# Patient Record
Sex: FEMALE | Race: BLACK OR AFRICAN AMERICAN | Employment: OTHER | URBAN - METROPOLITAN AREA
[De-identification: names, ages, dates, MRNs, and addresses within clinical notes are randomized per-mention and may not be internally consistent; named-entity substitution may affect disease eponyms.]

---

## 2017-01-18 ENCOUNTER — TELEPHONE (OUTPATIENT)
Dept: FAMILY MEDICINE CLINIC | Age: 82
End: 2017-01-18

## 2017-01-18 RX ORDER — DULOXETIN HYDROCHLORIDE 30 MG/1
30 CAPSULE, DELAYED RELEASE ORAL DAILY
Qty: 30 CAP | Refills: 3 | Status: SHIPPED | OUTPATIENT
Start: 2017-01-18 | End: 2017-05-17 | Stop reason: SDUPTHER

## 2017-01-18 NOTE — TELEPHONE ENCOUNTER
Pt states her feet and legs are burning and aching. Please call something in for her and call her back.     Pt# [phone]

## 2017-01-18 NOTE — TELEPHONE ENCOUNTER
Neuropathy is worse since being off of the gabapentin. Will add cymbalta 30 mg daily. Follow up in 1 month.

## 2017-02-17 RX ORDER — GLIPIZIDE 2.5 MG/1
TABLET, EXTENDED RELEASE ORAL
Qty: 90 TAB | Refills: 3 | Status: SHIPPED | OUTPATIENT
Start: 2017-02-17 | End: 2018-02-08 | Stop reason: SDUPTHER

## 2017-02-22 ENCOUNTER — OFFICE VISIT (OUTPATIENT)
Dept: FAMILY MEDICINE CLINIC | Age: 82
End: 2017-02-22

## 2017-02-22 ENCOUNTER — HOSPITAL ENCOUNTER (OUTPATIENT)
Dept: LAB | Age: 82
Discharge: HOME | End: 2017-02-22
Payer: MEDICARE

## 2017-02-22 VITALS
HEIGHT: 64 IN | BODY MASS INDEX: 36.74 KG/M2 | RESPIRATION RATE: 16 BRPM | HEART RATE: 64 BPM | WEIGHT: 215.2 LBS | DIASTOLIC BLOOD PRESSURE: 69 MMHG | OXYGEN SATURATION: 97 % | SYSTOLIC BLOOD PRESSURE: 125 MMHG | TEMPERATURE: 96.3 F

## 2017-02-22 DIAGNOSIS — I50.22 CHRONIC SYSTOLIC HEART FAILURE (HCC): ICD-10-CM

## 2017-02-22 DIAGNOSIS — J01.10 ACUTE NON-RECURRENT FRONTAL SINUSITIS: Primary | ICD-10-CM

## 2017-02-22 DIAGNOSIS — M25.50 ARTHRALGIA OF MULTIPLE JOINTS: ICD-10-CM

## 2017-02-22 DIAGNOSIS — E78.2 MIXED HYPERLIPIDEMIA: ICD-10-CM

## 2017-02-22 DIAGNOSIS — J45.909 REACTIVE AIRWAY DISEASE WITH WHEEZING WITHOUT COMPLICATION: ICD-10-CM

## 2017-02-22 DIAGNOSIS — I10 ESSENTIAL HYPERTENSION, BENIGN: ICD-10-CM

## 2017-02-22 DIAGNOSIS — N18.3 CKD (CHRONIC KIDNEY DISEASE), STAGE 3 (MODERATE): ICD-10-CM

## 2017-02-22 DIAGNOSIS — Z91.09 ENVIRONMENTAL ALLERGIES: ICD-10-CM

## 2017-02-22 DIAGNOSIS — Z51.81 ENCOUNTER FOR MEDICATION MONITORING: ICD-10-CM

## 2017-02-22 DIAGNOSIS — E11.9 TYPE 2 DIABETES MELLITUS WITHOUT COMPLICATION, WITHOUT LONG-TERM CURRENT USE OF INSULIN (HCC): Chronic | ICD-10-CM

## 2017-02-22 DIAGNOSIS — R52 BODY ACHES: ICD-10-CM

## 2017-02-22 LAB
FLUAV+FLUBV AG NOSE QL IA.RAPID: NEGATIVE POS/NEG
FLUAV+FLUBV AG NOSE QL IA.RAPID: NEGATIVE POS/NEG
GLUCOSE POC: 117 MG/DL
HBA1C MFR BLD HPLC: 6 %
S PYO AG THROAT QL: NEGATIVE
VALID INTERNAL CONTROL?: YES
VALID INTERNAL CONTROL?: YES

## 2017-02-22 PROCEDURE — 36415 COLL VENOUS BLD VENIPUNCTURE: CPT

## 2017-02-22 PROCEDURE — 80061 LIPID PANEL: CPT

## 2017-02-22 PROCEDURE — 80053 COMPREHEN METABOLIC PANEL: CPT

## 2017-02-22 RX ORDER — LISINOPRIL 5 MG/1
5 TABLET ORAL DAILY
COMMUNITY
Start: 2017-01-17 | End: 2017-08-10 | Stop reason: SDUPTHER

## 2017-02-22 RX ORDER — PREDNISONE 10 MG/1
10 TABLET ORAL 2 TIMES DAILY
Qty: 8 TAB | Refills: 0 | Status: SHIPPED | OUTPATIENT
Start: 2017-02-22 | End: 2017-02-26

## 2017-02-22 RX ORDER — AMOXICILLIN AND CLAVULANATE POTASSIUM 500; 125 MG/1; MG/1
1 TABLET, FILM COATED ORAL 2 TIMES DAILY
Qty: 20 TAB | Refills: 0 | Status: SHIPPED | OUTPATIENT
Start: 2017-02-22 | End: 2017-03-04

## 2017-02-22 NOTE — PROGRESS NOTES
Chief Complaint   Patient presents with    Leg Pain     follow up on left leg pain    Pressure Behind the Eyes     pt c/o sinus pressure    Cold Symptoms     pt c/o runny nose    Cough         BMP 12/19/2016    A1C  12/19/2016    Lipid 9/16/2016    Microalbumin 1/18/2017

## 2017-02-22 NOTE — MR AVS SNAPSHOT
Visit Information Date & Time Provider Department Dept. Phone Encounter #  
 2/22/2017  9:00 AM Madeleine RichardsBharat [phone] [id number] Follow-up Instructions Return in about 3 months (around 5/22/2017), or if symptoms worsen or fail to improve. Your Appointments 3/15/2017  8:45 AM  
ROUTINE CARE with Madeleine Richards MD  
[facility] [address]) Appt Note: fasting labs, 3 mon fu  
 [address] [place] [address] [phone]  
[phone] [address] [phone]  
  
    
 3/22/2017 10:00 AM  
6 MONTH with Jerry Jara MD  
[facility]) Appt Note: .; 10-10 pt resched -lj [address]  
[phone] [address] Upcoming Health Maintenance Date Due  
 EYE EXAM RETINAL OR DILATED Q1 2/22/2017 MEDICARE YEARLY EXAM 3/5/2017 HEMOGLOBIN A1C Q6M 6/19/2017 FOOT EXAM Q1 9/16/2017 LIPID PANEL Q1 9/16/2017 MICROALBUMIN Q1 1/18/2018 GLAUCOMA SCREENING Q2Y 2/22/2018 DTaP/Tdap/Td series (2 - Td) 9/14/2022 Allergies as of 2/22/2017  Review Complete On: 2/22/2017 By: Burgess Rhonda LPN Severity Noted Reaction Type Reactions Gabapentin  10/12/2016    Other (comments) Muscles twitching and jerking Levaquin [Levofloxacin]  03/09/2012    Swelling Percodan [Oxycodone Hcl-oxycodone-asa]  03/09/2012    Itching Current Immunizations  Reviewed on 12/19/2016 Name Date Influenza High Dose Vaccine PF 9/16/2016, 8/25/2015 Influenza Vaccine  Deferred (Patient Refused), 9/30/2014, 9/20/2013 Influenza Vaccine Split 8/31/2012, 9/13/2010 Pneumococcal Conjugate (PCV-13) 8/25/2015 Pneumococcal Vaccine (Pcv) 10/30/2009 TDAP Vaccine 9/14/2012 Zoster Vaccine, Live 3/19/2013 Not reviewed this visit You Were Diagnosed With   
  
 Codes Comments Type 2 diabetes mellitus without complication, without long-term current use of insulin (HCC)    -  Primary ICD-10-CM: E11.9 ICD-9-CM: 250.00 Chronic systolic heart failure (HCC)     ICD-10-CM: I50.22 ICD-9-CM: 428.22 CKD (chronic kidney disease), stage 3 (moderate)     ICD-10-CM: N18.3 ICD-9-CM: 180. 3 Arthralgia of multiple joints     ICD-10-CM: M25.50 ICD-9-CM: 719.49 Encounter for medication monitoring     ICD-10-CM: Z51.81 
ICD-9-CM: V58.83 Essential hypertension, benign     ICD-10-CM: I10 
ICD-9-CM: 401.1 Environmental allergies     ICD-10-CM: Z91.09 
ICD-9-CM: V15.09 Reactive airway disease with wheezing without complication     P-96-LN: J45.909 ICD-9-CM: 493.90 Upper respiratory tract infection, unspecified type     ICD-10-CM: J06.9 ICD-9-CM: 465.9 Body aches     ICD-10-CM: R52 ICD-9-CM: 780.96 Mixed hyperlipidemia     ICD-10-CM: E78.2 ICD-9-CM: 272.2 Vitals BP  
  
  
  
  
  
 125/69 (BP 1 Location: Left arm, BP Patient Position: Sitting) Vitals History BMI and BSA Data Body Mass Index Body Surface Area  
 36.94 kg/m 2 2.1 m 2 Preferred Pharmacy Pharmacy Name Phone [address], [address] [place] [place] [address] [phone] Your Updated Medication List  
  
   
This list is accurate as of: 2/22/17 10:37 AM.  Always use your most recent med list.  
  
  
  
  
 albuterol-ipratropium 2.5 mg-0.5 mg/3 ml Nebu Commonly known as:  DUO-NEB  
3 mL by Nebulization route. ALPRAZolam 0.5 mg tablet Commonly known as:  XANAX  
TAKE ONE-HALF TO ONE TABLET BY MOUTH AT BEDTIME AS NEEDED FOR SLEEP  
  
 amoxicillin-clavulanate 500-125 mg per tablet Commonly known as:  AUGMENTIN Take 1 Tab by mouth two (2) times a day for 10 days. azelastine 137 mcg (0.1 %) nasal spray Commonly known as:  ASTELIN  
1 Spray by Both Nostrils route daily. biotin 300 mcg Tab Take 1 Tab by mouth daily. For hair, skin, and nails  
  
 carvedilol 25 mg tablet Commonly known as:  COREG  
TAKE ONE TABLET BY MOUTH TWICE DAILY  
  
 diazePAM 5 mg tablet Commonly known as:  VALIUM  
TAKE ONE TABLET EVERY 6 HOURS AS NEEDED FOR ANXIETY DULoxetine 30 mg capsule Commonly known as:  CYMBALTA Take 1 Cap by mouth daily. Indications: NEUROPATHIC PAIN  
  
 fluticasone 50 mcg/actuation nasal spray Commonly known as:  FLONASE  
USE TWO SPRAYS EACH NOSTRIL EVERY DAY  
  
 furosemide 20 mg tablet Commonly known as:  LASIX TAKE ONE TABLET BY MOUTH EACH MORNING AND ONE TABLET EACH AFTERNOON BETWEEN 2PM AND 4PM. glipiZIDE SR 2.5 mg CR tablet Commonly known as:  GLUCOTROL XL  
TAKE ONE TABLET BY MOUTH DAILY HYDROcodone-acetaminophen  mg tablet Commonly known as:  [facility] Take 1 Tab by mouth every six (6) hours as needed. lisinopril 5 mg tablet Commonly known as:  PRINIVIL, ZESTRIL  
  
 mometasone 0.1 % topical cream  
Commonly known as:  Kym Boo Apply  to affected area daily as needed for Skin Irritation. MUCINEX D  mg per tablet Generic drug:  PSEUDOEPHEDRINE-guaiFENesin Take 1 Tab by mouth daily as needed. ondansetron hcl 4 mg tablet Commonly known as:  Gabrielle Crown Take 4 mg by mouth every eight (8) hours as needed. pantoprazole 40 mg tablet Commonly known as:  PROTONIX  
TAKE ONE TABLET BY MOUTH DAILY predniSONE 10 mg tablet Commonly known as:  Becky Pound Take 1 Tab by mouth two (2) times a day for 4 days. promethazine-dextromethorphan 6.25-15 mg/5 mL syrup Commonly known as:  PROMETHAZINE-DM  
TAKE ONE TEASPOONFUL BY MOUTH EVERY SIX HOURS AS NEEDED FOR COUGH  
  
 simvastatin 40 mg tablet Commonly known as:  ZOCOR  
TAKE ONE TABLET BY MOUTH EVERY NIGHT AT BEDTIME  
  
 tiotropium-olodaterol 2.5-2.5 mcg/actuation Mist  
Take 2 Puffs by inhalation daily. VENTOLIN HFA 90 mcg/actuation inhaler Generic drug:  albuterol INHALE 2 PUFFS BY MOUTH EVERY 4 HOURS AS NEEDED FOR WHEEZING. Prescriptions Sent to Pharmacy Refills  
 amoxicillin-clavulanate (AUGMENTIN) 500-125 mg per tablet 0 Sig: Take 1 Tab by mouth two (2) times a day for 10 days. Class: Normal  
 Pharmacy: [address] [place] [address] #: [phone] Route: Oral  
 predniSONE (DELTASONE) 10 mg tablet 0 Sig: Take 1 Tab by mouth two (2) times a day for 4 days. Class: Normal  
 Pharmacy: [address] [place] [address] Ph #: [phone] Route: Oral  
  
We Performed the Following AMB POC COMPLETE CBC, AUTOMATED [87343 CPT(R)] AMB POC GLUCOSE, QUANTITATIVE, BLOOD [48260 CPT(R)] AMB POC HEMOGLOBIN A1C [56527 CPT(R)] AMB POC RAPID STREP A [12233 CPT(R)] AMB POC MARIAM INFLUENZA A/B TEST [79819 CPT(R)] LIPID PANEL [04069 CPT(R)] METABOLIC PANEL, COMPREHENSIVE [92777 CPT(R)] Follow-up Instructions Return in about 3 months (around 5/22/2017), or if symptoms worsen or fail to improve. To-Do List   
 02/22/2017 Imaging:  XR CHEST PA LAT Please provide this summary of care documentation to your next provider. Your primary care clinician is listed as Ziyad Bishop. If you have any questions after today's visit, please call [phone].

## 2017-02-22 NOTE — PROGRESS NOTES
HISTORY OF PRESENT ILLNESS  Gely To is a 80 y.o. female. HPI   Follow up on chronic medical problems. C/o nasal congestion, headache and pressure in the face and cough for the past 2 weeks. Coughing up thick yellowish phlegm. No fever or chills noted. Has malaise and body aches. Throat is scratchy. Has post nasal drainage. No chest pain or SOB. No wheezing noted. Cardiovascular Review:  The patient has hypertension, hyperlipidemia and Systolic HF. Diet and Lifestyle: generally follows a low fat low cholesterol diet, generally follows a low sodium diet  Home BP Monitoring: is not measured at home. Pertinent ROS: taking medications as instructed, no medication side effects noted, no TIA's, no chest pain on exertion, no dyspnea on exertion, noting that her ankles has been swelling more over the past few weeks. DM type II follow up:  Compliant w/ meds, diabetic diet, and exercise. Obtains home glucose monitoring averaging in the mid 100s. Checks BS daily on most days and prn. Pt does not have BS log at visit today. No Rf needed for today. Denies any tingling sensation, polyuria and polydipsia. No blurred vision. No significant weight changes. Osteoarthritis:  Patient has osteoarthritis in multiple joints but primarily in the knees and back. Symptoms onset: problem is longstanding. Rheumatological ROS: stable, mild-to-moderate joint symptoms intermittently, reasonably well controlled by PRN meds. Response to treatment plan: stable and intermittent. Asthma Review:  The patient is being seen for follow up of allergies, chronic cough and chronic sinusitits, not currently in exacerbation. Overall still feel short winded at times. Has had increased in hoarseness over the past several weeks. Has been seeing pulmonary and not sure if asthma or COPD per pt. Using nebulizer 3 to 4 times in a day. Regimen compliance:  The patient reports adherence to asthma action plan and regimen. Patient Active Problem List   Diagnosis Code    CHF (congestive heart failure) ([facility]) I50.9    S/P TKR (total knee replacement) Z96.659    Anemia D64.9    s/p lumbar laminectomy Z98.890    S/P ASAD (total abdominal hysterectomy) Z90.710    S/P hemorrhoidectomy Z98.890, Z87.19    S/P rotator cuff surgery Z98.890    DM (diabetes mellitus) ([facility] [address] E11.9    Chronic sinusitis J32.9    S/P sinus surgery Z98.890    Dyslipidemia E78.5    Environmental allergies Z91.09    Obstructive sleep apnea (adult) (pediatric) G47.33    DJD (degenerative joint disease) M19.90    Chronic systolic heart failure ([facility]) I50.22    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    Sepsis ([facility]) A41.9    Acute respiratory failure ([facility]) J96.00    Back pain M54.9    Asthma J45.909    Anxiety disorder F41.9    Diabetic neuropathy ([facility]) E11.40    Esophageal reflux K21.9    Essential hypertension, benign I10    Reactive airway disease with wheezing without complication Y79.248    Encounter for medication monitoring Z51.81    CKD (chronic kidney disease) N18.9    Hyperkalemia E87.5    Arthralgia of multiple joints M25.50       Current Outpatient Prescriptions   Medication Sig Dispense Refill    lisinopril (PRINIVIL, ZESTRIL) 5 mg tablet       amoxicillin-clavulanate (AUGMENTIN) 500-125 mg per tablet Take 1 Tab by mouth two (2) times a day for 10 days. 20 Tab 0    predniSONE (DELTASONE) 10 mg tablet Take 1 Tab by mouth two (2) times a day for 4 days. 8 Tab 0    glipiZIDE SR (GLUCOTROL XL) 2.5 mg CR tablet TAKE ONE TABLET BY MOUTH DAILY 90 Tab 3    promethazine-dextromethorphan (PROMETHAZINE-DM) 6.25-15 mg/5 mL syrup TAKE ONE TEASPOONFUL BY MOUTH EVERY SIX HOURS AS NEEDED FOR COUGH 240 mL 1    fluticasone (FLONASE) 50 mcg/actuation nasal spray USE TWO SPRAYS EACH NOSTRIL EVERY DAY 16 g 8    DULoxetine (CYMBALTA) 30 mg capsule Take 1 Cap by mouth daily.  Indications: NEUROPATHIC PAIN 30 Cap 3    pantoprazole (PROTONIX) 40 mg tablet TAKE ONE TABLET BY MOUTH DAILY 30 Tab 6    ondansetron hcl (ZOFRAN) 4 mg tablet Take 4 mg by mouth every eight (8) hours as needed.  HYDROcodone-acetaminophen (NORCO)  mg tablet Take 1 Tab by mouth every six (6) hours as needed. 60 Tab 0    mometasone (ELOCON) 0.1 % topical cream Apply  to affected area daily as needed for Skin Irritation. 15 g 1    ALPRAZolam (XANAX) 0.5 mg tablet TAKE ONE-HALF TO ONE TABLET BY MOUTH AT BEDTIME AS NEEDED FOR SLEEP 30 Tab 4    azelastine (ASTELIN) 137 mcg (0.1 %) nasal spray 1 [place] by Both Nostrils route daily.  diazepam (VALIUM) 5 mg tablet TAKE ONE TABLET EVERY 6 HOURS AS NEEDED FOR ANXIETY 60 Tab 3    furosemide (LASIX) 20 mg tablet TAKE ONE TABLET BY MOUTH EACH MORNING AND ONE TABLET EACH AFTERNOON BETWEEN 2PM AND 4PM. 180 Tab 1    albuterol-ipratropium (DUO-NEB) 2.5 mg-0.5 mg/3 ml nebu 3 mL by Nebulization route.  simvastatin (ZOCOR) 40 mg tablet TAKE ONE TABLET BY MOUTH EVERY NIGHT AT BEDTIME 90 Tab 2    carvedilol (COREG) 25 mg tablet TAKE ONE TABLET BY MOUTH TWICE DAILY 180 Tab 3    VENTOLIN HFA 90 mcg/actuation inhaler INHALE 2 PUFFS BY MOUTH EVERY 4 HOURS AS NEEDED FOR WHEEZING. 1 Inhaler 9    tiotropium-olodaterol 2.5-2.5 mcg/actuation mist Take 2 Puffs by inhalation daily.  PSEUDOEPHEDRINE-guaiFENesin (MUCINEX D)  mg per tablet Take 1 Tab by mouth daily as needed.  biotin 300 mcg tab Take 1 Tab by mouth daily.  For hair, skin, and nails         Allergies   Allergen Reactions    Gabapentin Other (comments)     Muscles twitching and jerking    Levaquin [Levofloxacin] Swelling    Percodan [Oxycodone Hcl-Oxycodone-Asa] Itching       Past Medical History:   Diagnosis Date    Anemia 3/3/2010    Arrhythmia     irregular heartbeat    Arthritis     CHF (congestive heart failure) (HCC) 3/3/2010    Chronic pain     back    Chronic sinusitis 3/3/2010    CPAP (continuous positive airway pressure) dependence     Diverticulosis     DM (diabetes mellitus) ([facility] [address] 3/3/2010    Dyslipidemia 3/3/2010    GERD (gastroesophageal reflux disease)     HTN (hypertension) 3/3/2010    Ill-defined condition     being evaluated py pulmonolgist for \"trouble breathing\"    S/P TKR (total knee replacement) 3/3/2010    Unspecified sleep apnea     doesn't wear CPAP since back has been hurting       Past Surgical History:   Procedure Laterality Date    HX APPENDECTOMY      thinks it was taken with hysterectomy    HX GYN      \"tubes opened\"    HX HEENT      sinus surgery    HX HEMORRHOIDECTOMY      HX HYSTERECTOMY      HX KNEE REPLACEMENT  1996    both knees- at same time    HX LUMBAR LAMINECTOMY  1980s    HX MOHS PROCEDURES      left shoulder    HX ORTHOPAEDIC  1980    neck fusion    HX ORTHOPAEDIC  1999    right knee replaced for second time    HX ORTHOPAEDIC      lumbar fusion    HX OTHER SURGICAL      [place] COLONOSCOPY FLX DX W/COLLJ SPEC WHEN PFRMD  [id number]    dr. Augusta Sandifer (barium enema)       Family History   Problem Relation Age of Onset    Diabetes Mother     Heart Disease Father     Diabetes Brother     Blindness Brother     Diabetes Sister     Heart Disease Sister     Heart Disease Brother     Heart Disease Brother     Asthma Brother     Malignant Hyperthermia Neg Hx     Pseudocholinesterase Deficiency Neg Hx     Delayed Awakening Neg Hx     Post-op Nausea/Vomiting Neg Hx     Emergence Delirium Neg Hx     Post-op Cognitive Dysfunction Neg Hx        Social History   Substance Use Topics    Smoking status: Former Smoker     Packs/day: 0.30     Years: 5.00     Quit date: 1/1/1960    Smokeless tobacco: Never Used    Alcohol use No        Lab Results  Component Value Date/Time   WBC 5.4 10/05/2016 01:13 PM   HGB 10.5 10/05/2016 01:13 PM   HCT 33.4 10/05/2016 01:13 PM   PLATELET 721 55/50/9422 01:13 PM   MCV 96 10/05/2016 01:13 PM       Lab Results  Component Value Date/Time   Cholesterol, total 173 09/16/2016 09:16 AM   HDL Cholesterol 70 09/16/2016 09:16 AM   LDL, calculated 80 09/16/2016 09:16 AM   Triglyceride 114 09/16/2016 09:16 AM   CHOL/HDL Ratio 2.0 06/11/2010 02:10 PM       Lab Results   Component Value Date/Time    Glucose 136 12/19/2016 04:24 PM    Glucose (POC) 127 05/02/2016 07:40 AM    Glucose  02/22/2017 09:35 AM      Lab Results   Component Value Date/Time    Sodium 145 12/19/2016 04:24 PM    Potassium 5.3 12/19/2016 04:24 PM    Chloride 104 12/19/2016 04:24 PM    CO2 24 12/19/2016 04:24 PM    Anion gap 3 12/17/2015 11:47 AM    Glucose 136 12/19/2016 04:24 PM    BUN 25 12/19/2016 04:24 PM    Creatinine 1.78 12/19/2016 04:24 PM    BUN/Creatinine ratio 14 12/19/2016 04:24 PM    GFR est AA 30 12/19/2016 04:24 PM    GFR est non-AA 26 12/19/2016 04:24 PM    Calcium 9.7 12/19/2016 04:24 PM    Bilirubin, total 0.3 09/16/2016 09:16 AM    ALT (SGPT) 12 09/16/2016 09:16 AM    AST (SGOT) 17 09/16/2016 09:16 AM    Alk. phosphatase 63 09/16/2016 09:16 AM    Protein, total 6.4 09/16/2016 09:16 AM    Albumin 4.2 09/16/2016 09:16 AM    Globulin 3.4 12/17/2015 11:47 AM    A-G Ratio 1.9 09/16/2016 09:16 AM      Lab Results   Component Value Date/Time    Hemoglobin A1c 5.9 02/11/2014 08:26 AM    Hemoglobin A1c (POC) 6.0 02/22/2017 09:35 AM         Review of Systems   Constitutional: Positive for malaise/fatigue. HENT: Positive for congestion and sore throat. Eyes: Negative for blurred vision. Respiratory: Positive for cough. Negative for shortness of breath. Cardiovascular: Negative for chest pain, palpitations and leg swelling. Gastrointestinal: Negative for abdominal pain, constipation and heartburn. Genitourinary: Negative for dysuria, frequency and urgency. Musculoskeletal: Negative for back pain and joint pain. Neurological: Positive for headaches. Negative for dizziness and tingling. Endo/Heme/Allergies: Positive for environmental allergies.    Psychiatric/Behavioral: Negative for depression. The patient does not have insomnia. Physical Exam   Constitutional: She appears well-developed and well-nourished. /69 (BP 1 Location: Left arm, BP Patient Position: Sitting)  Pulse 64  Temp 96.3 °F (35.7 °C) (Oral)   Resp 16  Ht 5' 4\" (1.626 m)  Wt 215 lb 3.2 oz (97.6 kg)  LMP 03/04/1961 (Approximate)  SpO2 97%   L/min  BMI 36.94 kg/m2     HENT:   Right Ear: Tympanic membrane and ear canal normal.   Left Ear: Tympanic membrane and ear canal normal.   Nose: Mucosal edema and rhinorrhea present. Right sinus exhibits frontal sinus tenderness. Left sinus exhibits frontal sinus tenderness. Mouth/Throat: Mucous membranes are normal. Posterior oropharyngeal erythema present. No oropharyngeal exudate. Neck: Trachea normal, normal range of motion and full passive range of motion without pain. Neck supple. No edema present. No thyromegaly present. Cardiovascular: Normal rate and regular rhythm. No murmur heard. Pulmonary/Chest: Effort normal and breath sounds normal. She has no wheezes. She has no rales. Abdominal: Soft. Normal appearance and bowel sounds are normal. There is no tenderness. Musculoskeletal: Normal range of motion. She exhibits no edema. Lymphadenopathy:     She has no cervical adenopathy. Skin: Skin is warm and dry. Psychiatric: She has a normal mood and affect. Nursing note and vitals reviewed. ASSESSMENT and Chao Part was seen today for leg pain, pressure behind the eyes, cold symptoms and cough. Diagnoses and all orders for this visit:    Acute non-recurrent frontal sinusitis  -     AMB POC RAPID STREP A  -     XR CHEST PA LAT; Future  -     amoxicillin-clavulanate (AUGMENTIN) 500-125 mg per tablet; Take 1 Tab by mouth two (2) times a day for 10 days. -     predniSONE (DELTASONE) 10 mg tablet; Take 1 Tab by mouth two (2) times a day for 4 days. Body aches  -     AMB POC MARIAM INFLUENZA A/B TEST  -  Negative.       Essential hypertension, benign  Stable     Type 2 diabetes mellitus without complication, without long-term current use of insulin (HCC)  -     AMB POC HEMOGLOBIN A1C  -     AMB POC GLUCOSE, QUANTITATIVE, BLOOD    Chronic systolic heart failure (HCC)  Stable     CKD (chronic kidney disease), stage 3 (moderate)  Stable     Arthralgia of multiple joints  Stable     Environmental allergies//  Reactive airway disease with wheezing without complication  Continue with nebulizer. Mixed hyperlipidemia  -     LIPID PANEL    Encounter for medication monitoring  -     METABOLIC PANEL, COMPREHENSIVE  -     AMB POC COMPLETE CBC, AUTOMATED    Follow-up Disposition:  Return in about 3 months (around 5/22/2017), or if symptoms worsen or fail to improve. reviewed diet, exercise and weight control  cardiovascular risk and specific lipid/LDL goals reviewed  reviewed medications and side effects in detail  specific diabetic recommendations: low cholesterol diet, weight control and daily exercise discussed, all medications, side effects and compliance discussed carefully, foot care discussed and Podiatry visits discussed, annual eye examinations at Ophthalmology discussed and glycohemoglobin and other lab monitoring discussed     I have discussed diagnosis listed in this note with pt and/or family. I have discussed treatment plans and options and the risk/benefit analysis of those options, including safe use of medications and possible medication side effects. Through the use of shared decision making we have agreed to the above plan. The patient has received an after-visit summary and questions were answered concerning future plans and follow up. Advise pt of any urgent changes then to proceed to the ER.

## 2017-03-22 ENCOUNTER — OFFICE VISIT (OUTPATIENT)
Dept: CARDIOLOGY CLINIC | Age: 82
End: 2017-03-22

## 2017-03-22 VITALS
HEIGHT: 64 IN | DIASTOLIC BLOOD PRESSURE: 76 MMHG | BODY MASS INDEX: 37.78 KG/M2 | SYSTOLIC BLOOD PRESSURE: 116 MMHG | RESPIRATION RATE: 18 BRPM | WEIGHT: 221.3 LBS

## 2017-03-22 DIAGNOSIS — E78.5 DYSLIPIDEMIA: Chronic | ICD-10-CM

## 2017-03-22 DIAGNOSIS — I50.22 CHRONIC SYSTOLIC HEART FAILURE (HCC): Primary | ICD-10-CM

## 2017-03-22 DIAGNOSIS — I10 ESSENTIAL HYPERTENSION, BENIGN: ICD-10-CM

## 2017-03-22 DIAGNOSIS — E11.9 TYPE 2 DIABETES MELLITUS WITHOUT COMPLICATION, WITHOUT LONG-TERM CURRENT USE OF INSULIN (HCC): Chronic | ICD-10-CM

## 2017-03-22 NOTE — PROGRESS NOTES
Subjective/HPI:     Rian Oreilly is a 80 y.o. female is here for f/u appt. The patient denies chest pain/ shortness of breath, orthopnea, PND, LE edema, palpitations, syncope, presyncope or fatigue. Doing well.          PCP Provider  Johnny Nguyen MD  Past Medical History:   Diagnosis Date    Anemia 3/3/2010    Arrhythmia     irregular heartbeat    Arthritis     CHF (congestive heart failure) ([facility] [address] 3/3/2010    Chronic pain     back    Chronic sinusitis 3/3/2010    CPAP (continuous positive airway pressure) dependence     Diverticulosis     DM (diabetes mellitus) ([facility] [address] 3/3/2010    Dyslipidemia 3/3/2010    GERD (gastroesophageal reflux disease)     HTN (hypertension) 3/3/2010    Ill-defined condition     being evaluated py pulmonolgist for \"trouble breathing\"    S/P TKR (total knee replacement) 3/3/2010    Unspecified sleep apnea     doesn't wear CPAP since back has been hurting      Past Surgical History:   Procedure Laterality Date    HX APPENDECTOMY      thinks it was taken with hysterectomy    HX GYN      \"tubes opened\"    HX HEENT      sinus surgery    HX HEMORRHOIDECTOMY      HX HYSTERECTOMY      HX KNEE REPLACEMENT  1996    both knees- at same time    HX LUMBAR LAMINECTOMY  1980s    HX MOHS PROCEDURES      left shoulder    HX ORTHOPAEDIC  1980    neck fusion    HX ORTHOPAEDIC  1999    right knee replaced for second time    HX ORTHOPAEDIC      lumbar fusion    HX OTHER SURGICAL      NC COLONOSCOPY FLX DX W/COLLJ SPEC WHEN PFRMD  [id number]    dr. Veronica Mckeon (barium enema)     Allergies   Allergen Reactions    Gabapentin Other (comments)     Muscles twitching and jerking    Levaquin [Levofloxacin] Swelling    Percodan [Oxycodone Hcl-Oxycodone-Asa] Itching      Family History   Problem Relation Age of Onset    Diabetes Mother     Heart Disease Father     Diabetes Brother     Blindness Brother     Diabetes Sister     Heart Disease Sister     Heart Disease Brother     Heart Disease Brother     Asthma Brother     Malignant Hyperthermia Neg Hx     Pseudocholinesterase Deficiency Neg Hx     Delayed Awakening Neg Hx     Post-op Nausea/Vomiting Neg Hx     Emergence Delirium Neg Hx     Post-op Cognitive Dysfunction Neg Hx       Current Outpatient Prescriptions   Medication Sig    lisinopril (PRINIVIL, ZESTRIL) 5 mg tablet     glipiZIDE SR (GLUCOTROL XL) 2.5 mg CR tablet TAKE ONE TABLET BY MOUTH DAILY    promethazine-dextromethorphan (PROMETHAZINE-DM) 6.25-15 mg/5 mL syrup TAKE ONE TEASPOONFUL BY MOUTH EVERY SIX HOURS AS NEEDED FOR COUGH    fluticasone (FLONASE) 50 mcg/actuation nasal spray USE TWO SPRAYS EACH NOSTRIL EVERY DAY    DULoxetine (CYMBALTA) 30 mg capsule Take 1 Cap by mouth daily. Indications: NEUROPATHIC PAIN    pantoprazole (PROTONIX) 40 mg tablet TAKE ONE TABLET BY MOUTH DAILY    ondansetron hcl (ZOFRAN) 4 mg tablet Take 4 mg by mouth every eight (8) hours as needed.  HYDROcodone-acetaminophen (NORCO)  mg tablet Take 1 Tab by mouth every six (6) hours as needed.  mometasone (ELOCON) 0.1 % topical cream Apply  to affected area daily as needed for Skin Irritation.  ALPRAZolam (XANAX) 0.5 mg tablet TAKE ONE-HALF TO ONE TABLET BY MOUTH AT BEDTIME AS NEEDED FOR SLEEP    azelastine (ASTELIN) 137 mcg (0.1 %) nasal spray 1 [place] by Both Nostrils route daily.  diazepam (VALIUM) 5 mg tablet TAKE ONE TABLET EVERY 6 HOURS AS NEEDED FOR ANXIETY    furosemide (LASIX) 20 mg tablet TAKE ONE TABLET BY MOUTH EACH MORNING AND ONE TABLET EACH AFTERNOON BETWEEN 2PM AND 4PM.    albuterol-ipratropium (DUO-NEB) 2.5 mg-0.5 mg/3 ml nebu 3 mL by Nebulization route.  simvastatin (ZOCOR) 40 mg tablet TAKE ONE TABLET BY MOUTH EVERY NIGHT AT BEDTIME    carvedilol (COREG) 25 mg tablet TAKE ONE TABLET BY MOUTH TWICE DAILY    VENTOLIN HFA 90 mcg/actuation inhaler INHALE 2 PUFFS BY MOUTH EVERY 4 HOURS AS NEEDED FOR WHEEZING.     tiotropium-olodaterol 2.5-2.5 mcg/actuation mist Take 2 Puffs by inhalation daily.  PSEUDOEPHEDRINE-guaiFENesin (MUCINEX D)  mg per tablet Take 1 Tab by mouth daily as needed.  biotin 300 mcg tab Take 1 Tab by mouth daily. For hair, skin, and nails     No current facility-administered medications for this visit. Vitals:    03/22/17 0938 03/22/17 0947   BP: 116/80 116/76   Resp: 18    Weight: 221 lb 4.8 oz (100.4 kg)    Height: 5' 4\" (1.626 m)      Social History     Social History    Marital status:      Spouse name: N/A    Number of children: N/A    Years of education: N/A     Occupational History    Not on file. Social History Main Topics    Smoking status: Former Smoker     Packs/day: 0.30     Years: 5.00     Quit date: 1/1/1960    Smokeless tobacco: Never Used    Alcohol use No    Drug use: No    Sexual activity: No     Other Topics Concern    Not on file     Social History Narrative    ** Merged History Encounter **            I have reviewed the nurses notes, vitals, problem list, allergy list, medical history, family, social history and medications. Review of Symptoms:    General: Pt denies excessive weight gain or loss. Pt is able to conduct ADL's  HEENT: Denies blurred vision, headaches, epistaxis and difficulty swallowing. Respiratory: Denies shortness of breath, HIGH, wheezing or stridor. Cardiovascular: Denies precordial pain, palpitations, edema or PND  Gastrointestinal: Denies poor appetite, indigestion, abdominal pain or blood in stool  Urinary: Denies dysuria, pyuria  Musculoskeletal: Denies pain or swelling from muscles or joints  Neurologic: Denies tremor, paresthesias, or sensory motor disturbance  Skin: Denies rash, itching or texture change. Psych: Denies depression        Physical Exam:      General: Well developed, in no acute distress, cooperative and alert  HEENT: No carotid bruits, no JVD, trach is midline. Neck Supple, PEERL, EOM intact.   Heart:  Normal S1/S2 negative S3 or S4. Regular, no murmur, gallop or rub.   Respiratory: Clear bilaterally x 4, no wheezing or rales  Abdomen:   Soft, non-tender, no masses, bowel sounds are active.   Extremities:  No edema, normal cap refill, no cyanosis, atraumatic. Neuro: A&Ox3, speech clear, gait stable. Skin: Skin color is normal. No rashes or lesions. Non diaphoretic  Vascular: 2+ pulses symmetric in all extremities    Cardiographics    ECG: Sinus  Rhythm HR 70  -Poor R-wave progression    -  Nonspecific T-abnormality.      Results for orders placed or performed during the hospital encounter of 12/17/15   EKG, 12 LEAD, INITIAL   Result Value Ref Range    Ventricular Rate 78 BPM    Atrial Rate 78 BPM    P-R Interval 164 ms    QRS Duration 96 ms    Q-T Interval 400 ms    QTC Calculation (Bezet) 456 ms    Calculated P Axis 53 degrees    Calculated R Axis -35 degrees    Calculated T Axis 52 degrees    Diagnosis       Normal sinus rhythm  Left axis deviation  Nonspecific T wave abnormality  When compared with ECG of 16-OCT-2015 10:27,  premature ventricular complexes are no longer present  Criteria for Septal infarct are no longer present  Confirmed by Domingo Fowler, P.VBrittney ([id number]) on 12/17/2015 12:18:40 PM           Cardiology Labs:  Lab Results   Component Value Date/Time    Cholesterol, total 117 02/22/2017 10:23 AM    HDL Cholesterol 23 02/22/2017 10:23 AM    LDL, calculated 49 02/22/2017 10:23 AM    Triglyceride 225 02/22/2017 10:23 AM    CHOL/HDL Ratio 2.0 06/11/2010 02:10 PM       Lab Results   Component Value Date/Time    Sodium 145 02/22/2017 10:23 AM    Potassium 4.9 02/22/2017 10:23 AM    Chloride 104 02/22/2017 10:23 AM    CO2 26 02/22/2017 10:23 AM    Anion gap 3 12/17/2015 11:47 AM    Glucose 112 02/22/2017 10:23 AM    BUN 18 02/22/2017 10:23 AM    Creatinine 1.22 02/22/2017 10:23 AM    BUN/Creatinine ratio 15 02/22/2017 10:23 AM    GFR est AA 47 02/22/2017 10:23 AM    GFR est non-AA 40 02/22/2017 10:23 AM Calcium 9.5 02/22/2017 10:23 AM    AST (SGOT) 20 02/22/2017 10:23 AM    Alk. phosphatase 97 02/22/2017 10:23 AM    Protein, total 6.9 02/22/2017 10:23 AM    Albumin 4.1 02/22/2017 10:23 AM    Globulin 3.4 12/17/2015 11:47 AM    A-G Ratio 1.5 02/22/2017 10:23 AM    ALT (SGPT) 22 02/22/2017 10:23 AM           Assessment:     Assessment:     Lupillo Pritchett was seen today for hypertension. Diagnoses and all orders for this visit:    Chronic systolic heart failure (HCC)    Essential hypertension, benign  -     AMB POC EKG ROUTINE W/ 12 LEADS, INTER & REP    Type 2 diabetes mellitus without complication, without long-term current use of insulin ([facility] [address]    Dyslipidemia        ICD-10-CM ICD-9-CM    1. Chronic systolic heart failure (HCC) I50.22 428.22    2. Essential hypertension, benign I10 401.1 AMB POC EKG ROUTINE W/ 12 LEADS, INTER & REP   3. Type 2 diabetes mellitus without complication, without long-term current use of insulin (HCC) E11.9 250.00    4. Dyslipidemia E78.5 272.4      Orders Placed This Encounter    AMB POC EKG ROUTINE W/ 12 LEADS, INTER & REP     Order Specific Question:   Reason for Exam:     Answer:   routine        Plan:     Patient presents today for f/u appt and denies cardiac complaints. She remains in SR and is normotensive. Lipids and labs managed by PCP and at goal. Continue current care and f/u in 6 months.     Ga Meier MD

## 2017-03-24 ENCOUNTER — LAB ONLY (OUTPATIENT)
Dept: FAMILY MEDICINE CLINIC | Age: 82
End: 2017-03-24

## 2017-03-24 ENCOUNTER — HOSPITAL ENCOUNTER (OUTPATIENT)
Dept: LAB | Age: 82
Discharge: HOME | End: 2017-03-24
Payer: MEDICARE

## 2017-03-24 DIAGNOSIS — E78.5 DYSLIPIDEMIA: Primary | Chronic | ICD-10-CM

## 2017-03-24 PROCEDURE — 80061 LIPID PANEL: CPT

## 2017-03-24 PROCEDURE — 80053 COMPREHEN METABOLIC PANEL: CPT

## 2017-03-25 LAB
ALBUMIN SERPL-MCNC: 4.4 G/DL (ref 3.5–4.7)
ALBUMIN/GLOB SERPL: 2 {RATIO} (ref 1.2–2.2)
ALP SERPL-CCNC: 69 IU/L (ref 39–117)
ALT SERPL-CCNC: 15 IU/L (ref 0–32)
AST SERPL-CCNC: 21 IU/L (ref 0–40)
BILIRUB SERPL-MCNC: 0.2 MG/DL (ref 0–1.2)
BUN SERPL-MCNC: 21 MG/DL (ref 8–27)
BUN/CREAT SERPL: 18 (ref 11–26)
CALCIUM SERPL-MCNC: 10 MG/DL (ref 8.7–10.3)
CHLORIDE SERPL-SCNC: 102 MMOL/L (ref 96–106)
CHOLEST SERPL-MCNC: 168 MG/DL (ref 100–199)
CO2 SERPL-SCNC: 27 MMOL/L (ref 18–29)
CREAT SERPL-MCNC: 1.18 MG/DL (ref 0.57–1)
GLOBULIN SER CALC-MCNC: 2.2 G/DL (ref 1.5–4.5)
GLUCOSE SERPL-MCNC: 127 MG/DL (ref 65–99)
HDLC SERPL-MCNC: 85 MG/DL
INTERPRETATION, 910389: NORMAL
INTERPRETATION: NORMAL
LDLC SERPL CALC-MCNC: 69 MG/DL (ref 0–99)
PDF IMAGE, 910387: NORMAL
POTASSIUM SERPL-SCNC: 4.2 MMOL/L (ref 3.5–5.2)
PROT SERPL-MCNC: 6.6 G/DL (ref 6–8.5)
SODIUM SERPL-SCNC: 146 MMOL/L (ref 134–144)
TRIGL SERPL-MCNC: 71 MG/DL (ref 0–149)
VLDLC SERPL CALC-MCNC: 14 MG/DL (ref 5–40)

## 2017-03-30 ENCOUNTER — HOSPITAL ENCOUNTER (OUTPATIENT)
Dept: MAMMOGRAPHY | Age: 82
Discharge: HOME | End: 2017-03-30
Attending: FAMILY MEDICINE
Payer: MEDICARE

## 2017-03-30 DIAGNOSIS — Z12.31 VISIT FOR SCREENING MAMMOGRAM: ICD-10-CM

## 2017-03-30 PROCEDURE — 77067 SCR MAMMO BI INCL CAD: CPT

## 2017-05-08 RX ORDER — ALBUTEROL SULFATE 90 UG/1
AEROSOL, METERED RESPIRATORY (INHALATION)
Qty: 1 INHALER | Refills: 8 | Status: SHIPPED | OUTPATIENT
Start: 2017-05-08 | End: 2017-09-08 | Stop reason: SDUPTHER

## 2017-05-17 DIAGNOSIS — F51.01 PRIMARY INSOMNIA: ICD-10-CM

## 2017-05-17 RX ORDER — ALPRAZOLAM 0.5 MG/1
TABLET ORAL
Qty: 30 TAB | Refills: 3 | OUTPATIENT
Start: 2017-05-17 | End: 2017-09-25 | Stop reason: SDUPTHER

## 2017-05-17 RX ORDER — DULOXETIN HYDROCHLORIDE 30 MG/1
CAPSULE, DELAYED RELEASE ORAL
Qty: 30 CAP | Refills: 6 | Status: SHIPPED | OUTPATIENT
Start: 2017-05-17 | End: 2017-09-08

## 2017-05-22 ENCOUNTER — HOSPITAL ENCOUNTER (OUTPATIENT)
Dept: LAB | Age: 82
Discharge: HOME | End: 2017-05-22
Payer: MEDICARE

## 2017-05-22 ENCOUNTER — OFFICE VISIT (OUTPATIENT)
Dept: FAMILY MEDICINE CLINIC | Age: 82
End: 2017-05-22

## 2017-05-22 VITALS
RESPIRATION RATE: 16 BRPM | WEIGHT: 219 LBS | SYSTOLIC BLOOD PRESSURE: 129 MMHG | OXYGEN SATURATION: 99 % | BODY MASS INDEX: 37.39 KG/M2 | HEIGHT: 64 IN | DIASTOLIC BLOOD PRESSURE: 65 MMHG | TEMPERATURE: 97.8 F | HEART RATE: 64 BPM

## 2017-05-22 DIAGNOSIS — I10 ESSENTIAL HYPERTENSION, BENIGN: Primary | ICD-10-CM

## 2017-05-22 DIAGNOSIS — J32.9 CHRONIC SINUSITIS, UNSPECIFIED LOCATION: Chronic | ICD-10-CM

## 2017-05-22 DIAGNOSIS — Z91.09 ENVIRONMENTAL ALLERGIES: ICD-10-CM

## 2017-05-22 DIAGNOSIS — J45.20 MILD INTERMITTENT ASTHMA WITHOUT COMPLICATION: ICD-10-CM

## 2017-05-22 DIAGNOSIS — E78.5 DYSLIPIDEMIA: Chronic | ICD-10-CM

## 2017-05-22 DIAGNOSIS — N18.3 CKD (CHRONIC KIDNEY DISEASE), STAGE 3 (MODERATE): ICD-10-CM

## 2017-05-22 DIAGNOSIS — I50.22 CHRONIC SYSTOLIC HEART FAILURE (HCC): ICD-10-CM

## 2017-05-22 DIAGNOSIS — G47.33 OBSTRUCTIVE SLEEP APNEA (ADULT) (PEDIATRIC): ICD-10-CM

## 2017-05-22 DIAGNOSIS — M15.9 PRIMARY OSTEOARTHRITIS INVOLVING MULTIPLE JOINTS: ICD-10-CM

## 2017-05-22 DIAGNOSIS — Z51.81 ENCOUNTER FOR MEDICATION MONITORING: ICD-10-CM

## 2017-05-22 DIAGNOSIS — E11.9 TYPE 2 DIABETES MELLITUS WITHOUT COMPLICATION, WITHOUT LONG-TERM CURRENT USE OF INSULIN (HCC): Chronic | ICD-10-CM

## 2017-05-22 DIAGNOSIS — M47.816 SPONDYLOSIS OF LUMBAR REGION WITHOUT MYELOPATHY OR RADICULOPATHY: ICD-10-CM

## 2017-05-22 LAB
GLUCOSE POC: 115 MG/DL
HBA1C MFR BLD HPLC: 6.2 %

## 2017-05-22 PROCEDURE — 80048 BASIC METABOLIC PNL TOTAL CA: CPT

## 2017-05-22 PROCEDURE — 36415 COLL VENOUS BLD VENIPUNCTURE: CPT

## 2017-05-22 RX ORDER — PREDNISONE 10 MG/1
TABLET ORAL
Qty: 30 TAB | Refills: 0 | Status: SHIPPED | OUTPATIENT
Start: 2017-05-22 | End: 2017-06-23 | Stop reason: SDUPTHER

## 2017-05-22 RX ORDER — PROMETHAZINE HYDROCHLORIDE AND DEXTROMETHORPHAN HYDROBROMIDE 6.25; 15 MG/5ML; MG/5ML
SYRUP ORAL
Qty: 240 ML | Refills: 1 | Status: SHIPPED | OUTPATIENT
Start: 2017-05-22 | End: 2022-06-27 | Stop reason: ALTCHOICE

## 2017-05-22 NOTE — MR AVS SNAPSHOT
Visit Information Date & Time Provider Department Dept. Phone Encounter #  
 5/22/2017  8:00 AM Angela Tee MD [facility] [phone] [id number] Follow-up Instructions Return in about 4 months (around 9/22/2017). Your Appointments 9/13/2017  9:30 AM  
6 MONTH with Jaki Houser MD  
[facility] [address] [facility]) Appt Note: Per Dr Aaron Webb $0CP REM Patient will call and move to a different day in August. REM 3/22/2017  
 [address]  
[phone] [address] [place] [address] Upcoming Health Maintenance Date Due  
 EYE EXAM RETINAL OR DILATED Q1 2/22/2017 MEDICARE YEARLY EXAM 3/5/2017 INFLUENZA AGE 9 TO ADULT 8/1/2017 HEMOGLOBIN A1C Q6M 8/22/2017 FOOT EXAM Q1 9/16/2017 MICROALBUMIN Q1 1/18/2018 GLAUCOMA SCREENING Q2Y 2/22/2018 LIPID PANEL Q1 3/24/2018 DTaP/Tdap/Td series (2 - Td) 9/14/2022 Allergies as of 5/22/2017  Review Complete On: 5/22/2017 By: Neetu Dueñas LPN Severity Noted Reaction Type Reactions Gabapentin  10/12/2016    Other (comments) Muscles twitching and jerking Levaquin [Levofloxacin]  03/09/2012    Swelling Percodan [Oxycodone Hcl-oxycodone-asa]  03/09/2012    Itching Current Immunizations  Reviewed on 5/22/2017 Name Date Influenza High Dose Vaccine PF 9/16/2016, 8/25/2015 Influenza Vaccine  Deferred (Patient Refused), 9/30/2014, 9/20/2013 Influenza Vaccine Split 8/31/2012, 9/13/2010 Pneumococcal Conjugate (PCV-13) 8/25/2015 Pneumococcal Vaccine (Pcv) 10/30/2009 TDAP Vaccine 9/14/2012 Zoster Vaccine, Live 3/19/2013 Reviewed by Angela Tee MD on 5/22/2017 at  8:20 AM  
You Were Diagnosed With   
  
 Codes Comments Essential hypertension, benign    -  Primary ICD-10-CM: I10 
ICD-9-CM: 401.1 Type 2 diabetes mellitus without complication, without long-term current use of insulin (HCC)     ICD-10-CM: E11.9 ICD-9-CM: 250.00 Dyslipidemia     ICD-10-CM: E78.5 ICD-9-CM: 272.4 Chronic systolic heart failure (HCC)     ICD-10-CM: I50.22 ICD-9-CM: 428.22 CKD (chronic kidney disease), stage 3 (moderate)     ICD-10-CM: N18.3 ICD-9-CM: 457. 3 Environmental allergies     ICD-10-CM: Z91.09 
ICD-9-CM: V15.09 Chronic sinusitis, unspecified location     ICD-10-CM: J32.9 ICD-9-CM: 473.9 Mild intermittent asthma without complication     BKE-10-UY: J45.20 ICD-9-CM: 493.90 Obstructive sleep apnea (adult) (pediatric)     ICD-10-CM: G47.33 
ICD-9-CM: 327.23 Primary osteoarthritis involving multiple joints     ICD-10-CM: M15.0 ICD-9-CM: 715.09 Spondylosis of lumbar region without myelopathy or radiculopathy     ICD-10-CM: M47.816 ICD-9-CM: 721.3 Encounter for medication monitoring     ICD-10-CM: Z51.81 
ICD-9-CM: V58.83 Vitals BP Pulse Temp Resp Height(growth percentile) Weight(growth percentile) 129/65 (BP 1 Location: Left arm, BP Patient Position: Sitting) 64 97.8 °F (36.6 °C) (Oral) 16 5' 4\" (1.626 m) 219 lb (99.3 kg) LMP SpO2 BMI OB Status Smoking Status 03/04/1961 (Approximate) 99% 37.59 kg/m2 Hysterectomy Former Smoker Vitals History BMI and BSA Data Body Mass Index Body Surface Area  
 37.59 kg/m 2 2.12 m 2 Preferred Pharmacy Pharmacy Name Phone [facility] [address] [facility] [phone] Your Updated Medication List  
  
   
This list is accurate as of: 5/22/17  8:37 AM.  Always use your most recent med list.  
  
  
  
  
 albuterol-ipratropium 2.5 mg-0.5 mg/3 ml Nebu Commonly known as:  DUO-NEB  
3 mL by Nebulization route. ALPRAZolam 0.5 mg tablet Commonly known as:  Francesca Kim TAKE ONE-HALF TO 1 TABLET BY MOUTH EVERY NIGHT AT BEDTIME AS NEEDED FOR SLEEP  
  
 azelastine 137 mcg (0.1 %) nasal spray Commonly known as:  ASTELIN  
1 Spray by Both Nostrils route daily. biotin 300 mcg Tab Take 1 Tab by mouth daily. For hair, skin, and nails  
  
 carvedilol 25 mg tablet Commonly known as:  COREG  
TAKE ONE TABLET BY MOUTH TWICE DAILY  
  
 diazePAM 5 mg tablet Commonly known as:  VALIUM  
TAKE ONE TABLET EVERY 6 HOURS AS NEEDED FOR ANXIETY DULoxetine 30 mg capsule Commonly known as:  CYMBALTA TAKE ONE CAPSULE BY MOUTH DAILY  
  
 fluticasone 50 mcg/actuation nasal spray Commonly known as:  FLONASE  
USE TWO SPRAYS EACH NOSTRIL EVERY DAY  
  
 furosemide 20 mg tablet Commonly known as:  LASIX TAKE ONE TABLET BY MOUTH EACH MORNING AND ONE TABLET EACH AFTERNOON BETWEEN 2PM AND 4PM. glipiZIDE SR 2.5 mg CR tablet Commonly known as:  GLUCOTROL XL  
TAKE ONE TABLET BY MOUTH DAILY HYDROcodone-acetaminophen  mg tablet Commonly known as:  Theron Brittle Take 1 Tab by mouth every six (6) hours as needed. lisinopril 5 mg tablet Commonly known as:  Glenn Galdamez Take 5 mg by mouth daily. mometasone 0.1 % topical cream  
Commonly known as:  Isra Demark Apply  to affected area daily as needed for Skin Irritation. MUCINEX D  mg per tablet Generic drug:  PSEUDOEPHEDRINE-guaiFENesin Take 1 Tab by mouth daily as needed. ondansetron hcl 4 mg tablet Commonly known as:  Selvin Ernie Take 4 mg by mouth every eight (8) hours as needed. pantoprazole 40 mg tablet Commonly known as:  PROTONIX  
TAKE ONE TABLET BY MOUTH DAILY predniSONE 10 mg tablet Commonly known as:  Nighat Fay Use as directed. promethazine-dextromethorphan 6.25-15 mg/5 mL syrup Commonly known as:  PROMETHAZINE-DM  
TAKE ONE TEASPOONFUL BY MOUTH EVERY SIX HOURS AS NEEDED FOR COUGH  
  
 simvastatin 40 mg tablet Commonly known as:  ZOCOR  
 TAKE ONE TABLET BY MOUTH EVERY NIGHT AT BEDTIME  
  
 tiotropium-olodaterol 2.5-2.5 mcg/actuation Mist  
Take 2 Puffs by inhalation daily. VENTOLIN HFA 90 mcg/actuation inhaler Generic drug:  albuterol INHALE 2 PUFFS BY MOUTH EVERY 4 HOURS AS NEEDED FOR WHEEZING. Prescriptions Sent to Pharmacy Refills  
 promethazine-dextromethorphan (PROMETHAZINE-DM) 6.25-15 mg/5 mL syrup 1 Sig: TAKE ONE TEASPOONFUL BY MOUTH EVERY SIX HOURS AS NEEDED FOR COUGH Class: Normal  
 Pharmacy: [facility] [address] [facility] Ph #: [phone]  
 predniSONE (DELTASONE) 10 mg tablet 0 Sig: Use as directed. Class: Normal  
 Pharmacy: [facility] [address] [facility] Ph #: [phone] We Performed the Following AMB POC GLUCOSE, QUANTITATIVE, BLOOD [29207 CPT(R)] AMB POC HEMOGLOBIN A1C [38298 CPT(R)] METABOLIC PANEL, BASIC [18629 CPT(R)] Follow-up Instructions Return in about 4 months (around 9/22/2017). Please provide this summary of care documentation to your next provider. Your primary care clinician is listed as Yony Longoria. If you have any questions after today's visit, please call [phone].

## 2017-05-22 NOTE — PROGRESS NOTES
Chief Complaint   Patient presents with    Hypertension     follow up    Diabetes     follow up    Cholesterol Problem     follow up       CMP 3/24/2017    Lipid 3/24/2017    A1C  2/22/2017    Microalbumin 1/18/2017    Mammogram 3/30/2017    Last eye exam was 2/22/2016 by Dr. Anyi Melvin.

## 2017-05-22 NOTE — PROGRESS NOTES
HISTORY OF PRESENT ILLNESS  Nargis Moreno is a 80 y.o. female. HPI   Follow up on chronic medical problems. Overall feeling fairly well. Has no new issues noted today. Cardiovascular Review:  The patient has hypertension, hyperlipidemia and Systolic HF. Diet and Lifestyle: generally follows a low fat low cholesterol diet, generally follows a low sodium diet  Home BP Monitoring: is not measured at home. Pertinent ROS: taking medications as instructed, no medication side effects noted, no TIA's, no chest pain on exertion, no dyspnea on exertion, noting that her ankles has been swelling more over the past few weeks. DM type II follow up:  Compliant w/ meds, diabetic diet, and exercise. Obtains home glucose monitoring averaging in the mid 100s. Checks BS daily on most days and prn. Pt does not have BS log at visit today. No Rf needed for today. Denies any tingling sensation, polyuria and polydipsia. No blurred vision. No significant weight changes. Osteoarthritis:  Patient has osteoarthritis in multiple joints but primarily in the knees and back. Symptoms onset: problem is longstanding. Rheumatological ROS: stable, mild-to-moderate joint symptoms intermittently, reasonably well controlled by PRN meds. Response to treatment plan: stable and intermittent. Asthma Review:  The patient is being seen for follow up of chronic respiratory failure and allergies and chronic sinusitits, not currently in exacerbation. Overall still feel short winded at times but this is her baseline. Has been seeing pulmonary and not sure if asthma or COPD per pt. Using nebulizer 3 to 4 times in a day. Regimen compliance: The patient reports adherence to asthma action plan and regimen.     Patient Active Problem List   Diagnosis Code    CHF (congestive heart failure) ([facility]) I50.9    S/P TKR (total knee replacement) Z96.659    Anemia D64.9    s/p lumbar laminectomy Z98.890    S/P ASAD (total abdominal hysterectomy) Z90.710    S/P hemorrhoidectomy Z98.890, Z87.19    S/P rotator cuff surgery Z98.890    DM (diabetes mellitus) ([facility]) E11.9    Chronic sinusitis J32.9    S/P sinus surgery Z98.890    Dyslipidemia E78.5    Environmental allergies Z91.09    Obstructive sleep apnea (adult) (pediatric) G47.33    DJD (degenerative joint disease) M19.90    Chronic systolic heart failure ([facility]) I50.22    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    Sepsis ([facility]) A41.9    Acute respiratory failure ([facility]) J96.00    Asthma J45.909    Anxiety disorder F41.9    Diabetic neuropathy ([facility]) E11.40    Esophageal reflux K21.9    Essential hypertension, benign I10    Reactive airway disease with wheezing without complication G83.498    Encounter for medication monitoring Z51.81    CKD (chronic kidney disease) N18.9    Hyperkalemia E87.5    Arthralgia of multiple joints M25.50       Current Outpatient Prescriptions   Medication Sig Dispense Refill    promethazine-dextromethorphan (PROMETHAZINE-DM) 6.25-15 mg/5 mL syrup TAKE ONE TEASPOONFUL BY MOUTH EVERY SIX HOURS AS NEEDED FOR COUGH 240 mL 1    DULoxetine (CYMBALTA) 30 mg capsule TAKE ONE CAPSULE BY MOUTH DAILY 30 Cap 6    ALPRAZolam (XANAX) 0.5 mg tablet TAKE ONE-HALF TO 1 TABLET BY MOUTH EVERY NIGHT AT BEDTIME AS NEEDED FOR SLEEP 30 Tab 3    VENTOLIN HFA 90 mcg/actuation inhaler INHALE 2 PUFFS BY MOUTH EVERY 4 HOURS AS NEEDED FOR WHEEZING. 1 Inhaler 8    lisinopril (PRINIVIL, ZESTRIL) 5 mg tablet Take 5 mg by mouth daily.  glipiZIDE SR (GLUCOTROL XL) 2.5 mg CR tablet TAKE ONE TABLET BY MOUTH DAILY 90 Tab 3    fluticasone (FLONASE) 50 mcg/actuation nasal spray USE TWO SPRAYS EACH NOSTRIL EVERY DAY 16 g 8    pantoprazole (PROTONIX) 40 mg tablet TAKE ONE TABLET BY MOUTH DAILY 30 Tab 6    ondansetron hcl (ZOFRAN) 4 mg tablet Take 4 mg by mouth every eight (8) hours as needed.       HYDROcodone-acetaminophen (NORCO)  mg tablet Take 1 Tab by mouth every six (6) hours as needed. 60 Tab 0    mometasone (ELOCON) 0.1 % topical cream Apply  to affected area daily as needed for Skin Irritation. 15 g 1    azelastine (ASTELIN) 137 mcg (0.1 %) nasal spray 1 Sisters by Both Nostrils route daily.  diazepam (VALIUM) 5 mg tablet TAKE ONE TABLET EVERY 6 HOURS AS NEEDED FOR ANXIETY 60 Tab 3    furosemide (LASIX) 20 mg tablet TAKE ONE TABLET BY MOUTH EACH MORNING AND ONE TABLET EACH AFTERNOON BETWEEN 2PM AND 4PM. 180 Tab 1    albuterol-ipratropium (DUO-NEB) 2.5 mg-0.5 mg/3 ml nebu 3 mL by Nebulization route.  simvastatin (ZOCOR) 40 mg tablet TAKE ONE TABLET BY MOUTH EVERY NIGHT AT BEDTIME 90 Tab 2    carvedilol (COREG) 25 mg tablet TAKE ONE TABLET BY MOUTH TWICE DAILY 180 Tab 3    tiotropium-olodaterol 2.5-2.5 mcg/actuation mist Take 2 Puffs by inhalation daily.  PSEUDOEPHEDRINE-guaiFENesin (MUCINEX D)  mg per tablet Take 1 Tab by mouth daily as needed.  biotin 300 mcg tab Take 1 Tab by mouth daily.  For hair, skin, and nails         Allergies   Allergen Reactions    Gabapentin Other (comments)     Muscles twitching and jerking    Levaquin [Levofloxacin] Swelling    Percodan [Oxycodone Hcl-Oxycodone-Asa] Itching         Past Medical History:   Diagnosis Date    Anemia 3/3/2010    Arrhythmia     irregular heartbeat    Arthritis     CHF (congestive heart failure) ([facility]) 3/3/2010    Chronic pain     back    Chronic sinusitis 3/3/2010    CPAP (continuous positive airway pressure) dependence     Diverticulosis     DM (diabetes mellitus) ([facility] [address] 3/3/2010    Dyslipidemia 3/3/2010    GERD (gastroesophageal reflux disease)     HTN (hypertension) 3/3/2010    Ill-defined condition     being evaluated py pulmonolgist for \"trouble breathing\"    S/P TKR (total knee replacement) 3/3/2010    Unspecified sleep apnea     doesn't wear CPAP since back has been hurting         Past Surgical History:   Procedure Laterality Date    HX APPENDECTOMY thinks it was taken with hysterectomy    HX GYN      \"tubes opened\"    HX HEENT      sinus surgery    HX HEMORRHOIDECTOMY      HX HYSTERECTOMY      HX KNEE REPLACEMENT  1996    both knees- at same time    HX LUMBAR LAMINECTOMY  1980s    HX MOHS PROCEDURES      left shoulder    HX ORTHOPAEDIC  1980    neck fusion    HX [address] [facility]    right knee replaced for second time    HX ORTHOPAEDIC      lumbar fusion    HX OTHER SURGICAL      CORNELL BIOPSY BREAST STEREOTACTIC Left yrs ago    (-)    IN COLONOSCOPY FLX DX W/COLLJ SPEC WHEN PFRMD  [id number]    dr. Loreto Dorsey (barium enema)         Family History   Problem Relation Age of Onset    Diabetes Mother     Heart Disease Father     Diabetes Brother     Blindness Brother     Diabetes Sister     Heart Disease Sister     Heart Disease Brother     Heart Disease Brother     Asthma Brother     Malignant Hyperthermia Neg Hx     Pseudocholinesterase Deficiency Neg Hx     Delayed Awakening Neg Hx     Post-op Nausea/Vomiting Neg Hx     Emergence Delirium Neg Hx     Post-op Cognitive Dysfunction Neg Hx        Social History   Substance Use Topics    Smoking status: Former Smoker     Packs/day: 0.30     Years: 5.00     Quit date: 1/1/1960    Smokeless tobacco: Never Used    Alcohol use No        Lab Results   Component Value Date/Time    WBC 5.4 10/05/2016 01:13 PM    HGB 10.5 10/05/2016 01:13 PM    HCT 33.4 10/05/2016 01:13 PM    PLATELET 475 55/83/2353 01:13 PM    MCV 96 10/05/2016 01:13 PM       Lab Results   Component Value Date/Time    Cholesterol, total 168 03/24/2017 09:50 AM    HDL Cholesterol 85 03/24/2017 09:50 AM    LDL, calculated 69 03/24/2017 09:50 AM    Triglyceride 71 03/24/2017 09:50 AM    CHOL/HDL Ratio 2.0 06/11/2010 02:10 PM       Lab Results   Component Value Date/Time    TSH 2.510 10/05/2016 01:13 PM      Lab Results   Component Value Date/Time    Sodium 146 03/24/2017 09:50 AM    Potassium 4.2 03/24/2017 09:50 AM    Chloride 102 03/24/2017 09:50 AM    CO2 27 03/24/2017 09:50 AM    Anion gap 3 12/17/2015 11:47 AM    Glucose 127 03/24/2017 09:50 AM    BUN 21 03/24/2017 09:50 AM    Creatinine 1.18 03/24/2017 09:50 AM    BUN/Creatinine ratio 18 03/24/2017 09:50 AM    GFR est AA 49 03/24/2017 09:50 AM    GFR est non-AA 42 03/24/2017 09:50 AM    Calcium 10.0 03/24/2017 09:50 AM    Bilirubin, total 0.2 03/24/2017 09:50 AM    ALT (SGPT) 15 03/24/2017 09:50 AM    AST (SGOT) 21 03/24/2017 09:50 AM    Alk. phosphatase 69 03/24/2017 09:50 AM    Protein, total 6.6 03/24/2017 09:50 AM    Albumin 4.4 03/24/2017 09:50 AM    Globulin 3.4 12/17/2015 11:47 AM    A-G Ratio 2.0 03/24/2017 09:50 AM      Lab Results   Component Value Date/Time    Hemoglobin A1c 5.9 02/11/2014 08:26 AM    Hemoglobin A1c (POC) 6.0 02/22/2017 09:35 AM         Review of Systems   Constitutional: Negative for malaise/fatigue. HENT: Negative for congestion. Eyes: Negative for blurred vision. Respiratory: Negative for cough and shortness of breath. Cardiovascular: Negative for chest pain, palpitations and leg swelling. Gastrointestinal: Negative for abdominal pain, constipation and heartburn. Genitourinary: Negative for dysuria, frequency and urgency. Neurological: Negative for dizziness, tingling and headaches. Endo/Heme/Allergies: positive for environmental allergies. Psychiatric/Behavioral: Negative for depression. The patient does not have insomnia. Physical Exam   Constitutional: She appears well-developed and well-nourished. Visit Vitals    /65 (BP 1 Location: Left arm, BP Patient Position: Sitting)    Pulse 64    Temp 97.8 °F (36.6 °C) (Oral)    Resp 16    Ht 5' 4\" (1.626 m)    Wt 219 lb (99.3 kg)    LMP 03/04/1961 (Approximate)    SpO2 99%    BMI 37.59 kg/m2          HENT:   Right Ear: Tympanic membrane and ear canal normal.   Left Ear: Tympanic membrane and ear canal normal.   Nose: No mucosal edema or rhinorrhea.    Mouth/Throat: Oropharynx is clear and moist and mucous membranes are normal.   Neck: Normal range of motion. Neck supple. No thyromegaly present. Cardiovascular: Normal rate and regular rhythm. No murmur heard. Pulmonary/Chest: Effort normal and breath sounds normal.   Abdominal: Soft. Bowel sounds are normal. There is no tenderness. Musculoskeletal: Normal range of motion. She exhibits trace edema in the ankles. Lymphadenopathy:     She has no cervical adenopathy. Skin: Skin is warm and dry. Psychiatric: She has a normal mood and affect. Nursing note and vitals reviewed. ASSESSMENT and Lise Vargas was seen today for hypertension, diabetes and cholesterol problem. Diagnoses and all orders for this visit:    Essential hypertension, benign  Stable at goal.      Type 2 diabetes mellitus without complication, without long-term current use of insulin (HCC)  -     AMB POC GLUCOSE, QUANTITATIVE, BLOOD  -     AMB POC HEMOGLOBIN A1C  Pt will schedule her eye exam.      Dyslipidemia  Continue to monitor. Work on diet and exercise. Chronic systolic heart failure (HCC)  As per cardiology    CKD (chronic kidney disease), stage 3 (moderate)  Stable. Continue to monitor. Environmental allergies//Asthma  Chronic sinusitis, unspecified location  -     Refill promethazine-dextromethorphan (PROMETHAZINE-DM) 6.25-15 mg/5 mL syrup; TAKE ONE TEASPOONFUL BY MOUTH EVERY SIX HOURS AS NEEDED FOR COUGH    Obstructive sleep apnea (adult) (pediatric)  Stable with CAPA    Primary osteoarthritis involving multiple joints  Spondylosis of lumbar region without myelopathy or radiculopathy  Stable     Encounter for medication monitoring  -     METABOLIC PANEL, BASIC      Follow-up Disposition: 4 months.     Current treatment plan is effective, no change in therapy  reviewed diet, exercise and weight control  cardiovascular risk and specific lipid/LDL goals reviewed  reviewed medications and side effects in detail  specific diabetic recommendations: low cholesterol diet, weight control and daily exercise discussed, all medications, side effects and compliance discussed carefully, foot care discussed and Podiatry visits discussed, annual eye examinations at Ophthalmology discussed and glycohemoglobin and other lab monitoring discussed     I have discussed diagnosis listed in this note with pt and/or family. I have discussed treatment plans and options and the risk/benefit analysis of those options, including safe use of medications and possible medication side effects. Through the use of shared decision making we have agreed to the above plan. The patient has received an after-visit summary and questions were answered concerning future plans and follow up. Advise pt of any urgent changes then to proceed to the ER.

## 2017-05-23 LAB
BUN SERPL-MCNC: 21 MG/DL (ref 8–27)
BUN/CREAT SERPL: 15 (ref 12–28)
CALCIUM SERPL-MCNC: 9.4 MG/DL (ref 8.7–10.3)
CHLORIDE SERPL-SCNC: 106 MMOL/L (ref 96–106)
CO2 SERPL-SCNC: 25 MMOL/L (ref 18–29)
CREAT SERPL-MCNC: 1.44 MG/DL (ref 0.57–1)
GLUCOSE SERPL-MCNC: 98 MG/DL (ref 65–99)
INTERPRETATION: NORMAL
POTASSIUM SERPL-SCNC: 4.1 MMOL/L (ref 3.5–5.2)
SODIUM SERPL-SCNC: 148 MMOL/L (ref 134–144)

## 2017-06-06 RX ORDER — FLUTICASONE PROPIONATE 50 MCG
SPRAY, SUSPENSION (ML) NASAL
Qty: 1 BOTTLE | Refills: 5 | Status: SHIPPED | OUTPATIENT
Start: 2017-06-06 | End: 2018-01-22 | Stop reason: SDUPTHER

## 2017-06-12 DIAGNOSIS — K21.9 GASTROESOPHAGEAL REFLUX DISEASE WITHOUT ESOPHAGITIS: ICD-10-CM

## 2017-06-12 RX ORDER — PANTOPRAZOLE SODIUM 40 MG/1
TABLET, DELAYED RELEASE ORAL
Qty: 90 TAB | Refills: 3 | Status: SHIPPED | OUTPATIENT
Start: 2017-06-12 | End: 2018-08-06 | Stop reason: SDUPTHER

## 2017-06-23 RX ORDER — PREDNISONE 10 MG/1
TABLET ORAL
Qty: 30 TAB | Refills: 0 | Status: SHIPPED | OUTPATIENT
Start: 2017-06-23 | End: 2017-09-08 | Stop reason: ALTCHOICE

## 2017-07-10 RX ORDER — SIMVASTATIN 40 MG/1
TABLET, FILM COATED ORAL
Qty: 90 TAB | Refills: 0 | Status: SHIPPED | OUTPATIENT
Start: 2017-07-10 | End: 2017-10-16 | Stop reason: SDUPTHER

## 2017-08-07 RX ORDER — IPRATROPIUM BROMIDE AND ALBUTEROL SULFATE 2.5; .5 MG/3ML; MG/3ML
3 SOLUTION RESPIRATORY (INHALATION)
Qty: 540 NEBULE | Refills: 3 | Status: SHIPPED | OUTPATIENT
Start: 2017-08-07 | End: 2017-08-21 | Stop reason: SDUPTHER

## 2017-08-21 RX ORDER — IPRATROPIUM BROMIDE AND ALBUTEROL SULFATE 2.5; .5 MG/3ML; MG/3ML
3 SOLUTION RESPIRATORY (INHALATION)
Qty: 540 NEBULE | Refills: 3 | Status: SHIPPED | OUTPATIENT
Start: 2017-08-21 | End: 2018-06-26 | Stop reason: SDUPTHER

## 2017-08-21 RX ORDER — ALBUTEROL SULFATE 0.83 MG/ML
SOLUTION RESPIRATORY (INHALATION)
Qty: 1650 EACH | Refills: 3 | Status: SHIPPED | OUTPATIENT
Start: 2017-08-21 | End: 2019-03-18 | Stop reason: ALTCHOICE

## 2017-08-21 RX ORDER — ALBUTEROL SULFATE 0.83 MG/ML
SOLUTION RESPIRATORY (INHALATION)
Qty: 100 EACH | Refills: 3 | Status: SHIPPED | OUTPATIENT
Start: 2017-08-21 | End: 2017-08-21 | Stop reason: SDUPTHER

## 2017-08-21 RX ORDER — ALBUTEROL SULFATE 0.83 MG/ML
2.5 SOLUTION RESPIRATORY (INHALATION)
Qty: 3 PACKAGE | Refills: 3 | Status: SHIPPED | OUTPATIENT
Start: 2017-08-21 | End: 2017-08-21 | Stop reason: SDUPTHER

## 2017-08-21 NOTE — TELEPHONE ENCOUNTER
----- Message from Gunnar Booker sent at 8/21/2017 12:19 PM EDT -----  Regarding: Dr. Luis Malloy/Refill  Pt is requesting a refill on Ipratropium Bromide 0.5 mg and Albuterol Sulfate 3 mg. Pt uses [facility]) on file. Best contact is [phone].

## 2017-09-07 NOTE — PROGRESS NOTES
HISTORY OF PRESENT ILLNESS  Fox Walker is a 80 y.o. female. HPI   Follow up on chronic medical problems. Cardiovascular Review:  The patient has hypertension, hyperlipidemia and Systolic HF. Diet and Lifestyle: generally follows a low fat low cholesterol diet, generally follows a low sodium diet  Home BP Monitoring: is not measured at home. Pertinent ROS: taking medications as instructed, no medication side effects noted, no TIA's, no chest pain on exertion, no dyspnea on exertion, noting that her ankles has been swelling more over the past few weeks. DM type II follow up:  Compliant w/ meds, diabetic diet, and exercise. Obtains home glucose monitoring averaging in the mid 100s. Checks BS daily on most days and prn. Pt does not have BS log at visit today. No Rf needed for today. Denies any tingling sensation, polyuria and polydipsia. No blurred vision. No significant weight changes. Osteoarthritis:  Patient has osteoarthritis in multiple joints but primarily in the knees and back. Symptoms onset: problem is longstanding. Rheumatological ROS: stable, mild-to-moderate joint symptoms intermittently, reasonably well controlled by PRN meds. Response to treatment plan: stable and intermittent. Asthma Review:  The patient is being seen for follow up of chronic respiratory failure and allergies and chronic sinusitits, not currently in exacerbation. Overall still feel short winded at times. Has had increased in hoarseness over the past several weeks. Has been seeing pulmonary and not sure if asthma or COPD per pt. Using nebulizer 3 to 4 times in a day. Regimen compliance: The patient reports adherence to asthma action plan and regimen.     Patient Active Problem List   Diagnosis Code    CHF (congestive heart failure) ([facility]) I50.9    S/P TKR (total knee replacement) Z96.659    Anemia D64.9    s/p lumbar laminectomy Z98.890    S/P ASAD (total abdominal hysterectomy) Z90.710    S/P hemorrhoidectomy Z98.890, Z87.19    S/P rotator cuff surgery Z98.890    DM (diabetes mellitus) ([facility] [address] E11.9    Chronic sinusitis J32.9    S/P sinus surgery Z98.890    Dyslipidemia E78.5    Environmental allergies Z91.09    Obstructive sleep apnea (adult) (pediatric) G47.33    DJD (degenerative joint disease) M19.90    Chronic systolic heart failure (HCC) I50.22    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    Sepsis ([facility]) A41.9    Acute respiratory failure (HCC) J96.00    Asthma J45.909    Anxiety disorder F41.9    Diabetic neuropathy ([facility]) E11.40    Esophageal reflux K21.9    Essential hypertension, benign I10    Reactive airway disease with wheezing without complication U40.285    Encounter for medication monitoring Z51.81    CKD (chronic kidney disease) N18.9    Arthralgia of multiple joints M25.50       Current Outpatient Prescriptions   Medication Sig Dispense Refill    albuterol-ipratropium (DUO-NEB) 2.5 mg-0.5 mg/3 ml nebu 3 mL by Nebulization route every four (4) hours as needed. (up to 6 per day) *90 day supply DX: asthma 540 Nebule 3    albuterol (PROVENTIL VENTOLIN) 2.5 mg /3 mL (0.083 %) nebulizer solution INHALE THE CONTENTS OF ONE VIAL VIA NEBULIZER EVERY 4 HOURS AS NEEDED FOR WHEEZING 1650 Each 3    furosemide (LASIX) 20 mg tablet TAKE 1 TABLET BY MOUTH EVERY MORNING AND TAKE 1 TABLET BY MOUTH EVERY AFTERNOON BETWEEN 2 AND 4  Tab 3    lisinopril (PRINIVIL, ZESTRIL) 5 mg tablet Take 1 Tab by mouth daily.  90 Tab 3    simvastatin (ZOCOR) 40 mg tablet TAKE 1 TABLET BY MOUTH ONCE EVERY NIGHT AT BEDTIME 90 Tab 0    predniSONE (DELTASONE) 10 mg tablet TAKE 1 TABLET BY MOUTH TWICE DAILY AS DIRECTED 30 Tab 0    pantoprazole (PROTONIX) 40 mg tablet TAKE 1 TABLET BY MOUTH ONCE DAILY 90 Tab 3    fluticasone (FLONASE) 50 mcg/actuation nasal spray INHALE 2 SPRAYS IN EACH NOSTRIL ONCE DAILY 1 Bottle 5    promethazine-dextromethorphan (PROMETHAZINE-DM) 6.25-15 mg/5 mL syrup TAKE ONE TEASPOONFUL BY MOUTH EVERY SIX HOURS AS NEEDED FOR COUGH 240 mL 1    DULoxetine (CYMBALTA) 30 mg capsule TAKE ONE CAPSULE BY MOUTH DAILY 30 Cap 6    ALPRAZolam (XANAX) 0.5 mg tablet TAKE ONE-HALF TO 1 TABLET BY MOUTH EVERY NIGHT AT BEDTIME AS NEEDED FOR SLEEP 30 Tab 3    VENTOLIN HFA 90 mcg/actuation inhaler INHALE 2 PUFFS BY MOUTH EVERY 4 HOURS AS NEEDED FOR WHEEZING. 1 Inhaler 8    glipiZIDE SR (GLUCOTROL XL) 2.5 mg CR tablet TAKE ONE TABLET BY MOUTH DAILY 90 Tab 3    ondansetron hcl (ZOFRAN) 4 mg tablet Take 4 mg by mouth every eight (8) hours as needed.  HYDROcodone-acetaminophen (NORCO)  mg tablet Take 1 Tab by mouth every six (6) hours as needed. 60 Tab 0    mometasone (ELOCON) 0.1 % topical cream Apply  to affected area daily as needed for Skin Irritation. 15 g 1    azelastine (ASTELIN) 137 mcg (0.1 %) nasal spray 1 Purdin by Both Nostrils route daily.  diazepam (VALIUM) 5 mg tablet TAKE ONE TABLET EVERY 6 HOURS AS NEEDED FOR ANXIETY 60 Tab 3    carvedilol (COREG) 25 mg tablet TAKE ONE TABLET BY MOUTH TWICE DAILY 180 Tab 3    tiotropium-olodaterol 2.5-2.5 mcg/actuation mist Take 2 Puffs by inhalation daily.  PSEUDOEPHEDRINE-guaiFENesin (MUCINEX D)  mg per tablet Take 1 Tab by mouth daily as needed.  biotin 300 mcg tab Take 1 Tab by mouth daily.  For hair, skin, and nails         Allergies   Allergen Reactions    Gabapentin Other (comments)     Muscles twitching and jerking    Levaquin [Levofloxacin] Swelling    Percodan [Oxycodone Hcl-Oxycodone-Asa] Itching         Past Medical History:   Diagnosis Date    Anemia 3/3/2010    Arrhythmia     irregular heartbeat    Arthritis     CH[facility]congest[facility] failure) ([facility]) 3/3/2010    Chronic pain     back    Chronic sinusitis 3/3/2010    CPAP (continuous positive airway pressure) dependence     Diverticulosis     DM (diabetes mellitus) ([facility] [address] 3/3/2010    Dyslipidemia 3/3/2010    GERD (gastroesophageal reflux disease)     HTN (hypertension) 3/3/2010    Ill-defined condition     being evaluated py pulmonolgist for \"trouble breathing\"    S/P TKR (total knee replacement) 3/3/2010    Unspecified sleep apnea     doesn't wear CPAP since back has been hurting         Past Surgical History:   Procedure Laterality Date    HX APPENDECTOMY      thinks it was taken with hysterectomy    HX GYN      \"tubes opened\"    HX HEENT      sinus surgery    HX HEMORRHOIDECTOMY      HX HYSTERECTOMY      HX KNEE REPLACEMENT  1996    both knees- at same time    HX LUMBAR LAMINECTOMY  1980s    HX MOHS PROCEDURES      left shoulder    HX ORTHOPAEDIC  1980    neck fusion    HX ORTHOPAEDIC  1999    right knee replaced for second time    HX ORTHOPAEDIC      lumbar fusion    HX OTHER SURGICAL      CORNELL BIOPSY BREAST STEREOTACTIC Left yrs ago    (-)    IA COLONOSCOPY FLX DX W/COLLJ SPEC WHEN PFRMD  [id number]    dr. Destiny Pickard (barium enema)         Family History   Problem Relation Age of Onset    Diabetes Mother     Heart Disease Father     Diabetes Brother     Blindness Brother     Diabetes Sister     Heart Disease Sister     Heart Disease Brother     Heart Disease Brother     Asthma Brother     Malignant Hyperthermia Neg Hx     Pseudocholinesterase Deficiency Neg Hx     Delayed Awakening Neg Hx     Post-op Nausea/Vomiting Neg Hx     Emergence Delirium Neg Hx     Post-op Cognitive Dysfunction Neg Hx        Social History   Substance Use Topics    Smoking status: Former Smoker     Packs/day: 0.30     Years: 5.00     Quit date: 1/1/1960    Smokeless tobacco: Never Used    Alcohol use No        Lab Results   Component Value Date/Time    WBC 5.4 10/05/2016 01:13 PM    HGB 10.5 10/05/2016 01:13 PM    HCT 33.4 10/05/2016 01:13 PM    PLATELET 177 50/23/1571 01:13 PM    MCV 96 10/05/2016 01:13 PM       Lab Results   Component Value Date/Time    Cholesterol, total 168 03/24/2017 09:50 AM    HDL Cholesterol 85 03/24/2017 09:50 AM LDL, calculated 69 03/24/2017 09:50 AM    Triglyceride 71 03/24/2017 09:50 AM    CHOL/HDL Ratio 2.0 06/11/2010 02:10 PM       Lab Results   Component Value Date/Time    TSH 2.510 10/05/2016 01:13 PM      Lab Results   Component Value Date/Time    Sodium 148 05/22/2017 08:42 AM    Potassium 4.1 05/22/2017 08:42 AM    Chloride 106 05/22/2017 08:42 AM    CO2 25 05/22/2017 08:42 AM    Anion gap 3 12/17/2015 11:47 AM    Glucose 98 05/22/2017 08:42 AM    BUN 21 05/22/2017 08:42 AM    Creatinine 1.44 05/22/2017 08:42 AM    BUN/Creatinine ratio 15 05/22/2017 08:42 AM    GFR est AA 38 05/22/2017 08:42 AM    GFR est non-AA 33 05/22/2017 08:42 AM    Calcium 9.4 05/22/2017 08:42 AM    Bilirubin, total 0.2 03/24/2017 09:50 AM    ALT (SGPT) 15 03/24/2017 09:50 AM    AST (SGOT) 21 03/24/2017 09:50 AM    Alk. phosphatase 69 03/24/2017 09:50 AM    Protein, total 6.6 03/24/2017 09:50 AM    Albumin 4.4 03/24/2017 09:50 AM    Globulin 3.4 12/17/2015 11:47 AM    A-G Ratio 2.0 03/24/2017 09:50 AM      Lab Results   Component Value Date/Time    Hemoglobin A1c 5.9 02/11/2014 08:26 AM    Hemoglobin A1c (POC) 6.2 05/22/2017 08:42 AM         Review of Systems   Constitutional: Negative for malaise/fatigue. HENT: Negative for congestion. Eyes: Negative for blurred vision. Respiratory: Negative for cough. Cardiovascular: Negative for chest pain, palpitations and leg swelling. Gastrointestinal: Negative for abdominal pain, constipation and heartburn. Genitourinary: Negative for dysuria, frequency and urgency. Musculoskeletal: Negative for back pain and joint pain. Neurological: Negative for dizziness, tingling and headaches. Endo/Heme/Allergies: Negative for environmental allergies. Psychiatric/Behavioral: Negative for depression. The patient does not have insomnia. Physical Exam   Constitutional: She appears well-developed and well-nourished.    /78 (BP 1 Location: Left arm, BP Patient Position: Sitting) Pulse 75  Temp 96.6 °F (35.9 °C) (Oral)   Resp 16  Ht 5' 4\" (1.626 m)  Wt 222 lb (100.7 kg)  LMP 03[birth year]04/1961 (Approximate)  SpO2 99%  BMI 38.11 kg/m2    HENT:   Right Ear: Tympanic membrane and ear canal normal.   Left Ear: Tympanic membrane and ear canal normal.   Nose: No mucosal edema or rhinorrhea. Mouth/Throat: Oropharynx is clear and moist and mucous membranes are normal.   Neck: Normal range of motion. Neck supple. No thyromegaly present. Cardiovascular: Normal rate and regular rhythm. No murmur heard. Pulmonary/Chest: Effort normal and breath sounds normal.   Abdominal: Soft. Bowel sounds are normal. There is no tenderness. Musculoskeletal: Normal range of motion. She exhibits mild edema. Lymphadenopathy:     She has no cervical adenopathy. Skin: Skin is warm and dry. Hyperemic rash on the left lower leg medial ankle area. Psychiatric: She has a normal mood and affect. Nursing note and vitals reviewed. ASSESSMENT and PLAN  Diagnoses and all orders for this visit:    1. Essential hypertension, benign  Stable     2. Type 2 diabetes mellitus without complication, without long-term current use of insulin (HCC)  -     AMB POC GLUCOSE, QUANTITATIVE, BLOOD  -     HEMOGLOBIN A1C WITH EAG    3. Dyslipidemia  -     LIPID PANEL    4. Chronic systolic heart failure (HCC)  Stable     5. CKD (chronic kidney disease), stage 3 (moderate)  Stable     6. Environmental allergies//  7. Reactive airway disease with wheezing without complication  -     albuterol (VENTOLIN HFA) 90 mcg/actuation inhaler; INHALE 2 PUFFS BY MOUTH EVERY 4 HOURS AS NEEDED FOR WHEEZING. 8. Obstructive sleep apnea (adult) (pediatric)  Stable on CPAP    9. Primary osteoarthritis involving multiple joints  Stable     10. Bilateral edema of lower extremity  -    Increase furosemide (LASIX) 20 mg tablet; TAKE 1 and 1/2 TABLETS BY MOUTH EVERY MORNING AND TAKE 1 TABLET BY MOUTH EVERY AFTERNOON BETWEEN 2 AND 4 PM    11. Encounter for medication monitoring  -     METABOLIC PANEL, COMPREHENSIVE  -     CBC W/O DIFF    12. Encounter for immunization  -     Influenza virus vaccine (FLUZONE HIGH-DOSE) 65 years and older (76216)  -     [place] IMMUNIZ ADMIN,1 SINGLE/COMB VAC/TOXOID    13. Rash, skin  -     REFERRAL TO DERMATOLOGY      Follow-up Disposition:  Return in about 4 months (around 1/8/2018). reviewed diet, exercise and weight control  cardiovascular risk and specific lipid/LDL goals reviewed  reviewed medications and side effects in detail  specific diabetic recommendations: low cholesterol diet, weight control and daily exercise discussed, home glucose monitoring emphasized, all medications, side effects and compliance discussed carefully, foot care discussed and Podiatry visits discussed, annual eye examinations at Ophthalmology discussed and glycohemoglobin and other lab monitoring discussed     I have discussed diagnosis listed in this note with pt and/or family. I have discussed treatment plans and options and the risk/benefit analysis of those options, including safe use of medications and possible medication side effects. Through the use of shared decision making we have agreed to the above plan. The patient has received an after-visit summary and questions were answered concerning future plans and follow up. Advise pt of any urgent changes then to proceed to the ER.

## 2017-09-08 ENCOUNTER — HOSPITAL ENCOUNTER (OUTPATIENT)
Dept: LAB | Age: 82
Discharge: HOME | End: 2017-09-08
Payer: MEDICARE

## 2017-09-08 ENCOUNTER — OFFICE VISIT (OUTPATIENT)
Dept: FAMILY MEDICINE CLINIC | Age: 82
End: 2017-09-08

## 2017-09-08 VITALS
SYSTOLIC BLOOD PRESSURE: 136 MMHG | DIASTOLIC BLOOD PRESSURE: 78 MMHG | OXYGEN SATURATION: 99 % | WEIGHT: 222 LBS | HEART RATE: 75 BPM | RESPIRATION RATE: 16 BRPM | TEMPERATURE: 96.6 F | HEIGHT: 64 IN | BODY MASS INDEX: 37.9 KG/M2

## 2017-09-08 DIAGNOSIS — Z51.81 ENCOUNTER FOR MEDICATION MONITORING: ICD-10-CM

## 2017-09-08 DIAGNOSIS — R21 RASH, SKIN: ICD-10-CM

## 2017-09-08 DIAGNOSIS — G47.33 OBSTRUCTIVE SLEEP APNEA (ADULT) (PEDIATRIC): ICD-10-CM

## 2017-09-08 DIAGNOSIS — E11.9 TYPE 2 DIABETES MELLITUS WITHOUT COMPLICATION, WITHOUT LONG-TERM CURRENT USE OF INSULIN (HCC): Chronic | ICD-10-CM

## 2017-09-08 DIAGNOSIS — I10 ESSENTIAL HYPERTENSION, BENIGN: Primary | ICD-10-CM

## 2017-09-08 DIAGNOSIS — J45.909 REACTIVE AIRWAY DISEASE WITH WHEEZING WITHOUT COMPLICATION: ICD-10-CM

## 2017-09-08 DIAGNOSIS — E78.5 DYSLIPIDEMIA: Chronic | ICD-10-CM

## 2017-09-08 DIAGNOSIS — M15.9 PRIMARY OSTEOARTHRITIS INVOLVING MULTIPLE JOINTS: ICD-10-CM

## 2017-09-08 DIAGNOSIS — Z23 ENCOUNTER FOR IMMUNIZATION: ICD-10-CM

## 2017-09-08 DIAGNOSIS — N18.3 CKD (CHRONIC KIDNEY DISEASE), STAGE 3 (MODERATE): ICD-10-CM

## 2017-09-08 DIAGNOSIS — R60.0 BILATERAL EDEMA OF LOWER EXTREMITY: ICD-10-CM

## 2017-09-08 DIAGNOSIS — I50.22 CHRONIC SYSTOLIC HEART FAILURE (HCC): ICD-10-CM

## 2017-09-08 DIAGNOSIS — Z91.09 ENVIRONMENTAL ALLERGIES: ICD-10-CM

## 2017-09-08 LAB — GLUCOSE POC: 109 MG/DL

## 2017-09-08 PROCEDURE — 83036 HEMOGLOBIN GLYCOSYLATED A1C: CPT

## 2017-09-08 PROCEDURE — 80061 LIPID PANEL: CPT

## 2017-09-08 PROCEDURE — 85027 COMPLETE CBC AUTOMATED: CPT

## 2017-09-08 PROCEDURE — 36415 COLL VENOUS BLD VENIPUNCTURE: CPT

## 2017-09-08 PROCEDURE — 80053 COMPREHEN METABOLIC PANEL: CPT

## 2017-09-08 RX ORDER — HYDROCODONE BITARTRATE AND ACETAMINOPHEN 10; 325 MG/1; MG/1
1 TABLET ORAL
COMMUNITY
Start: 2017-07-25 | End: 2017-09-08 | Stop reason: SDUPTHER

## 2017-09-08 RX ORDER — ALBUTEROL SULFATE 90 UG/1
AEROSOL, METERED RESPIRATORY (INHALATION)
Qty: 1 INHALER | Refills: 11 | Status: SHIPPED | OUTPATIENT
Start: 2017-09-08 | End: 2018-12-02 | Stop reason: SDUPTHER

## 2017-09-08 RX ORDER — FUROSEMIDE 20 MG/1
TABLET ORAL
Qty: 180 TAB | Refills: 3
Start: 2017-09-08 | End: 2017-11-14

## 2017-09-08 RX ORDER — DICLOFENAC SODIUM 20 MG/G
SOLUTION TOPICAL
COMMUNITY
Start: 2017-08-04 | End: 2017-09-08

## 2017-09-08 NOTE — PROGRESS NOTES
Chief Complaint   Patient presents with    Hypertension     4 month follow up    Diabetes     4 month follow up     Cholesterol Problem     4 month follow up     CMP 3/24/2017    Lipid 3/24/2017    A1C  2/22/2017    Microalbumin 1/18/2017    Mammogram 3/30/2017    Last eye exam was 2/22/2016 by Dr. Lewis Siegel.  Appt in 1/2018

## 2017-09-08 NOTE — MR AVS SNAPSHOT
Visit Information Date & Time Provider Department Dept. Phone Encounter #  
 9/8/2017 10:00 AM Deisi Warren MD [facility] [phone] [id number] Follow-up Instructions Return in about 4 months (around 1/8/2018). Your Appointments 9/13/2017  9:30 AM  
6 MONTH with Hawa Rojas MD  
[facility] [address]) Appt Note: Per Dr Pa Travis $0CP REM Patient will call and move to a different day in August. REM 3/22/2017  
 [address]  
[phone] [address] [place] [address] Upcoming Health Maintenance Date Due  
 MEDICARE YEARLY EXAM 3/5/2017 EYE EXAM RETINAL OR DILATED Q1 1/22/2018* HEMOGLOBIN A1C Q6M 11/22/2017 MICROALBUMIN Q1 1/18/2018 GLAUCOMA SCREENING Q2Y 2/22/2018 LIPID PANEL Q1 3/24/2018 FOOT EXAM Q1 7/6/2018 DTaP/Tdap/Td series (2 - Td) 9/14/2022 *Topic was postponed. The date shown is not the original due date. Allergies as of 9/8/2017  Review Complete On: 9/8/2017 By: Deisi Warren MD  
  
 Severity Noted Reaction Type Reactions Gabapentin  10/12/2016    Other (comments) Muscles twitching and jerking Levaquin [Levofloxacin]  03/09/2012    Swelling Percodan [Oxycodone Hcl-oxycodone-asa]  03/09/2012    Itching Current Immunizations  Reviewed on 9/8/2017 Name Date Influenza High Dose Vaccine PF 9/8/2017, 9/16/2016, 8/25/2015 Influenza Vaccine  Deferred (Patient Refused), 9/30/2014, 9/20/2013 Influenza Vaccine Split 8/31/2012, 9/13/2010 Pneumococcal Conjugate (PCV-13) 8/25/2015 Pneumococcal Vaccine (Pcv) 10/30/2009 TDAP Vaccine 9/14/2012 Zoster Vaccine, Live 3/19/2013 Reviewed by Deisi Warren MD on 9/8/2017 at 10:49 AM  
You Were Diagnosed With   
  
 Codes Comments Essential hypertension, benign    -  Primary ICD-10-CM: I10 
ICD-9-CM: 401.1 Type 2 diabetes mellitus without complication, without long-term current use of insulin (HCC)     ICD-10-CM: E11.9 ICD-9-CM: 250.00 Dyslipidemia     ICD-10-CM: E78.5 ICD-9-CM: 272.4 Chronic systolic heart failure (HCC)     ICD-10-CM: I50.22 ICD-9-CM: 428.22 CKD (chronic kidney disease), stage 3 (moderate)     ICD-10-CM: N18.3 ICD-9-CM: 670. 3 Environmental allergies     ICD-10-CM: Z91.09 
ICD-9-CM: V15.09 Reactive airway disease with wheezing without complication     -75-DJ: J45.909 ICD-9-CM: 493.90 Obstructive sleep apnea (adult) (pediatric)     ICD-10-CM: G47.33 
ICD-9-CM: 327.23 Primary osteoarthritis involving multiple joints     ICD-10-CM: M15.0 ICD-9-CM: 715.09 Encounter for medication monitoring     ICD-10-CM: Z51.81 
ICD-9-CM: V58.83 Encounter for immunization     ICD-10-CM: X33 ICD-9-CM: V03.89 Bilateral edema of lower extremity     ICD-10-CM: R60.0 ICD-9-CM: 524. 3 Rash, skin     ICD-10-CM: R21 
ICD-9-CM: 782.1 Vitals BP Pulse Temp Resp Height(growth percentile) Weight(growth percentile) 136/78 (BP 1 Location: Left arm, BP Patient Position: Sitting) 75 96.6 °F (35.9 °C) (Oral) 16 5' 4\" (1.626 m) 222 lb (100.7 kg) LMP SpO2 BMI OB Status Smoking Status 03/04/1961 (Approximate) 99% 38.11 kg/m2 Hysterectomy Former Smoker Vitals History BMI and BSA Data Body Mass Index Body Surface Area  
 38.11 kg/m 2 2.13 m 2 Preferred Pharmacy Pharmacy Name Phone [phone] [facility] [phone] Your Updated Medication List  
  
   
This list is accurate as of: 9/8/17 11:28 AM.  Always use your most recent med list.  
  
  
  
  
 * albuterol 2.5 mg /3 mL (0.083 %) nebulizer solution Commonly known as:  PROVENTIL VENTOLIN  
INHALE THE CONTENTS OF ONE VIAL VIA NEBULIZER EVERY 4 HOURS AS NEEDED FOR WHEEZING  
  
 * albuterol 90 mcg/actuation inhaler Commonly known as:  VENTOLIN HFA INHALE 2 PUFFS BY MOUTH EVERY 4 HOURS AS NEEDED FOR WHEEZING. albuterol-ipratropium 2.5 mg-0.5 mg/3 ml Nebu Commonly known as:  DUO-NEB  
3 mL by Nebulization route every four (4) hours as needed. (up to 6 per day) *90 day supply DX: asthma ALPRAZolam 0.5 mg tablet Commonly known as:  XANAX  
TAKE ONE-HALF TO 1 TABLET BY MOUTH EVERY NIGHT AT BEDTIME AS NEEDED FOR SLEEP  
  
 azelastine 137 mcg (0.1 %) nasal spray Commonly known as:  ASTELIN  
1 Spray by Both Nostrils route daily. carvedilol 25 mg tablet Commonly known as:  COREG  
TAKE ONE TABLET BY MOUTH TWICE DAILY  
  
 diazePAM 5 mg tablet Commonly known as:  VALIUM  
TAKE ONE TABLET EVERY 6 HOURS AS NEEDED FOR ANXIETY  
  
 fluticasone 50 mcg/actuation nasal spray Commonly known as:  Adal Juana INHALE 2 SPRAYS IN EACH NOSTRIL ONCE DAILY  
  
 furosemide 20 mg tablet Commonly known as:  LASIX TAKE 1 and 1/2 TABLETS BY MOUTH EVERY MORNING AND TAKE 1 TABLET BY MOUTH EVERY AFTERNOON BETWEEN 2 AND 4 PM  
  
 glipiZIDE SR 2.5 mg CR tablet Commonly known as:  GLUCOTROL XL  
TAKE ONE TABLET BY MOUTH DAILY HYDROcodone-acetaminophen  mg tablet Commonly known as:  Eric Edwin Take 1 Tab by mouth every six (6) hours as needed. lisinopril 5 mg tablet Commonly known as:  Tildon Cadence Take 1 Tab by mouth daily. mometasone 0.1 % topical cream  
Commonly known as:  Yves Loron Apply  to affected area daily as needed for Skin Irritation. MUCINEX D  mg per tablet Generic drug:  PSEUDOEPHEDRINE-guaiFENesin Take 1 Tab by mouth daily as needed. ondansetron hcl 4 mg tablet Commonly known as:  Inocencio Marshal Take 4 mg by mouth every eight (8) hours as needed. pantoprazole 40 mg tablet Commonly known as:  PROTONIX  
TAKE 1 TABLET BY MOUTH ONCE DAILY promethazine-dextromethorphan 6.25-15 mg/5 mL syrup Commonly known as:  PROMETHAZINE-DM  
TAKE ONE TEASPOONFUL BY MOUTH EVERY SIX HOURS AS NEEDED FOR COUGH  
  
 simvastatin 40 mg tablet Commonly known as:  ZOCOR  
TAKE 1 TABLET BY MOUTH ONCE EVERY NIGHT AT BEDTIME  
  
 tiotropium-olodaterol 2.5-2.5 mcg/actuation Mist  
Take 2 Puffs by inhalation daily. * Notice: This list has 2 medication(s) that are the same as other medications prescribed for you. Read the directions carefully, and ask your doctor or other care provider to review them with you. Prescriptions Sent to Pharmacy Refills  
 albuterol (VENTOLIN HFA) 90 mcg/actuation inhaler 11 Sig: INHALE 2 PUFFS BY MOUTH EVERY 4 HOURS AS NEEDED FOR WHEEZING. Class: Normal  
 [facility] [address] [place] [address] [address] AT [address] [facility] #: [phone] We Performed the Following AMB POC GLUCOSE, QUANTITATIVE, BLOOD [02339 CPT(R)] CBC W/O DIFF [36669 CPT(R)] HEMOGLOBIN A1C WITH EAG [87556 CPT(R)] INFLUENZA VIRUS VACCINE, HIGH DOSE SEASONAL, PRESERVATIVE FREE [52249 CPT(R)] LIPID PANEL [09264 CPT(R)] METABOLIC PANEL, COMPREHENSIVE [23284 CPT(R)] [place] IMMUNIZ ADMIN,1 SINGLE/COMB VAC/TOXOID [id number] CPT(R)] REFERRAL TO DERMATOLOGY [REF19 Custom] Follow-up Instructions Return in about 4 months (around 1/8/2018). Referral Information Referral ID Referred By Referred To  
  
 [id number] Tammie DOZIER MD   
   [place] [address] [address] Phone: [phone] Fax: [phone] Visits Status Start Date End Date 1 Authorized 9/8/17 9/8/18 If your referral has a status of pending review or denied, additional information will be sent to support the outcome of this decision. Please provide this summary of care documentation to your next provider. Your primary care clinician is listed as Kasey Núñez. If you have any questions after today's visit, please call [phone].

## 2017-09-09 LAB
ALBUMIN SERPL-MCNC: 4.3 G/DL (ref 3.5–4.7)
ALBUMIN/GLOB SERPL: 1.9 {RATIO} (ref 1.2–2.2)
ALP SERPL-CCNC: 56 IU/L (ref 39–117)
ALT SERPL-CCNC: 13 IU/L (ref 0–32)
AST SERPL-CCNC: 23 IU/L (ref 0–40)
BILIRUB SERPL-MCNC: 0.3 MG/DL (ref 0–1.2)
BUN SERPL-MCNC: 26 MG/DL (ref 8–27)
BUN/CREAT SERPL: 15 (ref 12–28)
CALCIUM SERPL-MCNC: 9.6 MG/DL (ref 8.7–10.3)
CHLORIDE SERPL-SCNC: 104 MMOL/L (ref 96–106)
CHOLEST SERPL-MCNC: 160 MG/DL (ref 100–199)
CO2 SERPL-SCNC: 24 MMOL/L (ref 18–29)
CREAT SERPL-MCNC: 1.68 MG/DL (ref 0.57–1)
ERYTHROCYTE [DISTWIDTH] IN BLOOD BY AUTOMATED COUNT: 14.6 % (ref 12.3–15.4)
EST. AVERAGE GLUCOSE BLD GHB EST-MCNC: 137 MG/DL
GLOBULIN SER CALC-MCNC: 2.3 G/DL (ref 1.5–4.5)
GLUCOSE SERPL-MCNC: 105 MG/DL (ref 65–99)
HBA1C MFR BLD: 6.4 % (ref 4.8–5.6)
HCT VFR BLD AUTO: 34 % (ref 34–46.6)
HDLC SERPL-MCNC: 77 MG/DL
HGB BLD-MCNC: 10.8 G/DL (ref 11.1–15.9)
INTERPRETATION, 910389: NORMAL
INTERPRETATION: NORMAL
LDLC SERPL CALC-MCNC: 68 MG/DL (ref 0–99)
Lab: NORMAL
MCH RBC QN AUTO: 30.3 PG (ref 26.6–33)
MCHC RBC AUTO-ENTMCNC: 31.8 G/DL (ref 31.5–35.7)
MCV RBC AUTO: 95 FL (ref 79–97)
PDF IMAGE, 910387: NORMAL
PLATELET # BLD AUTO: 186 X10E3/UL (ref 150–379)
POTASSIUM SERPL-SCNC: 5.6 MMOL/L (ref 3.5–5.2)
PROT SERPL-MCNC: 6.6 G/DL (ref 6–8.5)
RBC # BLD AUTO: 3.57 X10E6/UL (ref 3.77–5.28)
SODIUM SERPL-SCNC: 147 MMOL/L (ref 134–144)
TRIGL SERPL-MCNC: 76 MG/DL (ref 0–149)
VLDLC SERPL CALC-MCNC: 15 MG/DL (ref 5–40)
WBC # BLD AUTO: 4.4 X10E3/UL (ref 3.4–10.8)

## 2017-09-11 RX ORDER — CARVEDILOL 25 MG/1
TABLET ORAL
Qty: 180 TAB | Refills: 0 | Status: SHIPPED | OUTPATIENT
Start: 2017-09-11 | End: 2017-12-08 | Stop reason: SDUPTHER

## 2017-09-12 ENCOUNTER — HOSPITAL ENCOUNTER (OUTPATIENT)
Dept: GENERAL RADIOLOGY | Age: 82
Discharge: HOME | End: 2017-09-12
Payer: MEDICARE

## 2017-09-12 DIAGNOSIS — J44.9 COPD (CHRONIC OBSTRUCTIVE PULMONARY DISEASE) (HCC): ICD-10-CM

## 2017-09-12 PROCEDURE — 71020 XR CHEST PA LAT: CPT

## 2017-09-25 DIAGNOSIS — F51.01 PRIMARY INSOMNIA: ICD-10-CM

## 2017-09-26 ENCOUNTER — TELEPHONE (OUTPATIENT)
Dept: FAMILY MEDICINE CLINIC | Age: 82
End: 2017-09-26

## 2017-09-26 RX ORDER — ALPRAZOLAM 0.5 MG/1
TABLET ORAL
Qty: 30 TAB | Refills: 3 | OUTPATIENT
Start: 2017-09-26 | End: 2018-02-12 | Stop reason: SDUPTHER

## 2017-09-26 NOTE — TELEPHONE ENCOUNTER
Spoke with patient and she has not received a letter with her lab results. Informed patient will check with Dr. Jaida Norman and call back. Patient verbalized understanding.

## 2017-10-04 ENCOUNTER — HOSPITAL ENCOUNTER (OUTPATIENT)
Dept: LAB | Age: 82
Discharge: HOME | End: 2017-10-04
Payer: MEDICARE

## 2017-10-04 ENCOUNTER — LAB ONLY (OUTPATIENT)
Dept: FAMILY MEDICINE CLINIC | Age: 82
End: 2017-10-04

## 2017-10-04 DIAGNOSIS — E87.5 HYPERKALEMIA: Primary | ICD-10-CM

## 2017-10-04 PROCEDURE — 84132 ASSAY OF SERUM POTASSIUM: CPT

## 2017-10-05 ENCOUNTER — TELEPHONE (OUTPATIENT)
Dept: FAMILY MEDICINE CLINIC | Age: 82
End: 2017-10-05

## 2017-10-05 LAB — POTASSIUM SERPL-SCNC: 5 MMOL/L (ref 3.5–5.2)

## 2017-10-16 RX ORDER — SIMVASTATIN 40 MG/1
TABLET, FILM COATED ORAL
Qty: 90 TAB | Refills: 0 | Status: SHIPPED | OUTPATIENT
Start: 2017-10-16 | End: 2018-02-12 | Stop reason: SDUPTHER

## 2017-10-20 ENCOUNTER — OFFICE VISIT (OUTPATIENT)
Dept: CARDIOLOGY CLINIC | Age: 82
End: 2017-10-20

## 2017-10-20 VITALS
WEIGHT: 222 LBS | SYSTOLIC BLOOD PRESSURE: 130 MMHG | HEART RATE: 91 BPM | HEIGHT: 64 IN | DIASTOLIC BLOOD PRESSURE: 70 MMHG | BODY MASS INDEX: 37.9 KG/M2 | RESPIRATION RATE: 18 BRPM | OXYGEN SATURATION: 98 %

## 2017-10-20 DIAGNOSIS — I10 ESSENTIAL HYPERTENSION, BENIGN: ICD-10-CM

## 2017-10-20 DIAGNOSIS — I50.22 CHRONIC SYSTOLIC HEART FAILURE (HCC): Primary | ICD-10-CM

## 2017-10-20 DIAGNOSIS — E78.5 DYSLIPIDEMIA: Chronic | ICD-10-CM

## 2017-10-20 DIAGNOSIS — N18.30 STAGE 3 CHRONIC KIDNEY DISEASE (HCC): ICD-10-CM

## 2017-10-20 RX ORDER — OFLOXACIN 3 MG/ML
SOLUTION/ DROPS OPHTHALMIC
Refills: 2 | COMMUNITY
Start: 2017-10-02 | End: 2018-04-11

## 2017-10-20 RX ORDER — KETOROLAC TROMETHAMINE 5 MG/ML
SOLUTION OPHTHALMIC
Refills: 2 | COMMUNITY
Start: 2017-10-03 | End: 2018-04-11

## 2017-10-20 RX ORDER — MONTELUKAST SODIUM 10 MG/1
TABLET ORAL
Refills: 3 | COMMUNITY
Start: 2017-09-25 | End: 2017-11-14

## 2017-10-20 RX ORDER — PREDNISONE 10 MG/1
TABLET ORAL
COMMUNITY
End: 2017-11-02 | Stop reason: ALTCHOICE

## 2017-10-20 RX ORDER — PREDNISOLONE ACETATE 10 MG/ML
SUSPENSION/ DROPS OPHTHALMIC
Refills: 2 | COMMUNITY
Start: 2017-10-02 | End: 2018-04-11

## 2017-10-20 RX ORDER — DULOXETIN HYDROCHLORIDE 30 MG/1
CAPSULE, DELAYED RELEASE ORAL
Refills: 6 | COMMUNITY
Start: 2017-10-16 | End: 2018-04-11 | Stop reason: SDUPTHER

## 2017-10-20 RX ORDER — FLUOCINONIDE 0.5 MG/G
CREAM TOPICAL
Refills: 2 | COMMUNITY
Start: 2017-09-25 | End: 2017-10-23

## 2017-10-20 NOTE — PROGRESS NOTES
NAME:  Candace Cage   :   1931   MRN:   [id number]   PCP:  Radha Taylor MD           Subjective: The patient is a 80y.o. year old female  who returns for a routine follow-up. Since the last visit, patient reports no change in exercise tolerance, chest pain, edema, medication intolerance, palpitations, shortness of breath, PND/orthopnea wheezing, sputum, syncope, dizziness or light headedness. Doing well. Started with COPD exacerbation yesterday. Notes wheezing and hoarseness. Past Medical History:   Diagnosis Date    Anemia 3/3/2010    Arrhythmia     irregular heartbeat    Arthritis     CH[facility]congestive heart failure) ([facility]) 3/3/2010    Chronic pain     back    Chronic sinusitis 3/3/2010    CPAP (continuous positive airway pressure) dependence     Diverticulosis     DM (diabetes mellitus) ([address] 3/3/2010    Dyslipidemia 3/3/2010    GERD (gastroesophageal reflux disease)     HTN (hypertension) 3/3/2010    Ill-defined condition     being evaluated py pulmonolgist for \"trouble breathing\"    S/P TKR (total knee replacement) 3/3/2010    Unspecified sleep apnea     doesn't wear CPAP since back has been hurting       Social History   Substance Use Topics    Smoking status: Former Smoker     Packs/day: 0.30     Years: 5.00     Quit date: 1960    Smokeless tobacco: Never Used    Alcohol use No      Family History   Problem Relation Age of Onset    Diabetes Mother     Heart Disease Father     Diabetes Brother     Blindness Brother     Diabetes Sister     Heart Disease Sister     Heart Disease Brother     Heart Disease Brother     Asthma Brother     Malignant Hyperthermia Neg Hx     Pseudocholinesterase Deficiency Neg Hx     Delayed Awakening Neg Hx     Post-op Nausea/Vomiting Neg Hx     Emergence Delirium Neg Hx     Post-op Cognitive Dysfunction Neg Hx         Review of Systems  Constitutional: Negative for fever, chills, and diaphoresis.    Respiratory: Negative for cough, hemoptysis, sputum production, Reports  shortness of breath and wheezing. Cardiovascular: Negative for chest pain, palpitations, orthopnea, claudication, leg swelling and PND. Gastrointestinal: Negative for heartburn, nausea, vomiting, blood in stool and melena. Genitourinary: Negative for dysuria and flank pain. Musculoskeletal: Negative for joint pain and back pain. Skin: Negative for rash. Neurological: Negative for focal weakness, seizures, loss of consciousness, weakness and headaches. Endo/Heme/Allergies: Does not bruise/bleed easily. Psychiatric/Behavioral: Negative for memory loss. The patient does not have insomnia. Objective:       Vitals:    10/20/17 0921 10/20/17 0932   BP: 128/70 130/70   Pulse: 91    Resp: 18    SpO2: 98%    Weight: 222 lb (100.7 kg)    Height: 5' 4\" (1.626 m)     Body mass index is 38.11 kg/(m^2). General PE    Gen: NAD     Mental Status - Alert. General Appearance - Not in acute distress. Neck - no JVD     Chest and Lung Exam     Inspection: Accessory muscles - No use of accessory muscles in breathing. Auscultation:   Breath sounds: - expiratory wheezing. Cardiovascular   Inspection: Jugular vein - Bilateral - Inspection Normal.   Palpation/Percussion:   Apical Impulse: - Normal.   Auscultation: Rhythm - Regular. Heart Sounds - S1 WNL and S2 WNL. No S3 or S4. Murmurs & Other Heart Sounds: Auscultation of the heart reveals - No Murmurs. Peripheral Vascular   Upper Extremity: Inspection - Bilateral - No Cyanotic nailbeds or Digital clubbing. Lower Extremity:   Palpation: Edema - Bilateral - No edema. Abdomen: Soft, non-tender, bowel sounds are active. Neuro: A&O times 3, CN and motor grossly WNL      Data Review:     EKG -  Sinus  Rhythm   -Left axis -anterior fascicular block. -  Nonspecific T-abnormality.      LABS-   Lab Results   Component Value Date/Time    Cholesterol, total 160 09/08/2017 11:09 AM    HDL Cholesterol 77 09/08/2017 11:09 AM    LDL, calculated 68 09/08/2017 11:09 AM    VLDL, calculated 15 09/08/2017 11:09 AM    Triglyceride 76 09/08/2017 11:09 AM    CHOL/HDL Ratio 2.0 06/11/2010 02:10 PM         Allergies reviewed  Allergies   Allergen Reactions    Gabapentin Other (comments)     Muscles twitching and jerking    Levaquin [Levofloxacin] Swelling    Percodan [Oxycodone Hcl-Oxycodone-Asa] Itching       Medications reviewed  Current Outpatient Prescriptions   Medication Sig    DULoxetine (CYMBALTA) 30 mg capsule TAKE ONE CAPSULE BY MOUTH DAILY    montelukast (SINGULAIR) 10 mg tablet TK 1 T PO D    predniSONE (DELTASONE) 10 mg tablet Take  by mouth. 4 tablets for four days, 3 tablets for 3 days and tapering off    simvastatin (ZOCOR) 40 mg tablet TAKE 1 TABLET BY MOUTH ONCE EVERY NIGHT AT BEDTIME    ALPRAZolam (XANAX) 0.5 mg tablet TAKE ONE-HALF TO 1 TABLET BY MOUTH ONCE EVERY NIGHT AT BEDTIME AS NEEDED FOR SLEEP    carvedilol (COREG) 25 mg tablet TAKE 1 TABLET BY MOUTH TWICE DAILY    albuterol (VENTOLIN HFA) 90 mcg/actuation inhaler INHALE 2 PUFFS BY MOUTH EVERY 4 HOURS AS NEEDED FOR WHEEZING.  furosemide (LASIX) 20 mg tablet TAKE 1 and 1/2 TABLETS BY MOUTH EVERY MORNING AND TAKE 1 TABLET BY MOUTH EVERY AFTERNOON BETWEEN 2 AND 4 PM (Patient taking differently: Take 40 mg by mouth daily. TAKE 1 and 1/2 TABLETS BY MOUTH EVERY MORNING AND TAKE 1 TABLET BY MOUTH EVERY AFTERNOON BETWEEN 2 AND 4 PM)    albuterol-ipratropium (DUO-NEB) 2.5 mg-0.5 mg/3 ml nebu 3 mL by Nebulization route every four (4) hours as needed. (up to 6 per day) *90 day supply DX: asthma    albuterol (PROVENTIL VENTOLIN) 2.5 mg /3 mL (0.083 %) nebulizer solution INHALE THE CONTENTS OF ONE VIAL VIA NEBULIZER EVERY 4 HOURS AS NEEDED FOR WHEEZING    lisinopril (PRINIVIL, ZESTRIL) 5 mg tablet Take 1 Tab by mouth daily.  (Patient taking differently: Take 2.5 mg by mouth daily.)    pantoprazole (PROTONIX) 40 mg tablet TAKE 1 TABLET BY MOUTH ONCE DAILY    fluticasone (FLONASE) 50 mcg/actuation nasal spray INHALE 2 SPRAYS IN EACH NOSTRIL ONCE DAILY    promethazine-dextromethorphan (PROMETHAZINE-DM) 6.25-15 mg/5 mL syrup TAKE ONE TEASPOONFUL BY MOUTH EVERY SIX HOURS AS NEEDED FOR COUGH    glipiZIDE SR (GLUCOTROL XL) 2.5 mg CR tablet TAKE ONE TABLET BY MOUTH DAILY    HYDROcodone-acetaminophen (NORCO)  mg tablet Take 1 Tab by mouth every six (6) hours as needed.  azelastine (ASTELIN) 137 mcg (0.1 %) nasal spray 1 [place] by Both Nostrils route daily.  diazepam (VALIUM) 5 mg tablet TAKE ONE TABLET EVERY 6 HOURS AS NEEDED FOR ANXIETY    tiotropium-olodaterol 2.5-2.5 mcg/actuation mist Take 2 Puffs by inhalation daily.  PSEUDOEPHEDRINE-guaiFENesin (MUCINEX D)  mg per tablet Take 1 Tab by mouth daily as needed.  ofloxacin (FLOXIN) 0.3 % ophthalmic solution INSTILL 1 GTT INTO OD QID STARTING 3 DAYS BEFORE SURGERY    prednisoLONE acetate (PRED FORTE) 1 % ophthalmic suspension INSTILL 1 GTT INTO OD QID START THE DAY AFTER SURGERY    ketorolac (ACULAR) 0.5 % ophthalmic solution INSTILL 1 GTT INTO OD QID STARTING 3 DAYS PRIOR TO SURGERY     No current facility-administered medications for this visit. Assessment:       ICD-10-CM ICD-9-CM    1. Chronic systolic heart failure (HCC) I50.22 428.22    2. Dyslipidemia E78.5 272.4 AMB POC EKG ROUTINE W/ 12 LEADS, INTER & REP   3. Essential hypertension, benign I10 401.1    4.  Stage 3 chronic kidney disease N18.3 585.3         Orders Placed This Encounter    AMB POC EKG ROUTINE W/ 12 LEADS, INTER & REP     Order Specific Question:   Reason for Exam:     Answer:   Routine    DULoxetine (CYMBALTA) 30 mg capsule     Sig: TAKE ONE CAPSULE BY MOUTH DAILY     Refill:  6    DISCONTD: fluocinoNIDE (LIDEX) 0.05 % topical cream     Sig: APPLY AA ON RIGHT LEG BID PRN     Refill:  2    montelukast (SINGULAIR) 10 mg tablet     Sig: TK 1 T PO D     Refill:  3    ofloxacin (FLOXIN) 0.3 % ophthalmic solution     Sig: INSTILL 1 GTT INTO OD QID STARTING 3 DAYS BEFORE SURGERY     Refill:  2    prednisoLONE acetate (PRED FORTE) 1 % ophthalmic suspension     Sig: INSTILL 1 GTT INTO OD QID START THE DAY AFTER SURGERY     Refill:  2    ketorolac (ACULAR) 0.5 % ophthalmic solution     Sig: INSTILL 1 GTT INTO OD QID STARTING 3 DAYS PRIOR TO SURGERY     Refill:  2    predniSONE (DELTASONE) 10 mg tablet     Sig: Take  by mouth. 4 tablets for four days, 3 tablets for 3 days and tapering off       Patient Active Problem List   Diagnosis Code    CHF (congestive heart failure) ([facility]) I50.9    S/P TKR (total knee replacement) Z96.659    Anemia D64.9    s/p lumbar laminectomy Z98.890    S/P ASAD (total abdominal hysterectomy) Z90.710    S/P hemorrhoidectomy Z98.890, Z87.19    S/P rotator cuff surgery Z98.890    DM (diabetes mellitus) ([facility] E11.9    Chronic sinusitis J32.9    S/P sinus surgery Z98.890    Dyslipidemia E78.5    Environmental allergies Z91.09    Obstructive sleep apnea (adult) (pediatric) G47.33    DJD (degenerative joint disease) M19.90    Chronic systolic heart failure ([facility]) I50.22    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    Sepsis ([facility]) A41.9    Acute respiratory failure ([facility]) J96.00    Asthma J45.909    Anxiety disorder F41.9    Diabetic neuropathy ([facility]) E11.40    Esophageal reflux K21.9    Essential hypertension, benign I10    Reactive airway disease with wheezing without complication P12.058    Encounter for medication monitoring Z51.81    CKD (chronic kidney disease) N18.9    Arthralgia of multiple joints M25.50       Plan:     Patient presents for follow up, feeling well and stable from cardiac standpoint. EKG sinus rhythm, normotensive. Edema improved with support socks. Will continue current medications and follow up in 6 mo. She is cleared for cataract surgery in November with Dr Lottie Hills a [facility].      Ashanti Young MD

## 2017-10-20 NOTE — MR AVS SNAPSHOT
Visit Information Date & Time Provider Department Dept. Phone Encounter #  
 10/20/2017  9:45 AM Gunjan Hill, [address] [facility] [phone] Your Appointments 10/23/2017  9:00 AM  
Medicare Physical with Oralia Archuleta, [address] [facility]) Appt Note: [address] [address]  
[phone] [address] [facility] [address]  
  
    
 11/14/2017 11:45 AM  
PRE OP with Chela Anderson MD  
[facility]) Appt Note: Pre op  
 [address] [facility] [address] [address]  
[phone] [address] [facility] [address]  
  
    
 1/8/2018 10:00 AM  
ROUTINE CARE with Chela Anderson MD  
[facility]) Appt Note: routine follow up  
 [address] [facility] [address] [address]  
[phone] [address] [facility] [address] Upcoming Health Maintenance Date Due  
 MEDICARE YEARLY EXAM 3/5/2017 EYE EXAM RETINAL OR DILATED Q1 1/22/2018* MICROALBUMIN Q1 1/18/2018 GLAUCOMA SCREENING Q2Y 2/22/2018 HEMOGLOBIN A1C Q6M 3/8/2018 FOOT EXAM Q1 7/6/2018 LIPID PANEL Q1 9/8/2018 DTaP/Tdap/Td series (2 - Td) 9/14/2022 *Topic was postponed. The date shown is not the original due date. Allergies as of 10/20/2017  Review Complete On: 10/20/2017 By: Cleve Hand Severity Noted Reaction Type Reactions Gabapentin  10/12/2016    Other (comments) Muscles twitching and jerking Levaquin [Levofloxacin]  03/09/2012    Swelling Percodan [Oxycodone Hcl-oxycodone-asa]  03/09/2012    Itching Current Immunizations  Reviewed on 9/8/2017 Name Date Influenza High Dose Vaccine PF 9/8/2017, 9/16/2016, 8/25/2015 Influenza Vaccine  Deferred (Patient Refused), 9/30/2014, 9/20/2013 Influenza Vaccine Split 8/31/2012, 9/13/2010 Pneumococcal Conjugate (PCV-13) 8/25/2015 Pneumococcal Vaccine (Pcv) 10/30/2009 TDAP Vaccine 9/14/2012 Zoster Vaccine, Live 3/19/2013 Not reviewed this visit You Were Diagnosed With   
  
 Codes Comments Chronic systolic heart failure (HCC)    -  Primary ICD-10-CM: G67.55 ICD-9-CM: 428.22 Dyslipidemia     ICD-10-CM: E78.5 ICD-9-CM: 272.4 Essential hypertension, benign     ICD-10-CM: I10 
ICD-9-CM: 401.1 Stage 3 chronic kidney disease     ICD-10-CM: N18.3 ICD-9-CM: 151. 3 Vitals BP Pulse Resp Height(growth percentile) Weight(growth percentile) LMP  
 130/70 (BP 1 Location: Left arm, BP Patient Position: Sitting) 91 18 5' 4\" (1.626 m) 222 lb (100.7 kg) 03/04/1961 (Approximate) SpO2 BMI OB Status Smoking Status 98% 38.11 kg/m2 Hysterectomy Former Smoker Vitals History BMI and BSA Data Body Mass Index Body Surface Area  
 38.11 kg/m 2 2.13 m 2 Preferred Pharmacy Pharmacy Name Phone [address] [facility] [phone] Your Updated Medication List  
  
   
This list is accurate as of: 10/20/17 10:20 AM.  Always use your most recent med list.  
  
  
  
  
 * albuterol 2.5 mg /3 mL (0.083 %) nebulizer solution Commonly known as:  PROVENTIL VENTOLIN  
INHALE THE CONTENTS OF ONE VIAL VIA NEBULIZER EVERY 4 HOURS AS NEEDED FOR WHEEZING  
  
 * albuterol 90 mcg/actuation inhaler Commonly known as:  VENTOLIN HFA INHALE 2 PUFFS BY MOUTH EVERY 4 HOURS AS NEEDED FOR WHEEZING. albuterol-ipratropium 2.5 mg-0.5 mg/3 ml Nebu Commonly known as:  DUO-NEB  
3 mL by Nebulization route every four (4) hours as needed. (up to 6 per day) *90 day supply DX: asthma ALPRAZolam 0.5 mg tablet Commonly known as:  XANAX  
TAKE ONE-HALF TO 1 TABLET BY MOUTH ONCE EVERY NIGHT AT BEDTIME AS NEEDED FOR SLEEP  
  
 azelastine 137 mcg (0.1 %) nasal spray Commonly known as:  ASTELIN  
1 Spray by Both Nostrils route daily. carvedilol 25 mg tablet Commonly known as:  COREG  
TAKE 1 TABLET BY MOUTH TWICE DAILY  
  
 diazePAM 5 mg tablet Commonly known as:  VALIUM  
TAKE ONE TABLET EVERY 6 HOURS AS NEEDED FOR ANXIETY DULoxetine 30 mg capsule Commonly known as:  CYMBALTA TAKE ONE CAPSULE BY MOUTH DAILY  
  
 fluocinoNIDE 0.05 % topical cream  
Commonly known as:  LIDEX APPLY AA ON RIGHT LEG BID PRN  
  
 fluticasone 50 mcg/actuation nasal spray Commonly known as:  Sheila Reges INHALE 2 SPRAYS IN EACH NOSTRIL ONCE DAILY  
  
 furosemide 20 mg tablet Commonly known as:  LASIX TAKE 1 and 1/2 TABLETS BY MOUTH EVERY MORNING AND TAKE 1 TABLET BY MOUTH EVERY AFTERNOON BETWEEN 2 AND 4 PM  
  
 glipiZIDE SR 2.5 mg CR tablet Commonly known as:  GLUCOTROL XL  
TAKE ONE TABLET BY MOUTH DAILY HYDROcodone-acetaminophen  mg tablet Commonly known as:  Winter Nicko Take 1 Tab by mouth every six (6) hours as needed. ketorolac 0.5 % ophthalmic solution Commonly known as:  Shaune Marsh INSTILL 1 GTT INTO OD QID STARTING 3 DAYS PRIOR TO SURGERY  
  
 lisinopril 5 mg tablet Commonly known as:  Fabio Carlinler Take 1 Tab by mouth daily. mometasone 0.1 % topical cream  
Commonly known as:  Alisa Boas Apply  to affected area daily as needed for Skin Irritation. montelukast 10 mg tablet Commonly known as:  SINGULAIR TK 1 T PO D  
  
 MUCINEX D  mg per tablet Generic drug:  PSEUDOEPHEDRINE-guaiFENesin Take 1 Tab by mouth daily as needed. ofloxacin 0.3 % ophthalmic solution Commonly known as:  FLOXIN  
INSTILL 1 GTT INTO OD QID STARTING 3 DAYS BEFORE SURGERY  
  
 ondansetron hcl 4 mg tablet Commonly known as:  Ele Loss Take 4 mg by mouth every eight (8) hours as needed. pantoprazole 40 mg tablet Commonly known as:  PROTONIX  
TAKE 1 TABLET BY MOUTH ONCE DAILY prednisoLONE acetate 1 % ophthalmic suspension Commonly known as:  PRED FORTE INSTILL 1 GTT INTO OD QID START THE DAY AFTER SURGERY  
  
 predniSONE 10 mg tablet Commonly known as:  Ben Esters Take  by mouth. 4 tablets for four days, 3 tablets for 3 days and tapering off  
  
 promethazine-dextromethorphan 6.25-15 mg/5 mL syrup Commonly known as:  PROMETHAZINE-DM  
TAKE ONE TEASPOONFUL BY MOUTH EVERY SIX HOURS AS NEEDED FOR COUGH  
  
 simvastatin 40 mg tablet Commonly known as:  ZOCOR  
TAKE 1 TABLET BY MOUTH ONCE EVERY NIGHT AT BEDTIME  
  
 tiotropium-olodaterol 2.5-2.5 mcg/actuation Mist  
Take 2 Puffs by inhalation daily. * Notice: This list has 2 medication(s) that are the same as other medications prescribed for you. Read the directions carefully, and ask your doctor or other care provider to review them with you. We Performed the Following AMB POC EKG ROUTINE W/ 12 LEADS, INTER & REP [50579 CPT(R)] Introducing [facility] SERVICES! Dear Clarisa Left: 
Thank you for requesting a [employer] account. Our records indicate that you have previously registered for a [employer] account but its currently inactive. Please call our [employer] support line at [phone]. Additional Information If you have questions, please visit the Frequently Asked Questions section of the [employer] website at [email or web address] Remember, [employer] is NOT to be used for urgent needs. For medical emergencies, dial 911. Now available from your iPhone and Android! Please provide this summary of care documentation to your next provider. Your primary care clinician is listed as Rani Samuels. If you have any questions after today's visit, please call [phone].

## 2017-10-20 NOTE — PROGRESS NOTES
Chief Complaint   Patient presents with    Cholesterol Problem     6 mo f/u    Hypertension     \"    CHF     \"

## 2017-10-23 ENCOUNTER — OFFICE VISIT (OUTPATIENT)
Dept: FAMILY MEDICINE CLINIC | Age: 82
End: 2017-10-23

## 2017-10-23 DIAGNOSIS — Z00.00 MEDICARE ANNUAL WELLNESS VISIT, SUBSEQUENT: Primary | ICD-10-CM

## 2017-10-31 VITALS
BODY MASS INDEX: 38.11 KG/M2 | WEIGHT: 222 LBS | DIASTOLIC BLOOD PRESSURE: 63 MMHG | SYSTOLIC BLOOD PRESSURE: 146 MMHG | HEART RATE: 70 BPM

## 2017-10-31 RX ORDER — LANOLIN ALCOHOL/MO/W.PET/CERES
1000 CREAM (GRAM) TOPICAL DAILY
COMMUNITY
End: 2020-05-18 | Stop reason: ALTCHOICE

## 2017-10-31 NOTE — PATIENT INSTRUCTIONS
Medicare Wellness Visit, Female    The best way to live healthy is to have a healthy lifestyle by eating a well-balanced diet, exercising regularly, limiting alcohol and stopping smoking. Regular physical exams and screening tests are another way to keep healthy. Preventive exams provided by your health care provider can find health problems before they become diseases or illnesses. Preventive services including immunizations, screening tests, monitoring and exams can help you take care of your own health. All people over age 72 should have a pneumovax  and and a prevnar shot to prevent pneumonia. These are once in a lifetime unless you and your provider decide differently. You have had both. All people over 65 should have a yearly flu shot and a tetanus vaccine every 10 years. You have had both. A tetanus booster will be needed in 2022. A bone mass density to screen for osteoporosis or thinning of the bones should be done every 2 years after 65. You had a dexa scan in 2012 that was normal; this can be repeated per d/w your PCP. Screening for diabetes mellitus with a blood sugar test should be done every year. Your A1c was 6.4% which is great, in Sept 2017 - you have diabetes so this will likely be checked every 3-6 months. Glaucoma is a disease of the eye due to increased ocular pressure that can lead to blindness and it should be done every year by an eye professional. You have had your annual eye exam and have cataract surgery in Nov.     Cardiovascular screening tests that check for elevated lipids (fatty part of blood) which can lead to heart disease and strokes should be done every 5 years. Your lipids were checked in  Sept 2017 and you are on medication so will likely be checked annually.      Colorectal screening that evaluates for blood or polyps in your colon should be done yearly as a stool test or every five years as a flexible sigmoidoscope or every 10 years as a colonoscopy up to age 76. Breast cancer screening with a mammogram is recommended biennially  for women age 54-69. Screening for cervical cancer with a pap smear and pelvic exam is recommended for women after age 72 years every 2 years up to age 79 or when the provider and patient decide to stop. If there is a history of cervical abnormalities or other increased risk for cancer then the test is recommended yearly. A shingles vaccine is also recommended once in a lifetime after age 61. You have had this vaccine. Your Medicare Wellness Exam is recommended annually.

## 2017-10-31 NOTE — PROGRESS NOTES
Dr. Tima Bedolla Referred AS, 4/5/1931, a 80 y.o. female for a Medicare Annual Wellness Visit (AWV). She presents with her  who is also going to have his AWV, per his request, while he is here since he is due. She has been on prednisone recently for her breathing. She has cataract surgery planned in  Nov with Dr. Anthony Saravia. Having trouble splitting 5 mg lisinopril tablets - states she is now on 2.5 mg daily not 5 mg daily. In review of labs noted low Vit D of 26.4 in past - she is not on a supplement. Will consult with Dr. Demetrius Cope on these things and get back with patient as indicated. This is a Subsequent Medicare Annual Wellness Exam (AWV) (Performed 12 months after IPPE or effective date of Medicare Part B enrollment)    I have reviewed the patient's medical history in detail and updated the computerized patient record.      History     Past Medical History:   Diagnosis Date    Anemia 3/3/2010    Arrhythmia     irregular heartbeat    Arthritis     CHF (congestive heart failure) ([facility]) 3/3/2010    Chronic pain     back    Chronic sinusitis 3/3/2010    CPAP (continuous positive airway pressure) dependence     Diverticulosis     DM (diabetes mellitus) ([facility] [address] 3/3/2010    Dyslipidemia 3/3/2010    GERD (gastroesophageal reflux disease)     HTN (hypertension) 3/3/2010    Ill-defined condition     being evaluated py pulmonolgist for \"trouble breathing\"    S/P TKR (total knee replacement) 3/3/2010    Unspecified sleep apnea     doesn't wear CPAP since back has been hurting      Past Surgical History:   Procedure Laterality Date    HX APPENDECTOMY      thinks it was taken with hysterectomy    HX GYN      \"tubes opened\"    HX HEENT      sinus surgery    HX HEMORRHOIDECTOMY      HX HYSTERECTOMY      HX KNEE REPLACEMENT  1996    both knees- at same time    HX LUMBAR LAMINECTOMY  1980s    HX MOHS PROCEDURES      left shoulder    HX ORTHOPAEDIC  1980    neck fusion   1 [employer] right knee replaced for second time    HX ORTHOPAEDIC      lumbar fusion    HX OTHER SURGICAL      CORNELL BIOPSY BREAST STEREOTACTIC Left yrs ago    (-)    MO COLONOSCOPY FLX DX W/COLLJ SPEC WHEN PFRMD  [id number]    dr. Solis Orr (barium enema)     Current Outpatient Prescriptions   Medication Sig Dispense Refill    DULoxetine (CYMBALTA) 30 mg capsule TAKE ONE CAPSULE BY MOUTH DAILY  6    predniSONE (DELTASONE) 10 mg tablet Take  by mouth. 4 tablets for four days, 3 tablets for 3 days and tapering off      simvastatin (ZOCOR) 40 mg tablet TAKE 1 TABLET BY MOUTH ONCE EVERY NIGHT AT BEDTIME 90 Tab 0    ALPRAZolam (XANAX) 0.5 mg tablet TAKE ONE-HALF TO 1 TABLET BY MOUTH ONCE EVERY NIGHT AT BEDTIME AS NEEDED FOR SLEEP 30 Tab 3    carvedilol (COREG) 25 mg tablet TAKE 1 TABLET BY MOUTH TWICE DAILY 180 Tab 0    albuterol (VENTOLIN HFA) 90 mcg/actuation inhaler INHALE 2 PUFFS BY MOUTH EVERY 4 HOURS AS NEEDED FOR WHEEZING. 1 Inhaler 11    furosemide (LASIX) 20 mg tablet TAKE 1 and 1/2 TABLETS BY MOUTH EVERY MORNING AND TAKE 1 TABLET BY MOUTH EVERY AFTERNOON BETWEEN 2 AND 4 PM (Patient taking differently: Take 40 mg by mouth daily. TAKE 1 and 1/2 TABLETS BY MOUTH EVERY MORNING AND TAKE 1 TABLET BY MOUTH EVERY AFTERNOON BETWEEN 2 AND 4 PM) 180 Tab 3    albuterol-ipratropium (DUO-NEB) 2.5 mg-0.5 mg/3 ml nebu 3 mL by Nebulization route every four (4) hours as needed. (up to 6 per day) *90 day supply DX: asthma 540 Nebule 3    albuterol (PROVENTIL VENTOLIN) 2.5 mg /3 mL (0.083 %) nebulizer solution INHALE THE CONTENTS OF ONE VIAL VIA NEBULIZER EVERY 4 HOURS AS NEEDED FOR WHEEZING 1650 Each 3    lisinopril (PRINIVIL, ZESTRIL) 5 mg tablet Take 1 Tab by mouth daily.  (Patient taking differently: Take 2.5 mg by mouth daily.) 90 Tab 3    pantoprazole (PROTONIX) 40 mg tablet TAKE 1 TABLET BY MOUTH ONCE DAILY 90 Tab 3    fluticasone (FLONASE) 50 mcg/actuation nasal spray INHALE 2 SPRAYS IN EACH NOSTRIL ONCE DAILY 1 Bottle 5    promethazine-dextromethorphan (PROMETHAZINE-DM) 6.25-15 mg/5 mL syrup TAKE ONE TEASPOONFUL BY MOUTH EVERY SIX HOURS AS NEEDED FOR COUGH 240 mL 1    glipiZIDE SR (GLUCOTROL XL) 2.5 mg CR tablet TAKE ONE TABLET BY MOUTH DAILY 90 Tab 3    HYDROcodone-acetaminophen (NORCO)  mg tablet Take 1 Tab by mouth every six (6) hours as needed. 60 Tab 0    azelastine (ASTELIN) 137 mcg (0.1 %) nasal spray 1 [place] by Both Nostrils route daily.  diazepam (VALIUM) 5 mg tablet TAKE ONE TABLET EVERY 6 HOURS AS NEEDED FOR ANXIETY 60 Tab 3    tiotropium-olodaterol 2.5-2.5 mcg/actuation mist Take 2 Puffs by inhalation daily.  PSEUDOEPHEDRINE-guaiFENesin (MUCINEX D)  mg per tablet Take 1 Tab by mouth daily as needed.       montelukast (SINGULAIR) 10 mg tablet TK 1 T PO D  3    ofloxacin (FLOXIN) 0.3 % ophthalmic solution INSTILL 1 GTT INTO OD QID STARTING 3 DAYS BEFORE SURGERY  2    prednisoLONE acetate (PRED FORTE) 1 % ophthalmic suspension INSTILL 1 GTT INTO OD QID START THE DAY AFTER SURGERY  2    ketorolac (ACULAR) 0.5 % ophthalmic solution INSTILL 1 GTT INTO OD QID STARTING 3 DAYS PRIOR TO SURGERY  2     Allergies   Allergen Reactions    Gabapentin Other (comments)     Muscles twitching and jerking    Levaquin [Levofloxacin] Swelling    Percodan [Oxycodone Hcl-Oxycodone-Asa] Itching     Family History   Problem Relation Age of Onset    Diabetes Mother     Heart Disease Father     Diabetes Brother     Blindness Brother     Diabetes Sister     Heart Disease Sister     Heart Disease Brother     Heart Disease Brother     Asthma Brother     Malignant Hyperthermia Neg Hx     Pseudocholinesterase Deficiency Neg Hx     Delayed Awakening Neg Hx     Post-op Nausea/Vomiting Neg Hx     Emergence Delirium Neg Hx     Post-op Cognitive Dysfunction Neg Hx      Social History   Substance Use Topics    Smoking status: Former Smoker     Packs/day: 0.30     Years: 5.00     Quit date: 1/1/1960    Smokeless tobacco: Never Used    Alcohol use No     Patient Active Problem List   Diagnosis Code    CHF [facility]congestive heart failure) ([facility]) I50.9    S/P TKR (total knee replacement) Z96.659    Anemia D64.9    s/p lumbar laminectomy Z98.890    S/P ASAD (total abdominal hysterectomy) Z90.710    S/P hemorrhoidectomy Z98.890, Z87.19    S/P rotator cuff surgery Z98.890    DM (diabetes mellitus) ([facility] [address] E11.9    Chronic sinusitis J32.9    S/P sinus surgery Z98.890    Dyslipidemia E78.5    Environmental allergies Z91.09    Obstructive sleep apnea (adult) (pediatric) G47.33    DJD (degenerative joint disease) M19.90    Chronic systolic heart failure ([facility]) I50.22    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    Sepsis ([facility]) A41.9    Acute respiratory failure ([facility]) J96.00    Asthma J45.909    Anxiety disorder F41.9    Diabetic neuropathy ([facility]) E11.40    Esophageal reflux K21.9    Essential hypertension, benign I10    Reactive airway disease with wheezing without complication V10.672    Encounter for medication monitoring Z51.81    CKD (chronic kidney disease) N18.9    Arthralgia of multiple joints M25.50       Depression Risk Factor Screening:     PHQ over the last two weeks 10/23/2017   Little interest or pleasure in doing things Several days   Feeling down, depressed or hopeless Several days   Total Score PHQ 2 2     Alcohol Risk Factor Screening: You do not drink alcohol or very rarely. Functional Ability and Level of Safety:   Hearing Loss  Hearing is good. The patient needs further evaluation. Pt states she has some issues with hearing. Activities of Daily Living  The home contains: no safety equipment.   Patient needs help with:  transportation, shopping, housework and managing medications   ADL Assessment 10/23/2017   Feeding yourself No Help Needed   Getting from bed to chair No Help Needed   Getting dressed No Help Needed   Bathing or showering No Help Needed   Walk across the room (includes cane/walker) No Help Needed   Using the telphone No Help Needed   Taking your medications Help Needed   Preparing meals Help Needed   Managing money (expenses/bills) No Help Needed   Moderately strenuous housework (laundry) Help Needed   Shopping for personal items (toiletries/medicines) Help Needed   Shopping for groceries Help Needed   Driving Help Needed   Climbing a flight of stairs Help Needed   Getting to places beyond walking distances Help Needed         Fall RiskFall Risk Assessment, last 12 mths 10/23/2017   Able to walk? Yes   Fall in past 12 months? No       Abuse Screen  Patient is not abused    Cognitive Screening   Evaluation of Cognitive Function:  Has your family/caregiver stated any concerns about your memory: yes   Pt feels she is having memory issues. Will discuss with her primary care doctor. Patient Care Team   Patient Care Team:  Dena Bassett MD as PCP - General (Family Practice)  Armida Guzmán RN as Nurse Cory Helm MD as Physician (Neurology)  Elda Tarango MD as Physician (Cardiology)  Destiny Streeter (Physician Assistant)  Ester Carrington MD (Nephrology)    Assessment/Plan   Education and counseling provided:  End-of-Life planning (with patient's consent)  Bone mass measurement (DEXA)    Diagnoses and all orders for this visit:    1. Medicare annual wellness visit, subsequent      Medicare-recommended wellness and preventative care screenings / vaccines reviewed as noted in patient instructions. Written information provided on advanced directives and reviewed with patient and spouse. Patient verbalized understanding of information presented. Answered all of the patient's questions.       Varsha Nunez, PHARMD, CDE

## 2017-11-02 ENCOUNTER — OFFICE VISIT (OUTPATIENT)
Dept: FAMILY MEDICINE CLINIC | Age: 82
End: 2017-11-02

## 2017-11-02 VITALS
OXYGEN SATURATION: 94 % | DIASTOLIC BLOOD PRESSURE: 58 MMHG | HEIGHT: 64 IN | RESPIRATION RATE: 16 BRPM | TEMPERATURE: 98 F | WEIGHT: 218.2 LBS | BODY MASS INDEX: 37.25 KG/M2 | HEART RATE: 73 BPM | SYSTOLIC BLOOD PRESSURE: 106 MMHG

## 2017-11-02 DIAGNOSIS — M72.2 PLANTAR FASCIITIS OF LEFT FOOT: Primary | ICD-10-CM

## 2017-11-02 NOTE — PATIENT INSTRUCTIONS
Plantar Fasciitis: Exercises  Your Care Instructions  Here are some examples of typical rehabilitation exercises for your condition. Start each exercise slowly. Ease off the exercise if you start to have pain. Your doctor or physical therapist will tell you when you can start these exercises and which ones will work best for you. How to do the exercises  Towel stretch    1. Sit with your legs extended and knees straight. 2. Place a towel around your foot just under the toes. 3. Hold each end of the towel in each hand, with your hands above your knees. 4. Pull back with the towel so that your foot stretches toward you. 5. Hold the position for at least 15 to 30 seconds. 6. Repeat 2 to 4 times a session, up to 5 sessions a day. Calf stretch    This exercise stretches the muscles at the back of the lower leg (the calf) and the Achilles tendon. Do this exercise 3 or 4 times a day, 5 days a week. 1. Stand facing a wall with your hands on the wall at about eye level. Put the leg you want to stretch about a step behind your other leg. 2. Keeping your back heel on the floor, bend your front knee until you feel a stretch in the back leg. 3. Hold the stretch for 15 to 30 seconds. Repeat 2 to 4 times. Plantar fascia and calf stretch    Stretching the plantar fascia and calf muscles can increase flexibility and decrease heel pain. You can do this exercise several times each day and before and after activity. 1. Stand on a step as shown above. Be sure to hold on to the banister. 2. Slowly let your heels down over the edge of the step as you relax your calf muscles. You should feel a gentle stretch across the bottom of your foot and up the back of your leg to your knee. 3. Hold the stretch about 15 to 30 seconds, and then tighten your calf muscle a little to bring your heel back up to the level of the step. Repeat 2 to 4 times.   Towel curls    Make this exercise more challenging by placing a weighted object, such as a soup can, on the other end of the towel. 1. While sitting, place your foot on a towel on the floor and scrunch the towel toward you with your toes. 2. Then, also using your toes, push the towel away from you. [place] pickups    1. Put marbles on the floor next to a cup.  2. Using your toes, try to lift the marbles up from the floor and put them in the cup. Follow-up care is a key part of your treatment and safety. Be sure to make and go to all appointments, and call your doctor if you are having problems. It's also a good idea to know your test results and keep a list of the medicines you take. Where can you learn more? Go to http://marla-chika.info/. Shameka Myers in the search box to learn more about \"Plantar Fasciitis: Exercises. \"  Current as of: March 21, 2017  Content Version: 11.4  © 7924-1544 [employer]. Care instructions adapted under license by [employer] (which disclaims liability or warranty for this information). If you have questions about a medical condition or this instruction, always ask your healthcare professional. [place] [address] any warranty or liability for your use of this information.

## 2017-11-02 NOTE — PROGRESS NOTES
HISTORY OF PRESENT ILLNESS  Reese Hale is a 80 y.o. female. HPI    Pt of Dr. Leydi Chin with PMHx of CHF, DM with neuropathy, CKD, dyslipidemia, here for an acute visit with CC of left foot pain. It started yesterday, so she took a pain pill that she normally uses for her back (Norco ) and the pain went away. She got up this morning and started having difficulty putting weight on it. Denies any injury. Reports that it feels like she stepped on something, though she didn't. The pain has been constant. Pain is in the middle/bottom of her left sole. She was wearing bedroom shoes yesterday and the day before; this is typical for her. No alleviating factors or aggravating factors noted. Has had similar pain in the remote past and it got better as she got up and moved around. Pain was worst when she first got up this morning and has been slowly improving since. Has been walking 20 minutes on the treadmill for the past few weeks, which hasn't caused any pain, but wondering if it may be related.      Past Medical History:   Diagnosis Date    Anemia 3/3/2010    Arrhythmia     irregular heartbeat    Arthritis     [facility]congest[facility] failure) ([facility]) 3/3/2010    Chronic pain     back    Chronic sinusitis 3/3/2010    CPAP (continuous positive airway pressure) dependence     Diverticulosis     DM (diabetes mellitus) ([facility] [address] 3/3/2010    Dyslipidemia 3/3/2010    GERD (gastroesophageal reflux disease)     HTN (hypertension) 3/3/2010    Ill-defined condition     being evaluated py pulmonolgist for \"trouble breathing\"    S/P TKR (total knee replacement) 3/3/2010    Unspecified sleep apnea     doesn't wear CPAP since back has been hurting     Past Surgical History:   Procedure Laterality Date    HX APPENDECTOMY      thinks it was taken with hysterectomy    HX GYN      \"tubes opened\"    HX HEENT      sinus surgery    HX HEMORRHOIDECTOMY      HX HYSTERECTOMY      [address] both knees- at same time    HX LUMBAR LAMINECTOMY  1980s    HX MOHS PROCEDURES      left shoulder    HX ORTHOPAEDIC  1980    neck fusion    HX ORTHOPAEDIC  1999    right knee replaced for second time    HX ORTHOPAEDIC      lumbar fusion    HX OTHER SURGICAL      CORNELL BIOPSY BREAST STEREOTACTIC Left yrs ago    (-)    UT COLONOSCOPY FLX DX W/COLLJ SPEC WHEN PFRMD  [id number]    dr. Sara Arshad (barium enema)     Family History   Problem Relation Age of Onset    Diabetes Mother     Heart Disease Father     Diabetes Brother     Blindness Brother     Diabetes Sister     Heart Disease Sister     Heart Disease Brother     Heart Disease Brother     Asthma Brother     Malignant Hyperthermia Neg Hx     Pseudocholinesterase Deficiency Neg Hx     Delayed Awakening Neg Hx     Post-op Nausea/Vomiting Neg Hx     Emergence Delirium Neg Hx     Post-op Cognitive Dysfunction Neg Hx      Social History     Social History    Marital status:      Spouse name: N/A    Number of children: N/A    Years of education: N/A     Social History Main Topics    Smoking status: Former Smoker     Packs/day: 0.30     Years: 5.00     Quit date: 1/1/1960    Smokeless tobacco: Never Used    Alcohol use No    Drug use: No    Sexual activity: No     Other Topics Concern    None     Social History Narrative    ** Merged History Encounter **          Patient Active Problem List   Diagnosis Code    CHF (congestive heart failure) ([facility]) I50.9    S/P TKR (total knee replacement) Z96.659    Anemia D64.9    s/p lumbar laminectomy Z98.890    S/P ASAD (total abdominal hysterectomy) Z90.710    S/P hemorrhoidectomy Z98.890, Z87.19    S/P rotator cuff surgery Z98.890    DM (diabetes mellitus) ([facility] [address] E11.9    Chronic sinusitis J32.9    S/P sinus surgery Z98.890    Dyslipidemia E78.5    Environmental allergies Z91.09    Obstructive sleep apnea (adult) (pediatric) G47.33    DJD (degenerative joint disease) M19.90    Chronic systolic heart failure ([facility]) I50.22    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    Sepsis ([facility]) A41.9    Acute respiratory failure ([facility]) J96.00    Asthma J45.909    Anxiety disorder F41.9    Diabetic neuropathy ([facility]) E11.40    Esophageal reflux K21.9    Essential hypertension, benign I10    Reactive airway disease with wheezing without complication X55.934    Encounter for medication monitoring Z51.81    CKD (chronic kidney disease) N18.9    Arthralgia of multiple joints M25.50    Plantar fasciitis of left foot M72.2     Outpatient Encounter Prescriptions as of 11/2/2017   Medication Sig Dispense Refill    cyanocobalamin 1,000 mcg tablet Take 1,000 mcg by mouth daily.  DULoxetine (CYMBALTA) 30 mg capsule TAKE ONE CAPSULE BY MOUTH DAILY  6    montelukast (SINGULAIR) 10 mg tablet TAKE ONE TABLET BY MOUTH DAILY  3    simvastatin (ZOCOR) 40 mg tablet TAKE 1 TABLET BY MOUTH ONCE EVERY NIGHT AT BEDTIME 90 Tab 0    ALPRAZolam (XANAX) 0.5 mg tablet TAKE ONE-HALF TO 1 TABLET BY MOUTH ONCE EVERY NIGHT AT BEDTIME AS NEEDED FOR SLEEP 30 Tab 3    carvedilol (COREG) 25 mg tablet TAKE 1 TABLET BY MOUTH TWICE DAILY 180 Tab 0    albuterol (VENTOLIN HFA) 90 mcg/actuation inhaler INHALE 2 PUFFS BY MOUTH EVERY 4 HOURS AS NEEDED FOR WHEEZING. 1 Inhaler 11    furosemide (LASIX) 20 mg tablet TAKE 1 and 1/2 TABLETS BY MOUTH EVERY MORNING AND TAKE 1 TABLET BY MOUTH EVERY AFTERNOON BETWEEN 2 AND 4 PM (Patient taking differently: Take 40 mg by mouth daily. TAKE 1 and 1/2 TABLETS BY MOUTH EVERY MORNING AND TAKE 1 TABLET BY MOUTH EVERY AFTERNOON BETWEEN 2 AND 4 PM) 180 Tab 3    albuterol-ipratropium (DUO-NEB) 2.5 mg-0.5 mg/3 ml nebu 3 mL by Nebulization route every four (4) hours as needed.  (up to 6 per day) *90 day supply DX: asthma 540 Nebule 3    albuterol (PROVENTIL VENTOLIN) 2.5 mg /3 mL (0.083 %) nebulizer solution INHALE THE CONTENTS OF ONE VIAL VIA NEBULIZER EVERY 4 HOURS AS NEEDED FOR WHEEZING 1650 Each 3    lisinopril (PRINIVIL, ZESTRIL) 5 mg tablet Take 1 Tab by mouth daily. (Patient taking differently: Take 2.5 mg by mouth daily.) 90 Tab 3    pantoprazole (PROTONIX) 40 mg tablet TAKE 1 TABLET BY MOUTH ONCE DAILY 90 Tab 3    fluticasone (FLONASE) 50 mcg/actuation nasal spray INHALE 2 SPRAYS IN EACH NOSTRIL ONCE DAILY 1 Bottle 5    glipiZIDE SR (GLUCOTROL XL) 2.5 mg CR tablet TAKE ONE TABLET BY MOUTH DAILY 90 Tab 3    HYDROcodone-acetaminophen (NORCO)  mg tablet Take 1 Tab by mouth every six (6) hours as needed. 60 Tab 0    azelastine (ASTELIN) 137 mcg (0.1 %) nasal spray 1 [employer] by Both Nostrils route daily.  diazepam (VALIUM) 5 mg tablet TAKE ONE TABLET EVERY 6 HOURS AS NEEDED FOR ANXIETY 60 Tab 3    tiotropium-olodaterol 2.5-2.5 mcg/actuation mist Take 2 Puffs by inhalation daily.  PSEUDOEPHEDRINE-guaiFENesin (MUCINEX D)  mg per tablet Take 1 Tab by mouth daily as needed.  ofloxacin (FLOXIN) 0.3 % ophthalmic solution INSTILL 1 GTT INTO OD QID STARTING 3 DAYS BEFORE SURGERY  2    prednisoLONE acetate (PRED FORTE) 1 % ophthalmic suspension INSTILL 1 GTT INTO OD QID START THE DAY AFTER SURGERY  2    ketorolac (ACULAR) 0.5 % ophthalmic solution INSTILL 1 GTT INTO OD QID STARTING 3 DAYS PRIOR TO SURGERY  2    [DISCONTINUED] predniSONE (DELTASONE) 10 mg tablet Take  by mouth. 4 tablets for four days, 3 tablets for 3 days and tapering off      promethazine-dextromethorphan (PROMETHAZINE-DM) 6.25-15 mg/5 mL syrup TAKE ONE TEASPOONFUL BY MOUTH EVERY SIX HOURS AS NEEDED FOR COUGH 240 mL 1     No facility-administered encounter medications on file as of 11/2/2017.       Visit Vitals    /58 (BP 1 Location: Right arm, BP Patient Position: Sitting)    Pulse 73    Temp 98 °F (36.7 °C) (Oral)    Resp 16    Ht 5' 4\" (1.626 m)    Wt 218 lb 3.2 oz (99 kg)    LMP 03/04/1961 (Approximate)    SpO2 94%    BMI 37.45 kg/m2         Review of Systems   Constitutional: Negative for chills and fever. Musculoskeletal: Positive for myalgias. Negative for falls and joint pain. Skin: Negative for itching and rash. Physical Exam   Constitutional: She appears well-developed and well-nourished. No distress. Cardiovascular: Intact distal pulses. Musculoskeletal:        Feet:    Tenderness to palpation over plantar fasciae; very flat arch noted; pedal pulse 2+; brisk cap refill   Skin: Skin is warm and dry. She is not diaphoretic. Nursing note and vitals reviewed. ASSESSMENT and PLAN    ICD-10-CM ICD-9-CM    1. Plantar fasciitis of left foot M72.2 728.71      1. Plantar fasciitis  Discussed supportive therapy, including frozen water bottle therapy, stretches, wearing supportive shoes at all times. Also discussed possible orthotic shoe inserts/[employer] [facility] for further treatment if needed. Follow-up Disposition:  Return if symptoms worsen or fail to improve.    Ngoc So, CYNTHIA

## 2017-11-02 NOTE — PROGRESS NOTES
Chief Complaint   Patient presents with    Foot Pain     left     Patient states left foot started hurting yesterday. No injury noted. History of diabetic neuropathy. Pain level 8.

## 2017-11-02 NOTE — MR AVS SNAPSHOT
Visit Information Date & Time Provider Department Dept. Phone Encounter #  
 11/2/2017  2:45 PM Lynda Bahena NP [facility] [phone] [id number] Follow-up Instructions Return if symptoms worsen or fail to improve. Your Appointments 11/14/2017 11:45 AM  
PRE OP with Melvin Pruitt MD  
[address]) Appt Note: Pre op  
 [address] [place] [id number]  
[phone] [address] [id number]  
  
    
 1/8/2018 10:00 AM  
ROUTINE CARE with Melvin Pruitt MD  
[address]) Appt Note: routine follow up  
 [address] [place] [phone] [address] Upcoming Health Maintenance Date Due MICROALBUMIN Q1 1/18/2018 HEMOGLOBIN A1C Q6M 3/8/2018 FOOT EXAM Q1 7/6/2018 LIPID PANEL Q1 9/8/2018 EYE EXAM RETINAL OR DILATED Q1 10/2/2018 MEDICARE YEARLY EXAM 10/24/2018 GLAUCOMA SCREENING Q2Y 10/2/2019 DTaP/Tdap/Td series (2 - Td) 9/14/2022 Allergies as of 11/2/2017  Review Complete On: 11/2/2017 By: Lynda Bahena NP Severity Noted Reaction Type Reactions Gabapentin  10/12/2016    Other (comments) Muscles twitching and jerking Levaquin [Levofloxacin]  03/09/2012    Swelling Lyrica [Pregabalin]  10/31/2017    Other (comments) Muscle twitching and jerking Percodan [Oxycodone Hcl-oxycodone-asa]  03/09/2012    Itching Current Immunizations  Reviewed on 10/31/2017 Name Date Influenza High Dose Vaccine PF 9/8/2017, 9/16/2016, 8/25/2015 Influenza Vaccine  Deferred (Patient Refused), 9/30/2014, 9/20/2013 Influenza Vaccine Split 8/31/2012, 9/13/2010 Pneumococcal Conjugate (PCV-13) 8/25/2015 Pneumococcal Vaccine (Pcv) 10/30/2009 TDAP Vaccine 9/14/2012 Zoster Vaccine, Live 3/19/2013 Not reviewed this visit You Were Diagnosed With   
  
 Codes Comments Plantar fasciitis of left foot    -  Primary ICD-10-CM: M72.2 ICD-9-CM: 728.71 Vitals BP Pulse Temp Resp Height(growth percentile) Weight(growth percentile) 106/58 (BP 1 Location: Right arm, BP Patient Position: Sitting) 73 98 °F (36.7 °C) (Oral) 16 5' 4\" (1.626 m) 218 lb 3.2 oz (99 kg) LMP SpO2 BMI OB Status Smoking Status 03/04/1961 (Approximate) 94% 37.45 kg/m2 Hysterectomy Former Smoker BMI and BSA Data Body Mass Index Body Surface Area  
 37.45 kg/m 2 2.11 m 2 Preferred Pharmacy Pharmacy Name Phone [address] [address] [facility] [address], [address] [facility] [phone] Your Updated Medication List  
  
   
This list is accurate as of: 11/2/17  3:21 PM.  Always use your most recent med list.  
  
  
  
  
 * albuterol 2.5 mg /3 mL (0.083 %) nebulizer solution Commonly known as:  PROVENTIL VENTOLIN  
INHALE THE CONTENTS OF ONE VIAL VIA NEBULIZER EVERY 4 HOURS AS NEEDED FOR WHEEZING  
  
 * albuterol 90 mcg/actuation inhaler Commonly known as:  VENTOLIN HFA INHALE 2 PUFFS BY MOUTH EVERY 4 HOURS AS NEEDED FOR WHEEZING. albuterol-ipratropium 2.5 mg-0.5 mg/3 ml Nebu Commonly known as:  DUO-NEB  
3 mL by Nebulization route every four (4) hours as needed. (up to 6 per day) *90 day supply DX: asthma ALPRAZolam 0.5 mg tablet Commonly known as:  XANAX  
TAKE ONE-HALF TO 1 TABLET BY MOUTH ONCE EVERY NIGHT AT BEDTIME AS NEEDED FOR SLEEP  
  
 azelastine 137 mcg (0.1 %) nasal spray Commonly known as:  ASTELIN  
1 Spray by Both Nostrils route daily. carvedilol 25 mg tablet Commonly known as:  COREG  
TAKE 1 TABLET BY MOUTH TWICE DAILY  
  
 cyanocobalamin 1,000 mcg tablet Take 1,000 mcg by mouth daily. diazePAM 5 mg tablet Commonly known as:  VALIUM  
TAKE ONE TABLET EVERY 6 HOURS AS NEEDED FOR ANXIETY DULoxetine 30 mg capsule Commonly known as:  CYMBALTA TAKE ONE CAPSULE BY MOUTH DAILY  
  
 fluticasone 50 mcg/actuation nasal spray Commonly known as:  Caron Gut INHALE 2 SPRAYS IN EACH NOSTRIL ONCE DAILY  
  
 furosemide 20 mg tablet Commonly known as:  LASIX TAKE 1 and 1/2 TABLETS BY MOUTH EVERY MORNING AND TAKE 1 TABLET BY MOUTH EVERY AFTERNOON BETWEEN 2 AND 4 PM  
  
 glipiZIDE SR 2.5 mg CR tablet Commonly known as:  GLUCOTROL XL  
TAKE ONE TABLET BY MOUTH DAILY HYDROcodone-acetaminophen  mg tablet Commonly known as:  [address] Take 1 Tab by mouth every six (6) hours as needed. ketorolac 0.5 % ophthalmic solution Commonly known as:  Claudell Dempsey INSTILL 1 GTT INTO OD QID STARTING 3 DAYS PRIOR TO SURGERY  
  
 lisinopril 5 mg tablet Commonly known as:  Donnald Code Take 1 Tab by mouth daily. montelukast 10 mg tablet Commonly known as:  SINGULAIR  
TAKE ONE TABLET BY MOUTH DAILY MUCINEX D  mg per tablet Generic drug:  PSEUDOEPHEDRINE-guaiFENesin Take 1 Tab by mouth daily as needed. ofloxacin 0.3 % ophthalmic solution Commonly known as:  FLOXIN  
INSTILL 1 GTT INTO OD QID STARTING 3 DAYS BEFORE SURGERY  
  
 pantoprazole 40 mg tablet Commonly known as:  PROTONIX  
TAKE 1 TABLET BY MOUTH ONCE DAILY prednisoLONE acetate 1 % ophthalmic suspension Commonly known as:  PRED FORTE INSTILL 1 GTT INTO OD QID START THE DAY AFTER SURGERY  
  
 promethazine-dextromethorphan 6.25-15 mg/5 mL syrup Commonly known as:  PROMETHAZINE-DM  
TAKE ONE TEASPOONFUL BY MOUTH EVERY SIX HOURS AS NEEDED FOR COUGH  
  
 simvastatin 40 mg tablet Commonly known as:  ZOCOR  
TAKE 1 TABLET BY MOUTH ONCE EVERY NIGHT AT BEDTIME  
  
 tiotropium-olodaterol 2.5-2.5 mcg/actuation Mist  
Take 2 Puffs by inhalation daily. * Notice: This list has 2 medication(s) that are the same as other medications prescribed for you.  Read the directions carefully, and ask your doctor or other care provider to review them with you. Follow-up Instructions Return if symptoms worsen or fail to improve. Patient Instructions Plantar Fasciitis: Exercises Your Care Instructions Here are some examples of typical rehabilitation exercises for your condition. Start each exercise slowly. Ease off the exercise if you start to have pain. Your doctor or physical therapist will tell you when you can start these exercises and which ones will work best for you. How to do the exercises Towel stretch 1. Sit with your legs extended and knees straight. 2. Place a towel around your foot just under the toes. 3. Hold each end of the towel in each hand, with your hands above your knees. 4. Pull back with the towel so that your foot stretches toward you. 5. Hold the position for at least 15 to 30 seconds. 6. Repeat 2 to 4 times a session, up to 5 sessions a day. Calf stretch This exercise stretches the muscles at the back of the lower leg (the calf) and the Achilles tendon. Do this exercise 3 or 4 times a day, 5 days a week. 1. Stand facing a wall with your hands on the wall at about eye level. Put the leg you want to stretch about a step behind your other leg. 2. Keeping your back heel on the floor, bend your front knee until you feel a stretch in the back leg. 3. Hold the stretch for 15 to 30 seconds. Repeat 2 to 4 times. Plantar fascia and calf stretch Stretching the plantar fascia and calf muscles can increase flexibility and decrease heel pain. You can do this exercise several times each day and before and after activity. 1. Stand on a step as shown above. Be sure to hold on to the banister. 2. Slowly let your heels down over the edge of the step as you relax your calf muscles. You should feel a gentle stretch across the bottom of your foot and up the back of your leg to your knee. 3. Hold the stretch about 15 to 30 seconds, and then tighten your calf muscle a little to bring your heel back up to the level of the step. Repeat 2 to 4 times. Towel curls Make this exercise more challenging by placing a weighted object, such as a soup can, on the other end of the towel. 1. While sitting, place your foot on a towel on the floor and scrunch the towel toward you with your toes. 2. Then, also using your toes, push the towel away from you. [place] pickups 1. Put marbles on the floor next to a cup. 
2. Using your toes, try to lift the marbles up from the floor and put them in the cup. Follow-up care is a key part of your treatment and safety. Be sure to make and go to all appointments, and call your doctor if you are having problems. It's also a good idea to know your test results and keep a list of the medicines you take. Where can you learn more? Go to http://marla-chika.info/. Bianka Vasquez in the search box to learn more about \"Plantar Fasciitis: Exercises. \" Current as of: March 21, 2017 Content Version: 11.4 © 4679-2970 Healthwise, [employer]. Care instructions adapted under license by [employer] (which disclaims liability or warranty for this information). If you have questions about a medical condition or this instruction, always ask your healthcare professional. [place] [address] any warranty or liability for your use of this information. Introducing [facility]! Dear Matilde Holguin: 
Thank you for requesting a i[employer] account. Our records indicate that you have previously registered for a iList account but its currently inactive. Please call our iList support line at [phone]. Additional Information If you have questions, please visit the Frequently Asked Questions section of the [employer] website at [email or web address] Remember, iList is NOT to be used for urgent needs.  For medical emergencies, dial 911. Now available from your iPhone and Android! Please provide this summary of care documentation to your next provider. Your primary care clinician is listed as Will Branham. If you have any questions after today's visit, please call [phone].

## 2017-11-07 LAB
CHOLEST SERPL-MCNC: NORMAL MG/DL (ref 100–199)
GLUCOSE SERPL-MCNC: NORMAL MG/DL (ref 65–99)
INTERPRETATION, 910389: NORMAL
INTERPRETATION: NORMAL
VLDLC SERPL CALC-MCNC: NORMAL MG/DL

## 2017-11-13 NOTE — PROGRESS NOTES
HISTORY OF PRESENT ILLNESS  Carl Loyola is a 80 y.o. female. HPI   Pre-op for cataract surgery. Cardiovascular Review:  The patient has hypertension, hyperlipidemia and Systolic HF. Diet and Lifestyle: generally follows a low fat low cholesterol diet, generally follows a low sodium diet  Pertinent ROS: taking medications as instructed, no medication side effects noted, no TIA's, no chest pain on exertion, no dyspnea on exertion, noting that her ankles has been swelling stable over the past several weeks with wearing her compression stockings. DM type II follow up:  Compliant w/ meds, diabetic diet, and exercise. Obtains home glucose monitoring averaging in the mid 100s. Checks BS daily on most days and prn. Pt does not have BS log at visit today. No Rf needed for today. Denies any tingling sensation, polyuria and polydipsia. No blurred vision. No significant weight changes. Osteoarthritis:  Patient has osteoarthritis in multiple joints but primarily in the knees and back. Symptoms onset: problem is longstanding. Rheumatological ROS: stable, mild-to-moderate joint symptoms intermittently, reasonably well controlled by PRN meds. Response to treatment plan: stable and intermittent. Asthma Review:  The patient is being seen for follow up of chronic respiratory failure and allergies and chronic sinusitits, not currently in exacerbation. Overall still feel short winded at times but this is at her baseline. Has been seeing pulmonary and not sure if asthma or COPD per pt. Using nebulizer 3 to 4 times in a day. Regimen compliance: The patient reports adherence to asthma action plan and regimen.     Patient Active Problem List   Diagnosis Code    CHF (congestive heart failure) ([facility]) I50.9    S/P TKR (total knee replacement) Z96.659    Anemia D64.9    s/p lumbar laminectomy Z98.890    S/P ASAD (total abdominal hysterectomy) Z90.710    S/P hemorrhoidectomy Z98.890, Z87.19    S/P rotator cuff surgery Z98.890    DM (diabetes mellitus) ([facility] [address] E11.9    Chronic sinusitis J32.9    S/P sinus surgery Z98.890    Dyslipidemia E78.5    Environmental allergies Z91.09    Obstructive sleep apnea (adult) (pediatric) G47.33    DJD (degenerative joint disease) M19.90    Chronic systolic heart failure (HCC) I50.22    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    Sepsis ([facility]) A41.9    Acute respiratory failure ([facility]) J96.00    Asthma J45.909    Anxiety disorder F41.9    Diabetic neuropathy ([facility]) E11.40    Esophageal reflux K21.9    Essential hypertension, benign I10    Reactive airway disease with wheezing without complication G42.110    Encounter for medication monitoring Z51.81    CKD (chronic kidney disease) N18.9    Arthralgia of multiple joints M25.50    Plantar fasciitis of left foot M72.2       Current Outpatient Prescriptions   Medication Sig Dispense Refill    lisinopril (PRINIVIL, ZESTRIL) 5 mg tablet Take one half tablet daily      cyanocobalamin 1,000 mcg tablet Take 1,000 mcg by mouth daily.  DULoxetine (CYMBALTA) 30 mg capsule TAKE ONE CAPSULE BY MOUTH DAILY  6    ofloxacin (FLOXIN) 0.3 % ophthalmic solution INSTILL 1 GTT INTO OD QID STARTING 3 DAYS BEFORE SURGERY  2    prednisoLONE acetate (PRED FORTE) 1 % ophthalmic suspension INSTILL 1 GTT INTO OD QID START THE DAY AFTER SURGERY  2    ketorolac (ACULAR) 0.5 % ophthalmic solution INSTILL 1 GTT INTO OD QID STARTING 3 DAYS PRIOR TO SURGERY  2    simvastatin (ZOCOR) 40 mg tablet TAKE 1 TABLET BY MOUTH ONCE EVERY NIGHT AT BEDTIME 90 Tab 0    ALPRAZolam (XANAX) 0.5 mg tablet TAKE ONE-HALF TO 1 TABLET BY MOUTH ONCE EVERY NIGHT AT BEDTIME AS NEEDED FOR SLEEP 30 Tab 3    carvedilol (COREG) 25 mg tablet TAKE 1 TABLET BY MOUTH TWICE DAILY 180 Tab 0    albuterol (VENTOLIN HFA) 90 mcg/actuation inhaler INHALE 2 PUFFS BY MOUTH EVERY 4 HOURS AS NEEDED FOR WHEEZING.  1 Inhaler 11    albuterol-ipratropium (DUO-NEB) 2.5 mg-0.5 mg/3 ml nebu 3 mL by Nebulization route every four (4) hours as needed. (up to 6 per day) *90 day supply DX: asthma 540 Nebule 3    albuterol (PROVENTIL VENTOLIN) 2.5 mg /3 mL (0.083 %) nebulizer solution INHALE THE CONTENTS OF ONE VIAL VIA NEBULIZER EVERY 4 HOURS AS NEEDED FOR WHEEZING 1650 Each 3    pantoprazole (PROTONIX) 40 mg tablet TAKE 1 TABLET BY MOUTH ONCE DAILY 90 Tab 3    fluticasone (FLONASE) 50 mcg/actuation nasal spray INHALE 2 SPRAYS IN EACH NOSTRIL ONCE DAILY 1 Bottle 5    promethazine-dextromethorphan (PROMETHAZINE-DM) 6.25-15 mg/5 mL syrup TAKE ONE TEASPOONFUL BY MOUTH EVERY SIX HOURS AS NEEDED FOR COUGH 240 mL 1    glipiZIDE SR (GLUCOTROL XL) 2.5 mg CR tablet TAKE ONE TABLET BY MOUTH DAILY 90 Tab 3    HYDROcodone-acetaminophen (NORCO)  mg tablet Take 1 Tab by mouth every six (6) hours as needed. 60 Tab 0    diazepam (VALIUM) 5 mg tablet TAKE ONE TABLET EVERY 6 HOURS AS NEEDED FOR ANXIETY 60 Tab 3    tiotropium-olodaterol 2.5-2.5 mcg/actuation mist Take 2 Puffs by inhalation daily.  PSEUDOEPHEDRINE-guaiFENesin (MUCINEX D)  mg per tablet Take 1 Tab by mouth daily as needed.          Allergies   Allergen Reactions    Gabapentin Other (comments)     Muscles twitching and jerking    Levaquin [Levofloxacin] Swelling    Lyrica [Pregabalin] Other (comments)     Muscle twitching and jerking    Percodan [Oxycodone Hcl-Oxycodone-Asa] Itching       Past Medical History:   Diagnosis Date    Anemia 3/3/2010    Arrhythmia     irregular heartbeat    Arthritis     CHF (congestive heart failure) (HCC) 3/3/2010    Chronic pain     back    Chronic sinusitis 3/3/2010    CPAP (continuous positive airway pressure) dependence     Diverticulosis     DM (diabetes mellitus) ([facility] [address] 3/3/2010    Dyslipidemia 3/3/2010    GERD (gastroesophageal reflux disease)     HTN (hypertension) 3/3/2010    Ill-defined condition     being evaluated py pulmonolgist for \"trouble breathing\"    S/P TKR (total knee replacement) 3/3/2010    Unspecified sleep apnea     doesn't wear CPAP since back has been hurting       Past Surgical History:   Procedure Laterality Date    HX APPENDECTOMY      thinks it was taken with hysterectomy    HX GYN      \"tubes opened\"    HX HEENT      sinus surgery    HX HEMORRHOIDECTOMY      HX HYSTERECTOMY      HX KNEE REPLACEMENT  1996    both knees- at same time    HX LUMBAR LAMINECTOMY  1980s    HX MOHS PROCEDURES      left shoulder    HX ORTHOPAEDIC  1980    neck fusion    HX [facility] [facility]    right knee replaced for second time    HX ORTHOPAEDIC      lumbar fusion    HX OTHER SURGICAL      CORNELL BIOPSY BREAST STEREOTACTIC Left yrs ago    (-)    VT COLONOSCOPY FLX DX W/COLLJ SPEC WHEN PFRMD  [id number]    dr. Ronald Melendez (barium enema)       Family History   Problem Relation Age of Onset    Diabetes Mother     Heart Disease Father     Diabetes Brother     Blindness Brother     Diabetes Sister     Heart Disease Sister     Heart Disease Brother     Heart Disease Brother     Asthma Brother     Malignant Hyperthermia Neg Hx     Pseudocholinesterase Deficiency Neg Hx     Delayed Awakening Neg Hx     Post-op Nausea/Vomiting Neg Hx     Emergence Delirium Neg Hx     Post-op Cognitive Dysfunction Neg Hx        Social History   Substance Use Topics    Smoking status: Former Smoker     Packs/day: 0.30     Years: 5.00     Quit date: 1/1/1960    Smokeless tobacco: Never Used    Alcohol use No          Lab Results  Component Value Date/Time   WBC 4.4 09/08/2017 11:09 AM   HGB 10.8 09/08/2017 11:09 AM   HCT 34.0 09/08/2017 11:09 AM   PLATELET 944 34/60/5807 11:09 AM   MCV 95 09/08/2017 11:09 AM     Lab Results  Component Value Date/Time   Cholesterol, total 160 09/08/2017 11:09 AM   HDL Cholesterol 77 09/08/2017 11:09 AM   LDL, calculated 68 09/08/2017 11:09 AM   Triglyceride 76 09/08/2017 11:09 AM   CHOL/HDL Ratio 2.0 06/11/2010 02:10 PM     Lab Results   Component Value Date/Time    Sodium 147 09/08/2017 11:09 AM    Potassium 5.0 10/04/2017 09:18 AM    Chloride 104 09/08/2017 11:09 AM    CO2 24 09/08/2017 11:09 AM    Anion gap 3 12/17/2015 11:47 AM    Glucose 105 09/08/2017 11:09 AM    BUN 26 09/08/2017 11:09 AM    Creatinine 1.68 09/08/2017 11:09 AM    BUN/Creatinine ratio 15 09/08/2017 11:09 AM    GFR est AA 31 09/08/2017 11:09 AM    GFR est non-AA 27 09/08/2017 11:09 AM    Calcium 9.6 09/08/2017 11:09 AM    Bilirubin, total 0.3 09/08/2017 11:09 AM    ALT (SGPT) 13 09/08/2017 11:09 AM    AST (SGOT) 23 09/08/2017 11:09 AM    Alk. phosphatase 56 09/08/2017 11:09 AM    Protein, total 6.6 09/08/2017 11:09 AM    Albumin 4.3 09/08/2017 11:09 AM    Globulin 3.4 12/17/2015 11:47 AM    A-G Ratio 1.9 09/08/2017 11:09 AM      Lab Results   Component Value Date/Time    Hemoglobin A1c 6.4 09/08/2017 11:09 AM    Hemoglobin A1c (POC) 6.2 05/22/2017 08:42 AM      Review of Systems   Constitutional: Negative for malaise/fatigue. HENT: Positive for congestion. Eyes: Negative for blurred vision. Respiratory: Positive for cough. Negative for shortness of breath. Cardiovascular: Negative for chest pain, palpitations and leg swelling. Gastrointestinal: Negative for abdominal pain, constipation and heartburn. Genitourinary: Negative for dysuria, frequency and urgency. Musculoskeletal: Positive for back pain and joint pain. Neurological: Negative for dizziness, tingling and headaches. Endo/Heme/Allergies: Positive for environmental allergies. Psychiatric/Behavioral: Negative for depression. The patient does not have insomnia. Physical Exam   Constitutional: She appears well-developed and well-nourished.    /59 (BP 1 Location: Left arm, BP Patient Position: Sitting)  Pulse 73  Temp 96.8 °F (36 °C) (Oral)   Resp 18  Ht 5' 4\" (1.626 m)  Wt 222 lb (100.7 kg)  LMP 03/04/1961 (Approximate)  SpO2 97%  BMI 38.11 kg/m2     HENT:   Right Ear: Tympanic membrane and ear canal normal.   Left Ear: Tympanic membrane and ear canal normal.   Nose: No mucosal edema or rhinorrhea. Mouth/Throat: Oropharynx is clear and moist and mucous membranes are normal.   Neck: Normal range of motion. Neck supple. No thyromegaly present. Cardiovascular: Normal rate and regular rhythm. No murmur heard. Pulmonary/Chest: Effort normal and breath sounds normal.   Abdominal: Soft. Bowel sounds are normal. There is no tenderness. Musculoskeletal: Normal range of motion. She exhibits no edema. Lymphadenopathy:     She has no cervical adenopathy. Skin: Skin is warm and dry. Psychiatric: She has a normal mood and affect. Nursing note and vitals reviewed. ASSESSMENT and PLAN  Diagnoses and all orders for this visit:    1. Pre-op exam  2. Senile cataract of right eye, unspecified age-related cataract type  -     AMB POC URINALYSIS DIP STICK AUTO W/ MICRO        Medically stable to proceed with surgery. 3. Essential hypertension, benign  Stable at goal.      4. Type 2 diabetes mellitus without complication, without long-term current use of insulin (HCC)  -     AMB POC GLUCOSE, QUANTITATIVE, BLOOD    5. Dyslipidemia  Stable     6. Chronic systolic heart failure (HCC)  Stable     7. Diabetic polyneuropathy associated with drug or chemical induced diabetes mellitus ([address]  Stable     8. Stage 3 chronic kidney disease  Follow by renal.  This has been stable     9. Mild intermittent reactive airway disease with wheezing without complication  Stable     10. Arthralgia of multiple joints//  11. Spondylosis of lumbar region without myelopathy or radiculopathy  Overall stable     12.  Encounter for medication monitoring  -     CBC W/O DIFF  -     METABOLIC PANEL, BASIC      Follow-up Disposition:   reviewed diet, exercise and weight control  cardiovascular risk and specific lipid/LDL goals reviewed  reviewed medications and side effects in detail    I have discussed diagnosis listed in this note with pt and/or family. I have discussed treatment plans and options and the risk/benefit analysis of those options, including safe use of medications and possible medication side effects. Through the use of shared decision making we have agreed to the above plan. The patient has received an after-visit summary and questions were answered concerning future plans and follow up. Advise pt of any urgent changes then to proceed to the ER.

## 2017-11-14 ENCOUNTER — HOSPITAL ENCOUNTER (OUTPATIENT)
Dept: LAB | Age: 82
Discharge: HOME | End: 2017-11-14
Payer: MEDICARE

## 2017-11-14 ENCOUNTER — OFFICE VISIT (OUTPATIENT)
Dept: FAMILY MEDICINE CLINIC | Age: 82
End: 2017-11-14

## 2017-11-14 VITALS
BODY MASS INDEX: 37.9 KG/M2 | WEIGHT: 222 LBS | OXYGEN SATURATION: 97 % | DIASTOLIC BLOOD PRESSURE: 59 MMHG | HEIGHT: 64 IN | HEART RATE: 73 BPM | TEMPERATURE: 96.8 F | SYSTOLIC BLOOD PRESSURE: 127 MMHG | RESPIRATION RATE: 18 BRPM

## 2017-11-14 DIAGNOSIS — I50.22 CHRONIC SYSTOLIC HEART FAILURE (HCC): ICD-10-CM

## 2017-11-14 DIAGNOSIS — Z01.818 PRE-OP EXAM: Primary | ICD-10-CM

## 2017-11-14 DIAGNOSIS — H25.9 SENILE CATARACT OF RIGHT EYE, UNSPECIFIED AGE-RELATED CATARACT TYPE: ICD-10-CM

## 2017-11-14 DIAGNOSIS — Z51.81 ENCOUNTER FOR MEDICATION MONITORING: ICD-10-CM

## 2017-11-14 DIAGNOSIS — M25.50 ARTHRALGIA OF MULTIPLE JOINTS: ICD-10-CM

## 2017-11-14 DIAGNOSIS — E11.9 TYPE 2 DIABETES MELLITUS WITHOUT COMPLICATION, WITHOUT LONG-TERM CURRENT USE OF INSULIN (HCC): Chronic | ICD-10-CM

## 2017-11-14 DIAGNOSIS — J45.20 MILD INTERMITTENT REACTIVE AIRWAY DISEASE WITH WHEEZING WITHOUT COMPLICATION: ICD-10-CM

## 2017-11-14 DIAGNOSIS — I10 ESSENTIAL HYPERTENSION, BENIGN: ICD-10-CM

## 2017-11-14 DIAGNOSIS — E78.5 DYSLIPIDEMIA: Chronic | ICD-10-CM

## 2017-11-14 DIAGNOSIS — E09.42 DIABETIC POLYNEUROPATHY ASSOCIATED WITH DRUG OR CHEMICAL INDUCED DIABETES MELLITUS (HCC): ICD-10-CM

## 2017-11-14 DIAGNOSIS — N18.30 STAGE 3 CHRONIC KIDNEY DISEASE (HCC): ICD-10-CM

## 2017-11-14 DIAGNOSIS — M47.816 SPONDYLOSIS OF LUMBAR REGION WITHOUT MYELOPATHY OR RADICULOPATHY: ICD-10-CM

## 2017-11-14 LAB
BACTERIA UA POCT, BACTPOCT: NORMAL
BILIRUB UR QL STRIP: NEGATIVE
CASTS UA POCT: NEGATIVE
CLUE CELLS, CLUEPOCT: NEGATIVE
CRYSTALS UA POCT, CRYSPOCT: NEGATIVE
EPITHELIAL CELLS POCT: NORMAL
GLUCOSE POC: 180 MG/DL
GLUCOSE UR-MCNC: NEGATIVE MG/DL
KETONES P FAST UR STRIP-MCNC: NEGATIVE MG/DL
MUCUS UA POCT, MUCPOCT: NORMAL
PH UR STRIP: 5.5 [PH] (ref 4.6–8)
PROT UR QL STRIP: NEGATIVE
RBC UA POCT, RBCPOCT: 0
SP GR UR STRIP: 1.01 (ref 1–1.03)
TRICH UA POCT, TRICHPOC: NEGATIVE
UA UROBILINOGEN AMB POC: NORMAL (ref 0.2–1)
URINALYSIS CLARITY POC: CLEAR
URINALYSIS COLOR POC: YELLOW
URINE BLOOD POC: NEGATIVE
URINE CULT COMMENT, POCT: NORMAL
URINE LEUKOCYTES POC: NEGATIVE
URINE NITRITES POC: NEGATIVE
WBC UA POCT, WBCPOCT: NORMAL
YEAST UA POCT, YEASTPOC: NEGATIVE

## 2017-11-14 PROCEDURE — 85027 COMPLETE CBC AUTOMATED: CPT

## 2017-11-14 PROCEDURE — 36415 COLL VENOUS BLD VENIPUNCTURE: CPT

## 2017-11-14 PROCEDURE — 80048 BASIC METABOLIC PNL TOTAL CA: CPT

## 2017-11-14 RX ORDER — LISINOPRIL 5 MG/1
TABLET ORAL
COMMUNITY
End: 2018-04-11

## 2017-11-14 RX ORDER — FUROSEMIDE 40 MG/1
TABLET ORAL DAILY
COMMUNITY
End: 2017-11-14

## 2017-11-14 NOTE — PROGRESS NOTES
Chief Complaint   Patient presents with    Pre-op Exam     CMP 9/18/17    Lipid 9/8/17    A1C 9/8/17  Microalbumin 1/18/2017    Mammogram 3/30/2017    Last eye exam was 2/22/16 by Dr. Benji Sharma.  Appt in 1/2018

## 2017-11-14 NOTE — MR AVS SNAPSHOT
Visit Information Date & Time Provider Department Dept. Phone Encounter #  
 11/14/2017 11:45 AM Rancho Sun MD [facility] [phone] [id number] Your Appointments 1/8/2018 10:00 AM  
ROUTINE CARE with Rancho Sun MD  
[facility]) Appt Note: routine follow up  
 [address] [facility] [phone] [address] [facility] [address] Upcoming Health Maintenance Date Due MICROALBUMIN Q1 1/18/2018 HEMOGLOBIN A1C Q6M 3/8/2018 FOOT EXAM Q1 7/6/2018 LIPID PANEL Q1 9/8/2018 EYE EXAM RETINAL OR DILATED Q1 10/2/2018 MEDICARE YEARLY EXAM 10/24/2018 GLAUCOMA SCREENING Q2Y 10/2/2019 DTaP/Tdap/Td series (2 - Td) 9/14/2022 Allergies as of 11/14/2017  Review Complete On: 11/14/2017 By: Edvin Montero LPN Severity Noted Reaction Type Reactions Gabapentin  10/12/2016    Other (comments) Muscles twitching and jerking Levaquin [Levofloxacin]  03/09/2012    Swelling Lyrica [Pregabalin]  10/31/2017    Other (comments) Muscle twitching and jerking Percodan [Oxycodone Hcl-oxycodone-asa]  03/09/2012    Itching Current Immunizations  Reviewed on 11/14/2017 Name Date Influenza High Dose Vaccine PF 9/8/2017, 9/16/2016, 8/25/2015 Influenza Vaccine  Deferred (Patient Refused), 9/30/2014, 9/20/2013 Influenza Vaccine Split 8/31/2012, 9/13/2010 Pneumococcal Conjugate (PCV-13) 8/25/2015 Pneumococcal Vaccine (Pcv) 10/30/2009 TDAP Vaccine 9/14/2012 Zoster Vaccine, Live 3/19/2013 Reviewed by Rancho Sun MD on 11/14/2017 at 12:16 PM  
 Reviewed by Rancho Sun MD on 11/14/2017 at 12:35 PM  
You Were Diagnosed With   
  
 Codes Comments Pre-op exam    -  Primary ICD-10-CM: Y01.535 ICD-9-CM: V72.84   
 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     ICD-10-CM: N82.76 
ICD-9-CM: 249.60, 357.2 Dyslipidemia     ICD-10-CM: E78.5 ICD-9-CM: 272.4 Stage 3 chronic kidney disease     ICD-10-CM: N18.3 ICD-9-CM: 068. 3 Arthralgia of multiple joints     ICD-10-CM: M25.50 ICD-9-CM: 719.49 Encounter for medication monitoring     ICD-10-CM: Z51.81 
ICD-9-CM: V58.83 Mild intermittent reactive airway disease with wheezing without complication     EJV-65-JY: J45.20 ICD-9-CM: 493.90 Essential hypertension, benign     ICD-10-CM: I10 
ICD-9-CM: 401.1 Spondylosis of lumbar region without myelopathy or radiculopathy     ICD-10-CM: M47.816 ICD-9-CM: 721.3 Vitals BP Pulse Temp Resp Height(growth percentile) Weight(growth percentile) 127/59 (BP 1 Location: Left arm, BP Patient Position: Sitting) 73 96.8 °F (36 °C) (Oral) 18 5' 4\" (1.626 m) 222 lb (100.7 kg) LMP SpO2 BMI OB Status Smoking Status 03/04/1961 (Approximate) 97% 38.11 kg/m2 Hysterectomy Former Smoker Vitals History BMI and BSA Data Body Mass Index Body Surface Area  
 38.11 kg/m 2 2.13 m 2 Preferred Pharmacy Pharmacy Name Phone [address] [facility] [address] [facility] [phone] Your Updated Medication List  
  
   
This list is accurate as of: 11/14/17 12:56 PM.  Always use your most recent med list.  
  
  
  
  
 * albuterol 2.5 mg /3 mL (0.083 %) nebulizer solution Commonly known as:  PROVENTIL VENTOLIN  
 INHALE THE CONTENTS OF ONE VIAL VIA NEBULIZER EVERY 4 HOURS AS NEEDED FOR WHEEZING  
  
 * albuterol 90 mcg/actuation inhaler Commonly known as:  VENTOLIN HFA INHALE 2 PUFFS BY MOUTH EVERY 4 HOURS AS NEEDED FOR WHEEZING. albuterol-ipratropium 2.5 mg-0.5 mg/3 ml Nebu Commonly known as:  DUO-NEB  
3 mL by Nebulization route every four (4) hours as needed. (up to 6 per day) *90 day supply DX: asthma ALPRAZolam 0.5 mg tablet Commonly known as:  XANAX  
TAKE ONE-HALF TO 1 TABLET BY MOUTH ONCE EVERY NIGHT AT BEDTIME AS NEEDED FOR SLEEP  
  
 carvedilol 25 mg tablet Commonly known as:  COREG  
TAKE 1 TABLET BY MOUTH TWICE DAILY  
  
 cyanocobalamin 1,000 mcg tablet Take 1,000 mcg by mouth daily. diazePAM 5 mg tablet Commonly known as:  VALIUM  
TAKE ONE TABLET EVERY 6 HOURS AS NEEDED FOR ANXIETY DULoxetine 30 mg capsule Commonly known as:  CYMBALTA TAKE ONE CAPSULE BY MOUTH DAILY  
  
 fluticasone 50 mcg/actuation nasal spray Commonly known as:  Cathlyn Nathalie INHALE 2 SPRAYS IN EACH NOSTRIL ONCE DAILY  
  
 glipiZIDE SR 2.5 mg CR tablet Commonly known as:  GLUCOTROL XL  
TAKE ONE TABLET BY MOUTH DAILY HYDROcodone-acetaminophen  mg tablet Commonly known as:  Benetta Pock Take 1 Tab by mouth every six (6) hours as needed. ketorolac 0.5 % ophthalmic solution Commonly known as:  Kisha Broad INSTILL 1 GTT INTO OD QID STARTING 3 DAYS PRIOR TO SURGERY  
  
 lisinopril 5 mg tablet Commonly known as:  Alisa Needy Take one half tablet daily MUCINEX D  mg per tablet Generic drug:  PSEUDOEPHEDRINE-guaiFENesin Take 1 Tab by mouth daily as needed. ofloxacin 0.3 % ophthalmic solution Commonly known as:  FLOXIN  
INSTILL 1 GTT INTO OD QID STARTING 3 DAYS BEFORE SURGERY  
  
 pantoprazole 40 mg tablet Commonly known as:  PROTONIX  
TAKE 1 TABLET BY MOUTH ONCE DAILY prednisoLONE acetate 1 % ophthalmic suspension Commonly known as:  PRED FORTE INSTILL 1 GTT INTO OD QID START THE DAY AFTER SURGERY  
  
 promethazine-dextromethorphan 6.25-15 mg/5 mL syrup Commonly known as:  PROMETHAZINE-DM  
TAKE ONE TEASPOONFUL BY MOUTH EVERY SIX HOURS AS NEEDED FOR COUGH  
  
 simvastatin 40 mg tablet Commonly known as:  ZOCOR  
TAKE 1 TABLET BY MOUTH ONCE EVERY NIGHT AT BEDTIME  
  
 tiotropium-olodaterol 2.5-2.5 mcg/actuation Mist  
Take 2 Puffs by inhalation daily. * Notice: This list has 2 medication(s) that are the same as other medications prescribed for you. Read the directions carefully, and ask your doctor or other care provider to review them with you. We Performed the Following AMB POC GLUCOSE, QUANTITATIVE, BLOOD [66280 CPT(R)] AMB POC URINALYSIS DIP STICK AUTO W/ MICRO  [21033 CPT(R)] CBC W/O DIFF [05344 CPT(R)] METABOLIC PANEL, BASIC [82002 CPT(R)] Introducing [facility]! Dear Charlesetta Severance: 
Thank you for requesting a [employer] account. Our records indicate that you have previously registered for a [employer] account but its currently inactive. Please call our Shopular support line at [phone]. Additional Information If you have questions, please visit the Frequently Asked Questions section of the [employer] website at [email or web address] Remember, Shopular is NOT to be used for urgent needs. For medical emergencies, dial 911. Now available from your iPhone and Android! Please provide this summary of care documentation to your next provider. Your primary care clinician is listed as Johnny Nguyen. If you have any questions after today's visit, please call [phone].

## 2017-11-15 LAB
BUN SERPL-MCNC: 22 MG/DL (ref 8–27)
BUN/CREAT SERPL: 13 (ref 12–28)
CALCIUM SERPL-MCNC: 9.9 MG/DL (ref 8.7–10.3)
CHLORIDE SERPL-SCNC: 101 MMOL/L (ref 96–106)
CO2 SERPL-SCNC: 30 MMOL/L (ref 18–29)
CREAT SERPL-MCNC: 1.67 MG/DL (ref 0.57–1)
ERYTHROCYTE [DISTWIDTH] IN BLOOD BY AUTOMATED COUNT: 14.4 % (ref 12.3–15.4)
GFR SERPLBLD CREATININE-BSD FMLA CKD-EPI: 28 ML/MIN/1.73
GFR SERPLBLD CREATININE-BSD FMLA CKD-EPI: 32 ML/MIN/1.73
GLUCOSE SERPL-MCNC: 168 MG/DL (ref 65–99)
HCT VFR BLD AUTO: 33.9 % (ref 34–46.6)
HGB BLD-MCNC: 11 G/DL (ref 11.1–15.9)
INTERPRETATION: NORMAL
MCH RBC QN AUTO: 31.3 PG (ref 26.6–33)
MCHC RBC AUTO-ENTMCNC: 32.4 G/DL (ref 31.5–35.7)
MCV RBC AUTO: 96 FL (ref 79–97)
PLATELET # BLD AUTO: 167 X10E3/UL (ref 150–379)
POTASSIUM SERPL-SCNC: 4.8 MMOL/L (ref 3.5–5.2)
RBC # BLD AUTO: 3.52 X10E6/UL (ref 3.77–5.28)
SODIUM SERPL-SCNC: 146 MMOL/L (ref 134–144)
WBC # BLD AUTO: 5.2 X10E3/UL (ref 3.4–10.8)

## 2017-11-20 ENCOUNTER — DOCUMENTATION ONLY (OUTPATIENT)
Dept: FAMILY MEDICINE CLINIC | Age: 82
End: 2017-11-20

## 2017-12-11 RX ORDER — CARVEDILOL 25 MG/1
TABLET ORAL
Qty: 180 TAB | Refills: 0 | Status: SHIPPED | OUTPATIENT
Start: 2017-12-11 | End: 2018-03-09 | Stop reason: SDUPTHER

## 2018-01-08 ENCOUNTER — OFFICE VISIT (OUTPATIENT)
Dept: FAMILY MEDICINE CLINIC | Age: 83
End: 2018-01-08

## 2018-01-08 ENCOUNTER — HOSPITAL ENCOUNTER (OUTPATIENT)
Dept: LAB | Age: 83
Discharge: HOME | End: 2018-01-08
Payer: MEDICARE

## 2018-01-08 VITALS
SYSTOLIC BLOOD PRESSURE: 124 MMHG | RESPIRATION RATE: 16 BRPM | HEIGHT: 64 IN | BODY MASS INDEX: 37.05 KG/M2 | TEMPERATURE: 97.4 F | WEIGHT: 217 LBS | HEART RATE: 77 BPM | DIASTOLIC BLOOD PRESSURE: 54 MMHG | OXYGEN SATURATION: 98 %

## 2018-01-08 DIAGNOSIS — N18.30 STAGE 3 CHRONIC KIDNEY DISEASE (HCC): ICD-10-CM

## 2018-01-08 DIAGNOSIS — E11.40 TYPE 2 DIABETES MELLITUS WITH DIABETIC NEUROPATHY, WITHOUT LONG-TERM CURRENT USE OF INSULIN (HCC): Chronic | ICD-10-CM

## 2018-01-08 DIAGNOSIS — M79.10 MYALGIA: ICD-10-CM

## 2018-01-08 DIAGNOSIS — I10 ESSENTIAL HYPERTENSION, BENIGN: Primary | ICD-10-CM

## 2018-01-08 DIAGNOSIS — M25.50 ARTHRALGIA OF MULTIPLE JOINTS: ICD-10-CM

## 2018-01-08 DIAGNOSIS — E78.5 DYSLIPIDEMIA: Chronic | ICD-10-CM

## 2018-01-08 DIAGNOSIS — I50.22 CHRONIC SYSTOLIC HEART FAILURE (HCC): ICD-10-CM

## 2018-01-08 DIAGNOSIS — Z51.81 ENCOUNTER FOR MEDICATION MONITORING: ICD-10-CM

## 2018-01-08 LAB
BILIRUB UR QL STRIP: NEGATIVE
GLUCOSE POC: 122 MG/DL
GLUCOSE UR-MCNC: NEGATIVE MG/DL
HBA1C MFR BLD HPLC: 6.2 %
KETONES P FAST UR STRIP-MCNC: NEGATIVE MG/DL
PH UR STRIP: 5.5 [PH] (ref 4.6–8)
PROT UR QL STRIP: NEGATIVE
SP GR UR STRIP: 1.01 (ref 1–1.03)
UA UROBILINOGEN AMB POC: NORMAL (ref 0.2–1)
URINALYSIS CLARITY POC: CLEAR
URINALYSIS COLOR POC: YELLOW
URINE BLOOD POC: NORMAL
URINE LEUKOCYTES POC: NEGATIVE
URINE NITRITES POC: NEGATIVE

## 2018-01-08 PROCEDURE — 80061 LIPID PANEL: CPT

## 2018-01-08 PROCEDURE — 36415 COLL VENOUS BLD VENIPUNCTURE: CPT

## 2018-01-08 PROCEDURE — 82550 ASSAY OF CK (CPK): CPT

## 2018-01-08 PROCEDURE — 82043 UR ALBUMIN QUANTITATIVE: CPT

## 2018-01-08 PROCEDURE — 80053 COMPREHEN METABOLIC PANEL: CPT

## 2018-01-08 NOTE — MR AVS SNAPSHOT
Visit Information Date & Time Provider Department Dept. Phone Encounter #  
 1/8/2018 10:00 AM Jasper Ornelas MD [address] [phone] [id number] Follow-up Instructions Return in about 1 month (around 2/8/2018). Upcoming Health Maintenance Date Due MICROALBUMIN Q1 1/18/2018 HEMOGLOBIN A1C Q6M 3/8/2018 FOOT EXAM Q1 7/6/2018 LIPID PANEL Q1 9/8/2018 EYE EXAM RETINAL OR DILATED Q1 10/2/2018 MEDICARE YEARLY EXAM 10/24/2018 GLAUCOMA SCREENING Q2Y 10/2/2019 DTaP/Tdap/Td series (2 - Td) 9/14/2022 Allergies as of 1/8/2018  Review Complete On: 1/8/2018 By: Jasper Ornelas MD  
  
 Severity Noted Reaction Type Reactions Gabapentin  10/12/2016    Other (comments) Muscles twitching and jerking Levaquin [Levofloxacin]  03/09/2012    Swelling Lyrica [Pregabalin]  10/31/2017    Other (comments) Muscle twitching and jerking Percodan [Oxycodone Hcl-oxycodone-asa]  03/09/2012    Itching Current Immunizations  Reviewed on 1/8/2018 Name Date Influenza High Dose Vaccine PF 9/8/2017, 9/16/2016, 8/25/2015 Influenza Vaccine  Deferred (Patient Refused), 9/30/2014, 9/20/2013 Influenza Vaccine Split 8/31/2012, 9/13/2010 Pneumococcal Conjugate (PCV-13) 8/25/2015 Pneumococcal Vaccine (Pcv) 10/30/2009 TDAP Vaccine 9/14/2012 Zoster Vaccine, Live 3/19/2013 Reviewed by Chan Gamez LPN on 4/6/7323 at  5:93 AM  
 Reviewed by Jasper Ornelas MD on 1/8/2018 at 10:32 AM  
You Were Diagnosed With   
  
 Codes Comments Essential hypertension, benign    -  Primary ICD-10-CM: I10 
ICD-9-CM: 401.1 Type 2 diabetes mellitus with diabetic neuropathy, without long-term current use of insulin (HCC)     ICD-10-CM: E11.40 ICD-9-CM: 250.60, 357.2 Dyslipidemia     ICD-10-CM: E78.5 ICD-9-CM: 272.4 Chronic systolic heart failure (HCC)     ICD-10-CM: I50.22 ICD-9-CM: 428.22   
 Stage 3 chronic kidney disease     ICD-10-CM: N18.3 ICD-9-CM: 203. 3 Arthralgia of multiple joints     ICD-10-CM: M25.50 ICD-9-CM: 719.49 Encounter for medication monitoring     ICD-10-CM: Z51.81 
ICD-9-CM: V58.83 Myalgia     ICD-10-CM: M79.1 ICD-9-CM: 729.1 Tremor     ICD-10-CM: R25.1 ICD-9-CM: 589. 0 Vitals BP Pulse Temp Resp Height(growth percentile) Weight(growth percentile) 124/54 (BP 1 Location: Left arm, BP Patient Position: Sitting) 77 97.4 °F (36.3 °C) (Oral) 16 5' 4\" (1.626 m) 217 lb (98.4 kg) LMP SpO2 BMI OB Status Smoking Status 03[birth year]04/1961 (Approximate) 98% 37.25 kg/m2 Hysterectomy Former Smoker Vitals History BMI and BSA Data Body Mass Index Body Surface Area  
 37.25 kg/m 2 2.11 m 2 Preferred Pharmacy Pharmacy Name Phone [address] [facility] [phone] Your Updated Medication List  
  
   
This list is accurate as of: 1/8/18 10:48 AM.  Always use your most recent med list.  
  
  
  
  
 * albuterol 2.5 mg /3 mL (0.083 %) nebulizer solution Commonly known as:  PROVENTIL VENTOLIN  
INHALE THE CONTENTS OF ONE VIAL VIA NEBULIZER EVERY 4 HOURS AS NEEDED FOR WHEEZING  
  
 * albuterol 90 mcg/actuation inhaler Commonly known as:  VENTOLIN HFA INHALE 2 PUFFS BY MOUTH EVERY 4 HOURS AS NEEDED FOR WHEEZING. albuterol-ipratropium 2.5 mg-0.5 mg/3 ml Nebu Commonly known as:  DUO-NEB  
3 mL by Nebulization route every four (4) hours as needed. (up to 6 per day) *90 day supply DX: asthma ALPRAZolam 0.5 mg tablet Commonly known as:  XANAX  
TAKE ONE-HALF TO 1 TABLET BY MOUTH ONCE EVERY NIGHT AT BEDTIME AS NEEDED FOR SLEEP  
  
 carvedilol 25 mg tablet Commonly known as:  COREG  
TAKE 1 TABLET BY MOUTH TWICE DAILY  
  
 cyanocobalamin 1,000 mcg tablet Take 1,000 mcg by mouth daily. diazePAM 5 mg tablet Commonly known as:  VALIUM  
 TAKE ONE TABLET EVERY 6 HOURS AS NEEDED FOR ANXIETY * DULoxetine 30 mg capsule Commonly known as:  CYMBALTA TAKE ONE CAPSULE BY MOUTH DAILY * DULoxetine 30 mg capsule Commonly known as:  CYMBALTA TAKE ONE CAPSULE BY MOUTH DAILY  
  
 fluticasone 50 mcg/actuation nasal spray Commonly known as:  Jeppie [place] INHALE 2 SPRAYS IN EACH NOSTRIL ONCE DAILY  
  
 glipiZIDE SR 2.5 mg CR tablet Commonly known as:  GLUCOTROL XL  
TAKE ONE TABLET BY MOUTH DAILY HYDROcodone-acetaminophen  mg tablet Commonly known as:  Hailey Gonzalez Take 1 Tab by mouth every six (6) hours as needed. ketorolac 0.5 % ophthalmic solution Commonly known as:  Terrial Regino INSTILL 1 GTT INTO OD QID STARTING 3 DAYS PRIOR TO SURGERY  
  
 lisinopril 5 mg tablet Commonly known as:  Joie Motley Take one half tablet daily MUCINEX D  mg per tablet Generic drug:  PSEUDOEPHEDRINE-guaiFENesin Take 1 Tab by mouth daily as needed. ofloxacin 0.3 % ophthalmic solution Commonly known as:  FLOXIN  
INSTILL 1 GTT INTO OD QID STARTING 3 DAYS BEFORE SURGERY  
  
 pantoprazole 40 mg tablet Commonly known as:  PROTONIX  
TAKE 1 TABLET BY MOUTH ONCE DAILY prednisoLONE acetate 1 % ophthalmic suspension Commonly known as:  PRED FORTE INSTILL 1 GTT INTO OD QID START THE DAY AFTER SURGERY  
  
 promethazine-dextromethorphan 6.25-15 mg/5 mL syrup Commonly known as:  PROMETHAZINE-DM  
TAKE ONE TEASPOONFUL BY MOUTH EVERY SIX HOURS AS NEEDED FOR COUGH  
  
 simvastatin 40 mg tablet Commonly known as:  ZOCOR  
TAKE 1 TABLET BY MOUTH ONCE EVERY NIGHT AT BEDTIME  
  
 tiotropium-olodaterol 2.5-2.5 mcg/actuation Mist  
Take 2 Puffs by inhalation daily. * Notice: This list has 4 medication(s) that are the same as other medications prescribed for you. Read the directions carefully, and ask your doctor or other care provider to review them with you. We Performed the Following AMB POC GLUCOSE, QUANTITATIVE, BLOOD [44417 CPT(R)] AMB POC HEMOGLOBIN A1C [84462 CPT(R)] AMB POC URINALYSIS DIP STICK AUTO W/O MICRO [53724 CPT(R)] CK [id number] CPT(R)] LIPID PANEL [35853 CPT(R)] METABOLIC PANEL, COMPREHENSIVE [94556 CPT(R)] MICROALBUMIN, UR, RAND W/ MICROALBUMIN/CREA RATIO [id number] CPT(R)] Follow-up Instructions Return in about 1 month (around 2/8/2018). Introducing [address]! Dear Wali Lara: 
Thank you for requesting a [employer] account. Our records indicate that you have previously registered for a [employer] account but its currently inactive. Please call our [employer] support line at [phone]. Additional Information If you have questions, please visit the Frequently Asked Questions section of the [employer] website at [email or web address] Remember, [employer] is NOT to be used for urgent needs. For medical emergencies, dial 911. Now available from your iPhone and Android! Please provide this summary of care documentation to your next provider. Your primary care clinician is listed as Keri Mao. If you have any questions after today's visit, please call [phone].

## 2018-01-08 NOTE — PROGRESS NOTES
HISTORY OF PRESENT ILLNESS  Shaneka Ruelas is a 80 y.o. female. HPI   Follow up on chronic medical problems. Cardiovascular Review:  The patient has hypertension, hyperlipidemia and Systolic HF. Diet and Lifestyle: generally follows a low fat low cholesterol diet, generally follows a low sodium diet  Home BP Monitoring: is not measured at home. Pertinent ROS: taking medications as instructed, no medication side effects noted, no TIA's, no chest pain on exertion, no dyspnea on exertion, noting that her ankles swelling has been mild. DM type II follow up:  Compliant w/ meds, diabetic diet, and exercise. Obtains home glucose monitoring averaging in the mid 100s. Checks BS daily on most days and prn. Pt does not have BS log at visit today. No Rf needed for today. Denies any tingling sensation, polyuria and polydipsia. No blurred vision. No significant weight changes. Osteoarthritis:  Patient has osteoarthritis in multiple joints but primarily in the knees and back. Has been having more aching in the muscles all over in the arms and legs. Cold water has been making her pain worse she thinks. Symptoms onset: problem is longstanding. Rheumatological ROS: stable, mild-to-moderate joint symptoms intermittently, reasonably well controlled by PRN meds. Response to treatment plan: stable and intermittent. Asthma Review:  The patient is being seen for follow up of chronic respiratory failure and allergies and chronic sinusitits, not currently in exacerbation. Overall still feel short winded at times but this is at baseline. Using nebulizer 3 to 4 times in a day as needed. Regimen compliance: The patient reports adherence to asthma action plan and regimen.     Patient Active Problem List   Diagnosis Code    CHF (congestive heart failure) ([facility]) I50.9    S/P TKR (total knee replacement) Z96.659    Anemia D64.9    s/p lumbar laminectomy Z98.890    S/P ASAD (total abdominal hysterectomy) Z90.710    S/P hemorrhoidectomy Z98.890, Z87.19    S/P rotator cuff surgery Z98.890    DM (diabetes mellitus) ([facility]) E11.9    Chronic sinusitis J32.9    S/P sinus surgery Z98.890    Dyslipidemia E78.5    Environmental allergies Z91.09    Obstructive sleep apnea (adult) (pediatric) G47.33    DJD (degenerative joint disease) M19.90    Chronic systolic heart failure ([facility]) I50.22    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    Sepsis ([facility]) A41.9    Acute respiratory failure ([facility]) J96.00    Asthma J45.909    Anxiety disorder F41.9    Diabetic neuropathy ([facility]) E11.40    Esophageal reflux K21.9    Essential hypertension, benign I10    Reactive airway disease with wheezing without complication P98.367    Encounter for medication monitoring Z51.81    CKD (chronic kidney disease) N18.9    Arthralgia of multiple joints M25.50       Current Outpatient Prescriptions   Medication Sig Dispense Refill    albuterol-ipratropium (DUO-NEB) 2.5 mg-0.5 mg/3 ml nebu 3 mL by Nebulization route every four (4) hours as needed. (up to 6 per day) *90 day supply DX: asthma 540 Nebule 3    albuterol (PROVENTIL VENTOLIN) 2.5 mg /3 mL (0.083 %) nebulizer solution INHALE THE CONTENTS OF ONE VIAL VIA NEBULIZER EVERY 4 HOURS AS NEEDED FOR WHEEZING 1650 Each 3    furosemide (LASIX) 20 mg tablet TAKE 1 TABLET BY MOUTH EVERY MORNING AND TAKE 1 TABLET BY MOUTH EVERY AFTERNOON BETWEEN 2 AND 4  Tab 3    lisinopril (PRINIVIL, ZESTRIL) 5 mg tablet Take 1 Tab by mouth daily.  90 Tab 3    simvastatin (ZOCOR) 40 mg tablet TAKE 1 TABLET BY MOUTH ONCE EVERY NIGHT AT BEDTIME 90 Tab 0    predniSONE (DELTASONE) 10 mg tablet TAKE 1 TABLET BY MOUTH TWICE DAILY AS DIRECTED 30 Tab 0    pantoprazole (PROTONIX) 40 mg tablet TAKE 1 TABLET BY MOUTH ONCE DAILY 90 Tab 3    fluticasone (FLONASE) 50 mcg/actuation nasal spray INHALE 2 SPRAYS IN EACH NOSTRIL ONCE DAILY 1 Bottle 5    promethazine-dextromethorphan (PROMETHAZINE-DM) 6.25-15 mg/5 mL syrup TAKE ONE TEASPOONFUL BY MOUTH EVERY SIX HOURS AS NEEDED FOR COUGH 240 mL 1    DULoxetine (CYMBALTA) 30 mg capsule TAKE ONE CAPSULE BY MOUTH DAILY 30 Cap 6    ALPRAZolam (XANAX) 0.5 mg tablet TAKE ONE-HALF TO 1 TABLET BY MOUTH EVERY NIGHT AT BEDTIME AS NEEDED FOR SLEEP 30 Tab 3    VENTOLIN HFA 90 mcg/actuation inhaler INHALE 2 PUFFS BY MOUTH EVERY 4 HOURS AS NEEDED FOR WHEEZING. 1 Inhaler 8    glipiZIDE SR (GLUCOTROL XL) 2.5 mg CR tablet TAKE ONE TABLET BY MOUTH DAILY 90 Tab 3    ondansetron hcl (ZOFRAN) 4 mg tablet Take 4 mg by mouth every eight (8) hours as needed.  HYDROcodone-acetaminophen (NORCO)  mg tablet Take 1 Tab by mouth every six (6) hours as needed. 60 Tab 0    mometasone (ELOCON) 0.1 % topical cream Apply  to affected area daily as needed for Skin Irritation. 15 g 1    azelastine (ASTELIN) 137 mcg (0.1 %) nasal spray 1 [place] by Both Nostrils route daily.  diazepam (VALIUM) 5 mg tablet TAKE ONE TABLET EVERY 6 HOURS AS NEEDED FOR ANXIETY 60 Tab 3    carvedilol (COREG) 25 mg tablet TAKE ONE TABLET BY MOUTH TWICE DAILY 180 Tab 3    tiotropium-olodaterol 2.5-2.5 mcg/actuation mist Take 2 Puffs by inhalation daily.  PSEUDOEPHEDRINE-guaiFENesin (MUCINEX D)  mg per tablet Take 1 Tab by mouth daily as needed.  biotin 300 mcg tab Take 1 Tab by mouth daily.  For hair, skin, and nails         Allergies   Allergen Reactions    Gabapentin Other (comments)     Muscles twitching and jerking    Levaquin [Levofloxacin] Swelling    Percodan [Oxycodone Hcl-Oxycodone-Asa] Itching         Past Medical History:   Diagnosis Date    Anemia 3/3/2010    Arrhythmia     irregular heartbeat    Arthritis     CHF (congestive heart failure) (HCC) 3/3/2010    Chronic pain     back    Chronic sinusitis 3/3/2010    CPAP (continuous positive airway pressure) dependence     Diverticulosis     DM (diabetes mellitus) ([facility] [address] 3/3/2010    Dyslipidemia 3/3/2010    GERD (gastroesophageal reflux disease)     HTN (hypertension) 3/3/2010    Ill-defined condition     being evaluated py pulmonolgist for \"trouble breathing\"    S/P TKR (total knee replacement) 3/3/2010    Unspecified sleep apnea     doesn't wear CPAP since back has been hurting         Past Surgical History:   Procedure Laterality Date    HX APPENDECTOMY      thinks it was taken with hysterectomy    HX GYN      \"tubes opened\"    HX HEENT      sinus surgery    HX HEMORRHOIDECTOMY      HX HYSTERECTOMY      HX KNEE REPLACEMENT  1996    both knees- at same time    HX LUMBAR LAMINECTOMY  1980s    HX MOHS PROCEDURES      left shoulder    HX ORTHOPAEDIC  1980    neck fusion    HX ORTHOPAEDIC  1999    right knee replaced for second time    HX ORTHOPAEDIC      lumbar fusion    HX OTHER SURGICAL      CORNELL BIOPSY BREAST STEREOTACTIC Left yrs ago    (-)    [place] COLONOSCOPY FLX DX W/COLLJ SPEC WHEN PFRMD  [id number]    dr. Josef Wolfe (barium enema)         Family History   Problem Relation Age of Onset    Diabetes Mother     Heart Disease Father     Diabetes Brother     Blindness Brother     Diabetes Sister     Heart Disease Sister     Heart Disease Brother     Heart Disease Brother     Asthma Brother     Malignant Hyperthermia Neg Hx     Pseudocholinesterase Deficiency Neg Hx     Delayed Awakening Neg Hx     Post-op Nausea/Vomiting Neg Hx     Emergence Delirium Neg Hx     Post-op Cognitive Dysfunction Neg Hx        Social History   Substance Use Topics    Smoking status: Former Smoker     Packs/day: 0.30     Years: 5.00     Quit date: 1/1/1960    Smokeless tobacco: Never Used    Alcohol use No        Lab Results   Component Value Date/Time    WBC 5.4 10/05/2016 01:13 PM    HGB 10.5 10/05/2016 01:13 PM    HCT 33.4 10/05/2016 01:13 PM    PLATELET 131 80/61/9867 01:13 PM    MCV 96 10/05/2016 01:13 PM       Lab Results   Component Value Date/Time    Cholesterol, total 168 03/24/2017 09:50 AM    HDL Cholesterol 85 03/24/2017 09:50 AM    LDL, calculated 69 03/24/2017 09:50 AM    Triglyceride 71 03/24/2017 09:50 AM    CHOL/HDL Ratio 2.0 06/11/2010 02:10 PM       Lab Results   Component Value Date/Time    TSH 2.510 10/05/2016 01:13 PM      Lab Results   Component Value Date/Time    Sodium 148 05/22/2017 08:42 AM    Potassium 4.1 05/22/2017 08:42 AM    Chloride 106 05/22/2017 08:42 AM    CO2 25 05/22/2017 08:42 AM    Anion gap 3 12/17/2015 11:47 AM    Glucose 98 05/22/2017 08:42 AM    BUN 21 05/22/2017 08:42 AM    Creatinine 1.44 05/22/2017 08:42 AM    BUN/Creatinine ratio 15 05/22/2017 08:42 AM    GFR est AA 38 05/22/2017 08:42 AM    GFR est non-AA 33 05/22/2017 08:42 AM    Calcium 9.4 05/22/2017 08:42 AM    Bilirubin, total 0.2 03/24/2017 09:50 AM    ALT (SGPT) 15 03/24/2017 09:50 AM    AST (SGOT) 21 03/24/2017 09:50 AM    Alk. phosphatase 69 03/24/2017 09:50 AM    Protein, total 6.6 03/24/2017 09:50 AM    Albumin 4.4 03/24/2017 09:50 AM    Globulin 3.4 12/17/2015 11:47 AM    A-G Ratio 2.0 03/24/2017 09:50 AM      Lab Results   Component Value Date/Time    Hemoglobin A1c 5.9 02/11/2014 08:26 AM    Hemoglobin A1c (POC) 6.2 05/22/2017 08:42 AM         Review of Systems   Constitutional: Negative for malaise/fatigue. HENT: Negative for congestion. Eyes: Negative for blurred vision. Respiratory: Negative for cough. Cardiovascular: Negative for chest pain, palpitations. Gastrointestinal: Negative for abdominal pain, constipation and heartburn. Genitourinary: Negative for dysuria, frequency and urgency. Neurological: Negative for dizziness, tingling and headaches. Endo/Heme/Allergies: Negative for environmental allergies. Psychiatric/Behavioral: Negative for depression. The patient does not have insomnia. Physical Exam   Constitutional: She appears well-developed and well-nourished.    /54 (BP 1 Location: Left arm, BP Patient Position: Sitting)  Pulse 77  Temp 97.4 °F (36.3 °C) (Oral)   Resp 16 Ht 5' 4\" (1.626 m)  Wt 217 lb (98.4 kg)  LMP 03/04/1961 (Approximate)  SpO2 98%  BMI 37.25 kg/m2    HENT:   Right Ear: Tympanic membrane and ear canal normal.   Left Ear: Tympanic membrane and ear canal normal.   Nose: No mucosal edema or rhinorrhea. Mouth/Throat: Oropharynx is clear and moist and mucous membranes are normal.   Neck: Normal range of motion. Neck supple. No thyromegaly present. Cardiovascular: Normal rate and regular rhythm. No murmur heard. Pulmonary/Chest: Effort normal and breath sounds normal.   Abdominal: Soft. Bowel sounds are normal. There is no tenderness. Musculoskeletal: Normal range of motion. She exhibits mild edema. Lymphadenopathy:     She has no cervical adenopathy. Skin: Skin is warm and dry. Hyperemic rash on the left lower leg medial ankle area. Psychiatric: She has a normal mood and affect. Nursing note and vitals reviewed. ASSESSMENT and PLAN  Diagnoses and all orders for this visit:    1. Essential hypertension, benign  Stable at goal.      2. Type 2 diabetes mellitus with diabetic neuropathy, without long-term current use of insulin (HCC)  -     AMB POC HEMOGLOBIN A1C  -     AMB POC GLUCOSE, QUANTITATIVE, BLOOD  -     MICROALBUMIN, UR, RAND W/ MICROALBUMIN/CREA RATIO  -     AMB POC URINALYSIS DIP STICK AUTO W/O MICRO    3. Dyslipidemia  -     LIPID PANEL    4. Chronic systolic heart failure (HCC)  Stable     5. Stage 3 chronic kidney disease  Stable. Has follow up with renal for next month. 6. Arthralgia of multiple joints  Stable     7. Myalgia  -     CK  HOLD zocor to see if this is d/t statin    8. Encounter for medication monitoring  -     METABOLIC PANEL, COMPREHENSIVE      Follow-up Disposition:  Return in about 1 month (around 2/8/2018).   reviewed diet, exercise and weight control  cardiovascular risk and specific lipid/LDL goals reviewed  reviewed medications and side effects in detail  specific diabetic recommendations: low cholesterol diet, weight control and daily exercise discussed, foot care discussed and Podiatry visits discussed, annual eye examinations at Ophthalmology discussed and glycohemoglobin and other lab monitoring discussed     I have discussed diagnosis listed in this note with pt and/or family. I have discussed treatment plans and options and the risk/benefit analysis of those options, including safe use of medications and possible medication side effects. Through the use of shared decision making we have agreed to the above plan. The patient has received an after-visit summary and questions were answered concerning future plans and follow up. Advise pt of any urgent changes then to proceed to the ER.

## 2018-01-09 LAB
ALBUMIN SERPL-MCNC: 4.2 G/DL (ref 3.5–4.7)
ALBUMIN/CREAT UR: 11.3 MG/G CREAT (ref 0–30)
ALBUMIN/GLOB SERPL: 1.8 {RATIO} (ref 1.2–2.2)
ALP SERPL-CCNC: 62 IU/L (ref 39–117)
ALT SERPL-CCNC: 11 IU/L (ref 0–32)
AST SERPL-CCNC: 17 IU/L (ref 0–40)
BILIRUB SERPL-MCNC: 0.3 MG/DL (ref 0–1.2)
BUN SERPL-MCNC: 21 MG/DL (ref 8–27)
BUN/CREAT SERPL: 13 (ref 12–28)
CALCIUM SERPL-MCNC: 9.9 MG/DL (ref 8.7–10.3)
CHLORIDE SERPL-SCNC: 100 MMOL/L (ref 96–106)
CHOLEST SERPL-MCNC: 175 MG/DL (ref 100–199)
CK SERPL-CCNC: 73 U/L (ref 24–173)
CO2 SERPL-SCNC: 28 MMOL/L (ref 18–29)
CREAT SERPL-MCNC: 1.68 MG/DL (ref 0.57–1)
CREAT UR-MCNC: 90.2 MG/DL
GLOBULIN SER CALC-MCNC: 2.4 G/DL (ref 1.5–4.5)
GLUCOSE SERPL-MCNC: 117 MG/DL (ref 65–99)
HDLC SERPL-MCNC: 71 MG/DL
INTERPRETATION, 910389: NORMAL
INTERPRETATION: NORMAL
INTERPRETATION: NORMAL
LDLC SERPL CALC-MCNC: 81 MG/DL (ref 0–99)
Lab: NORMAL
Lab: NORMAL
MICROALBUMIN UR-MCNC: 10.2 UG/ML
PDF IMAGE, 910387: NORMAL
POTASSIUM SERPL-SCNC: 4.4 MMOL/L (ref 3.5–5.2)
PROT SERPL-MCNC: 6.6 G/DL (ref 6–8.5)
SODIUM SERPL-SCNC: 146 MMOL/L (ref 134–144)
TRIGL SERPL-MCNC: 117 MG/DL (ref 0–149)
VLDLC SERPL CALC-MCNC: 23 MG/DL (ref 5–40)

## 2018-01-11 ENCOUNTER — TELEPHONE (OUTPATIENT)
Dept: FAMILY MEDICINE CLINIC | Age: 83
End: 2018-01-11

## 2018-01-15 RX ORDER — PEAK FLOW METER
EACH MISCELLANEOUS
Qty: 1 EACH | Refills: 0 | Status: SHIPPED | OUTPATIENT
Start: 2018-01-15 | End: 2018-06-15

## 2018-01-22 RX ORDER — FLUTICASONE PROPIONATE 50 MCG
SPRAY, SUSPENSION (ML) NASAL
Qty: 1 BOTTLE | Refills: 11 | Status: SHIPPED | OUTPATIENT
Start: 2018-01-22 | End: 2018-01-22 | Stop reason: SDUPTHER

## 2018-01-22 RX ORDER — FLUTICASONE PROPIONATE 50 MCG
SPRAY, SUSPENSION (ML) NASAL
Qty: 1 BOTTLE | Refills: 11 | Status: SHIPPED | OUTPATIENT
Start: 2018-01-22

## 2018-01-23 ENCOUNTER — LAB ONLY (OUTPATIENT)
Dept: FAMILY MEDICINE CLINIC | Age: 83
End: 2018-01-23

## 2018-01-23 ENCOUNTER — HOSPITAL ENCOUNTER (OUTPATIENT)
Dept: LAB | Age: 83
Discharge: HOME | End: 2018-01-23
Payer: MEDICARE

## 2018-01-23 DIAGNOSIS — E87.0 SERUM SODIUM ELEVATED: Primary | ICD-10-CM

## 2018-01-23 PROCEDURE — 84295 ASSAY OF SERUM SODIUM: CPT

## 2018-01-24 LAB — SODIUM SERPL-SCNC: 146 MMOL/L (ref 134–144)

## 2018-01-25 ENCOUNTER — TELEPHONE (OUTPATIENT)
Dept: FAMILY MEDICINE CLINIC | Age: 83
End: 2018-01-25

## 2018-01-26 RX ORDER — PREDNISONE 10 MG/1
10 TABLET ORAL 2 TIMES DAILY
Qty: 10 TAB | Refills: 0 | Status: SHIPPED | OUTPATIENT
Start: 2018-01-26 | End: 2018-01-31

## 2018-01-26 RX ORDER — AZITHROMYCIN 250 MG/1
TABLET, FILM COATED ORAL
Qty: 6 TAB | Refills: 0 | Status: SHIPPED | OUTPATIENT
Start: 2018-01-26 | End: 2018-01-31

## 2018-01-26 NOTE — TELEPHONE ENCOUNTER
Patients  would like a return call at [phone]. Patient believes she has a sinus infection and is having some wheezing without SOB. Patient would like to know if Karoline Perdomo will call something in for her.

## 2018-01-26 NOTE — TELEPHONE ENCOUNTER
Patient states she feels like she has a sinus infection. Patient c/o sore throat, coughing, sinus drainage, and a little wheezing. Patient stated she would like Dr. Sarah Cr to send Prednisone and Zpak to the pharm becauseshe does not feel like coming in. Informed patient will check with Dr. Sarah Cr.

## 2018-02-07 LAB — CREATININE, EXTERNAL: 1.81

## 2018-02-09 ENCOUNTER — OFFICE VISIT (OUTPATIENT)
Dept: FAMILY MEDICINE CLINIC | Age: 83
End: 2018-02-09

## 2018-02-09 VITALS
TEMPERATURE: 96.3 F | RESPIRATION RATE: 16 BRPM | HEIGHT: 64 IN | DIASTOLIC BLOOD PRESSURE: 56 MMHG | OXYGEN SATURATION: 97 % | BODY MASS INDEX: 36.91 KG/M2 | WEIGHT: 216.2 LBS | HEART RATE: 69 BPM | SYSTOLIC BLOOD PRESSURE: 120 MMHG

## 2018-02-09 DIAGNOSIS — M79.10 MUSCLE SORENESS: ICD-10-CM

## 2018-02-09 DIAGNOSIS — M79.10 MYALGIA: Primary | ICD-10-CM

## 2018-02-09 NOTE — PROGRESS NOTES
Chief Complaint   Patient presents with    Medication Evaluation     body soreness all over, was taken off some medications       1. Have you been to the ER, urgent care clinic since your last visit? Hospitalized since your last visit? no    2. Have you seen or consulted any other health care providers outside of the [address] [place] [address] since your last visit? Include any pap smears or colon screening.  No

## 2018-02-09 NOTE — PROGRESS NOTES
Appt made with Dr. Josseline Lomas 2/12/2018 at 2pm and arrive by 1:30pm. Patient and patient's  given appt information and verbalized understanding.

## 2018-02-09 NOTE — PROGRESS NOTES
HISTORY OF PRESENT ILLNESS  Tres Foy is a 80 y.o. female. HPI   Follow up on muscle pain. Holding the Zocor did not help with the muscle pain. Osteoarthritis:  Patient has osteoarthritis in multiple joints but primarily in the knees and back. Has been having more aching in the muscles all over in the arms and legs, and back. Cold weather makes her pain worse she thinks. Symptoms onset: problem is longstanding. Rheumatological ROS: stable, mild-to-moderate joint symptoms intermittently, reasonably well controlled by PRN meds. Response to treatment plan: stable and intermittent.      Patient Active Problem List   Diagnosis Code    CHF (congestive heart failure) ([facility]) I50.9    S/P TKR (total knee replacement) Z96.659    Anemia D64.9    s/p lumbar laminectomy Z98.890    S/P ASAD (total abdominal hysterectomy) Z90.710    S/P hemorrhoidectomy Z98.890, Z87.19    S/P rotator cuff surgery Z98.890    DM (diabetes mellitus) ([facility] [address] E11.9    Chronic sinusitis J32.9    S/P sinus surgery Z98.890    Dyslipidemia E78.5    Environmental allergies Z91.09    Obstructive sleep apnea (adult) (pediatric) G47.33    DJD (degenerative joint disease) M19.90    Chronic systolic heart failure ([facility]) I50.22    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    Sepsis ([facility]) A41.9    Acute respiratory failure ([facility]) J96.00    Asthma J45.909    Anxiety disorder F41.9    Diabetic neuropathy ([facility]) E11.40    Esophageal reflux K21.9    Essential hypertension, benign I10    Reactive airway disease with wheezing without complication K28.577    Encounter for medication monitoring Z51.81    CKD (chronic kidney disease) N18.9    Arthralgia of multiple joints M25.50    Plantar fasciitis of left foot M72.2       Current Outpatient Prescriptions   Medication Sig Dispense Refill    glipiZIDE SR (GLUCOTROL XL) 2.5 mg CR tablet TAKE ONE TABLET BY MOUTH DAILY 90 Tab 3    fluticasone (FLONASE) 50 mcg/actuation nasal spray SHAKE LIQUID AND USE 2 SPRAYS IN EACH NOSTRIL EVERY DAY 1 Bottle 11    VIOS machine USE AS DIRECTED 1 Each 0    carvedilol (COREG) 25 mg tablet TAKE 1 TABLET BY MOUTH TWICE DAILY 180 Tab 0    DULoxetine (CYMBALTA) 30 mg capsule TAKE ONE CAPSULE BY MOUTH DAILY 30 Cap 6    lisinopril (PRINIVIL, ZESTRIL) 5 mg tablet Take one half tablet daily      cyanocobalamin 1,000 mcg tablet Take 1,000 mcg by mouth daily.  DULoxetine (CYMBALTA) 30 mg capsule TAKE ONE CAPSULE BY MOUTH DAILY  6    ALPRAZolam (XANAX) 0.5 mg tablet TAKE ONE-HALF TO 1 TABLET BY MOUTH ONCE EVERY NIGHT AT BEDTIME AS NEEDED FOR SLEEP 30 Tab 3    albuterol (VENTOLIN HFA) 90 mcg/actuation inhaler INHALE 2 PUFFS BY MOUTH EVERY 4 HOURS AS NEEDED FOR WHEEZING. 1 Inhaler 11    albuterol-ipratropium (DUO-NEB) 2.5 mg-0.5 mg/3 ml nebu 3 mL by Nebulization route every four (4) hours as needed. (up to 6 per day) *90 day supply DX: asthma 540 Nebule 3    albuterol (PROVENTIL VENTOLIN) 2.5 mg /3 mL (0.083 %) nebulizer solution INHALE THE CONTENTS OF ONE VIAL VIA NEBULIZER EVERY 4 HOURS AS NEEDED FOR WHEEZING 1650 Each 3    pantoprazole (PROTONIX) 40 mg tablet TAKE 1 TABLET BY MOUTH ONCE DAILY 90 Tab 3    promethazine-dextromethorphan (PROMETHAZINE-DM) 6.25-15 mg/5 mL syrup TAKE ONE TEASPOONFUL BY MOUTH EVERY SIX HOURS AS NEEDED FOR COUGH 240 mL 1    HYDROcodone-acetaminophen (NORCO)  mg tablet Take 1 Tab by mouth every six (6) hours as needed. 60 Tab 0    diazepam (VALIUM) 5 mg tablet TAKE ONE TABLET EVERY 6 HOURS AS NEEDED FOR ANXIETY 60 Tab 3    tiotropium-olodaterol 2.5-2.5 mcg/actuation mist Take 2 Puffs by inhalation daily.  PSEUDOEPHEDRINE-guaiFENesin (MUCINEX D)  mg per tablet Take 1 Tab by mouth daily as needed.       ofloxacin (FLOXIN) 0.3 % ophthalmic solution   2    prednisoLONE acetate (PRED FORTE) 1 % ophthalmic suspension INSTILL 1 GTT INTO OD QID START THE DAY AFTER SURGERY  2    ketorolac (ACULAR) 0.5 % ophthalmic solution INSTILL 1 GTT INTO OD QID STARTING 3 DAYS PRIOR TO SURGERY  2    simvastatin (ZOCOR) 40 mg tablet TAKE 1 TABLET BY MOUTH ONCE EVERY NIGHT AT BEDTIME (Patient not taking: Reported on 2/9/2018) 90 Tab 0       Allergies   Allergen Reactions    Gabapentin Other (comments)     Muscles twitching and jerking    Levaquin [Levofloxacin] Swelling    Lyrica [Pregabalin] Other (comments)     Muscle twitching and jerking    Percodan [Oxycodone Hcl-Oxycodone-Asa] Itching       Past Medical History:   Diagnosis Date    Anemia 3/3/2010    Arrhythmia     irregular heartbeat    Arthritis     [facility] 3/3/2010    Chronic pain     back    Chronic sinusitis 3/3/2010    CPAP (continuous positive airway pressure) dependence     Diverticulosis     DM (diabetes mellitus) ([facility] [address] 3/3/2010    Dyslipidemia 3/3/2010    GERD (gastroesophageal reflux disease)     HTN (hypertension) 3/3/2010    Ill-defined condition     being evaluated py pulmonolgist for \"trouble breathing\"    S/P TKR (total knee replacement) 3/3/2010    Unspecified sleep apnea     doesn't wear CPAP since back has been hurting       Past Surgical History:   Procedure Laterality Date    HX APPENDECTOMY      thinks it was taken with hysterectomy    HX GYN      \"tubes opened\"    HX HEENT      sinus surgery    HX HEMORRHOIDECTOMY      HX HYSTERECTOMY      HX KNEE REPLACEMENT  1996    both knees- at same time    HX LUMBAR LAMINECTOMY  1980s    HX MOHS PROCEDURES      left shoulder    HX ORTHOPAEDIC  1980    neck fusion    HX ORTHOPAEDIC  1999    right knee replaced for second time    HX ORTHOPAEDIC      lumbar fusion    HX OTHER SURGICAL      CORNELL BIOPSY BREAST STEREOTACTIC Left yrs ago    (-)    [place] COLONOSCOPY FLX DX W/COLLJ SPEC WHEN PFRMD  [id number]    dr. Damari Chase (barium enema)       Family History   Problem Relation Age of Onset    Diabetes Mother     Heart Disease Father     Diabetes Brother    Tere Walker Blindness Brother     Diabetes Sister     Heart Disease Sister     Heart Disease Brother     Heart Disease Brother     Asthma Brother     Malignant Hyperthermia Neg Hx     Pseudocholinesterase Deficiency Neg Hx     Delayed Awakening Neg Hx     Post-op Nausea/Vomiting Neg Hx     Emergence Delirium Neg Hx     Post-op Cognitive Dysfunction Neg Hx        Social History   Substance Use Topics    Smoking status: Former Smoker     Packs/day: 0.30     Years: 5.00     Quit date: 1/1/1960    Smokeless tobacco: Never Used    Alcohol use No        Review of Systems   Constitutional: Negative for malaise/fatigue. HENT: Negative for congestion. Eyes: Negative for blurred vision. Respiratory: Negative for cough and shortness of breath. Cardiovascular: Negative for chest pain, palpitations and leg swelling. Gastrointestinal: Negative for abdominal pain, constipation and heartburn. Genitourinary: Negative for dysuria, frequency and urgency. Musculoskeletal: Positive for myalgias. Negative for back pain and joint pain. Neurological: Negative for dizziness, tingling and headaches. Endo/Heme/Allergies: Negative for environmental allergies. Psychiatric/Behavioral: Negative for depression. The patient does not have insomnia. Physical Exam   Constitutional: She appears well-developed and well-nourished. /56 (BP 1 Location: Left arm, BP Patient Position: Sitting)  Pulse 69  Temp 96.3 °F (35.7 °C) (Oral)   Resp 16  Ht 5' 4\" (1.626 m)  Wt 216 lb 3.2 oz (98.1 kg)  LMP 03/04/1961 (Approximate)  SpO2 97%  BMI 37.11 kg/m2       Neck: Normal range of motion. Neck supple. No thyromegaly present. Cardiovascular: Normal rate and regular rhythm. No murmur heard. Pulmonary/Chest: Effort normal and breath sounds normal.   Abdominal: Soft. Bowel sounds are normal. There is no tenderness. Musculoskeletal: Normal range of motion. She exhibits no edema.    Has multiple areas of muscle tenderness in the legs and arms and back. Lymphadenopathy:     She has no cervical adenopathy. Skin: Skin is warm and dry. Psychiatric: She has a normal mood and affect. Nursing note and vitals reviewed. ASSESSMENT and PLAN  Diagnoses and all orders for this visit:    1. Myalgia  2. Muscle soreness  -     REFERRAL TO RHEUMATOLOGY      Follow-up Disposition:  Return in about 2 months (around 4/9/2018). I have discussed diagnosis listed in this note with pt and/or family. I have discussed treatment plans and options and the risk/benefit analysis of those options, including safe use of medications and possible medication side effects. Through the use of shared decision making we have agreed to the above plan. The patient has received an after-visit summary and questions were answered concerning future plans and follow up. Advise pt of any urgent changes then to proceed to the ER.

## 2018-02-09 NOTE — MR AVS SNAPSHOT
[address] [facility] 
 
 
 [address] [facility] [address] [id number] [phone] Patient: Shantel Guzman MRN: [id number] Visit Information Date & Time Provider Department Dept. Phone Encounter #  
 2018 11:45 AM Michelle Wilde MD [facility] [phone] [id number] Follow-up Instructions Return in about 2 months (around 2018). Your Appointments 2018  1:00 PM  
ESTABLISHED PATIENT with Deanna Marsh MD  
[facility] [address] [facility]) Appt Note: smith'd 6mnt fu w/Dr WOOD 18 bg  
 [address] [address]  
[phone] [address] [address] Upcoming Health Maintenance Date Due  
 FOOT EXAM Q1 2018 HEMOGLOBIN A1C Q6M 2018 EYE EXAM RETINAL OR DILATED Q1 10/2/2018 MEDICARE YEARLY EXAM 10/24/2018 MICROALBUMIN Q1 2019 LIPID PANEL Q1 2019 GLAUCOMA SCREENING Q2Y 10/2/2019 DTaP/Tdap/Td series (2 - Td) 2022 Allergies as of 2018  Review Complete On: 2018 By: Maulik Coleman LPN Severity Noted Reaction Type Reactions Gabapentin  10/12/2016    Other (comments) Muscles twitching and jerking Levaquin [Levofloxacin]  2012    Swelling Lyrica [Pregabalin]  10/31/2017    Other (comments) Muscle twitching and jerking Percodan [Oxycodone Hcl-oxycodone-asa]  2012    Itching Current Immunizations  Reviewed on 2018 Name Date Influenza High Dose Vaccine PF 2017, 2016, 2015 Influenza Vaccine  Deferred (Patient Refused), 2014, 2013 Influenza Vaccine Split 2012, 2010 Pneumococcal Conjugate (PCV-13) 2015 Pneumococcal Vaccine (Pcv) 10/30/2009 TDAP Vaccine 2012 Zoster Vaccine, Live 3/19/2013 Not reviewed this visit You Were Diagnosed With   
  
 Codes Comments Myalgia    -  Primary ICD-10-CM: M79.1 ICD-9-CM: 729.1 Muscle soreness     ICD-10-CM: M79.1 ICD-9-CM: 729.1 Vitals BP Pulse Temp Resp Height(growth percentile) Weight(growth percentile) 120/56 (BP 1 Location: Left arm, BP Patient Position: Sitting) 69 96.3 °F (35.7 °C) (Oral) 16 5' 4\" (1.626 m) 216 lb 3.2 oz (98.1 kg) LMP SpO2 BMI OB Status Smoking Status 03/04/1961 (Approximate) 97% 37.11 kg/m2 Hysterectomy Former Smoker Vitals History BMI and BSA Data Body Mass Index Body Surface Area  
 37.11 kg/m 2 2.1 m 2 Preferred Pharmacy Pharmacy Name Phone [address] [facility] [phone] [facility] [phone] Your Updated Medication List  
  
   
This list is accurate as of: 2/9/18 12:51 PM.  Always use your most recent med list.  
  
  
  
  
 * albuterol 2.5 mg /3 mL (0.083 %) nebulizer solution Commonly known as:  PROVENTIL VENTOLIN  
INHALE THE CONTENTS OF ONE VIAL VIA NEBULIZER EVERY 4 HOURS AS NEEDED FOR WHEEZING  
  
 * albuterol 90 mcg/actuation inhaler Commonly known as:  VENTOLIN HFA INHALE 2 PUFFS BY MOUTH EVERY 4 HOURS AS NEEDED FOR WHEEZING. albuterol-ipratropium 2.5 mg-0.5 mg/3 ml Nebu Commonly known as:  DUO-NEB  
3 mL by Nebulization route every four (4) hours as needed. (up to 6 per day) *90 day supply DX: asthma ALPRAZolam 0.5 mg tablet Commonly known as:  XANAX  
TAKE ONE-HALF TO 1 TABLET BY MOUTH ONCE EVERY NIGHT AT BEDTIME AS NEEDED FOR SLEEP  
  
 carvedilol 25 mg tablet Commonly known as:  COREG  
TAKE 1 TABLET BY MOUTH TWICE DAILY  
  
 cyanocobalamin 1,000 mcg tablet Take 1,000 mcg by mouth daily. diazePAM 5 mg tablet Commonly known as:  VALIUM  
TAKE ONE TABLET EVERY 6 HOURS AS NEEDED FOR ANXIETY * DULoxetine 30 mg capsule Commonly known as:  CYMBALTA TAKE ONE CAPSULE BY MOUTH DAILY * DULoxetine 30 mg capsule Commonly known as:  CYMBALTA TAKE ONE CAPSULE BY MOUTH DAILY  
  
 fluticasone 50 mcg/actuation nasal spray Commonly known as:  Mariam New SHAKE LIQUID AND USE 2 SPRAYS IN EACH NOSTRIL EVERY DAY  
  
 glipiZIDE SR 2.5 mg CR tablet Commonly known as:  GLUCOTROL XL  
TAKE ONE TABLET BY MOUTH DAILY HYDROcodone-acetaminophen  mg tablet Commonly known as:  Milan Fraction Take 1 Tab by mouth every six (6) hours as needed. ketorolac 0.5 % ophthalmic solution Commonly known as:  Prince Copping INSTILL 1 GTT INTO OD QID STARTING 3 DAYS PRIOR TO SURGERY  
  
 lisinopril 5 mg tablet Commonly known as:  Delaney Prichard Take one half tablet daily MUCINEX D  mg per tablet Generic drug:  PSEUDOEPHEDRINE-guaiFENesin Take 1 Tab by mouth daily as needed. ofloxacin 0.3 % ophthalmic solution Commonly known as:  FLOXIN  
  
 pantoprazole 40 mg tablet Commonly known as:  PROTONIX  
TAKE 1 TABLET BY MOUTH ONCE DAILY prednisoLONE acetate 1 % ophthalmic suspension Commonly known as:  PRED FORTE INSTILL 1 GTT INTO OD QID START THE DAY AFTER SURGERY  
  
 promethazine-dextromethorphan 6.25-15 mg/5 mL syrup Commonly known as:  PROMETHAZINE-DM  
TAKE ONE TEASPOONFUL BY MOUTH EVERY SIX HOURS AS NEEDED FOR COUGH  
  
 simvastatin 40 mg tablet Commonly known as:  ZOCOR  
TAKE 1 TABLET BY MOUTH ONCE EVERY NIGHT AT BEDTIME  
  
 tiotropium-olodaterol 2.5-2.5 mcg/actuation Mist  
Take 2 Puffs by inhalation daily. Vios machine Generic drug:  Nebulizer & Compressor USE AS DIRECTED * Notice: This list has 4 medication(s) that are the same as other medications prescribed for you. Read the directions carefully, and ask your doctor or other care provider to review them with you. We Performed the Following REFERRAL TO RHEUMATOLOGY [ISF00 Custom] Follow-up Instructions Return in about 2 months (around 4/9/2018). Referral Information Referral ID Referred By Referred To  
  
 [id number] [place], [facility] [address] [facility] Phone: [phone] Fax: [phone] Visits Status Start Date End Date 1 New Request 2/9/18 2/9/19 If your referral has a status of pending review or denied, additional information will be sent to support the outcome of this decision. Introducing [facility] SERVICES! Dear Miesha Carlos: 
Thank you for requesting a [employer] account. Our records indicate that you have previously registered for a [employer] account but its currently inactive. Please call our [employer] support line at [phone]. Additional Information If you have questions, please visit the Frequently Asked Questions section of the [employer] website at [email or web address] [employer]. [employer]/. Remember, [employer] is NOT to be used for urgent needs. For medical emergencies, dial 911. Now available from your iPhone and Android! Please provide this summary of care documentation to your next provider. Your primary care clinician is listed as Reymundo Pollock. If you have any questions after today's visit, please call [phone].

## 2018-02-12 DIAGNOSIS — F51.01 PRIMARY INSOMNIA: ICD-10-CM

## 2018-02-12 RX ORDER — ALPRAZOLAM 0.5 MG/1
TABLET ORAL
Qty: 30 TAB | Refills: 3 | OUTPATIENT
Start: 2018-02-12 | End: 2018-06-22 | Stop reason: SDUPTHER

## 2018-02-13 RX ORDER — SIMVASTATIN 40 MG/1
TABLET, FILM COATED ORAL
Qty: 90 TAB | Refills: 0 | Status: SHIPPED | OUTPATIENT
Start: 2018-02-13 | End: 2018-05-16 | Stop reason: SDUPTHER

## 2018-02-22 LAB — CREATININE, EXTERNAL: 1.6

## 2018-03-09 RX ORDER — CARVEDILOL 25 MG/1
TABLET ORAL
Qty: 180 TAB | Refills: 0 | Status: SHIPPED | OUTPATIENT
Start: 2018-03-09 | End: 2018-06-06 | Stop reason: SDUPTHER

## 2018-04-03 RX ORDER — AMOXICILLIN AND CLAVULANATE POTASSIUM 500; 125 MG/1; MG/1
1 TABLET, FILM COATED ORAL 2 TIMES DAILY
Qty: 20 TAB | Refills: 0 | Status: SHIPPED | OUTPATIENT
Start: 2018-04-03 | End: 2018-04-13 | Stop reason: ALTCHOICE

## 2018-04-03 RX ORDER — PREDNISONE 10 MG/1
10 TABLET ORAL
Qty: 10 TAB | Refills: 0 | Status: SHIPPED | OUTPATIENT
Start: 2018-04-03 | End: 2018-04-08

## 2018-04-03 NOTE — TELEPHONE ENCOUNTER
Patient wants to get something called in for a URI, she said she is coughing & has congestion, she said it is a   head cold.   Please give her a call @ [phone]

## 2018-04-03 NOTE — TELEPHONE ENCOUNTER
Spoke with patient and c/o sinus drainage, coughing up light yellow sputum, hoarse, and nasal congestion. Patient would like an antibiotic and Prednisone sent to the pharm. Informed patient will check with Dr. Swartz. Patient verbalized understanding.

## 2018-04-11 ENCOUNTER — HOSPITAL ENCOUNTER (OUTPATIENT)
Dept: LAB | Age: 83
Discharge: HOME | End: 2018-04-11
Payer: MEDICARE

## 2018-04-11 ENCOUNTER — OFFICE VISIT (OUTPATIENT)
Dept: FAMILY MEDICINE CLINIC | Age: 83
End: 2018-04-11

## 2018-04-11 VITALS
BODY MASS INDEX: 35.17 KG/M2 | WEIGHT: 206 LBS | DIASTOLIC BLOOD PRESSURE: 52 MMHG | OXYGEN SATURATION: 99 % | HEIGHT: 64 IN | SYSTOLIC BLOOD PRESSURE: 105 MMHG | RESPIRATION RATE: 20 BRPM | HEART RATE: 62 BPM | TEMPERATURE: 98 F

## 2018-04-11 DIAGNOSIS — J01.00 ACUTE MAXILLARY SINUSITIS, RECURRENCE NOT SPECIFIED: ICD-10-CM

## 2018-04-11 DIAGNOSIS — Z51.81 ENCOUNTER FOR MEDICATION MONITORING: ICD-10-CM

## 2018-04-11 DIAGNOSIS — I50.22 CHRONIC SYSTOLIC HEART FAILURE (HCC): ICD-10-CM

## 2018-04-11 DIAGNOSIS — R53.81 MALAISE: ICD-10-CM

## 2018-04-11 DIAGNOSIS — J45.20 MILD INTERMITTENT ASTHMA WITHOUT COMPLICATION: ICD-10-CM

## 2018-04-11 DIAGNOSIS — N18.30 STAGE 3 CHRONIC KIDNEY DISEASE (HCC): ICD-10-CM

## 2018-04-11 DIAGNOSIS — E11.40 TYPE 2 DIABETES MELLITUS WITH DIABETIC NEUROPATHY, WITHOUT LONG-TERM CURRENT USE OF INSULIN (HCC): Chronic | ICD-10-CM

## 2018-04-11 DIAGNOSIS — M25.50 ARTHRALGIA OF MULTIPLE JOINTS: ICD-10-CM

## 2018-04-11 DIAGNOSIS — G47.33 OBSTRUCTIVE SLEEP APNEA (ADULT) (PEDIATRIC): ICD-10-CM

## 2018-04-11 DIAGNOSIS — I10 ESSENTIAL HYPERTENSION, BENIGN: Primary | ICD-10-CM

## 2018-04-11 DIAGNOSIS — E78.5 DYSLIPIDEMIA: Chronic | ICD-10-CM

## 2018-04-11 PROBLEM — E11.21 TYPE 2 DIABETES WITH NEPHROPATHY (HCC): Status: ACTIVE | Noted: 2018-04-11

## 2018-04-11 PROBLEM — E66.01 SEVERE OBESITY (BMI 35.0-39.9) WITH COMORBIDITY (HCC): Status: ACTIVE | Noted: 2018-04-11

## 2018-04-11 LAB
FLUAV+FLUBV AG NOSE QL IA.RAPID: NEGATIVE POS/NEG
FLUAV+FLUBV AG NOSE QL IA.RAPID: NEGATIVE POS/NEG
GLUCOSE POC: 124 MG/DL
HBA1C MFR BLD HPLC: 6.5 %
VALID INTERNAL CONTROL?: YES

## 2018-04-11 PROCEDURE — 80053 COMPREHEN METABOLIC PANEL: CPT

## 2018-04-11 PROCEDURE — 36415 COLL VENOUS BLD VENIPUNCTURE: CPT

## 2018-04-11 RX ORDER — HYDROXYCHLOROQUINE SULFATE 200 MG/1
200 TABLET, FILM COATED ORAL 2 TIMES DAILY
COMMUNITY
Start: 2018-02-27 | End: 2019-07-15

## 2018-04-11 RX ORDER — FUROSEMIDE 20 MG/1
TABLET ORAL
Refills: 3 | COMMUNITY
Start: 2018-03-29 | End: 2018-04-11

## 2018-04-11 RX ORDER — DULOXETIN HYDROCHLORIDE 60 MG/1
60 CAPSULE, DELAYED RELEASE ORAL DAILY
COMMUNITY
End: 2018-04-11 | Stop reason: SDUPTHER

## 2018-04-11 RX ORDER — DICLOFENAC SODIUM 20 MG/G
SOLUTION TOPICAL
COMMUNITY
Start: 2017-08-04 | End: 2018-06-15

## 2018-04-11 RX ORDER — ALBUTEROL SULFATE 90 UG/1
AEROSOL, METERED RESPIRATORY (INHALATION)
COMMUNITY
Start: 2017-09-08 | End: 2018-04-11 | Stop reason: SDUPTHER

## 2018-04-11 RX ORDER — LISINOPRIL 2.5 MG/1
2.5 TABLET ORAL DAILY
COMMUNITY

## 2018-04-11 RX ORDER — LISINOPRIL 5 MG/1
TABLET ORAL
COMMUNITY
End: 2018-04-11 | Stop reason: SDUPTHER

## 2018-04-11 RX ORDER — DULOXETIN HYDROCHLORIDE 30 MG/1
CAPSULE, DELAYED RELEASE ORAL
Refills: 4 | COMMUNITY
Start: 2018-02-08 | End: 2018-04-11

## 2018-04-11 RX ORDER — PANTOPRAZOLE SODIUM 40 MG/1
TABLET, DELAYED RELEASE ORAL
COMMUNITY
Start: 2017-06-12 | End: 2018-04-11 | Stop reason: SDUPTHER

## 2018-04-11 RX ORDER — CARVEDILOL 25 MG/1
TABLET ORAL
COMMUNITY
Start: 2018-03-09 | End: 2018-04-11 | Stop reason: SDUPTHER

## 2018-04-11 RX ORDER — GLIPIZIDE 2.5 MG/1
TABLET, EXTENDED RELEASE ORAL
COMMUNITY
Start: 2018-02-08 | End: 2018-04-11 | Stop reason: SDUPTHER

## 2018-04-11 RX ORDER — FUROSEMIDE 20 MG/1
20 TABLET ORAL DAILY
COMMUNITY
End: 2019-03-18 | Stop reason: DRUGHIGH

## 2018-04-11 RX ORDER — PREDNISONE 5 MG/1
TABLET ORAL
Refills: 0 | COMMUNITY
Start: 2018-02-26 | End: 2018-04-11 | Stop reason: ALTCHOICE

## 2018-04-11 RX ORDER — HYDROXYCHLOROQUINE SULFATE 200 MG/1
TABLET, FILM COATED ORAL
Refills: 1 | COMMUNITY
Start: 2018-03-26 | End: 2018-04-11 | Stop reason: SDUPTHER

## 2018-04-11 RX ORDER — DULOXETIN HYDROCHLORIDE 30 MG/1
CAPSULE, DELAYED RELEASE ORAL
COMMUNITY
Start: 2017-10-16 | End: 2018-04-11 | Stop reason: SDUPTHER

## 2018-04-11 RX ORDER — DULOXETIN HYDROCHLORIDE 60 MG/1
60 CAPSULE, DELAYED RELEASE ORAL DAILY
COMMUNITY
End: 2019-12-06 | Stop reason: SDUPTHER

## 2018-04-11 RX ORDER — SIMVASTATIN 40 MG/1
TABLET, FILM COATED ORAL
COMMUNITY
Start: 2018-02-13 | End: 2018-04-11 | Stop reason: SDUPTHER

## 2018-04-11 RX ORDER — PREDNISONE 10 MG/1
10 TABLET ORAL 2 TIMES DAILY
Qty: 10 TAB | Refills: 0 | Status: SHIPPED | OUTPATIENT
Start: 2018-04-11 | End: 2018-04-16

## 2018-04-11 RX ORDER — ALPRAZOLAM 0.5 MG/1
TABLET ORAL
COMMUNITY
Start: 2018-02-12 | End: 2018-04-11 | Stop reason: SDUPTHER

## 2018-04-11 RX ORDER — DULOXETIN HYDROCHLORIDE 60 MG/1
CAPSULE, DELAYED RELEASE ORAL
Refills: 2 | COMMUNITY
Start: 2018-03-29 | End: 2018-04-11 | Stop reason: SDUPTHER

## 2018-04-11 RX ORDER — IPRATROPIUM BROMIDE AND ALBUTEROL SULFATE 2.5; .5 MG/3ML; MG/3ML
3 SOLUTION RESPIRATORY (INHALATION)
COMMUNITY
Start: 2017-08-21 | End: 2018-04-11 | Stop reason: SDUPTHER

## 2018-04-11 RX ORDER — FLUTICASONE PROPIONATE 50 MCG
SPRAY, SUSPENSION (ML) NASAL
COMMUNITY
Start: 2018-01-22 | End: 2018-04-11 | Stop reason: SDUPTHER

## 2018-04-11 RX ORDER — PROMETHAZINE HYDROCHLORIDE AND DEXTROMETHORPHAN HYDROBROMIDE 6.25; 15 MG/5ML; MG/5ML
SYRUP ORAL
COMMUNITY
Start: 2017-05-22 | End: 2018-04-11 | Stop reason: SDUPTHER

## 2018-04-11 RX ORDER — LISINOPRIL 2.5 MG/1
TABLET ORAL
Refills: 10 | COMMUNITY
Start: 2018-02-07 | End: 2018-04-11 | Stop reason: SDUPTHER

## 2018-04-11 RX ORDER — AZITHROMYCIN 250 MG/1
TABLET, FILM COATED ORAL
Qty: 6 TAB | Refills: 0 | Status: SHIPPED | OUTPATIENT
Start: 2018-04-11 | End: 2018-06-15 | Stop reason: ALTCHOICE

## 2018-04-11 RX ORDER — LANOLIN ALCOHOL/MO/W.PET/CERES
1000 CREAM (GRAM) TOPICAL
COMMUNITY
End: 2018-04-11 | Stop reason: SDUPTHER

## 2018-04-11 RX ORDER — DIAZEPAM 5 MG/1
TABLET ORAL
COMMUNITY
Start: 2016-09-16 | End: 2018-04-11 | Stop reason: SDUPTHER

## 2018-04-11 NOTE — PROGRESS NOTES
Chief Complaint   Patient presents with    Cholesterol Problem     follow up    Hypertension     follow up     Diabetes     follow up     Mammogram 03/30/207     Eye exam 10/02/2017    1. Have you been to the ER, urgent care clinic since your last visit? No   Hospitalized since your last visit? NO    2. Have you seen or consulted any other health care providers outside of the [address] [place] [address] since your last visit?   NO    Patient denies any pain at this time

## 2018-04-11 NOTE — PROGRESS NOTES
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 HPI   Follow up on chronic medical problems. C/o nasal congestion, headache and pressure in the face and cough for the past 2 weeks. Coughing up thick yellowish phlegm. No fever or chills noted. Has malaise. Throat is scratchy. Has post nasal drainage. No chest pain or SOB. Has had some wheezing noted. Started on the prednisone and Augmentin on last week that has helped but does not feel completely cleared. Cardiovascular Review:  The patient has hypertension, hyperlipidemia and Systolic HF. Diet and Lifestyle: generally follows a low fat low cholesterol diet, generally follows a low sodium diet  Home BP Monitoring: is not measured at home. Pertinent ROS: taking medications as instructed, no medication side effects noted, no TIA's, no chest pain on exertion, no dyspnea on exertion, noting that her ankles swelling has been mild. DM type II follow up:  Compliant w/ meds, diabetic diet, and exercise. Obtains home glucose monitoring averaging in the mid 100s. Checks BS daily on most days and prn. Pt does not have BS log at visit today. No Rf needed for today. Denies any tingling sensation, polyuria and polydipsia. No blurred vision. No significant weight changes. Osteoarthritis:  Patient has osteoarthritis in multiple joints but primarily in the knees and back. Has been having more aching in the muscles all over in the arms and legs. Cold water has been making her pain worse she thinks. Symptoms onset: problem is longstanding. Rheumatological ROS: stable, mild-to-moderate joint symptoms intermittently, reasonably well controlled by PRN meds. Response to treatment plan: stable and intermittent. Asthma Review:  The patient is being seen for follow up of chronic respiratory failure and allergies and chronic sinusitits, not currently in exacerbation.   Overall still feel short winded at times but this is at baseline. Using nebulizer 3 to 4 times in a day as needed. Regimen compliance: The patient reports adherence to asthma action plan and regimen. Patient Active Problem List   Diagnosis Code    CHF (congestive heart failure) ([facility]) I50.9    S/P TKR (total knee replacement) Z96.659    Anemia D64.9    s/p lumbar laminectomy Z98.890    S/P ASAD (total abdominal hysterectomy) Z90.710    S/P hemorrhoidectomy Z98.890, Z87.19    S/P rotator cuff surgery Z98.890    DM (diabetes mellitus) ([facility] [address] E11.9    Chronic sinusitis J32.9    S/P sinus surgery Z98.890    Dyslipidemia E78.5    Environmental allergies Z91.09    Obstructive sleep apnea (adult) (pediatric) G47.33    DJD (degenerative joint disease) M19.90    Chronic systolic heart failure ([facility]) I50.22    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 9) with comorbidity ([facility]) E66.01       Current Outpatient Prescriptions   Medication Sig Dispense Refill    diclofenac sodium (PENNSAID) 20 mg/gram /actuation(2 %) [facility] LOT # [id number]  EXP: 06/01/2018      hydroxychloroquine (PLAQUENIL) 200 mg tablet 200 mg two (2) times a day.  furosemide (LASIX) 20 mg tablet Take 40 mg by mouth daily.  lisinopril (PRINIVIL, ZESTRIL) 2.5 mg tablet Take 2.5 mg by mouth daily.  DULoxetine (CYMBALTA) 60 mg capsule Take 60 mg by mouth daily.  predniSONE (DELTASONE) 10 mg tablet Take 1 Tab by mouth two (2) times a day for 5 days.  10 Tab 0    azithromycin (ZITHROMAX) 250 mg tablet Take 2 tablets today, then take 1 tablet daily 6 Tab 0    amoxicillin-clavulanate (AUGMENTIN) 500-125 mg per tablet Take 1 Tab by mouth two (2) times a day for 10 days. 20 Tab 0    carvedilol (COREG) 25 mg tablet TAKE 1 TABLET BY MOUTH TWICE DAILY 180 Tab 0    simvastatin (ZOCOR) 40 mg tablet TAKE 1 TABLET BY MOUTH ONCE EVERY NIGHT AT BEDTIME 90 Tab 0    ALPRAZolam (XANAX) 0.5 mg tablet TAKE 1/2 TO 1 TABLET BY MOUTH EVERY NIGHT AT BEDTIME AS NEEDED FOR SLEEP 30 Tab 3    glipiZIDE SR (GLUCOTROL XL) 2.5 mg CR tablet TAKE ONE TABLET BY MOUTH DAILY 90 Tab 3    fluticasone (FLONASE) 50 mcg/actuation nasal spray SHAKE LIQUID AND USE 2 SPRAYS IN EACH NOSTRIL EVERY DAY 1 Bottle 11    VIOS machine USE AS DIRECTED 1 Each 0    cyanocobalamin 1,000 mcg tablet Take 1,000 mcg by mouth daily.  albuterol (VENTOLIN HFA) 90 mcg/actuation inhaler INHALE 2 PUFFS BY MOUTH EVERY 4 HOURS AS NEEDED FOR WHEEZING. 1 Inhaler 11    albuterol-ipratropium (DUO-NEB) 2.5 mg-0.5 mg/3 ml nebu 3 mL by Nebulization route every four (4) hours as needed. (up to 6 per day) *90 day supply DX: asthma 540 Nebule 3    pantoprazole (PROTONIX) 40 mg tablet TAKE 1 TABLET BY MOUTH ONCE DAILY 90 Tab 3    promethazine-dextromethorphan (PROMETHAZINE-DM) 6.25-15 mg/5 mL syrup TAKE ONE TEASPOONFUL BY MOUTH EVERY SIX HOURS AS NEEDED FOR COUGH 240 mL 1    HYDROcodone-acetaminophen (NORCO)  mg tablet Take 1 Tab by mouth every six (6) hours as needed. 60 Tab 0    diazepam (VALIUM) 5 mg tablet TAKE ONE TABLET EVERY 6 HOURS AS NEEDED FOR ANXIETY 60 Tab 3    tiotropium-olodaterol 2.5-2.5 mcg/actuation mist Take 2 Puffs by inhalation daily.  PSEUDOEPHEDRINE-guaiFENesin (MUCINEX D)  mg per tablet Take 1 Tab by mouth daily as needed.       albuterol (PROVENTIL VENTOLIN) 2.5 mg /3 mL (0.083 %) nebulizer solution INHALE THE CONTENTS OF ONE VIAL VIA NEBULIZER EVERY 4 HOURS AS NEEDED FOR WHEEZING 1650 Each 3       Allergies   Allergen Reactions    Gabapentin Other (comments)     Muscles twitching and jerking    Levaquin [Levofloxacin] Swelling    Lyrica [Pregabalin] Other (comments)     Muscle twitching and jerking    Percodan [Oxycodone Hcl-Oxycodone-Asa] Itching       Past Medical History:   Diagnosis Date    Anemia 3/3/2010    Arrhythmia     irregular heartbeat    Arthritis     CHF (congestive heart failure) ([facility] [address] 3/3/2010    Chronic pain     back    Chronic sinusitis 3/3/2010    CPAP (continuous positive airway pressure) dependence     Diverticulosis     DM (diabetes mellitus) ([facility] [address] 3/3/2010    Dyslipidemia 3/3/2010    GERD (gastroesophageal reflux disease)     HTN (hypertension) 3/3/2010    Ill-defined condition     being evaluated py pulmonolgist for \"trouble breathing\"    S/P TKR (total knee replacement) 3/3/2010    Unspecified sleep apnea     doesn't wear CPAP since back has been hurting       Past Surgical History:   Procedure Laterality Date    HX APPENDECTOMY      thinks it was taken with hysterectomy    HX GYN      \"tubes opened\"    HX HEENT      sinus surgery    HX HEMORRHOIDECTOMY      HX HYSTERECTOMY      HX KNEE REPLACEMENT  1996    both knees- at same time    HX LUMBAR LAMINECTOMY  1980s    HX MOHS PROCEDURES      left shoulder    HX ORTHOPAEDIC  1980    neck fusion    HX ORTHOPAEDIC  1999    right knee replaced for second time    HX ORTHOPAEDIC      lumbar fusion    HX OTHER SURGICAL      CORNELL BIOPSY BREAST STEREOTACTIC Left yrs ago    (-)    MA COLONOSCOPY FLX DX W/COLLJ SPEC WHEN PFRMD  [id number]    dr. Yasir Wu (barium enema)       Family History   Problem Relation Age of Onset    Diabetes Mother     Heart Disease Father     Diabetes Brother     Blindness Brother     Diabetes Sister     Heart Disease Sister     Heart Disease Brother     Heart Disease Brother     Asthma Brother     Malignant Hyperthermia Neg Hx     Pseudocholinesterase Deficiency Neg Hx     Delayed Awakening Neg Hx     Post-op Nausea/Vomiting Neg Hx     Emergence Delirium Neg Hx     Post-op Cognitive Dysfunction Neg Hx        Social History   Substance Use Topics    Smoking status: Former Smoker     Packs/day: 0.30     Years: 5.00     Quit date: 1/1/1960    Smokeless tobacco: Never Used    Alcohol use No     Lab Results  Component Value Date/Time   WBC 5.2 11/14/2017 12:39 PM   HGB 11.0 (L) 11/14/2017 12:39 PM   HCT 33.9 (L) 11/14/2017 12:39 PM   PLATELET 375 67/22/3718 12:39 PM   MCV 96 11/14/2017 12:39 PM     Lab Results  Component Value Date/Time   Cholesterol, total 175 01/08/2018 10:54 AM   HDL Cholesterol 71 01/08/2018 10:54 AM   LDL, calculated 81 01/08/2018 10:54 AM   Triglyceride 117 01/08/2018 10:54 AM   CHOL/HDL Ratio 2.0 06/11/2010 02:10 PM     Lab Results   Component Value Date/Time    Sodium 146 (H) 01/23/2018 08:30 AM    Potassium 4.4 01/08/2018 10:54 AM    Chloride 100 01/08/2018 10:54 AM    CO2 28 01/08/2018 10:54 AM    Anion gap 3 (L) 12/17/2015 11:47 AM    Glucose 117 (H) 01/08/2018 10:54 AM    BUN 21 01/08/2018 10:54 AM    Creatinine 1.68 (H) 01/08/2018 10:54 AM    BUN/Creatinine ratio 13 01/08/2018 10:54 AM    GFR est AA 31 (L) 01/08/2018 10:54 AM    GFR est non-AA 27 (L) 01/08/2018 10:54 AM    Calcium 9.9 01/08/2018 10:54 AM    Bilirubin, total 0.3 01/08/2018 10:54 AM    ALT (SGPT) 11 01/08/2018 10:54 AM    AST (SGOT) 17 01/08/2018 10:54 AM    Alk. phosphatase 62 01/08/2018 10:54 AM    Protein, total 6.6 01/08/2018 10:54 AM    Albumin 4.2 01/08/2018 10:54 AM    Globulin 3.4 12/17/2015 11:47 AM    A-G Ratio 1.8 01/08/2018 10:54 AM      Lab Results   Component Value Date/Time    Hemoglobin A1c 6.4 (H) 09/08/2017 11:09 AM    Hemoglobin A1c (POC) 6.5 04/11/2018 03:38 PM           Review of Systems   Eyes: Negative for blurred vision. Respiratory: Negative for cough. Cardiovascular: Negative for chest pain, palpitations. Gastrointestinal: Negative for abdominal pain, constipation and heartburn.    Genitourinary: Negative for dysuria, frequency and urgency. Neurological: Negative for dizziness and headaches. Endo/Heme/Allergies: Negative for environmental allergies. Psychiatric/Behavioral: Negative for depression. The patient does not have insomnia. Physical Exam   Constitutional: She appears well-developed and well-nourished. /52 (BP 1 Location: Left arm, BP Patient Position: Sitting)  Pulse 62  Temp 98 °F (36.7 °C) (Oral)   Resp 20  Ht 5' 4\" (1.626 m)  Wt 206 lb (93.4 kg)  LMP 03/04/1961 (Approximate)  SpO2 99%   L/min  BMI 35.36 kg/m2    HENT:   Right Ear: Tympanic membrane and ear canal normal.   Left Ear: Tympanic membrane and ear canal normal.   Nose: has some mucosal edema or rhinorrhea. No sinus tenderness noted. Mouth/Throat: Oropharynx is clear and moist and mucous membranes are normal.   Neck: Normal range of motion. Neck supple. No thyromegaly present. Cardiovascular: Normal rate and regular rhythm. No murmur heard. Pulmonary/Chest: Effort normal and has exp wheezes noted. Abdominal: Soft. Bowel sounds are normal. There is no tenderness. Musculoskeletal: Normal range of motion. She exhibits mild edema. Lymphadenopathy:     She has no cervical adenopathy. Skin: Skin is warm and dry. Psychiatric: She has a normal mood and affect. Nursing note and vitals reviewed. ASSESSMENT and PLAN  Diagnoses and all orders for this visit:    1. Essential hypertension, benign  Stable at goal     2. Type 2 diabetes mellitus with diabetic neuropathy, without long-term current use of insulin ([facility])  -     AMB POC COMPLETE CBC, AUTOMATED  -     AMB POC GLUCOSE, QUANTITATIVE, BLOOD  -     AMB POC HEMOGLOBIN A1C      3. Dyslipidemia  Continue to monitor. Work on diet and exercise. 4. Mild intermittent asthma without complication  -     predniSONE (DELTASONE) 10 mg tablet; Take 1 Tab by mouth two (2) times a day for 5 days.     5. Acute maxillary sinusitis, recurrence not specified  - azithromycin (ZITHROMAX) 250 mg tablet; Take 2 tablets today, then take 1 tablet daily    6. Malaise  -     AMB POC MARIAM INFLUENZA A/B TEST  -  Negative     7. Chronic systolic heart failure (HCC)  Stable     8. Obstructive sleep apnea (adult) (pediatric)  Stable on CPAP    9. Arthralgia of multiple joints  As per rheumatolgist    10. Stage 3 chronic kidney disease  As per renal    11. Encounter for medication monitoring  -     METABOLIC PANEL, COMPREHENSIVE      Follow-up Disposition:  Return in about 2 days (around 4/13/2018). reviewed diet, exercise and weight control  cardiovascular risk and specific lipid/LDL goals reviewed  reviewed medications and side effects in detail  specific diabetic recommendations: low cholesterol diet, weight control and daily exercise discussed and glycohemoglobin and other lab monitoring discussed     I have discussed diagnosis listed in this note with pt and/or family. I have discussed treatment plans and options and the risk/benefit analysis of those options, including safe use of medications and possible medication side effects. Through the use of shared decision making we have agreed to the above plan. The patient has received an after-visit summary and questions were answered concerning future plans and follow up. Advise pt of any urgent changes then to proceed to the ER.

## 2018-04-11 NOTE — MR AVS SNAPSHOT
[address] [address] 
[phone] Patient: Cristal Gupta MRN: [id number] VWD:2/8/[birth year]12 Visit Information Date & Time Provider Department Dept. Phone Encounter #  
 4/11/2018  2:45 PM Bharat Wheat Jarret [phone] [id number] Follow-up Instructions Return in about 2 days (around 4/13/2018). Your Appointments 4/20/2018 11:00 AM  
ESTABLISHED PATIENT with Caridad Alan MD  
[facility]) Appt Note: smith'd 6mnt fu w/Dr WOOD 1/11/18 bg; per patient r/s because  was moved to this date per Dr. Howell Marmora 3/23/18 ka w  
 2 [address]  
[phone] [address] [place] [address] Upcoming Health Maintenance Date Due  
 FOOT EXAM Q1 7/6/2018 HEMOGLOBIN A1C Q6M 7/8/2018 EYE EXAM RETINAL OR DILATED Q1 10/2/2018 MEDICARE YEARLY EXAM 10/24/2018 MICROALBUMIN Q1 1/8/2019 LIPID PANEL Q1 1/8/2019 GLAUCOMA SCREENING Q2Y 10/2/2019 DTaP/Tdap/Td series (2 - Td) 9/14/2022 Allergies as of 4/11/2018  Review Complete On: 4/11/2018 By: Alexandra Ford MD  
  
 Severity Noted Reaction Type Reactions Gabapentin  10/12/2016    Other (comments) Muscles twitching and jerking Levaquin [Levofloxacin]  03/09/2012    Swelling Lyrica [Pregabalin]  10/31/2017    Other (comments) Muscle twitching and jerking Percodan [Oxycodone Hcl-oxycodone-asa]  03/09/2012    Itching Current Immunizations  Reviewed on 4/11/2018 Name Date Influenza High Dose Vaccine PF 9/8/2017, 9/16/2016, 8/25/2015 Influenza Vaccine  Deferred (Patient Refused), 9/30/2014, 9/20/2013 Influenza Vaccine Split 8/31/2012, 9/13/2010 Pneumococcal Conjugate (PCV-13) 8/25/2015 Pneumococcal Vaccine (Pcv) 10/30/2009 TDAP Vaccine 9/14/2012 Zoster Vaccine, Live 3/19/2013 Reviewed by Nataliia Good MD on 4/11/2018 at  3:17 PM  
 Reviewed by Nataliia Good MD on 4/11/2018 at  3:28 PM  
You Were Diagnosed With   
  
 Codes Comments Type 2 diabetes mellitus with diabetic neuropathy, without long-term current use of insulin (HCC)    -  Primary ICD-10-CM: E11.40 ICD-9-CM: 250.60, 357.2 Chronic systolic heart failure (HCC)     ICD-10-CM: I50.22 ICD-9-CM: 428.22 Type 2 diabetes with nephropathy (HCC)     ICD-10-CM: E11.21 
ICD-9-CM: 250.40, 583.81 Obstructive sleep apnea (adult) (pediatric)     ICD-10-CM: G47.33 
ICD-9-CM: 327.23 Essential hypertension, benign     ICD-10-CM: I10 
ICD-9-CM: 401.1 Mild intermittent asthma without complication     QKK-28-IC: J45.20 ICD-9-CM: 493.90 Arthralgia of multiple joints     ICD-10-CM: M25.50 ICD-9-CM: 719.49 Stage 3 chronic kidney disease     ICD-10-CM: N18.3 ICD-9-CM: 248. 3 Dyslipidemia     ICD-10-CM: E78.5 ICD-9-CM: 272.4 Upper respiratory tract infection, unspecified type     ICD-10-CM: J06.9 ICD-9-CM: 465.9 Vitals BP Pulse Temp Resp Height(growth percentile) Weight(growth percentile) 105/52 (BP 1 Location: Left arm, BP Patient Position: Sitting) 62 98 °F (36.7 °C) (Oral) 20 5' 4\" (1.626 m) 206 lb (93.4 kg) LMP SpO2 PF BMI OB Status Smoking Status 03/04/1961 (Approximate) 99% 250 L/min 35.36 kg/m2 Hysterectomy Former Smoker BMI and BSA Data Body Mass Index Body Surface Area  
 35.36 kg/m 2 2.05 m 2 Preferred Pharmacy Pharmacy Name Phone [address] [facility] [phone] Your Updated Medication List  
  
   
This list is accurate as of 4/11/18  4:14 PM.  Always use your most recent med list.  
  
  
  
  
 * albuterol 2.5 mg /3 mL (0.083 %) nebulizer solution Commonly known as:  PROVENTIL VENTOLIN  
 INHALE THE CONTENTS OF ONE VIAL VIA NEBULIZER EVERY 4 HOURS AS NEEDED FOR WHEEZING  
  
 * albuterol 90 mcg/actuation inhaler Commonly known as:  VENTOLIN HFA INHALE 2 PUFFS BY MOUTH EVERY 4 HOURS AS NEEDED FOR WHEEZING. albuterol-ipratropium 2.5 mg-0.5 mg/3 ml Nebu Commonly known as:  DUO-NEB  
3 mL by Nebulization route every four (4) hours as needed. (up to 6 per day) *90 day supply DX: asthma ALPRAZolam 0.5 mg tablet Commonly known as:  XANAX  
TAKE 1/2 TO 1 TABLET BY MOUTH EVERY NIGHT AT BEDTIME AS NEEDED FOR SLEEP  
  
 amoxicillin-clavulanate 500-125 mg per tablet Commonly known as:  AUGMENTIN Take 1 Tab by mouth two (2) times a day for 10 days. azithromycin 250 mg tablet Commonly known as:  Kristal Sanders Take 2 tablets today, then take 1 tablet daily  
  
 carvedilol 25 mg tablet Commonly known as:  COREG  
TAKE 1 TABLET BY MOUTH TWICE DAILY  
  
 cyanocobalamin 1,000 mcg tablet Take 1,000 mcg by mouth daily. diazePAM 5 mg tablet Commonly known as:  VALIUM  
TAKE ONE TABLET EVERY 6 HOURS AS NEEDED FOR ANXIETY DULoxetine 60 mg capsule Commonly known as:  CYMBALTA Take 60 mg by mouth daily. fluticasone 50 mcg/actuation nasal spray Commonly known as:  [employer] SHAKE LIQUID AND USE 2 SPRAYS IN EACH NOSTRIL EVERY DAY  
  
 glipiZIDE SR 2.5 mg CR tablet Commonly known as:  GLUCOTROL XL  
TAKE ONE TABLET BY MOUTH DAILY HYDROcodone-acetaminophen  mg tablet Commonly known as:  Herman James Take 1 Tab by mouth every six (6) hours as needed. hydroxychloroquine 200 mg tablet Commonly known as:  PLAQUENIL  
200 mg two (2) times a day. LASIX 20 mg tablet Generic drug:  furosemide Take 40 mg by mouth daily. lisinopril 2.5 mg tablet Commonly known as:  Kenzie Madison Take 2.5 mg by mouth daily. MUCINEX D  mg per tablet Generic drug:  PSEUDOEPHEDRINE-guaiFENesin Take 1 Tab by mouth daily as needed. pantoprazole 40 mg tablet Commonly known as:  PROTONIX  
TAKE 1 TABLET BY MOUTH ONCE DAILY PENNSAID 20 mg/gram /actuation(2 %) Sopm  
Generic drug:  diclofenac sodium LOT # [id number] EXP: 06/01/2018  
  
 predniSONE 10 mg tablet Commonly known as:  Lannis Maddison Take 1 Tab by mouth two (2) times a day for 5 days. promethazine-dextromethorphan 6.25-15 mg/5 mL syrup Commonly known as:  PROMETHAZINE-DM  
TAKE ONE TEASPOONFUL BY MOUTH EVERY SIX HOURS AS NEEDED FOR COUGH  
  
 simvastatin 40 mg tablet Commonly known as:  ZOCOR  
TAKE 1 TABLET BY MOUTH ONCE EVERY NIGHT AT BEDTIME  
  
 tiotropium-olodaterol 2.5-2.5 mcg/actuation Mist  
Take 2 Puffs by inhalation daily. Vios machine Generic drug:  Nebulizer & Compressor USE AS DIRECTED * Notice: This list has 2 medication(s) that are the same as other medications prescribed for you. Read the directions carefully, and ask your doctor or other care provider to review them with you. Prescriptions Sent to Pharmacy Refills  
 predniSONE (DELTASONE) 10 mg tablet 0 Sig: Take 1 Tab by mouth two (2) times a day for 5 days. Class: Normal  
 Pharmacy: [facility] [address] AT [address] [place] [address] Ph #: [phone] Route: Oral  
 azithromycin (ZITHROMAX) 250 mg tablet 0 Sig: Take 2 tablets today, then take 1 tablet daily Class: Normal  
 Pharmacy: [facility] [employer] [address], [address] [place] [address] RD AT [address] [place] [address] Ph #: [phone] We Performed the Following AMB POC COMPLETE CBC, AUTOMATED [19889 CPT(R)] AMB POC GLUCOSE, QUANTITATIVE, BLOOD [99429 CPT(R)] AMB POC HEMOGLOBIN A1C [33938 CPT(R)] AMB POC MARIAM INFLUENZA A/B TEST [39800 CPT(R)] METABOLIC PANEL, COMPREHENSIVE [89150 CPT(R)] Follow-up Instructions Return in about 2 days (around 4/13/2018). Introducing [facility] & HEALTH SERVICES! Dear Azra Easley: Thank you for requesting a [employer] account. Our records indicate that you have previously registered for a [employer] account but its currently inactive. Please call our [employer] support line at [phone]. Additional Information If you have questions, please visit the Frequently Asked Questions section of the [employer] website at [email or web address] Remember, Al[employer] is NOT to be used for urgent needs. For medical emergencies, dial 911. Now available from your iPhone and Android! Please provide this summary of care documentation to your next provider. Your primary care clinician is listed as Sarina Rivas. If you have any questions after today's visit, please call [phone].

## 2018-04-12 LAB
ALBUMIN SERPL-MCNC: 4.3 G/DL (ref 3.5–4.7)
ALBUMIN/GLOB SERPL: 1.7 {RATIO} (ref 1.2–2.2)
ALP SERPL-CCNC: 64 IU/L (ref 39–117)
ALT SERPL-CCNC: 19 IU/L (ref 0–32)
AST SERPL-CCNC: 21 IU/L (ref 0–40)
BILIRUB SERPL-MCNC: 0.3 MG/DL (ref 0–1.2)
BUN SERPL-MCNC: 36 MG/DL (ref 8–27)
BUN/CREAT SERPL: 17 (ref 12–28)
CALCIUM SERPL-MCNC: 9.7 MG/DL (ref 8.7–10.3)
CHLORIDE SERPL-SCNC: 101 MMOL/L (ref 96–106)
CO2 SERPL-SCNC: 27 MMOL/L (ref 18–29)
CREAT SERPL-MCNC: 2.08 MG/DL (ref 0.57–1)
GFR SERPLBLD CREATININE-BSD FMLA CKD-EPI: 21 ML/MIN/1.73
GFR SERPLBLD CREATININE-BSD FMLA CKD-EPI: 24 ML/MIN/1.73
GLOBULIN SER CALC-MCNC: 2.5 G/DL (ref 1.5–4.5)
GLUCOSE SERPL-MCNC: 113 MG/DL (ref 65–99)
INTERPRETATION: NORMAL
Lab: NORMAL
POTASSIUM SERPL-SCNC: 4.9 MMOL/L (ref 3.5–5.2)
PROT SERPL-MCNC: 6.8 G/DL (ref 6–8.5)
SODIUM SERPL-SCNC: 145 MMOL/L (ref 134–144)

## 2018-04-13 ENCOUNTER — OFFICE VISIT (OUTPATIENT)
Dept: FAMILY MEDICINE CLINIC | Age: 83
End: 2018-04-13

## 2018-04-13 VITALS
BODY MASS INDEX: 35.55 KG/M2 | WEIGHT: 208.2 LBS | OXYGEN SATURATION: 98 % | SYSTOLIC BLOOD PRESSURE: 132 MMHG | HEIGHT: 64 IN | DIASTOLIC BLOOD PRESSURE: 73 MMHG | RESPIRATION RATE: 20 BRPM | TEMPERATURE: 97.8 F | HEART RATE: 67 BPM

## 2018-04-13 DIAGNOSIS — J01.40 ACUTE NON-RECURRENT PANSINUSITIS: ICD-10-CM

## 2018-04-13 DIAGNOSIS — Z12.31 ENCOUNTER FOR SCREENING MAMMOGRAM FOR BREAST CANCER: ICD-10-CM

## 2018-04-13 DIAGNOSIS — J20.9 ACUTE BRONCHITIS, UNSPECIFIED ORGANISM: Primary | ICD-10-CM

## 2018-04-13 DIAGNOSIS — R49.0 HOARSENESS OF VOICE: ICD-10-CM

## 2018-04-13 NOTE — PROGRESS NOTES
Chief Complaint   Patient presents with    Cough     follow up    Cold Symptoms     follow up     Mammogram 03/30/2017     Eye exam 10/02/2017      1. Have you been to the ER, urgent care clinic since your last visit? NO   Hospitalized since your last visit? NO    2. Have you seen or consulted any other health care providers outside of the [facility] since your last visit? NO    Patient denies any pain at this time.

## 2018-04-13 NOTE — MR AVS SNAPSHOT
Nura Mckeon 
 
 
 [address] [facility] [address] [address] 
[phone] Patient: Chapin Russo MRN: [id number] WQR:6/0/4515 Visit Information Date & Time Provider Department Dept. Phone Encounter #  
 4/13/2018 11:45 AM Karrie Agee MD [facility] [phone] [id number] Your Appointments 4/20/2018 11:00 AM  
ESTABLISHED PATIENT with Roopa Olguin MD  
[facility] [address] [place] [address]) Appt Note: smith'd 6mnt fu w/Dr WOOD 1/11/18 bg; per patient r/s because  was moved to this date per Dr. Garland Hathaway 3/23/18 ka w  
 [address]  
[phone] [address] Upcoming Health Maintenance Date Due  
 FOOT EXAM Q1 7/6/2018 EYE EXAM RETINAL OR DILATED Q1 10/2/2018 HEMOGLOBIN A1C Q6M 10/11/2018 MEDICARE YEARLY EXAM 10/24/2018 MICROALBUMIN Q1 1/8/2019 LIPID PANEL Q1 1/8/2019 GLAUCOMA SCREENING Q2Y 10/2/2019 DTaP/Tdap/Td series (2 - Td) 9/14/2022 Allergies as of 4/13/2018  Review Complete On: 4/13/2018 By: Gerri Bingham LPN Severity Noted Reaction Type Reactions Gabapentin  10/12/2016    Other (comments) Muscles twitching and jerking Levaquin [Levofloxacin]  03/09/2012    Swelling Lyrica [Pregabalin]  10/31/2017    Other (comments) Muscle twitching and jerking Percodan [Oxycodone Hcl-oxycodone-asa]  03/09/2012    Itching Current Immunizations  Reviewed on 4/11/2018 Name Date Influenza High Dose Vaccine PF 9/8/2017, 9/16/2016, 8/25/2015 Influenza Vaccine  Deferred (Patient Refused), 9/30/2014, 9/20/2013 Influenza Vaccine Split 8/31/2012, 9/13/2010 Pneumococcal Conjugate (PCV-13) 8/25/2015 Pneumococcal Vaccine (Pcv) 10/30/2009 TDAP Vaccine 9/14/2012 Zoster Vaccine, Live 3/19/2013 Not reviewed this visit Vitals BP Pulse Temp Resp Height(growth percentile) Weight(growth percentile) 132/73 (BP 1 Location: Left arm, BP Patient Position: Sitting) 67 97.8 °F (36.6 °C) (Oral) 20 5' 4\" (1.626 m) 208 lb 3.2 oz (94.4 kg) LMP SpO2 PF BMI OB Status Smoking Status 03[birth year]04/1961 (Approximate) 98% 275 L/min 35.74 kg/m2 Hysterectomy Former Smoker BMI and BSA Data Body Mass Index Body Surface Area 35.74 kg/m 2 2.06 m 2 Preferred Pharmacy Pharmacy Name Phone [facility] [address], [address] [facility] [phone] Your Updated Medication List  
  
   
This list is accurate as of 4/13/18 12:46 PM.  Always use your most recent med list.  
  
  
  
  
 * albuterol 2.5 mg /3 mL (0.083 %) nebulizer solution Commonly known as:  PROVENTIL VENTOLIN  
INHALE THE CONTENTS OF ONE VIAL VIA NEBULIZER EVERY 4 HOURS AS NEEDED FOR WHEEZING  
  
 * albuterol 90 mcg/actuation inhaler Commonly known as:  VENTOLIN HFA INHALE 2 PUFFS BY MOUTH EVERY 4 HOURS AS NEEDED FOR WHEEZING. albuterol-ipratropium 2.5 mg-0.5 mg/3 ml Nebu Commonly known as:  DUO-NEB  
3 mL by Nebulization route every four (4) hours as needed. (up to 6 per day) *90 day supply DX: asthma ALPRAZolam 0.5 mg tablet Commonly known as:  XANAX  
TAKE 1/2 TO 1 TABLET BY MOUTH EVERY NIGHT AT BEDTIME AS NEEDED FOR SLEEP  
  
 azithromycin 250 mg tablet Commonly known as:  Carri Gores Take 2 tablets today, then take 1 tablet daily  
  
 carvedilol 25 mg tablet Commonly known as:  COREG  
TAKE 1 TABLET BY MOUTH TWICE DAILY  
  
 cyanocobalamin 1,000 mcg tablet Take 1,000 mcg by mouth daily. diazePAM 5 mg tablet Commonly known as:  VALIUM  
TAKE ONE TABLET EVERY 6 HOURS AS NEEDED FOR ANXIETY DULoxetine 60 mg capsule Commonly known as:  CYMBALTA Take 60 mg by mouth daily. fluticasone 50 mcg/actuation nasal spray Commonly known as:  Cyndra [employer] SHAKE LIQUID AND USE 2 SPRAYS IN EACH NOSTRIL EVERY DAY  
  
 glipiZIDE SR 2.5 mg CR tablet Commonly known as:  GLUCOTROL XL  
TAKE ONE TABLET BY MOUTH DAILY HYDROcodone-acetaminophen  mg tablet Commonly known as:  Lui Ayan Take 1 Tab by mouth every six (6) hours as needed. hydroxychloroquine 200 mg tablet Commonly known as:  PLAQUENIL  
200 mg two (2) times a day. LASIX 20 mg tablet Generic drug:  furosemide Take 40 mg by mouth daily. lisinopril 2.5 mg tablet Commonly known as:  Erika B[place]ord Pool Take 2.5 mg by mouth daily. MUCINEX D  mg per tablet Generic drug:  PSEUDOEPHEDRINE-guaiFENesin Take 1 Tab by mouth daily as needed. pantoprazole 40 mg tablet Commonly known as:  PROTONIX  
TAKE 1 TABLET BY MOUTH ONCE DAILY PENNSAID 20 mg/gram /actuation(2 %) Sopm  
Generic drug:  diclofenac sodium LOT # [id number] EXP: 06/01/2018  
  
 predniSONE 10 mg tablet Commonly known as:  [employer] Take 1 Tab by mouth two (2) times a day for 5 days. promethazine-dextromethorphan 6.25-15 mg/5 mL syrup Commonly known as:  PROMETHAZINE-DM  
TAKE ONE TEASPOONFUL BY MOUTH EVERY SIX HOURS AS NEEDED FOR COUGH  
  
 simvastatin 40 mg tablet Commonly known as:  ZOCOR  
TAKE 1 TABLET BY MOUTH ONCE EVERY NIGHT AT BEDTIME  
  
 tiotropium-olodaterol 2.5-2.5 mcg/actuation Mist  
Take 2 Puffs by inhalation daily. Vios machine Generic drug:  Nebulizer & Compressor USE AS DIRECTED * Notice: This list has 2 medication(s) that are the same as other medications prescribed for you. Read the directions carefully, and ask your doctor or other care provider to review them with you. Introducing [facility] & HE[facility] SERVICES! Dear Guanaco Shepherd: 
Thank you for requesting a [employer] account. Our records indicate that you have previously registered for a [employer] account but its currently inactive. Please call our [employer] support line at [phone]. Additional Information If you have questions, please visit the Frequently Asked Questions section of the [email or web address] website at [email or web address] Remember, [email or web address] is NOT to be used for urgent needs. For medical emergencies, dial 911. Now available from your iPhone and Android! Please provide this summary of care documentation to your next provider. Your primary care clinician is listed as Clara Neil. If you have any questions after today's visit, please call [phone].

## 2018-04-13 NOTE — PROGRESS NOTES
HISTORY OF PRESENT ILLNESS  Ronald Harris is a 80 y.o. female.   HPI  {Choose one or more HPI Chronic Disease Notes, press DELETE if none desired:30229}  {Choose one or more SmartLinks; press DELETE if none desired:[id number]}   {Choose one or more Last Lab values; press DELETE if none desired:[id number]}     ROS    Physical Exam    ASSESSMENT and PLAN  {ASSESSMENT/PLAN:73083}

## 2018-04-13 NOTE — PROGRESS NOTES
2 day follow up on cough and congestion. Overall feeling better. Congestion is less. Wheezing is resolved. Lungs sound much clearer. Continue with current treatment. Still has hoarseness of her voice.   Refer to ENT

## 2018-04-20 ENCOUNTER — OFFICE VISIT (OUTPATIENT)
Dept: CARDIOLOGY CLINIC | Age: 83
End: 2018-04-20

## 2018-04-20 VITALS
HEART RATE: 72 BPM | HEIGHT: 64 IN | OXYGEN SATURATION: 98 % | DIASTOLIC BLOOD PRESSURE: 74 MMHG | SYSTOLIC BLOOD PRESSURE: 126 MMHG | BODY MASS INDEX: 35.58 KG/M2 | RESPIRATION RATE: 16 BRPM | WEIGHT: 208.4 LBS

## 2018-04-20 DIAGNOSIS — I50.22 CHRONIC SYSTOLIC HEART FAILURE (HCC): Primary | ICD-10-CM

## 2018-04-20 DIAGNOSIS — E78.5 DYSLIPIDEMIA: Chronic | ICD-10-CM

## 2018-04-20 DIAGNOSIS — N18.30 STAGE 3 CHRONIC KIDNEY DISEASE (HCC): ICD-10-CM

## 2018-04-20 DIAGNOSIS — E11.40 TYPE 2 DIABETES MELLITUS WITH DIABETIC NEUROPATHY, WITHOUT LONG-TERM CURRENT USE OF INSULIN (HCC): Chronic | ICD-10-CM

## 2018-04-20 NOTE — PROGRESS NOTES
Chief Complaint   Patient presents with    Cholesterol Problem     6 Month F/U     1. Have you been to the ER, urgent care clinic since your last visit? Hospitalized since your last visit? No    2. Have you seen or consulted any other health care providers outside of the [address] [place] [address] since your last visit? Include any pap smears or colon screening. Yes Pt seen her PCP.

## 2018-04-20 NOTE — MR AVS SNAPSHOT
Brenda Sabillon [address] [facility] 
[phone] Patient: Trenton Zepeda MRN:  [id number] Visit Information Date & Time Provider Department Dept. Phone Encounter #  
 4/20/2018  1:30 PM Dang Plaza, [address] [facility] [id number] [phone] Your Appointments 6/12/2018  2:45 PM  
ROUTINE CARE with Clara Neil MD  
[facility] Appt Note: routine follow up  
 [address] [facility] [place] [id number]  
[phone] [address] [facility] [address] 4/17/2019 11:15 AM  
ESTABLISHED PATIENT with Dang Plaza MD  
[facility]) Appt Note: annual f/u  
 [address] [facility]  
[phone] [address] [facility] Upcoming Health Maintenance Date Due  
 FOOT EXAM Q1 7/6/2018 EYE EXAM RETINAL OR DILATED Q1 10/2/2018 HEMOGLOBIN A1C Q6M 10/11/2018 MEDICARE YEARLY EXAM 10/24/2018 MICROALBUMIN Q1 1/8/2019 LIPID PANEL Q1 1/8/2019 GLAUCOMA SCREENING Q2Y 10/2/2019 DTaP/Tdap/Td series (2 - Td) 9/14/2022 Allergies as of 4/20/2018  Review Complete On: 4/20/2018 By: Elias Mejia NP Severity Noted Reaction Type Reactions Gabapentin  10/12/2016    Other (comments) Muscles twitching and jerking Levaquin [Levofloxacin]  03/09/2012    Swelling Lyrica [Pregabalin]  10/31/2017    Other (comments) Muscle twitching and jerking Percodan [Oxycodone Hcl-oxycodone-asa]  03/09/2012    Itching Current Immunizations  Reviewed on 4/11/2018 Name Date Influenza High Dose Vaccine PF 9/8/2017, 9/16/2016, 8/25/2015 Influenza Vaccine  Deferred (Patient Refused), 9/30/2014, 9/20/2013 Influenza Vaccine Split 8/31/2012, 9/13/2010 Pneumococcal Conjugate (PCV-13) 8/25/2015 Pneumococcal Vaccine (Pcv) 10/30/2009 TDAP Vaccine 9/14/2012 Zoster Vaccine, Live 3/19/2013 Not reviewed this visit You Were Diagnosed With   
  
 Codes Comments Chronic systolic heart failure (HCC)    -  Primary ICD-10-CM: P51.42 ICD-9-CM: 428.22 Dyslipidemia     ICD-10-CM: E78.5 ICD-9-CM: 272.4 Type 2 diabetes mellitus with diabetic neuropathy, without long-term current use of insulin (HCC)     ICD-10-CM: E11.40 ICD-9-CM: 250.60, 357.2 Stage 3 chronic kidney disease     ICD-10-CM: N18.3 ICD-9-CM: 817. 3 Vitals BP Pulse Resp Height(growth percentile) Weight(growth percentile) LMP  
 126/74 (BP 1 Location: Right arm, BP Patient Position: Sitting) 72 16 5' 4\" (1.626 m) 208 lb 6.4 oz (94.5 kg) 03/04/1961 (Approximate) SpO2 BMI OB Status Smoking Status 98% 35.77 kg/m2 Hysterectomy Former Smoker Vitals History BMI and BSA Data Body Mass Index Body Surface Area 35.77 kg/m 2 2.07 m 2 Preferred Pharmacy Pharmacy Name Phone [address] [facility] [phone] Your Updated Medication List  
  
   
This list is accurate as of 4/20/18  2:19 PM.  Always use your most recent med list.  
  
  
  
  
 * albuterol 2.5 mg /3 mL (0.083 %) nebulizer solution Commonly known as:  PROVENTIL VENTOLIN  
INHALE THE CONTENTS OF ONE VIAL VIA NEBULIZER EVERY 4 HOURS AS NEEDED FOR WHEEZING  
  
 * albuterol 90 mcg/actuation inhaler Commonly known as:  VENTOLIN HFA INHALE 2 PUFFS BY MOUTH EVERY 4 HOURS AS NEEDED FOR WHEEZING. albuterol-ipratropium 2.5 mg-0.5 mg/3 ml Nebu Commonly known as:  DUO-NEB  
3 mL by Nebulization route every four (4) hours as needed. (up to 6 per day) *90 day supply DX: asthma ALPRAZolam 0.5 mg tablet Commonly known as:  XANAX  
TAKE 1/2 TO 1 TABLET BY MOUTH EVERY NIGHT AT BEDTIME AS NEEDED FOR SLEEP  
  
 azithromycin 250 mg tablet Commonly known as:  Geremias Hire Take 2 tablets today, then take 1 tablet daily  
  
 carvedilol 25 mg tablet Commonly known as:  COREG  
TAKE 1 TABLET BY MOUTH TWICE DAILY  
  
 cyanocobalamin 1,000 mcg tablet Take 1,000 mcg by mouth daily. diazePAM 5 mg tablet Commonly known as:  VALIUM  
TAKE ONE TABLET EVERY 6 HOURS AS NEEDED FOR ANXIETY DULoxetine 60 mg capsule Commonly known as:  CYMBALTA Take 60 mg by mouth daily. fluticasone 50 mcg/actuation nasal spray Commonly known as:  Star Serve SHAKE LIQUID AND USE 2 SPRAYS IN EACH NOSTRIL EVERY DAY  
  
 glipiZIDE SR 2.5 mg CR tablet Commonly known as:  GLUCOTROL XL  
TAKE ONE TABLET BY MOUTH DAILY HYDROcodone-acetaminophen  mg tablet Commonly known as:  Orren Yolanda Take 1 Tab by mouth every six (6) hours as needed. hydroxychloroquine 200 mg tablet Commonly known as:  PLAQUENIL  
200 mg two (2) times a day. LASIX 20 mg tablet Generic drug:  furosemide Take 40 mg by mouth daily. lisinopril 2.5 mg tablet Commonly known as:  Courtney Golder Take 2.5 mg by mouth daily. MUCINEX D  mg per tablet Generic drug:  PSEUDOEPHEDRINE-guaiFENesin Take 1 Tab by mouth daily as needed. pantoprazole 40 mg tablet Commonly known as:  PROTONIX  
TAKE 1 TABLET BY MOUTH ONCE DAILY PENNSAID 20 mg/gram /actuation(2 %) Sop  
Generic drug:  diclofenac sodium LOT # [id number] EXP: 06/01/2018  
  
 promethazine-dextromethorphan 6.25-15 mg/5 mL syrup Commonly known as:  PROMETHAZINE-DM  
TAKE ONE TEASPOONFUL BY MOUTH EVERY SIX HOURS AS NEEDED FOR COUGH  
  
 simvastatin 40 mg tablet Commonly known as:  ZOCOR  
TAKE 1 TABLET BY MOUTH ONCE EVERY NIGHT AT BEDTIME  
  
 tiotropium-olodaterol 2.5-2.5 mcg/actuation Mist  
Take 2 Puffs by inhalation daily. Vios machine Generic drug:  Nebulizer & Compressor USE AS DIRECTED  
  
 * Notice: This list has 2 medication(s) that are the same as other medications prescribed for you. Read the directions carefully, and ask your doctor or other care provider to review them with you. We Performed the Following AMB POC EKG ROUTINE W/ 12 LEADS, INTER & REP [78804 CPT(R)] To-Do List   
 05/31/2018 11:10 AM  
  Appointment with [facility] CORNELL 1 at [facility] ([phone]) Shower or bathe using soap and water. Do not use deodorant, powder, perfumes, or lotion the day of your exam.  If your prior mammograms were not performed at [facility] 6 please bring films with you or forward prior images 2 days before your procedure. Check in at registration 15min before your appointment time unless you were instructed to do otherwise. A script is not necessary, but if you have one, please bring it on the day of the mammogram or have it faxed to the department. [facility] [phone] [facility]  [phone] [address] [place] [address]  [phone] [facility] [phone] [facility] [address] [facility] [phone] Introducing [facility] & HEALTH SERVICES! Nicole Sharp introduces [employer] patient portal. Now you can access parts of your medical record, email your doctor's office, and request medication refills online. 1. In your internet browser, go to [email or web address] 2. Click on the First Time User? Click Here link in the Sign In box. You will see the New Member Sign Up page. 3. Enter your [employer] Access Code exactly as it appears below. You will not need to use this code after youve completed the sign-up process. If you do not sign up before the expiration date, you must request a new code. · [employer] Access Code: [email or web address] Expires: 7/19/2018  2:17 PM 
 
4. Enter the last four digits of your Social Security Number (xxxx) and Date of Birth (mm/dd/yyyy) as indicated and click Submit. You will be taken to the next sign-up page. 5. Create a [employer] ID. This will be your [employer] login ID and cannot be changed, so think of one that is secure and easy to remember. 6. Create a [employer] password. You can change your password at any time. 7. Enter your Password Reset Question and Answer. This can be used at a later time if you forget your password. 8. Enter your e-mail address. You will receive e-mail notification when new information is available in [address] 9. Click Sign Up. You can now view and download portions of your medical record. 10. Click the Download Summary menu link to download a portable copy of your medical information. If you have questions, please visit the Frequently Asked Questions section of the [employer] website. Remember, [employer] is NOT to be used for urgent needs. For medical emergencies, dial 911. Now available from your iPhone and Android! Please provide this summary of care documentation to your next provider. Your primary care clinician is listed as Michel Booker. If you have any questions after today's visit, please call [phone].

## 2018-04-20 NOTE — PROGRESS NOTES
Julee Peabody DNP, ANP-BC  Subjective/HPI:     Aide Olsen is a 80 y.o. female is here for routine f/u. The patient denies exertional chest pain/ shortness of breath, orthopnea, PND, LE edema, palpitations, syncope, presyncope or fatigue. Patient reports feeling well in her usual state of health, she is able to ambulate without difficulty specifically denies dyspnea on exertion or excessive levels of fatigue.       PCP Provider  Nishant Arreguin MD  Past Medical History:   Diagnosis Date    Anemia 3/3/2010    Arrhythmia     irregular heartbeat    Arthritis     CHF (congestive heart failure) ([facility] [address] 3/3/2010    Chronic pain     back    Chronic sinusitis 3/3/2010    CPAP (continuous positive airway pressure) dependence     Diverticulosis     DM (diabetes mellitus) ([facility] 3/3/2010    Dyslipidemia 3/3/2010    GERD (gastroesophageal reflux disease)     HTN (hypertension) 3/3/2010    Ill-defined condition     being evaluated py pulmonolgist for \"trouble breathing\"    S/P TKR (total knee replacement) 3/3/2010    Unspecified sleep apnea     doesn't wear CPAP since back has been hurting      Past Surgical History:   Procedure Laterality Date    HX APPENDECTOMY      thinks it was taken with hysterectomy    HX GYN      \"tubes opened\"    HX HEENT      sinus surgery    HX HEMORRHOIDECTOMY      HX HYSTERECTOMY      HX KNEE REPLACEMENT  1996    both knees- at same time    HX LUMBAR LAMINECTOMY  1980s    HX MOHS PROCEDURES      left shoulder    HX ORTHOPAEDIC  1980    neck fusion    HX ORTHOPAEDIC  1999    right knee replaced for second time    HX ORTHOPAEDIC      lumbar fusion    HX OTHER SURGICAL      CORNELL BIOPSY BREAST STEREOTACTIC Left yrs ago    (-)    TN COLONOSCOPY FLX DX W/COLLJ SPEC WHEN PFRMD  [id number]    dr. Srinivasan Macias (barium enema)     Allergies   Allergen Reactions    Gabapentin Other (comments)     Muscles twitching and jerking    Levaquin [Levofloxacin] Swelling    Lyrica [Pregabalin] Other (comments)     Muscle twitching and jerking    Percodan [Oxycodone Hcl-Oxycodone-Asa] Itching      Family History   Problem Relation Age of Onset    Diabetes Mother     Heart Disease Father     Diabetes Brother     Blindness Brother     Diabetes Sister     Heart Disease Sister     Heart Disease Brother     Heart Disease Brother     Asthma Brother     Malignant Hyperthermia Neg Hx     Pseudocholinesterase Deficiency Neg Hx     Delayed Awakening Neg Hx     Post-op Nausea/Vomiting Neg Hx     Emergence Delirium Neg Hx     Post-op Cognitive Dysfunction Neg Hx       Current Outpatient Prescriptions   Medication Sig    hydroxychloroquine (PLAQUENIL) 200 mg tablet 200 mg two (2) times a day.  lisinopril (PRINIVIL, ZESTRIL) 2.5 mg tablet Take 2.5 mg by mouth daily.  DULoxetine (CYMBALTA) 60 mg capsule Take 60 mg by mouth daily.  azithromycin (ZITHROMAX) 250 mg tablet Take 2 tablets today, then take 1 tablet daily    carvedilol (COREG) 25 mg tablet TAKE 1 TABLET BY MOUTH TWICE DAILY    simvastatin (ZOCOR) 40 mg tablet TAKE 1 TABLET BY MOUTH ONCE EVERY NIGHT AT BEDTIME    ALPRAZolam (XANAX) 0.5 mg tablet TAKE 1/2 TO 1 TABLET BY MOUTH EVERY NIGHT AT BEDTIME AS NEEDED FOR SLEEP    glipiZIDE SR (GLUCOTROL XL) 2.5 mg CR tablet TAKE ONE TABLET BY MOUTH DAILY    fluticasone (FLONASE) 50 mcg/actuation nasal spray SHAKE LIQUID AND USE 2 SPRAYS IN EACH NOSTRIL EVERY DAY    VIOS machine USE AS DIRECTED    cyanocobalamin 1,000 mcg tablet Take 1,000 mcg by mouth daily.  albuterol (VENTOLIN HFA) 90 mcg/actuation inhaler INHALE 2 PUFFS BY MOUTH EVERY 4 HOURS AS NEEDED FOR WHEEZING.  albuterol-ipratropium (DUO-NEB) 2.5 mg-0.5 mg/3 ml nebu 3 mL by Nebulization route every four (4) hours as needed.  (up to 6 per day) *90 day supply DX: asthma    albuterol (PROVENTIL VENTOLIN) 2.5 mg /3 mL (0.083 %) nebulizer solution INHALE THE CONTENTS OF ONE VIAL VIA NEBULIZER EVERY 4 HOURS AS NEEDED FOR WHEEZING    pantoprazole (PROTONIX) 40 mg tablet TAKE 1 TABLET BY MOUTH ONCE DAILY    promethazine-dextromethorphan (PROMETHAZINE-DM) 6.25-15 mg/5 mL syrup TAKE ONE TEASPOONFUL BY MOUTH EVERY SIX HOURS AS NEEDED FOR COUGH    HYDROcodone-acetaminophen (NORCO)  mg tablet Take 1 Tab by mouth every six (6) hours as needed.  diazepam (VALIUM) 5 mg tablet TAKE ONE TABLET EVERY 6 HOURS AS NEEDED FOR ANXIETY    tiotropium-olodaterol 2.5-2.5 mcg/actuation mist Take 2 Puffs by inhalation daily.  diclofenac sodium (PENNSAID) 20 mg/gram /actuation(2 %) [facility] LOT # [id number]  EXP: 06/01/2018    furosemide (LASIX) 20 mg tablet Take 40 mg by mouth daily.  PSEUDOEPHEDRINE-guaiFENesin (MUCINEX D)  mg per tablet Take 1 Tab by mouth daily as needed. No current facility-administered medications for this visit. Vitals:    04/20/18 1327 04/20/18 1335   BP: 128/70 126/74   Pulse: 72    Resp: 16    SpO2: 98%    Weight: 208 lb 6.4 oz (94.5 kg)    Height: 5' 4\" (1.626 m)      Social History     Social History    Marital status:      Spouse name: N/A    Number of children: N/A    Years of education: N/A     Occupational History    Not on file. Social History Main Topics    Smoking status: Former Smoker     Packs/day: 0.30     Years: 5.00     Quit date: 1/1/1960    Smokeless tobacco: Never Used    Alcohol use No    Drug use: No    Sexual activity: No     Other Topics Concern    Not on file     Social History Narrative    ** Merged History Encounter **            I have reviewed the nurses notes, vitals, problem list, allergy list, medical history, family, social history and medications. Review of Symptoms:    General: Pt denies excessive weight gain or loss. Pt is able to conduct ADL's  HEENT: Denies blurred vision, headaches, epistaxis and difficulty swallowing. Respiratory: Denies shortness of breath, HIGH, wheezing or stridor.   Cardiovascular: Denies precordial pain, palpitations, edema or PND  Gastrointestinal: Denies poor appetite, indigestion, abdominal pain or blood in stool  Musculoskeletal: Denies pain or swelling from muscles or joints  Neurologic: Denies tremor, paresthesias, or sensory motor disturbance  Skin: Denies rash, itching or texture change. Physical Exam:      General: Well developed, in no acute distress, cooperative and alert  HEENT: No carotid bruits, no JVD, trach is midline. Neck Supple, PEERL, EOM intact. Heart:  Normal S1/S2 negative S3 or S4. Regular, no murmur, gallop or rub.   Respiratory: Clear bilaterally x 4, no wheezing or rales  Abdomen:   Soft, non-tender, no masses, bowel sounds are active.   Extremities:  No edema, normal cap refill, no cyanosis, atraumatic. Neuro: A&Ox3, speech clear, gait stable. Skin: Skin color is normal. No rashes or lesions.  Non diaphoretic  Vascular: 2+ pulses symmetric in all extremities    Cardiographics    ECG: Sinus rhythm with PVC  Results for orders placed or performed during the hospital encounter of 12/17/15   EKG, 12 LEAD, INITIAL   Result Value Ref Range    Ventricular Rate 78 BPM    Atrial Rate 78 BPM    P-R Interval 164 ms    QRS Duration 96 ms    Q-T Interval 400 ms    QTC Calculation (Bezet) 456 ms    Calculated P Axis 53 degrees    Calculated R Axis -35 degrees    Calculated T Axis 52 degrees    Diagnosis       Normal sinus rhythm  Left axis deviation  Nonspecific T wave abnormality  When compared with ECG of 16-OCT-2015 10:27,  premature ventricular complexes are no longer present  Criteria for Septal infarct are no longer present  Confirmed by Amy Najera, P.VBrittney ([id number]) on 12/17/2015 12:18:40 PM           Cardiology Labs:  Lab Results   Component Value Date/Time    Cholesterol, total 175 01/08/2018 10:54 AM    HDL Cholesterol 71 01/08/2018 10:54 AM    LDL, calculated 81 01/08/2018 10:54 AM    Triglyceride 117 01/08/2018 10:54 AM    CHOL/HDL Ratio 2.0 06/11/2010 02:10 PM       Lab Results   Component Value Date/Time    Sodium 145 (H) 04/11/2018 03:56 PM    Potassium 4.9 04/11/2018 03:56 PM    Chloride 101 04/11/2018 03:56 PM    CO2 27 04/11/2018 03:56 PM    Anion gap 3 (L) 12/17/2015 11:47 AM    Glucose 113 (H) 04/11/2018 03:56 PM    BUN 36 (H) 04/11/2018 03:56 PM    Creatinine 2.08 (H) 04/11/2018 03:56 PM    BUN/Creatinine ratio 17 04/11/2018 03:56 PM    GFR est AA 24 (L) 04/11/2018 03:56 PM    GFR est non-AA 21 (L) 04/11/2018 03:56 PM    Calcium 9.7 04/11/2018 03:56 PM    Bilirubin, total 0.3 04/11/2018 03:56 PM    AST (SGOT) 21 04/11/2018 03:56 PM    Alk. phosphatase 64 04/11/2018 03:56 PM    Protein, total 6.8 04/11/2018 03:56 PM    Albumin 4.3 04/11/2018 03:56 PM    Globulin 3.4 12/17/2015 11:47 AM    A-G Ratio 1.7 04/11/2018 03:56 PM    ALT (SGPT) 19 04/11/2018 03:56 PM           Assessment:     Assessment:     Diagnoses and all orders for this visit:    1. Chronic systolic heart failure ([facility] [address]    2. Dyslipidemia  -     AMB POC EKG ROUTINE W/ 12 LEADS, INTER & REP    3. Type 2 diabetes mellitus with diabetic neuropathy, without long-term current use of insulin ([facility])    4. Stage 3 chronic kidney disease        ICD-10-CM ICD-9-CM    1. Chronic systolic heart failure (HCC) I50.22 428.22    2. Dyslipidemia E78.5 272.4 AMB POC EKG ROUTINE W/ 12 LEADS, INTER & REP   3. Type 2 diabetes mellitus with diabetic neuropathy, without long-term current use of insulin (HCC) E11.40 250.60      357.2    4. Stage 3 chronic kidney disease N18.3 585.3      Orders Placed This Encounter    AMB POC EKG ROUTINE W/ 12 LEADS, INTER & REP     Order Specific Question:   Reason for Exam:     Answer:   Routine        Plan:     Patient is a 71-year-old female with a history of cardiomyopathy resolved ejection fraction 60% 2016, presenting as [place] heart class I.   Continue current medications  Hypertension: Controlled continue  Hyperlipidemia: On statin therapy followed by primary care  Chronic kidney disease stage III: She is off diuretic, continue 2.5 mg ACE inhibitor; would not titrate any further  Follow-up in 1 year    Kathleen Novoa MD    This note was created using voice recognition software. Despite editing, there may be syntax errors.

## 2018-04-30 LAB — CREATININE, EXTERNAL: 2

## 2018-05-21 ENCOUNTER — TELEPHONE (OUTPATIENT)
Dept: CARDIOLOGY CLINIC | Age: 83
End: 2018-05-21

## 2018-05-22 RX ORDER — SIMVASTATIN 40 MG/1
TABLET, FILM COATED ORAL
Qty: 90 TAB | Refills: 0 | Status: SHIPPED | OUTPATIENT
Start: 2018-05-22 | End: 2018-08-15 | Stop reason: SDUPTHER

## 2018-05-22 NOTE — TELEPHONE ENCOUNTER
Mr. Sander Madrigal has gone to [facility] twice looking for the refill on the simvastatin 40mg, the refill request was received 5/16/18, 5/21/18 & being faxed again today from [facility]. Patient is out of meds and needs the refill today please. Advised of 24 hour return call policy.

## 2018-05-23 NOTE — TELEPHONE ENCOUNTER
Spoke with patient  Verified patient with 2 patient identifiers    Confirmed patient received medication refill for Simvastatin.

## 2018-05-31 ENCOUNTER — HOSPITAL ENCOUNTER (OUTPATIENT)
Dept: MAMMOGRAPHY | Age: 83
Discharge: HOME | End: 2018-05-31
Attending: FAMILY MEDICINE
Payer: MEDICARE

## 2018-05-31 DIAGNOSIS — Z12.31 ENCOUNTER FOR SCREENING MAMMOGRAM FOR BREAST CANCER: ICD-10-CM

## 2018-05-31 LAB — CREATININE, EXTERNAL: 1.56

## 2018-05-31 PROCEDURE — 77067 SCR MAMMO BI INCL CAD: CPT

## 2018-06-06 RX ORDER — CARVEDILOL 25 MG/1
25 TABLET ORAL 2 TIMES DAILY WITH MEALS
Qty: 180 TAB | Refills: 3 | Status: SHIPPED | OUTPATIENT
Start: 2018-06-06 | End: 2019-05-17 | Stop reason: SDUPTHER

## 2018-06-15 ENCOUNTER — HOSPITAL ENCOUNTER (OUTPATIENT)
Dept: LAB | Age: 83
Discharge: HOME | End: 2018-06-15
Payer: MEDICARE

## 2018-06-15 ENCOUNTER — OFFICE VISIT (OUTPATIENT)
Dept: FAMILY MEDICINE CLINIC | Age: 83
End: 2018-06-15

## 2018-06-15 VITALS
TEMPERATURE: 97.4 F | HEART RATE: 63 BPM | WEIGHT: 216.2 LBS | DIASTOLIC BLOOD PRESSURE: 65 MMHG | SYSTOLIC BLOOD PRESSURE: 127 MMHG | BODY MASS INDEX: 36.91 KG/M2 | RESPIRATION RATE: 18 BRPM | HEIGHT: 64 IN | OXYGEN SATURATION: 97 %

## 2018-06-15 DIAGNOSIS — E11.21 TYPE 2 DIABETES WITH NEPHROPATHY (HCC): ICD-10-CM

## 2018-06-15 DIAGNOSIS — I50.22 CHRONIC SYSTOLIC HEART FAILURE (HCC): ICD-10-CM

## 2018-06-15 DIAGNOSIS — M35.3 POLYMYALGIA RHEUMATICA (HCC): ICD-10-CM

## 2018-06-15 DIAGNOSIS — I10 ESSENTIAL HYPERTENSION, BENIGN: Primary | ICD-10-CM

## 2018-06-15 DIAGNOSIS — J45.20 MILD INTERMITTENT REACTIVE AIRWAY DISEASE WITH WHEEZING WITHOUT COMPLICATION: ICD-10-CM

## 2018-06-15 DIAGNOSIS — Z51.81 ENCOUNTER FOR MEDICATION MONITORING: ICD-10-CM

## 2018-06-15 DIAGNOSIS — E78.5 DYSLIPIDEMIA: Chronic | ICD-10-CM

## 2018-06-15 DIAGNOSIS — M25.50 ARTHRALGIA OF MULTIPLE JOINTS: ICD-10-CM

## 2018-06-15 DIAGNOSIS — N18.30 STAGE 3 CHRONIC KIDNEY DISEASE (HCC): ICD-10-CM

## 2018-06-15 LAB — GLUCOSE POC: 152 MG/DL

## 2018-06-15 PROCEDURE — 36415 COLL VENOUS BLD VENIPUNCTURE: CPT

## 2018-06-15 PROCEDURE — 80048 BASIC METABOLIC PNL TOTAL CA: CPT

## 2018-06-15 RX ORDER — PREDNISONE 5 MG/1
5 TABLET ORAL DAILY
Refills: 1 | COMMUNITY
Start: 2018-06-03 | End: 2018-09-17 | Stop reason: SDUPTHER

## 2018-06-15 NOTE — PROGRESS NOTES
HISTORY OF PRESENT ILLNESS  Kieran Vallejo is a 80 y.o. female. HPI   Follow up on chronic medical problems. Cardiovascular Review:  The patient has hypertension, hyperlipidemia and Systolic HF. Diet and Lifestyle: generally follows a low fat low cholesterol diet, generally follows a low sodium diet  Home BP Monitoring: is not measured at home. Pertinent ROS: taking medications as instructed, no medication side effects noted, no TIA's, no chest pain on exertion, no dyspnea on exertion, noting that her ankles swelling has been mild. DM type II follow up:  Compliant w/ meds, diabetic diet, and exercise. Obtains home glucose monitoring averaging in the mid 100s. Checks BS daily on most days and prn. Pt does not have BS log at visit today. No Rf needed for today. Denies any tingling sensation, polyuria and polydipsia. No blurred vision. No significant weight changes. Osteoarthritis:  Patient has osteoarthritis in multiple joints but primarily in the knees and back. Seen by rheumatology and place on prednisone and plaquenil. Her symptoms are so much better on this regimen. She claims that she is not having any pain and feeling really good. She has polymyalgia rheumatica. Response to treatment plan: improved  Asthma Review:  The patient is being seen for follow up of chronic respiratory failure and allergies and chronic sinusitits, not currently in exacerbation. Breathing has been good also with taking prednisone. Using nebulizer 3 to 4 times in a day as needed. Regimen compliance: The patient reports adherence to asthma action plan and regimen.     Patient Active Problem List   Diagnosis Code    CHF (congestive heart failure) ([facility]) I50.9    S/P TKR (total knee replacement) Z96.659    Anemia D64.9    s/p lumbar laminectomy Z98.890    S/P ASAD (total abdominal hysterectomy) Z90.710    S/P hemorrhoidectomy Z98.890, Z87.19    S/P rotator cuff surgery Z98.890    DM (diabetes mellitus) ([facility] [address] E11.9    Chronic sinusitis J32.9    S/P sinus surgery Z98.890    Dyslipidemia E78.5    Environmental allergies Z91.09    Obstructive sleep apnea (adult) (pediatric) G47.33    DJD (degenerative joint disease) M19.90    Chronic systolic heart failure (HCC) I50.22    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    Asthma J45.909    Anxiety disorder F41.9    Diabetic neuropathy ([facility]) E11.40    Esophageal reflux K21.9    Essential hypertension, benign I10    Reactive airway disease with wheezing without complication W50.882    Encounter for medication monitoring Z51.81    CKD (chronic kidney disease) N18.9    Arthralgia of multiple joints M25.50    Plantar fasciitis of left foot M72.2    Type 2 diabetes with nephropathy ([facility]) E11.21    Severe obesity (BMI 35.0-39. 9) with comorbidity ([facility]) E66.01       Current Outpatient Prescriptions   Medication Sig Dispense Refill    predniSONE (DELTASONE) 5 mg tablet Take 3 tabs by mouth daily  1    carvedilol (COREG) 25 mg tablet Take 1 Tab by mouth two (2) times daily (with meals). 180 Tab 3    simvastatin (ZOCOR) 40 mg tablet TAKE 1 TABLET BY MOUTH EVERY NIGHT AT BEDTIME 90 Tab 0    hydroxychloroquine (PLAQUENIL) 200 mg tablet 200 mg two (2) times a day.  furosemide (LASIX) 20 mg tablet Take 40 mg by mouth daily.  lisinopril (PRINIVIL, ZESTRIL) 2.5 mg tablet Take 2.5 mg by mouth daily.  DULoxetine (CYMBALTA) 60 mg capsule Take 60 mg by mouth daily.  ALPRAZolam (XANAX) 0.5 mg tablet TAKE 1/2 TO 1 TABLET BY MOUTH EVERY NIGHT AT BEDTIME AS NEEDED FOR SLEEP 30 Tab 3    glipiZIDE SR (GLUCOTROL XL) 2.5 mg CR tablet TAKE ONE TABLET BY MOUTH DAILY 90 Tab 3    fluticasone (FLONASE) 50 mcg/actuation nasal spray SHAKE LIQUID AND USE 2 SPRAYS IN EACH NOSTRIL EVERY DAY 1 Bottle 11    cyanocobalamin 1,000 mcg tablet Take 1,000 mcg by mouth daily.       albuterol (VENTOLIN HFA) 90 mcg/actuation inhaler INHALE 2 PUFFS BY MOUTH EVERY 4 HOURS AS NEEDED FOR WHEEZING. 1 Inhaler 11    albuterol-ipratropium (DUO-NEB) 2.5 mg-0.5 mg/3 ml nebu 3 mL by Nebulization route every four (4) hours as needed. (up to 6 per day) *90 day supply DX: asthma 540 Nebule 3    albuterol (PROVENTIL VENTOLIN) 2.5 mg /3 mL (0.083 %) nebulizer solution INHALE THE CONTENTS OF ONE VIAL VIA NEBULIZER EVERY 4 HOURS AS NEEDED FOR WHEEZING 1650 Each 3    pantoprazole (PROTONIX) 40 mg tablet TAKE 1 TABLET BY MOUTH ONCE DAILY 90 Tab 3    promethazine-dextromethorphan (PROMETHAZINE-DM) 6.25-15 mg/5 mL syrup TAKE ONE TEASPOONFUL BY MOUTH EVERY SIX HOURS AS NEEDED FOR COUGH 240 mL 1    HYDROcodone-acetaminophen (NORCO)  mg tablet Take 1 Tab by mouth every six (6) hours as needed. 60 Tab 0    PSEUDOEPHEDRINE-guaiFENesin (MUCINEX D)  mg per tablet Take 1 Tab by mouth daily as needed.          Allergies   Allergen Reactions    Gabapentin Other (comments)     Muscles twitching and jerking    Levaquin [Levofloxacin] Swelling    Lyrica [Pregabalin] Other (comments)     Muscle twitching and jerking    Percodan [Oxycodone Hcl-Oxycodone-Asa] Itching       Past Medical History:   Diagnosis Date    Anemia 3/3/2010    Arrhythmia     irregular heartbeat    Arthritis     CHF [facility]congestive heart failure) ([facility]) 3/3/2010    Chronic pain     back    Chronic sinusitis 3/3/2010    CPAP (continuous positive airway pressure) dependence     Diverticulosis     DM (diabetes mellitus) ([facility] [address] 3/3/2010    Dyslipidemia 3/3/2010    GERD (gastroesophageal reflux disease)     HTN (hypertension) 3/3/2010    Ill-defined condition     being evaluated py pulmonolgist for \"trouble breathing\"    S/P TKR (total knee replacement) 3/3/2010    Unspecified sleep apnea     doesn't wear CPAP since back has been hurting       Past Surgical History:   Procedure Laterality Date    HX APPENDECTOMY      thinks it was taken with hysterectomy    HX GYN      \"tubes opened\"    HX HEENT      sinus surgery    HX HEMORRHOIDECTOMY      HX HYSTERECTOMY      HX KNEE REPLACEMENT  1996    both knees- at same time    HX LUMBAR LAMINECTOMY  1980s    HX MOHS PROCEDURES      left shoulder    HX ORTHOPAEDIC  1980    neck fusion    HX ORTHOPAEDIC  1999    right knee replaced for second time    HX ORTHOPAEDIC      lumbar fusion    HX OTHER SURGICAL      CORNELL BIOPSY BREAST STEREOTACTIC Left yrs ago    (-)    [place] COLONOSCOPY FLX DX W/COLLJ SPEC WHEN PFRMD  [id number]    dr. Nicole Bishop (barium enema)       Family History   Problem Relation Age of Onset    Diabetes Mother     Heart Disease Father     Diabetes Brother     Blindness Brother     Diabetes Sister     Heart Disease Sister     Heart Disease Brother     Heart Disease Brother     Asthma Brother     Malignant Hyperthermia Neg Hx     Pseudocholinesterase Deficiency Neg Hx     Delayed Awakening Neg Hx     Post-op Nausea/Vomiting Neg Hx     Emergence Delirium Neg Hx     Post-op Cognitive Dysfunction Neg Hx        Social History   Substance Use Topics    Smoking status: Former Smoker     Packs/day: 0.30     Years: 5.00     Quit date: 1/1/1960    Smokeless tobacco: Never Used    Alcohol use No        Lab Results  Component Value Date/Time   WBC 5.2 11/14/2017 12:39 PM   HGB 11.0 (L) 11/14/2017 12:39 PM   HCT 33.9 (L) 11/14/2017 12:39 PM   PLATELET 210 94/54/8514 12:39 PM   MCV 96 11/14/2017 12:39 PM     Lab Results  Component Value Date/Time   Cholesterol, total 175 01/08/2018 10:54 AM   HDL Cholesterol 71 01/08/2018 10:54 AM   LDL, calculated 81 01/08/2018 10:54 AM   Triglyceride 117 01/08/2018 10:54 AM   CHOL/HDL Ratio 2.0 06/11/2010 02:10 PM     Lab Results  Component Value Date/Time   TSH 2.510 10/05/2016 01:13 PM      Lab Results   Component Value Date/Time    Sodium 145 (H) 04/11/2018 03:56 PM    Potassium 4.9 04/11/2018 03:56 PM    Chloride 101 04/11/2018 03:56 PM    CO2 27 04/11/2018 03:56 PM    Anion gap 3 (L) 12/17/2015 11:47 AM    Glucose 113 (H) 04/11/2018 03:56 PM    BUN 36 (H) 04/11/2018 03:56 PM    Creatinine 2.08 (H) 04/11/2018 03:56 PM    BUN/Creatinine ratio 17 04/11/2018 03:56 PM    GFR est AA 24 (L) 04/11/2018 03:56 PM    GFR est non-AA 21 (L) 04/11/2018 03:56 PM    Calcium 9.7 04/11/2018 03:56 PM    Bilirubin, total 0.3 04/11/2018 03:56 PM    ALT (SGPT) 19 04/11/2018 03:56 PM    AST (SGOT) 21 04/11/2018 03:56 PM    Alk. phosphatase 64 04/11/2018 03:56 PM    Protein, total 6.8 04/11/2018 03:56 PM    Albumin 4.3 04/11/2018 03:56 PM    Globulin 3.4 12/17/2015 11:47 AM    A-G Ratio 1.7 04/11/2018 03:56 PM      Lab Results   Component Value Date/Time    Hemoglobin A1c 6.4 (H) 09/08/2017 11:09 AM    Hemoglobin A1c (POC) 6.5 04/11/2018 03:38 PM         Review of Systems   Constitutional: Negative for malaise/fatigue. HENT: Negative for congestion. Eyes: Negative for blurred vision. Respiratory: Negative for cough and shortness of breath. Cardiovascular: Negative for chest pain, palpitations and leg swelling. Gastrointestinal: Negative for abdominal pain, constipation and heartburn. Genitourinary: Negative for dysuria, frequency and urgency. Musculoskeletal: Negative for back pain and joint pain. Neurological: Negative for dizziness, tingling and headaches. Endo/Heme/Allergies: Negative for environmental allergies. Psychiatric/Behavioral: Negative for depression. The patient does not have insomnia. Physical Exam   Constitutional: She appears well-developed and well-nourished. /65 (BP 1 Location: Right arm, BP Patient Position: Sitting)  Pulse 63  Temp 97.4 °F (36.3 °C) (Oral)   Resp 18  Ht 5' 4\" (1.626 m)  Wt 216 lb 3.2 oz (98.1 kg)  LMP 03/04/1961 (Approximate)  SpO2 97%   L/min  BMI 37.11 kg/m2     HENT:   Right Ear: Tympanic membrane and ear canal normal.   Left Ear: Tympanic membrane and ear canal normal.   Nose: No mucosal edema or rhinorrhea.    Mouth/Throat: Oropharynx is clear and moist and mucous membranes are normal.   Neck: Normal range of motion. Neck supple. No thyromegaly present. Cardiovascular: Normal rate and regular rhythm. No murmur heard. Pulmonary/Chest: Effort normal and breath sounds normal.   Abdominal: Soft. Bowel sounds are normal. There is no tenderness. Musculoskeletal: Normal range of motion. She exhibits no edema. Lymphadenopathy:     She has no cervical adenopathy. Skin: Skin is warm and dry. Psychiatric: She has a normal mood and affect. Nursing note and vitals reviewed. ASSESSMENT and PLAN  Diagnoses and all orders for this visit:    1. Essential hypertension, benign  Stable at goal      2. Type 2 diabetes with nephropathy (HCC)  -     AMB POC GLUCOSE, QUANTITATIVE, BLOOD    3. Chronic systolic heart failure (HCC)  Stable     4. Dyslipidemia  Stable     5. Stage 3 chronic kidney disease  Stable     6. Arthralgia of multiple joints//  7. Polymyalgia rheumatica (HCC)  Improved     8. Mild intermittent reactive airway disease with wheezing without complication  Stable     9. Encounter for medication monitoring  -     METABOLIC PANEL, BASIC      Follow-up Disposition:  Return in about 3 months (around 9/15/2018). reviewed diet, exercise and weight control  cardiovascular risk and specific lipid/LDL goals reviewed  reviewed medications and side effects in detail  specific diabetic recommendations: low cholesterol diet, weight control and daily exercise discussed and glycohemoglobin and other lab monitoring discussed     I have discussed diagnosis listed in this note with pt and/or family. I have discussed treatment plans and options and the risk/benefit analysis of those options, including safe use of medications and possible medication side effects. Through the use of shared decision making we have agreed to the above plan. The patient has received an after-visit summary and questions were answered concerning future plans and follow up.   Advise pt of any urgent changes then to proceed to the ER.

## 2018-06-15 NOTE — PROGRESS NOTES
Chief Complaint   Patient presents with    Hypertension     follow up    Diabetes     follow up    Cholesterol Problem     follow up     Mammogram 5/31/2018    1. Have you been to the ER, urgent care clinic since your last visit? Hospitalized since your last visit? No    2. Have you seen or consulted any other health care providers outside of the [facility] since your last visit? Include any pap smears or colon screening.  No

## 2018-06-15 NOTE — MR AVS SNAPSHOT
[address] [facility] 
 
 
 [address] [facility] [address] [id number] 
[phone] Patient: Floretta Goltz MRN: [id number] CYJ:[birth year]2 Visit Information Date & Time Provider Department Dept. Phone Encounter #  
 6/15/2018 11:45 AM Ana De León MD [facility] [phone] [id number] Follow-up Instructions Return in about 3 months (around 9/15/2018). Your Appointments 4/17/2019 11:15 AM  
ESTABLISHED PATIENT with Nelly Cortez MD  
[facility]) Appt Note: annual f/u  
 [address]  
[phone] [address] Upcoming Health Maintenance Date Due  
 FOOT EXAM Q1 7/6/2018 Influenza Age 5 to Adult 8/1/2018 EYE EXAM RETINAL OR DILATED Q1 10/2/2018 HEMOGLOBIN A1C Q6M 10/11/2018 MEDICARE YEARLY EXAM 10/24/2018 MICROALBUMIN Q1 1/8/2019 LIPID PANEL Q1 1/8/2019 GLAUCOMA SCREENING Q2Y 10/2/2019 DTaP/Tdap/Td series (2 - Td) 9/14/2022 Allergies as of 6/15/2018  Review Complete On: 6/15/2018 By: Ana De León MD  
  
 Severity Noted Reaction Type Reactions Gabapentin  10/12/2016    Other (comments) Muscles twitching and jerking Levaquin [Levofloxacin]  03/09/2012    Swelling Lyrica [Pregabalin]  10/31/2017    Other (comments) Muscle twitching and jerking Percodan [Oxycodone Hcl-oxycodone-asa]  03/09/2012    Itching Current Immunizations  Reviewed on 4/11/2018 Name Date Influenza High Dose Vaccine PF 9/8/2017, 9/16/2016, 8/25/2015 Influenza Vaccine  Deferred (Patient Refused), 9/30/2014, 9/20/2013 Influenza Vaccine Split 8/31/2012, 9/13/2010 Pneumococcal Conjugate (PCV-13) 8/25/2015 Pneumococcal Vaccine (Pcv) 10/30/2009 TDAP Vaccine 9/14/2012 Zoster Vaccine, Live 3/19/2013 Not reviewed this visit You Were Diagnosed With   
  
 Codes Comments Essential hypertension, benign    -  Primary ICD-10-CM: I10 
ICD-9-CM: 401.1 Type 2 diabetes with nephropathy (HCC)     ICD-10-CM: E11.21 
ICD-9-CM: 250.40, 583.81 Chronic systolic heart failure (HCC)     ICD-10-CM: I50.22 ICD-9-CM: 428.22 Dyslipidemia     ICD-10-CM: E78.5 ICD-9-CM: 272.4 Stage 3 chronic kidney disease     ICD-10-CM: N18.3 ICD-9-CM: 669. 3 Arthralgia of multiple joints     ICD-10-CM: M25.50 ICD-9-CM: 719.49 Mild intermittent reactive airway disease with wheezing without complication     WCS-21-DK: J45.20 ICD-9-CM: 493.90 Encounter for medication monitoring     ICD-10-CM: Z51.81 
ICD-9-CM: V58.83 Vitals BP Pulse Temp Resp Height(growth percentile) Weight(growth percentile) 127/65 (BP 1 Location: Right arm, BP Patient Position: Sitting) 63 97.4 °F (36.3 °C) (Oral) 18 5' 4\" (1.626 m) 216 lb 3.2 oz (98.1 kg) LMP SpO2 PF BMI OB Status Smoking Status 03/04/1961 (Approximate) 97% 330 L/min 37.11 kg/m2 Hysterectomy Former Smoker Vitals History BMI and BSA Data Body Mass Index Body Surface Area  
 37.11 kg/m 2 2.1 m 2 Preferred Pharmacy Pharmacy Name Phone [address] [facility] [facility] [phone] Your Updated Medication List  
  
   
This list is accurate as of 6/15/18 12:45 PM.  Always use your most recent med list.  
  
  
  
  
 * albuterol 2.5 mg /3 mL (0.083 %) nebulizer solution Commonly known as:  PROVENTIL VENTOLIN  
INHALE THE CONTENTS OF ONE VIAL VIA NEBULIZER EVERY 4 HOURS AS NEEDED FOR WHEEZING  
  
 * albuterol 90 mcg/actuation inhaler Commonly known as:  VENTOLIN HFA INHALE 2 PUFFS BY MOUTH EVERY 4 HOURS AS NEEDED FOR WHEEZING. albuterol-ipratropium 2.5 mg-0.5 mg/3 ml Nebu Commonly known as:  DUO-NEB  
3 mL by Nebulization route every four (4) hours as needed.  (up to 6 per day) *90 day supply DX: asthma ALPRAZolam 0.5 mg tablet Commonly known as:  XANAX  
TAKE 1/2 TO 1 TABLET BY MOUTH EVERY NIGHT AT BEDTIME AS NEEDED FOR SLEEP  
  
 carvedilol 25 mg tablet Commonly known as:  Randy Mood Take 1 Tab by mouth two (2) times daily (with meals). cyanocobalamin 1,000 mcg tablet Take 1,000 mcg by mouth daily. DULoxetine 60 mg capsule Commonly known as:  CYMBALTA Take 60 mg by mouth daily. fluticasone 50 mcg/actuation nasal spray Commonly known as:  Stephanie [place] SHAKE LIQUID AND USE 2 SPRAYS IN EACH NOSTRIL EVERY DAY  
  
 glipiZIDE SR 2.5 mg CR tablet Commonly known as:  GLUCOTROL XL  
TAKE ONE TABLET BY MOUTH DAILY HYDROcodone-acetaminophen  mg tablet Commonly known as:  Hurman Grist Take 1 Tab by mouth every six (6) hours as needed. hydroxychloroquine 200 mg tablet Commonly known as:  PLAQUENIL  
200 mg two (2) times a day. LASIX 20 mg tablet Generic drug:  furosemide Take 40 mg by mouth daily. lisinopril 2.5 mg tablet Commonly known as:  Kathyleen Herber Take 2.5 mg by mouth daily. MUCINEX D  mg per tablet Generic drug:  PSEUDOEPHEDRINE-guaiFENesin Take 1 Tab by mouth daily as needed. pantoprazole 40 mg tablet Commonly known as:  PROTONIX  
TAKE 1 TABLET BY MOUTH ONCE DAILY predniSONE 5 mg tablet Commonly known as:  Jose Shubham Take 3 tabs by mouth daily  
  
 promethazine-dextromethorphan 6.25-15 mg/5 mL syrup Commonly known as:  PROMETHAZINE-DM  
TAKE ONE TEASPOONFUL BY MOUTH EVERY SIX HOURS AS NEEDED FOR COUGH  
  
 simvastatin 40 mg tablet Commonly known as:  ZOCOR  
TAKE 1 TABLET BY MOUTH EVERY NIGHT AT BEDTIME  
  
 * Notice: This list has 2 medication(s) that are the same as other medications prescribed for you. Read the directions carefully, and ask your doctor or other care provider to review them with you. We Performed the Following AMB POC GLUCOSE, QUANTITATIVE, BLOOD [24115 CPT(R)] METABOLIC PANEL, BASIC [26344 CPT(R)] Follow-up Instructions Return in about 3 months (around 9/15/2018). Introducing [facility] [place] [facility]! [facility] introduces [employer] patient portal. Now you can access parts of your medical record, email your doctor's office, and request medication refills online. 1. In your internet browser, go to [email or web address] 2. Click on the First Time User? Click Here link in the Sign In box. You will see the New Member Sign Up page. 3. Enter your [employer] Access Code exactly as it appears below. You will not need to use this code after youve completed the sign-up process. If you do not sign up before the expiration date, you must request a new code. · [employer] Access Code: [email or web address] Expires: 7/19/2018  2:17 PM 
 
4. Enter the last four digits of your Social Security Number (xxxx) and Date of Birth (mm/dd/yyyy) as indicated and click Submit. You will be taken to the next sign-up page. 5. Create a [employer] ID. This will be your [employer] login ID and cannot be changed, so think of one that is secure and easy to remember. 6. Create a [employer] password. You can change your password at any time. 7. Enter your Password Reset Question and Answer. This can be used at a later time if you forget your password. 8. Enter your e-mail address. You will receive e-mail notification when new information is available in [address] 9. Click Sign Up. You can now view and download portions of your medical record. 10. Click the Download Summary menu link to download a portable copy of your medical information. If you have questions, please visit the Frequently Asked Questions section of the [employer] website. Remember, [employer] is NOT to be used for urgent needs. For medical emergencies, dial 911. Now available from your iPhone and Android! Please provide this summary of care documentation to your next provider. Your primary care clinician is listed as Latoya Guerrero. If you have any questions after today's visit, please call [phone].

## 2018-06-16 LAB
BUN SERPL-MCNC: 33 MG/DL (ref 8–27)
BUN/CREAT SERPL: 20 (ref 12–28)
CALCIUM SERPL-MCNC: 9.3 MG/DL (ref 8.7–10.3)
CHLORIDE SERPL-SCNC: 105 MMOL/L (ref 96–106)
CO2 SERPL-SCNC: 29 MMOL/L (ref 20–29)
CREAT SERPL-MCNC: 1.66 MG/DL (ref 0.57–1)
GFR SERPLBLD CREATININE-BSD FMLA CKD-EPI: 28 ML/MIN/1.73
GFR SERPLBLD CREATININE-BSD FMLA CKD-EPI: 32 ML/MIN/1.73
GLUCOSE SERPL-MCNC: 142 MG/DL (ref 65–99)
INTERPRETATION: NORMAL
POTASSIUM SERPL-SCNC: 4.5 MMOL/L (ref 3.5–5.2)
SODIUM SERPL-SCNC: 145 MMOL/L (ref 134–144)

## 2018-06-22 DIAGNOSIS — F51.01 PRIMARY INSOMNIA: ICD-10-CM

## 2018-06-22 RX ORDER — ALPRAZOLAM 0.5 MG/1
TABLET ORAL
Qty: 30 TAB | Refills: 3 | OUTPATIENT
Start: 2018-06-22 | End: 2018-10-21 | Stop reason: SDUPTHER

## 2018-06-26 ENCOUNTER — OFFICE VISIT (OUTPATIENT)
Dept: FAMILY MEDICINE CLINIC | Age: 83
End: 2018-06-26

## 2018-06-26 VITALS
DIASTOLIC BLOOD PRESSURE: 71 MMHG | HEART RATE: 77 BPM | OXYGEN SATURATION: 95 % | BODY MASS INDEX: 36.54 KG/M2 | RESPIRATION RATE: 22 BRPM | TEMPERATURE: 95.5 F | WEIGHT: 214 LBS | SYSTOLIC BLOOD PRESSURE: 151 MMHG | HEIGHT: 64 IN

## 2018-06-26 DIAGNOSIS — E11.21 TYPE 2 DIABETES WITH NEPHROPATHY (HCC): ICD-10-CM

## 2018-06-26 DIAGNOSIS — J20.9 ACUTE BRONCHITIS, UNSPECIFIED ORGANISM: Primary | ICD-10-CM

## 2018-06-26 DIAGNOSIS — J45.21 MILD INTERMITTENT ASTHMA WITH EXACERBATION: ICD-10-CM

## 2018-06-26 LAB — GLUCOSE POC: 93 MG/DL

## 2018-06-26 RX ORDER — PREDNISONE 10 MG/1
TABLET ORAL
Qty: 21 TAB | Refills: 0 | Status: SHIPPED | OUTPATIENT
Start: 2018-06-26 | End: 2018-07-20 | Stop reason: ALTCHOICE

## 2018-06-26 RX ORDER — TRIAMCINOLONE ACETONIDE 55 UG/1
2 SPRAY, METERED NASAL DAILY
Qty: 1 BOTTLE | Refills: 6 | Status: SHIPPED | OUTPATIENT
Start: 2018-06-26 | End: 2018-09-17

## 2018-06-26 RX ORDER — AMOXICILLIN AND CLAVULANATE POTASSIUM 500; 125 MG/1; MG/1
1 TABLET, FILM COATED ORAL 2 TIMES DAILY
Qty: 20 TAB | Refills: 0 | Status: SHIPPED | OUTPATIENT
Start: 2018-06-26 | End: 2018-07-06

## 2018-06-26 RX ORDER — AZITHROMYCIN 250 MG/1
TABLET, FILM COATED ORAL
Qty: 6 TAB | Refills: 0 | Status: SHIPPED | OUTPATIENT
Start: 2018-06-26 | End: 2018-07-01

## 2018-06-26 NOTE — PROGRESS NOTES
Chief Complaint   Patient presents with    Cough     patient c/o cough for 6 days    Wheezing     patient c/o wheezing 6 days     Mammogram 05/31/2018    Colonoscopy 04/06/2000    Eye exam 10/02/2017    1. Have you been to the ER, urgent care clinic since your last visit? Hospitalized since your last visit? No    2. Have you seen or consulted any other health care providers outside of the [address] [place] [address] since your last visit? Include any pap smears or colon screening. No     Patient c/o 6/10 headache     Verbal order per  Albuterol nebulizer treatment. After obtaining consent, and per verbal orders of Dr. Elda Arrington, injection of Decodron given in left deltoid  by Brianna Gómez LPN. Patient instructed to remain in clinic for 20 minutes afterwards, and to report any adverse reaction to me immediately. Patient did not have any adverse reactions during this office visit.

## 2018-06-26 NOTE — MR AVS SNAPSHOT
[address] [facility] 
 
 
 [address] [facility] [address] [id number] 
[phone] Patient: Laruyn Schumacher MRN: [id number] DLI:[birth year] Visit Information Date & Time Provider Department Dept. Phone Encounter #  
 6/26/2018  3:00 PM Rachell Pruitt MD [facility] [phone] [id number] Follow-up Instructions Return in about 3 days (around 6/29/2018). Your Appointments 6/26/2018  3:00 PM  
ROUTINE CARE with Rachell Pruitt MD  
[facility]) Appt Note: cough/wheezing [address] [facility] [address] [phone]  
[phone] [address] [facility] [address] 9/17/2018  2:00 PM  
ROUTINE CARE with Rachell Pruitt MD  
[facility]) Appt Note: 3  mnth fu  
 [address] [place] [address] [address] [phone]  
[phone] 4/17/2019 11:15 AM  
ESTABLISHED PATIENT with Dane Rendon MD  
[facility] Appt Note: annual f/u  
 [address] [facility] [address]  
[phone] [address] [facility] [address] Upcoming Health Maintenance Date Due  
 FOOT EXAM Q1 7/6/2018 Influenza Age 5 to Adult 8/1/2018 EYE EXAM RETINAL OR DILATED Q1 10/2/2018 HEMOGLOBIN A1C Q6M 10/11/2018 MEDICARE YEARLY EXAM 10/24/2018 MICROALBUMIN Q1 1/8/2019 LIPID PANEL Q1 1/8/2019 GLAUCOMA SCREENING Q2Y 10/2/2019 DTaP/Tdap/Td series (2 - Td) 9/14/2022 Allergies as of 6/26/2018  Review Complete On: 6/26/2018 By: Sebastien Liang LPN Severity Noted Reaction Type Reactions Gabapentin  10/12/2016    Other (comments) Muscles twitching and jerking Levaquin [Levofloxacin]  03/09/2012    Swelling Lyrica [Pregabalin]  10/31/2017    Other (comments) Muscle twitching and jerking Percodan [Oxycodone Hcl-oxycodone-asa]  03/09/2012    Itching Current Immunizations  Reviewed on 4/11/2018 Name Date Influenza High Dose Vaccine PF 9/8/2017, 9/16/2016, 8/25/2015 Influenza Vaccine  Deferred (Patient Refused), 9/30/2014, 9/20/2013 Influenza Vaccine Split 8/31/2012, 9/13/2010 Pneumococcal Conjugate (PCV-13) 8/25/2015 Pneumococcal Vaccine (Pcv) 10/30/2009 TDAP Vaccine 9/14/2012 Zoster Vaccine, Live 3/19/2013 Not reviewed this visit You Were Diagnosed With   
  
 Codes Comments Mild intermittent asthma with exacerbation    -  Primary ICD-10-CM: J45.21 ICD-9-CM: 980.32 Acute bronchitis, unspecified organism     ICD-10-CM: J20.9 ICD-9-CM: 466.0 Type 2 diabetes with nephropathy (HCC)     ICD-10-CM: E11.21 
ICD-9-CM: 250.40, 583.81 Vitals BP Pulse Temp Resp Height(growth percentile) Weight(growth percentile) 151/71 (BP 1 Location: Right arm, BP Patient Position: Sitting) 77 95.5 °F (35.3 °C) (Oral) 22 5' 4\" (1.626 m) 214 lb (97.1 kg) LMP SpO2 BMI OB Status Smoking Status 03/04/1961 (Approximate) 95% 36.73 kg/m2 Hysterectomy Former Smoker BMI and BSA Data Body Mass Index Body Surface Area  
 36.73 kg/m 2 2.09 m 2 Preferred Pharmacy Pharmacy Name Phone [address] [facility], [address] [facility] [phone] Your Updated Medication List  
  
   
This list is accurate as of 6/26/18  1:14 PM.  Always use your most recent med list.  
  
  
  
  
 * albuterol 2.5 mg /3 mL (0.083 %) nebulizer solution Commonly known as:  PROVENTIL VENTOLIN  
INHALE THE CONTENTS OF ONE VIAL VIA NEBULIZER EVERY 4 HOURS AS NEEDED FOR WHEEZING  
  
 * albuterol 90 mcg/actuation inhaler Commonly known as:  VENTOLIN HFA INHALE 2 PUFFS BY MOUTH EVERY 4 HOURS AS NEEDED FOR WHEEZING. ALPRAZolam 0.5 mg tablet Commonly known as:  Mary Osmel TAKE 1/2 TO 1 TABLET BY MOUTH EVERY NIGHT AT BEDTIME AS NEEDED FOR SLEEP  
  
 amoxicillin-clavulanate 500-125 mg per tablet Commonly known as:  AUGMENTIN Take 1 Tab by mouth two (2) times a day for 10 days. azithromycin 250 mg tablet Commonly known as:  Kristal Sanders Take 2 tablets today, then take 1 tablet daily  
  
 carvedilol 25 mg tablet Commonly known as:  Pablo Pineda Take 1 Tab by mouth two (2) times daily (with meals). cyanocobalamin 1,000 mcg tablet Take 1,000 mcg by mouth daily. DULoxetine 60 mg capsule Commonly known as:  CYMBALTA Take 60 mg by mouth daily. fluticasone 50 mcg/actuation nasal spray Commonly known as:  [facility] SHAKE LIQUID AND USE 2 SPRAYS IN EACH NOSTRIL EVERY DAY  
  
 glipiZIDE SR 2.5 mg CR tablet Commonly known as:  GLUCOTROL XL  
TAKE ONE TABLET BY MOUTH DAILY HYDROcodone-acetaminophen  mg tablet Commonly known as:  Herman James Take 1 Tab by mouth every six (6) hours as needed. hydroxychloroquine 200 mg tablet Commonly known as:  PLAQUENIL  
200 mg two (2) times a day. LASIX 20 mg tablet Generic drug:  furosemide Take 40 mg by mouth daily. lisinopril 2.5 mg tablet Commonly known as:  Welcome Kaycee Take 2.5 mg by mouth daily. MUCINEX D  mg per tablet Generic drug:  PSEUDOEPHEDRINE-guaiFENesin Take 1 Tab by mouth daily as needed. pantoprazole 40 mg tablet Commonly known as:  PROTONIX  
TAKE 1 TABLET BY MOUTH ONCE DAILY * predniSONE 5 mg tablet Commonly known as:  Pansy Hummingbird Take 3 tabs by mouth daily * predniSONE 10 mg dose pack Commonly known as:  STERAPRED DS See administration instruction per 10mg dose pack  
  
 promethazine-dextromethorphan 6.25-15 mg/5 mL syrup Commonly known as:  PROMETHAZINE-DM  
TAKE ONE TEASPOONFUL BY MOUTH EVERY SIX HOURS AS NEEDED FOR COUGH  
  
 simvastatin 40 mg tablet Commonly known as:  ZOCOR  
 TAKE 1 TABLET BY MOUTH EVERY NIGHT AT BEDTIME  
  
 * Notice: This list has 4 medication(s) that are the same as other medications prescribed for you. Read the directions carefully, and ask your doctor or other care provider to review them with you. Prescriptions Sent to Pharmacy Refills  
 amoxicillin-clavulanate (AUGMENTIN) 500-125 mg per tablet 0 Sig: Take 1 Tab by mouth two (2) times a day for 10 days. Class: Normal  
 Pharmacy: [facility], [address] AT [address] [place] [address] Ph #: [phone] Route: Oral  
 azithromycin (ZITHROMAX) 250 mg tablet 0 Sig: Take 2 tablets today, then take 1 tablet daily Class: Normal  
 Pharmacy: [facility] [address] [place] [address] RD AT [address] [place] [address] Ph #: [phone]  
 predniSONE (STERAPRED DS) 10 mg dose pack 0 Sig: See administration instruction per 10mg dose pack Class: Normal  
 Pharmacy: [facility], [address] [place] [address] RD AT [address] [place] [address] Ph #: [phone] We Performed the Following AMB POC COMPLETE CBC, AUTOMATED [31457 CPT(R)] AMB POC GLUCOSE, QUANTITATIVE, BLOOD [45382 CPT(R)] Follow-up Instructions Return in about 3 days (around 6/29/2018). Introducing [facility]! [facility] introduces [employer] patient portal. Now you can access parts of your medical record, email your doctor's office, and request medication refills online. 1. In your internet browser, go to [email or web address] 2. Click on the First Time User? Click Here link in the Sign In box. You will see the New Member Sign Up page. 3. Enter your [employer] Access Code exactly as it appears below. You will not need to use this code after youve completed the sign-up process. If you do not sign up before the expiration date, you must request a new code. · [employer] Access Code: [email or web address] Expires: 7/19/2018  2:17 PM 
 
4. Enter the last four digits of your Social Security Number (xxxx) and Date of Birth (mm/dd/yyyy) as indicated and click Submit. You will be taken to the next sign-up page. 5. Create a [employer] ID. This will be your [employer] login ID and cannot be changed, so think of one that is secure and easy to remember. 6. Create a [employer] password. You can change your password at any time. 7. Enter your Password Reset Question and Answer. This can be used at a later time if you forget your password. 8. Enter your e-mail address. You will receive e-mail notification when new information is available in [address] 9. Click Sign Up. You can now view and download portions of your medical record. 10. Click the Download Summary menu link to download a portable copy of your medical information. If you have questions, please visit the Frequently Asked Questions section of the [employer] website. Remember, [employer] is NOT to be used for urgent needs. For medical emergencies, dial 911. Now available from your iPhone and Android! Please provide this summary of care documentation to your next provider. Your primary care clinician is listed as Nishant Arreguin. If you have any questions after today's visit, please call [phone].

## 2018-06-26 NOTE — PROGRESS NOTES
HISTORY OF PRESENT ILLNESS  Fox Walker is a 80 y.o. female. HPI   C/o nasal congestion, headache and cough for the past 5-6 days. Coughing up thick yellowish phlegm as well as blowing it out from her nose. No fever or chills noted. Has malaise. Throat is scratchy. Has post nasal drainage. No chest pain or SOB. Has had increased wheezing noted. Increase cough at night. Patient Active Problem List   Diagnosis Code    CHF (congestive heart failure) ([facility]) I50.9    S/P TKR (total knee replacement) Z96.659    Anemia D64.9    s/p lumbar laminectomy Z98.890    S/P ASAD (total abdominal hysterectomy) Z90.710    S/P hemorrhoidectomy Z98.890, Z87.19    S/P rotator cuff surgery Z98.890    [facility] E11.9    Chronic sinusitis J32.9    S/P sinus surgery Z98.890    Dyslipidemia E78.5    Environmental allergies Z91.09    Obstructive sleep apnea (adult) (pediatric) G47.33    DJD (degenerative joint disease) M19.90    Chronic systolic heart failure ([facility]) I50.22    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    Asthma J45.909    Anxiety disorder F41.9    Diabetic neuropathy ([facility]) E11.40    Esophageal reflux K21.9    Essential hypertension, benign I10    Reactive airway disease with wheezing without complication F82.963    Encounter for medication monitoring Z51.81    CKD (chronic kidney disease) N18.9    Arthralgia of multiple joints M25.50    Plantar fasciitis of left foot M72.2    Type 2 diabetes with nephropathy ([facility]) E11.21    Severe obesity (BMI 35.0-39. 9) with comorbidity ([facility]) E66.01    Polymyalgia rheumatica ([facility]) M35.3       Current Outpatient Prescriptions   Medication Sig Dispense Refill    amoxicillin-clavulanate (AUGMENTIN) 500-125 mg per tablet Take 1 Tab by mouth two (2) times a day for 10 days.  20 Tab 0    azithromycin (ZITHROMAX) 250 mg tablet Take 2 tablets today, then take 1 tablet daily 6 Tab 0    predniSONE (STERAPRED DS) 10 mg dose pack See administration instruction per 10mg dose pack 21 Tab 0    ALPRAZolam (XANAX) 0.5 mg tablet TAKE 1/2 TO 1 TABLET BY MOUTH EVERY NIGHT AT BEDTIME AS NEEDED FOR SLEEP 30 Tab 3    predniSONE (DELTASONE) 5 mg tablet Take 3 tabs by mouth daily  1    carvedilol (COREG) 25 mg tablet Take 1 Tab by mouth two (2) times daily (with meals). 180 Tab 3    simvastatin (ZOCOR) 40 mg tablet TAKE 1 TABLET BY MOUTH EVERY NIGHT AT BEDTIME 90 Tab 0    hydroxychloroquine (PLAQUENIL) 200 mg tablet 200 mg two (2) times a day.  furosemide (LASIX) 20 mg tablet Take 40 mg by mouth daily.  lisinopril (PRINIVIL, ZESTRIL) 2.5 mg tablet Take 2.5 mg by mouth daily.  DULoxetine (CYMBALTA) 60 mg capsule Take 60 mg by mouth daily.  glipiZIDE SR (GLUCOTROL XL) 2.5 mg CR tablet TAKE ONE TABLET BY MOUTH DAILY 90 Tab 3    cyanocobalamin 1,000 mcg tablet Take 1,000 mcg by mouth daily.  albuterol (VENTOLIN HFA) 90 mcg/actuation inhaler INHALE 2 PUFFS BY MOUTH EVERY 4 HOURS AS NEEDED FOR WHEEZING. 1 Inhaler 11    albuterol (PROVENTIL VENTOLIN) 2.5 mg /3 mL (0.083 %) nebulizer solution INHALE THE CONTENTS OF ONE VIAL VIA NEBULIZER EVERY 4 HOURS AS NEEDED FOR WHEEZING 1650 Each 3    pantoprazole (PROTONIX) 40 mg tablet TAKE 1 TABLET BY MOUTH ONCE DAILY 90 Tab 3    promethazine-dextromethorphan (PROMETHAZINE-DM) 6.25-15 mg/5 mL syrup TAKE ONE TEASPOONFUL BY MOUTH EVERY SIX HOURS AS NEEDED FOR COUGH 240 mL 1    HYDROcodone-acetaminophen (NORCO)  mg tablet Take 1 Tab by mouth every six (6) hours as needed. 60 Tab 0    PSEUDOEPHEDRINE-guaiFENesin (MUCINEX D)  mg per tablet Take 1 Tab by mouth daily as needed.       fluticasone (FLONASE) 50 mcg/actuation nasal spray SHAKE LIQUID AND USE 2 SPRAYS IN EACH NOSTRIL EVERY DAY 1 Bottle 11       Allergies   Allergen Reactions    Gabapentin Other (comments)     Muscles twitching and jerking    Levaquin [Levofloxacin] Swelling    Lyrica [Pregabalin] Other (comments) Muscle twitching and jerking    Percodan [Oxycodone Hcl-Oxycodone-Asa] Itching       Past Medical History:   Diagnosis Date    Anemia 3/3/2010    Arrhythmia     irregular heartbeat    Arthritis     CHF (congestive heart failure) ([facility]) 3/3/2010    Chronic pain     back    Chronic sinusitis 3/3/2010    CPAP (continuous positive airway pressure) dependence     Diverticulosis     DM (diabetes mellitus) ([address] 3/3/2010    Dyslipidemia 3/3/2010    GERD (gastroesophageal reflux disease)     HTN (hypertension) 3/3/2010    Ill-defined condition     being evaluated py pulmonolgist for \"trouble breathing\"    S/P TKR (total knee replacement) 3/3/2010    Unspecified sleep apnea     doesn't wear CPAP since back has been hurting       Past Surgical History:   Procedure Laterality Date    HX APPENDECTOMY      thinks it was taken with hysterectomy    HX GYN      \"tubes opened\"    HX HEENT      sinus surgery    HX HEMORRHOIDECTOMY      HX HYSTERECTOMY      HX KNEE REPLACEMENT  1996    both knees- at same time    HX LUMBAR LAMINECTOMY  1980s    HX MOHS PROCEDURES      left shoulder    HX ORTHOPAEDIC  1980    neck fusion    HX ORTHOPAEDIC  1999    right knee replaced for second time    HX ORTHOPAEDIC      lumbar fusion    HX OTHER SURGICAL      CORNELL BIOPSY BREAST STEREOTACTIC Left yrs ago    (-)    MA COLONOSCOPY FLX DX W/COLLJ SPEC WHEN PFRMD  [id number]    dr. Preston Laguna (barium enema)       Family History   Problem Relation Age of Onset    Diabetes Mother     Heart Disease Father     Diabetes Brother     Blindness Brother     Diabetes Sister     Heart Disease Sister     Heart Disease Brother     Heart Disease Brother     Asthma Brother     Malignant Hyperthermia Neg Hx     Pseudocholinesterase Deficiency Neg Hx     Delayed Awakening Neg Hx     Post-op Nausea/Vomiting Neg Hx     Emergence Delirium Neg Hx     Post-op Cognitive Dysfunction Neg Hx        Social History   Substance Use Topics    Smoking status: Former Smoker     Packs/day: 0.30     Years: 5.00     Quit date: 1/1/1960    Smokeless tobacco: Never Used    Alcohol use No          ROS    Physical Exam   Constitutional: She appears well-developed and well-nourished. /71 (BP 1 Location: Right arm, BP Patient Position: Sitting)  Pulse 77  Temp 95.5 °F (35.3 °C) (Oral)   Resp 22  Ht 5' 4\" (1.626 m)  Wt 214 lb (97.1 kg)  LMP 03/04/1961 (Approximate)  SpO2 95%  BMI 36.73 kg/m2     HENT:   Right Ear: Tympanic membrane and ear canal normal.   Left Ear: Tympanic membrane and ear canal normal.   Nose: Mucosal edema and rhinorrhea present. Mouth/Throat: Mucous membranes are normal. Posterior oropharyngeal erythema present. No oropharyngeal exudate. Neck: Trachea normal, normal range of motion and full passive range of motion without pain. Neck supple. No edema present. No thyromegaly present. Cardiovascular: Normal rate and regular rhythm. Pulmonary/Chest: Effort normal. She has no decreased breath sounds. She has wheezes. She has rhonchi. She has no rales. Abdominal: Soft. Normal appearance and bowel sounds are normal. There is no tenderness. Lymphadenopathy:     She has no cervical adenopathy. Nursing note and vitals reviewed. ASSESSMENT and PLAN  Diagnoses and all orders for this visit:    1. Acute bronchitis, unspecified organism  Xray not available so will cover for possible PNA. -     amoxicillin-clavulanate (AUGMENTIN) 500-125 mg per tablet; Take 1 Tab by mouth two (2) times a day for 10 days. -     azithromycin (ZITHROMAX) 250 mg tablet; Take 2 tablets today, then take 1 tablet daily    2. Mild intermittent asthma with exacerbation  -     AMB POC COMPLETE CBC, AUTOMATED  -     predniSONE (STERAPRED DS) 10 mg dose pack; See administration instruction per 10mg dose pack    3.  Type 2 diabetes with nephropathy (HCC)  -     AMB POC GLUCOSE, QUANTITATIVE, BLOOD      Follow-up Disposition:  Return in about 3 days (around 6/29/2018). reviewed medications and side effects in detail    I have discussed diagnosis listed in this note with pt and/or family. I have discussed treatment plans and options and the risk/benefit analysis of those options, including safe use of medications and possible medication side effects. Through the use of shared decision making we have agreed to the above plan. The patient has received an after-visit summary and questions were answered concerning future plans and follow up. Advise pt of any urgent changes then to proceed to the ER.

## 2018-06-27 RX ORDER — DEXAMETHASONE SODIUM PHOSPHATE 10 MG/ML
10 INJECTION INTRAMUSCULAR; INTRAVENOUS ONCE
Qty: 1 ML | Refills: 0
Start: 2018-06-27 | End: 2018-06-27

## 2018-06-29 ENCOUNTER — OFFICE VISIT (OUTPATIENT)
Dept: FAMILY MEDICINE CLINIC | Age: 83
End: 2018-06-29

## 2018-06-29 VITALS
TEMPERATURE: 97 F | RESPIRATION RATE: 18 BRPM | BODY MASS INDEX: 36.37 KG/M2 | HEART RATE: 67 BPM | WEIGHT: 213 LBS | HEIGHT: 64 IN | SYSTOLIC BLOOD PRESSURE: 136 MMHG | DIASTOLIC BLOOD PRESSURE: 64 MMHG | OXYGEN SATURATION: 95 %

## 2018-06-29 DIAGNOSIS — Z23 ENCOUNTER FOR IMMUNIZATION: ICD-10-CM

## 2018-06-29 DIAGNOSIS — J20.9 ACUTE BRONCHITIS, UNSPECIFIED ORGANISM: ICD-10-CM

## 2018-06-29 DIAGNOSIS — J45.21 MILD INTERMITTENT ASTHMA WITH EXACERBATION: Primary | ICD-10-CM

## 2018-06-29 NOTE — PROGRESS NOTES
Chief Complaint   Patient presents with    Cough     2 day follow up cough and wheezing     Patient denies pain a this time. 1. Have you been to the ER, urgent care clinic since your last visit? Hospitalized since your last visit? No    2. Have you seen or consulted any other health care providers outside of the [address] [place] [address] since your last visit? Include any pap smears or colon screening.  No

## 2018-06-29 NOTE — MR AVS SNAPSHOT
[facility] 
 
 
 [address] [facility] [address] 
[phone] Patient: Kishore Johnston MRN: [id number] QSK:4[birth year]61 Visit Information Date & Time Provider Department Dept. Phone Encounter #  
 6/29/2018 11:45 AM Nataliia Good MD [facility] [phone] [id number] Your Appointments 9/17/2018  2:00 PM  
ROUTINE CARE with Nataliia Good MD  
[address]) Appt Note: 3  mnth   
 [address] [facility] [address]  
[phone] [address] [place] [address] [address] 4/17/2019 11:15 AM  
ESTABLISHED PATIENT with Wilbur Blizzard, MD  
[facility] [address] [place] [address]) Appt Note: annual f/u  
 [address] [facility] [place], [place] [address]  
[phone] [address] [facility] [place] [place] [address] Upcoming Health Maintenance Date Due  
 FOOT EXAM Q1 7/6/2018 Influenza Age 5 to Adult 8/1/2018 EYE EXAM RETINAL OR DILATED Q1 10/2/2018 HEMOGLOBIN A1C Q6M 10/11/2018 MEDICARE YEARLY EXAM 10/24/2018 MICROALBUMIN Q1 1/8/2019 LIPID PANEL Q1 1/8/2019 GLAUCOMA SCREENING Q2Y 10/2/2019 DTaP/Tdap/Td series (2 - Td) 9/14/2022 Allergies as of 6/29/2018  Review Complete On: 6/29/2018 By: Glady Romberg, LPN Severity Noted Reaction Type Reactions Gabapentin  10/12/2016    Other (comments) Muscles twitching and jerking Levaquin [Levofloxacin]  03/09/2012    Swelling Lyrica [Pregabalin]  10/31/2017    Other (comments) Muscle twitching and jerking Percodan [Oxycodone Hcl-oxycodone-asa]  03/09/2012    Itching Current Immunizations  Reviewed on 4/11/2018 Name Date Influenza High Dose Vaccine PF 9/8/2017, 9/16/2016, 8/25/2015 Influenza Vaccine  Deferred (Patient Refused), 9/30/2014, 9/20/2013 Influenza Vaccine Split 8/31/2012, 9/13/2010 Pneumococcal Conjugate (PCV-13) 8/25/2015 Pneumococcal Vaccine (Pcv) 10/30/2009 TDAP Vaccine 9/14/2012 Zoster Vaccine, Live 3/19/2013 Not reviewed this visit Vitals BP Pulse Temp Resp Height(growth percentile) Weight(growth percentile) 136/64 (BP 1 Location: Left arm, BP Patient Position: Sitting) 67 97 °F (36.1 °C) (Oral) 18 5' 4\" (1.626 m) 213 lb (96.6 kg) LMP SpO2 BMI OB Status Smoking Status 03/04/1961 (Approximate) 95% 36.56 kg/m2 Hysterectomy Former Smoker BMI and BSA Data Body Mass Index Body Surface Area  
 36.56 kg/m 2 2.09 m 2 Preferred Pharmacy Pharmacy Name Phone [address] [facility], [address] [facility] [phone] Your Updated Medication List  
  
   
This list is accurate as of 6/29/18 12:25 PM.  Always use your most recent med list.  
  
  
  
  
 * albuterol 2.5 mg /3 mL (0.083 %) nebulizer solution Commonly known as:  PROVENTIL VENTOLIN  
INHALE THE CONTENTS OF ONE VIAL VIA NEBULIZER EVERY 4 HOURS AS NEEDED FOR WHEEZING  
  
 * albuterol 90 mcg/actuation inhaler Commonly known as:  VENTOLIN HFA INHALE 2 PUFFS BY MOUTH EVERY 4 HOURS AS NEEDED FOR WHEEZING. ALPRAZolam 0.5 mg tablet Commonly known as:  XANAX  
TAKE 1/2 TO 1 TABLET BY MOUTH EVERY NIGHT AT BEDTIME AS NEEDED FOR SLEEP  
  
 amoxicillin-clavulanate 500-125 mg per tablet Commonly known as:  AUGMENTIN Take 1 Tab by mouth two (2) times a day for 10 days. azithromycin 250 mg tablet Commonly known as:  Pradip Jackson Take 2 tablets today, then take 1 tablet daily  
  
 carvedilol 25 mg tablet Commonly known as:  Ryan Dole Take 1 Tab by mouth two (2) times daily (with meals). cyanocobalamin 1,000 mcg tablet Take 1,000 mcg by mouth daily. DULoxetine 60 mg capsule Commonly known as:  CYMBALTA Take 60 mg by mouth daily. fluticasone 50 mcg/actuation nasal spray Commonly known as:  Babatunde Shyann SHAKE LIQUID AND USE 2 SPRAYS IN EACH NOSTRIL EVERY DAY  
  
 glipiZIDE SR 2.5 mg CR tablet Commonly known as:  GLUCOTROL XL  
TAKE ONE TABLET BY MOUTH DAILY HYDROcodone-acetaminophen  mg tablet Commonly known as:  Aparna Pare Take 1 Tab by mouth every six (6) hours as needed. hydroxychloroquine 200 mg tablet Commonly known as:  PLAQUENIL  
200 mg two (2) times a day. LASIX 20 mg tablet Generic drug:  furosemide Take 40 mg by mouth daily. lisinopril 2.5 mg tablet Commonly known as:  Zuleta Linwood Take 2.5 mg by mouth daily. MUCINEX D  mg per tablet Generic drug:  PSEUDOEPHEDRINE-guaiFENesin Take 1 Tab by mouth daily as needed. pantoprazole 40 mg tablet Commonly known as:  PROTONIX  
TAKE 1 TABLET BY MOUTH ONCE DAILY * predniSONE 5 mg tablet Commonly known as:  Leverne Jane Take 3 tabs by mouth daily * predniSONE 10 mg dose pack Commonly known as:  STERAPRED DS See administration instruction per 10mg dose pack  
  
 promethazine-dextromethorphan 6.25-15 mg/5 mL syrup Commonly known as:  PROMETHAZINE-DM  
TAKE ONE TEASPOONFUL BY MOUTH EVERY SIX HOURS AS NEEDED FOR COUGH  
  
 simvastatin 40 mg tablet Commonly known as:  ZOCOR  
TAKE 1 TABLET BY MOUTH EVERY NIGHT AT BEDTIME  
  
 triamcinolone 55 mcg nasal inhaler Commonly known as:  NASACORT  
2 Sprays by Both Nostrils route daily. * Notice: This list has 4 medication(s) that are the same as other medications prescribed for you. Read the directions carefully, and ask your doctor or other care provider to review them with you. Introducing [facility]! Ava Metcalf introduces [employer] patient portal. Now you can access parts of your medical record, email your doctor's office, and request medication refills online. 1. In your internet browser, go to [email or web address] 2. Click on the First Time User? Click Here link in the Sign In box. You will see the New Member Sign Up page. 3. Enter your PostedIn Access Code exactly as it appears below. You will not need to use this code after youve completed the sign-up process. If you do not sign up before the expiration date, you must request a new code. · PostedIn Access Code: [email or web address] Expires: 7/19/2018  2:17 PM 
 
4. Enter the last four digits of your Social Security Number (xxxx) and Date of Birth (mm/dd/yyyy) as indicated and click Submit. You will be taken to the next sign-up page. 5. Create a PostedIn ID. This will be your PostedIn login ID and cannot be changed, so think of one that is secure and easy to remember. 6. Create a PostedIn password. You can change your password at any time. 7. Enter your Password Reset Question and Answer. This can be used at a later time if you forget your password. 8. Enter your e-mail address. You will receive e-mail notification when new information is available in [address] 9. Click Sign Up. You can now view and download portions of your medical record. 10. Click the Download Summary menu link to download a portable copy of your medical information. If you have questions, please visit the Frequently Asked Questions section of the PostedIn website. Remember, PostedIn is NOT to be used for urgent needs. For medical emergencies, dial 911. Now available from your iPhone and Android! Please provide this summary of care documentation to your next provider. Your primary care clinician is listed as Vick Blanc. If you have any questions after today's visit, please call [phone].

## 2018-06-29 NOTE — PROGRESS NOTES
HISTORY OF PRESENT ILLNESS  Keaton Bee is a 80 y.o. female. HPI   Follow on congestion and cough. Overall feeling better. Cough is better. Still wheezing but better. No fever or chills. Cough at night is improved. No chest pain and no SOB. BS did elevate to the low 200s. Seen by pulmonary on yesterday and added asmanex. Patient Active Problem List   Diagnosis Code    CHF (congestive heart failure) ([facility]) I50.9    S/P TKR (total knee replacement) Z96.659    Anemia D64.9    s/p lumbar laminectomy Z98.890    S/P ASAD (total abdominal hysterectomy) Z90.710    S/P hemorrhoidectomy Z98.890, Z87.19    S/P rotator cuff surgery Z98.890    [facility] mellitus[facility] E11.9    Chronic sinusitis J32.9    S/P sinus surgery Z98.890    Dyslipidemia E78.5    Environmental allergies Z91.09    Obstructive sleep apnea (adult) (pediatric) G47.33    DJD (degenerative joint disease) M19.90    Chronic systolic heart failure ([facility]) I50.22    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    Asthma J45.909    Anxiety disorder F41.9    Diabetic neuropathy ([facility]) E11.40    Esophageal reflux K21.9    Essential hypertension, benign I10    Reactive airway disease with wheezing without complication Z31.334    Encounter for medication monitoring Z51.81    CKD (chronic kidney disease) N18.9    Arthralgia of multiple joints M25.50    Plantar fasciitis of left foot M72.2    Type 2 diabetes with nephropathy ([facility]) E11.21    Severe obesity (BMI 35.0-39. 9) with comorbidity ([facility]) E66.01    Polymyalgia rheumatica ([facility]) M35.3       Current Outpatient Prescriptions   Medication Sig Dispense Refill    amoxicillin-clavulanate (AUGMENTIN) 500-125 mg per tablet Take 1 Tab by mouth two (2) times a day for 10 days.  20 Tab 0    azithromycin (ZITHROMAX) 250 mg tablet Take 2 tablets today, then take 1 tablet daily 6 Tab 0    predniSONE (STERAPRED DS) 10 mg dose pack See administration instruction per 10mg dose pack 21 Tab 0    triamcinolone (NASACORT) 55 mcg nasal inhaler 2 Sprays by Both Nostrils route daily. 1 Bottle 6    ALPRAZolam (XANAX) 0.5 mg tablet TAKE 1/2 TO 1 TABLET BY MOUTH EVERY NIGHT AT BEDTIME AS NEEDED FOR SLEEP 30 Tab 3    carvedilol (COREG) 25 mg tablet Take 1 Tab by mouth two (2) times daily (with meals). 180 Tab 3    hydroxychloroquine (PLAQUENIL) 200 mg tablet 200 mg two (2) times a day.  furosemide (LASIX) 20 mg tablet Take 40 mg by mouth daily.  lisinopril (PRINIVIL, ZESTRIL) 2.5 mg tablet Take 2.5 mg by mouth daily.  DULoxetine (CYMBALTA) 60 mg capsule Take 60 mg by mouth daily.  glipiZIDE SR (GLUCOTROL XL) 2.5 mg CR tablet TAKE ONE TABLET BY MOUTH DAILY 90 Tab 3    fluticasone (FLONASE) 50 mcg/actuation nasal spray SHAKE LIQUID AND USE 2 SPRAYS IN EACH NOSTRIL EVERY DAY 1 Bottle 11    cyanocobalamin 1,000 mcg tablet Take 1,000 mcg by mouth daily.  albuterol (VENTOLIN HFA) 90 mcg/actuation inhaler INHALE 2 PUFFS BY MOUTH EVERY 4 HOURS AS NEEDED FOR WHEEZING. 1 Inhaler 11    albuterol (PROVENTIL VENTOLIN) 2.5 mg /3 mL (0.083 %) nebulizer solution INHALE THE CONTENTS OF ONE VIAL VIA NEBULIZER EVERY 4 HOURS AS NEEDED FOR WHEEZING 1650 Each 3    pantoprazole (PROTONIX) 40 mg tablet TAKE 1 TABLET BY MOUTH ONCE DAILY 90 Tab 3    promethazine-dextromethorphan (PROMETHAZINE-DM) 6.25-15 mg/5 mL syrup TAKE ONE TEASPOONFUL BY MOUTH EVERY SIX HOURS AS NEEDED FOR COUGH 240 mL 1    HYDROcodone-acetaminophen (NORCO)  mg tablet Take 1 Tab by mouth every six (6) hours as needed. 60 Tab 0    PSEUDOEPHEDRINE-guaiFENesin (MUCINEX D)  mg per tablet Take 1 Tab by mouth daily as needed.       predniSONE (DELTASONE) 5 mg tablet Take 3 tabs by mouth daily  1    simvastatin (ZOCOR) 40 mg tablet TAKE 1 TABLET BY MOUTH EVERY NIGHT AT BEDTIME 90 Tab 0       Allergies   Allergen Reactions    Gabapentin Other (comments)     Muscles twitching and jerking    Levaquin [Levofloxacin] Swelling    Lyrica [Pregabalin] Other (comments)     Muscle twitching and jerking    Percodan [Oxycodone Hcl-Oxycodone-Asa] Itching       Past Medical History:   Diagnosis Date    Anemia 3/3/2010    Arrhythmia     irregular heartbeat    Arthritis     CH[facility]estive [facility] failure) ([facility] 3/3/2010    Chronic pain     back    Chronic sinusitis 3/3/2010    CPAP (continuous positive airway pressure) dependence     Diverticulosis     DM (diabetes mellitus) ([facility] 3/3/2010    Dyslipidemia 3/3/2010    GERD (gastroesophageal reflux disease)     HTN (hypertension) 3/3/2010    Ill-defined condition     being evaluated py pulmonolgist for \"trouble breathing\"    S/P TKR (total knee replacement) 3/3/2010    Unspecified sleep apnea     doesn't wear CPAP since back has been hurting       Past Surgical History:   Procedure Laterality Date    HX APPENDECTOMY      thinks it was taken with hysterectomy    HX GYN      \"tubes opened\"    HX HEENT      sinus surgery    HX HEMORRHOIDECTOMY      HX HYSTERECTOMY      HX KNEE REPLACEMENT  1996    both knees- at same time    HX LUMBAR LAMINECTOMY  1980s    HX MOHS PROCEDURES      left shoulder    HX ORTHOPAEDIC  1980    neck fusion    HX ORTHOPAEDIC  1999    right knee replaced for second time    HX ORTHOPAEDIC      lumbar fusion    HX OTHER SURGICAL      CORNELL BIOPSY BREAST STEREOTACTIC Left yrs ago    (-)    [place] COLONOSCOPY FLX DX W/COLLJ SPEC WHEN PFRMD  [id number]    dr. Yasir Wu (barium enema)       Family History   Problem Relation Age of Onset    Diabetes Mother     Heart Disease Father     Diabetes Brother     Blindness Brother     Diabetes Sister     Heart Disease Sister     Heart Disease Brother     Heart Disease Brother     Asthma Brother     Malignant Hyperthermia Neg Hx     Pseudocholinesterase Deficiency Neg Hx     Delayed Awakening Neg Hx     Post-op Nausea/Vomiting Neg Hx     Emergence Delirium Neg Hx     Post-op Cognitive Dysfunction Neg Hx        Social History   Substance Use Topics    Smoking status: Former Smoker     Packs/day: 0.30     Years: 5.00     Quit date: 1/1/1960    Smokeless tobacco: Never Used    Alcohol use No        Lab Results  Component Value Date/Time   WBC 5.2 11/14/2017 12:39 PM   HGB 11.0 (L) 11/14/2017 12:39 PM   HCT 33.9 (L) 11/14/2017 12:39 PM   PLATELET 421 95/06/2738 12:39 PM   MCV 96 11/14/2017 12:39 PM     Lab Results  Component Value Date/Time   Cholesterol, total 175 01/08/2018 10:54 AM   HDL Cholesterol 71 01/08/2018 10:54 AM   LDL, calculated 81 01/08/2018 10:54 AM   Triglyceride 117 01/08/2018 10:54 AM   CHOL/HDL Ratio 2.0 06/11/2010 02:10 PM     Lab Results  Component Value Date/Time   TSH 2.510 10/05/2016 01:13 PM      Lab Results   Component Value Date/Time    Sodium 145 (H) 06/15/2018 12:38 PM    Potassium 4.5 06/15/2018 12:38 PM    Chloride 105 06/15/2018 12:38 PM    CO2 29 06/15/2018 12:38 PM    Anion gap 3 (L) 12/17/2015 11:47 AM    Glucose 142 (H) 06/15/2018 12:38 PM    BUN 33 (H) 06/15/2018 12:38 PM    Creatinine 1.66 (H) 06/15/2018 12:38 PM    BUN/Creatinine ratio 20 06/15/2018 12:38 PM    GFR est AA 32 (L) 06/15/2018 12:38 PM    GFR est non-AA 28 (L) 06/15/2018 12:38 PM    Calcium 9.3 06/15/2018 12:38 PM    Bilirubin, total 0.3 04/11/2018 03:56 PM    ALT (SGPT) 19 04/11/2018 03:56 PM    AST (SGOT) 21 04/11/2018 03:56 PM    Alk. phosphatase 64 04/11/2018 03:56 PM    Protein, total 6.8 04/11/2018 03:56 PM    Albumin 4.3 04/11/2018 03:56 PM    Globulin 3.4 12/17/2015 11:47 AM    A-G Ratio 1.7 04/11/2018 03:56 PM      Lab Results   Component Value Date/Time    Hemoglobin A1c 6.4 (H) 09/08/2017 11:09 AM    Hemoglobin A1c (POC) 6.5 04/11/2018 03:38 PM         ROS    Physical Exam   Constitutional: She appears well-developed and well-nourished.    /64 (BP 1 Location: Left arm, BP Patient Position: Sitting)  Pulse 67  Temp 97 °F (36.1 °C) (Oral)   Resp 18  Ht 5' 4\" (1.626 m)  Wt 213 lb (96.6 kg)  LMP 03/04/1961 (Approximate)  SpO2 95%   L/min  BMI 36.56 kg/m2     HENT:   Right Ear: Tympanic membrane and ear canal normal.   Left Ear: Tympanic membrane and ear canal normal.   Nose: Mucosal edema and rhinorrhea present. Mouth/Throat: Mucous membranes are normal. Posterior oropharyngeal erythema present. No oropharyngeal exudate. Neck: Trachea normal, normal range of motion and full passive range of motion without pain. Neck supple. No edema present. No thyromegaly present. Cardiovascular: Normal rate and regular rhythm. Pulmonary/Chest: Effort normal and breath sounds normal. She has no wheezes. She has no rales. Abdominal: Soft. Normal appearance and bowel sounds are normal. There is no tenderness. Lymphadenopathy:     She has no cervical adenopathy. Nursing note and vitals reviewed. ASSESSMENT and PLAN  Diagnoses and all orders for this visit:    1. Mild intermittent asthma with exacerbation//  2. Acute bronchitis, unspecified organism  Improving. Continue with current treatment.         Follow-up Disposition:   reviewed medications and side effects in detail

## 2018-07-02 ENCOUNTER — TELEPHONE (OUTPATIENT)
Dept: FAMILY MEDICINE CLINIC | Age: 83
End: 2018-07-02

## 2018-07-02 NOTE — TELEPHONE ENCOUNTER
Spoke to patient and states she is starting to feel a little better. Informed patient to call if symptoms got worse.  Patient verbalized understanding    Dr. Sharron Fitch made aware

## 2018-07-02 NOTE — TELEPHONE ENCOUNTER
----- Message from Claude Combe, MD sent at 6/29/2018  1:53 PM EDT -----  Call on Monday and check to see if she is better.

## 2018-07-20 ENCOUNTER — OFFICE VISIT (OUTPATIENT)
Dept: FAMILY MEDICINE CLINIC | Age: 83
End: 2018-07-20

## 2018-07-20 VITALS
HEART RATE: 74 BPM | HEIGHT: 64 IN | DIASTOLIC BLOOD PRESSURE: 57 MMHG | WEIGHT: 214 LBS | RESPIRATION RATE: 18 BRPM | BODY MASS INDEX: 36.54 KG/M2 | TEMPERATURE: 98.5 F | SYSTOLIC BLOOD PRESSURE: 109 MMHG | OXYGEN SATURATION: 96 %

## 2018-07-20 DIAGNOSIS — J45.21 MILD INTERMITTENT ASTHMA WITH ACUTE EXACERBATION: Primary | ICD-10-CM

## 2018-07-20 DIAGNOSIS — J20.9 ACUTE BRONCHITIS, UNSPECIFIED ORGANISM: ICD-10-CM

## 2018-07-20 RX ORDER — PREDNISONE 1 MG/1
TABLET ORAL
Refills: 1 | COMMUNITY
Start: 2018-07-17 | End: 2018-07-20 | Stop reason: ALTCHOICE

## 2018-07-20 NOTE — PROGRESS NOTES
HISTORY OF PRESENT ILLNESS  Kimberly Hansen is a 80 y.o. female.   HPI  {Choose one or more HPI Chronic Disease Notes, press DELETE if none desired:85809}  {Choose one or more SmartLinks; press DELETE if none desired:[id number]}   {Choose one or more Last Lab values; press DELETE if none desired:[id number]}     ROS    Physical Exam    ASSESSMENT and PLAN  {ASSESSMENT/PLAN:56556}

## 2018-07-20 NOTE — PROGRESS NOTES
HISTORY OF PRESENT ILLNESS  Shefali Vickers is a 80 y.o. female. HPI   Follow on congestion and cough. Overall feeling better. Cough is resolved. No wheezing. No fever or chills. No chest pain and no SOB. BS back down in the low to mid 100s. Patient Active Problem List   Diagnosis Code    CHF (congestive heart failure) ([facility]) I50.9    S/P TKR (total knee replacement) Z96.659    Anemia D64.9    s/p lumbar laminectomy Z98.890    S/P ASAD (total abdominal hysterectomy) Z90.710    S/P hemorrhoidectomy Z98.890, Z87.19    S/P rotator cuff surgery Z98.890    DM (diabetes mellitus) ([facility] E11.9    Chronic sinusitis J32.9    S/P sinus surgery Z98.890    Dyslipidemia E78.5    Environmental allergies Z91.09    Obstructive sleep apnea (adult) (pediatric) G47.33    DJD (degenerative joint disease) M19.90    Chronic systolic heart failure ([facility]) I50.22    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    Asthma J45.909    Anxiety disorder F41.9    Diabetic neuropathy ([facility]) E11.40    Esophageal reflux K21.9    Essential hypertension, benign I10    Reactive airway disease with wheezing without complication N08.420    Encounter for medication monitoring Z51.81    CKD (chronic kidney disease) N18.9    Arthralgia of multiple joints M25.50    Plantar fasciitis of left foot M72.2    Type 2 diabetes with nephropathy ([facility]) E11.21    Severe obesity (BMI 35.0-39. 9) with comorbidity ([facility]) E66.01    Polymyalgia rheumatica ([facility]) M35.3       Current Outpatient Prescriptions   Medication Sig Dispense Refill    docusate sodium (STOOL SOFTENER PO) Take 1 Tab by mouth two (2) times a day.  triamcinolone (NASACORT) 55 mcg nasal inhaler 2 Sprays by Both Nostrils route daily.  (Patient taking differently: 2 Sprays by Both Nostrils route daily as needed.) 1 Bottle 6    ALPRAZolam (XANAX) 0.5 mg tablet TAKE 1/2 TO 1 TABLET BY MOUTH EVERY NIGHT AT BEDTIME AS NEEDED FOR SLEEP 30 Tab 3    predniSONE (DELTASONE) 5 mg tablet Take 10 mg by mouth daily. 1    carvedilol (COREG) 25 mg tablet Take 1 Tab by mouth two (2) times daily (with meals). 180 Tab 3    simvastatin (ZOCOR) 40 mg tablet TAKE 1 TABLET BY MOUTH EVERY NIGHT AT BEDTIME 90 Tab 0    hydroxychloroquine (PLAQUENIL) 200 mg tablet Take 200 mg by mouth two (2) times a day.  furosemide (LASIX) 20 mg tablet Take 40 mg by mouth daily.  lisinopril (PRINIVIL, ZESTRIL) 2.5 mg tablet Take 2.5 mg by mouth daily.  DULoxetine (CYMBALTA) 60 mg capsule Take 60 mg by mouth daily.  glipiZIDE SR (GLUCOTROL XL) 2.5 mg CR tablet TAKE ONE TABLET BY MOUTH DAILY 90 Tab 3    fluticasone (FLONASE) 50 mcg/actuation nasal spray SHAKE LIQUID AND USE 2 SPRAYS IN EACH NOSTRIL EVERY DAY 1 Bottle 11    cyanocobalamin 1,000 mcg tablet Take 1,000 mcg by mouth daily.  albuterol (VENTOLIN HFA) 90 mcg/actuation inhaler INHALE 2 PUFFS BY MOUTH EVERY 4 HOURS AS NEEDED FOR WHEEZING. 1 Inhaler 11    albuterol (PROVENTIL VENTOLIN) 2.5 mg /3 mL (0.083 %) nebulizer solution INHALE THE CONTENTS OF ONE VIAL VIA NEBULIZER EVERY 4 HOURS AS NEEDED FOR WHEEZING 1650 Each 3    pantoprazole (PROTONIX) 40 mg tablet TAKE 1 TABLET BY MOUTH ONCE DAILY 90 Tab 3    promethazine-dextromethorphan (PROMETHAZINE-DM) 6.25-15 mg/5 mL syrup TAKE ONE TEASPOONFUL BY MOUTH EVERY SIX HOURS AS NEEDED FOR COUGH 240 mL 1    HYDROcodone-acetaminophen (NORCO)  mg tablet Take 1 Tab by mouth every six (6) hours as needed. 60 Tab 0    PSEUDOEPHEDRINE-guaiFENesin (MUCINEX D)  mg per tablet Take 1 Tab by mouth daily.          Allergies   Allergen Reactions    Gabapentin Other (comments)     Muscles twitching and jerking    Levaquin [Levofloxacin] Swelling    Lyrica [Pregabalin] Other (comments)     Muscle twitching and jerking    Percodan [Oxycodone Hcl-Oxycodone-Asa] Itching         Past Medical History:   Diagnosis Date    Anemia 3/3/2010    Arrhythmia     irregular heartbeat    Arthritis  CHF (congestive heart failure) ([facility] 3/3/2010    Chronic pain     back    Chronic sinusitis 3/3/2010    CPAP (continuous positive airway pressure) dependence     Diverticulosis     DM (diabetes mellitus) ([facility] 3/3/2010    Dyslipidemia 3/3/2010    GERD (gastroesophageal reflux disease)     HTN (hypertension) 3/3/2010    Ill-defined condition     being evaluated py pulmonolgist for \"trouble breathing\"    S/P TKR (total knee replacement) 3/3/2010    Unspecified sleep apnea     doesn't wear CPAP since back has been hurting         Past Surgical History:   Procedure Laterality Date    HX APPENDECTOMY      thinks it was taken with hysterectomy    HX GYN      \"tubes opened\"    HX HEENT      sinus surgery    HX HEMORRHOIDECTOMY      HX HYSTERECTOMY      HX KNEE REPLACEMENT  1996    both knees- at same time    HX LUMBAR LAMINECTOMY  1980s    HX MOHS PROCEDURES      left shoulder    HX ORTHOPAEDIC  1980    neck fusion    HX ORTHOPAEDIC  1999    right knee replaced for second time    HX ORTHOPAEDIC      lumbar fusion    HX OTHER SURGICAL      CORNELL BIOPSY BREAST STEREOTACTIC Left yrs ago    (-)    WI COLONOSCOPY FLX DX W/COLLJ SPEC WHEN PFRMD  [id number]    dr. Orlando Linton (barium enema)         Family History   Problem Relation Age of Onset    Diabetes Mother     Heart Disease Father     Diabetes Brother     Blindness Brother     Diabetes Sister     Heart Disease Sister     Heart Disease Brother     Heart Disease Brother     Asthma Brother     Malignant Hyperthermia Neg Hx     Pseudocholinesterase Deficiency Neg Hx     Delayed Awakening Neg Hx     Post-op Nausea/Vomiting Neg Hx     Emergence Delirium Neg Hx     Post-op Cognitive Dysfunction Neg Hx        Social History   Substance Use Topics    Smoking status: Former Smoker     Packs/day: 0.30     Years: 5.00     Quit date: 1/1/1960    Smokeless tobacco: Never Used    Alcohol use No        Lab Results   Component Value Date/Time    WBC 5.2 11/14/2017 12:39 PM    HGB 11.0 (L) 11/14/2017 12:39 PM    HCT 33.9 (L) 11/14/2017 12:39 PM    PLATELET 661 29/61/1632 12:39 PM    MCV 96 11/14/2017 12:39 PM     Lab Results   Component Value Date/Time    Cholesterol, total 175 01/08/2018 10:54 AM    HDL Cholesterol 71 01/08/2018 10:54 AM    LDL, calculated 81 01/08/2018 10:54 AM    Triglyceride 117 01/08/2018 10:54 AM    CHOL/HDL Ratio 2.0 06/11/2010 02:10 PM     Lab Results   Component Value Date/Time    TSH 2.510 10/05/2016 01:13 PM      Lab Results   Component Value Date/Time    Sodium 145 (H) 06/15/2018 12:38 PM    Potassium 4.5 06/15/2018 12:38 PM    Chloride 105 06/15/2018 12:38 PM    CO2 29 06/15/2018 12:38 PM    Anion gap 3 (L) 12/17/2015 11:47 AM    Glucose 142 (H) 06/15/2018 12:38 PM    BUN 33 (H) 06/15/2018 12:38 PM    Creatinine 1.66 (H) 06/15/2018 12:38 PM    BUN/Creatinine ratio 20 06/15/2018 12:38 PM    GFR est AA 32 (L) 06/15/2018 12:38 PM    GFR est non-AA 28 (L) 06/15/2018 12:38 PM    Calcium 9.3 06/15/2018 12:38 PM    Bilirubin, total 0.3 04/11/2018 03:56 PM    ALT (SGPT) 19 04/11/2018 03:56 PM    AST (SGOT) 21 04/11/2018 03:56 PM    Alk. phosphatase 64 04/11/2018 03:56 PM    Protein, total 6.8 04/11/2018 03:56 PM    Albumin 4.3 04/11/2018 03:56 PM    Globulin 3.4 12/17/2015 11:47 AM    A-G Ratio 1.7 04/11/2018 03:56 PM      Lab Results   Component Value Date/Time    Hemoglobin A1c 6.4 (H) 09/08/2017 11:09 AM    Hemoglobin A1c (POC) 6.5 04/11/2018 03:38 PM         ROS    Physical Exam   Constitutional: She appears well-developed and well-nourished.    /57 (BP 1 Location: Right arm, BP Patient Position: Sitting)  Pulse 74  Temp 98.5 °F (36.9 °C) (Oral)   Resp 18  Ht 5' 4\" (1.626 m)  Wt 214 lb (97.1 kg)  LMP 03/04/1961 (Approximate)  SpO2 96%   L/min  BMI 36.73 kg/m2     HENT:   Right Ear: Tympanic membrane and ear canal normal.   Left Ear: Tympanic membrane and ear canal normal.   Nose: clear and moist.    Mouth/Throat: Mucous membranes are normal.  No oropharyngeal exudate. Neck: Neck supple. No edema present. No thyromegaly present. Cardiovascular: Normal rate and regular rhythm. Pulmonary/Chest: Effort normal and breath sounds normal. She has no wheezes. She has no rales. Abdominal: Soft. Normal appearance and bowel sounds are normal. There is no tenderness. Lymphadenopathy:     She has no cervical adenopathy. Nursing note and vitals reviewed. ASSESSMENT and PLAN  Diagnoses and all orders for this visit:    1. Mild intermittent asthma with exacerbation//  2. Acute bronchitis, unspecified organism  Improved. Follow up PRN. Follow-up Disposition:     I have discussed diagnosis listed in this note with pt and/or family. I have discussed treatment plans and options and the risk/benefit analysis of those options, including safe use of medications and possible medication side effects. Through the use of shared decision making we have agreed to the above plan. The patient has received an after-visit summary and questions were answered concerning future plans and follow up. Advise pt of any urgent changes then to proceed to the ER.

## 2018-07-20 NOTE — MR AVS SNAPSHOT
[facility] 
 
 
 [address] [id number] [phone] Patient: Val Becerril MRN: [id number] Visit Information Date & Time Provider Department Dept. Phone Encounter #  
 2018 11:45 AM David Morales MD [facility] [phone] [id number] Your Appointments 2018  2:00 PM  
ROUTINE CARE with David Morales MD  
[facility]) Appt Note: 3  mnth fu  
 [address] [id number]  
[phone] [address] [facility] [address] 2019 11:15 AM  
ESTABLISHED PATIENT with Yissel Wakefield MD  
[facility]) Appt Note: annual f/u  
 [id number] [facility]  
[phone] [id number] [facility] Upcoming Health Maintenance Date Due  
 FOOT EXAM Q1 2018 Influenza Age 5 to Adult 2018 EYE EXAM RETINAL OR DILATED Q1 10/2/2018 HEMOGLOBIN A1C Q6M 10/11/2018 MEDICARE YEARLY EXAM 10/24/2018 MICROALBUMIN Q1 2019 LIPID PANEL Q1 2019 GLAUCOMA SCREENING Q2Y 10/2/2019 DTaP/Tdap/Td series (2 - Td) 2022 Allergies as of 2018  Review Complete On: 2018 By: David Morales MD  
  
 Severity Noted Reaction Type Reactions Gabapentin  10/12/2016    Other (comments) Muscles twitching and jerking Levaquin [Levofloxacin]  2012    Swelling Lyrica [Pregabalin]  10/31/2017    Other (comments) Muscle twitching and jerking Percodan [Oxycodone Hcl-oxycodone-asa]  2012    Itching Current Immunizations  Reviewed on 2018 Name Date Influenza High Dose Vaccine PF 2017, 2016, 2015 Influenza Vaccine  Deferred (Patient Refused), 2014, 2013 Influenza Vaccine Split 2012, 2010 Pneumococcal Conjugate (PCV-13) 8/25/2015 Pneumococcal Vaccine (Pcv) 10/30/2009 TDAP Vaccine 9/14/2012 Zoster Vaccine, Live 3/19/2013 Not reviewed this visit Vitals BP Pulse Temp Resp Height(growth percentile) Weight(growth percentile) 109/57 (BP 1 Location: Right arm, BP Patient Position: Sitting) 74 98.5 °F (36.9 °C) (Oral) 18 5' 4\" (1.626 m) 214 lb (97.1 kg) LMP SpO2 PF BMI OB Status Smoking Status 03/04/1961 (Approximate) 96% 350 L/min 36.73 kg/m2 Hysterectomy Former Smoker Vitals History BMI and BSA Data Body Mass Index Body Surface Area  
 36.73 kg/m 2 2.09 m 2 Preferred Pharmacy Pharmacy Name Phone [facility]y [facility] [phone] [facility] [phone] Your Updated Medication List  
  
   
This list is accurate as of 7/20/18 12:49 PM.  Always use your most recent med list.  
  
  
  
  
 * albuterol 2.5 mg /3 mL (0.083 %) nebulizer solution Commonly known as:  PROVENTIL VENTOLIN  
INHALE THE CONTENTS OF ONE VIAL VIA NEBULIZER EVERY 4 HOURS AS NEEDED FOR WHEEZING  
  
 * albuterol 90 mcg/actuation inhaler Commonly known as:  VENTOLIN HFA INHALE 2 PUFFS BY MOUTH EVERY 4 HOURS AS NEEDED FOR WHEEZING. ALPRAZolam 0.5 mg tablet Commonly known as:  XANAX  
TAKE 1/2 TO 1 TABLET BY MOUTH EVERY NIGHT AT BEDTIME AS NEEDED FOR SLEEP  
  
 carvedilol 25 mg tablet Commonly known as:  Phineas Hoar Take 1 Tab by mouth two (2) times daily (with meals). cyanocobalamin 1,000 mcg tablet Take 1,000 mcg by mouth daily. DULoxetine 60 mg capsule Commonly known as:  CYMBALTA Take 60 mg by mouth daily. fluticasone 50 mcg/actuation nasal spray Commonly known as:  Vicente Metro SHAKE LIQUID AND USE 2 SPRAYS IN EACH NOSTRIL EVERY DAY  
  
 glipiZIDE SR 2.5 mg CR tablet Commonly known as:  GLUCOTROL XL  
TAKE ONE TABLET BY MOUTH DAILY HYDROcodone-acetaminophen  mg tablet Commonly known as:  Rolinda Doles Take 1 Tab by mouth every six (6) hours as needed. hydroxychloroquine 200 mg tablet Commonly known as:  PLAQUENIL Take 200 mg by mouth two (2) times a day. LASIX 20 mg tablet Generic drug:  furosemide Take 40 mg by mouth daily. lisinopril 2.5 mg tablet Commonly known as:  Marilynne Shows Take 2.5 mg by mouth daily. MUCINEX D  mg per tablet Generic drug:  PSEUDOEPHEDRINE-guaiFENesin Take 1 Tab by mouth daily. pantoprazole 40 mg tablet Commonly known as:  PROTONIX  
TAKE 1 TABLET BY MOUTH ONCE DAILY predniSONE 5 mg tablet Commonly known as:  Hollace Dhruv Take 10 mg by mouth daily. promethazine-dextromethorphan 6.25-15 mg/5 mL syrup Commonly known as:  PROMETHAZINE-DM  
TAKE ONE TEASPOONFUL BY MOUTH EVERY SIX HOURS AS NEEDED FOR COUGH  
  
 simvastatin 40 mg tablet Commonly known as:  ZOCOR  
TAKE 1 TABLET BY MOUTH EVERY NIGHT AT BEDTIME  
  
 STOOL SOFTENER PO Take 1 Tab by mouth two (2) times a day. triamcinolone 55 mcg nasal inhaler Commonly known as:  NASACORT  
2 Sprays by Both Nostrils route daily. * Notice: This list has 2 medication(s) that are the same as other medications prescribed for you. Read the directions carefully, and ask your doctor or other care provider to review them with you. Introducing [facility]! [facility] introduces [employer] patient portal. Now you can access parts of your medical record, email your doctor's office, and request medication refills online. 1. In your internet browser, go to [email or web address] 2. Click on the First Time User? Click Here link in the Sign In box. You will see the New Member Sign Up page. 3. Enter your [employer] Access Code exactly as it appears below. You will not need to use this code after youve completed the sign-up process.  If you do not sign up before the expiration date, you must request a new code. · [employer] Access Code: [email or web address] Expires: 10/18/2018 12:49 PM 
 
4. Enter the last four digits of your Social Security Number (xxxx) and Date of Birth (mm/dd/yyyy) as indicated and click Submit. You will be taken to the next sign-up page. 5. Create a [employer] ID. This will be your [employer] login ID and cannot be changed, so think of one that is secure and easy to remember. 6. Create a [employer] password. You can change your password at any time. 7. Enter your Password Reset Question and Answer. This can be used at a later time if you forget your password. 8. Enter your e-mail address. You will receive e-mail notification when new information is available in [address] 9. Click Sign Up. You can now view and download portions of your medical record. 10. Click the Download Summary menu link to download a portable copy of your medical information. If you have questions, please visit the Frequently Asked Questions section of the [employer] website. Remember, [employer] is NOT to be used for urgent needs. For medical emergencies, dial 911. Now available from your iPhone and Android! Please provide this summary of care documentation to your next provider. Your primary care clinician is listed as Jose Gamez. If you have any questions after today's visit, please call [phone].

## 2018-08-15 RX ORDER — SIMVASTATIN 40 MG/1
TABLET, FILM COATED ORAL
Qty: 90 TAB | Refills: 0 | Status: SHIPPED | OUTPATIENT
Start: 2018-08-15 | End: 2018-11-18 | Stop reason: SDUPTHER

## 2018-09-05 LAB — CREATININE, EXTERNAL: 2.11

## 2018-09-17 ENCOUNTER — HOSPITAL ENCOUNTER (OUTPATIENT)
Dept: LAB | Age: 83
Discharge: HOME | End: 2018-09-17
Payer: MEDICARE

## 2018-09-17 ENCOUNTER — OFFICE VISIT (OUTPATIENT)
Dept: FAMILY MEDICINE CLINIC | Age: 83
End: 2018-09-17

## 2018-09-17 VITALS
TEMPERATURE: 98.5 F | WEIGHT: 220.4 LBS | HEART RATE: 63 BPM | HEIGHT: 64 IN | SYSTOLIC BLOOD PRESSURE: 131 MMHG | BODY MASS INDEX: 37.63 KG/M2 | RESPIRATION RATE: 16 BRPM | DIASTOLIC BLOOD PRESSURE: 64 MMHG | OXYGEN SATURATION: 98 %

## 2018-09-17 DIAGNOSIS — M25.50 ARTHRALGIA OF MULTIPLE JOINTS: ICD-10-CM

## 2018-09-17 DIAGNOSIS — N18.30 STAGE 3 CHRONIC KIDNEY DISEASE (HCC): ICD-10-CM

## 2018-09-17 DIAGNOSIS — E11.21 TYPE 2 DIABETES WITH NEPHROPATHY (HCC): ICD-10-CM

## 2018-09-17 DIAGNOSIS — E78.2 MIXED HYPERLIPIDEMIA: ICD-10-CM

## 2018-09-17 DIAGNOSIS — M35.3 POLYMYALGIA RHEUMATICA (HCC): ICD-10-CM

## 2018-09-17 DIAGNOSIS — R25.1 TREMOR OF BOTH HANDS: ICD-10-CM

## 2018-09-17 DIAGNOSIS — G89.29 ENCOUNTER FOR CHRONIC PAIN MANAGEMENT: ICD-10-CM

## 2018-09-17 DIAGNOSIS — J45.20 MILD INTERMITTENT REACTIVE AIRWAY DISEASE WITH WHEEZING WITHOUT COMPLICATION: ICD-10-CM

## 2018-09-17 DIAGNOSIS — Z51.81 ENCOUNTER FOR MEDICATION MONITORING: ICD-10-CM

## 2018-09-17 DIAGNOSIS — I10 ESSENTIAL HYPERTENSION, BENIGN: Primary | ICD-10-CM

## 2018-09-17 DIAGNOSIS — I50.22 CHRONIC SYSTOLIC HEART FAILURE (HCC): ICD-10-CM

## 2018-09-17 DIAGNOSIS — Z23 ENCOUNTER FOR IMMUNIZATION: ICD-10-CM

## 2018-09-17 LAB
GLUCOSE POC: 108 MG/DL
HBA1C MFR BLD HPLC: 7.1 %

## 2018-09-17 PROCEDURE — 80307 DRUG TEST PRSMV CHEM ANLYZR: CPT

## 2018-09-17 PROCEDURE — 85027 COMPLETE CBC AUTOMATED: CPT

## 2018-09-17 PROCEDURE — 36415 COLL VENOUS BLD VENIPUNCTURE: CPT

## 2018-09-17 PROCEDURE — 80061 LIPID PANEL: CPT

## 2018-09-17 PROCEDURE — 80053 COMPREHEN METABOLIC PANEL: CPT

## 2018-09-17 RX ORDER — PREDNISONE 1 MG/1
1 TABLET ORAL 3 TIMES DAILY
COMMUNITY
End: 2018-09-17 | Stop reason: SDUPTHER

## 2018-09-17 RX ORDER — PREDNISONE 5 MG/1
5 TABLET ORAL DAILY
Qty: 30 TAB | Refills: 1
Start: 2018-09-17 | End: 2018-12-18 | Stop reason: ALTCHOICE

## 2018-09-17 RX ORDER — PREDNISONE 1 MG/1
3 TABLET ORAL DAILY
COMMUNITY
Start: 2018-09-17 | End: 2018-12-18 | Stop reason: ALTCHOICE

## 2018-09-17 RX ORDER — HYDROCODONE BITARTRATE AND ACETAMINOPHEN 10; 325 MG/1; MG/1
1 TABLET ORAL
Qty: 60 TAB | Refills: 0 | Status: SHIPPED | OUTPATIENT
Start: 2018-09-17 | End: 2019-06-18 | Stop reason: SDUPTHER

## 2018-09-17 NOTE — PROGRESS NOTES
HISTORY OF PRESENT ILLNESS Killian Milligan is a 80 y.o. female. HPI Follow up on chronic medical problems. Cardiovascular Review: 
The patient has hypertension, hyperlipidemia and Systolic HF. Diet and Lifestyle: generally follows a low fat low cholesterol diet, generally follows a low sodium diet Home BP Monitoring: is not measured at home. Pertinent ROS: taking medications as instructed, no medication side effects noted, no TIA's, no chest pain on exertion, no dyspnea on exertion, noting that her ankles swelling has been mild. DM type II follow up: 
Compliant w/ meds, diabetic diet, and exercise. Obtains home glucose monitoring averaging in the mid 100s. Checks BS daily on most days and prn. Pt does not have BS log at visit today. No Rf needed for today. Denies any tingling sensation, polyuria and polydipsia. No blurred vision. No significant weight changes. Asthma Review: 
The patient is being seen for follow up of chronic respiratory failure and allergies and chronic sinusitits, not currently in exacerbation. Breathing has been good also with taking prednisone. Using nebulizer 3 to 4 times in a day as needed. Regimen compliance: The patient reports adherence to asthma action plan and regimen. Encounter for pain management. 1./2. Medical history/Past medical History Chronic Pain: 
Osteoarthritis: 
Patient has osteoarthritis in multiple joints but primarily in the knees and back. Seen by rheumatology and place on prednisone and plaquenil. Her symptoms have been worse over the last 2 weeks and she has been hurting all over. She has appt with rheumatology in the first week of October. She is currently on prednisone 7 mg daily per rheumatology. She has polymyalgia rheumatica. On several days over the past 2 weeks, she has had severe pain. Pain has been 8/10.      
3. Applicable records from prior treatment providers are apart of [facility] under the media tab. 4. Diagnostic, therapeutic and laboratory results are available in the [facility] chart. 5. Consultation notes are available for review in the media tab of the [facility] chart. 6. Treatment goals include pain control so that the pt may be as active and function with her daily activities and improved comfort level. Previously pt has been limited by pain. 7. The risks and benefits of treatment has been discussed at this office visit with the pt. She understands that the medication has addicting potential.  Additionally the pt has been advised that narcotic pain medication may impair mental and/or physical ability required for performance of tasks such as driving or operating any other machinery. 8. Pt has an updated signed pain contract on file and can be found under the FYI section of the [facility] chart. 9. The pain contract is reviewed. Pain medication will be continued at the previous dosage. Urine drug screening will be done today. Diagnostic studies are not indicated at this time. Interventional procedure are not indicated at this time. 10. Medication prescibed is HYDROcodone-acetaminophen (NORCO)  mg tablet.  has been reviewed at this OV by me. 11. Patient instructions have been reviewed in detail as outlined above and in the pain contract. Last refill was in December. 12. Re-eval is planned for 3 months. Naloxone prescription is not warranted. Patient Active Problem List  
Diagnosis Code  CHF (congestive heart failure) ([facility]) I50.9  S/P TKR (total knee replacement) K59.186  Anemia D64.9  
 s/p lumbar laminectomy Z98.890  
 S/P ASAD (total abdominal hysterectomy) Z90.710  S/P hemorrhoidectomy Z98.890, Z87.19  
 S/P rotator cuff surgery Z98.890  
 DM (diabetes mellitus) ([facility] E11.9  Chronic sinusitis J32.9  S/P sinus surgery Z98.890  
 Dyslipidemia E78.5  Environmental allergies Z91.09  
  Obstructive sleep apnea (adult) (pediatric) G47.33  
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  
 CKD (chronic kidney disease) N18.9  Arthralgia of multiple joints M25.50  Plantar fasciitis of left foot M72.2  Type 2 diabetes with nephropathy ([facility]) E11.21  
 Severe obesity (BMI 35.0-39. 9) with comorbidity ([facility]) E66.01  
 Polymyalgia rheumatica ([facility] M35.3 Current Outpatient Prescriptions Medication Sig Dispense Refill  predniSONE (DELTASONE) 1 mg tablet Take 3 Tabs by mouth daily.  HYDROcodone-acetaminophen (NORCO)  mg tablet Take 1 Tab by mouth every six (6) hours as needed. 60 Tab 0  predniSONE (DELTASONE) 5 mg tablet Take 1 Tab by mouth daily. 30 Tab 1  
 simvastatin (ZOCOR) 40 mg tablet TAKE 1 TABLET BY MOUTH EVERY NIGHT AT BEDTIME 90 Tab 0  
 pantoprazole (PROTONIX) 40 mg tablet TAKE 1 TABLET BY MOUTH EVERY DAY 90 Tab 3  
 docusate sodium (STOOL SOFTENER PO) Take 1 Tab by mouth two (2) times a day.  ALPRAZolam (XANAX) 0.5 mg tablet TAKE 1/2 TO 1 TABLET BY MOUTH EVERY NIGHT AT BEDTIME AS NEEDED FOR SLEEP 30 Tab 3  carvedilol (COREG) 25 mg tablet Take 1 Tab by mouth two (2) times daily (with meals). 180 Tab 3  
 hydroxychloroquine (PLAQUENIL) 200 mg tablet Take 200 mg by mouth two (2) times a day.  furosemide (LASIX) 20 mg tablet Take 20 mg by mouth daily.  lisinopril (PRINIVIL, ZESTRIL) 2.5 mg tablet Take 2.5 mg by mouth daily.  DULoxetine (CYMBALTA) 60 mg capsule Take 60 mg by mouth daily.     
 glipiZIDE SR (GLUCOTROL XL) 2.5 mg CR tablet TAKE ONE TABLET BY MOUTH DAILY 90 Tab 3  
  fluticasone (FLONASE) 50 mcg/actuation nasal spray SHAKE LIQUID AND USE 2 SPRAYS IN EACH NOSTRIL EVERY DAY 1 Bottle 11  
 cyanocobalamin 1,000 mcg tablet Take 1,000 mcg by mouth daily.  albuterol (VENTOLIN HFA) 90 mcg/actuation inhaler INHALE 2 PUFFS BY MOUTH EVERY 4 HOURS AS NEEDED FOR WHEEZING. 1 Inhaler 11  
 albuterol (PROVENTIL VENTOLIN) 2.5 mg /3 mL (0.083 %) nebulizer solution INHALE THE CONTENTS OF ONE VIAL VIA NEBULIZER EVERY 4 HOURS AS NEEDED FOR WHEEZING 1650 Each 3  
 promethazine-dextromethorphan (PROMETHAZINE-DM) 6.25-15 mg/5 mL syrup TAKE ONE TEASPOONFUL BY MOUTH EVERY SIX HOURS AS NEEDED FOR COUGH 240 mL 1  
 PSEUDOEPHEDRINE-guaiFENesin (MUCINEX D)  mg per tablet Take 1 Tab by mouth daily. Allergies Allergen Reactions  Gabapentin Other (comments) Muscles twitching and jerking  Levaquin [Levofloxacin] Swelling  Lyrica [Pregabalin] Other (comments) Muscle twitching and jerking  Percodan [Oxycodone Hcl-Oxycodone-Asa] Itching Past Medical History:  
Diagnosis Date  Anemia 3/3/2010  Arrhythmia   
 irregular heartbeat  Arthritis  CHF (congestive heart failure) ([facility] 3/3/2010  Chronic pain   
 back  Chronic sinusitis 3/3/2010  CPAP (continuous positive airway pressure) dependence  Diverticulosis  DM (diabetes mellitus) ([facility] 3/3/2010  Dyslipidemia 3/3/2010  GERD (gastroesophageal reflux disease)  HTN (hypertension) 3/3/2010  Ill-defined condition   
 being evaluated py pulmonolgist for \"trouble breathing\"  S/P TKR (total knee replacement) 3/3/2010  Unspecified sleep apnea   
 doesn't wear CPAP since back has been hurting Past Surgical History:  
Procedure Laterality Date  HX APPENDECTOMY    
 thinks it was taken with hysterectomy  HX GYN \"tubes opened\"  HX HEENT    
 sinus surgery  HX HEMORRHOIDECTOMY  HX HYSTERECTOMY  HX KNEE REPLACEMENT  1996  
 both knees- at same time  HX LUMBAR LAMINECTOMY  1980s  HX MOHS PROCEDURES    
 left shoulder  HX ORTHOPAEDIC  1980  
 neck fusion  HX ORTHOPAEDIC  1999  
 right knee replaced for second time  HX ORTHOPAEDIC    
 lumbar fusion  HX OTHER SURGICAL  CORNELL BIOPSY BREAST STEREOTACTIC Left yrs ago (-)  MO COLONOSCOPY FLX DX W/COLLJ SPEC WHEN PFRMD  [id number]  
 dr. Orlando Linton (barium enema) Family History Problem Relation Age of Onset  Diabetes Mother  Heart Disease Father  Diabetes Brother  Blindness Brother  Diabetes Sister  Heart Disease Sister  Heart Disease Brother  Heart Disease Brother  Asthma Brother  Malignant Hyperthermia Neg Hx  Pseudocholinesterase Deficiency Neg Hx  Delayed Awakening Neg Hx  Post-op Nausea/Vomiting Neg Hx  Emergence Delirium Neg Hx  Post-op Cognitive Dysfunction Neg Hx Social History Substance Use Topics  Smoking status: Former Smoker Packs/day: 0.30 Years: 5.00 Quit date: 1/1/1960  Smokeless tobacco: Never Used  Alcohol use No  
 
  
Lab Results Component Value Date/Time WBC 5.2 11/14/2017 12:39 PM  
HGB 11.0 (L) 11/14/2017 12:39 PM  
HCT 33.9 (L) 11/14/2017 12:39 PM  
PLATELET 980 67/23/5638 12:39 PM  
MCV 96 11/14/2017 12:39 PM  
 
Lab Results Component Value Date/Time Cholesterol, total 175 01/08/2018 10:54 AM  
HDL Cholesterol 71 01/08/2018 10:54 AM  
LDL, calculated 81 01/08/2018 10:54 AM  
Triglyceride 117 01/08/2018 10:54 AM  
CHOL/HDL Ratio 2.0 06/11/2010 02:10 PM  
 
Lab Results Component Value Date/Time  Sodium 145 (H) 06/15/2018 12:38 PM  
 Potassium 4.5 06/15/2018 12:38 PM  
 Chloride 105 06/15/2018 12:38 PM  
 CO2 29 06/15/2018 12:38 PM  
 Anion gap 3 (L) 12/17/2015 11:47 AM  
 Glucose 142 (H) 06/15/2018 12:38 PM  
 BUN 33 (H) 06/15/2018 12:38 PM  
 Creatinine 1.66 (H) 06/15/2018 12:38 PM  
 BUN/Creatinine ratio 20 06/15/2018 12:38 PM  
 GFR est AA 32 (L) 06/15/2018 12:38 PM  
 GFR est non-AA 28 (L) 06/15/2018 12:38 PM  
 Calcium 9.3 06/15/2018 12:38 PM  
 Bilirubin, total 0.3 04/11/2018 03:56 PM  
 ALT (SGPT) 19 04/11/2018 03:56 PM  
 AST (SGOT) 21 04/11/2018 03:56 PM  
 Alk. phosphatase 64 04/11/2018 03:56 PM  
 Protein, total 6.8 04/11/2018 03:56 PM  
 Albumin 4.3 04/11/2018 03:56 PM  
 Globulin 3.4 12/17/2015 11:47 AM  
 A-G Ratio 1.7 04/11/2018 03:56 PM  
  
Lab Results Component Value Date/Time Hemoglobin A1c 6.4 (H) 09/08/2017 11:09 AM  
 Hemoglobin A1c (POC) 7.1 09/17/2018 10:38 AM  
   
Review of Systems Constitutional: Negative for malaise/fatigue. HENT: Negative for congestion. Eyes: Negative for blurred vision. Respiratory: Negative for cough and shortness of breath. Cardiovascular: Negative for chest pain, palpitations and leg swelling. Gastrointestinal: Negative for abdominal pain, constipation and heartburn. Genitourinary: Negative for dysuria, frequency and urgency. Musculoskeletal: Positive for back pain, joint pain and myalgias. Neurological: Positive for tremors (c/o increase tremor in both hands, left seems wose. sometimes has hard time holding objects d/t the tremor. ). Negative for dizziness, tingling, sensory change, focal weakness and headaches. Endo/Heme/Allergies: Negative for environmental allergies. Psychiatric/Behavioral: Negative for depression. The patient does not have insomnia. Physical Exam  
Constitutional: She appears well-developed and well-nourished. /64 (BP 1 Location: Left arm, BP Patient Position: Sitting)  Pulse 63  Temp 98.5 °F (36.9 °C) (Oral)   Resp 16  Ht 5' 4\" (1.626 m)  Wt 220 lb 6.4 oz (100 kg)  LMP 03/04/1961 (Approximate)  SpO2 98%  BMI 37.83 kg/m2 HENT:  
Right Ear: Tympanic membrane and ear canal normal.  
Left Ear: Tympanic membrane and ear canal normal.  
Nose: No mucosal edema or rhinorrhea.   
Mouth/Throat: Oropharynx is clear and moist and mucous membranes are normal.  
 Neck: Normal range of motion. Neck supple. No thyromegaly present. Cardiovascular: Normal rate, regular rhythm and normal heart sounds. Exam reveals no gallop. Pulmonary/Chest: Effort normal and breath sounds normal.  
Abdominal: Soft. Normal appearance and bowel sounds are normal. She exhibits no mass. There is no tenderness. Musculoskeletal: Normal range of motion. She exhibits edema (trace). She exhibits no tenderness. Lymphadenopathy:  
  She has no cervical adenopathy. Skin: Skin is warm and dry. Psychiatric: She has a normal mood and affect. Nursing note and vitals reviewed. ASSESSMENT and PLAN Diagnoses and all orders for this visit: 1. Essential hypertension, benign Stable 2. Type 2 diabetes with nephropathy (HCC) -     AMB POC HEMOGLOBIN A1C 
-     AMB POC GLUCOSE, QUANTITATIVE, BLOOD 3. Mixed hyperlipidemia -     LIPID PANEL 4. Chronic systolic heart failure ([facility] Stable 5. Stage 3 chronic kidney disease Continue to monitor. 6. Mild intermittent reactive airway disease with wheezing without complication// 
Environmental Allergies Add zyrtec daily as needed. 7. Arthralgia of multiple joints 8. Polymyalgia rheumatica ([facility] Increase prednisone up to 8 mg daily until her follow up.   
-     HYDROcodone-acetaminophen (NORCO)  mg tablet; Take 1 Tab by mouth every six (6) hours as needed. 9. Encounter for chronic pain management 
-     9-DRUG SCREEN + OXY, UR The pt has signed medication agreement. Pain contract is reviewed. Pain medications will be continued at the previous dosage. Urine drug screening will be done today. Diagnostic  studies are not indicated at this time. Interventional procedure are not indicated at this time. Re-eval in 3 months. 10. Tremor of both hands 
-     [facility] ref [id number] Encounter for medication monitoring -     METABOLIC PANEL, COMPREHENSIVE 
-     CBC W/O DIFF 
 
 12. Encounter for immunization -     Administration fee () for Medicare insured patients -     Influenza Vaccine Inactivated (IIV) (FLUAD), Subunit, Adjuvanted, IM (31877) -     [place] IMMUNIZ ADMIN,1 SINGLE/COMB VAC/TOXOID Follow-up Disposition: 
Return in about 3 months (around 12/17/2018). reviewed diet, exercise and weight control 
cardiovascular risk and specific lipid/LDL goals reviewed 
reviewed medications and side effects in detail 
specific diabetic recommendations: low cholesterol diet, weight control and daily exercise discussed, home glucose monitoring emphasized, all medications, side effects and compliance discussed carefully and glycohemoglobin and other lab monitoring discussed I have discussed diagnosis listed in this note with pt and/or family. I have discussed treatment plans and options and the risk/benefit analysis of those options, including safe use of medications and possible medication side effects. Through the use of shared decision making we have agreed to the above plan. The patient has received an after-visit summary and questions were answered concerning future plans and follow up. Advise pt of any urgent changes then to proceed to the ER.

## 2018-09-17 NOTE — PROGRESS NOTES
Verbal order received per Dr. Swartz flu vaccine IM- Talita Sanz. Pt received fluad vaccine IM in right deltoid without any difficulty.

## 2018-09-17 NOTE — LETTER
Name:Moy Quinn VYQ:[birth year] MR #:[id number] Provider Name:Lesley Muniz MD  
*[id number]* BSMG-491 (5/16) Page 1 of 5 Initial [employer] CONTROLLED SUBSTANCE AGREEMENT I may be prescribed medications that are controlled substances as part  of my treatment plan for management of my medical condition(s). The goal of my treatment plan is to maintain and/or improve my health and wellbeing. Because controlled substances have an increased risk of abuse or harm, continual re-evaluation is needed determine if the goals of my treatment plan are being met for my safety and the safety of others. Quirino Trinidad  am entering into this Controlled Substance Agreement with my provider, Zenobia Garcia MD at [facility] . I understand that successful treatment requires mutual trust and honesty between me and my provider. I understand that there are state and federal laws and regulations which apply to the medications that my provider may prescribe that must be followed. I understand there are risks and benefits ts of taking the medicines that my provider may prescribe. I understand and agree that following this Agreement is necessary in continuing my provider-patient relationship and success of my treatment plan. As a part of my treatment plan, I agree to the following: COMMUNICATION: 
 
1. I will communicate fully with my provider about my medical condition(s), including the effect on my daily life and how well my medications are helping. I will tell my provider all of the medications that I take for any reason, including medications I receive from another health care provider, and will notify my provider about all issues, problems or concerns, including any side effects, which may be related to my medications. I understand that this information allows my provider to adjust my treatment plan to help manage my medical condition.  I understand that this information will become part of my permanent medical record. 2. I will notify my provider if I have a history of alcohol/drug misuse/addiction or if I have had treatment for alcohol/drug addiction in the past, or if I have a new problem with or concern about alcohol/drug use/addiction, because this increases the likelihood of high risk behaviors and may lead to serious medical conditions. 3. Females Only: I will notify my provider if I am or become pregnant, or if I intend to become pregnant, or if I intend to breastfeed. I understand that communication of these issues with my provider is important, due to possible effects my medication could have on an unborn fetus or breastfeeding child. Name:Brittney Loyola GSX:[birth year] MR #:[id number] Provider Name:Lesley Carver MD  
*[id number]* BSMG-491 (5/16) Page 2 of 5 Initial [employer] MISUSE OF MEDICATIONS / DRUGS: 
 
1. I agree to take all controlled substances as prescribed, and will not misuse or abuse any controlled substances prescribed by my provider. For my safety, I will not increase the amount of medicine I take without first talking with and getting permission from my provider. 2. If I have a medical emergency, another health care provider may prescribe me medication. If I seek emergency treatment, I will notify my provider within seventy-two (72) hours. 3. I understand that my provider may discuss my use and/or possible misuse/abuse of controlled substances and alcohol, as appropriate, with any health care provider involved in my care, pharmacist or legal authority. ILLEGAL DRUGS: 
 
1. I will not use illegal drugs of any kind, including but not limited to marijuana, heroin, cocaine, or any prescription drug which is not prescribed to me. DRUG DIVERSION / PRESCRIPTION FRAUD: 
 
1. I will not share, sell, trade, give away, or otherwise misuse my prescriptions or medications. 2. I will not alter any prescriptions provided to me by my provider. SINGLE PROVIDER: 
 
1. I agree that all controlled substances that I take will be prescribed only by my provider (or his/her covering provider) under this Agreement. This agreement does not prevent me from seeking emergency medical treatment or receiving pain management related to a surgery. PROTECTING MEDICATIONS: 
 
1. I am responsible for keeping my prescriptions and medications in a safe and secure place including safeguarding them from loss or theft. I understand that lost, stolen or damaged/destroyed prescriptions or medications will not be replaced. Name:Brittney Mccoy FOE:[birth year] MR #:[id number] Provider Name:Lesley Clarke MD  
*IHIP-391* BSMG-491 (5/16) Page 3 of 5 Initial [facility] PRESCRIPTION RENEWALS/REFILLS: 
 
1. I will follow my controlled substance medication schedule as prescribed by my provider. 2. I understand and agree that I will make any requests for renewals or refills of my prescriptions only at the time of an office visit or during my providers regular office hours subject to the prescription refill requirements of the individual practice. 3. I understand that my provider may not call in prescriptions for controlled substances to my pharmacy. 4. I understand that my provider may adjust or discontinue these medications as deemed appropriate for my medical treatment plan. This Agreement does not guarantee the prescription of controlled medications. 5. I agree that if my medications are adjusted or discontinued, I will properly dispose of any remaining medications. I understand that I will be required to dispose of any remaining controlled medications prior to being provided with any prescriptions for other controlled medications.  
 
 
1. I authorize my provider and my pharmacy to cooperate fully with any local, state, or federal law enforcement agency in the investigation of any possible misuse, sale, or other diversion of my controlled substance prescriptions or medications. RISKS: 
 
 
1. I understand that if I do not adhere to this Agreement in any way, my provider may change my prescriptions, stop prescribing controlled substances or end our provider-patient relationship. 2. If my provider decides to stop prescribing medication, or decides to end our provider-patient relationship,my provider may require that I taper my medications slowly. If necessary, my provider may also provide a prescription for other medications to treat my withdrawal symptoms. UNDERSTANDING THIS AGREEMENT: 
 
I understand that my provider may adjust or stop my prescriptions for controlled substances based on my medical condition and my treatment plan. I understand that this Agreement does not guarantee that I will be prescribed medications or controlled substances. I understand that controlled substances may be just one part 
of my treatment plan. My initial on each page and my signature below shows that I have read each page of this Agreement, I have had an opportunity to ask questions, and all of my questions have been answered to my satisfaction by my provider. By signing below, I agree to comply with this Agreement, and I understand that if I do not follow the Agreements listed above, my provider may stop 
 
 
 
_________________________________________  Date/Time 9/17/2018 11:05 AM   
             (Patient Signature)

## 2018-09-17 NOTE — PROGRESS NOTES
Carl Loyola is a 80 y.o. female Chief Complaint Patient presents with  Diabetes 4 month follow up Visit Vitals  /64 (BP 1 Location: Left arm, BP Patient Position: Sitting)  Pulse 63  Temp 98.5 °F (36.9 °C) (Oral)  Resp 16  
 Ht 5' 4\" (1.626 m)  Wt 220 lb 6.4 oz (100 kg)  LMP 03/04/1961 (Approximate)  SpO2 98%  BMI 37.83 kg/m2 Health Maintenance Due Topic Date Due  
 FOOT EXAM Q1  07/06/2018  Influenza Age 5 to Adult  08/01/2018  
 EYE EXAM RETINAL OR DILATED Q1  10/02/2018  HEMOGLOBIN A1C Q6M  10/11/2018 1. Have you been to the ER, urgent care clinic since your last visit? Hospitalized since your last visit? No 
 
2. Have you seen or consulted any other health care providers outside of the [facility] since your last visit? Include any pap smears or colon screening. No 
 
Do you have an Advance Directive?  No

## 2018-09-17 NOTE — MR AVS SNAPSHOT
[address] [facility] 
 
 
 [address] [facility] [address] [address] 
[phone] Patient: Kimberly Hansen MRN: [id number] WSD:[birth year] Visit Information Date & Time Provider Department Dept. Phone Encounter #  
 9/17/2018  9:45 AM Hossein Amaya MD [facility] [phone] [id number] Follow-up Instructions Return in about 3 months (around 12/17/2018). Your Appointments 4/17/2019 11:15 AM  
ESTABLISHED PATIENT with Lencho Santacruz MD  
[facility] [address] [facility]) Appt Note: annual f/u  
 [id number] [facility]  
[phone] [address] [facility] Upcoming Health Maintenance Date Due  
 FOOT EXAM Q1 7/6/2018 Influenza Age 5 to Adult 8/1/2018 EYE EXAM RETINAL OR DILATED Q1 10/2/2018 HEMOGLOBIN A1C Q6M 10/11/2018 MEDICARE YEARLY EXAM 10/24/2018 LIPID PANEL Q1 1/8/2019 MICROALBUMIN Q1 5/31/2019 GLAUCOMA SCREENING Q2Y 10/2/2019 DTaP/Tdap/Td series (2 - Td) 9/14/2022 Allergies as of 9/17/2018  Review Complete On: 9/17/2018 By: Hossein Amaya MD  
  
 Severity Noted Reaction Type Reactions Gabapentin  10/12/2016    Other (comments) Muscles twitching and jerking Levaquin [Levofloxacin]  03/09/2012    Swelling Lyrica [Pregabalin]  10/31/2017    Other (comments) Muscle twitching and jerking Percodan [Oxycodone Hcl-oxycodone-asa]  03/09/2012    Itching Current Immunizations  Reviewed on 9/17/2018 Name Date Influenza High Dose Vaccine PF 9/8/2017, 9/16/2016, 8/25/2015 Influenza Vaccine  Deferred (Patient Refused), 9/30/2014, 9/20/2013 Influenza Vaccine Split 8/31/2012, 9/13/2010 Pneumococcal Conjugate (PCV-13) 8/25/2015 Pneumococcal Vaccine (Pcv) 10/30/2009 TDAP Vaccine 9/14/2012 Zoster Vaccine, Live 3/19/2013 Reviewed by Mireille Howell MD on 9/17/2018 at 10:20 AM  
You Were Diagnosed With   
  
 Codes Comments Essential hypertension, benign    -  Primary ICD-10-CM: I10 
ICD-9-CM: 401.1 Type 2 diabetes with nephropathy (HCC)     ICD-10-CM: E11.21 
ICD-9-CM: 250.40, 583.81 Mixed hyperlipidemia     ICD-10-CM: E78.2 ICD-9-CM: 272.2 Chronic systolic heart failure (HCC)     ICD-10-CM: I50.22 ICD-9-CM: 428.22 Stage 3 chronic kidney disease     ICD-10-CM: N18.3 ICD-9-CM: 818. 3 Mild intermittent reactive airway disease with wheezing without complication     ZOY-35-YT: J45.20 ICD-9-CM: 493.90 Arthralgia of multiple joints     ICD-10-CM: M25.50 ICD-9-CM: 719.49 Spondylosis of lumbar region without myelopathy or radiculopathy     ICD-10-CM: M47.816 ICD-9-CM: 721.3 Encounter for medication monitoring     ICD-10-CM: Z51.81 
ICD-9-CM: V58.83 Tremor of both hands     ICD-10-CM: R25.1 ICD-9-CM: 350. 0 Vitals BP Pulse Temp Resp Height(growth percentile) Weight(growth percentile) 131/64 (BP 1 Location: Left arm, BP Patient Position: Sitting) 63 98.5 °F (36.9 °C) (Oral) 16 5' 4\" (1.626 m) 220 lb 6.4 oz (100 kg) LMP SpO2 BMI OB Status Smoking Status 03/04/1961 (Approximate) 98% 37.83 kg/m2 Hysterectomy Former Smoker BMI and BSA Data Body Mass Index Body Surface Area  
 37.83 kg/m 2 2.12 m 2 Preferred Pharmacy Pharmacy Name Phone [place] [address] [facility] [address] [facility] [[email or web address]] [phone] Your Updated Medication List  
  
   
This list is accurate as of 9/17/18 11:00 AM.  Always use your most recent med list.  
  
  
  
  
 * albuterol 2.5 mg /3 mL (0.083 %) nebulizer solution Commonly known as:  PROVENTIL VENTOLIN  
INHALE THE CONTENTS OF ONE VIAL VIA NEBULIZER EVERY 4 HOURS AS NEEDED FOR WHEEZING  
  
 * albuterol 90 mcg/actuation inhaler Commonly known as:  VENTOLIN HFA  
 INHALE 2 PUFFS BY MOUTH EVERY 4 HOURS AS NEEDED FOR WHEEZING. ALPRAZolam 0.5 mg tablet Commonly known as:  XANAX  
TAKE 1/2 TO 1 TABLET BY MOUTH EVERY NIGHT AT BEDTIME AS NEEDED FOR SLEEP  
  
 carvedilol 25 mg tablet Commonly known as:  Christa Dome Take 1 Tab by mouth two (2) times daily (with meals). cyanocobalamin 1,000 mcg tablet Take 1,000 mcg by mouth daily. DULoxetine 60 mg capsule Commonly known as:  CYMBALTA Take 60 mg by mouth daily. fluticasone 50 mcg/actuation nasal spray Commonly known as:  Henrry Sapello SHAKE LIQUID AND USE 2 SPRAYS IN EACH NOSTRIL EVERY DAY  
  
 glipiZIDE SR 2.5 mg CR tablet Commonly known as:  GLUCOTROL XL  
TAKE ONE TABLET BY MOUTH DAILY HYDROcodone-acetaminophen  mg tablet Commonly known as:  Daphnie Michele Take 1 Tab by mouth every six (6) hours as needed. hydroxychloroquine 200 mg tablet Commonly known as:  PLAQUENIL Take 200 mg by mouth two (2) times a day. LASIX 20 mg tablet Generic drug:  furosemide Take 20 mg by mouth daily. lisinopril 2.5 mg tablet Commonly known as:  Patricia Kathleen Take 2.5 mg by mouth daily. MUCINEX D  mg per tablet Generic drug:  PSEUDOEPHEDRINE-guaiFENesin Take 1 Tab by mouth daily. pantoprazole 40 mg tablet Commonly known as:  PROTONIX  
TAKE 1 TABLET BY MOUTH EVERY DAY * predniSONE 5 mg tablet Commonly known as:  Alma Mons Take 5 mg by mouth daily. * predniSONE 1 mg tablet Commonly known as:  Alma Mons Take 3 Tabs by mouth daily. promethazine-dextromethorphan 6.25-15 mg/5 mL syrup Commonly known as:  PROMETHAZINE-DM  
TAKE ONE TEASPOONFUL BY MOUTH EVERY SIX HOURS AS NEEDED FOR COUGH  
  
 simvastatin 40 mg tablet Commonly known as:  ZOCOR  
TAKE 1 TABLET BY MOUTH EVERY NIGHT AT BEDTIME  
  
 STOOL SOFTENER PO Take 1 Tab by mouth two (2) times a day. * Notice:   This list has 4 medication(s) that are the same as other medications prescribed for you. Read the directions carefully, and ask your doctor or other care provider to review them with you. We Performed the Following AMB POC GLUCOSE, QUANTITATIVE, BLOOD [34845 CPT(R)] AMB POC HEMOGLOBIN A1C [52914 CPT(R)] CBC W/O DIFF [20121 CPT(R)] LIPID PANEL [62813 CPT(R)] METABOLIC PANEL, COMPREHENSIVE [83298 CPT(R)] REFERRAL TO NEUROLOGY [JTP85 Custom] Follow-up Instructions Return in about 3 months (around 12/17/2018). Referral Information Referral ID Referred By Referred To  
  
 [id number] [address] Addis Bains MD   
   [address] [place], [address] Phone: [phone] Fax: [phone] Visits Status Start Date End Date 1 New Request 9/17/18 9/17/19 If your referral has a status of pending review or denied, additional information will be sent to support the outcome of this decision. Introducing [facility]! [employer] introduces [employer] patient portal. Now you can access parts of your medical record, email your doctor's office, and request medication refills online. 1. In your internet browser, go to [email or web address] 2. Click on the First Time User? Click Here link in the Sign In box. You will see the New Member Sign Up page. 3. Enter your [employer] Access Code exactly as it appears below. You will not need to use this code after youve completed the sign-up process. If you do not sign up before the expiration date, you must request a new code. · [employer] Access Code: [email or web address] Expires: 10/18/2018 12:49 PM 
 
4. Enter the last four digits of your Social Security Number (xxxx) and Date of Birth (mm/dd/yyyy) as indicated and click Submit. You will be taken to the next sign-up page. 5. Create a [employer] ID.  This will be your [employer] login ID and cannot be changed, so think of one that is secure and easy to remember. 6. Create a [employer] password. You can change your password at any time. 7. Enter your Password Reset Question and Answer. This can be used at a later time if you forget your password. 8. Enter your e-mail address. You will receive e-mail notification when new information is available in [address] 9. Click Sign Up. You can now view and download portions of your medical record. 10. Click the Download Summary menu link to download a portable copy of your medical information. If you have questions, please visit the Frequently Asked Questions section of the [employer] website. Remember, [employer] is NOT to be used for urgent needs. For medical emergencies, dial 911. Now available from your iPhone and Android! Please provide this summary of care documentation to your next provider. Your primary care clinician is listed as Jose Voss. If you have any questions after today's visit, please call [phone].

## 2018-09-18 LAB
ALBUMIN SERPL-MCNC: 4.3 G/DL (ref 3.5–4.7)
ALBUMIN/GLOB SERPL: 2.2 {RATIO} (ref 1.2–2.2)
ALP SERPL-CCNC: 42 IU/L (ref 39–117)
ALT SERPL-CCNC: 34 IU/L (ref 0–32)
AST SERPL-CCNC: 28 IU/L (ref 0–40)
BILIRUB SERPL-MCNC: 0.4 MG/DL (ref 0–1.2)
BUN SERPL-MCNC: 17 MG/DL (ref 8–27)
BUN/CREAT SERPL: 9 (ref 12–28)
CALCIUM SERPL-MCNC: 9.7 MG/DL (ref 8.7–10.3)
CHLORIDE SERPL-SCNC: 106 MMOL/L (ref 96–106)
CHOLEST SERPL-MCNC: 188 MG/DL (ref 100–199)
CO2 SERPL-SCNC: 29 MMOL/L (ref 20–29)
CREAT SERPL-MCNC: 1.81 MG/DL (ref 0.57–1)
ERYTHROCYTE [DISTWIDTH] IN BLOOD BY AUTOMATED COUNT: 13.8 % (ref 12.3–15.4)
GLOBULIN SER CALC-MCNC: 2 G/DL (ref 1.5–4.5)
GLUCOSE SERPL-MCNC: 99 MG/DL (ref 65–99)
HCT VFR BLD AUTO: 30.6 % (ref 34–46.6)
HDLC SERPL-MCNC: 85 MG/DL
HGB BLD-MCNC: 10 G/DL (ref 11.1–15.9)
INTERPRETATION, 910389: NORMAL
INTERPRETATION: NORMAL
LDLC SERPL CALC-MCNC: 84 MG/DL (ref 0–99)
MCH RBC QN AUTO: 32.9 PG (ref 26.6–33)
MCHC RBC AUTO-ENTMCNC: 32.7 G/DL (ref 31.5–35.7)
MCV RBC AUTO: 101 FL (ref 79–97)
PDF IMAGE, 910387: NORMAL
PLATELET # BLD AUTO: 145 X10E3/UL (ref 150–379)
POTASSIUM SERPL-SCNC: 5.2 MMOL/L (ref 3.5–5.2)
PROT SERPL-MCNC: 6.3 G/DL (ref 6–8.5)
RBC # BLD AUTO: 3.04 X10E6/UL (ref 3.77–5.28)
SODIUM SERPL-SCNC: 146 MMOL/L (ref 134–144)
TRIGL SERPL-MCNC: 97 MG/DL (ref 0–149)
VLDLC SERPL CALC-MCNC: 19 MG/DL (ref 5–40)
WBC # BLD AUTO: 5.9 X10E3/UL (ref 3.4–10.8)

## 2018-09-19 LAB
AMPHETAMINES UR QL SCN: NEGATIVE NG/ML
BARBITURATES UR QL SCN: NEGATIVE NG/ML
BENZODIAZ UR QL SCN: POSITIVE NG/ML
BZE UR QL SCN: NEGATIVE NG/ML
CANNABINOIDS UR QL SCN: NEGATIVE NG/ML
CREAT UR-MCNC: 46.5 MG/DL (ref 20–300)
METHADONE UR QL SCN: NEGATIVE NG/ML
OPIATES UR QL SCN: NEGATIVE NG/ML
OXYCODONE+OXYMORPHONE UR QL SCN: NEGATIVE NG/ML
PCP UR QL: NEGATIVE NG/ML
PH UR: 6.2 [PH] (ref 4.5–8.9)
PLEASE NOTE:, 733163: ABNORMAL
PROPOXYPH UR QL SCN: NEGATIVE NG/ML

## 2018-09-24 ENCOUNTER — TELEPHONE (OUTPATIENT)
Dept: FAMILY MEDICINE CLINIC | Age: 83
End: 2018-09-24

## 2018-09-24 NOTE — TELEPHONE ENCOUNTER
Advised patient per Dr. Tera Rinne to start prednisone 9 mg daily until Rheumatology appointment next week. She agreed.

## 2018-09-24 NOTE — TELEPHONE ENCOUNTER
Patient states that she saw Mj Gee on the 17th and she told her to give her a call to let her know how she was doing she says about the same when she last seen her she can be reached @ [phone]

## 2018-10-21 DIAGNOSIS — F51.01 PRIMARY INSOMNIA: ICD-10-CM

## 2018-10-22 RX ORDER — ALPRAZOLAM 0.5 MG/1
TABLET ORAL
Qty: 30 TAB | Refills: 3 | OUTPATIENT
Start: 2018-10-22 | End: 2019-02-26 | Stop reason: SDUPTHER

## 2018-10-23 ENCOUNTER — OFFICE VISIT (OUTPATIENT)
Dept: NEUROLOGY | Age: 83
End: 2018-10-23

## 2018-10-23 VITALS
DIASTOLIC BLOOD PRESSURE: 70 MMHG | BODY MASS INDEX: 38.07 KG/M2 | WEIGHT: 223 LBS | OXYGEN SATURATION: 98 % | HEIGHT: 64 IN | SYSTOLIC BLOOD PRESSURE: 110 MMHG | HEART RATE: 76 BPM

## 2018-10-23 DIAGNOSIS — G25.0 ESSENTIAL TREMOR: Primary | ICD-10-CM

## 2018-10-23 DIAGNOSIS — M54.17 LUMBOSACRAL RADICULOPATHY: ICD-10-CM

## 2018-10-23 DIAGNOSIS — E11.42 DIABETIC POLYNEUROPATHY ASSOCIATED WITH TYPE 2 DIABETES MELLITUS (HCC): ICD-10-CM

## 2018-10-23 RX ORDER — PRIMIDONE 50 MG/1
TABLET ORAL
Qty: 30 TAB | Refills: 2 | Status: SHIPPED | OUTPATIENT
Start: 2018-10-23 | End: 2018-10-23 | Stop reason: SDUPTHER

## 2018-10-23 RX ORDER — PRIMIDONE 50 MG/1
TABLET ORAL
Qty: 90 TAB | Refills: 2 | Status: SHIPPED | OUTPATIENT
Start: 2018-10-23 | End: 2018-12-17

## 2018-10-23 NOTE — PATIENT INSTRUCTIONS
1.  Primidone 25 mg p.o. nightly for 1 week and then 50 mg p.o. Nightly  2. Follow-up in 2 months    Office Policies  · Phone calls/patient messages:  Please allow up to 24 hours for someone in the office to contact you about your call or message. Be mindful your provider may be out of the office or your message may require further review. We encourage you to use [employer] for your messages as this is a faster, more efficient way to communicate with our office  · Medication Refills:  Prescription medications require up to 48 business hours to process. We encourage you to use [employer] for your refills. For controlled medications: Please allow up to 72 business hours to process. Certain medications may require you to  a written prescription at our office. NO narcotic/controlled medications will be prescribed after 4pm Monday through Friday or on weekends  · Form/Paperwork Completion:  Please note there is a $25 fee for all paperwork completed by our providers. We ask that you allow 7-14 business days. Pre-payment is due prior to picking up/faxing the completed form. You may also download your forms to [employer] to have your doctor print off.

## 2018-10-23 NOTE — PROGRESS NOTES
NEUROLOGY FOLLOW UP NOTE    Chief Complaint   Patient presents with   Arleen Nguyen is a 80 y.o. female who presented to the neurology office for management of tremors. It has been going on for the last 2 yrs. It is gradually progressive with time. It is postural and in both in the arms and legs. No falls. She does have constipation. She does have mild stiffness in legs. The patient was on gabapentin and when it was discontinued her tremors have improved but they are coming back recently. She does also have lumbar radiculopathy and a length dependent neuropathy. Medications tried:  Gabapentin and Lyrica causes tremors    Current Outpatient Medications   Medication Sig    primidone (MYSOLINE) 50 mg tablet 25 mg p.o. nightly for 1 week and then 50 mg p.o. nightly    ALPRAZolam (XANAX) 0.5 mg tablet TAKE ONE-HALF TO 1 TABLET BY MOUTH EVERY EVENING AS NEEDED FOR SLEEP    HYDROcodone-acetaminophen (NORCO)  mg tablet Take 1 Tab by mouth every six (6) hours as needed.  predniSONE (DELTASONE) 5 mg tablet Take 1 Tab by mouth daily. (Patient taking differently: Take 5 mg by mouth daily. Taking 2 tabs 5 mg in  The morning)    simvastatin (ZOCOR) 40 mg tablet TAKE 1 TABLET BY MOUTH EVERY NIGHT AT BEDTIME    pantoprazole (PROTONIX) 40 mg tablet TAKE 1 TABLET BY MOUTH EVERY DAY    docusate sodium (STOOL SOFTENER PO) Take 1 Tab by mouth two (2) times a day.  carvedilol (COREG) 25 mg tablet Take 1 Tab by mouth two (2) times daily (with meals).  hydroxychloroquine (PLAQUENIL) 200 mg tablet Take 200 mg by mouth two (2) times a day.  furosemide (LASIX) 20 mg tablet Take 20 mg by mouth daily.  lisinopril (PRINIVIL, ZESTRIL) 2.5 mg tablet Take 2.5 mg by mouth daily.  DULoxetine (CYMBALTA) 60 mg capsule Take 60 mg by mouth daily.     glipiZIDE SR (GLUCOTROL XL) 2.5 mg CR tablet TAKE ONE TABLET BY MOUTH DAILY    fluticasone (FLONASE) 50 mcg/actuation nasal spray SHAKE LIQUID AND USE 2 SPRAYS IN EACH NOSTRIL EVERY DAY    cyanocobalamin 1,000 mcg tablet Take 1,000 mcg by mouth daily.  albuterol (VENTOLIN HFA) 90 mcg/actuation inhaler INHALE 2 PUFFS BY MOUTH EVERY 4 HOURS AS NEEDED FOR WHEEZING.  albuterol (PROVENTIL VENTOLIN) 2.5 mg /3 mL (0.083 %) nebulizer solution INHALE THE CONTENTS OF ONE VIAL VIA NEBULIZER EVERY 4 HOURS AS NEEDED FOR WHEEZING    promethazine-dextromethorphan (PROMETHAZINE-DM) 6.25-15 mg/5 mL syrup TAKE ONE TEASPOONFUL BY MOUTH EVERY SIX HOURS AS NEEDED FOR COUGH    PSEUDOEPHEDRINE-guaiFENesin (MUCINEX D)  mg per tablet Take 1 Tab by mouth daily.  predniSONE (DELTASONE) 1 mg tablet Take 3 Tabs by mouth daily. No current facility-administered medications for this visit. REVIEW OF SYSTEMS:   A ten system review of constitutional, cardiovascular, respiratory, musculoskeletal, endocrine, skin, SHEENT, genitourinary, psychiatric and neurologic systems was obtained and is unremarkable except as stated in HPI    EXAMINATION:   Visit Vitals  /70   Pulse 76   Ht 5' 4\" (1.626 m)   Wt 223 lb (101.2 kg)   LMP 03/04/1961 (Approximate)   SpO2 98%   BMI 38.28 kg/m²        General:   General appearance: Pt is in no acute distress   Distal pulses are preserved    Neurological Examination:   Mental Status: AAO x3. Speech is fluent. Follows commands, has normal fund of knowledge, attention, short term recall, comprehension and insight. Cranial Nerves: Visual fields are full. PERRL, Extraocular movements are full. Facial sensation intact V1- V3. Facial movement intact, symmetric. Hearing intact to conversation. Palate elevates symmetrically. Shoulder shrug symmetric. Tongue midline. Motor: Strength is 5/5 in all 4 ext. Sensation: Decreased vibration sensation in feet and ankles.     Coordination/Cerebellar: Intact to finger-nose-finger     Laboratory review:   Results for orders placed or performed in visit on 85/17/17   METABOLIC PANEL, COMPREHENSIVE   Result Value Ref Range    Glucose 99 65 - 99 mg/dL    BUN 17 8 - 27 mg/dL    Creatinine 1.81 (H) 0.57 - 1.00 mg/dL    GFR est non-AA 25 (L) >59 mL/min/1.73    GFR est AA 29 (L) >59 mL/min/1.73    BUN/Creatinine ratio 9 (L) 12 - 28    Sodium 146 (H) 134 - 144 mmol/L    Potassium 5.2 3.5 - 5.2 mmol/L    Chloride 106 96 - 106 mmol/L    CO2 29 20 - 29 mmol/L    Calcium 9.7 8.7 - 10.3 mg/dL    Protein, total 6.3 6.0 - 8.5 g/dL    Albumin 4.3 3.5 - 4.7 g/dL    GLOBULIN, TOTAL 2.0 1.5 - 4.5 g/dL    A-G Ratio 2.2 1.2 - 2.2    Bilirubin, total 0.4 0.0 - 1.2 mg/dL    Alk. phosphatase 42 39 - 117 IU/L    AST (SGOT) 28 0 - 40 IU/L    ALT (SGPT) 34 (H) 0 - 32 IU/L   LIPID PANEL   Result Value Ref Range    Cholesterol, total 188 100 - 199 mg/dL    Triglyceride 97 0 - 149 mg/dL    HDL Cholesterol 85 >39 mg/dL    VLDL, calculated 19 5 - 40 mg/dL    LDL, calculated 84 0 - 99 mg/dL   CBC W/O DIFF   Result Value Ref Range    WBC 5.9 3.4 - 10.8 x10E3/uL    RBC 3.04 (L) 3.77 - 5.28 x10E6/uL    HGB 10.0 (L) 11.1 - 15.9 g/dL    HCT 30.6 (L) 34.0 - 46.6 %     (H) 79 - 97 fL    MCH 32.9 26.6 - 33.0 pg    MCHC 32.7 31.5 - 35.7 g/dL    RDW 13.8 12.3 - 15.4 %    PLATELET 715 (L) 319 - 379 x10E3/uL   9-DRUG SCREEN + OXY, UR   Result Value Ref Range    Amphetamine Screen, urine Negative Wkcvnz=9636 ng/mL    Barbiturates Screen, urine Negative Zhqgid=811 ng/mL    Benzodiazepines Screen, urine Positive (A) Sjtfjj=288 ng/mL    Cannabinoid Screen, urine Negative Cutoff=20 ng/mL    Cocaine Metab.  Screen, urine Negative Ixlubp=555 ng/mL    Opiate Screen, urine Negative Viuhmj=206 ng/mL    Oxycodone/Oxymorphone, urine Negative Zevqez=829 ng/mL    Phencyclidine Screen, urine Negative Cutoff=25 ng/mL    Methadone Screen, urine Negative Zzqbkb=931 ng/mL    Propoxyphene Screen, urine Negative Yxottq=617 ng/mL    Creatinine, urine 46.5 20.0 - 300.0 mg/dL    pH, urine 6.2 4.5 - 8.9    Please note: Comment    CVD REPORT   Result Value Ref Range    INTERPRETATION Note     PDF IMAGE Not applicable    CKD REPORT   Result Value Ref Range    Interpretation Note    AMB POC HEMOGLOBIN A1C   Result Value Ref Range    Hemoglobin A1c (POC) 7.1 %   AMB POC GLUCOSE, QUANTITATIVE, BLOOD   Result Value Ref Range    Glucose  mg/dL       Imaging review:   11/6/12  MRI L spine  1. Status post L4 and L5 laminectomies. Severe L4-5 disc space narrowing with moderate sized central disc herniation. No canal stenosis; moderate left foraminal narrowing. 2.  Diffuse degenerative changes. Canal stenosis mild at T10-11, borderline at T11-12 and T12-L1, mild at L1-2, mild to moderate at L2-3, severe at L3-4, and moderate at L5-S1.    12/6/12  MRI Brain  1. No mass or enhancement abnormality of the internal auditory canals or cerebellopontine angles. 2.  Extensive sinus disease with complete obscuration of the left frontal and sphenoid sinuses. 9/28/16  EMG/NCS  This study is abnormal. There is evidence for a moderate to severe degree of a length dependent polyneuropathy. There is also residual multilevel lumbosacral radiculopathy without significant denervation in the distal leg muscles. Documentation review:  Pt was seen by Dr. April Hartmann for tremors and was started on lyrica. EMG was performed by him and it showed a length dependent neuropathy. Assessment/Plan:   Skylar Agrawal is a 80 y.o. female who presented to the neurology office for management of postural tremors, lumbosacral radiculopathy and diabetic polyneuropathy. Her diabetic neuropathy and lumbosacral radiculopathy are stable. Her gabapentin was discontinued and her tremors had improved but recently her tremors are returning back. I did examine her and I did not find any evidence of Parkinson's disease. Am going to start the patient on primidone 25 mg p.o. nightly for 1 week and then 50 mg p.o. nightly.     Follow-up in 2 months

## 2018-11-19 RX ORDER — SIMVASTATIN 40 MG/1
TABLET, FILM COATED ORAL
Qty: 90 TAB | Refills: 0 | Status: SHIPPED | OUTPATIENT
Start: 2018-11-19 | End: 2019-02-18 | Stop reason: SDUPTHER

## 2018-12-02 DIAGNOSIS — J45.909 REACTIVE AIRWAY DISEASE WITH WHEEZING WITHOUT COMPLICATION: ICD-10-CM

## 2018-12-03 RX ORDER — ALBUTEROL SULFATE 90 UG/1
AEROSOL, METERED RESPIRATORY (INHALATION)
Qty: 1 INHALER | Refills: 6 | Status: SHIPPED | OUTPATIENT
Start: 2018-12-03 | End: 2020-03-06 | Stop reason: SDUPTHER

## 2018-12-17 ENCOUNTER — OFFICE VISIT (OUTPATIENT)
Dept: NEUROLOGY | Age: 83
End: 2018-12-17

## 2018-12-17 VITALS
SYSTOLIC BLOOD PRESSURE: 115 MMHG | HEIGHT: 64 IN | BODY MASS INDEX: 38.76 KG/M2 | OXYGEN SATURATION: 98 % | DIASTOLIC BLOOD PRESSURE: 78 MMHG | HEART RATE: 70 BPM | WEIGHT: 227 LBS

## 2018-12-17 DIAGNOSIS — G25.0 ESSENTIAL TREMOR: Primary | ICD-10-CM

## 2018-12-17 DIAGNOSIS — M54.17 LUMBOSACRAL RADICULOPATHY: ICD-10-CM

## 2018-12-17 DIAGNOSIS — E11.42 DIABETIC POLYNEUROPATHY ASSOCIATED WITH TYPE 2 DIABETES MELLITUS (HCC): ICD-10-CM

## 2018-12-17 RX ORDER — PRIMIDONE 50 MG/1
TABLET ORAL
Qty: 180 TAB | Refills: 2 | Status: SHIPPED | OUTPATIENT
Start: 2018-12-17 | End: 2019-02-19

## 2018-12-17 NOTE — LETTER
12/17/2018 3:13 PM 
 
Patient:    Ottoniel Ferguson YOB: 1931 Date of Visit:    12/17/2018 Dear Alli Willson MD 
 
Thank you for referring Ms. Will Evans to me for evaluation/treatment. Below are the relevant portions of my assessment and plan of care. NEUROLOGY FOLLOW UP NOTE Chief Complaint Patient presents with  Follow-up  
  tremors SUBJECTIVE Ottoniel Ferguson is a 80 y.o. female who presented to the neurology office for management of tremors. It has been going on for the last 2 yrs. It is gradually progressive with time. It is postural and in both in the arms and legs. No falls. She does have constipation. She does have mild stiffness in legs. The patient was on gabapentin and when it was discontinued her tremors have improved but they are coming back recently. Patient is on primidone 50 mg p.o. nightly but has not noticed any improvement. She does also have lumbar radiculopathy and a length dependent neuropathy. Medications tried: 
Gabapentin and Lyrica causes tremors Current Outpatient Medications Medication Sig  primidone (MYSOLINE) 50 mg tablet 2 tablets at night  albuterol (VENTOLIN HFA) 90 mcg/actuation inhaler INHALE 2 PUFFS BY MOUTH EVERY 4 HOURS AS NEEDED FOR WHEEZING  
 simvastatin (ZOCOR) 40 mg tablet TAKE 1 TABLET BY MOUTH EVERY NIGHT AT BEDTIME  ALPRAZolam (XANAX) 0.5 mg tablet TAKE ONE-HALF TO 1 TABLET BY MOUTH EVERY EVENING AS NEEDED FOR SLEEP  
 HYDROcodone-acetaminophen (NORCO)  mg tablet Take 1 Tab by mouth every six (6) hours as needed.  predniSONE (DELTASONE) 5 mg tablet Take 1 Tab by mouth daily. (Patient taking differently: Take 5 mg by mouth daily. Taking 2 tabs 5 mg in  The morning)  pantoprazole (PROTONIX) 40 mg tablet TAKE 1 TABLET BY MOUTH EVERY DAY  docusate sodium (STOOL SOFTENER PO) Take 1 Tab by mouth two (2) times a day.  carvedilol (COREG) 25 mg tablet Take 1 Tab by mouth two (2) times daily (with meals).  hydroxychloroquine (PLAQUENIL) 200 mg tablet Take 200 mg by mouth two (2) times a day.  furosemide (LASIX) 20 mg tablet Take 20 mg by mouth daily.  lisinopril (PRINIVIL, ZESTRIL) 2.5 mg tablet Take 2.5 mg by mouth daily.  DULoxetine (CYMBALTA) 60 mg capsule Take 60 mg by mouth daily.  glipiZIDE SR (GLUCOTROL XL) 2.5 mg CR tablet TAKE ONE TABLET BY MOUTH DAILY  fluticasone (FLONASE) 50 mcg/actuation nasal spray SHAKE LIQUID AND USE 2 SPRAYS IN EACH NOSTRIL EVERY DAY  cyanocobalamin 1,000 mcg tablet Take 1,000 mcg by mouth daily.  albuterol (PROVENTIL VENTOLIN) 2.5 mg /3 mL (0.083 %) nebulizer solution INHALE THE CONTENTS OF ONE VIAL VIA NEBULIZER EVERY 4 HOURS AS NEEDED FOR WHEEZING  promethazine-dextromethorphan (PROMETHAZINE-DM) 6.25-15 mg/5 mL syrup TAKE ONE TEASPOONFUL BY MOUTH EVERY SIX HOURS AS NEEDED FOR COUGH  PSEUDOEPHEDRINE-guaiFENesin (MUCINEX D)  mg per tablet Take 1 Tab by mouth daily.  predniSONE (DELTASONE) 1 mg tablet Take 3 Tabs by mouth daily. No current facility-administered medications for this visit. REVIEW OF SYSTEMS:  
A ten system review of constitutional, cardiovascular, respiratory, musculoskeletal, endocrine, skin, SHEENT, genitourinary, psychiatric and neurologic systems was obtained and is unremarkable except as stated in HPI EXAMINATION:  
Visit Vitals /78 Pulse 70 Ht 5' 4\" (1.626 m) Wt 227 lb (103 kg) LMP 03/04/1961 (Approximate) SpO2 98% BMI 38.96 kg/m² General:  
General appearance: Pt is in no acute distress Distal pulses are preserved Neurological Examination:  
Mental Status: AAO x3. Speech is fluent. Follows commands, has normal fund of knowledge, attention, short term recall, comprehension and insight. Cranial Nerves: Visual fields are full.  PERRL, Extraocular movements are full. Facial sensation intact V1- V3. Facial movement intact, symmetric. Hearing intact to conversation. Palate elevates symmetrically. Shoulder shrug symmetric. Tongue midline. Motor: Strength is 5/5 in all 4 ext. Sensation: Decreased vibration sensation in feet and ankles. Coordination/Cerebellar: Intact to finger-nose-finger Laboratory review:  
Results for orders placed or performed in visit on 09/17/18 METABOLIC PANEL, COMPREHENSIVE Result Value Ref Range Glucose 99 65 - 99 mg/dL BUN 17 8 - 27 mg/dL Creatinine 1.81 (H) 0.57 - 1.00 mg/dL GFR est non-AA 25 (L) >59 mL/min/1.73 GFR est AA 29 (L) >59 mL/min/1.73  
 BUN/Creatinine ratio 9 (L) 12 - 28 Sodium 146 (H) 134 - 144 mmol/L Potassium 5.2 3.5 - 5.2 mmol/L Chloride 106 96 - 106 mmol/L  
 CO2 29 20 - 29 mmol/L Calcium 9.7 8.7 - 10.3 mg/dL Protein, total 6.3 6.0 - 8.5 g/dL Albumin 4.3 3.5 - 4.7 g/dL GLOBULIN, TOTAL 2.0 1.5 - 4.5 g/dL A-G Ratio 2.2 1.2 - 2.2 Bilirubin, total 0.4 0.0 - 1.2 mg/dL Alk. phosphatase 42 39 - 117 IU/L  
 AST (SGOT) 28 0 - 40 IU/L  
 ALT (SGPT) 34 (H) 0 - 32 IU/L  
LIPID PANEL Result Value Ref Range Cholesterol, total 188 100 - 199 mg/dL Triglyceride 97 0 - 149 mg/dL HDL Cholesterol 85 >39 mg/dL VLDL, calculated 19 5 - 40 mg/dL LDL, calculated 84 0 - 99 mg/dL CBC W/O DIFF Result Value Ref Range WBC 5.9 3.4 - 10.8 x10E3/uL  
 RBC 3.04 (L) 3.77 - 5.28 x10E6/uL HGB 10.0 (L) 11.1 - 15.9 g/dL HCT 30.6 (L) 34.0 - 46.6 %  (H) 79 - 97 fL  
 MCH 32.9 26.6 - 33.0 pg  
 MCHC 32.7 31.5 - 35.7 g/dL  
 RDW 13.8 12.3 - 15.4 % PLATELET 019 (L) 648 - 379 x10E3/uL  
9-DRUG SCREEN + OXY, UR Result Value Ref Range Amphetamine Screen, urine Negative Ckgopo=6850 ng/mL Barbiturates Screen, urine Negative Vxfqnm=976 ng/mL Benzodiazepines Screen, urine Positive (A) Bozima=429 ng/mL Cannabinoid Screen, urine Negative Cutoff=20 ng/mL Cocaine Metab. Screen, urine Negative Qbdmpt=529 ng/mL Opiate Screen, urine Negative Wwunuq=070 ng/mL Oxycodone/Oxymorphone, urine Negative Pjelqe=292 ng/mL Phencyclidine Screen, urine Negative Cutoff=25 ng/mL Methadone Screen, urine Negative Bmrzoo=083 ng/mL Propoxyphene Screen, urine Negative Gzwmbk=520 ng/mL Creatinine, urine 46.5 20.0 - 300.0 mg/dL  
 pH, urine 6.2 4.5 - 8.9 Please note: Comment CVD REPORT Result Value Ref Range INTERPRETATION Note PDF IMAGE Not applicable CKD REPORT Result Value Ref Range Interpretation Note AMB POC HEMOGLOBIN A1C Result Value Ref Range Hemoglobin A1c (POC) 7.1 % AMB POC GLUCOSE, QUANTITATIVE, BLOOD Result Value Ref Range Glucose  mg/dL Imaging review:  
11/6/12 MRI L spine 1. Status post L4 and L5 laminectomies. Severe L4-5 disc space narrowing with moderate sized central disc herniation. No canal stenosis; moderate left foraminal narrowing. 2.  Diffuse degenerative changes. Canal stenosis mild at T10-11, borderline at T11-12 and T12-L1, mild at L1-2, mild to moderate at L2-3, severe at L3-4, and moderate at L5-S1. 
 
12/6/12 MRI Brain 1. No mass or enhancement abnormality of the internal auditory canals or cerebellopontine angles. 2.  Extensive sinus disease with complete obscuration of the left frontal and sphenoid sinuses. 9/28/16 EMG/NCS This study is abnormal. There is evidence for a moderate to severe degree of a length dependent polyneuropathy. There is also residual multilevel lumbosacral radiculopathy without significant denervation in the distal leg muscles. Documentation review: Pt was seen by Dr. Lacey Kwon for tremors and was started on lyrica. EMG was performed by him and it showed a length dependent neuropathy. Assessment/Plan: Darline Barboza is a 80 y.o. female who presented to the neurology office for management of postural tremors, lumbosacral radiculopathy and diabetic polyneuropathy. Her diabetic neuropathy and lumbosacral radiculopathy are stable. For essential tremors, patient is on primidone 50 mg p.o. nightly and there has been no improvement. I do plan to increase the dosage to 100 mg at night. Follow-up in 2 months If you have questions, please do not hesitate to call me. I look forward to following Ms. Tabitha Goodson along with you. Sincerely, Petr Vásquez MD

## 2018-12-17 NOTE — PATIENT INSTRUCTIONS
1.  Increase primidone to 100 mg at night  2. Follow-up in 2 months    Office Policies  · Phone calls/patient messages:  Please allow up to 24 hours for someone in the office to contact you about your call or message. Be mindful your provider may be out of the office or your message may require further review. We encourage you to use MyDoc for your messages as this is a faster, more efficient way to communicate with our office  · Medication Refills:  Prescription medications require up to 48 business hours to process. We encourage you to use MyDoc for your refills. For controlled medications: Please allow up to 72 business hours to process. Certain medications may require you to  a written prescription at our office. NO narcotic/controlled medications will be prescribed after 4pm Monday through Friday or on weekends  · Form/Paperwork Completion:  Please note there is a $25 fee for all paperwork completed by our providers. We ask that you allow 7-14 business days. Pre-payment is due prior to picking up/faxing the completed form. You may also download your forms to MyDoc to have your doctor print off.

## 2018-12-17 NOTE — PROGRESS NOTES
NEUROLOGY FOLLOW UP NOTE    Chief Complaint   Patient presents with    Follow-up     tremors       [address] aRza is a 80 y.o. female who presented to the neurology office for management of tremors. It has been going on for the last 2 yrs. It is gradually progressive with time. It is postural and in both in the arms and legs. No falls. She does have constipation. She does have mild stiffness in legs. The patient was on gabapentin and when it was discontinued her tremors have improved but they are coming back recently. Patient is on primidone 50 mg p.o. nightly but has not noticed any improvement. She does also have lumbar radiculopathy and a length dependent neuropathy. Medications tried:  Gabapentin and Lyrica causes tremors    Current Outpatient Medications   Medication Sig    primidone (MYSOLINE) 50 mg tablet 2 tablets at night    albuterol (VENTOLIN HFA) 90 mcg/actuation inhaler INHALE 2 PUFFS BY MOUTH EVERY 4 HOURS AS NEEDED FOR WHEEZING    simvastatin (ZOCOR) 40 mg tablet TAKE 1 TABLET BY MOUTH EVERY NIGHT AT BEDTIME    ALPRAZolam (XANAX) 0.5 mg tablet TAKE ONE-HALF TO 1 TABLET BY MOUTH EVERY EVENING AS NEEDED FOR SLEEP    HYDROcodone-acetaminophen (NORCO)  mg tablet Take 1 Tab by mouth every six (6) hours as needed.  predniSONE (DELTASONE) 5 mg tablet Take 1 Tab by mouth daily. (Patient taking differently: Take 5 mg by mouth daily. Taking 2 tabs 5 mg in  The morning)    pantoprazole (PROTONIX) 40 mg tablet TAKE 1 TABLET BY MOUTH EVERY DAY    docusate sodium (STOOL SOFTENER PO) Take 1 Tab by mouth two (2) times a day.  carvedilol (COREG) 25 mg tablet Take 1 Tab by mouth two (2) times daily (with meals).  hydroxychloroquine (PLAQUENIL) 200 mg tablet Take 200 mg by mouth two (2) times a day.  furosemide (LASIX) 20 mg tablet Take 20 mg by mouth daily.  lisinopril (PRINIVIL, ZESTRIL) 2.5 mg tablet Take 2.5 mg by mouth daily.     DULoxetine (CYMBALTA) 60 mg capsule Take 60 mg by mouth daily.  glipiZIDE SR (GLUCOTROL XL) 2.5 mg CR tablet TAKE ONE TABLET BY MOUTH DAILY    fluticasone (FLONASE) 50 mcg/actuation nasal spray SHAKE LIQUID AND USE 2 SPRAYS IN EACH NOSTRIL EVERY DAY    cyanocobalamin 1,000 mcg tablet Take 1,000 mcg by mouth daily.  albuterol (PROVENTIL VENTOLIN) 2.5 mg /3 mL (0.083 %) nebulizer solution INHALE THE CONTENTS OF ONE VIAL VIA NEBULIZER EVERY 4 HOURS AS NEEDED FOR WHEEZING    promethazine-dextromethorphan (PROMETHAZINE-DM) 6.25-15 mg/5 mL syrup TAKE ONE TEASPOONFUL BY MOUTH EVERY SIX HOURS AS NEEDED FOR COUGH    PSEUDOEPHEDRINE-guaiFENesin (MUCINEX D)  mg per tablet Take 1 Tab by mouth daily.  predniSONE (DELTASONE) 1 mg tablet Take 3 Tabs by mouth daily. No current facility-administered medications for this visit. REVIEW OF SYSTEMS:   A ten system review of constitutional, cardiovascular, respiratory, musculoskeletal, endocrine, skin, SHEENT, genitourinary, psychiatric and neurologic systems was obtained and is unremarkable except as stated in HPI    EXAMINATION:   Visit Vitals  /78   Pulse 70   Ht 5' 4\" (1.626 m)   Wt 227 lb (103 kg)   LMP 03/04/1961 (Approximate)   SpO2 98%   BMI 38.96 kg/m²        General:   General appearance: Pt is in no acute distress   Distal pulses are preserved    Neurological Examination:   Mental Status: AAO x3. Speech is fluent. Follows commands, has normal fund of knowledge, attention, short term recall, comprehension and insight. Cranial Nerves: Visual fields are full. PERRL, Extraocular movements are full. Facial sensation intact V1- V3. Facial movement intact, symmetric. Hearing intact to conversation. Palate elevates symmetrically. Shoulder shrug symmetric. Tongue midline. Motor: Strength is 5/5 in all 4 ext. Sensation: Decreased vibration sensation in feet and ankles.     Coordination/Cerebellar: Intact to finger-nose-finger     Laboratory review:   Results for orders placed or performed in visit on 49/61/55   METABOLIC PANEL, COMPREHENSIVE   Result Value Ref Range    Glucose 99 65 - 99 mg/dL    BUN 17 8 - 27 mg/dL    Creatinine 1.81 (H) 0.57 - 1.00 mg/dL    GFR est non-AA 25 (L) >59 mL/min/1.73    GFR est AA 29 (L) >59 mL/min/1.73    BUN/Creatinine ratio 9 (L) 12 - 28    Sodium 146 (H) 134 - 144 mmol/L    Potassium 5.2 3.5 - 5.2 mmol/L    Chloride 106 96 - 106 mmol/L    CO2 29 20 - 29 mmol/L    Calcium 9.7 8.7 - 10.3 mg/dL    Protein, total 6.3 6.0 - 8.5 g/dL    Albumin 4.3 3.5 - 4.7 g/dL    GLOBULIN, TOTAL 2.0 1.5 - 4.5 g/dL    A-G Ratio 2.2 1.2 - 2.2    Bilirubin, total 0.4 0.0 - 1.2 mg/dL    Alk. phosphatase 42 39 - 117 IU/L    AST (SGOT) 28 0 - 40 IU/L    ALT (SGPT) 34 (H) 0 - 32 IU/L   LIPID PANEL   Result Value Ref Range    Cholesterol, total 188 100 - 199 mg/dL    Triglyceride 97 0 - 149 mg/dL    HDL Cholesterol 85 >39 mg/dL    VLDL, calculated 19 5 - 40 mg/dL    LDL, calculated 84 0 - 99 mg/dL   CBC W/O DIFF   Result Value Ref Range    WBC 5.9 3.4 - 10.8 x10E3/uL    RBC 3.04 (L) 3.77 - 5.28 x10E6/uL    HGB 10.0 (L) 11.1 - 15.9 g/dL    HCT 30.6 (L) 34.0 - 46.6 %     (H) 79 - 97 fL    MCH 32.9 26.6 - 33.0 pg    MCHC 32.7 31.5 - 35.7 g/dL    RDW 13.8 12.3 - 15.4 %    PLATELET 322 (L) 442 - 379 x10E3/uL   9-DRUG SCREEN + OXY, UR   Result Value Ref Range    Amphetamine Screen, urine Negative Xfsjvu=4870 ng/mL    Barbiturates Screen, urine Negative Eturym=167 ng/mL    Benzodiazepines Screen, urine Positive (A) Nkvydr=547 ng/mL    Cannabinoid Screen, urine Negative Cutoff=20 ng/mL    Cocaine Metab.  Screen, urine Negative Ugfsiq=886 ng/mL    Opiate Screen, urine Negative Qkvdaz=962 ng/mL    Oxycodone/Oxymorphone, urine Negative Iufgdg=780 ng/mL    Phencyclidine Screen, urine Negative Cutoff=25 ng/mL    Methadone Screen, urine Negative Ogmjou=882 ng/mL    Propoxyphene Screen, urine Negative Uvrlpu=181 ng/mL    Creatinine, urine 46.5 20.0 - 300.0 mg/dL    pH, urine 6.2 4.5 - 8.9    Please note: Comment    CVD REPORT   Result Value Ref Range    INTERPRETATION Note     PDF IMAGE Not applicable    CKD REPORT   Result Value Ref Range    Interpretation Note    AMB POC HEMOGLOBIN A1C   Result Value Ref Range    Hemoglobin A1c (POC) 7.1 %   AMB POC GLUCOSE, QUANTITATIVE, BLOOD   Result Value Ref Range    Glucose  mg/dL       Imaging review:   11/6/12  MRI L spine  1. Status post L4 and L5 laminectomies. Severe L4-5 disc space narrowing with moderate sized central disc herniation. No canal stenosis; moderate left foraminal narrowing. 2.  Diffuse degenerative changes. Canal stenosis mild at T10-11, borderline at T11-12 and T12-L1, mild at L1-2, mild to moderate at L2-3, severe at L3-4, and moderate at L5-S1.    12/6/12  MRI Brain  1. No mass or enhancement abnormality of the internal auditory canals or cerebellopontine angles. 2.  Extensive sinus disease with complete obscuration of the left frontal and sphenoid sinuses. 9/28/16  EMG/NCS  This study is abnormal. There is evidence for a moderate to severe degree of a length dependent polyneuropathy. There is also residual multilevel lumbosacral radiculopathy without significant denervation in the distal leg muscles. Documentation review:  Pt was seen by Dr. Reji Ayala for tremors and was started on lyrica. EMG was performed by him and it showed a length dependent neuropathy. Assessment/Plan:   Nidia Sheppard is a 80 y.o. female who presented to the neurology office for management of postural tremors, lumbosacral radiculopathy and diabetic polyneuropathy. Her diabetic neuropathy and lumbosacral radiculopathy are stable. For essential tremors, patient is on primidone 50 mg p.o. nightly and there has been no improvement. I do plan to increase the dosage to 100 mg at night.     Follow-up in 2 months

## 2018-12-18 ENCOUNTER — HOSPITAL ENCOUNTER (OUTPATIENT)
Dept: LAB | Age: 83
Discharge: HOME | End: 2018-12-18
Payer: MEDICARE

## 2018-12-18 ENCOUNTER — OFFICE VISIT (OUTPATIENT)
Dept: FAMILY MEDICINE CLINIC | Age: 83
End: 2018-12-18

## 2018-12-18 VITALS
HEIGHT: 64 IN | DIASTOLIC BLOOD PRESSURE: 62 MMHG | HEART RATE: 62 BPM | OXYGEN SATURATION: 99 % | BODY MASS INDEX: 38.86 KG/M2 | RESPIRATION RATE: 18 BRPM | WEIGHT: 227.6 LBS | SYSTOLIC BLOOD PRESSURE: 134 MMHG | TEMPERATURE: 97.6 F

## 2018-12-18 DIAGNOSIS — E78.2 MIXED HYPERLIPIDEMIA: ICD-10-CM

## 2018-12-18 DIAGNOSIS — N18.30 STAGE 3 CHRONIC KIDNEY DISEASE (HCC): ICD-10-CM

## 2018-12-18 DIAGNOSIS — M35.3 POLYMYALGIA RHEUMATICA (HCC): ICD-10-CM

## 2018-12-18 DIAGNOSIS — Z51.81 ENCOUNTER FOR MEDICATION MONITORING: ICD-10-CM

## 2018-12-18 DIAGNOSIS — I10 ESSENTIAL HYPERTENSION, BENIGN: Primary | ICD-10-CM

## 2018-12-18 DIAGNOSIS — E11.21 TYPE 2 DIABETES WITH NEPHROPATHY (HCC): ICD-10-CM

## 2018-12-18 DIAGNOSIS — K21.9 GASTROESOPHAGEAL REFLUX DISEASE, ESOPHAGITIS PRESENCE NOT SPECIFIED: ICD-10-CM

## 2018-12-18 DIAGNOSIS — R09.89 LEFT CAROTID BRUIT: ICD-10-CM

## 2018-12-18 DIAGNOSIS — R13.13 PHARYNGEAL DYSPHAGIA: ICD-10-CM

## 2018-12-18 DIAGNOSIS — M25.50 ARTHRALGIA OF MULTIPLE JOINTS: ICD-10-CM

## 2018-12-18 DIAGNOSIS — I50.22 CHRONIC SYSTOLIC HEART FAILURE (HCC): ICD-10-CM

## 2018-12-18 LAB
GLUCOSE POC: 201 MG/DL
HBA1C MFR BLD HPLC: 7.3 %

## 2018-12-18 PROCEDURE — 80048 BASIC METABOLIC PNL TOTAL CA: CPT

## 2018-12-18 PROCEDURE — 84443 ASSAY THYROID STIM HORMONE: CPT

## 2018-12-18 PROCEDURE — 36415 COLL VENOUS BLD VENIPUNCTURE: CPT

## 2018-12-18 RX ORDER — DEXLANSOPRAZOLE 60 MG/1
1 CAPSULE, DELAYED RELEASE ORAL DAILY
Qty: 30 CAP | Refills: 6 | Status: SHIPPED | OUTPATIENT
Start: 2018-12-18 | End: 2019-07-15

## 2018-12-18 RX ORDER — PREDNISONE 5 MG/1
5 TABLET ORAL DAILY
COMMUNITY
End: 2019-06-18 | Stop reason: SDUPTHER

## 2018-12-18 NOTE — PROGRESS NOTES
Chief Complaint   Patient presents with    Hypertension     Follow uo    Diabetes     follow up    Cholesterol Problem     follow up     Eye exam 11/15/2018 by Dr. Isaias Peres 5/31/2018    1. Have you been to the ER, urgent care clinic since your last visit? Hospitalized since your last visit? No    2. Have you seen or consulted any other health care providers outside of the [address] [place] [address] since your last visit? Include any pap smears or colon screening.  No

## 2018-12-18 NOTE — PROGRESS NOTES
HISTORY OF PRESENT ILLNESS  Chapito Mauro is a 80 y.o. female. HPI   Follow up on chronic medical problems. C/o trouble with her throat and swallowing which she has noticed over the past several weeks. Feels like food gets stuck in the throat with eating. She has to drink water to get the food to go down completely. She also has had increased with her reflux. Has had food coming back up in her throat. She has been taking protonix. No abd pain but has a \"misery\" feeling with eating. No increased in her usual constipation pattern. No blood in the stool and no melena. Weight has been stable        Cardiovascular Review:  The patient has hypertension, hyperlipidemia and Systolic HF. Diet and Lifestyle: generally follows a low fat low cholesterol diet, generally follows a low sodium diet  Home BP Monitoring: is not measured at home. Pertinent ROS: taking medications as instructed, no medication side effects noted, no TIA's, no chest pain on exertion, no dyspnea on exertion, noting that her ankles swelling has been mild. DM type II follow up:  Compliant w/ meds, diabetic diet, and exercise. Obtains home glucose monitoring averaging in the mid 100s. Checks BS daily on most days and prn. Pt does not have BS log at visit today. No Rf needed for today. Denies any tingling sensation, polyuria and polydipsia. No blurred vision. No significant weight changes. Asthma Review:  The patient is being seen for follow up of chronic respiratory failure and allergies and chronic sinusitits, not currently in exacerbation. Breathing has been good also with taking prednisone. Using nebulizer 3 to 4 times in a day as needed. Regimen compliance: The patient reports adherence to asthma action plan and regimen. Encounter for pain management. 1./2. Medical history/Past medical History  Chronic Pain:  Osteoarthritis:  Patient has osteoarthritis in multiple joints but primarily in the knees and back.   Seen by rheumatology and place on prednisone and plaquenil. Her joint pain seem to be reasonable more controlled with taking this regimen. 3. Applicable records from prior treatment providers are apart of [facility] under the media tab. 4. Diagnostic, therapeutic and laboratory results are available in the [facility] chart. 5. Consultation notes are available for review in the media tab of the [facility] chart. 6. Treatment goals include pain control so that the pt may be as active and function with her daily activities and improved comfort level. Previously pt has been limited by pain. 7. The risks and benefits of treatment has been discussed at this office visit with the pt. She understands that the medication has addicting potential.  Additionally the pt has been advised that narcotic pain medication may impair mental and/or physical ability required for performance of tasks such as driving or operating any other machinery. 8. Pt has an updated signed pain contract on file and can be found under the FYI section of the [facility] chart. 9. The pain contract is reviewed. Pain medication will be continued at the previous dosage. Urine drug screening will be done today. Diagnostic studies are not indicated at this time. Interventional procedure are not indicated at this time. 10. Medication prescibed is HYDROcodone-acetaminophen (NORCO)  mg tablet. No prescription was requested or needed today. 11. Patient instructions have been reviewed in detail as outlined above and in the pain contract. Last refill was in December. 12. Re-eval is planned for 3 months. Naloxone prescription is not warranted.      Patient Active Problem List   Diagnosis Code    CHF (congestive heart failure) ([facility]) I50.9    S/P TKR (total knee replacement) Z96.659    Anemia D64.9    s/p lumbar laminectomy Z98.890    S/P ASAD (total abdominal hysterectomy) Z90.710    S/P hemorrhoidectomy Z98.890, Z87.19    S/P rotator cuff surgery Z98.890    DM (diabetes mellitus) ([facility] E11.9    Chronic sinusitis J32.9    S/P sinus surgery Z98.890    Dyslipidemia E78.5    Environmental allergies Z91.09    Obstructive sleep apnea (adult) (pediatric) G47.33    DJD (degenerative joint disease) M19.90    Chronic systolic heart failure ([facility]) I50.22    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    Asthma J45.909    Anxiety disorder F41.9    Diabetic neuropathy ([facility]) E11.40    Esophageal reflux K21.9    Essential hypertension, benign I10    Reactive airway disease with wheezing without complication I39.080    Encounter for medication monitoring Z51.81    CKD (chronic kidney disease) N18.9    Arthralgia of multiple joints M25.50    Plantar fasciitis of left foot M72.2    Type 2 diabetes with nephropathy ([facility]) E11.21    Severe obesity (BMI 35.0-39. 9) with comorbidity ([facility]) E66.01    Polymyalgia rheumatica ([facility]) M35.3       Current Outpatient Medications   Medication Sig Dispense Refill    primidone (MYSOLINE) 50 mg tablet 2 tablets at night 180 Tab 2    albuterol (VENTOLIN HFA) 90 mcg/actuation inhaler INHALE 2 PUFFS BY MOUTH EVERY 4 HOURS AS NEEDED FOR WHEEZING 1 Inhaler 6    simvastatin (ZOCOR) 40 mg tablet TAKE 1 TABLET BY MOUTH EVERY NIGHT AT BEDTIME 90 Tab 0    ALPRAZolam (XANAX) 0.5 mg tablet TAKE ONE-HALF TO 1 TABLET BY MOUTH EVERY EVENING AS NEEDED FOR SLEEP 30 Tab 3    predniSONE (DELTASONE) 1 mg tablet Take 3 Tabs by mouth daily.  HYDROcodone-acetaminophen (NORCO)  mg tablet Take 1 Tab by mouth every six (6) hours as needed. 60 Tab 0    predniSONE (DELTASONE) 5 mg tablet Take 1 Tab by mouth daily. (Patient taking differently: Take 5 mg by mouth daily. Taking 2 tabs 5 mg in  The morning) 30 Tab 1    pantoprazole (PROTONIX) 40 mg tablet TAKE 1 TABLET BY MOUTH EVERY DAY 90 Tab 3    docusate sodium (STOOL SOFTENER PO) Take 1 Tab by mouth two (2) times a day.       carvedilol (COREG) 25 mg tablet Take 1 Tab by mouth two (2) times daily (with meals). 180 Tab 3    hydroxychloroquine (PLAQUENIL) 200 mg tablet Take 200 mg by mouth two (2) times a day.  furosemide (LASIX) 20 mg tablet Take 20 mg by mouth daily.  lisinopril (PRINIVIL, ZESTRIL) 2.5 mg tablet Take 2.5 mg by mouth daily.  DULoxetine (CYMBALTA) 60 mg capsule Take 60 mg by mouth daily.  glipiZIDE SR (GLUCOTROL XL) 2.5 mg CR tablet TAKE ONE TABLET BY MOUTH DAILY 90 Tab 3    fluticasone (FLONASE) 50 mcg/actuation nasal spray SHAKE LIQUID AND USE 2 SPRAYS IN EACH NOSTRIL EVERY DAY 1 Bottle 11    cyanocobalamin 1,000 mcg tablet Take 1,000 mcg by mouth daily.  albuterol (PROVENTIL VENTOLIN) 2.5 mg /3 mL (0.083 %) nebulizer solution INHALE THE CONTENTS OF ONE VIAL VIA NEBULIZER EVERY 4 HOURS AS NEEDED FOR WHEEZING 1650 Each 3    promethazine-dextromethorphan (PROMETHAZINE-DM) 6.25-15 mg/5 mL syrup TAKE ONE TEASPOONFUL BY MOUTH EVERY SIX HOURS AS NEEDED FOR COUGH 240 mL 1    PSEUDOEPHEDRINE-guaiFENesin (MUCINEX D)  mg per tablet Take 1 Tab by mouth daily.          Allergies   Allergen Reactions    Gabapentin Other (comments)     Muscles twitching and jerking    Levaquin [Levofloxacin] Swelling    Lyrica [Pregabalin] Other (comments)     Muscle twitching and jerking    Percodan [Oxycodone Hcl-Oxycodone-Asa] Itching         Past Medical History:   Diagnosis Date    Anemia 3/3/2010    Arrhythmia     irregular heartbeat    Arthritis     [facility]est[facility] failure) [facility]) 3/3/2010    Chronic pain     back    Chronic sinusitis 3/3/2010    CPAP (continuous positive airway pressure) dependence     Diverticulosis     DM (diabetes mellitus) ([facility] 3/3/2010    Dyslipidemia 3/3/2010    GERD (gastroesophageal reflux disease)     HTN (hypertension) 3/3/2010    Ill-defined condition     being evaluated py pulmonolgist for \"trouble breathing\"    S/P TKR (total knee replacement) 3/3/2010    Unspecified sleep apnea     doesn't wear CPAP since back has been hurting         Past Surgical History:   Procedure Laterality Date    HX APPENDECTOMY      thinks it was taken with hysterectomy    HX GYN      \"tubes opened\"    HX HEENT      sinus surgery    HX HEMORRHOIDECTOMY      HX HYSTERECTOMY      HX KNEE REPLACEMENT      both knees- at same time    HX LUMBAR LAMINECTOMY      HX MOHS PROCEDURES      left shoulder    HX ORTHOPAEDIC      neck fusion    HX ORTHOPAEDIC      right knee replaced for second time    HX ORTHOPAEDIC      lumbar fusion    HX OTHER SURGICAL      CORNELL BIOPSY BREAST STEREOTACTIC Left yrs ago    (-)    PA COLONOSCOPY FLX DX W/COLLJ SPEC WHEN PFRMD  [id number]    dr. Devora Storey (barium enema)         Family History   Problem Relation Age of Onset    Diabetes Mother     Heart Disease Father     Diabetes Brother     Blindness Brother     Diabetes Sister     Heart Disease Sister     Heart Disease Brother     Heart Disease Brother     Asthma Brother     Malignant Hyperthermia Neg Hx     Pseudocholinesterase Deficiency Neg Hx     Delayed Awakening Neg Hx     Post-op Nausea/Vomiting Neg Hx     Emergence Delirium Neg Hx     Post-op Cognitive Dysfunction Neg Hx        Social History     Tobacco Use    Smoking status: Former Smoker     Packs/day: 0.30     Years: 5.00     Pack years: 1.50     Last attempt to quit: 1960     Years since quittin.0    Smokeless tobacco: Never Used   Substance Use Topics    Alcohol use: No     Alcohol/week: 0.0 oz        Lab Results   Component Value Date/Time    WBC 5.9 2018 10:38 AM    HGB 10.0 (L) 2018 10:38 AM    HCT 30.6 (L) 2018 10:38 AM    PLATELET 903 (L)  10:38 AM     (H) 2018 10:38 AM     Lab Results   Component Value Date/Time    Cholesterol, total 188 2018 10:38 AM    HDL Cholesterol 85 2018 10:38 AM    LDL, calculated 84 2018 10:38 AM    Triglyceride 97 2018 10:38 AM    CHOL/HDL Ratio 2.0 06/11/2010 02:10 PM     Lab Results   Component Value Date/Time    Sodium 146 (H) 09/17/2018 10:38 AM    Potassium 5.2 09/17/2018 10:38 AM    Chloride 106 09/17/2018 10:38 AM    CO2 29 09/17/2018 10:38 AM    Anion gap 3 (L) 12/17/2015 11:47 AM    Glucose 99 09/17/2018 10:38 AM    BUN 17 09/17/2018 10:38 AM    Creatinine 1.81 (H) 09/17/2018 10:38 AM    BUN/Creatinine ratio 9 (L) 09/17/2018 10:38 AM    GFR est AA 29 (L) 09/17/2018 10:38 AM    GFR est non-AA 25 (L) 09/17/2018 10:38 AM    Calcium 9.7 09/17/2018 10:38 AM    Bilirubin, total 0.4 09/17/2018 10:38 AM    ALT (SGPT) 34 (H) 09/17/2018 10:38 AM    AST (SGOT) 28 09/17/2018 10:38 AM    Alk. phosphatase 42 09/17/2018 10:38 AM    Protein, total 6.3 09/17/2018 10:38 AM    Albumin 4.3 09/17/2018 10:38 AM    Globulin 3.4 12/17/2015 11:47 AM    A-G Ratio 2.2 09/17/2018 10:38 AM      Lab Results   Component Value Date/Time    Hemoglobin A1c 6.4 (H) 09/08/2017 11:09 AM    Hemoglobin A1c (POC) 7.1 09/17/2018 10:38 AM         Review of Systems   Constitutional: Negative for malaise/fatigue. HENT: Negative for congestion. Eyes: Negative for blurred vision. Respiratory: Negative for cough and shortness of breath. Cardiovascular: Negative for chest pain, palpitations and leg swelling. Seen by retinal specialist for the flashing lights in the eyes. Pt states that she was not told what the problem was but was given a script to give to me which states she has left carotid bruit. Gastrointestinal: Negative for abdominal pain, constipation and heartburn. Genitourinary: Negative for dysuria, frequency and urgency. Musculoskeletal: Positive for back pain, joint pain and myalgias. Neurological: Positive for tremors (c/o increase tremor in both hands, left seems wose. sometimes has hard time holding objects d/t the tremor. she was seen by neuroology and place on primdone.  had recent increased in dose on yesterday. ).  Negative for dizziness, tingling, sensory change, focal weakness and headaches. Endo/Heme/Allergies: Negative for environmental allergies. Psychiatric/Behavioral: Negative for depression. The patient does not have insomnia. Physical Exam   Constitutional: She appears well-developed and well-nourished. /62 (BP 1 Location: Left arm, BP Patient Position: Sitting)   Pulse 62   Temp 97.6 °F (36.4 °C) (Oral)   Resp 18   Ht 5' 4\" (1.626 m)   Wt 227 lb 9.6 oz (103.2 kg)   LMP 03/04/1961 (Approximate)   SpO2 99%    L/min   BMI 39.07 kg/m²      HENT:   Right Ear: Tympanic membrane and ear canal normal.   Left Ear: Tympanic membrane and ear canal normal.   Nose: No mucosal edema or rhinorrhea. Mouth/Throat: Oropharynx is clear and moist and mucous membranes are normal.   Neck: Normal range of motion. Neck supple. No thyromegaly present. Cardiovascular: Normal rate, regular rhythm and normal heart sounds. Exam reveals no gallop. I could not hear a left carotid bruit on today. Pulmonary/Chest: Effort normal and breath sounds normal.   Abdominal: Soft. Normal appearance and bowel sounds are normal. She exhibits no mass. There is no tenderness. Musculoskeletal: Normal range of motion. She exhibits edema (mild ankle swelling). She exhibits no tenderness. Lymphadenopathy:     She has no cervical adenopathy. Skin: Skin is warm and dry. Psychiatric: She has a normal mood and affect. Nursing note and vitals reviewed. ASSESSMENT and PLAN  Diagnoses and all orders for this visit:    1. Essential hypertension, benign  Stable   -     TSH 3RD GENERATION    2. Type 2 diabetes with nephropathy (HCC)  -     AMB POC HEMOGLOBIN A1C  -     AMB POC GLUCOSE, QUANTITATIVE, BLOOD    3. Mixed hyperlipidemia    4. Chronic systolic heart failure (HCC)  Stable     5. Stage 3 chronic kidney disease (HCC)  Stable     6. Polymyalgia rheumatica (HCC)//  7.  Arthralgia of multiple joints  As per specialist    8. Left carotid bruit  -     DUPLEX CAROTID BILATERAL; Future    9. Gastroesophageal reflux disease, esophagitis presence not specified  10. Pharyngeal dysphagia  -     REFERRAL TO GASTROENTEROLOGY  -     dexlansoprazole (DEXILANT) 60 mg CpDB capsule (delayed release); Take 1 Cap by mouth daily. This is in place of pantoprazole for reflu    11. Encounter for medication monitoring  -     METABOLIC PANEL, BASIC          Follow-up Disposition:  Return in about 3 months (around 3/18/2019). reviewed diet, exercise and weight control  cardiovascular risk and specific lipid/LDL goals reviewed  reviewed medications and side effects in detail  specific diabetic recommendations: low cholesterol diet, weight control and daily exercise discussed, home glucose monitoring emphasized, all medications, side effects and compliance discussed carefully and glycohemoglobin and other lab monitoring discussed     I have discussed diagnosis listed in this note with pt and/or family. I have discussed treatment plans and options and the risk/benefit analysis of those options, including safe use of medications and possible medication side effects. Through the use of shared decision making we have agreed to the above plan. The patient has received an after-visit summary and questions were answered concerning future plans and follow up. Advise pt of any urgent changes then to proceed to the ER.

## 2018-12-19 LAB
BUN SERPL-MCNC: 26 MG/DL (ref 8–27)
BUN/CREAT SERPL: 20 (ref 12–28)
CALCIUM SERPL-MCNC: 9.4 MG/DL (ref 8.7–10.3)
CHLORIDE SERPL-SCNC: 101 MMOL/L (ref 96–106)
CO2 SERPL-SCNC: 29 MMOL/L (ref 20–29)
CREAT SERPL-MCNC: 1.29 MG/DL (ref 0.57–1)
GLUCOSE SERPL-MCNC: 199 MG/DL (ref 65–99)
INTERPRETATION: NORMAL
POTASSIUM SERPL-SCNC: 4.7 MMOL/L (ref 3.5–5.2)
SODIUM SERPL-SCNC: 145 MMOL/L (ref 134–144)
TSH SERPL DL<=0.005 MIU/L-ACNC: 4.31 UIU/ML (ref 0.45–4.5)

## 2019-01-02 RX ORDER — AZITHROMYCIN 250 MG/1
TABLET, FILM COATED ORAL
Qty: 6 TAB | Refills: 0 | Status: SHIPPED | OUTPATIENT
Start: 2019-01-02 | End: 2019-01-07

## 2019-01-02 NOTE — TELEPHONE ENCOUNTER
Spoke to patient's , has a sinus infection, coughing, stuffy, coughing up mucus, using mucinex. Requesting a Zpak.

## 2019-01-04 ENCOUNTER — HOSPITAL ENCOUNTER (OUTPATIENT)
Dept: VASCULAR SURGERY | Age: 84
Discharge: HOME | End: 2019-01-04
Attending: FAMILY MEDICINE
Payer: MEDICARE

## 2019-01-04 DIAGNOSIS — R09.89 LEFT CAROTID BRUIT: ICD-10-CM

## 2019-01-04 PROCEDURE — 93880 EXTRACRANIAL BILAT STUDY: CPT

## 2019-01-04 NOTE — PROCEDURES
[facility]  *** FINAL REPORT ***    Name: Zuleima Aviles  MRN: [id number]    Outpatient  : [id number]  HIS Order #: [id number]  [id number] [facility] Visit #: [id number]  Date: 2019    TYPE OF TEST: Cerebrovascular Duplex    REASON FOR TEST  Carotid bruit (left hemisphere)    Right Carotid:-             Proximal               Mid                 Distal  cm/s  Systolic  Diastolic  Systolic  Diastolic  Systolic  Diastolic  CCA:     14.2      14.0                            57.0      18.0  Bulb:  ECA:     50.0       7.0  ICA:     48.0      14.0       51.0      18.0       77.0      29.0  ICA/CCA:  0.8       0.8    ICA Stenosis: <50%    Right Vertebral:-  Finding: Antegrade  Sys:       54.0  Katheryn:       17.0    Right Subclavian: Normal    Left Carotid:-            Proximal                Mid                 Distal  cm/s  Systolic  Diastolic  Systolic  Diastolic  Systolic  Diastolic  CCA:    060.8      22.0                            53.0      14.0  Bulb:  ECA:     55.0       7.0  ICA:     86.0      27.0       88.0      27.0       82.0      24.0  ICA/CCA:  1.6       1.9    ICA Stenosis: <50%    Left Vertebral:-  Finding: Antegrade  Sys:       56.0  Katheryn:       20.0    Left Subclavian: Normal    INTERPRETATION/FINDINGS  PROCEDURE:  Carotid Duplex Examination using B-mode, color and  spectral Doppler of the extracranial cerebrovascular arteries. 1. Bilateral <50% stenosis of the internal carotid arteries. 2. No significant stenosis in the external carotid arteries  bilaterally. 3. Antegrade flow in both vertebral arteries. 4. Normal flow in both subclavian arteries. Plaque Morphology:  1. Calcific plaque in the bulb and right ICA. 2. Calcific plaque in the bulb and left ICA. ADDITIONAL COMMENTS    I have personally reviewed the data relevant to the interpretation of  this  study.     TECHNOLOGIST: Jj Thornton RVT  Signed: 2019 10:41 AM    PHYSICIAN: Stephen Kasper MD  Signed: 2019 04:10 PM

## 2019-01-31 DIAGNOSIS — J32.0 MAXILLARY SINUSITIS, UNSPECIFIED CHRONICITY: Primary | ICD-10-CM

## 2019-01-31 RX ORDER — AZITHROMYCIN 250 MG/1
TABLET, FILM COATED ORAL
Qty: 6 TAB | Refills: 0 | Status: SHIPPED | OUTPATIENT
Start: 2019-01-31 | End: 2019-05-20 | Stop reason: ALTCHOICE

## 2019-02-18 RX ORDER — SIMVASTATIN 40 MG/1
TABLET, FILM COATED ORAL
Qty: 90 TAB | Refills: 0 | Status: SHIPPED | OUTPATIENT
Start: 2019-02-18 | End: 2019-04-17 | Stop reason: ALTCHOICE

## 2019-02-19 ENCOUNTER — OFFICE VISIT (OUTPATIENT)
Dept: NEUROLOGY | Age: 84
End: 2019-02-19

## 2019-02-19 VITALS
WEIGHT: 237 LBS | OXYGEN SATURATION: 98 % | HEART RATE: 76 BPM | SYSTOLIC BLOOD PRESSURE: 118 MMHG | HEIGHT: 64 IN | BODY MASS INDEX: 40.46 KG/M2 | DIASTOLIC BLOOD PRESSURE: 55 MMHG

## 2019-02-19 DIAGNOSIS — G25.0 ESSENTIAL TREMOR: Primary | ICD-10-CM

## 2019-02-19 DIAGNOSIS — M54.17 LUMBOSACRAL RADICULOPATHY: ICD-10-CM

## 2019-02-19 DIAGNOSIS — E11.42 DIABETIC POLYNEUROPATHY ASSOCIATED WITH TYPE 2 DIABETES MELLITUS (HCC): ICD-10-CM

## 2019-02-19 RX ORDER — PRIMIDONE 50 MG/1
TABLET ORAL
Qty: 360 TAB | Refills: 1 | Status: SHIPPED | OUTPATIENT
Start: 2019-02-19 | End: 2019-07-15

## 2019-02-19 NOTE — PROGRESS NOTES
NEUROLOGY FOLLOW UP NOTE    No chief complaint on file. Sharon Kaiser is a 80 y.o. female who presented to the neurology office for management of tremors. It has been going on for the last 2 yrs. It is gradually progressive with time. It is postural and in both in the arms and legs. No falls. She does have constipation. She does have mild stiffness in legs. The patient was on gabapentin and when it was discontinued her tremors have improved but they are coming back recently. Patient is on primidone 100 mg p.o. nightly but has not noticed any improvement. She does also have lumbar radiculopathy and a length dependent neuropathy. Medications tried:  Gabapentin and Lyrica causes tremors    Current Outpatient Medications   Medication Sig    glipiZIDE SR (GLUCOTROL XL) 2.5 mg CR tablet TAKE ONE TABLET BY MOUTH DAILY    simvastatin (ZOCOR) 40 mg tablet TAKE 1 TABLET BY MOUTH EVERY NIGHT AT BEDTIME    albuterol-ipratropium (DUO-NEB) 2.5 mg-0.5 mg/3 ml nebu TAKE CONTENTS OF ONE VIAL BY NEBULIZATION ROUTE EVERY 4 HOURS AS NEEDED(UP TO 6 PER DAY)    azithromycin (ZITHROMAX) 250 mg tablet Take 2 tabs together day 1,then 1 tab daily. Take with food    predniSONE (DELTASONE) 5 mg tablet Take 10 mg by mouth daily.  dexlansoprazole (DEXILANT) 60 mg CpDB capsule (delayed release) Take 1 Cap by mouth daily. This is in place of pantoprazole for reflux    primidone (MYSOLINE) 50 mg tablet 2 tablets at night    albuterol (VENTOLIN HFA) 90 mcg/actuation inhaler INHALE 2 PUFFS BY MOUTH EVERY 4 HOURS AS NEEDED FOR WHEEZING    ALPRAZolam (XANAX) 0.5 mg tablet TAKE ONE-HALF TO 1 TABLET BY MOUTH EVERY EVENING AS NEEDED FOR SLEEP    HYDROcodone-acetaminophen (NORCO)  mg tablet Take 1 Tab by mouth every six (6) hours as needed.  docusate sodium (STOOL SOFTENER PO) Take 1 Tab by mouth two (2) times a day.  carvedilol (COREG) 25 mg tablet Take 1 Tab by mouth two (2) times daily (with meals).  hydroxychloroquine (PLAQUENIL) 200 mg tablet Take 200 mg by mouth two (2) times a day.  furosemide (LASIX) 20 mg tablet Take 20 mg by mouth daily.  lisinopril (PRINIVIL, ZESTRIL) 2.5 mg tablet Take 2.5 mg by mouth daily.  DULoxetine (CYMBALTA) 60 mg capsule Take 60 mg by mouth daily.  fluticasone (FLONASE) 50 mcg/actuation nasal spray SHAKE LIQUID AND USE 2 SPRAYS IN EACH NOSTRIL EVERY DAY    cyanocobalamin 1,000 mcg tablet Take 1,000 mcg by mouth daily.  albuterol (PROVENTIL VENTOLIN) 2.5 mg /3 mL (0.083 %) nebulizer solution INHALE THE CONTENTS OF ONE VIAL VIA NEBULIZER EVERY 4 HOURS AS NEEDED FOR WHEEZING    promethazine-dextromethorphan (PROMETHAZINE-DM) 6.25-15 mg/5 mL syrup TAKE ONE TEASPOONFUL BY MOUTH EVERY SIX HOURS AS NEEDED FOR COUGH    PSEUDOEPHEDRINE-guaiFENesin (MUCINEX D)  mg per tablet Take 1 Tab by mouth daily. No current facility-administered medications for this visit. REVIEW OF SYSTEMS:   A ten system review of constitutional, cardiovascular, respiratory, musculoskeletal, endocrine, skin, SHEENT, genitourinary, psychiatric and neurologic systems was obtained and is unremarkable except as stated in HPI    EXAMINATION:   Visit Vitals  LMP [birth year]04/1961 (Approximate)        General:   General appearance: Pt is in no acute distress   Distal pulses are preserved    Neurological Examination:   Mental Status: AAO x3. Speech is fluent. Follows commands, has normal fund of knowledge, attention, short term recall, comprehension and insight. Cranial Nerves: Visual fields are full. PERRL, Extraocular movements are full. Facial sensation intact V1- V3. Facial movement intact, symmetric. Hearing intact to conversation. Palate elevates symmetrically. Shoulder shrug symmetric. Tongue midline. Motor: Strength is 5/5 in all 4 ext. Sensation: Decreased vibration sensation in feet and ankles.     Coordination/Cerebellar: Intact to finger-nose-finger     Laboratory review: Results for orders placed or performed in visit on 08/06/80   METABOLIC PANEL, BASIC   Result Value Ref Range    Glucose 199 (H) 65 - 99 mg/dL    BUN 26 8 - 27 mg/dL    Creatinine 1.29 (H) 0.57 - 1.00 mg/dL    GFR est non-AA 37 (L) >59 mL/min/1.73    GFR est AA 43 (L) >59 mL/min/1.73    BUN/Creatinine ratio 20 12 - 28    Sodium 145 (H) 134 - 144 mmol/L    Potassium 4.7 3.5 - 5.2 mmol/L    Chloride 101 96 - 106 mmol/L    CO2 29 20 - 29 mmol/L    Calcium 9.4 8.7 - 10.3 mg/dL   TSH 3RD GENERATION   Result Value Ref Range    TSH 4.310 0.450 - 4.500 uIU/mL   CKD REPORT   Result Value Ref Range    Interpretation Note    AMB POC HEMOGLOBIN A1C   Result Value Ref Range    Hemoglobin A1c (POC) 7.3 %   AMB POC GLUCOSE, QUANTITATIVE, BLOOD   Result Value Ref Range    Glucose  mg/dL       Imaging review:   11/6/12  MRI L spine  1. Status post L4 and L5 laminectomies. Severe L4-5 disc space narrowing with moderate sized central disc herniation. No canal stenosis; moderate left foraminal narrowing. 2.  Diffuse degenerative changes. Canal stenosis mild at T10-11, borderline at T11-12 and T12-L1, mild at L1-2, mild to moderate at L2-3, severe at L3-4, and moderate at L5-S1.    12/6/12  MRI Brain  1. No mass or enhancement abnormality of the internal auditory canals or cerebellopontine angles. 2.  Extensive sinus disease with complete obscuration of the left frontal and sphenoid sinuses. 9/28/16  EMG/NCS  This study is abnormal. There is evidence for a moderate to severe degree of a length dependent polyneuropathy. There is also residual multilevel lumbosacral radiculopathy without significant denervation in the distal leg muscles. Documentation review:  Pt was seen by Dr. Justo Campbell for tremors and was started on lyrica. EMG was performed by him and it showed a length dependent neuropathy.     Assessment/Plan:   Adal  is a 80 y.o. female who presented to the neurology office for management of postural tremors, lumbosacral radiculopathy and diabetic polyneuropathy. Her diabetic neuropathy and lumbosacral radiculopathy are stable. For essential tremors, patient is on primidone 100 mg at night but has not noticed any improvement. Want to increase the dosage further to 50 mg in the morning and 100 mg at night for 2 weeks and then 100 mg p.o. twice daily. See the patient back in 3 months and if there is still no relief, will get a DaTscan at [facility]. Over 25 minutes was spent with the patient of which > 50% of the visit was spent counseling on diagnosis, management, and treatment of the diagnosis.   I did discuss about essential tremors and possibility of Parkinson's disease

## 2019-02-19 NOTE — LETTER
2/19/2019 2:57 PM 
 
Patient:    Floretta Goltz YOB: 1931 Date of Visit:    2/19/2019 Dear Fanny Stearns MD 
 
Thank you for referring Ms. Oscar Borrego to me for evaluation/treatment. Below are the relevant portions of my assessment and plan of care. NEUROLOGY FOLLOW UP NOTE No chief complaint on file. SUBJECTIVE Floretta Goltz is a 80 y.o. female who presented to the neurology office for management of tremors. It has been going on for the last 2 yrs. It is gradually progressive with time. It is postural and in both in the arms and legs. No falls. She does have constipation. She does have mild stiffness in legs. The patient was on gabapentin and when it was discontinued her tremors have improved but they are coming back recently. Patient is on primidone 100 mg p.o. nightly but has not noticed any improvement. She does also have lumbar radiculopathy and a length dependent neuropathy. Medications tried: 
Gabapentin and Lyrica causes tremors Current Outpatient Medications Medication Sig  
 glipiZIDE SR (GLUCOTROL XL) 2.5 mg CR tablet TAKE ONE TABLET BY MOUTH DAILY  simvastatin (ZOCOR) 40 mg tablet TAKE 1 TABLET BY MOUTH EVERY NIGHT AT BEDTIME  albuterol-ipratropium (DUO-NEB) 2.5 mg-0.5 mg/3 ml nebu TAKE CONTENTS OF ONE VIAL BY NEBULIZATION ROUTE EVERY 4 HOURS AS NEEDED(UP TO 6 PER DAY)  azithromycin (ZITHROMAX) 250 mg tablet Take 2 tabs together day 1,then 1 tab daily. Take with food  predniSONE (DELTASONE) 5 mg tablet Take 10 mg by mouth daily.  dexlansoprazole (DEXILANT) 60 mg CpDB capsule (delayed release) Take 1 Cap by mouth daily. This is in place of pantoprazole for reflux  primidone (MYSOLINE) 50 mg tablet 2 tablets at night  albuterol (VENTOLIN HFA) 90 mcg/actuation inhaler INHALE 2 PUFFS BY MOUTH EVERY 4 HOURS AS NEEDED FOR WHEEZING  
 ALPRAZolam (XANAX) 0.5 mg tablet TAKE ONE-HALF TO 1 TABLET BY MOUTH EVERY EVENING AS NEEDED FOR SLEEP  
 HYDROcodone-acetaminophen (NORCO)  mg tablet Take 1 Tab by mouth every six (6) hours as needed.  docusate sodium (STOOL SOFTENER PO) Take 1 Tab by mouth two (2) times a day.  carvedilol (COREG) 25 mg tablet Take 1 Tab by mouth two (2) times daily (with meals).  hydroxychloroquine (PLAQUENIL) 200 mg tablet Take 200 mg by mouth two (2) times a day.  furosemide (LASIX) 20 mg tablet Take 20 mg by mouth daily.  lisinopril (PRINIVIL, ZESTRIL) 2.5 mg tablet Take 2.5 mg by mouth daily.  DULoxetine (CYMBALTA) 60 mg capsule Take 60 mg by mouth daily.  fluticasone (FLONASE) 50 mcg/actuation nasal spray SHAKE LIQUID AND USE 2 SPRAYS IN EACH NOSTRIL EVERY DAY  cyanocobalamin 1,000 mcg tablet Take 1,000 mcg by mouth daily.  albuterol (PROVENTIL VENTOLIN) 2.5 mg /3 mL (0.083 %) nebulizer solution INHALE THE CONTENTS OF ONE VIAL VIA NEBULIZER EVERY 4 HOURS AS NEEDED FOR WHEEZING  promethazine-dextromethorphan (PROMETHAZINE-DM) 6.25-15 mg/5 mL syrup TAKE ONE TEASPOONFUL BY MOUTH EVERY SIX HOURS AS NEEDED FOR COUGH  PSEUDOEPHEDRINE-guaiFENesin (MUCINEX D)  mg per tablet Take 1 Tab by mouth daily. No current facility-administered medications for this visit. REVIEW OF SYSTEMS:  
A ten system review of constitutional, cardiovascular, respiratory, musculoskeletal, endocrine, skin, SHEENT, genitourinary, psychiatric and neurologic systems was obtained and is unremarkable except as stated in HPI EXAMINATION:  
Visit Vitals LMP 03/04/1961 (Approximate) General:  
General appearance: Pt is in no acute distress Distal pulses are preserved Neurological Examination:  
Mental Status: AAO x3. Speech is fluent. Follows commands, has normal fund of knowledge, attention, short term recall, comprehension and insight. Cranial Nerves: Visual fields are full.  PERRL, Extraocular movements are full. Facial sensation intact V1- V3. Facial movement intact, symmetric. Hearing intact to conversation. Palate elevates symmetrically. Shoulder shrug symmetric. Tongue midline. Motor: Strength is 5/5 in all 4 ext. Sensation: Decreased vibration sensation in feet and ankles. Coordination/Cerebellar: Intact to finger-nose-finger Laboratory review:  
Results for orders placed or performed in visit on 12/18/18 METABOLIC PANEL, BASIC Result Value Ref Range Glucose 199 (H) 65 - 99 mg/dL BUN 26 8 - 27 mg/dL Creatinine 1.29 (H) 0.57 - 1.00 mg/dL GFR est non-AA 37 (L) >59 mL/min/1.73 GFR est AA 43 (L) >59 mL/min/1.73  
 BUN/Creatinine ratio 20 12 - 28 Sodium 145 (H) 134 - 144 mmol/L Potassium 4.7 3.5 - 5.2 mmol/L Chloride 101 96 - 106 mmol/L  
 CO2 29 20 - 29 mmol/L Calcium 9.4 8.7 - 10.3 mg/dL TSH 3RD GENERATION Result Value Ref Range TSH 4.310 0.450 - 4.500 uIU/mL  
CKD REPORT Result Value Ref Range Interpretation Note AMB POC HEMOGLOBIN A1C Result Value Ref Range Hemoglobin A1c (POC) 7.3 % AMB POC GLUCOSE, QUANTITATIVE, BLOOD Result Value Ref Range Glucose  mg/dL Imaging review:  
11/6/12 MRI L spine 1. Status post L4 and L5 laminectomies. Severe L4-5 disc space narrowing with moderate sized central disc herniation. No canal stenosis; moderate left foraminal narrowing. 2.  Diffuse degenerative changes. Canal stenosis mild at T10-11, borderline at T11-12 and T12-L1, mild at L1-2, mild to moderate at L2-3, severe at L3-4, and moderate at L5-S1. 
 
12/6/12 MRI Brain 1. No mass or enhancement abnormality of the internal auditory canals or cerebellopontine angles. 2.  Extensive sinus disease with complete obscuration of the left frontal and sphenoid sinuses. 9/28/16 EMG/NCS This study is abnormal. There is evidence for a moderate to severe degree of a length dependent polyneuropathy.  There is also residual multilevel lumbosacral radiculopathy without significant denervation in the distal leg muscles. Documentation review: Pt was seen by Dr. Anel Mendoza for tremors and was started on lyrica. EMG was performed by him and it showed a length dependent neuropathy. Assessment/Plan: Richard Mi is a 80 y.o. female who presented to the neurology office for management of postural tremors, lumbosacral radiculopathy and diabetic polyneuropathy. Her diabetic neuropathy and lumbosacral radiculopathy are stable. For essential tremors, patient is on primidone 100 mg at night but has not noticed any improvement. Want to increase the dosage further to 50 mg in the morning and 100 mg at night for 2 weeks and then 100 mg p.o. twice daily. See the patient back in 3 months and if there is still no relief, will get a Da[facility]scan at [facility]. Over 25 minutes was spent with the patient of which > 50% of the visit was spent counseling on diagnosis, management, and treatment of the diagnosis. I did discuss about essential tremors and possibility of Parkinson's disease If you have questions, please do not hesitate to call me. I look forward to following MsBrittney Asia Loera along with you. Sincerely, Drake Guillen MD

## 2019-02-19 NOTE — PATIENT INSTRUCTIONS
1. Increase primidone to 1 tablet in the morning and 2 tablets at night for 2 weeks and then 2 tablets p.o. twice daily  2. Follow-up in 3 months    Office Policies  · Phone calls/patient messages:  Please allow up to 24 hours for someone in the office to contact you about your call or message. Be mindful your provider may be out of the office or your message may require further review. We encourage you to use [employer] for your messages as this is a faster, more efficient way to communicate with our office  · Medication Refills:  Prescription medications require up to 48 business hours to process. We encourage you to use [employer] for your refills. For controlled medications: Please allow up to 72 business hours to process. Certain medications may require you to  a written prescription at our office. NO narcotic/controlled medications will be prescribed after 4pm Monday through Friday or on weekends  · Form/Paperwork Completion:  Please note there is a $25 fee for all paperwork completed by our providers. We ask that you allow 7-14 business days. Pre-payment is due prior to picking up/faxing the completed form. You may also download your forms to [employer] to have your doctor print off.

## 2019-02-26 DIAGNOSIS — R60.0 BILATERAL EDEMA OF LOWER EXTREMITY: ICD-10-CM

## 2019-02-26 DIAGNOSIS — F51.01 PRIMARY INSOMNIA: ICD-10-CM

## 2019-02-26 RX ORDER — ALPRAZOLAM 0.5 MG/1
TABLET ORAL
Qty: 30 TAB | Refills: 3 | OUTPATIENT
Start: 2019-02-26 | End: 2019-06-28 | Stop reason: SDUPTHER

## 2019-02-26 RX ORDER — FUROSEMIDE 20 MG/1
TABLET ORAL
Qty: 180 TAB | Refills: 3 | Status: SHIPPED | OUTPATIENT
Start: 2019-02-26 | End: 2020-02-11

## 2019-03-18 ENCOUNTER — OFFICE VISIT (OUTPATIENT)
Dept: FAMILY MEDICINE CLINIC | Age: 84
End: 2019-03-18

## 2019-03-18 ENCOUNTER — HOSPITAL ENCOUNTER (OUTPATIENT)
Dept: LAB | Age: 84
Discharge: HOME | End: 2019-03-18
Payer: MEDICARE

## 2019-03-18 VITALS
HEIGHT: 64 IN | HEART RATE: 69 BPM | RESPIRATION RATE: 16 BRPM | WEIGHT: 225 LBS | OXYGEN SATURATION: 98 % | DIASTOLIC BLOOD PRESSURE: 78 MMHG | TEMPERATURE: 96.8 F | SYSTOLIC BLOOD PRESSURE: 142 MMHG | BODY MASS INDEX: 38.41 KG/M2

## 2019-03-18 DIAGNOSIS — Z12.31 ENCOUNTER FOR SCREENING MAMMOGRAM FOR BREAST CANCER: ICD-10-CM

## 2019-03-18 DIAGNOSIS — I50.22 CHRONIC SYSTOLIC HEART FAILURE (HCC): ICD-10-CM

## 2019-03-18 DIAGNOSIS — M35.3 POLYMYALGIA RHEUMATICA (HCC): ICD-10-CM

## 2019-03-18 DIAGNOSIS — D64.9 CHRONIC ANEMIA: ICD-10-CM

## 2019-03-18 DIAGNOSIS — I10 ESSENTIAL HYPERTENSION, BENIGN: Primary | ICD-10-CM

## 2019-03-18 DIAGNOSIS — E11.21 TYPE 2 DIABETES WITH NEPHROPATHY (HCC): ICD-10-CM

## 2019-03-18 DIAGNOSIS — E78.2 MIXED HYPERLIPIDEMIA: ICD-10-CM

## 2019-03-18 DIAGNOSIS — N18.30 STAGE 3 CHRONIC KIDNEY DISEASE (HCC): ICD-10-CM

## 2019-03-18 DIAGNOSIS — E66.9 NON MORBID OBESITY: ICD-10-CM

## 2019-03-18 DIAGNOSIS — K21.9 GASTROESOPHAGEAL REFLUX DISEASE, ESOPHAGITIS PRESENCE NOT SPECIFIED: ICD-10-CM

## 2019-03-18 DIAGNOSIS — M25.50 ARTHRALGIA OF MULTIPLE JOINTS: ICD-10-CM

## 2019-03-18 DIAGNOSIS — Z51.81 ENCOUNTER FOR MEDICATION MONITORING: ICD-10-CM

## 2019-03-18 LAB
BILIRUB UR QL STRIP: NEGATIVE
GLUCOSE POC: 98 MG/DL
GLUCOSE UR-MCNC: NEGATIVE MG/DL
HBA1C MFR BLD HPLC: 6.8 %
KETONES P FAST UR STRIP-MCNC: NEGATIVE MG/DL
PH UR STRIP: 5 [PH] (ref 4.6–8)
PROT UR QL STRIP: NEGATIVE
SP GR UR STRIP: 1.01 (ref 1–1.03)
UA UROBILINOGEN AMB POC: NORMAL (ref 0.2–1)
URINALYSIS CLARITY POC: CLEAR
URINALYSIS COLOR POC: YELLOW
URINE BLOOD POC: NEGATIVE
URINE LEUKOCYTES POC: NEGATIVE
URINE NITRITES POC: NEGATIVE

## 2019-03-18 PROCEDURE — 80061 LIPID PANEL: CPT

## 2019-03-18 PROCEDURE — 85027 COMPLETE CBC AUTOMATED: CPT

## 2019-03-18 PROCEDURE — 80053 COMPREHEN METABOLIC PANEL: CPT

## 2019-03-18 PROCEDURE — 36415 COLL VENOUS BLD VENIPUNCTURE: CPT

## 2019-03-18 NOTE — PROGRESS NOTES
HISTORY OF PRESENT ILLNESS  Samra Dela Cruz is a 80 y.o. female. HPI   Follow up on chronic medical problems. Overall has been dealing with a lot of pain in the joints. Cardiovascular Review:  The patient has hypertension, hyperlipidemia and Systolic HF. Diet and Lifestyle: generally follows a low fat low cholesterol diet, generally follows a low sodium diet  Home BP Monitoring: is not measured at home. Pertinent ROS: taking medications as instructed, no medication side effects noted, no TIA's, no chest pain on exertion, no dyspnea on exertion, noting that her ankles swelling has been mild. DM type II follow up:  Compliant w/ meds, diabetic diet, and exercise. Obtains home glucose monitoring averaging in the mid 100s. Checks BS daily on most days and prn. Pt does not have BS log at visit today. No Rf needed for today. Denies any tingling sensation, polyuria and polydipsia. No blurred vision. No significant weight changes. Asthma Review:  The patient is being seen for follow up of chronic respiratory failure and allergies and chronic sinusitits, not currently in exacerbation. Breathing has been good also with taking prednisone. Using nebulizer 3 to 4 times in a day as needed. Regimen compliance: The patient reports adherence to asthma action plan and regimen. Encounter for pain management. 1./2. Medical history/Past medical History  Chronic Pain:  Osteoarthritis:  Patient has osteoarthritis in multiple joints but primarily in the knees and back. Seen by rheumatology and place on prednisone and plaquenil. Told also that she has fibromyalgia. Her symptoms have been wax and wane. She is currently on prednisone 7.5 mg daily per rheumatology. She has polymyalgia rheumatica. Pain has been 8/10 when it is severe and she takes the pain medication. 3. Applicable records from prior treatment providers are apart of [facility] under the media tab.   4. Diagnostic, therapeutic and laboratory results are available in the [address] [place] [address] chart. 5. Consultation notes are available for review in the media tab of the [address] [place] [address] chart. 6. Treatment goals include pain control so that the pt may be as active and function with her daily activities and improved comfort level. Previously pt has been limited by pain. 7. The risks and benefits of treatment has been discussed at this office visit with the pt. She understands that the medication has addicting potential.  Additionally the pt has been advised that narcotic pain medication may impair mental and/or physical ability required for performance of tasks such as driving or operating any other machinery. 8. Pt has an updated signed pain contract on file and can be found under the FYI section of the [facility] chart. 9. The pain contract is reviewed. Pain medication will be continued at the previous dosage. Urine drug screening will be done today. Diagnostic studies are not indicated at this time. Interventional procedure are not indicated at this time. 10. Medication prescibed is HYDROcodone-acetaminophen (NORCO)  mg tablet. No prescription is needed today. 11. Patient instructions have been reviewed in detail as outlined above and in the pain contract. Last refill was in December. 12. Re-eval is planned for 3 months. Naloxone prescription is not warranted.      Patient Active Problem List   Diagnosis Code    CHF (congestive heart failure) ([facility]) I50.9    S/P TKR (total knee replacement) Z96.659    Anemia D64.9    s/p lumbar laminectomy Z98.890    S/P ASAD (total abdominal hysterectomy) Z90.710    S/P hemorrhoidectomy Z98.890, Z87.19    S/P rotator cuff surgery Z98.890    DM (diabetes mellitus) ([facility] E11.9    Chronic sinusitis J32.9    S/P sinus surgery Z98.890    Dyslipidemia E78.5    Environmental allergies Z91.09    Obstructive sleep apnea (adult) (pediatric) G47.33    DJD (degenerative joint disease) M19.90    Chronic systolic heart failure ([facility]) I50.22    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 9) with comorbidity ([facility]) E66.01    Polymyalgia rheumatica ([facility]) M35.3       Current Outpatient Medications   Medication Sig Dispense Refill    ALPRAZolam (XANAX) 0.5 mg tablet TAKE 1/2 TO 1 TABLET BY MOUTH EVERY EVENING AS NEEDED FOR SLEEP 30 Tab 3    furosemide (LASIX) 20 mg tablet TAKE 1 TABLET BY MOUTH EVERY MORNING AND TAKE 1 TABLET BY MOUTH EVERY AFTERNOON BETWEEN 2 AND 4  Tab 3    primidone (MYSOLINE) 50 mg tablet 1 tablet in the morning and 2 tablets at night for 2 weeks and then 2 tablets p.o. twice daily 360 Tab 1    glipiZIDE SR (GLUCOTROL XL) 2.5 mg CR tablet TAKE ONE TABLET BY MOUTH DAILY 90 Tab 3    simvastatin (ZOCOR) 40 mg tablet TAKE 1 TABLET BY MOUTH EVERY NIGHT AT BEDTIME 90 Tab 0    albuterol-ipratropium (DUO-NEB) 2.5 mg-0.5 mg/3 ml nebu TAKE CONTENTS OF ONE VIAL BY NEBULIZATION ROUTE EVERY 4 HOURS AS NEEDED(UP TO 6 PER DAY) 1620 mL 3    azithromycin (ZITHROMAX) 250 mg tablet Take 2 tabs together day 1,then 1 tab daily. Take with food 6 Tab 0    predniSONE (DELTASONE) 5 mg tablet Take 10 mg by mouth daily.  dexlansoprazole (DEXILANT) 60 mg CpDB capsule (delayed release) Take 1 Cap by mouth daily.  This is in place of pantoprazole for reflux 30 Cap 6    albuterol (VENTOLIN HFA) 90 mcg/actuation inhaler INHALE 2 PUFFS BY MOUTH EVERY 4 HOURS AS NEEDED FOR WHEEZING 1 Inhaler 6    HYDROcodone-acetaminophen (NORCO)  mg tablet Take 1 Tab by mouth every six (6) hours as needed. 60 Tab 0    docusate sodium (STOOL SOFTENER PO) Take 1 Tab by mouth two (2) times a day.  carvedilol (COREG) 25 mg tablet Take 1 Tab by mouth two (2) times daily (with meals). 180 Tab 3    hydroxychloroquine (PLAQUENIL) 200 mg tablet Take 200 mg by mouth two (2) times a day.  furosemide (LASIX) 20 mg tablet Take 20 mg by mouth daily.  lisinopril (PRINIVIL, ZESTRIL) 2.5 mg tablet Take 2.5 mg by mouth daily.  DULoxetine (CYMBALTA) 60 mg capsule Take 60 mg by mouth daily.  fluticasone (FLONASE) 50 mcg/actuation nasal spray SHAKE LIQUID AND USE 2 SPRAYS IN EACH NOSTRIL EVERY DAY 1 Bottle 11    cyanocobalamin 1,000 mcg tablet Take 1,000 mcg by mouth daily.  albuterol (PROVENTIL VENTOLIN) 2.5 mg /3 mL (0.083 %) nebulizer solution INHALE THE CONTENTS OF ONE VIAL VIA NEBULIZER EVERY 4 HOURS AS NEEDED FOR WHEEZING 1650 Each 3    promethazine-dextromethorphan (PROMETHAZINE-DM) 6.25-15 mg/5 mL syrup TAKE ONE TEASPOONFUL BY MOUTH EVERY SIX HOURS AS NEEDED FOR COUGH 240 mL 1    PSEUDOEPHEDRINE-guaiFENesin (MUCINEX D)  mg per tablet Take 1 Tab by mouth daily.          Allergies   Allergen Reactions    Gabapentin Other (comments)     Muscles twitching and jerking    Levaquin [Levofloxacin] Swelling    Lyrica [Pregabalin] Other (comments)     Muscle twitching and jerking    Percodan [Oxycodone Hcl-Oxycodone-Asa] Itching         Past Medical History:   Diagnosis Date    Anemia 3/3/2010    Arrhythmia     irregular heartbeat    Arthritis     CHF [facility]congestive heart failure) ([facility]) 3/3/2010    Chronic pain     back    Chronic sinusitis 3/3/2010    CPAP (continuous positive airway pressure) dependence     Diverticulosis     DM (diabetes mellitus) ([facility] [address] 3/3/2010    Dyslipidemia 3/3/2010    GERD (gastroesophageal reflux disease)     HTN (hypertension) 3/3/2010    Ill-defined condition     being evaluated py pulmonolgist for \"trouble breathing\"    S/P TKR (total knee replacement) 3/3/2010    Unspecified sleep apnea     doesn't wear CPAP since back has been hurting         Past Surgical History:   Procedure Laterality Date    HX APPENDECTOMY      thinks it was taken with hysterectomy    HX GYN      \"tubes opened\"    HX HEENT      sinus surgery    HX HEMORRHOIDECTOMY      HX HYSTERECTOMY      HX KNEE REPLACEMENT      both knees- at same time    HX LUMBAR LAMINECTOMY      HX MOHS PROCEDURES      left shoulder    HX ORTHOPAEDIC  1980    neck fusion    HX [facility] [facility]    right knee replaced for second time    HX ORTHOPAEDIC      lumbar fusion    HX OTHER SURGICAL      CORNELL BIOPSY BREAST STEREOTACTIC Left yrs ago    (-)    AR COLONOSCOPY FLX DX W/COLLJ SPEC WHEN PFRMD  [id number]    dr. Christian Godinez (barium enema)         Family History   Problem Relation Age of Onset    Diabetes Mother     Heart Disease Father     Diabetes Brother     Blindness Brother     Diabetes Sister     Heart Disease Sister     Heart Disease Brother     Heart Disease Brother     Asthma Brother     Malignant Hyperthermia Neg Hx     Pseudocholinesterase Deficiency Neg Hx     Delayed Awakening Neg Hx     Post-op Nausea/Vomiting Neg Hx     Emergence Delirium Neg Hx     Post-op Cognitive Dysfunction Neg Hx        Social History     Tobacco Use    Smoking status: Former Smoker     Packs/day: 0.30     Years: 5.00     Pack years: 1.50     Last attempt to quit: 1960     Years since quittin.2    Smokeless tobacco: Never Used   Substance Use Topics    Alcohol use: No     Alcohol/week: 0.0 oz        Lab Results   Component Value Date/Time    WBC 5.9 2018 10:38 AM    HGB 10.0 (L) 2018 10:38 AM    HCT 30.6 (L) 2018 10:38 AM    PLATELET 430 (L)  10:38 AM     (H) 2018 10:38 AM     Lab Results   Component Value Date/Time    Cholesterol, total 188 2018 10:38 AM    HDL Cholesterol 85 09/17/2018 10:38 AM    LDL, calculated 84 09/17/2018 10:38 AM    Triglyceride 97 09/17/2018 10:38 AM    CHOL/HDL Ratio 2.0 06/11/2010 02:10 PM     Lab Results   Component Value Date/Time    Sodium 145 (H) 12/18/2018 11:05 AM    Potassium 4.7 12/18/2018 11:05 AM    Chloride 101 12/18/2018 11:05 AM    CO2 29 12/18/2018 11:05 AM    Anion gap 3 (L) 12/17/2015 11:47 AM    Glucose 199 (H) 12/18/2018 11:05 AM    BUN 26 12/18/2018 11:05 AM    Creatinine 1.29 (H) 12/18/2018 11:05 AM    BUN/Creatinine ratio 20 12/18/2018 11:05 AM    GFR est AA 43 (L) 12/18/2018 11:05 AM    GFR est non-AA 37 (L) 12/18/2018 11:05 AM    Calcium 9.4 12/18/2018 11:05 AM    Bilirubin, total 0.4 09/17/2018 10:38 AM    ALT (SGPT) 34 (H) 09/17/2018 10:38 AM    AST (SGOT) 28 09/17/2018 10:38 AM    Alk. phosphatase 42 09/17/2018 10:38 AM    Protein, total 6.3 09/17/2018 10:38 AM    Albumin 4.3 09/17/2018 10:38 AM    Globulin 3.4 12/17/2015 11:47 AM    A-G Ratio 2.2 09/17/2018 10:38 AM      Lab Results   Component Value Date/Time    Hemoglobin A1c 6.4 (H) 09/08/2017 11:09 AM    Hemoglobin A1c (POC) 7.3 12/18/2018 11:05 AM         Review of Systems   Constitutional: Negative for malaise/fatigue. HENT: Negative for congestion. Eyes: Negative for blurred vision. Respiratory: Negative for cough and shortness of breath. Cardiovascular: Negative for chest pain, palpitations and leg swelling. Gastrointestinal: Negative for abdominal pain, constipation and heartburn. Genitourinary: Negative for dysuria, frequency and urgency. Musculoskeletal: Positive for back pain, joint pain and myalgias. Neurological: Positive for tremors (c/o increase tremor in both hands, left seems wose. sometimes has hard time holding objects d/t the tremor. ). Negative for dizziness, tingling, sensory change, focal weakness and headaches. Endo/Heme/Allergies: Negative for environmental allergies. Psychiatric/Behavioral: Negative for depression.  The patient does not have insomnia. Physical Exam   Constitutional: She appears well-developed and well-nourished. /64 (BP 1 Location: Left arm, BP Patient Position: Sitting)  Pulse 63  Temp 98.5 °F (36.9 °C) (Oral)   Resp 16  Ht 5' 4\" (1.626 m)  Wt 220 lb 6.4 oz (100 kg)  LMP 03/04/1961 (Approximate)  SpO2 98%  BMI 37.83 kg/m2   HENT:   Right Ear: Tympanic membrane and ear canal normal.   Left Ear: Tympanic membrane and ear canal normal.   Nose: No mucosal edema or rhinorrhea. Mouth/Throat: Oropharynx is clear and moist and mucous membranes are normal.   Neck: Normal range of motion. Neck supple. No thyromegaly present. Cardiovascular: Normal rate, regular rhythm and normal heart sounds. Exam reveals no gallop. Pulmonary/Chest: Effort normal and breath sounds normal.   Abdominal: Soft. Normal appearance and bowel sounds are normal. She exhibits no mass. There is no tenderness. Musculoskeletal: Normal range of motion. She exhibits edema (trace). She exhibits no tenderness. Lymphadenopathy:     She has no cervical adenopathy. Skin: Skin is warm and dry. Psychiatric: She has a normal mood and affect. Nursing note and vitals reviewed. ASSESSMENT and PLAN  Diagnoses and all orders for this visit:    1. Essential hypertension, benign  Continue to monitor. 2. Type 2 diabetes with nephropathy (HCC)  -     AMB POC HEMOGLOBIN A1C  -     AMB POC GLUCOSE, QUANTITATIVE, BLOOD    3. Mixed hyperlipidemia  -     LIPID PANEL    4. Chronic systolic heart failure (HCC)  Stable     5. Stage 3 chronic kidney disease (HCC)  Stable     6. Polymyalgia rheumatica (HCC)//  7. Arthralgia of multiple joints  As per rheumatology. However will hold her statin medication over the next 30 days and see if this helps with her pain. 8. Gastroesophageal reflux disease, esophagitis presence not specified  Stable     9. Chronic anemia  -     CBC W/O DIFF    10.  Encounter for medication monitoring  - METABOLIC PANEL, COMPREHENSIVE  -     AMB POC URINALYSIS DIP STICK AUTO W/ MICRO    11. Encounter for screening mammogram for breast cancer  -     [facility] MAMMO BI SCREENING INCL CAD; Future    12. Non morbid obesity  I have reviewed/discussed the above normal BMI with the patient. I have recommended the following interventions: dietary management education, guidance, and counseling . Follow-up Disposition:  Return in about 3 months (around 6/18/2019). reviewed diet, exercise and weight control  cardiovascular risk and specific lipid/LDL goals reviewed  reviewed medications and side effects in detail  specific diabetic recommendations: low cholesterol diet, weight control and daily exercise discussed and glycohemoglobin and other lab monitoring discussed     I have discussed diagnosis listed in this note with pt and/or family. I have discussed treatment plans and options and the risk/benefit analysis of those options, including safe use of medications and possible medication side effects. Through the use of shared decision making we have agreed to the above plan. The patient has received an after-visit summary and questions were answered concerning future plans and follow up. Advise pt of any urgent changes then to proceed to the ER.

## 2019-03-18 NOTE — PROGRESS NOTES
Chief Complaint   Patient presents with    Hypertension     follow up    Diabetes     follow up    Cholesterol Problem     follow up       Microalbumin 5/31/2018    Mammogram 5/31/2018    Eye exam 11/15/2018    1. Have you been to the ER, urgent care clinic since your last visit? Hospitalized since your last visit? No    2. Have you seen or consulted any other health care providers outside of the [address] [place] [address] since your last visit? Include any pap smears or colon screening.  No

## 2019-03-19 LAB
ALBUMIN SERPL-MCNC: 4.5 G/DL (ref 3.5–4.7)
ALBUMIN/GLOB SERPL: 2.3 {RATIO} (ref 1.2–2.2)
ALP SERPL-CCNC: 56 IU/L (ref 39–117)
ALT SERPL-CCNC: 17 IU/L (ref 0–32)
AST SERPL-CCNC: 18 IU/L (ref 0–40)
BILIRUB SERPL-MCNC: <0.2 MG/DL (ref 0–1.2)
BUN SERPL-MCNC: 19 MG/DL (ref 8–27)
BUN/CREAT SERPL: 12 (ref 12–28)
CALCIUM SERPL-MCNC: 9.8 MG/DL (ref 8.7–10.3)
CHLORIDE SERPL-SCNC: 102 MMOL/L (ref 96–106)
CHOLEST SERPL-MCNC: 200 MG/DL (ref 100–199)
CO2 SERPL-SCNC: 28 MMOL/L (ref 20–29)
CREAT SERPL-MCNC: 1.61 MG/DL (ref 0.57–1)
ERYTHROCYTE [DISTWIDTH] IN BLOOD BY AUTOMATED COUNT: 15.2 % (ref 12.3–15.4)
GLOBULIN SER CALC-MCNC: 2 G/DL (ref 1.5–4.5)
GLUCOSE SERPL-MCNC: 102 MG/DL (ref 65–99)
HCT VFR BLD AUTO: 34.7 % (ref 34–46.6)
HDLC SERPL-MCNC: 95 MG/DL
HGB BLD-MCNC: 10.7 G/DL (ref 11.1–15.9)
INTERPRETATION, 910389: NORMAL
INTERPRETATION: NORMAL
LDLC SERPL CALC-MCNC: 87 MG/DL (ref 0–99)
MCH RBC QN AUTO: 31.3 PG (ref 26.6–33)
MCHC RBC AUTO-ENTMCNC: 30.8 G/DL (ref 31.5–35.7)
MCV RBC AUTO: 102 FL (ref 79–97)
PDF IMAGE, 910387: NORMAL
PLATELET # BLD AUTO: 175 X10E3/UL (ref 150–379)
POTASSIUM SERPL-SCNC: 4.9 MMOL/L (ref 3.5–5.2)
PROT SERPL-MCNC: 6.5 G/DL (ref 6–8.5)
RBC # BLD AUTO: 3.42 X10E6/UL (ref 3.77–5.28)
SODIUM SERPL-SCNC: 145 MMOL/L (ref 134–144)
TRIGL SERPL-MCNC: 92 MG/DL (ref 0–149)
VLDLC SERPL CALC-MCNC: 18 MG/DL (ref 5–40)
WBC # BLD AUTO: 5.4 X10E3/UL (ref 3.4–10.8)

## 2019-03-29 NOTE — TELEPHONE ENCOUNTER
Advised patient that drops are at pharmacy, cost was 74.00 pharmacist used a discount card that brought cost down to 41.00, patient agreed.

## 2019-04-17 ENCOUNTER — OFFICE VISIT (OUTPATIENT)
Dept: CARDIOLOGY CLINIC | Age: 84
End: 2019-04-17

## 2019-04-17 VITALS
DIASTOLIC BLOOD PRESSURE: 66 MMHG | BODY MASS INDEX: 38.41 KG/M2 | SYSTOLIC BLOOD PRESSURE: 112 MMHG | HEART RATE: 62 BPM | HEIGHT: 64 IN | RESPIRATION RATE: 18 BRPM | OXYGEN SATURATION: 97 % | WEIGHT: 225 LBS

## 2019-04-17 DIAGNOSIS — I10 ESSENTIAL HYPERTENSION, BENIGN: ICD-10-CM

## 2019-04-17 DIAGNOSIS — I50.22 CHRONIC SYSTOLIC HEART FAILURE (HCC): Primary | ICD-10-CM

## 2019-04-17 DIAGNOSIS — N18.30 STAGE 3 CHRONIC KIDNEY DISEASE (HCC): ICD-10-CM

## 2019-04-17 DIAGNOSIS — E11.40 TYPE 2 DIABETES MELLITUS WITH DIABETIC NEUROPATHY, WITHOUT LONG-TERM CURRENT USE OF INSULIN (HCC): Chronic | ICD-10-CM

## 2019-04-17 DIAGNOSIS — E78.5 DYSLIPIDEMIA: Chronic | ICD-10-CM

## 2019-04-17 RX ORDER — ROSUVASTATIN CALCIUM 10 MG/1
10 TABLET, COATED ORAL
Qty: 90 TAB | Refills: 1 | Status: SHIPPED | OUTPATIENT
Start: 2019-04-17 | End: 2019-06-18 | Stop reason: SINTOL

## 2019-04-17 NOTE — PROGRESS NOTES
1. Have you been to the ER, urgent care clinic since your last visit? Hospitalized since your last visit? No    2. Have you seen or consulted any other health care providers outside of the [address] [place] [address] since your last visit? Include any pap smears or colon screening. No    Chief Complaint   Patient presents with    CHF     Yearly appt. C/O SOB with exertion.

## 2019-04-17 NOTE — PROGRESS NOTES
Gloria Melendez DNP, ANP-BC  Subjective/HPI:     Shann Koyanagi is a 80 y.o. female is here for yearly follow-up. She denies chest pain but admits to having some dyspnea on exertion making the bed or with prolonged amounts of walking for her. She denies edema orthopnea or paroxysmal nocturnal dyspnea. She states compliance on all medications.     PCP Provider  Tom Schlatter, MD  Past Medical History:   Diagnosis Date    Anemia 3/3/2010    Arrhythmia     irregular heartbeat    Arthritis     CHF (congestive heart failure) ([facility] [address] 3/3/2010    Chronic pain     back    Chronic sinusitis 3/3/2010    CPAP (continuous positive airway pressure) dependence     Diverticulosis     DM (diabetes mellitus) ([facility] [address] 3/3/2010    Dyslipidemia 3/3/2010    GERD (gastroesophageal reflux disease)     HTN (hypertension) 3/3/2010    Ill-defined condition     being evaluated py pulmonolgist for \"trouble breathing\"    S/P TKR (total knee replacement) 3/3/2010    Unspecified sleep apnea     doesn't wear CPAP since back has been hurting      Past Surgical History:   Procedure Laterality Date    HX APPENDECTOMY      thinks it was taken with hysterectomy    HX GYN      \"tubes opened\"    HX HEENT      sinus surgery    HX HEMORRHOIDECTOMY      HX HYSTERECTOMY      HX KNEE REPLACEMENT  1996    both knees- at same time    HX LUMBAR LAMINECTOMY  1980s    HX MOHS PROCEDURES      left shoulder    HX ORTHOPAEDIC  1980    neck fusion    HX ORTHOPAEDIC  1999    right knee replaced for second time    HX ORTHOPAEDIC      lumbar fusion    HX OTHER SURGICAL      CORNELL BIOPSY BREAST STEREOTACTIC Left yrs ago    (-)    MI COLONOSCOPY FLX DX W/COLLJ SPEC WHEN PFRMD  [id number]    dr. Ana Batista (barium enema)     Allergies   Allergen Reactions    Gabapentin Other (comments)     Muscles twitching and jerking    Levaquin [Levofloxacin] Swelling    Lyrica [Pregabalin] Other (comments)     Muscle twitching and jerking    Percodan [Oxycodone Hcl-Oxycodone-Asa] Itching      Family History   Problem Relation Age of Onset    Diabetes Mother     Heart Disease Father     Diabetes Brother     Blindness Brother     Diabetes Sister     Heart Disease Sister     Heart Disease Brother     Heart Disease Brother     Asthma Brother     Malignant Hyperthermia Neg Hx     Pseudocholinesterase Deficiency Neg Hx     Delayed Awakening Neg Hx     Post-op Nausea/Vomiting Neg Hx     Emergence Delirium Neg Hx     Post-op Cognitive Dysfunction Neg Hx       Current Outpatient Medications   Medication Sig    rosuvastatin (CRESTOR) 10 mg tablet Take 1 Tab by mouth nightly.  neomycin-polymyxin-hydrocortisone (CORTISPORIN) otic solution Administer 3-4 Drops in left ear three (3) times daily as needed (ear discomfort).  ALPRAZolam (XANAX) 0.5 mg tablet TAKE 1/2 TO 1 TABLET BY MOUTH EVERY EVENING AS NEEDED FOR SLEEP    furosemide (LASIX) 20 mg tablet TAKE 1 TABLET BY MOUTH EVERY MORNING AND TAKE 1 TABLET BY MOUTH EVERY AFTERNOON BETWEEN 2 AND 4 PM    primidone (MYSOLINE) 50 mg tablet 1 tablet in the morning and 2 tablets at night for 2 weeks and then 2 tablets p.o. twice daily    glipiZIDE SR (GLUCOTROL XL) 2.5 mg CR tablet TAKE ONE TABLET BY MOUTH DAILY    albuterol-ipratropium (DUO-NEB) 2.5 mg-0.5 mg/3 ml nebu TAKE CONTENTS OF ONE VIAL BY NEBULIZATION ROUTE EVERY 4 HOURS AS NEEDED(UP TO 6 PER DAY)    predniSONE (DELTASONE) 5 mg tablet Take 1 1/2 tab by mouth daily    dexlansoprazole (DEXILANT) 60 mg CpDB capsule (delayed release) Take 1 Cap by mouth daily. This is in place of pantoprazole for reflux    albuterol (VENTOLIN HFA) 90 mcg/actuation inhaler INHALE 2 PUFFS BY MOUTH EVERY 4 HOURS AS NEEDED FOR WHEEZING    HYDROcodone-acetaminophen (NORCO)  mg tablet Take 1 Tab by mouth every six (6) hours as needed.  docusate sodium (STOOL SOFTENER PO) Take 1 Tab by mouth two (2) times a day.     carvedilol (COREG) 25 mg tablet Take 1 Tab by mouth two (2) times daily (with meals).  hydroxychloroquine (PLAQUENIL) 200 mg tablet Take 200 mg by mouth two (2) times a day.  lisinopril (PRINIVIL, ZESTRIL) 2.5 mg tablet Take 2.5 mg by mouth daily.  DULoxetine (CYMBALTA) 60 mg capsule Take 60 mg by mouth daily.  fluticasone (FLONASE) 50 mcg/actuation nasal spray SHAKE LIQUID AND USE 2 SPRAYS IN EACH NOSTRIL EVERY DAY    cyanocobalamin 1,000 mcg tablet Take 1,000 mcg by mouth daily.  promethazine-dextromethorphan (PROMETHAZINE-DM) 6.25-15 mg/5 mL syrup TAKE ONE TEASPOONFUL BY MOUTH EVERY SIX HOURS AS NEEDED FOR COUGH    PSEUDOEPHEDRINE-guaiFENesin (MUCINEX D)  mg per tablet Take 1 Tab by mouth daily.  azithromycin (ZITHROMAX) 250 mg tablet Take 2 tabs together day 1,then 1 tab daily. Take with food     No current facility-administered medications for this visit.        Vitals:    19 1042 19 1100   BP: 112/68 112/66   Pulse: 62    Resp: 18    SpO2: 97%    Weight: 225 lb (102.1 kg)    Height: 5' 4\" (1.626 m)      Social History     Socioeconomic History    Marital status:      Spouse name: Not on file    Number of children: Not on file    Years of education: Not on file    Highest education level: Not on file   Occupational History    Not on file   Social Needs    Financial resource strain: Not on file    Food insecurity:     Worry: Not on file     Inability: Not on file    Transportation needs:     Medical: Not on file     Non-medical: Not on file   Tobacco Use    Smoking status: Former Smoker     Packs/day: 0.30     Years: 5.00     Pack years: 1.50     Last attempt to quit: 1960     Years since quittin.3    Smokeless tobacco: Never Used   Substance and Sexual Activity    Alcohol use: No     Alcohol/week: 0.0 oz    Drug use: No    Sexual activity: Never   Lifestyle    Physical activity:     Days per week: Not on file     Minutes per session: Not on file    Stress: Not on file Relationships    Social connections:     Talks on phone: Not on file     Gets together: Not on file     Attends Voodoo service: Not on file     Active member of club or organization: Not on file     Attends meetings of clubs or organizations: Not on file     Relationship status: Not on file    Intimate partner violence:     Fear of current or ex partner: Not on file     Emotionally abused: Not on file     Physically abused: Not on file     Forced sexual activity: Not on file   Other Topics Concern    Not on file   Social History Narrative    ** Merged History Encounter **            I have reviewed the nurses notes, vitals, problem list, allergy list, medical history, family, social history and medications. Review of Symptoms:    General: Pt denies excessive weight gain or loss. Pt is able to conduct ADL's with limitations of dyspnea on exertion   HEENT: Denies blurred vision, headaches, epistaxis and difficulty swallowing. Respiratory: Denies shortness of breath, + HIGH, wheezing or stridor. Cardiovascular: Denies precordial pain, palpitations, edema or PND  Gastrointestinal: Denies poor appetite, indigestion, abdominal pain or blood in stool  Musculoskeletal: Patient reports diffuse osteoarthritic and back pain  Neurologic: Denies tremor, paresthesias, or sensory motor disturbance  Skin: Denies rash, itching or texture change. Physical Exam:      General: Well developed, in no acute distress, cooperative and alert  HEENT: No carotid bruits, no JVD, trach is midline. Neck Supple,  Heart:  Normal S1/S2 negative S3 or S4. Regular, no murmur, gallop or rub.   Respiratory: Clear bilaterally x 4, no wheezing or rales  Abdomen:   Soft, non-tender, no masses, bowel sounds are active.   Extremities:  No edema she is wearing compression stockings, normal cap refill, no cyanosis, atraumatic. Neuro: A&Ox3, speech clear, gait stable. Skin: Skin color is normal. No rashes or lesions.  Non diaphoretic  Vascular: 2+ pulses symmetric in all extremities    Cardiographics    ECG: Sinus rhythm  Results for orders placed or performed during the hospital encounter of 12/17/15   EKG, 12 LEAD, INITIAL   Result Value Ref Range    Ventricular Rate 78 BPM    Atrial Rate 78 BPM    P-R Interval 164 ms    QRS Duration 96 ms    Q-T Interval 400 ms    QTC Calculation (Bezet) 456 ms    Calculated P Axis 53 degrees    Calculated R Axis -35 degrees    Calculated T Axis 52 degrees    Diagnosis       Normal sinus rhythm  Left axis deviation  Nonspecific T wave abnormality  When compared with ECG of 16-OCT-2015 10:27,  premature ventricular complexes are no longer present  Criteria for Septal infarct are no longer present  Confirmed by DEVI Doyle ([id number]) on 12/17/2015 12:18:40 PM           Cardiology Labs:  Lab Results   Component Value Date/Time    Cholesterol, total 200 (H) 03/18/2019 11:13 AM    HDL Cholesterol 95 03/18/2019 11:13 AM    LDL, calculated 87 03/18/2019 11:13 AM    Triglyceride 92 03/18/2019 11:13 AM    CHOL/HDL Ratio 2.0 06/11/2010 02:10 PM       Lab Results   Component Value Date/Time    Sodium 145 (H) 03/18/2019 11:13 AM    Potassium 4.9 03/18/2019 11:13 AM    Chloride 102 03/18/2019 11:13 AM    CO2 28 03/18/2019 11:13 AM    Anion gap 3 (L) 12/17/2015 11:47 AM    Glucose 102 (H) 03/18/2019 11:13 AM    BUN 19 03/18/2019 11:13 AM    Creatinine 1.61 (H) 03/18/2019 11:13 AM    BUN/Creatinine ratio 12 03/18/2019 11:13 AM    GFR est AA 33 (L) 03/18/2019 11:13 AM    GFR est non-AA 29 (L) 03/18/2019 11:13 AM    Calcium 9.8 03/18/2019 11:13 AM    Bilirubin, total <0.2 03/18/2019 11:13 AM    AST (SGOT) 18 03/18/2019 11:13 AM    Alk.  phosphatase 56 03/18/2019 11:13 AM    Protein, total 6.5 03/18/2019 11:13 AM    Albumin 4.5 03/18/2019 11:13 AM    Globulin 3.4 12/17/2015 11:47 AM    A-G Ratio 2.3 (H) 03/18/2019 11:13 AM    ALT (SGPT) 17 03/18/2019 11:13 AM           Assessment:     Assessment:     Diagnoses and all orders for this visit:    1. Chronic systolic heart failure (HCC)  -     AMB POC EKG ROUTINE W/ 12 LEADS, INTER & REP  -     NUCLEAR CARDIAC STRESS TEST; Future    2. Essential hypertension, benign  -     NUCLEAR CARDIAC STRESS TEST; Future    3. Type 2 diabetes mellitus with diabetic neuropathy, without long-term current use of insulin (HCC)  -     NUCLEAR CARDIAC STRESS TEST; Future    4. Stage 3 chronic kidney disease (HCC)  -     NUCLEAR CARDIAC STRESS TEST; Future    5. Dyslipidemia  -     NUCLEAR CARDIAC STRESS TEST; Future    Other orders  -     rosuvastatin (CRESTOR) 10 mg tablet; Take 1 Tab by mouth nightly. ICD-10-CM ICD-9-CM    1. Chronic systolic heart failure (HCC) I50.22 428.22 AMB POC EKG ROUTINE W/ 12 LEADS, INTER & REP      NUCLEAR CARDIAC STRESS TEST   2. Essential hypertension, benign I10 401.1 NUCLEAR CARDIAC STRESS TEST   3. Type 2 diabetes mellitus with diabetic neuropathy, without long-term current use of insulin (HCC) E11.40 250.60 NUCLEAR CARDIAC STRESS TEST     357.2    4. Stage 3 chronic kidney disease (HCC) N18.3 585.3 NUCLEAR CARDIAC STRESS TEST   5. Dyslipidemia E78.5 272.4 NUCLEAR CARDIAC STRESS TEST     Orders Placed This Encounter    AMB POC EKG ROUTINE W/ 12 LEADS, INTER & REP     Order Specific Question:   Reason for Exam:     Answer:   routine    rosuvastatin (CRESTOR) 10 mg tablet     Sig: Take 1 Tab by mouth nightly. Dispense:  90 Tab     Refill:  1        Plan:     Patient is a 80-year-old female with a history of systolic heart failure presenting with dyspnea on exertion unchanged weight. Given CAD risk factors will evaluate with Lexiscan nuclear stress test, EF will be checked on nuclear scan if low will then proceed with dedicated echo. In 2016 her EF was normal and had grade 1 diastolic dysfunction.   Hypertension: Controlled 112/66 continue current medication  Hyperlipidemia: Followed by primary care on statin therapy LDL 87, she is on a statin holiday for 1 month with no improvement in arthralgias will switch to Crestor 10 mg.  Angel Pavon MD    This note was created using voice recognition software. Despite editing, there may be syntax errors.

## 2019-04-29 NOTE — TELEPHONE ENCOUNTER
Patients  called to say , as of Saturday 4/27/19 patient started taking Decadron 5 mg, per Dr. Crabtree Brochure need to wait 2 weeks to take Shingrix. Patient's  will go to pharmacy after 5/13/19 with patient for injection.

## 2019-04-29 NOTE — TELEPHONE ENCOUNTER
----- Message from Edilberto Palomo sent at 4/29/2019 11:33 AM EDT -----  Regarding: Jacky Abraham - MD/ telephone  Pt would like a call back in regards to her shingles shot Pts contact [phone]

## 2019-05-02 DIAGNOSIS — I50.22 CHRONIC SYSTOLIC HEART FAILURE (HCC): Primary | ICD-10-CM

## 2019-05-17 RX ORDER — CARVEDILOL 25 MG/1
TABLET ORAL
Qty: 180 TAB | Refills: 0 | Status: SHIPPED | OUTPATIENT
Start: 2019-05-17 | End: 2019-08-15 | Stop reason: SDUPTHER

## 2019-05-20 ENCOUNTER — OFFICE VISIT (OUTPATIENT)
Dept: NEUROLOGY | Age: 84
End: 2019-05-20

## 2019-05-20 VITALS
SYSTOLIC BLOOD PRESSURE: 110 MMHG | BODY MASS INDEX: 37.9 KG/M2 | OXYGEN SATURATION: 98 % | WEIGHT: 222 LBS | HEIGHT: 64 IN | DIASTOLIC BLOOD PRESSURE: 65 MMHG | HEART RATE: 71 BPM

## 2019-05-20 DIAGNOSIS — M54.17 LUMBOSACRAL RADICULOPATHY: ICD-10-CM

## 2019-05-20 DIAGNOSIS — E11.42 DIABETIC POLYNEUROPATHY ASSOCIATED WITH TYPE 2 DIABETES MELLITUS (HCC): ICD-10-CM

## 2019-05-20 DIAGNOSIS — G25.0 ESSENTIAL TREMOR: Primary | ICD-10-CM

## 2019-05-20 NOTE — PROGRESS NOTES
NEUROLOGY FOLLOW UP NOTE    Chief Complaint   Patient presents with    Follow-up     tremors       [address] Raza is a 80 y.o. female who presented to the neurology office for management of tremors. It has been going on since 2016. It is gradually progressive with time. It is postural and in both in the arms and legs. No falls. She does have constipation. She does have mild stiffness in legs. The patient was on gabapentin and when it was discontinued her tremors have improved but they are coming back recently. Patient is on primidone 100 mg p.o. twice daily and there is improvement in her tremors and she is overall satisfied with it. She does also have lumbar radiculopathy and a length dependent neuropathy. Both of them are stable    Medications tried:  Gabapentin and Lyrica causes tremors  Primidone    Current Outpatient Medications   Medication Sig    carvedilol (COREG) 25 mg tablet TAKE 1 TABLET BY MOUTH TWICE DAILY WITH MEALS    rosuvastatin (CRESTOR) 10 mg tablet Take 1 Tab by mouth nightly.  neomycin-polymyxin-hydrocortisone (CORTISPORIN) otic solution Administer 3-4 Drops in left ear three (3) times daily as needed (ear discomfort).  ALPRAZolam (XANAX) 0.5 mg tablet TAKE 1/2 TO 1 TABLET BY MOUTH EVERY EVENING AS NEEDED FOR SLEEP    furosemide (LASIX) 20 mg tablet TAKE 1 TABLET BY MOUTH EVERY MORNING AND TAKE 1 TABLET BY MOUTH EVERY AFTERNOON BETWEEN 2 AND 4 PM    primidone (MYSOLINE) 50 mg tablet 1 tablet in the morning and 2 tablets at night for 2 weeks and then 2 tablets p.o. twice daily    glipiZIDE SR (GLUCOTROL XL) 2.5 mg CR tablet TAKE ONE TABLET BY MOUTH DAILY    albuterol-ipratropium (DUO-NEB) 2.5 mg-0.5 mg/3 ml nebu TAKE CONTENTS OF ONE VIAL BY NEBULIZATION ROUTE EVERY 4 HOURS AS NEEDED(UP TO 6 PER DAY)    predniSONE (DELTASONE) 5 mg tablet Take 1 1/2 tab by mouth daily    dexlansoprazole (DEXILANT) 60 mg CpDB capsule (delayed release) Take 1 Cap by mouth daily. This is in place of pantoprazole for reflux    albuterol (VENTOLIN HFA) 90 mcg/actuation inhaler INHALE 2 PUFFS BY MOUTH EVERY 4 HOURS AS NEEDED FOR WHEEZING    HYDROcodone-acetaminophen (NORCO)  mg tablet Take 1 Tab by mouth every six (6) hours as needed.  docusate sodium (STOOL SOFTENER PO) Take 1 Tab by mouth two (2) times a day.  hydroxychloroquine (PLAQUENIL) 200 mg tablet Take 200 mg by mouth two (2) times a day.  lisinopril (PRINIVIL, ZESTRIL) 2.5 mg tablet Take 2.5 mg by mouth daily.  DULoxetine (CYMBALTA) 60 mg capsule Take 60 mg by mouth daily.  fluticasone (FLONASE) 50 mcg/actuation nasal spray SHAKE LIQUID AND USE 2 SPRAYS IN EACH NOSTRIL EVERY DAY    cyanocobalamin 1,000 mcg tablet Take 1,000 mcg by mouth daily.  promethazine-dextromethorphan (PROMETHAZINE-DM) 6.25-15 mg/5 mL syrup TAKE ONE TEASPOONFUL BY MOUTH EVERY SIX HOURS AS NEEDED FOR COUGH    PSEUDOEPHEDRINE-guaiFENesin (MUCINEX D)  mg per tablet Take 1 Tab by mouth daily. No current facility-administered medications for this visit. REVIEW OF SYSTEMS:   A ten system review of constitutional, cardiovascular, respiratory, musculoskeletal, endocrine, skin, SHEENT, genitourinary, psychiatric and neurologic systems was obtained and is unremarkable except as stated in HPI    EXAMINATION:   Visit Vitals  /65   Pulse 71   Ht 5' 4\" (1.626 m)   Wt 222 lb (100.7 kg)   LMP 03/04/1961 (Approximate)   SpO2 98%   BMI 38.11 kg/m²        General:   General appearance: Pt is in no acute distress   Distal pulses are preserved    Neurological Examination:   Mental Status: AAO x3. Speech is fluent. Follows commands, has normal fund of knowledge, attention, short term recall, comprehension and insight. Cranial Nerves: Visual fields are full. PERRL, Extraocular movements are full. Facial sensation intact V1- V3. Facial movement intact, symmetric. Hearing intact to conversation. Palate elevates symmetrically. Shoulder shrug symmetric. Tongue midline. Motor: Strength is 5/5 in all 4 ext. Sensation: Decreased vibration sensation in feet and ankles. Coordination/Cerebellar: Intact to finger-nose-finger     Laboratory review:   Results for orders placed or performed in visit on 05/07/19   ECHO ADULT COMPLETE   Result Value Ref Range    LA Volume 93.66 (A) 22 - 52 mL    Right Atrial Area 4C 20.94 cm2    Ao Root D 2.75 cm    LA Vol Index 45.55 16 - 28 ml/m2       Imaging review:   11/6/12  MRI L spine  1. Status post L4 and L5 laminectomies. Severe L4-5 disc space narrowing with moderate sized central disc herniation. No canal stenosis; moderate left foraminal narrowing. 2.  Diffuse degenerative changes. Canal stenosis mild at T10-11, borderline at T11-12 and T12-L1, mild at L1-2, mild to moderate at L2-3, severe at L3-4, and moderate at L5-S1.    12/6/12  MRI Brain  1. No mass or enhancement abnormality of the internal auditory canals or cerebellopontine angles. 2.  Extensive sinus disease with complete obscuration of the left frontal and sphenoid sinuses. 9/28/16  EMG/NCS  This study is abnormal. There is evidence for a moderate to severe degree of a length dependent polyneuropathy. There is also residual multilevel lumbosacral radiculopathy without significant denervation in the distal leg muscles. Documentation review:  Pt was seen by Dr. Fauzia Marshall for tremors and was started on lyrica. EMG was performed by him and it showed a length dependent neuropathy. Assessment/Plan:   Rodrick Norman is a 80 y.o. female who presented to the neurology office for management of postural tremors, lumbosacral radiculopathy and diabetic polyneuropathy. Her diabetic neuropathy and lumbosacral radiculopathy are stable. For essential tremors, patient is on primidone 100 mg p.o. twice daily. Her tremors have improved on increased dosage of primidone and she is satisfied with it.     Follow-up in 6 months

## 2019-05-20 NOTE — PATIENT INSTRUCTIONS

## 2019-05-20 NOTE — LETTER
5/20/19 Patient: Hamlet Ch YOB: 1931 Date of Visit: 5/20/2019 Adrian Puente MD 
[address] [place] [id number] VIA In Basket Dear Adrian Puente MD, Thank you for referring Ms. Chayito Bergeron to [address] [place] [address] for evaluation. My notes for this consultation are attached. If you have questions, please do not hesitate to call me. I look forward to following your patient along with you. Sincerely, Donal Abreu MD 
 
 Yes...

## 2019-06-03 ENCOUNTER — HOSPITAL ENCOUNTER (OUTPATIENT)
Dept: MAMMOGRAPHY | Age: 84
Discharge: HOME | End: 2019-06-03
Attending: FAMILY MEDICINE
Payer: MEDICARE

## 2019-06-03 DIAGNOSIS — Z12.31 ENCOUNTER FOR SCREENING MAMMOGRAM FOR BREAST CANCER: ICD-10-CM

## 2019-06-03 PROCEDURE — 77067 SCR MAMMO BI INCL CAD: CPT

## 2019-06-17 NOTE — PROGRESS NOTES
HISTORY OF PRESENT ILLNESS  Carl Loyola is a 80 y.o. female. HPI   Follow up on chronic medical problems. Overall has been dealing with a lot of pain in the joints. Cardiovascular Review:  The patient has hypertension, hyperlipidemia and Systolic HF. Diet and Lifestyle: generally follows a low fat low cholesterol diet, generally follows a low sodium diet  Home BP Monitoring: is not measured at home. Pertinent ROS: taking medications as instructed, no medication side effects noted, no TIA's, no chest pain on exertion, no dyspnea on exertion, noting that her ankles swelling has been mild. DM type II follow up:  Compliant w/ meds, diabetic diet, and exercise. Obtains home glucose monitoring averaging in the mid 100s. Checks BS daily on most days and prn. Pt does not have BS log at visit today. No Rf needed for today. Denies any tingling sensation, polyuria and polydipsia. No blurred vision. No significant weight changes. Asthma Review:  The patient is being seen for follow up of chronic respiratory failure and allergies and chronic sinusitits, not currently in exacerbation. Breathing has been good also with taking prednisone. Using nebulizer 3 to 4 times in a day as needed. Regimen compliance: The patient reports adherence to asthma action plan and regimen. Encounter for pain management. 1./2. Medical history/Past medical History  Chronic Pain:  Osteoarthritis:  Patient has osteoarthritis in multiple joints but primarily in the knees and back. Seen by rheumatology and place on prednisone and plaquenil. Told also that she has fibromyalgia. Her symptoms have been wax and wane. She is currently on prednisone 7.5 mg daily per rheumatology. She has polymyalgia rheumatica. Pain has been 8/10 when it is severe and she takes the pain medication. 3. Applicable records from prior treatment providers are apart of [facility] under the media tab.   4. Diagnostic, therapeutic and laboratory results are available in the [facility] chart. 5. Consultation notes are available for review in the media tab of the [facility] chart. 6. Treatment goals include pain control so that the pt may be as active and function with her daily activities and improved comfort level. Previously pt has been limited by pain. 7. The risks and benefits of treatment has been discussed at this office visit with the pt. She understands that the medication has addicting potential.  Additionally the pt has been advised that narcotic pain medication may impair mental and/or physical ability required for performance of tasks such as driving or operating any other machinery. 8. Pt has an updated signed pain contract on file and can be found under the FYI section of the [facility] chart. 9. The pain contract is reviewed. Pain medication will be continued at the previous dosage. Urine drug screening will be done today. Diagnostic studies are not indicated at this time. Interventional procedure are not indicated at this time. 10. Medication prescibed is HYDROcodone-acetaminophen (NORCO)  mg tablet. No prescription is needed today. 11. Patient instructions have been reviewed in detail as outlined above and in the pain contract. Last refill was in December. 12. Re-eval is planned for 3 months. Naloxone prescription is not warranted.      Patient Active Problem List   Diagnosis Code    CHF (congestive heart failure) ([facility]) I50.9    S/P TKR (total knee replacement) Z96.659    Anemia D64.9    s/p lumbar laminectomy Z98.890    S/P ASAD (total abdominal hysterectomy) Z90.710    S/P hemorrhoidectomy Z98.890, Z87.19    S/P rotator cuff surgery Z98.890    DM (diabetes mellitus) ([facility] [address] E11.9    Chronic sinusitis J32.9    S/P sinus surgery Z98.890    Dyslipidemia E78.5    Environmental allergies Z91.09    Obstructive sleep apnea (adult) (pediatric) G47.33    DJD (degenerative joint disease) M19.90    Chronic systolic heart failure ([facility]) I50.22    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 9) with comorbidity ([facility]) E66.01    Polymyalgia rheumatica ([facility]) M35.3       Current Outpatient Medications   Medication Sig Dispense Refill    carvedilol (COREG) 25 mg tablet TAKE 1 TABLET BY MOUTH TWICE DAILY WITH MEALS 180 Tab 0    rosuvastatin (CRESTOR) 10 mg tablet Take 1 Tab by mouth nightly. 90 Tab 1    neomycin-polymyxin-hydrocortisone (CORTISPORIN) otic solution Administer 3-4 Drops in left ear three (3) times daily as needed (ear discomfort). 1 Bottle 1    ALPRAZolam (XANAX) 0.5 mg tablet TAKE 1/2 TO 1 TABLET BY MOUTH EVERY EVENING AS NEEDED FOR SLEEP 30 Tab 3    furosemide (LASIX) 20 mg tablet TAKE 1 TABLET BY MOUTH EVERY MORNING AND TAKE 1 TABLET BY MOUTH EVERY AFTERNOON BETWEEN 2 AND 4  Tab 3    primidone (MYSOLINE) 50 mg tablet 1 tablet in the morning and 2 tablets at night for 2 weeks and then 2 tablets p.o. twice daily 360 Tab 1    glipiZIDE SR (GLUCOTROL XL) 2.5 mg CR tablet TAKE ONE TABLET BY MOUTH DAILY 90 Tab 3    albuterol-ipratropium (DUO-NEB) 2.5 mg-0.5 mg/3 ml nebu TAKE CONTENTS OF ONE VIAL BY NEBULIZATION ROUTE EVERY 4 HOURS AS NEEDED(UP TO 6 PER DAY) 1620 mL 3    predniSONE (DELTASONE) 5 mg tablet Take 1 1/2 tab by mouth daily      dexlansoprazole (DEXILANT) 60 mg CpDB capsule (delayed release) Take 1 Cap by mouth daily.  This is in place of pantoprazole for reflux 30 Cap 6    albuterol (VENTOLIN HFA) 90 mcg/actuation inhaler INHALE 2 PUFFS BY MOUTH EVERY 4 HOURS AS NEEDED FOR WHEEZING 1 Inhaler 6    HYDROcodone-acetaminophen (NORCO)  mg tablet Take 1 Tab by mouth every six (6) hours as needed. 60 Tab 0    docusate sodium (STOOL SOFTENER PO) Take 1 Tab by mouth two (2) times a day.  hydroxychloroquine (PLAQUENIL) 200 mg tablet Take 200 mg by mouth two (2) times a day.  lisinopril (PRINIVIL, ZESTRIL) 2.5 mg tablet Take 2.5 mg by mouth daily.  DULoxetine (CYMBALTA) 60 mg capsule Take 60 mg by mouth daily.  fluticasone (FLONASE) 50 mcg/actuation nasal spray SHAKE LIQUID AND USE 2 SPRAYS IN EACH NOSTRIL EVERY DAY 1 Bottle 11    cyanocobalamin 1,000 mcg tablet Take 1,000 mcg by mouth daily.  promethazine-dextromethorphan (PROMETHAZINE-DM) 6.25-15 mg/5 mL syrup TAKE ONE TEASPOONFUL BY MOUTH EVERY SIX HOURS AS NEEDED FOR COUGH 240 mL 1    PSEUDOEPHEDRINE-guaiFENesin (MUCINEX D)  mg per tablet Take 1 Tab by mouth daily.          Allergies   Allergen Reactions    Gabapentin Other (comments)     Muscles twitching and jerking    Levaquin [Levofloxacin] Swelling    Lyrica [Pregabalin] Other (comments)     Muscle twitching and jerking    Percodan [Oxycodone Hcl-Oxycodone-Asa] Itching         Past Medical History:   Diagnosis Date    Anemia 3/3/2010    Arrhythmia     irregular heartbeat    Arthritis     CHF [facility]congestive heart failure) [facility]) 3/3/2010    Chronic pain     back    Chronic sinusitis 3/3/2010    CPAP (continuous positive airway pressure) dependence     Diverticulosis     DM (diabetes mellitus) ([facility] [address] 3/3/2010    Dyslipidemia 3/3/2010    GERD (gastroesophageal reflux disease)     HTN (hypertension) 3/3/2010    Ill-defined condition     being evaluated py pulmonolgist for \"trouble breathing\"    S/P TKR (total knee replacement) 3/3/2010    Unspecified sleep apnea     doesn't wear CPAP since back has been hurting         Past Surgical History:   Procedure Laterality Date    HX APPENDECTOMY      thinks it was taken with hysterectomy    HX GYN      \"tubes opened\"    HX HEENT      sinus surgery    HX HEMORRHOIDECTOMY      HX HYSTERECTOMY      HX KNEE REPLACEMENT      both knees- at same time    HX LUMBAR LAMINECTOMY      HX MOHS PROCEDURES      left shoulder    HX ORTHOPAEDIC  1980    neck fusion    HX [address] [facility]    right knee replaced for second time    HX ORTHOPAEDIC      lumbar fusion    HX OTHER SURGICAL      CORNELL BIOPSY BREAST STEREOTACTIC Left yrs ago    (-)    IN COLONOSCOPY FLX DX W/COLLJ SPEC WHEN PFRMD  [id number]    dr. Abbey Tucker (barium enema)         Family History   Problem Relation Age of Onset    Diabetes Mother     Heart Disease Father     Diabetes Brother     Blindness Brother     Diabetes Sister     Heart Disease Sister     Heart Disease Brother     Heart Disease Brother     Asthma Brother     Malignant Hyperthermia Neg Hx     Pseudocholinesterase Deficiency Neg Hx     Delayed Awakening Neg Hx     Post-op Nausea/Vomiting Neg Hx     Emergence Delirium Neg Hx     Post-op Cognitive Dysfunction Neg Hx        Social History     Tobacco Use    Smoking status: Former Smoker     Packs/day: 0.30     Years: 5.00     Pack years: 1.50     Last attempt to quit: 1960     Years since quittin.4    Smokeless tobacco: Never Used   Substance Use Topics    Alcohol use: No     Alcohol/week: 0.0 oz        Lab Results   Component Value Date/Time    WBC 5.4 2019 11:13 AM    HGB 10.7 (L) 2019 11:13 AM    HCT 34.7 2019 11:13 AM    PLATELET 437  11:13 AM     (H) 2019 11:13 AM     Lab Results   Component Value Date/Time    Cholesterol, total 200 (H) 2019 11:13 AM    HDL Cholesterol 95 2019 11:13 AM    LDL, calculated 87 2019 11:13 AM    Triglyceride 92 2019 11:13 AM    CHOL/HDL Ratio 2.0 2010 02:10 PM     Lab Results   Component Value Date/Time    Sodium 145 (H) 03/18/2019 11:13 AM    Potassium 4.9 03/18/2019 11:13 AM    Chloride 102 03/18/2019 11:13 AM    CO2 28 03/18/2019 11:13 AM    Anion gap 3 (L) 12/17/2015 11:47 AM    Glucose 102 (H) 03/18/2019 11:13 AM    BUN 19 03/18/2019 11:13 AM    Creatinine 1.61 (H) 03/18/2019 11:13 AM    BUN/Creatinine ratio 12 03/18/2019 11:13 AM    GFR est AA 33 (L) 03/18/2019 11:13 AM    GFR est non-AA 29 (L) 03/18/2019 11:13 AM    Calcium 9.8 03/18/2019 11:13 AM    Bilirubin, total <0.2 03/18/2019 11:13 AM    ALT (SGPT) 17 03/18/2019 11:13 AM    AST (SGOT) 18 03/18/2019 11:13 AM    Alk. phosphatase 56 03/18/2019 11:13 AM    Protein, total 6.5 03/18/2019 11:13 AM    Albumin 4.5 03/18/2019 11:13 AM    Globulin 3.4 12/17/2015 11:47 AM    A-G Ratio 2.3 (H) 03/18/2019 11:13 AM      Lab Results   Component Value Date/Time    Hemoglobin A1c 6.4 (H) 09/08/2017 11:09 AM    Hemoglobin A1c (POC) 6.8 03/18/2019 11:13 AM         Review of Systems   Constitutional: Negative for malaise/fatigue. HENT: Negative for congestion. Eyes: Negative for blurred vision. Respiratory: Negative for cough and shortness of breath. Cardiovascular: Negative for chest pain, palpitations and leg swelling. Gastrointestinal: Negative for abdominal pain, constipation and heartburn. Genitourinary: Negative for dysuria, frequency and urgency. Musculoskeletal: Positive for back pain, joint pain and myalgias. Neurological: Positive for tremors (c/o increase tremor in both hands, left seems wose. sometimes has hard time holding objects d/t the tremor. ). Negative for dizziness, tingling, sensory change, focal weakness and headaches. Endo/Heme/Allergies: Negative for environmental allergies. Psychiatric/Behavioral: Negative for depression. The patient does not have insomnia. Physical Exam   Constitutional: She appears well-developed and well-nourished.    /68 (BP 1 Location: Left arm, BP Patient Position: Sitting)   Pulse 71   Temp 96.7 °F (35.9 °C) (Oral)   Resp 18   Ht 5' 4\" (1.626 m)   Wt 223 lb 6.4 oz (101.3 kg)   LMP 03/04/1961 (Approximate)   SpO2 99%   BMI 38.35 kg/m²    HENT:   Right Ear: Tympanic membrane and ear canal normal.   Left Ear: Tympanic membrane and ear canal normal.   Nose: No mucosal edema or rhinorrhea. Mouth/Throat: Oropharynx is clear and moist and mucous membranes are normal.   Neck: Normal range of motion. Neck supple. No thyromegaly present. Cardiovascular: Normal rate, regular rhythm and normal heart sounds. Exam reveals no gallop. Pulmonary/Chest: Effort normal and breath sounds normal.   Abdominal: Soft. Normal appearance and bowel sounds are normal. She exhibits no mass. There is no tenderness. Musculoskeletal: Normal range of motion. She exhibits edema (mild) and tenderness (multiple areas of tenderness in the arm and legs. ). Lymphadenopathy:     She has no cervical adenopathy. Skin: Skin is warm and dry. Psychiatric: She has a normal mood and affect. Nursing note and vitals reviewed. ASSESSMENT and PLAN  Diagnoses and all orders for this visit:    1. Essential hypertension, benign  Stable     2. Type 2 diabetes with nephropathy (HCC)  -     AMB POC HEMOGLOBIN A1C  -     AMB POC GLUCOSE, QUANTITATIVE, BLOOD  -     MICROALBUMIN, UR, RAND W/ MICROALB/CREAT RATIO  -     AMB POC URINALYSIS DIP STICK AUTO W/O MICRO    3. Mixed hyperlipidemia  Continue to monitor. Work on diet and exercise. Hold crestor due increase myalgias. 4. Chronic systolic heart failure (HCC)  Stable     5. Stage 3 chronic kidney disease (HCC)  Stable     6. Chronic anemia  Stable     7. Polymyalgia rheumatica (HCC)  Pt and  requesting HH for PT eval.    -     predniSONE (DELTASONE) 5 mg tablet; Take 2 Tabs by mouth daily.  -     REFERRAL TO HOME HEALTH  -     dexamethasone, PF, (DECADRON) 10 mg/mL injection; 1 mL by IntraMUSCular route once for 1 dose.   -     DEXAMETHASONE SODIUM PHOSPHATE INJECTION 1 MG  -     [place] THER/PROPH/DIAG INJECTION, SUBCUT/IM    8. Arthralgia of multiple joints  -     HYDROcodone-acetaminophen (NORCO)  mg tablet; Take 1 Tab by mouth every six (6) hours as needed for Pain for up to 99 days. -     predniSONE (DELTASONE) 5 mg tablet; Take 2 Tabs by mouth daily.  -     REFERRAL TO HOME HEALTH  -     dexamethasone, PF, (DECADRON) 10 mg/mL injection; 1 mL by IntraMUSCular route once for 1 dose. -     DEXAMETHASONE SODIUM PHOSPHATE INJECTION 1 MG  -     [place] THER/PROPH/DIAG INJECTION, SUBCUT/IM    9. Encounter for chronic pain management  -     9-DRUG SCREEN + OXY, UR  The pt has signed medication agreement. Pain contract is reviewed. Pain medications will be continued at the previous dosage. Urine drug screening will be done today. Diagnostic  studies are not indicated at this time. Interventional procedure are not indicated at this time. Re-eval in 3 months. 10. Encounter for medication monitoring  -     METABOLIC PANEL, BASIC  -     CBC W/O DIFF    Other orders  -     neomycin-polymyxin-hydrocortisone (CORTISPORIN) otic solution; Administer 3-4 Drops in left ear three (3) times daily as needed (ear discomfort). Follow-up and Dispositions    · Return in about 1 month (around 7/18/2019). reviewed diet, exercise and weight control  cardiovascular risk and specific lipid/LDL goals reviewed  reviewed medications and side effects in detail  specific diabetic recommendations: low cholesterol diet, weight control and daily exercise discussed and glycohemoglobin and other lab monitoring discussed     I have discussed diagnosis listed in this note with pt and/or family. I have discussed treatment plans and options and the risk/benefit analysis of those options, including safe use of medications and possible medication side effects. Through the use of shared decision making we have agreed to the above plan.  The patient has received an after-visit summary and questions were answered concerning future plans and follow up. Advise pt of any urgent changes then to proceed to the ER.

## 2019-06-18 ENCOUNTER — HOSPITAL ENCOUNTER (OUTPATIENT)
Dept: LAB | Age: 84
Discharge: HOME | End: 2019-06-18
Payer: MEDICARE

## 2019-06-18 ENCOUNTER — OFFICE VISIT (OUTPATIENT)
Dept: FAMILY MEDICINE CLINIC | Age: 84
End: 2019-06-18

## 2019-06-18 VITALS
HEIGHT: 64 IN | WEIGHT: 223.4 LBS | SYSTOLIC BLOOD PRESSURE: 116 MMHG | OXYGEN SATURATION: 99 % | HEART RATE: 71 BPM | DIASTOLIC BLOOD PRESSURE: 68 MMHG | TEMPERATURE: 96.7 F | RESPIRATION RATE: 18 BRPM | BODY MASS INDEX: 38.14 KG/M2

## 2019-06-18 DIAGNOSIS — Z51.81 ENCOUNTER FOR MEDICATION MONITORING: ICD-10-CM

## 2019-06-18 DIAGNOSIS — G89.29 ENCOUNTER FOR CHRONIC PAIN MANAGEMENT: ICD-10-CM

## 2019-06-18 DIAGNOSIS — E11.21 TYPE 2 DIABETES WITH NEPHROPATHY (HCC): ICD-10-CM

## 2019-06-18 DIAGNOSIS — D64.9 CHRONIC ANEMIA: ICD-10-CM

## 2019-06-18 DIAGNOSIS — M25.50 ARTHRALGIA OF MULTIPLE JOINTS: ICD-10-CM

## 2019-06-18 DIAGNOSIS — E78.2 MIXED HYPERLIPIDEMIA: ICD-10-CM

## 2019-06-18 DIAGNOSIS — N18.30 STAGE 3 CHRONIC KIDNEY DISEASE (HCC): ICD-10-CM

## 2019-06-18 DIAGNOSIS — I50.22 CHRONIC SYSTOLIC HEART FAILURE (HCC): ICD-10-CM

## 2019-06-18 DIAGNOSIS — M35.3 POLYMYALGIA RHEUMATICA (HCC): ICD-10-CM

## 2019-06-18 DIAGNOSIS — I10 ESSENTIAL HYPERTENSION, BENIGN: Primary | ICD-10-CM

## 2019-06-18 LAB
BILIRUB UR QL STRIP: NEGATIVE
GLUCOSE POC: 192 MG/DL
GLUCOSE UR-MCNC: NEGATIVE MG/DL
HBA1C MFR BLD HPLC: 6.6 %
KETONES P FAST UR STRIP-MCNC: NEGATIVE MG/DL
PH UR STRIP: 5 [PH] (ref 4.6–8)
PROT UR QL STRIP: NEGATIVE
SP GR UR STRIP: 1.01 (ref 1–1.03)
UA UROBILINOGEN AMB POC: NORMAL (ref 0.2–1)
URINALYSIS CLARITY POC: CLEAR
URINALYSIS COLOR POC: NORMAL
URINE BLOOD POC: NEGATIVE
URINE LEUKOCYTES POC: NEGATIVE
URINE NITRITES POC: NEGATIVE

## 2019-06-18 PROCEDURE — 80307 DRUG TEST PRSMV CHEM ANLYZR: CPT

## 2019-06-18 PROCEDURE — 36415 COLL VENOUS BLD VENIPUNCTURE: CPT

## 2019-06-18 PROCEDURE — 85027 COMPLETE CBC AUTOMATED: CPT

## 2019-06-18 PROCEDURE — 82043 UR ALBUMIN QUANTITATIVE: CPT

## 2019-06-18 PROCEDURE — 80048 BASIC METABOLIC PNL TOTAL CA: CPT

## 2019-06-18 RX ORDER — PREDNISONE 5 MG/1
10 TABLET ORAL DAILY
Qty: 60 TAB | Refills: 3 | Status: SHIPPED | OUTPATIENT
Start: 2019-06-18 | End: 2019-09-16 | Stop reason: SDUPTHER

## 2019-06-18 RX ORDER — DEXAMETHASONE SODIUM PHOSPHATE 10 MG/ML
10 INJECTION INTRAMUSCULAR; INTRAVENOUS ONCE
Qty: 1 ML | Refills: 0
Start: 2019-06-18 | End: 2019-06-18

## 2019-06-18 RX ORDER — HYDROCODONE BITARTRATE AND ACETAMINOPHEN 10; 325 MG/1; MG/1
1 TABLET ORAL
Qty: 60 TAB | Refills: 0 | Status: SHIPPED | OUTPATIENT
Start: 2019-06-18 | End: 2019-09-25

## 2019-06-18 RX ORDER — PRIMIDONE 50 MG/1
100 TABLET ORAL 2 TIMES DAILY
COMMUNITY
End: 2019-09-16 | Stop reason: SDUPTHER

## 2019-06-18 RX ORDER — NEOMYCIN SULFATE, POLYMYXIN B SULFATE, HYDROCORTISONE 3.5; 10000; 1 MG/ML; [USP'U]/ML; MG/ML
3-4 SOLUTION/ DROPS AURICULAR (OTIC)
Qty: 1 BOTTLE | Refills: 1 | Status: SHIPPED | OUTPATIENT
Start: 2019-06-18 | End: 2020-03-06

## 2019-06-18 RX ORDER — GUAIFENESIN 600 MG/1
600 TABLET, EXTENDED RELEASE ORAL DAILY
COMMUNITY
End: 2019-07-15

## 2019-06-18 RX ORDER — PHENOL/SODIUM PHENOLATE
20 AEROSOL, SPRAY (ML) MUCOUS MEMBRANE DAILY
COMMUNITY
End: 2022-06-27 | Stop reason: ALTCHOICE

## 2019-06-18 NOTE — PROGRESS NOTES
Chief Complaint   Patient presents with    Diabetes     follow up    Hypertension     follow up    Cholesterol Problem     follow up         A1C 3/18/2019    Microalbumin 5/31/2018    Mammogram 5/31/2018    Eye exam 5/28/2019    1. Have you been to the ER, urgent care clinic since your last visit? Hospitalized since your last visit? No    2. Have you seen or consulted any other health care providers outside of the [address] [place] [address] since your last visit? Include any pap smears or colon screening.  No

## 2019-06-19 ENCOUNTER — HOME HEALTH ADMISSION (OUTPATIENT)
Dept: HOME HEALTH SERVICES | Facility: HOME HEALTH | Age: 84
End: 2019-06-19
Payer: MEDICARE

## 2019-06-19 LAB
ALBUMIN/CREAT UR: 2.4 MG/G CREAT (ref 0–30)
AMPHETAMINES UR QL SCN: NEGATIVE NG/ML
BARBITURATES UR QL SCN: POSITIVE NG/ML
BENZODIAZ UR QL SCN: POSITIVE NG/ML
BUN SERPL-MCNC: 33 MG/DL (ref 8–27)
BUN/CREAT SERPL: 22 (ref 12–28)
BZE UR QL SCN: NEGATIVE NG/ML
CALCIUM SERPL-MCNC: 9.4 MG/DL (ref 8.7–10.3)
CANNABINOIDS UR QL SCN: NEGATIVE NG/ML
CHLORIDE SERPL-SCNC: 102 MMOL/L (ref 96–106)
CO2 SERPL-SCNC: 24 MMOL/L (ref 20–29)
CREAT SERPL-MCNC: 1.47 MG/DL (ref 0.57–1)
CREAT UR-MCNC: 182.2 MG/DL
CREAT UR-MCNC: 200.8 MG/DL (ref 20–300)
ERYTHROCYTE [DISTWIDTH] IN BLOOD BY AUTOMATED COUNT: 14.7 % (ref 12.3–15.4)
GLUCOSE SERPL-MCNC: 187 MG/DL (ref 65–99)
HCT VFR BLD AUTO: 32.7 % (ref 34–46.6)
HGB BLD-MCNC: 10.9 G/DL (ref 11.1–15.9)
INTERPRETATION: NORMAL
Lab: NORMAL
MCH RBC QN AUTO: 31.9 PG (ref 26.6–33)
MCHC RBC AUTO-ENTMCNC: 33.3 G/DL (ref 31.5–35.7)
MCV RBC AUTO: 96 FL (ref 79–97)
METHADONE UR QL SCN: NEGATIVE NG/ML
MICROALBUMIN UR-MCNC: 4.3 UG/ML
OPIATES UR QL SCN: POSITIVE NG/ML
OXYCODONE+OXYMORPHONE UR QL SCN: NEGATIVE NG/ML
PCP UR QL: NEGATIVE NG/ML
PH UR: 5.1 [PH] (ref 4.5–8.9)
PLATELET # BLD AUTO: 147 X10E3/UL (ref 150–450)
PLEASE NOTE:, 733163: ABNORMAL
POTASSIUM SERPL-SCNC: 4.8 MMOL/L (ref 3.5–5.2)
PROPOXYPH UR QL SCN: NEGATIVE NG/ML
RBC # BLD AUTO: 3.42 X10E6/UL (ref 3.77–5.28)
SODIUM SERPL-SCNC: 141 MMOL/L (ref 134–144)
WBC # BLD AUTO: 4.9 X10E3/UL (ref 3.4–10.8)

## 2019-06-21 ENCOUNTER — HOME CARE VISIT (OUTPATIENT)
Dept: SCHEDULING | Facility: HOME HEALTH | Age: 84
End: 2019-06-21
Payer: MEDICARE

## 2019-06-21 VITALS
SYSTOLIC BLOOD PRESSURE: 102 MMHG | TEMPERATURE: 98.1 F | OXYGEN SATURATION: 96 % | DIASTOLIC BLOOD PRESSURE: 72 MMHG | HEART RATE: 54 BPM

## 2019-06-21 PROCEDURE — 3331090002 HH PPS REVENUE DEBIT

## 2019-06-21 PROCEDURE — 3331090001 HH PPS REVENUE CREDIT

## 2019-06-21 PROCEDURE — G0151 HHCP-SERV OF PT,EA 15 MIN: HCPCS

## 2019-06-21 PROCEDURE — 400013 HH SOC

## 2019-06-22 PROCEDURE — 3331090002 HH PPS REVENUE DEBIT

## 2019-06-22 PROCEDURE — 3331090001 HH PPS REVENUE CREDIT

## 2019-06-23 PROCEDURE — 3331090002 HH PPS REVENUE DEBIT

## 2019-06-23 PROCEDURE — 3331090001 HH PPS REVENUE CREDIT

## 2019-06-24 PROCEDURE — 3331090002 HH PPS REVENUE DEBIT

## 2019-06-24 PROCEDURE — 3331090001 HH PPS REVENUE CREDIT

## 2019-06-25 ENCOUNTER — HOME CARE VISIT (OUTPATIENT)
Dept: HOME HEALTH SERVICES | Facility: HOME HEALTH | Age: 84
End: 2019-06-25
Payer: MEDICARE

## 2019-06-25 ENCOUNTER — HOME CARE VISIT (OUTPATIENT)
Dept: SCHEDULING | Facility: HOME HEALTH | Age: 84
End: 2019-06-25
Payer: MEDICARE

## 2019-06-25 VITALS
SYSTOLIC BLOOD PRESSURE: 120 MMHG | OXYGEN SATURATION: 97 % | RESPIRATION RATE: 16 BRPM | DIASTOLIC BLOOD PRESSURE: 70 MMHG | HEART RATE: 70 BPM

## 2019-06-25 PROCEDURE — 3331090002 HH PPS REVENUE DEBIT

## 2019-06-25 PROCEDURE — 3331090001 HH PPS REVENUE CREDIT

## 2019-06-25 PROCEDURE — G0152 HHCP-SERV OF OT,EA 15 MIN: HCPCS

## 2019-06-26 PROCEDURE — 3331090001 HH PPS REVENUE CREDIT

## 2019-06-26 PROCEDURE — 3331090002 HH PPS REVENUE DEBIT

## 2019-06-27 ENCOUNTER — HOME CARE VISIT (OUTPATIENT)
Dept: SCHEDULING | Facility: HOME HEALTH | Age: 84
End: 2019-06-27
Payer: MEDICARE

## 2019-06-27 VITALS
SYSTOLIC BLOOD PRESSURE: 110 MMHG | RESPIRATION RATE: 16 BRPM | DIASTOLIC BLOOD PRESSURE: 62 MMHG | OXYGEN SATURATION: 97 % | HEART RATE: 55 BPM

## 2019-06-27 PROCEDURE — G0151 HHCP-SERV OF PT,EA 15 MIN: HCPCS

## 2019-06-27 PROCEDURE — G0152 HHCP-SERV OF OT,EA 15 MIN: HCPCS

## 2019-06-27 PROCEDURE — 3331090001 HH PPS REVENUE CREDIT

## 2019-06-27 PROCEDURE — 3331090002 HH PPS REVENUE DEBIT

## 2019-06-28 VITALS
OXYGEN SATURATION: 97 % | TEMPERATURE: 97.2 F | HEART RATE: 55 BPM | SYSTOLIC BLOOD PRESSURE: 124 MMHG | DIASTOLIC BLOOD PRESSURE: 86 MMHG

## 2019-06-28 DIAGNOSIS — F51.01 PRIMARY INSOMNIA: ICD-10-CM

## 2019-06-28 PROCEDURE — 3331090001 HH PPS REVENUE CREDIT

## 2019-06-28 PROCEDURE — 3331090002 HH PPS REVENUE DEBIT

## 2019-06-29 PROCEDURE — 3331090001 HH PPS REVENUE CREDIT

## 2019-06-29 PROCEDURE — 3331090002 HH PPS REVENUE DEBIT

## 2019-06-30 PROCEDURE — 3331090002 HH PPS REVENUE DEBIT

## 2019-06-30 PROCEDURE — 3331090001 HH PPS REVENUE CREDIT

## 2019-07-01 PROCEDURE — 3331090002 HH PPS REVENUE DEBIT

## 2019-07-01 PROCEDURE — 3331090001 HH PPS REVENUE CREDIT

## 2019-07-01 RX ORDER — ALPRAZOLAM 0.5 MG/1
TABLET ORAL
Qty: 30 TAB | Refills: 3 | OUTPATIENT
Start: 2019-07-01 | End: 2019-10-22 | Stop reason: SDUPTHER

## 2019-07-02 ENCOUNTER — HOME CARE VISIT (OUTPATIENT)
Dept: HOME HEALTH SERVICES | Facility: HOME HEALTH | Age: 84
End: 2019-07-02
Payer: MEDICARE

## 2019-07-02 ENCOUNTER — HOME CARE VISIT (OUTPATIENT)
Dept: SCHEDULING | Facility: HOME HEALTH | Age: 84
End: 2019-07-02
Payer: MEDICARE

## 2019-07-02 VITALS
SYSTOLIC BLOOD PRESSURE: 132 MMHG | DIASTOLIC BLOOD PRESSURE: 70 MMHG | RESPIRATION RATE: 14 BRPM | OXYGEN SATURATION: 98 % | HEART RATE: 66 BPM | TEMPERATURE: 99 F

## 2019-07-02 VITALS
HEART RATE: 75 BPM | DIASTOLIC BLOOD PRESSURE: 70 MMHG | SYSTOLIC BLOOD PRESSURE: 110 MMHG | RESPIRATION RATE: 16 BRPM | OXYGEN SATURATION: 96 %

## 2019-07-02 PROCEDURE — 3331090002 HH PPS REVENUE DEBIT

## 2019-07-02 PROCEDURE — 3331090001 HH PPS REVENUE CREDIT

## 2019-07-02 PROCEDURE — G0157 HHC PT ASSISTANT EA 15: HCPCS

## 2019-07-02 PROCEDURE — G0152 HHCP-SERV OF OT,EA 15 MIN: HCPCS

## 2019-07-03 PROCEDURE — 3331090002 HH PPS REVENUE DEBIT

## 2019-07-03 PROCEDURE — 3331090001 HH PPS REVENUE CREDIT

## 2019-07-04 PROCEDURE — 3331090001 HH PPS REVENUE CREDIT

## 2019-07-04 PROCEDURE — 3331090002 HH PPS REVENUE DEBIT

## 2019-07-05 ENCOUNTER — HOME CARE VISIT (OUTPATIENT)
Dept: SCHEDULING | Facility: HOME HEALTH | Age: 84
End: 2019-07-05
Payer: MEDICARE

## 2019-07-05 ENCOUNTER — HOME CARE VISIT (OUTPATIENT)
Dept: HOME HEALTH SERVICES | Facility: HOME HEALTH | Age: 84
End: 2019-07-05
Payer: MEDICARE

## 2019-07-05 PROCEDURE — G0152 HHCP-SERV OF OT,EA 15 MIN: HCPCS

## 2019-07-05 PROCEDURE — 3331090002 HH PPS REVENUE DEBIT

## 2019-07-05 PROCEDURE — G0157 HHC PT ASSISTANT EA 15: HCPCS

## 2019-07-05 PROCEDURE — 3331090001 HH PPS REVENUE CREDIT

## 2019-07-06 VITALS
HEART RATE: 70 BPM | RESPIRATION RATE: 16 BRPM | OXYGEN SATURATION: 99 % | DIASTOLIC BLOOD PRESSURE: 65 MMHG | SYSTOLIC BLOOD PRESSURE: 123 MMHG

## 2019-07-06 PROCEDURE — 3331090001 HH PPS REVENUE CREDIT

## 2019-07-06 PROCEDURE — 3331090002 HH PPS REVENUE DEBIT

## 2019-07-07 PROCEDURE — 3331090002 HH PPS REVENUE DEBIT

## 2019-07-07 PROCEDURE — 3331090001 HH PPS REVENUE CREDIT

## 2019-07-08 ENCOUNTER — HOME CARE VISIT (OUTPATIENT)
Dept: SCHEDULING | Facility: HOME HEALTH | Age: 84
End: 2019-07-08
Payer: MEDICARE

## 2019-07-08 VITALS
HEART RATE: 65 BPM | SYSTOLIC BLOOD PRESSURE: 110 MMHG | OXYGEN SATURATION: 94 % | DIASTOLIC BLOOD PRESSURE: 69 MMHG | RESPIRATION RATE: 17 BRPM

## 2019-07-08 PROCEDURE — 3331090001 HH PPS REVENUE CREDIT

## 2019-07-08 PROCEDURE — G0152 HHCP-SERV OF OT,EA 15 MIN: HCPCS

## 2019-07-08 PROCEDURE — 3331090002 HH PPS REVENUE DEBIT

## 2019-07-09 ENCOUNTER — HOME CARE VISIT (OUTPATIENT)
Dept: HOME HEALTH SERVICES | Facility: HOME HEALTH | Age: 84
End: 2019-07-09
Payer: MEDICARE

## 2019-07-09 VITALS
RESPIRATION RATE: 14 BRPM | OXYGEN SATURATION: 98 % | SYSTOLIC BLOOD PRESSURE: 100 MMHG | HEART RATE: 58 BPM | TEMPERATURE: 98.4 F | DIASTOLIC BLOOD PRESSURE: 60 MMHG

## 2019-07-09 PROCEDURE — 3331090002 HH PPS REVENUE DEBIT

## 2019-07-09 PROCEDURE — G0157 HHC PT ASSISTANT EA 15: HCPCS

## 2019-07-09 PROCEDURE — 3331090001 HH PPS REVENUE CREDIT

## 2019-07-10 VITALS
OXYGEN SATURATION: 98 % | TEMPERATURE: 99 F | RESPIRATION RATE: 16 BRPM | SYSTOLIC BLOOD PRESSURE: 102 MMHG | HEART RATE: 64 BPM | DIASTOLIC BLOOD PRESSURE: 58 MMHG

## 2019-07-10 PROCEDURE — 3331090002 HH PPS REVENUE DEBIT

## 2019-07-10 PROCEDURE — 3331090001 HH PPS REVENUE CREDIT

## 2019-07-11 ENCOUNTER — HOME CARE VISIT (OUTPATIENT)
Dept: SCHEDULING | Facility: HOME HEALTH | Age: 84
End: 2019-07-11
Payer: MEDICARE

## 2019-07-11 VITALS
RESPIRATION RATE: 16 BRPM | SYSTOLIC BLOOD PRESSURE: 105 MMHG | OXYGEN SATURATION: 97 % | HEART RATE: 65 BPM | DIASTOLIC BLOOD PRESSURE: 60 MMHG

## 2019-07-11 PROCEDURE — 3331090002 HH PPS REVENUE DEBIT

## 2019-07-11 PROCEDURE — G0152 HHCP-SERV OF OT,EA 15 MIN: HCPCS

## 2019-07-11 PROCEDURE — 3331090001 HH PPS REVENUE CREDIT

## 2019-07-12 ENCOUNTER — HOME CARE VISIT (OUTPATIENT)
Dept: SCHEDULING | Facility: HOME HEALTH | Age: 84
End: 2019-07-12
Payer: MEDICARE

## 2019-07-12 PROCEDURE — G0151 HHCP-SERV OF PT,EA 15 MIN: HCPCS

## 2019-07-12 PROCEDURE — 3331090002 HH PPS REVENUE DEBIT

## 2019-07-12 PROCEDURE — 3331090001 HH PPS REVENUE CREDIT

## 2019-07-13 PROCEDURE — 3331090001 HH PPS REVENUE CREDIT

## 2019-07-13 PROCEDURE — 3331090002 HH PPS REVENUE DEBIT

## 2019-07-14 VITALS
OXYGEN SATURATION: 98 % | SYSTOLIC BLOOD PRESSURE: 122 MMHG | RESPIRATION RATE: 20 BRPM | DIASTOLIC BLOOD PRESSURE: 76 MMHG | TEMPERATURE: 98 F | HEART RATE: 68 BPM

## 2019-07-14 PROCEDURE — 3331090002 HH PPS REVENUE DEBIT

## 2019-07-14 PROCEDURE — 3331090001 HH PPS REVENUE CREDIT

## 2019-07-15 ENCOUNTER — OFFICE VISIT (OUTPATIENT)
Dept: FAMILY MEDICINE CLINIC | Age: 84
End: 2019-07-15

## 2019-07-15 ENCOUNTER — HOSPITAL ENCOUNTER (OUTPATIENT)
Dept: LAB | Age: 84
Discharge: HOME | End: 2019-07-15
Payer: MEDICARE

## 2019-07-15 VITALS
HEIGHT: 64 IN | HEART RATE: 63 BPM | SYSTOLIC BLOOD PRESSURE: 120 MMHG | OXYGEN SATURATION: 97 % | RESPIRATION RATE: 18 BRPM | WEIGHT: 224.2 LBS | DIASTOLIC BLOOD PRESSURE: 65 MMHG | TEMPERATURE: 97.7 F | BODY MASS INDEX: 38.28 KG/M2

## 2019-07-15 DIAGNOSIS — Z51.81 ENCOUNTER FOR MEDICATION MONITORING: ICD-10-CM

## 2019-07-15 DIAGNOSIS — E11.21 TYPE 2 DIABETES WITH NEPHROPATHY (HCC): ICD-10-CM

## 2019-07-15 DIAGNOSIS — G89.29 ENCOUNTER FOR CHRONIC PAIN MANAGEMENT: ICD-10-CM

## 2019-07-15 DIAGNOSIS — M25.50 ARTHRALGIA OF MULTIPLE JOINTS: Primary | ICD-10-CM

## 2019-07-15 DIAGNOSIS — M35.3 POLYMYALGIA RHEUMATICA (HCC): ICD-10-CM

## 2019-07-15 LAB — GLUCOSE POC: 140 MG/DL

## 2019-07-15 PROCEDURE — 3331090002 HH PPS REVENUE DEBIT

## 2019-07-15 PROCEDURE — 3331090001 HH PPS REVENUE CREDIT

## 2019-07-15 PROCEDURE — 36415 COLL VENOUS BLD VENIPUNCTURE: CPT

## 2019-07-15 PROCEDURE — 80048 BASIC METABOLIC PNL TOTAL CA: CPT

## 2019-07-15 RX ORDER — POLYETHYLENE GLYCOL 3350 17 G/17G
17 POWDER, FOR SOLUTION ORAL
COMMUNITY
End: 2021-04-26 | Stop reason: ALTCHOICE

## 2019-07-15 NOTE — PROGRESS NOTES
Chief Complaint   Patient presents with    Hypertension     follow up    Cholesterol Problem     follow up    Diabetes     follow up         A1C 6/18/2019    Microalbumin 6/18/2019    Mammogram 6/3/2019    Eye exam 5/28/2019    1. Have you been to the ER, urgent care clinic since your last visit? Hospitalized since your last visit? No    2. Have you seen or consulted any other health care providers outside of the [address] [place] [address] since your last visit? Include any pap smears or colon screening.  No

## 2019-07-15 NOTE — PROGRESS NOTES
HISTORY OF PRESENT ILLNESS  Vandana Chery is a 80 y.o. female. HPI   Follow up on pain in the muscles and joint pain. Overall she is much better on the higher dose of the prednisone,  She has also been off of the statin and has being going for PT. Pian is much more tolerable. Has follow up with her rheumatologist at the end of this month. She has had slight increased in the BS with the higher dose of the prednisone. Last A1C test on last month was 6.6%     Encounter for pain management. 1./2. Medical history/Past medical History  Chronic Pain:  Osteoarthritis:  Patient has osteoarthritis in multiple joints but primarily in the knees and back. Seen by rheumatology and place on prednisone and plaquenil. Told also that she has fibromyalgia. Her symptoms have been wax and wane. She is currently on prednisone 10 mg daily. She has polymyalgia rheumatica. Pain has been 8/10 when it is severe but overall has been much better with the higher dose of the prednisone. 3. Applicable records from prior treatment providers are apart of [facility] under the media tab. 4. Diagnostic, therapeutic and laboratory results are available in the [facility] chart. 5. Consultation notes are available for review in the media tab of the [facility] chart. 6. Treatment goals include pain control so that the pt may be as active and function with her daily activities and improved comfort level. Previously pt has been limited by pain. 7. The risks and benefits of treatment has been discussed at this office visit with the pt. She understands that the medication has addicting potential.  Additionally the pt has been advised that narcotic pain medication may impair mental and/or physical ability required for performance of tasks such as driving or operating any other machinery. 8. Pt has an updated signed pain contract on file and can be found under the FYI section of the [facility] chart.     9. The pain contract is reviewed. Pain medication will be continued at the previous dosage. Urine drug screening will be done today. Diagnostic studies are not indicated at this time. Interventional procedure are not indicated at this time. 10. Medication prescibed is HYDROcodone-acetaminophen (NORCO)  mg tablet. No prescription is needed today. 11. Patient instructions have been reviewed in detail as outlined above and in the pain contract. Last refill was in December. 12. Re-eval is planned for 3 months. Naloxone prescription is not warranted. Patient Active Problem List   Diagnosis Code    CHF (congestive heart failure) ([facility]) I50.9    S/P TKR (total knee replacement) Z96.659    Anemia D64.9    s/p lumbar laminectomy Z98.890    S/P ASAD (total abdominal hysterectomy) Z90.710    S/P hemorrhoidectomy Z98.890, Z87.19    S/P rotator cuff surgery Z98.890    [facility]di[facility] mellitus[facility] E11.9    Chronic sinusitis J32.9    S/P sinus surgery Z98.890    Dyslipidemia E78.5    Environmental allergies Z91.09    Obstructive sleep apnea (adult) (pediatric) G47.33    DJD (degenerative joint disease) M19.90    Chronic systolic heart failure ([facility]) I50.22    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 9) with comorbidity ([facility]) E66.01    Polymyalgia rheumatica ([facility]) M35.3       Current Outpatient Medications   Medication Sig Dispense Refill    ALPRAZolam (XANAX) 0.5 mg tablet TAKE 1/2 TO 1 TABLET BY MOUTH EVERY NIGHT AT BEDTIME AS NEEDED FOR SLEEP 30 Tab 3    Omeprazole delayed release (PRILOSEC D/R) 20 mg tablet Take 20 mg by mouth daily.  primidone (MYSOLINE) 50 mg tablet Take 100 mg by mouth two (2) times a day.  guaiFENesin ER (MUCINEX) 600 mg ER tablet Take 600 mg by mouth daily.  neomycin-polymyxin-hydrocortisone (CORTISPORIN) otic solution Administer 3-4 Drops in left ear three (3) times daily as needed (ear discomfort). 1 Bottle 1    HYDROcodone-acetaminophen (NORCO)  mg tablet Take 1 Tab by mouth every six (6) hours as needed for Pain for up to 99 days. 60 Tab 0    predniSONE (DELTASONE) 5 mg tablet Take 2 Tabs by mouth daily. 60 Tab 3    carvedilol (COREG) 25 mg tablet TAKE 1 TABLET BY MOUTH TWICE DAILY WITH MEALS 180 Tab 0    furosemide (LASIX) 20 mg tablet TAKE 1 TABLET BY MOUTH EVERY MORNING AND TAKE 1 TABLET BY MOUTH EVERY AFTERNOON BETWEEN 2 AND 4  Tab 3    primidone (MYSOLINE) 50 mg tablet 1 tablet in the morning and 2 tablets at night for 2 weeks and then 2 tablets p.o. twice daily 360 Tab 1    glipiZIDE SR (GLUCOTROL XL) 2.5 mg CR tablet TAKE ONE TABLET BY MOUTH DAILY 90 Tab 3    albuterol-ipratropium (DUO-NEB) 2.5 mg-0.5 mg/3 ml nebu TAKE CONTENTS OF ONE VIAL BY NEBULIZATION ROUTE EVERY 4 HOURS AS NEEDED(UP TO 6 PER DAY) (Patient taking differently: TAKE CONTENTS OF ONE VIAL BY NEBULIZATION ROUTE EVERY 4 HOURS AS NEEDED(UP TO 6 PER DAY)for difficulty breathing/wheezing) 1620 mL 3    dexlansoprazole (DEXILANT) 60 mg CpDB capsule (delayed release) Take 1 Cap by mouth daily. This is in place of pantoprazole for reflux 30 Cap 6    albuterol (VENTOLIN HFA) 90 mcg/actuation inhaler INHALE 2 PUFFS BY MOUTH EVERY 4 HOURS AS NEEDED FOR WHEEZING 1 Inhaler 6    docusate sodium (STOOL SOFTENER PO) Take 1 Tab by mouth two (2) times a day.  hydroxychloroquine (PLAQUENIL) 200 mg tablet Take 200 mg by mouth two (2) times a day.       lisinopril (PRINIVIL, ZESTRIL) 2.5 mg tablet Take 2.5 mg by mouth daily.  DULoxetine (CYMBALTA) 60 mg capsule Take 60 mg by mouth daily.  fluticasone (FLONASE) 50 mcg/actuation nasal spray SHAKE LIQUID AND USE 2 SPRAYS IN EACH NOSTRIL EVERY DAY 1 Bottle 11    cyanocobalamin 1,000 mcg tablet Take 1,000 mcg by mouth daily.  promethazine-dextromethorphan (PROMETHAZINE-DM) 6.25-15 mg/5 mL syrup TAKE ONE TEASPOONFUL BY MOUTH EVERY SIX HOURS AS NEEDED FOR COUGH 240 mL 1    PSEUDOEPHEDRINE-guaiFENesin (MUCINEX D)  mg per tablet Take 1 Tab by mouth daily.          Allergies   Allergen Reactions    Gabapentin Other (comments)     Muscles twitching and jerking    Levaquin [Levofloxacin] Swelling    Lyrica [Pregabalin] Other (comments)     Muscle twitching and jerking    Percodan [Oxycodone Hcl-Oxycodone-Asa] Itching         Past Medical History:   Diagnosis Date    Anemia 3/3/2010    Arrhythmia     irregular heartbeat    Arthritis     [facility] 3/3/2010    Chronic pain     back    Chronic sinusitis 3/3/2010    CPAP (continuous positive airway pressure) dependence     Diverticulosis     DM (diabetes mellitus) ([facility] 3/3/2010    Dyslipidemia 3/3/2010    GERD (gastroesophageal reflux disease)     HTN (hypertension) 3/3/2010    Ill-defined condition     being evaluated py pulmonolgist for \"trouble breathing\"    S/P TKR (total knee replacement) 3/3/2010    Unspecified sleep apnea     doesn't wear CPAP since back has been hurting         Past Surgical History:   Procedure Laterality Date    HX APPENDECTOMY      thinks it was taken with hysterectomy    HX GYN      \"tubes opened\"    HX HEENT      sinus surgery    HX HEMORRHOIDECTOMY      HX HYSTERECTOMY      HX KNEE REPLACEMENT  1996    both knees- at same time    HX LUMBAR LAMINECTOMY  1980s    HX MOHS PROCEDURES      left shoulder    HX ORTHOPAEDIC  1980    neck fusion    HX ORTHOPAEDIC  1999    right knee replaced for second time    HX ORTHOPAEDIC      lumbar fusion    HX OTHER SURGICAL      CORNELL BIOPSY BREAST STEREOTACTIC Left yrs ago    (-)    ID COLONOSCOPY FLX DX W/COLLJ SPEC WHEN PFRMD  [id number]    dr. Sandrine Hua (barium enema)         Family History   Problem Relation Age of Onset    Diabetes Mother     Heart Disease Father     Diabetes Brother     Blindness Brother     Diabetes Sister     Heart Disease Sister     Heart Disease Brother     Heart Disease Brother     Asthma Brother     Malignant Hyperthermia Neg Hx     Pseudocholinesterase Deficiency Neg Hx     Delayed Awakening Neg Hx     Post-op Nausea/Vomiting Neg Hx     Emergence Delirium Neg Hx     Post-op Cognitive Dysfunction Neg Hx        Social History     Tobacco Use    Smoking status: Former Smoker     Packs/day: 0.30     Years: 5.00     Pack years: 1.50     Last attempt to quit: 1960     Years since quittin.5    Smokeless tobacco: Never Used   Substance Use Topics    Alcohol use: No     Alcohol/week: 0.0 oz        Lab Results   Component Value Date/Time    WBC 4.9 2019 10:27 AM    HGB 10.9 (L) 2019 10:27 AM    HCT 32.7 (L) 2019 10:27 AM    PLATELET 246 (L)  10:27 AM    MCV 96 2019 10:27 AM     Lab Results   Component Value Date/Time    Cholesterol, total 200 (H) 2019 11:13 AM    HDL Cholesterol 95 2019 11:13 AM    LDL, calculated 87 2019 11:13 AM    Triglyceride 92 2019 11:13 AM    CHOL/HDL Ratio 2.0 2010 02:10 PM     Lab Results   Component Value Date/Time    Sodium 141 2019 10:27 AM    Potassium 4.8 2019 10:27 AM    Chloride 102 2019 10:27 AM    CO2 24 2019 10:27 AM    Anion gap 3 (L) 2015 11:47 AM    Glucose 187 (H) 2019 10:27 AM    BUN 33 (H) 2019 10:27 AM    Creatinine 1.47 (H) 2019 10:27 AM    BUN/Creatinine ratio 22 2019 10:27 AM    GFR est AA 36 (L) 2019 10:27 AM    GFR est non-AA 32 (L) 2019 10:27 AM    Calcium 9.4 2019 10:27 AM Bilirubin, total <0.2 03/18/2019 11:13 AM    ALT (SGPT) 17 03/18/2019 11:13 AM    AST (SGOT) 18 03/18/2019 11:13 AM    Alk. phosphatase 56 03/18/2019 11:13 AM    Protein, total 6.5 03/18/2019 11:13 AM    Albumin 4.5 03/18/2019 11:13 AM    Globulin 3.4 12/17/2015 11:47 AM    A-G Ratio 2.3 (H) 03/18/2019 11:13 AM      Lab Results   Component Value Date/Time    Hemoglobin A1c 6.4 (H) 09/08/2017 11:09 AM    Hemoglobin A1c (POC) 6.6 06/18/2019 10:40 AM         Review of Systems   Constitutional: Negative for malaise/fatigue. HENT: Negative for congestion. Eyes: Negative for blurred vision. Respiratory: Negative for cough and shortness of breath. Cardiovascular: Negative for chest pain, palpitations and leg swelling. Gastrointestinal: Negative for abdominal pain, constipation and heartburn. Genitourinary: Negative for dysuria, frequency and urgency. Musculoskeletal: Positive for back pain, joint pain and myalgias. Neurological: Positive for tremors (c/o increase tremor in both hands, left seems wose. sometimes has hard time holding objects d/t the tremor. ). Negative for dizziness, tingling, sensory change, focal weakness and headaches. Endo/Heme/Allergies: Negative for environmental allergies. Psychiatric/Behavioral: Negative for depression. The patient does not have insomnia. Physical Exam   Constitutional: She appears well-developed and well-nourished. /65 (BP 1 Location: Left arm, BP Patient Position: Sitting)   Pulse 63   Temp 97.7 °F (36.5 °C) (Oral)   Resp 18   Ht 5' 4\" (1.626 m)   Wt 224 lb 3.2 oz (101.7 kg)   LMP 03/04/1961 (Approximate)   SpO2 97%   BMI 38.48 kg/m²      HENT:   Right Ear: Tympanic membrane and ear canal normal.   Left Ear: Tympanic membrane and ear canal normal.   Nose: No mucosal edema or rhinorrhea. Mouth/Throat: Oropharynx is clear and moist and mucous membranes are normal.   Neck: Normal range of motion. Neck supple. No thyromegaly present. Cardiovascular: Normal rate, regular rhythm and normal heart sounds. Exam reveals no gallop. Pulmonary/Chest: Effort normal and breath sounds normal.   Abdominal: Soft. Normal appearance and bowel sounds are normal. She exhibits no mass. There is no tenderness. Musculoskeletal: Normal range of motion. She exhibits edema (mild) and tenderness (multiple areas of tenderness in the arm and legs. ). Lymphadenopathy:     She has no cervical adenopathy. Skin: Skin is warm and dry. Psychiatric: She has a normal mood and affect. Nursing note and vitals reviewed. ASSESSMENT and PLAN  Diagnoses and all orders for this visit:    1. Arthralgia of multiple joints//  2. Polymyalgia rheumatica ([address]  Follow up with specialist as planned. For now will also hold statin. 3. Encounter for chronic pain management    4. Type 2 diabetes with nephropathy (HCC)  -     AMB POC GLUCOSE, QUANTITATIVE, BLOOD  Monitor diet closely. 5. Encounter for medication monitoring  -     METABOLIC PANEL, BASIC      Follow-up and Dispositions    · Return in about 2 months (around 9/15/2019). reviewed diet, exercise and weight control  cardiovascular risk and specific lipid/LDL goals reviewed  reviewed medications and side effects in detail  specific diabetic recommendations: low cholesterol diet, weight control and daily exercise discussed, all medications, side effects and compliance discussed carefully and glycohemoglobin and other lab monitoring discussed     I have discussed diagnosis listed in this note with pt and/or family. I have discussed treatment plans and options and the risk/benefit analysis of those options, including safe use of medications and possible medication side effects. Through the use of shared decision making we have agreed to the above plan. The patient has received an after-visit summary and questions were answered concerning future plans and follow up.   Advise pt of any urgent changes then to proceed to the ER.

## 2019-07-16 ENCOUNTER — HOME CARE VISIT (OUTPATIENT)
Dept: HOME HEALTH SERVICES | Facility: HOME HEALTH | Age: 84
End: 2019-07-16
Payer: MEDICARE

## 2019-07-16 VITALS
OXYGEN SATURATION: 97 % | TEMPERATURE: 98.3 F | SYSTOLIC BLOOD PRESSURE: 106 MMHG | DIASTOLIC BLOOD PRESSURE: 62 MMHG | RESPIRATION RATE: 16 BRPM | HEART RATE: 60 BPM

## 2019-07-16 LAB
BUN SERPL-MCNC: 22 MG/DL (ref 8–27)
BUN/CREAT SERPL: 16 (ref 12–28)
CALCIUM SERPL-MCNC: 9.6 MG/DL (ref 8.7–10.3)
CHLORIDE SERPL-SCNC: 104 MMOL/L (ref 96–106)
CO2 SERPL-SCNC: 27 MMOL/L (ref 20–29)
CREAT SERPL-MCNC: 1.4 MG/DL (ref 0.57–1)
GLUCOSE SERPL-MCNC: 141 MG/DL (ref 65–99)
INTERPRETATION: NORMAL
POTASSIUM SERPL-SCNC: 4.8 MMOL/L (ref 3.5–5.2)
SODIUM SERPL-SCNC: 145 MMOL/L (ref 134–144)

## 2019-07-16 PROCEDURE — 3331090002 HH PPS REVENUE DEBIT

## 2019-07-16 PROCEDURE — G0157 HHC PT ASSISTANT EA 15: HCPCS

## 2019-07-16 PROCEDURE — 3331090001 HH PPS REVENUE CREDIT

## 2019-07-17 PROCEDURE — 3331090002 HH PPS REVENUE DEBIT

## 2019-07-17 PROCEDURE — 3331090001 HH PPS REVENUE CREDIT

## 2019-07-18 PROCEDURE — 3331090001 HH PPS REVENUE CREDIT

## 2019-07-18 PROCEDURE — 3331090002 HH PPS REVENUE DEBIT

## 2019-07-19 ENCOUNTER — HOME CARE VISIT (OUTPATIENT)
Dept: HOME HEALTH SERVICES | Facility: HOME HEALTH | Age: 84
End: 2019-07-19
Payer: MEDICARE

## 2019-07-19 PROCEDURE — 3331090001 HH PPS REVENUE CREDIT

## 2019-07-19 PROCEDURE — 3331090002 HH PPS REVENUE DEBIT

## 2019-07-20 PROCEDURE — 3331090002 HH PPS REVENUE DEBIT

## 2019-07-20 PROCEDURE — 3331090001 HH PPS REVENUE CREDIT

## 2019-07-21 PROCEDURE — 3331090002 HH PPS REVENUE DEBIT

## 2019-07-21 PROCEDURE — 3331090001 HH PPS REVENUE CREDIT

## 2019-07-22 PROCEDURE — 3331090001 HH PPS REVENUE CREDIT

## 2019-07-22 PROCEDURE — 3331090002 HH PPS REVENUE DEBIT

## 2019-07-23 ENCOUNTER — HOME CARE VISIT (OUTPATIENT)
Dept: HOME HEALTH SERVICES | Facility: HOME HEALTH | Age: 84
End: 2019-07-23
Payer: MEDICARE

## 2019-07-23 PROCEDURE — G0157 HHC PT ASSISTANT EA 15: HCPCS

## 2019-07-23 PROCEDURE — 3331090001 HH PPS REVENUE CREDIT

## 2019-07-23 PROCEDURE — 3331090002 HH PPS REVENUE DEBIT

## 2019-07-24 PROCEDURE — 3331090001 HH PPS REVENUE CREDIT

## 2019-07-24 PROCEDURE — 3331090002 HH PPS REVENUE DEBIT

## 2019-07-25 PROCEDURE — 3331090002 HH PPS REVENUE DEBIT

## 2019-07-25 PROCEDURE — 3331090001 HH PPS REVENUE CREDIT

## 2019-07-26 ENCOUNTER — HOME CARE VISIT (OUTPATIENT)
Dept: SCHEDULING | Facility: HOME HEALTH | Age: 84
End: 2019-07-26
Payer: MEDICARE

## 2019-07-26 VITALS
HEART RATE: 73 BPM | DIASTOLIC BLOOD PRESSURE: 62 MMHG | SYSTOLIC BLOOD PRESSURE: 108 MMHG | RESPIRATION RATE: 16 BRPM | OXYGEN SATURATION: 96 % | TEMPERATURE: 98.8 F

## 2019-07-26 PROCEDURE — G0151 HHCP-SERV OF PT,EA 15 MIN: HCPCS

## 2019-07-26 PROCEDURE — 3331090001 HH PPS REVENUE CREDIT

## 2019-07-26 PROCEDURE — 3331090002 HH PPS REVENUE DEBIT

## 2019-07-27 VITALS
HEART RATE: 67 BPM | DIASTOLIC BLOOD PRESSURE: 70 MMHG | SYSTOLIC BLOOD PRESSURE: 118 MMHG | OXYGEN SATURATION: 98 % | TEMPERATURE: 97.8 F

## 2019-07-27 PROCEDURE — 3331090001 HH PPS REVENUE CREDIT

## 2019-07-27 PROCEDURE — 3331090002 HH PPS REVENUE DEBIT

## 2019-07-28 PROCEDURE — 3331090001 HH PPS REVENUE CREDIT

## 2019-07-28 PROCEDURE — 3331090002 HH PPS REVENUE DEBIT

## 2019-07-29 PROCEDURE — 3331090001 HH PPS REVENUE CREDIT

## 2019-07-29 PROCEDURE — 3331090002 HH PPS REVENUE DEBIT

## 2019-08-15 RX ORDER — CARVEDILOL 25 MG/1
TABLET ORAL
Qty: 180 TAB | Refills: 0 | Status: SHIPPED | OUTPATIENT
Start: 2019-08-15 | End: 2019-11-13 | Stop reason: SDUPTHER

## 2019-08-27 DIAGNOSIS — G25.0 ESSENTIAL TREMOR: ICD-10-CM

## 2019-08-28 RX ORDER — PRIMIDONE 50 MG/1
TABLET ORAL
Qty: 360 TAB | Refills: 0 | Status: SHIPPED | OUTPATIENT
Start: 2019-08-28 | End: 2019-09-16

## 2019-09-16 ENCOUNTER — OFFICE VISIT (OUTPATIENT)
Dept: FAMILY MEDICINE CLINIC | Age: 84
End: 2019-09-16

## 2019-09-16 ENCOUNTER — HOSPITAL ENCOUNTER (OUTPATIENT)
Dept: LAB | Age: 84
Discharge: HOME | End: 2019-09-16
Payer: MEDICARE

## 2019-09-16 VITALS
DIASTOLIC BLOOD PRESSURE: 68 MMHG | SYSTOLIC BLOOD PRESSURE: 112 MMHG | HEART RATE: 63 BPM | BODY MASS INDEX: 37.56 KG/M2 | OXYGEN SATURATION: 95 % | RESPIRATION RATE: 18 BRPM | HEIGHT: 64 IN | WEIGHT: 220 LBS | TEMPERATURE: 97.1 F

## 2019-09-16 DIAGNOSIS — N18.30 STAGE 3 CHRONIC KIDNEY DISEASE (HCC): ICD-10-CM

## 2019-09-16 DIAGNOSIS — Z23 ENCOUNTER FOR IMMUNIZATION: ICD-10-CM

## 2019-09-16 DIAGNOSIS — I50.22 CHRONIC SYSTOLIC HEART FAILURE (HCC): ICD-10-CM

## 2019-09-16 DIAGNOSIS — I10 ESSENTIAL HYPERTENSION, BENIGN: Primary | ICD-10-CM

## 2019-09-16 DIAGNOSIS — M25.50 ARTHRALGIA OF MULTIPLE JOINTS: ICD-10-CM

## 2019-09-16 DIAGNOSIS — E11.21 TYPE 2 DIABETES WITH NEPHROPATHY (HCC): ICD-10-CM

## 2019-09-16 DIAGNOSIS — Z51.81 ENCOUNTER FOR MEDICATION MONITORING: ICD-10-CM

## 2019-09-16 DIAGNOSIS — E78.2 MIXED HYPERLIPIDEMIA: ICD-10-CM

## 2019-09-16 DIAGNOSIS — D64.9 CHRONIC ANEMIA: ICD-10-CM

## 2019-09-16 DIAGNOSIS — M35.3 POLYMYALGIA RHEUMATICA (HCC): ICD-10-CM

## 2019-09-16 LAB
GLUCOSE POC: 139 MG/DL
HBA1C MFR BLD HPLC: 6.5 %

## 2019-09-16 PROCEDURE — 80053 COMPREHEN METABOLIC PANEL: CPT

## 2019-09-16 PROCEDURE — 36415 COLL VENOUS BLD VENIPUNCTURE: CPT

## 2019-09-16 PROCEDURE — 85027 COMPLETE CBC AUTOMATED: CPT

## 2019-09-16 PROCEDURE — 80061 LIPID PANEL: CPT

## 2019-09-16 RX ORDER — ROSUVASTATIN CALCIUM 10 MG/1
TABLET, COATED ORAL
Refills: 1 | COMMUNITY
Start: 2019-08-17 | End: 2019-09-16

## 2019-09-16 RX ORDER — PRIMIDONE 50 MG/1
TABLET ORAL
COMMUNITY
End: 2019-11-18

## 2019-09-16 RX ORDER — PREDNISONE 5 MG/1
10 TABLET ORAL DAILY
Qty: 90 TAB | Refills: 3 | Status: SHIPPED | OUTPATIENT
Start: 2019-09-16 | End: 2019-09-16 | Stop reason: SDUPTHER

## 2019-09-16 RX ORDER — PREDNISONE 5 MG/1
TABLET ORAL
Qty: 90 TAB | Refills: 3 | Status: SHIPPED | OUTPATIENT
Start: 2019-09-16 | End: 2019-12-06

## 2019-09-16 RX ORDER — ROSUVASTATIN CALCIUM 10 MG/1
10 TABLET, COATED ORAL
Qty: 90 TAB | Refills: 1 | Status: SHIPPED | OUTPATIENT
Start: 2019-09-16 | End: 2020-05-11

## 2019-09-16 NOTE — PROGRESS NOTES
HISTORY OF PRESENT ILLNESS  Teresa Hyatt is a 80 y.o. female. HPI   Follow up on chronic medical problems. Overall has been dealing with a lot of pain in the joints. Cardiovascular Review:  The patient has hypertension, hyperlipidemia and Systolic HF. Diet and Lifestyle: generally follows a low fat low cholesterol diet, generally follows a low sodium diet  Home BP Monitoring: is not measured at home. Pertinent ROS: taking medications as instructed, no medication side effects noted, no TIA's, no chest pain on exertion, no dyspnea on exertion, noting that her ankles swelling has been mild. She has been off of th4 statin but she did not see any improvemnet with her myalgia being off of the medication. DM type II follow up:  Compliant w/ meds, diabetic diet, and exercise. Obtains home glucose monitoring averaging in the mid 100s. Checks BS daily on most days and prn. Pt does not have BS log at visit today. No Rf needed for today. Denies any tingling sensation, polyuria and polydipsia. No blurred vision. No significant weight changes. Asthma Review:  The patient is being seen for follow up of chronic respiratory failure and allergies and chronic sinusitits, not currently in exacerbation. Breathing has been good also with taking prednisone. Using nebulizer 3 to 4 times in a day as needed. Regimen compliance: The patient reports adherence to asthma action plan and regimen. Encounter for pain management. 1./2. Medical history/Past medical History  Chronic Pain:  Osteoarthritis:  Patient has osteoarthritis in multiple joints but primarily in the knees and back. Seen by rheumatology and place on prednisone and plaquenil. Told also that she has polymyalgia rheumatica. Her symptoms have been wax and wane. She is currently on prednisone 12.5 mg daily per rheumatology. Pain has been 8/10 when it is severe and she takes the pain medication.         3. Applicable records from prior treatment providers are apart of [facility] under the media tab. 4. Diagnostic, therapeutic and laboratory results are available in the [facility] chart. 5. Consultation notes are available for review in the media tab of the [facility] chart. 6. Treatment goals include pain control so that the pt may be as active and function with her daily activities and improved comfort level. Previously pt has been limited by pain. 7. The risks and benefits of treatment has been discussed at this office visit with the pt. She understands that the medication has addicting potential.  Additionally the pt has been advised that narcotic pain medication may impair mental and/or physical ability required for performance of tasks such as driving or operating any other machinery. 8. Pt has an updated signed pain contract on file and can be found under the FYI section of the [facility] chart. 9. The pain contract is reviewed. Pain medication will be continued at the previous dosage. Urine drug screening will be done today. Diagnostic studies are not indicated at this time. Interventional procedure are not indicated at this time. 10. Medication prescibed is HYDROcodone-acetaminophen (NORCO)  mg tablet. No prescription is needed today. 11. Patient instructions have been reviewed in detail as outlined above and in the pain contract. Last refill was in December. 12. Re-eval is planned for 3 months. Naloxone prescription is not warranted.      Patient Active Problem List   Diagnosis Code    CHF (congestive heart failure) ([facility]) I50.9    S/P TKR (total knee replacement) Z96.659    Anemia D64.9    s/p lumbar laminectomy Z98.890    S/P ASAD (total abdominal hysterectomy) Z90.710    S/P hemorrhoidectomy Z98.890, Z87.19    S/P rotator cuff surgery Z98.890    DM (diabetes mellitus) ([facility] E11.9    Chronic sinusitis J32.9    S/P sinus surgery Z98.890    Dyslipidemia E78.5    Environmental allergies Z91.09    Obstructive sleep apnea (adult) (pediatric) G47.33    DJD (degenerative joint disease) M19.90    Chronic systolic heart failure ([facility]) I50.22    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 9) with comorbidity ([facility]) E66.01    Polymyalgia rheumatica ([facility]) M35.3       Current Outpatient Medications   Medication Sig Dispense Refill    primidone (MYSOLINE) 50 mg tablet TAKE 1 TABLET BY MOUTH EVERY MORNING AND TAKE 2 TABLETS BY MOUTH EVERY EVENING FOR 2 WEEKS AND THEN TAKE 2 TABLETS BY MOUTH TWICE DAILY 360 Tab 0    carvedilol (COREG) 25 mg tablet TAKE 1 TABLET BY MOUTH TWICE DAILY WITH MEALS 180 Tab 0    guaiFENesin (MUCINEX) 1,200 mg Ta12 ER tablet Take 1,200 mg by mouth daily.  polyethylene glycol (MIRALAX) 17 gram/dose powder Take 17 g by mouth daily as needed.  tiotropium-olodaterol (STIOLTO RESPIMAT) 2.5-2.5 mcg/actuation inhaler Take 2 Puffs by inhalation daily.  MULTIVITAMIN PO Take 1 Tab by mouth daily.  ALPRAZolam (XANAX) 0.5 mg tablet TAKE 1/2 TO 1 TABLET BY MOUTH EVERY NIGHT AT BEDTIME AS NEEDED FOR SLEEP 30 Tab 3    Omeprazole delayed release (PRILOSEC D/R) 20 mg tablet Take 20 mg by mouth daily.  primidone (MYSOLINE) 50 mg tablet Take 100 mg by mouth two (2) times a day.  neomycin-polymyxin-hydrocortisone (CORTISPORIN) otic solution Administer 3-4 Drops in left ear three (3) times daily as needed (ear discomfort).  1 Bottle 1    HYDROcodone-acetaminophen (NORCO)  mg tablet Take 1 Tab by mouth every six (6) hours as needed for Pain for up to 99 days. 60 Tab 0    predniSONE (DELTASONE) 5 mg tablet Take 2 Tabs by mouth daily. 60 Tab 3    furosemide (LASIX) 20 mg tablet TAKE 1 TABLET BY MOUTH EVERY MORNING AND TAKE 1 TABLET BY MOUTH EVERY AFTERNOON BETWEEN 2 AND 4  Tab 3    glipiZIDE SR (GLUCOTROL XL) 2.5 mg CR tablet TAKE ONE TABLET BY MOUTH DAILY 90 Tab 3    albuterol-ipratropium (DUO-NEB) 2.5 mg-0.5 mg/3 ml nebu TAKE CONTENTS OF ONE VIAL BY NEBULIZATION ROUTE EVERY 4 HOURS AS NEEDED(UP TO 6 PER DAY) (Patient taking differently: TAKE CONTENTS OF ONE VIAL BY NEBULIZATION ROUTE EVERY 4 HOURS AS NEEDED(UP TO 6 PER DAY)for difficulty breathing/wheezing) 1620 mL 3    albuterol (VENTOLIN HFA) 90 mcg/actuation inhaler INHALE 2 PUFFS BY MOUTH EVERY 4 HOURS AS NEEDED FOR WHEEZING 1 Inhaler 6    docusate sodium (STOOL SOFTENER PO) Take 1 Tab by mouth two (2) times a day.  lisinopril (PRINIVIL, ZESTRIL) 2.5 mg tablet Take 2.5 mg by mouth daily.  DULoxetine (CYMBALTA) 60 mg capsule Take 60 mg by mouth daily.  fluticasone (FLONASE) 50 mcg/actuation nasal spray SHAKE LIQUID AND USE 2 SPRAYS IN EACH NOSTRIL EVERY DAY 1 Bottle 11    cyanocobalamin 1,000 mcg tablet Take 1,000 mcg by mouth daily.       promethazine-dextromethorphan (PROMETHAZINE-DM) 6.25-15 mg/5 mL syrup TAKE ONE TEASPOONFUL BY MOUTH EVERY SIX HOURS AS NEEDED FOR COUGH 240 mL 1       Allergies   Allergen Reactions    Gabapentin Other (comments)     Muscles twitching and jerking    Levaquin [Levofloxacin] Swelling    Lyrica [Pregabalin] Other (comments)     Muscle twitching and jerking    Percodan [Oxycodone Hcl-Oxycodone-Asa] Itching         Past Medical History:   Diagnosis Date    Anemia 3/3/2010    Arrhythmia     irregular heartbeat    Arthritis     CHF (congestive heart failure) (HCC) 3/3/2010    Chronic pain     back    Chronic sinusitis 3/3/2010    CPAP (continuous positive airway pressure) dependence     Diverticulosis     DM (diabetes mellitus) ([facility] 3/3/2010    Dyslipidemia 3/3/2010    GERD (gastroesophageal reflux disease)     HTN (hypertension) 3/3/2010    Ill-defined condition     being evaluated py pulmonolgist for \"trouble breathing\"    S/P TKR (total knee replacement) 3/3/2010    Unspecified sleep apnea     doesn't wear CPAP since back has been hurting         Past Surgical History:   Procedure Laterality Date    HX APPENDECTOMY      thinks it was taken with hysterectomy    HX GYN      \"tubes opened\"    HX HEENT      sinus surgery    HX HEMORRHOIDECTOMY      HX HYSTERECTOMY      HX KNEE REPLACEMENT      both knees- at same time    HX LUMBAR LAMINECTOMY      HX MOHS PROCEDURES      left shoulder    HX ORTHOPAEDIC      neck fusion    HX ORTHOPAEDIC      right knee replaced for second time    HX ORTHOPAEDIC      lumbar fusion    HX OTHER SURGICAL      CORNELL BIOPSY BREAST STEREOTACTIC Left yrs ago    (-)    [place] COLONOSCOPY FLX DX W/COLLJ SPEC WHEN PFRMD  [id number]    dr. Juan Michel (barium enema)         Family History   Problem Relation Age of Onset    Diabetes Mother     Heart Disease Father     Diabetes Brother     Blindness Brother     Diabetes Sister     Heart Disease Sister     Heart Disease Brother     Heart Disease Brother     Asthma Brother     Malignant Hyperthermia Neg Hx     Pseudocholinesterase Deficiency Neg Hx     Delayed Awakening Neg Hx     Post-op Nausea/Vomiting Neg Hx     Emergence Delirium Neg Hx     Post-op Cognitive Dysfunction Neg Hx        Social History     Tobacco Use    Smoking status: Former Smoker     Packs/day: 0.30     Years: 5.00     Pack years: 1.50     Last attempt to quit: 1960     Years since quittin.7    Smokeless tobacco: Never Used   Substance Use Topics    Alcohol use: No     Alcohol/week: 0.0 standard drinks        Lab Results   Component Value Date/Time    WBC 4.9 2019 10:27 AM    HGB 10.9 (L) 2019 10:27 AM    HCT 32.7 (L) 06/18/2019 10:27 AM    PLATELET 186 (L) 15/15/7698 10:27 AM    MCV 96 06/18/2019 10:27 AM     Lab Results   Component Value Date/Time    Cholesterol, total 200 (H) 03/18/2019 11:13 AM    HDL Cholesterol 95 03/18/2019 11:13 AM    LDL, calculated 87 03/18/2019 11:13 AM    Triglyceride 92 03/18/2019 11:13 AM    CHOL/HDL Ratio 2.0 06/11/2010 02:10 PM     Lab Results   Component Value Date/Time    Sodium 145 (H) 07/15/2019 10:49 AM    Potassium 4.8 07/15/2019 10:49 AM    Chloride 104 07/15/2019 10:49 AM    CO2 27 07/15/2019 10:49 AM    Anion gap 3 (L) 12/17/2015 11:47 AM    Glucose 141 (H) 07/15/2019 10:49 AM    BUN 22 07/15/2019 10:49 AM    Creatinine 1.40 (H) 07/15/2019 10:49 AM    BUN/Creatinine ratio 16 07/15/2019 10:49 AM    GFR est AA 39 (L) 07/15/2019 10:49 AM    GFR est non-AA 34 (L) 07/15/2019 10:49 AM    Calcium 9.6 07/15/2019 10:49 AM    Bilirubin, total <0.2 03/18/2019 11:13 AM    ALT (SGPT) 17 03/18/2019 11:13 AM    AST (SGOT) 18 03/18/2019 11:13 AM    Alk. phosphatase 56 03/18/2019 11:13 AM    Protein, total 6.5 03/18/2019 11:13 AM    Albumin 4.5 03/18/2019 11:13 AM    Globulin 3.4 12/17/2015 11:47 AM    A-G Ratio 2.3 (H) 03/18/2019 11:13 AM      Lab Results   Component Value Date/Time    Hemoglobin A1c 6.4 (H) 09/08/2017 11:09 AM    Hemoglobin A1c (POC) 6.6 06/18/2019 10:40 AM         Review of Systems   Constitutional: Negative for malaise/fatigue. HENT: Negative for congestion. Eyes: Negative for blurred vision. Respiratory: Negative for cough and shortness of breath. Cardiovascular: Negative for chest pain, palpitations and leg swelling. Gastrointestinal: Negative for abdominal pain, constipation and heartburn. Genitourinary: Negative for dysuria, frequency and urgency. Musculoskeletal: Positive for back pain, joint pain and myalgias. Neurological: Positive for tremors (c/o increase tremor in both hands, left seems wose. ).  Negative for dizziness, tingling, sensory change, focal weakness and headaches. Endo/Heme/Allergies: Negative for environmental allergies. Psychiatric/Behavioral: Negative for depression. The patient does not have insomnia. Physical Exam   Constitutional: She appears well-developed and well-nourished. /68 (BP 1 Location: Left arm, BP Patient Position: Sitting)   Pulse 71   Temp 96.7 °F (35.9 °C) (Oral)   Resp 18   Ht 5' 4\" (1.626 m)   Wt 223 lb 6.4 oz (101.3 kg)   LMP 03/04/1961 (Approximate)   SpO2 99%   BMI 38.35 kg/m²    HENT:   Right Ear: Tympanic membrane and ear canal normal.   Left Ear: Tympanic membrane and ear canal normal.   Nose: No mucosal edema or rhinorrhea. Mouth/Throat: Oropharynx is clear and moist and mucous membranes are normal.   Neck: Normal range of motion. Neck supple. No thyromegaly present. Cardiovascular: Normal rate, regular rhythm and normal heart sounds. Exam reveals no gallop. Pulmonary/Chest: Effort normal and breath sounds normal.   Abdominal: Soft. Normal appearance and bowel sounds are normal. She exhibits no mass. There is no tenderness. Musculoskeletal: Normal range of motion. She exhibits edema (mild) and tenderness (multiple areas of tenderness in the arm and legs. ). Lymphadenopathy:     She has no cervical adenopathy. Skin: Skin is warm and dry. Psychiatric: She has a normal mood and affect. Nursing note and vitals reviewed. ASSESSMENT and PLAN  Diagnoses and all orders for this visit:    1. Essential hypertension, benign  Stable     2. Type 2 diabetes with nephropathy (HCC)  a1c level is 6.5%. -     AMB POC HEMOGLOBIN A1C  -     AMB POC GLUCOSE, QUANTITATIVE, BLOOD    3. Mixed hyperlipidemia  -     LIPID PANEL  -     Restart rosuvastatin (CRESTOR) 10 mg tablet; Take 1 Tab by mouth nightly. 4. Chronic systolic heart failure (HCC)  Stable     5.  Stage 3 chronic kidney disease ([address]  Stable   Has had follow up with renal.      6. Arthralgia of multiple joints  7. Polymyalgia rheumatica (HCC)  -     Increase predniSONE (DELTASONE) 5 mg tablet; Take 2 tabs alternating with 3 tabs every other day. 8. Chronic anemia  -     CBC W/O DIFF    9. Encounter for medication monitoring  -     METABOLIC PANEL, COMPREHENSIVE        Follow-up and Dispositions    · Return in about 11 weeks (around 12/2/2019).        reviewed diet, exercise and weight control  cardiovascular risk and specific lipid/LDL goals reviewed  reviewed medications and side effects in detail  specific diabetic recommendations: low cholesterol diet, weight control and daily exercise discussed and glycohemoglobin and other lab monitoring discussed

## 2019-09-17 LAB
ALBUMIN SERPL-MCNC: 4.5 G/DL (ref 3.5–4.7)
ALBUMIN/GLOB SERPL: 2.5 {RATIO} (ref 1.2–2.2)
ALP SERPL-CCNC: 60 IU/L (ref 39–117)
ALT SERPL-CCNC: 29 IU/L (ref 0–32)
AST SERPL-CCNC: 25 IU/L (ref 0–40)
BILIRUB SERPL-MCNC: 0.3 MG/DL (ref 0–1.2)
BUN SERPL-MCNC: 25 MG/DL (ref 8–27)
BUN/CREAT SERPL: 17 (ref 12–28)
CALCIUM SERPL-MCNC: 9.6 MG/DL (ref 8.7–10.3)
CHLORIDE SERPL-SCNC: 98 MMOL/L (ref 96–106)
CHOLEST SERPL-MCNC: 212 MG/DL (ref 100–199)
CO2 SERPL-SCNC: 28 MMOL/L (ref 20–29)
CREAT SERPL-MCNC: 1.47 MG/DL (ref 0.57–1)
ERYTHROCYTE [DISTWIDTH] IN BLOOD BY AUTOMATED COUNT: 13.5 % (ref 12.3–15.4)
GLOBULIN SER CALC-MCNC: 1.8 G/DL (ref 1.5–4.5)
GLUCOSE SERPL-MCNC: 136 MG/DL (ref 65–99)
HCT VFR BLD AUTO: 34.3 % (ref 34–46.6)
HDLC SERPL-MCNC: 95 MG/DL
HGB BLD-MCNC: 11.3 G/DL (ref 11.1–15.9)
INTERPRETATION, 910389: NORMAL
INTERPRETATION: NORMAL
LDLC SERPL CALC-MCNC: 102 MG/DL (ref 0–99)
MCH RBC QN AUTO: 32.7 PG (ref 26.6–33)
MCHC RBC AUTO-ENTMCNC: 32.9 G/DL (ref 31.5–35.7)
MCV RBC AUTO: 99 FL (ref 79–97)
PDF IMAGE, 910387: NORMAL
PLATELET # BLD AUTO: 140 X10E3/UL (ref 150–450)
POTASSIUM SERPL-SCNC: 4.7 MMOL/L (ref 3.5–5.2)
PROT SERPL-MCNC: 6.3 G/DL (ref 6–8.5)
RBC # BLD AUTO: 3.46 X10E6/UL (ref 3.77–5.28)
SODIUM SERPL-SCNC: 142 MMOL/L (ref 134–144)
TRIGL SERPL-MCNC: 76 MG/DL (ref 0–149)
VLDLC SERPL CALC-MCNC: 15 MG/DL (ref 5–40)
WBC # BLD AUTO: 5.9 X10E3/UL (ref 3.4–10.8)

## 2019-10-17 ENCOUNTER — TELEPHONE (OUTPATIENT)
Dept: FAMILY MEDICINE CLINIC | Age: 84
End: 2019-10-17

## 2019-10-17 NOTE — TELEPHONE ENCOUNTER
Patient wants a return call regarding having stiffness in her neck x 2 weeks.   Please give her a call @ [phone]

## 2019-10-18 ENCOUNTER — TELEPHONE (OUTPATIENT)
Dept: FAMILY MEDICINE CLINIC | Age: 84
End: 2019-10-18

## 2019-10-18 ENCOUNTER — APPOINTMENT (OUTPATIENT)
Dept: CT IMAGING | Age: 84
End: 2019-10-18
Attending: PHYSICIAN ASSISTANT
Payer: MEDICARE

## 2019-10-18 ENCOUNTER — HOSPITAL ENCOUNTER (EMERGENCY)
Age: 84
Discharge: HOME | End: 2019-10-18
Attending: EMERGENCY MEDICINE
Payer: MEDICARE

## 2019-10-18 VITALS
BODY MASS INDEX: 37.79 KG/M2 | HEART RATE: 67 BPM | WEIGHT: 221.34 LBS | RESPIRATION RATE: 18 BRPM | HEIGHT: 64 IN | SYSTOLIC BLOOD PRESSURE: 112 MMHG | OXYGEN SATURATION: 100 % | DIASTOLIC BLOOD PRESSURE: 56 MMHG | TEMPERATURE: 98.2 F

## 2019-10-18 DIAGNOSIS — S16.1XXA STRAIN OF NECK MUSCLE, INITIAL ENCOUNTER: Primary | ICD-10-CM

## 2019-10-18 DIAGNOSIS — S09.90XA CLOSED HEAD INJURY, INITIAL ENCOUNTER: ICD-10-CM

## 2019-10-18 PROCEDURE — 72125 CT NECK SPINE W/O DYE: CPT

## 2019-10-18 PROCEDURE — 99283 EMERGENCY DEPT VISIT LOW MDM: CPT

## 2019-10-18 PROCEDURE — 70450 CT HEAD/BRAIN W/O DYE: CPT

## 2019-10-18 RX ORDER — METHOCARBAMOL 500 MG/1
500 TABLET, FILM COATED ORAL 3 TIMES DAILY
Qty: 20 TAB | Refills: 0 | Status: SHIPPED | OUTPATIENT
Start: 2019-10-18 | End: 2019-12-06

## 2019-10-18 NOTE — TELEPHONE ENCOUNTER
Spoke with patient and notified that we would call next week with an appointment for follow up, patient has muscle relaxer from ER.

## 2019-10-18 NOTE — TELEPHONE ENCOUNTER
Spoke with patient today, patient raised up and hit head on mounted TV about 2 weeks ago, since about 1 week ago patient has been experiencing a stiff neck, neck pain. Per Dr. Swartz advised to go to ER to be evaluated. No Xray at office today.

## 2019-10-18 NOTE — TELEPHONE ENCOUNTER
Patient states that Ripl told her to go to Er to have x-ray done she did and they told her everything was fine and that  she needs to see Ripl in about a week her call back number is [phone]

## 2019-10-18 NOTE — DISCHARGE INSTRUCTIONS
Ct Head Wo Cont    Result Date: 10/18/2019  IMPRESSION: No acute intracranial hemorrhage, mass or infarct. Ct Spine Cerv Wo Cont    Result Date: 10/18/2019  IMPRESSION: No acute fracture or subluxation. Patient Education        Neck Strain: Care Instructions  Your Care Instructions    You have strained the muscles and ligaments in your neck. A sudden, awkward movement can strain the neck. This often occurs with falls or car accidents or during certain sports. Everyday activities like working on a computer or sleeping can also cause neck strain if they force you to hold your neck in an awkward position for a long time. It is common for neck pain to get worse for a day or two after an injury, but it should start to feel better after that. You may have more pain and stiffness for several days before it gets better. This is expected. It may take a few weeks or longer for it to heal completely. Good home treatment can help you get better faster and avoid future neck problems. Follow-up care is a key part of your treatment and safety. Be sure to make and go to all appointments, and call your doctor if you are having problems. It's also a good idea to know your test results and keep a list of the medicines you take. How can you care for yourself at home? · If you were given a neck brace (cervical collar) to limit neck motion, wear it as instructed for as many days as your doctor tells you to. Do not wear it longer than you were told to. Wearing a brace for too long can make neck stiffness worse and weaken the neck muscles. · You can try using heat or ice to see if it helps. ? Try using a heating pad on a low or medium setting for 15 to 20 minutes every 2 to 3 hours. Try a warm shower in place of one session with the heating pad. You can also buy single-use heat wraps that last up to 8 hours. ? You can also try an ice pack for 10 to 15 minutes every 2 to 3 hours.   · Take pain medicines exactly as directed. ? If the doctor gave you a prescription medicine for pain, take it as prescribed. ? If you are not taking a prescription pain medicine, ask your doctor if you can take an over-the-counter medicine. · Gently rub the area to relieve pain and help with blood flow. Do not massage the area if it hurts to do so. · Do not do anything that makes the pain worse. Take it easy for a couple of days. You can do your usual activities if they do not hurt your neck or put it at risk for more stress or injury. · Try sleeping on a special neck pillow. Place it under your neck, not under your head. Placing a tightly rolled-up towel under your neck while you sleep will also work. If you use a neck pillow or rolled towel, do not use your regular pillow at the same time. · To prevent future neck pain, do exercises to stretch and strengthen your neck and back. Learn how to use good posture, safe lifting techniques, and proper body mechanics. When should you call for help? Call 911 anytime you think you may need emergency care. For example, call if:    · You are unable to move an arm or a leg at all.   [facility] your doctor now or seek immediate medical care if:    · You have new or worse symptoms in your arms, legs, chest, belly, or buttocks. Symptoms may include:  ? Numbness or tingling. ? Weakness. ? Pain.     · You lose bladder or bowel control.    Watch closely for changes in your health, and be sure to contact your doctor if:    · You are not getting better as expected. Where can you learn more? Go to http://marla-chika.info/. Enter M253 in the search box to learn more about \"Neck Strain: Care Instructions. \"  Current as of: June 26, 2019  Content Version: 12.2  © 4430-0110 [employer]. Care instructions adapted under license by [employer] (which disclaims liability or warranty for this information).  If you have questions about a medical condition or this instruction, always ask your healthcare professional. [place] [address] any warranty or liability for your use of this information. Patient Education        Learning About a Closed Head Injury  What is a closed head injury? A closed head injury happens when your head gets hit hard. The strong force of the blow causes your brain to shake in your skull. This movement can cause the brain to bruise, swell, or tear. Sometimes nerves or blood vessels also get damaged. This can cause bleeding in or around the brain. A concussion is a type of closed head injury. What are the symptoms? If you have a mild concussion, you may have a mild headache or feel \"not quite right. \" These symptoms are common. They usually go away over a few days to 4 weeks. But sometimes after a concussion, you feel like you can't function as well as before the injury. And you have new symptoms. This is called postconcussive syndrome. You may:  · Find it harder to solve problems, think, concentrate, or remember. · Have headaches. · Have changes in your sleep patterns, such as not being able to sleep or sleeping all the time. · Have changes in your personality. · Not be interested in your usual activities. · Feel angry or anxious without a clear reason. · Lose your sense of taste or smell. · Be dizzy, lightheaded, or unsteady. It may be hard to stand or walk. How is a closed head injury treated? Any person who may have a concussion needs to see a doctor. Some people have to stay in the hospital to be watched. Others can go home safely. If you go home, follow your doctor's instructions. He or she will tell you if you need someone to watch you closely for the next 24 hours or longer. Rest is the best treatment. Get plenty of sleep at night. And try to rest during the day. · Avoid activities that are physically or mentally demanding. These include housework, exercise, and schoolwork.  And don't play video games, send text messages, or use the computer. You may need to change your school or work schedule to be able to avoid these activities. · Ask your doctor when it's okay to drive, ride a bike, or operate machinery. · Take an over-the-counter pain medicine, such as acetaminophen (Tylenol), ibuprofen (Advil, Motrin), or naproxen (Aleve). Be safe with medicines. Read and follow all instructions on the label. · Check with your doctor before you use any other medicines for pain. · Do not drink alcohol or use illegal drugs. They can slow recovery. They can also increase your risk of getting a second head injury. Follow-up care is a key part of your treatment and safety. Be sure to make and go to all appointments, and call your doctor if you are having problems. It's also a good idea to know your test results and keep a list of the medicines you take. Where can you learn more? Go to http://marla-chika.info/. Enter E235 in the search box to learn more about \"Learning About a Closed Head Injury. \"  Current as of: March 28, 2019  Content Version: 12.2  © 3332-0062 [employer]. Care instructions adapted under license by [employer] (which disclaims liability or warranty for this information). If you have questions about a medical condition or this instruction, always ask your healthcare professional. [address] any warranty or liability for your use of this information.

## 2019-10-19 NOTE — ED PROVIDER NOTES
EMERGENCY DEPARTMENT HISTORY AND PHYSICAL EXAM      Date: 10/18/2019  Patient Name: Kasia Paiz    History of Presenting Illness     Chief Complaint   Patient presents with    Neck Pain     neck pain 2 weeks ago     Headache     patient hit her head about 2 weeks ago and she has been having pain in her head and neck pcp sent her here no loc and patient has not taken anything for pain        History Provided By: Patient    HPI: Kasia Paiz, 80 y.o. female with PMHx significant for anemia, arthritis, CHF, fibromyalgia, hypertension, GERD, diabetes, hyperlipidemia, presents to the ED with cc of headache and neck pain after injury 2 weeks ago. The patient reports that she was bent over picking something up off the ground and that she hit the back of her head as she stood up. Since then, she has had a posterior headache and neck pain. The pain has gradually improved but she called her primary care doctor today to be evaluated and they referred her to the emergency department for imaging. She tried taking hydrocodone 10 mg without relief of pain. She denies loss of consciousness, nausea, vomiting, dizziness, blurred vision, numbness, focal weakness. There are no other complaints, changes, or physical findings at this time. PCP: Paco Hammond MD    No current facility-administered medications on file prior to encounter. Current Outpatient Medications on File Prior to Encounter   Medication Sig Dispense Refill    predniSONE (DELTASONE) 5 mg tablet Take 2 tabs alternating with 3 tabs every other day. 90 Tab 3    glipiZIDE SR (GLUCOTROL XL) 2.5 mg CR tablet TAKE ONE TABLET BY MOUTH DAILY 90 Tab 3    lisinopril (PRINIVIL, ZESTRIL) 2.5 mg tablet Take 2.5 mg by mouth daily.  primidone (MYSOLINE) 50 mg tablet 2 tablets twice daily      rosuvastatin (CRESTOR) 10 mg tablet Take 1 Tab by mouth nightly.  90 Tab 1    carvedilol (COREG) 25 mg tablet TAKE 1 TABLET BY MOUTH TWICE DAILY WITH MEALS 180 Tab 0    guaiFENesin (MUCINEX) 1,200 mg Ta12 ER tablet Take 1,200 mg by mouth daily as needed.  polyethylene glycol (MIRALAX) 17 gram/dose powder Take 17 g by mouth daily as needed.  tiotropium-olodaterol (STIOLTO RESPIMAT) 2.5-2.5 mcg/actuation inhaler Take 2 Puffs by inhalation daily.  MULTIVITAMIN PO Take 1 Tab by mouth daily.  ALPRAZolam (XANAX) 0.5 mg tablet TAKE 1/2 TO 1 TABLET BY MOUTH EVERY NIGHT AT BEDTIME AS NEEDED FOR SLEEP 30 Tab 3    Omeprazole delayed release (PRILOSEC D/R) 20 mg tablet Take 20 mg by mouth daily.  neomycin-polymyxin-hydrocortisone (CORTISPORIN) otic solution Administer 3-4 Drops in left ear three (3) times daily as needed (ear discomfort). 1 Bottle 1    furosemide (LASIX) 20 mg tablet TAKE 1 TABLET BY MOUTH EVERY MORNING AND TAKE 1 TABLET BY MOUTH EVERY AFTERNOON BETWEEN 2 AND 4  Tab 3    albuterol-ipratropium (DUO-NEB) 2.5 mg-0.5 mg/3 ml nebu TAKE CONTENTS OF ONE VIAL BY NEBULIZATION ROUTE EVERY 4 HOURS AS NEEDED(UP TO 6 PER DAY) (Patient taking differently: TAKE CONTENTS OF ONE VIAL BY NEBULIZATION ROUTE EVERY 4 HOURS AS NEEDED(UP TO 6 PER DAY)for difficulty breathing/wheezing) 1620 mL 3    albuterol (VENTOLIN HFA) 90 mcg/actuation inhaler INHALE 2 PUFFS BY MOUTH EVERY 4 HOURS AS NEEDED FOR WHEEZING 1 Inhaler 6    docusate sodium (STOOL SOFTENER PO) Take 1 Tab by mouth two (2) times a day.  DULoxetine (CYMBALTA) 60 mg capsule Take 60 mg by mouth daily.  fluticasone (FLONASE) 50 mcg/actuation nasal spray SHAKE LIQUID AND USE 2 SPRAYS IN EACH NOSTRIL EVERY DAY 1 Bottle 11    cyanocobalamin 1,000 mcg tablet Take 1,000 mcg by mouth daily.       promethazine-dextromethorphan (PROMETHAZINE-DM) 6.25-15 mg/5 mL syrup TAKE ONE TEASPOONFUL BY MOUTH EVERY SIX HOURS AS NEEDED FOR COUGH 240 mL 1       Past History     Past Medical History:  Past Medical History:   Diagnosis Date    Anemia 3/3/2010    Arrhythmia     irregular heartbeat  Arthritis     [facility]congest[facility] 3/3/2010    Chronic pain     back    Chronic sinusitis 3/3/2010    CPAP (continuous positive airway pressure) dependence     Diverticulosis     DM (diabetes mellitus) ([facility] 3/3/2010    Dyslipidemia 3/3/2010    GERD (gastroesophageal reflux disease)     HTN (hypertension) 3/3/2010    Ill-defined condition     being evaluated py pulmonolgist for \"trouble breathing\"    S/P TKR (total knee replacement) 3/3/2010    Unspecified sleep apnea     doesn't wear CPAP since back has been hurting       Past Surgical History:  Past Surgical History:   Procedure Laterality Date    HX APPENDECTOMY      thinks it was taken with hysterectomy    HX GYN      \"tubes opened\"    HX HEENT      sinus surgery    HX HEMORRHOIDECTOMY      HX HYSTERECTOMY      HX KNEE REPLACEMENT  1996    both knees- at same time    HX LUMBAR LAMINECTOMY  1980s    HX MOHS PROCEDURES      left shoulder    HX ORTHOPAEDIC  1980    neck fusion    HX ORTHOPAEDIC  1999    right knee replaced for second time    HX ORTHOPAEDIC      lumbar fusion    HX OTHER SURGICAL      CORNELL BIOPSY BREAST STEREOTACTIC Left yrs ago    (-)    VT COLONOSCOPY FLX DX W/COLLJ SPEC WHEN PFRMD  [id number]    dr. Josi Villavicencio (barium enema)       Family History:  Family History   Problem Relation Age of Onset    Diabetes Mother     Heart Disease Father     Diabetes Brother     Blindness Brother     Diabetes Sister     Heart Disease Sister     Heart Disease Brother     Heart Disease Brother     Asthma Brother     Malignant Hyperthermia Neg Hx     Pseudocholinesterase Deficiency Neg Hx     Delayed Awakening Neg Hx     Post-op Nausea/Vomiting Neg Hx     Emergence Delirium Neg Hx     Post-op Cognitive Dysfunction Neg Hx        Social History:  Social History     Tobacco Use    Smoking status: Former Smoker     Packs/day: 0.30     Years: 5.00     Pack years: 1.50     Last attempt to quit: 1/1/1960     Years since quittin.8    Smokeless tobacco: Never Used   Substance Use Topics    Alcohol use: No     Alcohol/week: 0.0 standard drinks    Drug use: No       Allergies: Allergies   Allergen Reactions    Gabapentin Other (comments)     Muscles twitching and jerking    Levaquin [Levofloxacin] Swelling    Lyrica [Pregabalin] Other (comments)     Muscle twitching and jerking    Percodan [Oxycodone Hcl-Oxycodone-Asa] Itching         Review of Systems   Review of Systems   Constitutional: Negative for chills and fever. HENT: Negative for ear pain and sore throat. Eyes: Negative for redness and visual disturbance. Respiratory: Negative for cough and shortness of breath. Cardiovascular: Negative for chest pain and palpitations. Gastrointestinal: Negative for abdominal pain, nausea and vomiting. Genitourinary: Negative for dysuria and hematuria. Musculoskeletal: Positive for neck pain. Negative for back pain and gait problem. Skin: Negative for rash and wound. Neurological: Positive for headaches. Negative for dizziness, syncope, weakness and numbness. Psychiatric/Behavioral: Negative for behavioral problems and confusion. All other systems reviewed and are negative. Physical Exam   Physical Exam   Constitutional: She is oriented to person, place, and time. She appears well-developed and well-nourished. Alert, elderly female in no acute distress. HENT:   Head: Normocephalic and atraumatic. Head is without raccoon's eyes and without Ferguson's sign. Right Ear: No hemotympanum. Left Ear: No hemotympanum. No palpable scalp hematoma or crepitance. Eyes: Pupils are equal, round, and reactive to light. Conjunctivae and EOM are normal.   Neck: Normal range of motion. Neck supple. Spinous process tenderness and muscular tenderness (bilateral) present. Cardiovascular: Normal rate, regular rhythm and normal heart sounds.    Pulmonary/Chest: Effort normal and breath sounds normal. Musculoskeletal: Normal range of motion. Neurological: She is alert and oriented to person, place, and time. She has normal strength. No cranial nerve deficit or sensory deficit. GCS eye subscore is 4. GCS verbal subscore is 5. GCS motor subscore is 6. Skin: Skin is warm and dry. No rash noted. Psychiatric: She has a normal mood and affect. Her behavior is normal.   Nursing note and vitals reviewed. Diagnostic Study Results     Labs -   No results found for this or any previous visit (from the past 12 hour(s)). Radiologic Studies -   CT HEAD WO CONT   Final Result   IMPRESSION: No acute intracranial hemorrhage, mass or infarct. CT SPINE CERV WO CONT   Final Result   IMPRESSION: No acute fracture or subluxation. CT Results  (Last 48 hours)               10/18/19 1304  CT HEAD WO CONT Final result    Impression:  IMPRESSION: No acute intracranial hemorrhage, mass or infarct. Narrative:  INDICATION: Headache, post trauma; Hit head 2 weeks ago, continued headaches   since then        Exam: Noncontrast CT of the brain is performed with 5 mm collimation. CT dose reduction was achieved with the use of the standardized protocol   tailored for this examination and automatic exposure control for dose   modulation. FINDINGS: There is mild, age-appropriate diffuse cortical atrophy. There is no   acute intracranial hemorrhage, mass, mass effect or herniation. Ventricular   system is normal. The gray-white matter differentiation is well-preserved. The   mastoid air cells are well pneumatized. There is moderate mucosal thickening   within the right maxillary sinus and left sphenoid sinus. There is mild mucosal   thickening within the left maxillary sinus. Bilateral antrectomy postoperative   changes are noted. 10/18/19 1304  CT SPINE CERV WO CONT Final result    Impression:  IMPRESSION: No acute fracture or subluxation.            Narrative:  INDICATION: Neck pain following trauma; Midline C-spine pain since head injury 2   weeks ago        Exam: Noncontrast CT of the cervical spine is performed with 2.5 mm collimation. Sagittal and coronal reformatted images were also performed. CT dose reduction was achieved through use of a standardized protocol tailored   for this examination and automatic exposure control for dose modulation. FINDINGS: There is no acute fracture or subluxation. The prevertebral soft   tissues are within normal limits. Bones are diffusely demineralized. Remaining   visualized soft tissues are normal. C5 and C6 vertebral bodies are fused. There   are multilevel cervical degenerative changes. CXR Results  (Last 48 hours)    None            Medical Decision Making   I am the first provider for this patient. I reviewed the vital signs, available nursing notes, past medical history, past surgical history, family history and social history. Vital Signs-Reviewed the patient's vital signs. Patient Vitals for the past 12 hrs:   Temp Pulse Resp BP SpO2   10/18/19 1210 98.2 °F (36.8 °C) 67 18 112/56 100 %         Records Reviewed: Nursing Notes and Old Medical Records      Provider Notes (Medical Decision Making):   DDx: skull fracture, intracranial hemorrhage, cervical spine fracture, contusion, muscle strain, tension headache, fibromyalgia    Pt has reassuring neurological exam. Given age and trauma, plan to obtain CT head and cervical spine. ED Course:   Initial assessment performed. The patients presenting problems have been discussed, and they are in agreement with the care plan formulated and outlined with them. I have encouraged them to ask questions as they arise throughout their visit. Disposition:  2:30 PM -    The patient has been re-evaluated and is ready for discharge. Reviewed available results with patient. Counseled patient on diagnosis and care plan.  Patient has expressed understanding, and all questions have been answered. Patient agrees with plan and agrees to follow up as recommended, or to return to the ED if their symptoms worsen. Discharge instructions have been provided and explained to the patient, along with reasons to return to the ED. PLAN:  1. Discharge Medication List as of 10/18/2019  2:22 PM      START taking these medications    Details   methocarbamol (ROBAXIN) 500 mg tablet Take 1 Tab by mouth three (3) times daily. , Normal, Disp-20 Tab, R-0         CONTINUE these medications which have NOT CHANGED    Details   predniSONE (DELTASONE) 5 mg tablet Take 2 tabs alternating with 3 tabs every other day., Normal, Disp-90 Tab, R-3      glipiZIDE SR (GLUCOTROL XL) 2.5 mg CR tablet TAKE ONE TABLET BY MOUTH DAILY, Normal, Disp-90 Tab, R-3      lisinopril (PRINIVIL, ZESTRIL) 2.5 mg tablet Take 2.5 mg by mouth daily. , Historical Med      primidone (MYSOLINE) 50 mg tablet 2 tablets twice daily, Historical Med      rosuvastatin (CRESTOR) 10 mg tablet Take 1 Tab by mouth nightly., Normal, Disp-90 Tab, R-1      carvedilol (COREG) 25 mg tablet TAKE 1 TABLET BY MOUTH TWICE DAILY WITH MEALS, Normal, Disp-180 Tab, R-0      guaiFENesin (MUCINEX) 1,200 mg Ta12 ER tablet Take 1,200 mg by mouth daily as needed., Historical Med      polyethylene glycol (MIRALAX) 17 gram/dose powder Take 17 g by mouth daily as needed., Historical Med      tiotropium-olodaterol (STIOLTO RESPIMAT) 2.5-2.5 mcg/actuation inhaler Take 2 Puffs by inhalation daily. , Historical Med      MULTIVITAMIN PO Take 1 Tab by mouth daily. , Historical Med      ALPRAZolam (XANAX) 0.5 mg tablet TAKE 1/2 TO 1 TABLET BY MOUTH EVERY NIGHT AT BEDTIME AS NEEDED FOR SLEEP, Phone In, Disp-30 Tab, R-3      Omeprazole delayed release (PRILOSEC D/R) 20 mg tablet Take 20 mg by mouth daily. , Historical Med      neomycin-polymyxin-hydrocortisone (CORTISPORIN) otic solution Administer 3-4 Drops in left ear three (3) times daily as needed (ear discomfort). , Normal, Disp-1 Bottle, R-1      furosemide (LASIX) 20 mg tablet TAKE 1 TABLET BY MOUTH EVERY MORNING AND TAKE 1 TABLET BY MOUTH EVERY AFTERNOON BETWEEN 2 AND 4 PM, Normal, Disp-180 Tab, R-3      albuterol-ipratropium (DUO-NEB) 2.5 mg-0.5 mg/3 ml nebu TAKE CONTENTS OF ONE VIAL BY NEBULIZATION ROUTE EVERY 4 HOURS AS NEEDED(UP TO 6 PER DAY), Normal, Disp-1620 mL, R-3      albuterol (VENTOLIN HFA) 90 mcg/actuation inhaler INHALE 2 PUFFS BY MOUTH EVERY 4 HOURS AS NEEDED FOR WHEEZING, Normal, Disp-1 Inhaler, R-6      docusate sodium (STOOL SOFTENER PO) Take 1 Tab by mouth two (2) times a day., Historical Med      DULoxetine (CYMBALTA) 60 mg capsule Take 60 mg by mouth daily. , Historical Med      fluticasone (FLONASE) 50 mcg/actuation nasal spray SHAKE LIQUID AND USE 2 SPRAYS IN EACH NOSTRIL EVERY DAY, Normal**Patient requests 90 days supply**Disp-1 Bottle, R-11      cyanocobalamin 1,000 mcg tablet Take 1,000 mcg by mouth daily. , Historical Med      promethazine-dextromethorphan (PROMETHAZINE-DM) 6.25-15 mg/5 mL syrup TAKE ONE TEASPOONFUL BY MOUTH EVERY SIX HOURS AS NEEDED FOR COUGH, Normal, Disp-240 mL, R-1           2. Follow-up Information     Follow up With Specialties Details Why Contact Info    Moe Londono MD Family Practice Schedule an appointment as soon as possible for a visit in 1 week for a recheck [address]  [phone]      [facility] EMERGENCY DEPT Emergency Medicine Go to If symptoms worsen [address] [place] [address]  [phone]        Return to ED if worse     Diagnosis     Clinical Impression:   1. Strain of neck muscle, initial encounter    2. Closed head injury, initial encounter            Vee Goins.  EMILIE Sapp

## 2019-10-22 DIAGNOSIS — F51.01 PRIMARY INSOMNIA: ICD-10-CM

## 2019-10-22 RX ORDER — ALPRAZOLAM 0.5 MG/1
TABLET ORAL
Qty: 30 TAB | Refills: 3 | OUTPATIENT
Start: 2019-10-22 | End: 2020-03-09

## 2019-10-22 NOTE — TELEPHONE ENCOUNTER
Spoke with patient , she is feeling better, using muscle relaxer and heat to neck. Still having some headaches. Will call office on Monday if not better.

## 2019-11-13 RX ORDER — CARVEDILOL 25 MG/1
TABLET ORAL
Qty: 180 TAB | Refills: 0 | Status: SHIPPED | OUTPATIENT
Start: 2019-11-13 | End: 2020-02-11 | Stop reason: SDUPTHER

## 2019-11-14 NOTE — PROGRESS NOTES
Chief Complaint   Patient presents with    Diabetes     2 month follow up     Mammogram  6/3/19    A1C  6/18/19    Micro Albumin  6/18/19    Eye Exam  5/28/19    1. Have you been to the ER, urgent care clinic since your last visit? Hospitalized since your last visit? No  2. Have you seen or consulted any other health care providers outside of the [address] [place] [address] since your last visit? Include any pap smears or colon screening. No    Order placed for Flu shot, per Verbal Order from Dr. Swartz on 9/16/2019 due to need. Flu shot 0.5 ml given in left arm IM, no reaction noted. Information: Selecting Yes will display possible errors in your note based on the variables you have selected. This validation is only offered as a suggestion for you. PLEASE NOTE THAT THE VALIDATION TEXT WILL BE REMOVED WHEN YOU FINALIZE YOUR NOTE. IF YOU WANT TO FAX A PRELIMINARY NOTE YOU WILL NEED TO TOGGLE THIS TO 'NO' IF YOU DO NOT WANT IT IN YOUR FAXED NOTE.

## 2019-11-18 ENCOUNTER — OFFICE VISIT (OUTPATIENT)
Dept: NEUROLOGY | Age: 84
End: 2019-11-18

## 2019-11-18 VITALS
HEIGHT: 64 IN | WEIGHT: 218 LBS | OXYGEN SATURATION: 98 % | HEART RATE: 71 BPM | BODY MASS INDEX: 37.22 KG/M2 | DIASTOLIC BLOOD PRESSURE: 72 MMHG | SYSTOLIC BLOOD PRESSURE: 120 MMHG

## 2019-11-18 DIAGNOSIS — G25.0 ESSENTIAL TREMOR: Primary | ICD-10-CM

## 2019-11-18 DIAGNOSIS — E11.42 DIABETIC POLYNEUROPATHY ASSOCIATED WITH TYPE 2 DIABETES MELLITUS (HCC): ICD-10-CM

## 2019-11-18 DIAGNOSIS — M54.17 LUMBOSACRAL RADICULOPATHY: ICD-10-CM

## 2019-11-18 RX ORDER — PRIMIDONE 50 MG/1
TABLET ORAL
Qty: 180 TAB | Refills: 5 | Status: SHIPPED | OUTPATIENT
Start: 2019-11-18 | End: 2019-12-06

## 2019-11-18 NOTE — PATIENT INSTRUCTIONS

## 2019-11-18 NOTE — PROGRESS NOTES
NEUROLOGY FOLLOW UP NOTE    Chief Complaint   Patient presents with    Follow-up     lynda LU  Skye Cardona is a 80 y.o. female who presented to the neurology office for management of tremors. It has been going on since 2016. It is gradually progressive with time. It is postural and in both in the arms and legs. No falls. She does have constipation. She does have mild stiffness in legs. The patient was on gabapentin and when it was discontinued her tremors have improved but they are coming back recently. Patient is on primidone 100 mg p.o. twice daily and there is improvement in her tremors and she is overall satisfied with it. She does also have lumbar radiculopathy and a length dependent neuropathy. Both of them are stable    Interval history:   Pt is having increasing tremors more in the left than the right. Medications tried:  Gabapentin and Lyrica causes tremors  Primidone    Current Outpatient Medications   Medication Sig    carvedilol (COREG) 25 mg tablet TAKE 1 TABLET BY MOUTH TWICE DAILY WITH MEALS    ALPRAZolam (XANAX) 0.5 mg tablet TAKE 1/2 TO 1 TABLETS BY MOUTH EVERY NIGHT AT BEDTIME AS NEEDED FOR SLEEP    methocarbamol (ROBAXIN) 500 mg tablet Take 1 Tab by mouth three (3) times daily.  primidone (MYSOLINE) 50 mg tablet 2 tablets twice daily    predniSONE (DELTASONE) 5 mg tablet Take 2 tabs alternating with 3 tabs every other day.  rosuvastatin (CRESTOR) 10 mg tablet Take 1 Tab by mouth nightly.  guaiFENesin (MUCINEX) 1,200 mg Ta12 ER tablet Take 1,200 mg by mouth daily as needed.  polyethylene glycol (MIRALAX) 17 gram/dose powder Take 17 g by mouth daily as needed.  tiotropium-olodaterol (STIOLTO RESPIMAT) 2.5-2.5 mcg/actuation inhaler Take 2 Puffs by inhalation daily.  MULTIVITAMIN PO Take 1 Tab by mouth daily.  Omeprazole delayed release (PRILOSEC D/R) 20 mg tablet Take 20 mg by mouth daily.     neomycin-polymyxin-hydrocortisone (CORTISPORIN) otic solution Administer 3-4 Drops in left ear three (3) times daily as needed (ear discomfort).  furosemide (LASIX) 20 mg tablet TAKE 1 TABLET BY MOUTH EVERY MORNING AND TAKE 1 TABLET BY MOUTH EVERY AFTERNOON BETWEEN 2 AND 4 PM    glipiZIDE SR (GLUCOTROL XL) 2.5 mg CR tablet TAKE ONE TABLET BY MOUTH DAILY    albuterol-ipratropium (DUO-NEB) 2.5 mg-0.5 mg/3 ml nebu TAKE CONTENTS OF ONE VIAL BY NEBULIZATION ROUTE EVERY 4 HOURS AS NEEDED(UP TO 6 PER DAY) (Patient taking differently: TAKE CONTENTS OF ONE VIAL BY NEBULIZATION ROUTE EVERY 4 HOURS AS NEEDED(UP TO 6 PER DAY)for difficulty breathing/wheezing)    albuterol (VENTOLIN HFA) 90 mcg/actuation inhaler INHALE 2 PUFFS BY MOUTH EVERY 4 HOURS AS NEEDED FOR WHEEZING    docusate sodium (STOOL SOFTENER PO) Take 1 Tab by mouth two (2) times a day.  lisinopril (PRINIVIL, ZESTRIL) 2.5 mg tablet Take 2.5 mg by mouth daily.  DULoxetine (CYMBALTA) 60 mg capsule Take 60 mg by mouth daily.  fluticasone (FLONASE) 50 mcg/actuation nasal spray SHAKE LIQUID AND USE 2 SPRAYS IN EACH NOSTRIL EVERY DAY    cyanocobalamin 1,000 mcg tablet Take 1,000 mcg by mouth daily.  promethazine-dextromethorphan (PROMETHAZINE-DM) 6.25-15 mg/5 mL syrup TAKE ONE TEASPOONFUL BY MOUTH EVERY SIX HOURS AS NEEDED FOR COUGH     No current facility-administered medications for this visit. REVIEW OF SYSTEMS:   A ten system review of constitutional, cardiovascular, respiratory, musculoskeletal, endocrine, skin, SHEENT, genitourinary, psychiatric and neurologic systems was obtained and is unremarkable except as stated in HPI    EXAMINATION:   Visit Vitals  /72   Pulse 71   Ht 5' 4\" (1.626 m)   Wt 218 lb (98.9 kg)   LMP 03/04/1961 (Approximate)   SpO2 98%   BMI 37.42 kg/m²        General:   General appearance: Pt is in no acute distress   Distal pulses are preserved    Neurological Examination:   Mental Status: AAO x3. Speech is fluent.  Follows commands, has normal fund of knowledge, attention, short term recall, comprehension and insight. Cranial Nerves: Visual fields are full. PERRL, Extraocular movements are full. Facial sensation intact V1- V3. Facial movement intact, symmetric. Hearing intact to conversation. Palate elevates symmetrically. Shoulder shrug symmetric. Tongue midline. Motor: Strength is 5/5 in all 4 ext. Sensation: Decreased vibration sensation in feet and ankles. Coordination/Cerebellar: Intact to finger-nose-finger     Laboratory review:   Results for orders placed or performed in visit on 05/61/91   METABOLIC PANEL, COMPREHENSIVE   Result Value Ref Range    Glucose 136 (H) 65 - 99 mg/dL    BUN 25 8 - 27 mg/dL    Creatinine 1.47 (H) 0.57 - 1.00 mg/dL    GFR est non-AA 32 (L) >59 mL/min/1.73    GFR est AA 36 (L) >59 mL/min/1.73    BUN/Creatinine ratio 17 12 - 28    Sodium 142 134 - 144 mmol/L    Potassium 4.7 3.5 - 5.2 mmol/L    Chloride 98 96 - 106 mmol/L    CO2 28 20 - 29 mmol/L    Calcium 9.6 8.7 - 10.3 mg/dL    Protein, total 6.3 6.0 - 8.5 g/dL    Albumin 4.5 3.5 - 4.7 g/dL    GLOBULIN, TOTAL 1.8 1.5 - 4.5 g/dL    A-G Ratio 2.5 (H) 1.2 - 2.2    Bilirubin, total 0.3 0.0 - 1.2 mg/dL    Alk.  phosphatase 60 39 - 117 IU/L    AST (SGOT) 25 0 - 40 IU/L    ALT (SGPT) 29 0 - 32 IU/L   LIPID PANEL   Result Value Ref Range    Cholesterol, total 212 (H) 100 - 199 mg/dL    Triglyceride 76 0 - 149 mg/dL    HDL Cholesterol 95 >39 mg/dL    VLDL, calculated 15 5 - 40 mg/dL    LDL, calculated 102 (H) 0 - 99 mg/dL   CBC W/O DIFF   Result Value Ref Range    WBC 5.9 3.4 - 10.8 x10E3/uL    RBC 3.46 (L) 3.77 - 5.28 x10E6/uL    HGB 11.3 11.1 - 15.9 g/dL    HCT 34.3 34.0 - 46.6 %    MCV 99 (H) 79 - 97 fL    MCH 32.7 26.6 - 33.0 pg    MCHC 32.9 31.5 - 35.7 g/dL    RDW 13.5 12.3 - 15.4 %    PLATELET 088 (L) 300 - 450 x10E3/uL   CVD REPORT   Result Value Ref Range    INTERPRETATION Note     PDF IMAGE Not applicable    CKD REPORT   Result Value Ref Range    Interpretation Note AMB POC HEMOGLOBIN A1C   Result Value Ref Range    Hemoglobin A1c (POC) 6.5 %   AMB POC GLUCOSE, QUANTITATIVE, BLOOD   Result Value Ref Range    Glucose  mg/dL       Imaging review:   11/6/12  MRI L spine  1. Status post L4 and L5 laminectomies. Severe L4-5 disc space narrowing with moderate sized central disc herniation. No canal stenosis; moderate left foraminal narrowing. 2.  Diffuse degenerative changes. Canal stenosis mild at T10-11, borderline at T11-12 and T12-L1, mild at L1-2, mild to moderate at L2-3, severe at L3-4, and moderate at L5-S1.    12/6/12  MRI Brain  1. No mass or enhancement abnormality of the internal auditory canals or cerebellopontine angles. 2.  Extensive sinus disease with complete obscuration of the left frontal and sphenoid sinuses. 9/28/16  EMG/NCS  This study is abnormal. There is evidence for a moderate to severe degree of a length dependent polyneuropathy. There is also residual multilevel lumbosacral radiculopathy without significant denervation in the distal leg muscles. Documentation review:  Pt was seen by Dr. Estrella Cheema for tremors and was started on lyrica. EMG was performed by him and it showed a length dependent neuropathy. Assessment/Plan:   Rakan Hall is a 80 y.o. female who presented to the neurology office for management of postural tremors, lumbosacral radiculopathy and diabetic polyneuropathy. Her diabetic neuropathy and lumbosacral radiculopathy are stable. For essential tremors, patient is on primidone 100 mg p.o. twice daily. There has been mild deterioration since the last visit. Increasing primidone to 100 mg in the morning and 150 mg at night for a week and then 150 mg p.o. twice daily.         Follow-up in 6 months

## 2019-12-06 ENCOUNTER — HOSPITAL ENCOUNTER (OUTPATIENT)
Dept: LAB | Age: 84
Discharge: HOME | End: 2019-12-06
Payer: MEDICARE

## 2019-12-06 ENCOUNTER — OFFICE VISIT (OUTPATIENT)
Dept: FAMILY MEDICINE CLINIC | Age: 84
End: 2019-12-06

## 2019-12-06 VITALS
WEIGHT: 222 LBS | HEIGHT: 64 IN | BODY MASS INDEX: 37.9 KG/M2 | HEART RATE: 56 BPM | SYSTOLIC BLOOD PRESSURE: 138 MMHG | OXYGEN SATURATION: 96 % | TEMPERATURE: 97.5 F | RESPIRATION RATE: 16 BRPM | DIASTOLIC BLOOD PRESSURE: 68 MMHG

## 2019-12-06 DIAGNOSIS — Z51.81 ENCOUNTER FOR MEDICATION MONITORING: ICD-10-CM

## 2019-12-06 DIAGNOSIS — E78.2 MIXED HYPERLIPIDEMIA: ICD-10-CM

## 2019-12-06 DIAGNOSIS — R49.0 HOARSENESS: ICD-10-CM

## 2019-12-06 DIAGNOSIS — M35.3 POLYMYALGIA RHEUMATICA (HCC): ICD-10-CM

## 2019-12-06 DIAGNOSIS — M54.2 NECK PAIN: ICD-10-CM

## 2019-12-06 DIAGNOSIS — I50.22 CHRONIC SYSTOLIC HEART FAILURE (HCC): ICD-10-CM

## 2019-12-06 DIAGNOSIS — E11.21 TYPE 2 DIABETES WITH NEPHROPATHY (HCC): ICD-10-CM

## 2019-12-06 DIAGNOSIS — D64.9 CHRONIC ANEMIA: ICD-10-CM

## 2019-12-06 DIAGNOSIS — R13.12 OROPHARYNGEAL DYSPHAGIA: ICD-10-CM

## 2019-12-06 DIAGNOSIS — M50.30 DDD (DEGENERATIVE DISC DISEASE), CERVICAL: ICD-10-CM

## 2019-12-06 DIAGNOSIS — M25.50 ARTHRALGIA OF MULTIPLE JOINTS: ICD-10-CM

## 2019-12-06 DIAGNOSIS — I10 ESSENTIAL HYPERTENSION, BENIGN: Primary | ICD-10-CM

## 2019-12-06 DIAGNOSIS — N18.30 STAGE 3 CHRONIC KIDNEY DISEASE (HCC): ICD-10-CM

## 2019-12-06 LAB
GLUCOSE POC: 114 MG.DL
HBA1C MFR BLD HPLC: 6.6 %

## 2019-12-06 PROCEDURE — 36415 COLL VENOUS BLD VENIPUNCTURE: CPT

## 2019-12-06 PROCEDURE — 80061 LIPID PANEL: CPT

## 2019-12-06 PROCEDURE — 80053 COMPREHEN METABOLIC PANEL: CPT

## 2019-12-06 RX ORDER — DULOXETIN HYDROCHLORIDE 60 MG/1
60 CAPSULE, DELAYED RELEASE ORAL DAILY
Qty: 90 CAP | Refills: 3 | Status: SHIPPED | OUTPATIENT
Start: 2019-12-06 | End: 2021-01-22

## 2019-12-06 RX ORDER — DICLOFENAC SODIUM 10 MG/G
GEL TOPICAL 4 TIMES DAILY
Qty: 300 G | Refills: 3 | Status: SHIPPED | OUTPATIENT
Start: 2019-12-06 | End: 2020-12-11

## 2019-12-06 RX ORDER — PRIMIDONE 50 MG/1
TABLET ORAL
COMMUNITY
End: 2020-06-08

## 2019-12-06 RX ORDER — PREDNISONE 5 MG/1
TABLET ORAL
COMMUNITY
End: 2020-03-06 | Stop reason: SDUPTHER

## 2019-12-06 NOTE — PROGRESS NOTES
Chief Complaint   Patient presents with    Diabetes     follow up    Cholesterol Problem     follow up    Hypertension     follow up    Neck Pain     x 3 weeks           A1C 9/16/2019    Microalbumin 6/18/2019    Mammogram 6/3/2019    Eye exam 5/28/2019    1. Have you been to the ER, urgent care clinic since your last visit? Hospitalized since your last visit? Yes 10/18/2019 [facility] for hitting head on cabinet, HA, neck pain    2. Have you seen or consulted any other health care providers outside of the [address] [place] [address] since your last visit? Include any pap smears or colon screening.  No

## 2019-12-06 NOTE — PROGRESS NOTES
HISTORY OF PRESENT ILLNESS  Alma Rausch is a 80 y.o. female. HPI   Follow up on chronic medical problems. Overall has been dealing with a lot of pain in the joints and now in the neck since she hit her head on cabinet door at home. Was seen in the ER and CT done the head and neck. She is still having \"bad pain\" in the neck. C/o trouble with swallowing and seem like it gets stuck in the back of the throat over the past several weeks. Also has been getting more hoarseness. Postnasal drainage has been about the same as usual.        Cardiovascular Review:  The patient has hypertension, hyperlipidemia and Systolic HF. Diet and Lifestyle: generally follows a low fat low cholesterol diet, generally follows a low sodium diet  Home BP Monitoring: is not measured at home. Pertinent ROS: taking medications as instructed, no medication side effects noted, no TIA's, no chest pain on exertion, no dyspnea on exertion, noting that her ankles swelling has been mild. She has been off of the statin but she did not see any improvemnet with her myalgia being off of the medication. DM type II follow up:  Compliant w/ meds, diabetic diet, and exercise. Obtains home glucose monitoring averaging in the mid 100s. Checks BS daily on most days and prn. Pt does not have BS log at visit today. No Rf needed for today. Denies any tingling sensation, polyuria and polydipsia. No blurred vision. No significant weight changes. Asthma Review:  The patient is being seen for follow up of chronic respiratory failure and allergies and chronic sinusitits, not currently in exacerbation. Breathing has been good also with taking prednisone. Using nebulizer 3 to 4 times in a day as needed but has not recently had to use it. .        Regimen compliance: The patient reports adherence to asthma action plan and regimen. Encounter for pain management.   1./2. Medical history/Past medical History  Chronic Pain:  Osteoarthritis:  Patient has osteoarthritis in multiple joints but primarily in the knees and back. Seen by rheumatology and place on prednisone and plaquenil. Told also that she has polymyalgia rheumatica. Her symptoms have been wax and wane. She is currently on prednisone 12.5 mg daily per rheumatology. Pain has been 8/10 when it is severe. Rheumatology has discharge her from her care d/t nothing more to add and to get med refills thru her PCP. .        3. Applicable records from prior treatment providers are apart of [facility] under the media tab. 4. Diagnostic, therapeutic and laboratory results are available in the [address] [place] [address] chart. 5. Consultation notes are available for review in the media tab of the [address] [place] [address] chart. 6. Treatment goals include pain control so that the pt may be as active and function with her daily activities and improved comfort level. Previously pt has been limited by pain. 7. The risks and benefits of treatment has been discussed at this office visit with the pt. She understands that the medication has addicting potential.  Additionally the pt has been advised that narcotic pain medication may impair mental and/or physical ability required for performance of tasks such as driving or operating any other machinery. 8. Pt has an updated signed pain contract on file and can be found under the FYI section of the [facility] chart. 9. The pain contract is reviewed. Pain medication will be continued at the previous dosage. Urine drug screening will be done today. Diagnostic studies are not indicated at this time. Interventional procedure are not indicated at this time. 10. Medication prescibed is HYDROcodone-acetaminophen (NORCO)  mg tablet. No prescription is needed today. She has not recently use the hydrocodone but will call if needed.       11. Patient instructions have been reviewed in detail as outlined above and in the pain contract which is updated today. 12. Re-eval is planned for 3 months. Naloxone prescription is not warranted. Patient Active Problem List   Diagnosis Code    CHF (congestive heart failure) ([facility]) I50.9    S/P TKR (total knee replacement) Z96.659    Anemia D64.9    s/p lumbar laminectomy Z98.890    S/P ASAD (total abdominal hysterectomy) Z90.710    S/P hemorrhoidectomy Z98.890, Z87.19    S/P rotator cuff surgery Z98.890    DM (diabetes mellitus) ([facility] [address] E11.9    Chronic sinusitis J32.9    S/P sinus surgery Z98.890    Dyslipidemia E78.5    Environmental allergies Z91.09    Obstructive sleep apnea (adult) (pediatric) G47.33    DJD (degenerative joint disease) M19.90    Chronic systolic heart failure ([facility]) I50.22    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 9) with comorbidity ([facility]) E66.01    Polymyalgia rheumatica ([facility]) M35.3       Current Outpatient Medications   Medication Sig Dispense Refill    primidone (MYSOLINE) 50 mg tablet 2 tablets in morning and 3 tablets at night for 1 week and then 3 tablets p.o. twice daily 180 Tab 5    carvedilol (COREG) 25 mg tablet TAKE 1 TABLET BY MOUTH TWICE DAILY WITH MEALS 180 Tab 0    ALPRAZolam (XANAX) 0.5 mg tablet TAKE 1/2 TO 1 TABLETS BY MOUTH EVERY NIGHT AT BEDTIME AS NEEDED FOR SLEEP 30 Tab 3    methocarbamol (ROBAXIN) 500 mg tablet Take 1 Tab by mouth three (3) times daily. 20 Tab 0    predniSONE (DELTASONE) 5 mg tablet Take 2 tabs alternating with 3 tabs every other day.  90 Tab 3    rosuvastatin (CRESTOR) 10 mg tablet Take 1 Tab by mouth nightly. 90 Tab 1    guaiFENesin (MUCINEX) 1,200 mg Ta12 ER tablet Take 1,200 mg by mouth daily as needed.  polyethylene glycol (MIRALAX) 17 gram/dose powder Take 17 g by mouth daily as needed.  tiotropium-olodaterol (STIOLTO RESPIMAT) 2.5-2.5 mcg/actuation inhaler Take 2 Puffs by inhalation daily.  MULTIVITAMIN PO Take 1 Tab by mouth daily.  Omeprazole delayed release (PRILOSEC D/R) 20 mg tablet Take 20 mg by mouth daily.  neomycin-polymyxin-hydrocortisone (CORTISPORIN) otic solution Administer 3-4 Drops in left ear three (3) times daily as needed (ear discomfort). 1 Bottle 1    furosemide (LASIX) 20 mg tablet TAKE 1 TABLET BY MOUTH EVERY MORNING AND TAKE 1 TABLET BY MOUTH EVERY AFTERNOON BETWEEN 2 AND 4  Tab 3    glipiZIDE SR (GLUCOTROL XL) 2.5 mg CR tablet TAKE ONE TABLET BY MOUTH DAILY 90 Tab 3    albuterol-ipratropium (DUO-NEB) 2.5 mg-0.5 mg/3 ml nebu TAKE CONTENTS OF ONE VIAL BY NEBULIZATION ROUTE EVERY 4 HOURS AS NEEDED(UP TO 6 PER DAY) (Patient taking differently: TAKE CONTENTS OF ONE VIAL BY NEBULIZATION ROUTE EVERY 4 HOURS AS NEEDED(UP TO 6 PER DAY)for difficulty breathing/wheezing) 1620 mL 3    albuterol (VENTOLIN HFA) 90 mcg/actuation inhaler INHALE 2 PUFFS BY MOUTH EVERY 4 HOURS AS NEEDED FOR WHEEZING 1 Inhaler 6    docusate sodium (STOOL SOFTENER PO) Take 1 Tab by mouth two (2) times a day.  lisinopril (PRINIVIL, ZESTRIL) 2.5 mg tablet Take 2.5 mg by mouth daily.  DULoxetine (CYMBALTA) 60 mg capsule Take 60 mg by mouth daily.  fluticasone (FLONASE) 50 mcg/actuation nasal spray SHAKE LIQUID AND USE 2 SPRAYS IN EACH NOSTRIL EVERY DAY 1 Bottle 11    cyanocobalamin 1,000 mcg tablet Take 1,000 mcg by mouth daily.       promethazine-dextromethorphan (PROMETHAZINE-DM) 6.25-15 mg/5 mL syrup TAKE ONE TEASPOONFUL BY MOUTH EVERY SIX HOURS AS NEEDED FOR COUGH 240 mL 1       Allergies   Allergen Reactions    Gabapentin Other (comments)     Muscles twitching and jerking    Levaquin [Levofloxacin] Swelling    Lyrica [Pregabalin] Other (comments)     Muscle twitching and jerking    Percodan [Oxycodone Hcl-Oxycodone-Asa] Itching         Past Medical History:   Diagnosis Date    Anemia 3/3/2010    Arrhythmia     irregular heartbeat    Arthritis     CHF (congestive heart failure) ([facility] [address] 3/3/2010    Chronic pain     back    Chronic sinusitis 3/3/2010    CPAP (continuous positive airway pressure) dependence     Diverticulosis     DM (diabetes mellitus) ([facility] [address] 3/3/2010    Dyslipidemia 3/3/2010    GERD (gastroesophageal reflux disease)     HTN (hypertension) 3/3/2010    Ill-defined condition     being evaluated py pulmonolgist for \"trouble breathing\"    S/P TKR (total knee replacement) 3/3/2010    Unspecified sleep apnea     doesn't wear CPAP since back has been hurting         Past Surgical History:   Procedure Laterality Date    HX APPENDECTOMY      thinks it was taken with hysterectomy    HX GYN      \"tubes opened\"    HX HEENT      sinus surgery    HX HEMORRHOIDECTOMY      HX HYSTERECTOMY      HX KNEE REPLACEMENT  1996    both knees- at same time    HX LUMBAR LAMINECTOMY  1980s    HX MOHS PROCEDURES      left shoulder    HX ORTHOPAEDIC  1980    neck fusion    HX ORTHOPAEDIC  1999    right knee replaced for second time    HX ORTHOPAEDIC      lumbar fusion    HX OTHER SURGICAL      CORNELL BIOPSY BREAST STEREOTACTIC Left yrs ago    (-)    IA COLONOSCOPY FLX DX W/COLLJ SPEC WHEN PFRMD  [id number]    dr. Shireen Horne (barium enema)         Family History   Problem Relation Age of Onset    Diabetes Mother     Heart Disease Father     Diabetes Brother     Blindness Brother     Diabetes Sister     Heart Disease Sister     Heart Disease Brother     Heart Disease Brother     Asthma Brother     Malignant Hyperthermia Neg Hx     Pseudocholinesterase Deficiency Neg Hx     Delayed Awakening Neg Hx     Post-op Nausea/Vomiting Neg Hx     Emergence Delirium Neg Hx     Post-op Cognitive Dysfunction Neg Hx        Social History     Tobacco Use    Smoking status: Former Smoker     Packs/day: 0.30     Years: 5.00     Pack years: 1.50     Last attempt to quit: 1960     Years since quittin.9    Smokeless tobacco: Never Used   Substance Use Topics    Alcohol use: No     Alcohol/week: 0.0 standard drinks        Lab Results   Component Value Date/Time    WBC 5.9 2019 10:58 AM    HGB 11.3 2019 10:58 AM    HCT 34.3 2019 10:58 AM    PLATELET 229 (L)  10:58 AM    MCV 99 (H) 2019 10:58 AM     Lab Results   Component Value Date/Time    Cholesterol, total 212 (H) 2019 10:58 AM    HDL Cholesterol 95 2019 10:58 AM    LDL, calculated 102 (H) 2019 10:58 AM    Triglyceride 76 2019 10:58 AM    CHOL/HDL Ratio 2.0 2010 02:10 PM     Lab Results   Component Value Date/Time    Sodium 142 2019 10:58 AM    Potassium 4.7 2019 10:58 AM    Chloride 98 2019 10:58 AM    CO2 28 2019 10:58 AM    Anion gap 3 (L) 2015 11:47 AM    Glucose 136 (H) 2019 10:58 AM    BUN 25 2019 10:58 AM    Creatinine 1.47 (H) 2019 10:58 AM    BUN/Creatinine ratio 17 2019 10:58 AM    GFR est AA 36 (L) 2019 10:58 AM    GFR est non-AA 32 (L) 2019 10:58 AM    Calcium 9.6 2019 10:58 AM    Bilirubin, total 0.3 2019 10:58 AM    ALT (SGPT) 29 2019 10:58 AM    AST (SGOT) 25 2019 10:58 AM    Alk. phosphatase 60 2019 10:58 AM    Protein, total 6.3 2019 10:58 AM    Albumin 4.5 2019 10:58 AM    Globulin 3.4 2015 11:47 AM    A-G Ratio 2.5 (H) 2019 10:58 AM      Lab Results   Component Value Date/Time    Hemoglobin A1c 6.4 (H) 2017 11:09 AM    Hemoglobin A1c (POC) 6.5 2019 11:11 AM         Review of Systems   Constitutional: Negative for malaise/fatigue. HENT: Negative for congestion. Eyes: Negative for blurred vision. Respiratory: Negative for cough and shortness of breath. Cardiovascular: Negative for chest pain, palpitations and leg swelling. Gastrointestinal: Negative for abdominal pain, constipation and heartburn. Genitourinary: Negative for dysuria, frequency and urgency. Musculoskeletal: Positive for back pain, joint pain and myalgias. Neurological: Positive for tremors (c/o increase tremor in both hands, left seems wose. ). Negative for dizziness, tingling, sensory change, focal weakness and headaches. Endo/Heme/Allergies: Negative for environmental allergies. Psychiatric/Behavioral: Negative for depression. The patient does not have insomnia. Physical Exam   Constitutional: She appears well-developed and well-nourished. /68 (BP 1 Location: Left arm, BP Patient Position: Sitting)   Pulse 71   Temp 96.7 °F (35.9 °C) (Oral)   Resp 18   Ht 5' 4\" (1.626 m)   Wt 223 lb 6.4 oz (101.3 kg)   LMP 03/04/1961 (Approximate)   SpO2 99%   BMI 38.35 kg/m²    HENT:   Right Ear: Tympanic membrane and ear canal normal.   Left Ear: Tympanic membrane and ear canal normal.   Nose: No mucosal edema or rhinorrhea. Mouth/Throat: Oropharynx is clear and moist and mucous membranes are normal.   Neck: Neck supple. No spinous process tenderness and no muscular tenderness present. No neck rigidity. Decreased range of motion present. No thyromegaly present. Cardiovascular: Normal rate, regular rhythm and normal heart sounds. Exam reveals no gallop. Pulmonary/Chest: Effort normal and breath sounds normal.   Abdominal: Soft. Normal appearance and bowel sounds are normal. She exhibits no mass. There is no tenderness. Musculoskeletal:         General: Tenderness (multiple areas of tenderness in the arm and legs.  ) and edema (mild) present. Lymphadenopathy:     She has no cervical adenopathy. Skin: Skin is warm and dry. Psychiatric: She has a normal mood and affect. Nursing note and vitals reviewed. ASSESSMENT and PLAN  Diagnoses and all orders for this visit:    1. Essential hypertension, benign  Discussed sodium restriction, high k rich diet, maintaining ideal body weight and regular exercise program such as daily walking 30 min perday 4-5 times per week, as physiologic means to achieve blood pressure control.  Medication compliance advised. 2. Type 2 diabetes with nephropathy (HCC)  -     AMB POC HEMOGLOBIN A1C  -     AMB POC GLUCOSE, QUANTITATIVE, BLOOD    3. Mixed hyperlipidemia  -     LIPID PANEL    4. Chronic systolic heart failure (HCC)  Stable     5. Stage 3 chronic kidney disease (HCC)  Stable     6. Arthralgia of multiple joints//  7. Polymyalgia rheumatica (HCC)  -     REFERRAL TO PHYSICAL THERAPY  -     DULoxetine (CYMBALTA) 60 mg capsule; Take 1 Cap by mouth daily. -     diclofenac (VOLTAREN) 1 % gel; Apply  to affected area four (4) times daily. 8. Neck pain//  9. DDD (degenerative disc disease), cervical  -     REFERRAL TO PHYSICAL THERAPY    10. Hoarseness//  11. Oropharyngeal dysphagia  -     REFERRAL TO ENT-OTOLARYNGOLOGY    12. Chronic anemia  Has been stable. 13. Encounter for medication monitoring  -     METABOLIC PANEL, COMPREHENSIVE      Follow-up and Dispositions    · Return in about 3 months (around 3/6/2020). reviewed diet, exercise and weight control  cardiovascular risk and specific lipid/LDL goals reviewed  reviewed medications and side effects in detail  specific diabetic recommendations: low cholesterol diet, weight control and daily exercise discussed, home glucose monitoring emphasized, all medications, side effects and compliance discussed carefully, foot care discussed and Podiatry visits discussed, annual eye examinations at Ophthalmology discussed and glycohemoglobin and other lab monitoring discussed      I have discussed diagnosis listed in this note with pt and/or family.  I have discussed treatment plans and options and the risk/benefit analysis of those options, including safe use of medications and possible medication side effects. Through the use of shared decision making we have agreed to the above plan. The patient has received an after-visit summary and questions were answered concerning future plans and follow up. Advise pt of any urgent changes then to proceed to the ER.

## 2019-12-07 LAB
ALBUMIN SERPL-MCNC: 4.3 G/DL (ref 3.5–4.7)
ALBUMIN/GLOB SERPL: 2 {RATIO} (ref 1.2–2.2)
ALP SERPL-CCNC: 59 IU/L (ref 39–117)
ALT SERPL-CCNC: 28 IU/L (ref 0–32)
AST SERPL-CCNC: 23 IU/L (ref 0–40)
BILIRUB SERPL-MCNC: <0.2 MG/DL (ref 0–1.2)
BUN SERPL-MCNC: 24 MG/DL (ref 8–27)
BUN/CREAT SERPL: 18 (ref 12–28)
CALCIUM SERPL-MCNC: 9.2 MG/DL (ref 8.7–10.3)
CHLORIDE SERPL-SCNC: 102 MMOL/L (ref 96–106)
CHOLEST SERPL-MCNC: 221 MG/DL (ref 100–199)
CO2 SERPL-SCNC: 26 MMOL/L (ref 20–29)
CREAT SERPL-MCNC: 1.35 MG/DL (ref 0.57–1)
GLOBULIN SER CALC-MCNC: 2.2 G/DL (ref 1.5–4.5)
GLUCOSE SERPL-MCNC: 104 MG/DL (ref 65–99)
HDLC SERPL-MCNC: 99 MG/DL
INTERPRETATION, 910389: NORMAL
INTERPRETATION: NORMAL
LDLC SERPL CALC-MCNC: 101 MG/DL (ref 0–99)
PDF IMAGE, 910387: NORMAL
POTASSIUM SERPL-SCNC: 4.4 MMOL/L (ref 3.5–5.2)
PROT SERPL-MCNC: 6.5 G/DL (ref 6–8.5)
SODIUM SERPL-SCNC: 144 MMOL/L (ref 134–144)
TRIGL SERPL-MCNC: 105 MG/DL (ref 0–149)
VLDLC SERPL CALC-MCNC: 21 MG/DL (ref 5–40)

## 2019-12-09 ENCOUNTER — APPOINTMENT (OUTPATIENT)
Dept: GENERAL RADIOLOGY | Age: 84
DRG: 871 | End: 2019-12-09
Attending: EMERGENCY MEDICINE
Payer: MEDICARE

## 2019-12-09 ENCOUNTER — HOSPITAL ENCOUNTER (INPATIENT)
Age: 84
LOS: 2 days | Discharge: HOME | DRG: 871 | End: 2019-12-11
Attending: EMERGENCY MEDICINE | Admitting: INTERNAL MEDICINE
Payer: MEDICARE

## 2019-12-09 DIAGNOSIS — I50.9 CONGESTIVE HEART FAILURE, UNSPECIFIED HF CHRONICITY, UNSPECIFIED HEART FAILURE TYPE (HCC): ICD-10-CM

## 2019-12-09 DIAGNOSIS — J44.1 COPD EXACERBATION (HCC): Primary | ICD-10-CM

## 2019-12-09 DIAGNOSIS — J20.9 ACUTE BRONCHITIS, UNSPECIFIED ORGANISM: ICD-10-CM

## 2019-12-09 LAB
ALBUMIN SERPL-MCNC: 3.7 G/DL (ref 3.5–5)
ALBUMIN/GLOB SERPL: 1.1 {RATIO} (ref 1.1–2.2)
ALP SERPL-CCNC: 65 U/L (ref 45–117)
ALT SERPL-CCNC: 28 U/L (ref 12–78)
ANION GAP SERPL CALC-SCNC: 2 MMOL/L (ref 5–15)
APPEARANCE UR: CLEAR
AST SERPL-CCNC: 21 U/L (ref 15–37)
BASOPHILS # BLD: 0 K/UL (ref 0–0.1)
BASOPHILS NFR BLD: 0 % (ref 0–1)
BILIRUB SERPL-MCNC: 0.4 MG/DL (ref 0.2–1)
BILIRUB UR QL: NEGATIVE
BNP SERPL-MCNC: 2541 PG/ML
BUN SERPL-MCNC: 20 MG/DL (ref 6–20)
BUN/CREAT SERPL: 15 (ref 12–20)
CALCIUM SERPL-MCNC: 9.4 MG/DL (ref 8.5–10.1)
CHLORIDE SERPL-SCNC: 108 MMOL/L (ref 97–108)
CO2 SERPL-SCNC: 31 MMOL/L (ref 21–32)
COLOR UR: NORMAL
CREAT SERPL-MCNC: 1.35 MG/DL (ref 0.55–1.02)
DIFFERENTIAL METHOD BLD: ABNORMAL
EOSINOPHIL # BLD: 0 K/UL (ref 0–0.4)
EOSINOPHIL NFR BLD: 0 % (ref 0–7)
ERYTHROCYTE [DISTWIDTH] IN BLOOD BY AUTOMATED COUNT: 14.4 % (ref 11.5–14.5)
FLUAV AG NPH QL IA: NEGATIVE
FLUBV AG NOSE QL IA: NEGATIVE
GLOBULIN SER CALC-MCNC: 3.3 G/DL (ref 2–4)
GLUCOSE SERPL-MCNC: 145 MG/DL (ref 65–100)
GLUCOSE UR STRIP.AUTO-MCNC: NEGATIVE MG/DL
HCT VFR BLD AUTO: 39.7 % (ref 35–47)
HGB BLD-MCNC: 12 G/DL (ref 11.5–16)
HGB UR QL STRIP: NEGATIVE
IMM GRANULOCYTES # BLD AUTO: 0 K/UL (ref 0–0.04)
IMM GRANULOCYTES NFR BLD AUTO: 0 % (ref 0–0.5)
KETONES UR QL STRIP.AUTO: NEGATIVE MG/DL
LACTATE BLD-SCNC: 1.02 MMOL/L (ref 0.4–2)
LACTATE BLD-SCNC: 1.37 MMOL/L (ref 0.4–2)
LEUKOCYTE ESTERASE UR QL STRIP.AUTO: NEGATIVE
LYMPHOCYTES # BLD: 0.7 K/UL (ref 0.8–3.5)
LYMPHOCYTES NFR BLD: 8 % (ref 12–49)
MCH RBC QN AUTO: 32.1 PG (ref 26–34)
MCHC RBC AUTO-ENTMCNC: 30.2 G/DL (ref 30–36.5)
MCV RBC AUTO: 106.1 FL (ref 80–99)
MONOCYTES # BLD: 0.2 K/UL (ref 0–1)
MONOCYTES NFR BLD: 2 % (ref 5–13)
NEUTS BAND NFR BLD MANUAL: 3 %
NEUTS SEG # BLD: 7.6 K/UL (ref 1.8–8)
NEUTS SEG NFR BLD: 87 % (ref 32–75)
NITRITE UR QL STRIP.AUTO: NEGATIVE
NRBC # BLD: 0 K/UL (ref 0–0.01)
NRBC BLD-RTO: 0 PER 100 WBC
PH UR STRIP: 7.5 [PH] (ref 5–8)
PLATELET # BLD AUTO: 137 K/UL (ref 150–400)
PMV BLD AUTO: 11 FL (ref 8.9–12.9)
POTASSIUM SERPL-SCNC: 4.8 MMOL/L (ref 3.5–5.1)
PROT SERPL-MCNC: 7 G/DL (ref 6.4–8.2)
PROT UR STRIP-MCNC: NEGATIVE MG/DL
RBC # BLD AUTO: 3.74 M/UL (ref 3.8–5.2)
RBC MORPH BLD: ABNORMAL
SODIUM SERPL-SCNC: 141 MMOL/L (ref 136–145)
SP GR UR REFRACTOMETRY: 1.02 (ref 1–1.03)
TROPONIN I SERPL-MCNC: <0.05 NG/ML
UROBILINOGEN UR QL STRIP.AUTO: 0.2 EU/DL (ref 0.2–1)
WBC # BLD AUTO: 8.5 K/UL (ref 3.6–11)

## 2019-12-09 PROCEDURE — 71045 X-RAY EXAM CHEST 1 VIEW: CPT

## 2019-12-09 PROCEDURE — 87040 BLOOD CULTURE FOR BACTERIA: CPT

## 2019-12-09 PROCEDURE — 74011250636 HC RX REV CODE- 250/636: Performed by: EMERGENCY MEDICINE

## 2019-12-09 PROCEDURE — 80053 COMPREHEN METABOLIC PANEL: CPT

## 2019-12-09 PROCEDURE — 83605 ASSAY OF LACTIC ACID: CPT

## 2019-12-09 PROCEDURE — 36415 COLL VENOUS BLD VENIPUNCTURE: CPT

## 2019-12-09 PROCEDURE — 83880 ASSAY OF NATRIURETIC PEPTIDE: CPT

## 2019-12-09 PROCEDURE — 65270000029 HC RM PRIVATE

## 2019-12-09 PROCEDURE — 85025 COMPLETE CBC W/AUTO DIFF WBC: CPT

## 2019-12-09 PROCEDURE — 74011250637 HC RX REV CODE- 250/637: Performed by: EMERGENCY MEDICINE

## 2019-12-09 PROCEDURE — 87804 INFLUENZA ASSAY W/OPTIC: CPT

## 2019-12-09 PROCEDURE — 94761 N-INVAS EAR/PLS OXIMETRY MLT: CPT

## 2019-12-09 PROCEDURE — 74011000250 HC RX REV CODE- 250: Performed by: EMERGENCY MEDICINE

## 2019-12-09 PROCEDURE — 93005 ELECTROCARDIOGRAM TRACING: CPT

## 2019-12-09 PROCEDURE — 84484 ASSAY OF TROPONIN QUANT: CPT

## 2019-12-09 PROCEDURE — 99285 EMERGENCY DEPT VISIT HI MDM: CPT

## 2019-12-09 PROCEDURE — 74011000258 HC RX REV CODE- 258: Performed by: EMERGENCY MEDICINE

## 2019-12-09 PROCEDURE — 96374 THER/PROPH/DIAG INJ IV PUSH: CPT

## 2019-12-09 PROCEDURE — 81003 URINALYSIS AUTO W/O SCOPE: CPT

## 2019-12-09 RX ORDER — ACETAMINOPHEN 500 MG
1000 TABLET ORAL
Status: COMPLETED | OUTPATIENT
Start: 2019-12-09 | End: 2019-12-09

## 2019-12-09 RX ORDER — ONDANSETRON 2 MG/ML
4 INJECTION INTRAMUSCULAR; INTRAVENOUS
Status: DISCONTINUED | OUTPATIENT
Start: 2019-12-09 | End: 2019-12-11 | Stop reason: HOSPADM

## 2019-12-09 RX ORDER — ACETAMINOPHEN 325 MG/1
650 TABLET ORAL
Status: DISCONTINUED | OUTPATIENT
Start: 2019-12-09 | End: 2019-12-11 | Stop reason: HOSPADM

## 2019-12-09 RX ORDER — IPRATROPIUM BROMIDE AND ALBUTEROL SULFATE 2.5; .5 MG/3ML; MG/3ML
3 SOLUTION RESPIRATORY (INHALATION)
Status: COMPLETED | OUTPATIENT
Start: 2019-12-09 | End: 2019-12-09

## 2019-12-09 RX ADMIN — CEFTRIAXONE 1 G: 1 INJECTION, POWDER, FOR SOLUTION INTRAMUSCULAR; INTRAVENOUS at 23:31

## 2019-12-09 RX ADMIN — SODIUM CHLORIDE 500 ML: 900 INJECTION, SOLUTION INTRAVENOUS at 19:52

## 2019-12-09 RX ADMIN — ACETAMINOPHEN 1000 MG: 500 TABLET ORAL at 23:40

## 2019-12-09 RX ADMIN — METHYLPREDNISOLONE SODIUM SUCCINATE 125 MG: 125 INJECTION, POWDER, FOR SOLUTION INTRAMUSCULAR; INTRAVENOUS at 22:51

## 2019-12-09 RX ADMIN — IPRATROPIUM BROMIDE AND ALBUTEROL SULFATE 3 ML: .5; 3 SOLUTION RESPIRATORY (INHALATION) at 22:51

## 2019-12-09 RX ADMIN — SODIUM CHLORIDE 500 ML: 900 INJECTION, SOLUTION INTRAVENOUS at 22:52

## 2019-12-10 ENCOUNTER — APPOINTMENT (OUTPATIENT)
Dept: GENERAL RADIOLOGY | Age: 84
DRG: 871 | End: 2019-12-10
Attending: INTERNAL MEDICINE
Payer: MEDICARE

## 2019-12-10 ENCOUNTER — HOME HEALTH ADMISSION (OUTPATIENT)
Dept: HOME HEALTH SERVICES | Facility: HOME HEALTH | Age: 84
End: 2019-12-10

## 2019-12-10 LAB
ANION GAP SERPL CALC-SCNC: 6 MMOL/L (ref 5–15)
ATRIAL RATE: 94 BPM
BASOPHILS # BLD: 0 K/UL (ref 0–0.1)
BASOPHILS NFR BLD: 0 % (ref 0–1)
BUN SERPL-MCNC: 19 MG/DL (ref 6–20)
BUN/CREAT SERPL: 15 (ref 12–20)
CALCIUM SERPL-MCNC: 9.5 MG/DL (ref 8.5–10.1)
CALCULATED P AXIS, ECG09: 61 DEGREES
CALCULATED R AXIS, ECG10: -41 DEGREES
CALCULATED T AXIS, ECG11: 99 DEGREES
CHLORIDE SERPL-SCNC: 107 MMOL/L (ref 97–108)
CO2 SERPL-SCNC: 26 MMOL/L (ref 21–32)
CREAT SERPL-MCNC: 1.23 MG/DL (ref 0.55–1.02)
DIAGNOSIS, 93000: NORMAL
DIFFERENTIAL METHOD BLD: ABNORMAL
EOSINOPHIL # BLD: 0 K/UL (ref 0–0.4)
EOSINOPHIL NFR BLD: 0 % (ref 0–7)
ERYTHROCYTE [DISTWIDTH] IN BLOOD BY AUTOMATED COUNT: 14.4 % (ref 11.5–14.5)
GLUCOSE SERPL-MCNC: 188 MG/DL (ref 65–100)
HCT VFR BLD AUTO: 36.8 % (ref 35–47)
HGB BLD-MCNC: 11.5 G/DL (ref 11.5–16)
IMM GRANULOCYTES # BLD AUTO: 0 K/UL (ref 0–0.04)
IMM GRANULOCYTES NFR BLD AUTO: 0 % (ref 0–0.5)
LYMPHOCYTES # BLD: 0.7 K/UL (ref 0.8–3.5)
LYMPHOCYTES NFR BLD: 7 % (ref 12–49)
MCH RBC QN AUTO: 32.9 PG (ref 26–34)
MCHC RBC AUTO-ENTMCNC: 31.3 G/DL (ref 30–36.5)
MCV RBC AUTO: 105.1 FL (ref 80–99)
MONOCYTES # BLD: 0.1 K/UL (ref 0–1)
MONOCYTES NFR BLD: 1 % (ref 5–13)
NEUTS SEG # BLD: 8.6 K/UL (ref 1.8–8)
NEUTS SEG NFR BLD: 92 % (ref 32–75)
NRBC # BLD: 0 K/UL (ref 0–0.01)
NRBC BLD-RTO: 0 PER 100 WBC
P-R INTERVAL, ECG05: 132 MS
PLATELET # BLD AUTO: 121 K/UL (ref 150–400)
PMV BLD AUTO: 11 FL (ref 8.9–12.9)
POTASSIUM SERPL-SCNC: 4.6 MMOL/L (ref 3.5–5.1)
Q-T INTERVAL, ECG07: 346 MS
QRS DURATION, ECG06: 100 MS
QTC CALCULATION (BEZET), ECG08: 432 MS
RBC # BLD AUTO: 3.5 M/UL (ref 3.8–5.2)
RBC MORPH BLD: ABNORMAL
SODIUM SERPL-SCNC: 139 MMOL/L (ref 136–145)
TROPONIN I SERPL-MCNC: 0.09 NG/ML
TROPONIN I SERPL-MCNC: <0.05 NG/ML
VENTRICULAR RATE, ECG03: 94 BPM
WBC # BLD AUTO: 9.4 K/UL (ref 3.6–11)

## 2019-12-10 PROCEDURE — 74011250636 HC RX REV CODE- 250/636: Performed by: INTERNAL MEDICINE

## 2019-12-10 PROCEDURE — 65660000000 HC RM CCU STEPDOWN

## 2019-12-10 PROCEDURE — 77010033678 HC OXYGEN DAILY

## 2019-12-10 PROCEDURE — 85025 COMPLETE CBC W/AUTO DIFF WBC: CPT

## 2019-12-10 PROCEDURE — 80048 BASIC METABOLIC PNL TOTAL CA: CPT

## 2019-12-10 PROCEDURE — 71046 X-RAY EXAM CHEST 2 VIEWS: CPT

## 2019-12-10 PROCEDURE — 74011000258 HC RX REV CODE- 258: Performed by: INTERNAL MEDICINE

## 2019-12-10 PROCEDURE — 74011000250 HC RX REV CODE- 250: Performed by: INTERNAL MEDICINE

## 2019-12-10 PROCEDURE — 94640 AIRWAY INHALATION TREATMENT: CPT

## 2019-12-10 PROCEDURE — 71045 X-RAY EXAM CHEST 1 VIEW: CPT

## 2019-12-10 PROCEDURE — 84484 ASSAY OF TROPONIN QUANT: CPT

## 2019-12-10 PROCEDURE — 94760 N-INVAS EAR/PLS OXIMETRY 1: CPT

## 2019-12-10 PROCEDURE — 36415 COLL VENOUS BLD VENIPUNCTURE: CPT

## 2019-12-10 PROCEDURE — 77030029684 HC NEB SM VOL KT MONA -A

## 2019-12-10 RX ORDER — IPRATROPIUM BROMIDE AND ALBUTEROL SULFATE 2.5; .5 MG/3ML; MG/3ML
3 SOLUTION RESPIRATORY (INHALATION)
Status: DISCONTINUED | OUTPATIENT
Start: 2019-12-10 | End: 2019-12-11 | Stop reason: HOSPADM

## 2019-12-10 RX ORDER — SODIUM CHLORIDE 0.9 % (FLUSH) 0.9 %
5-40 SYRINGE (ML) INJECTION AS NEEDED
Status: DISCONTINUED | OUTPATIENT
Start: 2019-12-10 | End: 2019-12-11 | Stop reason: HOSPADM

## 2019-12-10 RX ORDER — LISINOPRIL 5 MG/1
2.5 TABLET ORAL DAILY
Status: CANCELLED | OUTPATIENT
Start: 2019-12-10

## 2019-12-10 RX ORDER — FUROSEMIDE 10 MG/ML
40 INJECTION INTRAMUSCULAR; INTRAVENOUS ONCE
Status: COMPLETED | OUTPATIENT
Start: 2019-12-10 | End: 2019-12-10

## 2019-12-10 RX ORDER — FAMOTIDINE 10 MG/ML
20 INJECTION INTRAVENOUS EVERY 12 HOURS
Status: DISCONTINUED | OUTPATIENT
Start: 2019-12-10 | End: 2019-12-11 | Stop reason: HOSPADM

## 2019-12-10 RX ORDER — IPRATROPIUM BROMIDE AND ALBUTEROL SULFATE 2.5; .5 MG/3ML; MG/3ML
3 SOLUTION RESPIRATORY (INHALATION)
Status: DISCONTINUED | OUTPATIENT
Start: 2019-12-10 | End: 2019-12-11

## 2019-12-10 RX ORDER — CARVEDILOL 12.5 MG/1
25 TABLET ORAL 2 TIMES DAILY WITH MEALS
Status: CANCELLED | OUTPATIENT
Start: 2019-12-10

## 2019-12-10 RX ORDER — ROSUVASTATIN CALCIUM 10 MG/1
10 TABLET, COATED ORAL
Status: CANCELLED | OUTPATIENT
Start: 2019-12-10

## 2019-12-10 RX ORDER — BISACODYL 5 MG
5 TABLET, DELAYED RELEASE (ENTERIC COATED) ORAL DAILY PRN
Status: DISCONTINUED | OUTPATIENT
Start: 2019-12-10 | End: 2019-12-11 | Stop reason: HOSPADM

## 2019-12-10 RX ORDER — ENOXAPARIN SODIUM 100 MG/ML
40 INJECTION SUBCUTANEOUS DAILY
Status: DISCONTINUED | OUTPATIENT
Start: 2019-12-10 | End: 2019-12-11 | Stop reason: HOSPADM

## 2019-12-10 RX ORDER — SODIUM CHLORIDE 0.9 % (FLUSH) 0.9 %
5-40 SYRINGE (ML) INJECTION EVERY 8 HOURS
Status: DISCONTINUED | OUTPATIENT
Start: 2019-12-10 | End: 2019-12-11 | Stop reason: HOSPADM

## 2019-12-10 RX ORDER — DULOXETIN HYDROCHLORIDE 30 MG/1
60 CAPSULE, DELAYED RELEASE ORAL DAILY
Status: CANCELLED | OUTPATIENT
Start: 2019-12-10

## 2019-12-10 RX ADMIN — IPRATROPIUM BROMIDE AND ALBUTEROL SULFATE 3 ML: .5; 3 SOLUTION RESPIRATORY (INHALATION) at 11:29

## 2019-12-10 RX ADMIN — IPRATROPIUM BROMIDE AND ALBUTEROL SULFATE 3 ML: .5; 3 SOLUTION RESPIRATORY (INHALATION) at 15:06

## 2019-12-10 RX ADMIN — IPRATROPIUM BROMIDE AND ALBUTEROL SULFATE 3 ML: .5; 3 SOLUTION RESPIRATORY (INHALATION) at 09:28

## 2019-12-10 RX ADMIN — IPRATROPIUM BROMIDE AND ALBUTEROL SULFATE 3 ML: .5; 3 SOLUTION RESPIRATORY (INHALATION) at 03:48

## 2019-12-10 RX ADMIN — METHYLPREDNISOLONE SODIUM SUCCINATE 40 MG: 40 INJECTION, POWDER, FOR SOLUTION INTRAMUSCULAR; INTRAVENOUS at 13:05

## 2019-12-10 RX ADMIN — Medication 10 ML: at 06:35

## 2019-12-10 RX ADMIN — IPRATROPIUM BROMIDE AND ALBUTEROL SULFATE 3 ML: .5; 3 SOLUTION RESPIRATORY (INHALATION) at 19:45

## 2019-12-10 RX ADMIN — CEFTRIAXONE 1 G: 1 INJECTION, POWDER, FOR SOLUTION INTRAMUSCULAR; INTRAVENOUS at 12:58

## 2019-12-10 RX ADMIN — FAMOTIDINE 20 MG: 10 INJECTION, SOLUTION INTRAVENOUS at 08:42

## 2019-12-10 RX ADMIN — ENOXAPARIN SODIUM 40 MG: 40 INJECTION SUBCUTANEOUS at 08:42

## 2019-12-10 RX ADMIN — AZITHROMYCIN MONOHYDRATE 500 MG: 500 INJECTION, POWDER, LYOPHILIZED, FOR SOLUTION INTRAVENOUS at 04:16

## 2019-12-10 RX ADMIN — METHYLPREDNISOLONE SODIUM SUCCINATE 40 MG: 40 INJECTION, POWDER, FOR SOLUTION INTRAMUSCULAR; INTRAVENOUS at 21:23

## 2019-12-10 RX ADMIN — FUROSEMIDE 40 MG: 10 INJECTION, SOLUTION INTRAMUSCULAR; INTRAVENOUS at 04:00

## 2019-12-10 RX ADMIN — METHYLPREDNISOLONE SODIUM SUCCINATE 40 MG: 40 INJECTION, POWDER, FOR SOLUTION INTRAMUSCULAR; INTRAVENOUS at 06:35

## 2019-12-10 RX ADMIN — Medication 10 ML: at 13:02

## 2019-12-10 RX ADMIN — FAMOTIDINE 20 MG: 10 INJECTION, SOLUTION INTRAVENOUS at 21:23

## 2019-12-10 RX ADMIN — IPRATROPIUM BROMIDE AND ALBUTEROL SULFATE 3 ML: .5; 3 SOLUTION RESPIRATORY (INHALATION) at 23:49

## 2019-12-10 RX ADMIN — Medication 10 ML: at 21:28

## 2019-12-10 NOTE — PROGRESS NOTES
Bedside and Verbal shift change report given to Rosmery Tolentino (oncoming nurse) by Adela Moreira (offgoing nurse). Report included the following information SBAR, Kardex, Intake/Output, MAR, Recent Results and Med Rec Status.

## 2019-12-10 NOTE — CONSULTS
[facility]  Pulmonary, Critical Care, and Sleep Medicine    Initial Patient Consult    Name: Nellie Noel MRN: [id number]   : 1931 Hospital: [facility]   Date: 12/10/2019        IMPRESSION:   · COPD with acute exacerbation; FEV1 of 1.43, 61% pred. PFTs had good bronchodilator response. Has air trapping. I personally obtained and reviewed records from Dr. Jean Claude Santos. · Acute on Chronic BronchitisIncreased productive coughing  · Allergic rhinitis, seems at baseline. Possible chronic bronchitis. On singulair. · CAD had mildly increased troponin. 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 · T2DM  · Baseline Hypertension  · DONNIE but has not been compliant with CPAP. · GERD-on ppi. · Obese  · Chronic venous insufficiency, uses compression hose. · Arthritis. · Chronic low back pain, had prior back surgery. RECOMMENDATIONS:   · Was placed on Azithromycin, Ctx. · ON Solumedrol. Will change to prednisone. · Has home nebs. · Could go home later today or in am.   · Will need to follow up in office upon discharge for hospital follow up. Can call [phone] for follow up. · Will need to keep regular appt with DR. Delaney     PCP: Jeannette  Pulm: Dr. Shreya Shankar  Cards: Timothy Marquez  Renal: Dr. Kiera Patel      Subjective: This patient has been seen and evaluated at the request of Dr. Liam Curran for above. Patient is a 80 y.o. female who presents for increased shortness of breath, severe dyspnea, wheezing. Used her neb at home without relief. Acute onset. Has gotten better since being admitted. Was a smoker when she was young in her 25s. She last saw Dr. Jean Claude Santos on 10/31/19. Prior to 3-4 days ago had been at baseline. Had dyspnea with moderate exertion. Had intermittent wheezing and chest congestion. CT from  did not reveal pulmonary fibrosis. Did have abnormal area of RML.      Past Medical History: Diagnosis Date    Anemia 3/3/2010    Arrhythmia     irregular heartbeat    Arthritis     CHF (congestive heart failure) ([facility]) 3/3/2010    Chronic pain     back    Chronic sinusitis 3/3/2010    CPAP (continuous positive airway pressure) dependence     Diverticulosis     DM (diabetes mellitus) ([address] 3/3/2010    Dyslipidemia 3/3/2010    GERD (gastroesophageal reflux disease)     HTN (hypertension) 3/3/2010    Ill-defined condition     being evaluated py pulmonolgist for \"trouble breathing\"    S/P TKR (total knee replacement) 3/3/2010    Unspecified sleep apnea     doesn't wear CPAP since back has been hurting      Past Surgical History:   Procedure Laterality Date    HX APPENDECTOMY      thinks it was taken with hysterectomy    HX GYN      \"tubes opened\"    HX HEENT      sinus surgery    HX HEMORRHOIDECTOMY      HX HYSTERECTOMY      HX KNEE REPLACEMENT  1996    both knees- at same time    HX LUMBAR LAMINECTOMY  1980s    HX MOHS PROCEDURES      left shoulder    HX ORTHOPAEDIC  1980    neck fusion    HX ORTHOPAEDIC  1999    right knee replaced for second time    HX ORTHOPAEDIC      lumbar fusion    HX OTHER SURGICAL      OCRNELL BIOPSY BREAST STEREOTACTIC Left yrs ago    (-)    CO COLONOSCOPY FLX DX W/COLLJ SPEC WHEN PFRMD  [id number]    dr. Rodriguez Lung (barium enema)      Prior to Admission medications    Medication Sig Start Date End Date Taking? Authorizing Provider   primidone (MYSOLINE) 50 mg tablet Take 3 tabs by mouth 2 times daily    Provider, Historical   diclofenac (VOLTAREN) 1 % gel Apply  to affected area four (4) times daily. 12/6/19   Cristel Garner MD   DULoxetine (CYMBALTA) 60 mg capsule Take 1 Cap by mouth daily. 12/6/19   Lu Machado MD   predniSONE (DELTASONE) 5 mg tablet Take 2 tabs alternating with 3 tabs every other day.     Provider, Historical   carvedilol (COREG) 25 mg tablet TAKE 1 TABLET BY MOUTH TWICE DAILY WITH MEALS 11/13/19   Shawn Vance MD ALPRAZolam (XANAX) 0.5 mg tablet TAKE 1/2 TO 1 TABLETS BY MOUTH EVERY NIGHT AT BEDTIME AS NEEDED FOR SLEEP 10/22/19   Clifford Machado Asa, MD   rosuvastatin (CRESTOR) 10 mg tablet Take 1 Tab by mouth nightly. 9/16/19   Clifford Machado Asa, MD   guaiFENesin (MUCINEX) 1,200 mg Ta12 ER tablet Take 1,200 mg by mouth daily as needed. Provider, Historical   polyethylene glycol (MIRALAX) 17 gram/dose powder Take 17 g by mouth daily as needed. Provider, Historical   tiotropium-olodaterol (STIOLTO RESPIMAT) 2.5-2.5 mcg/actuation inhaler Take 2 Puffs by inhalation daily. Provider, Historical   MULTIVITAMIN PO Take 1 Tab by mouth daily. Provider, Historical   Omeprazole delayed release (PRILOSEC D/R) 20 mg tablet Take 20 mg by mouth daily. Provider, Historical   neomycin-polymyxin-hydrocortisone (CORTISPORIN) otic solution Administer 3-4 Drops in left ear three (3) times daily as needed (ear discomfort). 6/18/19   Riky Sanchez MD   furosemide (LASIX) 20 mg tablet TAKE 1 TABLET BY MOUTH EVERY MORNING AND TAKE 1 TABLET BY MOUTH EVERY AFTERNOON BETWEEN 2 AND 4 PM 2/26/19   Clifford Machado Asa, MD   glipiZIDE SR (GLUCOTROL XL) 2.5 mg CR tablet TAKE ONE TABLET BY MOUTH DAILY 2/18/19   Riky SCOTT MD   albuterol-ipratropium (DUO-NEB) 2.5 mg-0.5 mg/3 ml nebu TAKE CONTENTS OF ONE VIAL BY NEBULIZATION ROUTE EVERY 4 HOURS AS NEEDED(UP TO 6 PER DAY)  Patient taking differently: TAKE CONTENTS OF ONE VIAL BY NEBULIZATION ROUTE EVERY 4 HOURS AS NEEDED(UP TO 6 PER DAY)for difficulty breathing/wheezing 2/11/19   Clifford Machado Asa, MD   albuterol (VENTOLIN HFA) 90 mcg/actuation inhaler INHALE 2 PUFFS BY MOUTH EVERY 4 HOURS AS NEEDED FOR WHEEZING 12/3/18   Clifford Machado Asa, MD   docusate sodium (STOOL SOFTENER PO) Take 1 Tab by mouth two (2) times a day. Provider, Historical   lisinopril (PRINIVIL, ZESTRIL) 2.5 mg tablet Take 2.5 mg by mouth daily.     Provider, Historical fluticasone (FLONASE) 50 mcg/actuation nasal spray SHAKE LIQUID AND USE 2 SPRAYS IN EACH NOSTRIL EVERY DAY 18   Lolita Pinzon MD   cyanocobalamin 1,000 mcg tablet Take 1,000 mcg by mouth daily.     Provider, Historical   promethazine-dextromethorphan (PROMETHAZINE-DM) 6.25-15 mg/5 mL syrup TAKE ONE TEASPOONFUL BY MOUTH EVERY SIX HOURS AS NEEDED FOR COUGH 17   Sherita Machado MD     Allergies   Allergen Reactions    Gabapentin Other (comments)     Muscles twitching and jerking    Levaquin [Levofloxacin] Swelling    Lyrica [Pregabalin] Other (comments)     Muscle twitching and jerking    Percodan [Oxycodone Hcl-Oxycodone-Asa] Itching      Social History     Tobacco Use    Smoking status: Former Smoker     Packs/day: 0.30     Years: 5.00     Pack years: 1.50     Last attempt to quit: 1960     Years since quittin.9    Smokeless tobacco: Never Used   Substance Use Topics    Alcohol use: No     Alcohol/week: 0.0 standard drinks      Family History   Problem Relation Age of Onset    Diabetes Mother     Heart Disease Father     Diabetes Brother     Blindness Brother     Diabetes Sister     Heart Disease Sister     Heart Disease Brother     Heart Disease Brother     Asthma Brother     Malignant Hyperthermia Neg Hx     Pseudocholinesterase Deficiency Neg Hx     Delayed Awakening Neg Hx     Post-op Nausea/Vomiting Neg Hx     Emergence Delirium Neg Hx     Post-op Cognitive Dysfunction Neg Hx         Current Facility-Administered Medications   Medication Dose Route Frequency    sodium chloride (NS) flush 5-40 mL  5-40 mL IntraVENous Q8H    enoxaparin (LOVENOX) injection 40 mg  40 mg SubCUTAneous DAILY    cefTRIAXone (ROCEPHIN) 1 g in 0.9% sodium chloride (MBP/ADV) 50 mL  1 g IntraVENous NOW    famotidine (PF) (PEPCID) injection 20 mg  20 mg IntraVENous Q12H    methylPREDNISolone (PF) (SOLU-MEDROL) injection 40 mg  40 mg IntraVENous Q8H    azithromycin (ZITHROMAX) 500 mg in 0.9% sodium chloride (MBP/ADV) 250 mL  500 mg IntraVENous Q24H       Review of Systems:  Constitutional: positive for fatigue  Eyes: negative  Ears, nose, mouth, throat, and face: negative  Respiratory: positive for cough, hemoptysis, wheezing, dyspnea on exertion or chronic bronchitis  Cardiovascular: negative  Gastrointestinal: negative  Genitourinary:negative  Integument/breast: negative  Hematologic/lymphatic: negative  Musculoskeletal:negative  Neurological: negative  Behavioral/Psych: negative  Endocrine: negative  Allergic/Immunologic: negative    Objective:   Vital Signs:    Visit Vitals  /88   Pulse 80   Temp 98.5 °F (36.9 °C)   Resp 20   Ht 5' 4\" (1.626 m)   Wt 101 kg (222 lb 9.6 oz)   LMP 1961 (Approximate)   SpO2 98%   BMI 38.21 kg/m²       O2 Device: Nasal cannula   O2 Flow Rate (L/min): 2 l/min   Temp (24hrs), Av.9 °F (37.7 °C), Min:98.5 °F (36.9 °C), Max:102.1 °F (38.9 °C)       Intake/Output:   Last shift:      No intake/output data recorded. Last 3 shifts:  1901 - 12/10 0700  In: -   Out: 500 [Urine:500]    Intake/Output Summary (Last 24 hours) at 12/10/2019 1044  Last data filed at 12/10/2019 0503  Gross per 24 hour   Intake    Output 500 ml   Net -500 ml      Physical Exam:   General:  Alert, cooperative, no distress, appears stated age. NO distress. Head:  Normocephalic, without obvious abnormality, atraumatic. Eyes:  Conjunctivae/corneas clear. PERRL, EOMs intact. Nose: Nares normal. Septum midline. Mucosa normal. No drainage or sinus tenderness. Throat: Lips, mucosa, and tongue normal. Teeth and gums normal.   Neck: Supple, symmetrical, trachea midline, no adenopathy, thyroid: no enlargment/tenderness/nodules, no carotid bruit and no JVD. Back:   Symmetric, no curvature. ROM normal.   Lungs:   Clear to auscultation bilaterally. Seems to be breathing comfortably. Chest wall:  No tenderness or deformity.    Heart:  Regular rate and rhythm, S1, S2 normal, no murmur, click, rub or gallop. Abdomen:   Soft, non-tender. Bowel sounds normal. No masses,  No organomegaly. Obese. Extremities: Extremities normal, atraumatic, no cyanosis or edema. Pulses: 2+ and symmetric all extremities. Skin: Skin color, texture, turgor normal. No rashes or lesions   Lymph nodes: Cervical, supraclavicular, and axillary nodes normal.   Neurologic: Grossly nonfocal. Motor seems to be intact. Psych: No overt anxiety or depression. Data review:     Recent Results (from the past 24 hour(s))   EKG, 12 LEAD, INITIAL    Collection Time: 12/09/19  6:10 PM   Result Value Ref Range    Ventricular Rate 94 BPM    Atrial Rate 94 BPM    P-R Interval 132 ms    QRS Duration 100 ms    Q-T Interval 346 ms    QTC Calculation (Bezet) 432 ms    Calculated P Axis 61 degrees    Calculated R Axis -41 degrees    Calculated T Axis 99 degrees    Diagnosis       Sinus rhythm with occasional premature ventricular complexes  Left axis deviation  Anterior infarct , age undetermined  When compared with ECG of 17-DEC-2015 10:05,  premature ventricular complexes are now present  Inverted T waves have replaced nonspecific T wave abnormality in Lateral   leads     CBC WITH AUTOMATED DIFF    Collection Time: 12/09/19  6:23 PM   Result Value Ref Range    WBC 8.5 3.6 - 11.0 K/uL    RBC 3.74 (L) 3.80 - 5.20 M/uL    HGB 12.0 11.5 - 16.0 g/dL    HCT 39.7 35.0 - 47.0 %    .1 (H) 80.0 - 99.0 FL    MCH 32.1 26.0 - 34.0 PG    MCHC 30.2 30.0 - 36.5 g/dL    RDW 14.4 11.5 - 14.5 %    PLATELET 383 (L) 971 - 400 K/uL    MPV 11.0 8.9 - 12.9 FL    NRBC 0.0 0  WBC    ABSOLUTE NRBC 0.00 0.00 - 0.01 K/uL    NEUTROPHILS 87 (H) 32 - 75 %    BAND NEUTROPHILS 3 %    LYMPHOCYTES 8 (L) 12 - 49 %    MONOCYTES 2 (L) 5 - 13 %    EOSINOPHILS 0 0 - 7 %    BASOPHILS 0 0 - 1 %    IMMATURE GRANULOCYTES 0 0.0 - 0.5 %    ABS. NEUTROPHILS 7.6 1.8 - 8.0 K/UL    ABS. LYMPHOCYTES 0.7 (L) 0.8 - 3.5 K/UL    ABS.  MONOCYTES 0.2 0.0 - 1.0 K/UL    ABS. EOSINOPHILS 0.0 0.0 - 0.4 K/UL    ABS. BASOPHILS 0.0 0.0 - 0.1 K/UL    ABS. IMM. GRANS. 0.0 0.00 - 0.04 K/UL    DF AUTOMATED      RBC COMMENTS MACROCYTOSIS  PRESENT       METABOLIC PANEL, COMPREHENSIVE    Collection Time: 12/09/19  6:23 PM   Result Value Ref Range    Sodium 141 136 - 145 mmol/L    Potassium 4.8 3.5 - 5.1 mmol/L    Chloride 108 97 - 108 mmol/L    CO2 31 21 - 32 mmol/L    Anion gap 2 (L) 5 - 15 mmol/L    Glucose 145 (H) 65 - 100 mg/dL    BUN 20 6 - 20 MG/DL    Creatinine 1.35 (H) 0.55 - 1.02 MG/DL    BUN/Creatinine ratio 15 12 - 20      GFR est AA 45 (L) >60 ml/min/1.73m2    GFR est non-AA 37 (L) >60 ml/min/1.73m2    Calcium 9.4 8.5 - 10.1 MG/DL    Bilirubin, total 0.4 0.2 - 1.0 MG/DL    ALT (SGPT) 28 12 - 78 U/L    AST (SGOT) 21 15 - 37 U/L    Alk.  phosphatase 65 45 - 117 U/L    Protein, total 7.0 6.4 - 8.2 g/dL    Albumin 3.7 3.5 - 5.0 g/dL    Globulin 3.3 2.0 - 4.0 g/dL    A-G Ratio 1.1 1.1 - 2.2     NT-PRO BNP    Collection Time: 12/09/19  6:23 PM   Result Value Ref Range    NT pro-BNP 2,541 (H) <450 PG/ML   TROPONIN I    Collection Time: 12/09/19  6:23 PM   Result Value Ref Range    Troponin-I, Qt. <0.05 <0.05 ng/mL   CULTURE, BLOOD, PAIRED    Collection Time: 12/09/19  6:26 PM   Result Value Ref Range    Special Requests: NO SPECIAL REQUESTS      Culture result: NO GROWTH AFTER 12 HOURS     POC LACTIC ACID    Collection Time: 12/09/19  6:32 PM   Result Value Ref Range    Lactic Acid (POC) 1.37 0.40 - 2.00 mmol/L   URINALYSIS W/ RFLX MICROSCOPIC    Collection Time: 12/09/19  7:53 PM   Result Value Ref Range    Color YELLOW/STRAW      Appearance CLEAR CLEAR      Specific gravity 1.019 1.003 - 1.030      pH (UA) 7.5 5.0 - 8.0      Protein NEGATIVE  NEG mg/dL    Glucose NEGATIVE  NEG mg/dL    Ketone NEGATIVE  NEG mg/dL    Bilirubin NEGATIVE  NEG      Blood NEGATIVE  NEG      Urobilinogen 0.2 0.2 - 1.0 EU/dL    Nitrites NEGATIVE  NEG      Leukocyte Esterase NEGATIVE  NEG POC LACTIC ACID    Collection Time: 12/09/19  7:53 PM   Result Value Ref Range    Lactic Acid (POC) 1.02 0.40 - 2.00 mmol/L   INFLUENZA A & B AG (RAPID TEST)    Collection Time: 12/09/19  9:25 PM   Result Value Ref Range    Influenza A Antigen NEGATIVE  NEG      Influenza B Antigen NEGATIVE  NEG     METABOLIC PANEL, BASIC    Collection Time: 12/10/19  5:31 AM   Result Value Ref Range    Sodium 139 136 - 145 mmol/L    Potassium 4.6 3.5 - 5.1 mmol/L    Chloride 107 97 - 108 mmol/L    CO2 26 21 - 32 mmol/L    Anion gap 6 5 - 15 mmol/L    Glucose 188 (H) 65 - 100 mg/dL    BUN 19 6 - 20 MG/DL    Creatinine 1.23 (H) 0.55 - 1.02 MG/DL    BUN/Creatinine ratio 15 12 - 20      GFR est AA 50 (L) >60 ml/min/1.73m2    GFR est non-AA 41 (L) >60 ml/min/1.73m2    Calcium 9.5 8.5 - 10.1 MG/DL   CBC WITH AUTOMATED DIFF    Collection Time: 12/10/19  5:31 AM   Result Value Ref Range    WBC 9.4 3.6 - 11.0 K/uL    RBC 3.50 (L) 3.80 - 5.20 M/uL    HGB 11.5 11.5 - 16.0 g/dL    HCT 36.8 35.0 - 47.0 %    .1 (H) 80.0 - 99.0 FL    MCH 32.9 26.0 - 34.0 PG    MCHC 31.3 30.0 - 36.5 g/dL    RDW 14.4 11.5 - 14.5 %    PLATELET 734 (L) 153 - 400 K/uL    MPV 11.0 8.9 - 12.9 FL    NRBC 0.0 0  WBC    ABSOLUTE NRBC 0.00 0.00 - 0.01 K/uL    NEUTROPHILS 92 (H) 32 - 75 %    LYMPHOCYTES 7 (L) 12 - 49 %    MONOCYTES 1 (L) 5 - 13 %    EOSINOPHILS 0 0 - 7 %    BASOPHILS 0 0 - 1 %    IMMATURE GRANULOCYTES 0 0.0 - 0.5 %    ABS. NEUTROPHILS 8.6 (H) 1.8 - 8.0 K/UL    ABS. LYMPHOCYTES 0.7 (L) 0.8 - 3.5 K/UL    ABS. MONOCYTES 0.1 0.0 - 1.0 K/UL    ABS. EOSINOPHILS 0.0 0.0 - 0.4 K/UL    ABS. BASOPHILS 0.0 0.0 - 0.1 K/UL    ABS. IMM.  GRANS. 0.0 0.00 - 0.04 K/UL    DF SMEAR SCANNED      RBC COMMENTS MACROCYTOSIS  1+       TROPONIN I    Collection Time: 12/10/19  5:31 AM   Result Value Ref Range    Troponin-I, Qt. 0.09 (H) <0.05 ng/mL       Imaging:  I have personally reviewed the patients radiographs and have reviewed the reports:  12-10-19: CXR: IMPRESSION:  Possible left basilar airspace disease, recommend PA and lateral views when  Possible. 12-9-19: CXR was clear.          Tamanna Hook MD

## 2019-12-10 NOTE — ED NOTES
Bedside and Verbal shift change report given to [employer] (oncoming nurse) by Ange Orozco (offgoing nurse). Report included the following information SBAR, ED Summary, MAR and Recent Results.

## 2019-12-10 NOTE — ED PROVIDER NOTES
EMERGENCY DEPARTMENT HISTORY AND PHYSICAL EXAM      Date: 12/9/2019  Patient Name: Alma Rausch  Patient Age and Sex: 80 y.o. female    History of Presenting Illness     Chief Complaint   Patient presents with    Shortness of Breath     hx of copd.  sob started yesterday. pt denies fever       History Provided By: Patient    HPI: Alma Rausch, is a 80 y.o. female who has a history of CHF with preserved EF, COPD, DM, dependent on oxygen at night and during the day prn, presents to the ED with worsening SOB for 2-3 days, which acutely worsened over the past 24 hours. In addition to the SOB, which is primary exertional, she has developed a cough productive of yellow/brown sputum, new orthopnea since last night, has been dependent on 2-3L of oxygen via NC now for the past 48 hours continuously. She is on prednisone at baseline, alternating between 10 and 15 mg daily. Using prescribed breathing treatments as instructed, these are no longer helping. No fever. No chest pain, palpitations or dizziness. She was prescribed Azithromycin today, started the dose pack with 500mg this morning. Pt denies any other alleviating or exacerbating factors. There are no other complaints, changes or physical findings at this time.      Past Medical History:   Diagnosis Date    Anemia 3/3/2010    Arrhythmia     irregular heartbeat    Arthritis     CH[facility]congestive [facility] failure) [facility] 3/3/2010    Chronic pain     back    Chronic sinusitis 3/3/2010    CPAP (continuous positive airway pressure) dependence     Diverticulosis     DM (diabetes mellitus) ([facility] [address] 3/3/2010    Dyslipidemia 3/3/2010    GERD (gastroesophageal reflux disease)     HTN (hypertension) 3/3/2010    Ill-defined condition     being evaluated py pulmonolgist for \"trouble breathing\"    S/P TKR (total knee replacement) 3/3/2010    Unspecified sleep apnea     doesn't wear CPAP since back has been hurting     Past Surgical History:   Procedure Laterality Date    HX APPENDECTOMY      thinks it was taken with hysterectomy    HX GYN      \"tubes opened\"    HX HEENT      sinus surgery    HX HEMORRHOIDECTOMY      HX HYSTERECTOMY      HX KNEE REPLACEMENT  1996    both knees- at same time    HX LUMBAR LAMINECTOMY  1980s    HX MOHS PROCEDURES      left shoulder    HX ORTHOPAEDIC  1980    neck fusion    HX ORTHOPAEDIC  1999    right knee replaced for second time    HX ORTHOPAEDIC      lumbar fusion    HX OTHER SURGICAL      CORNELL BIOPSY BREAST STEREOTACTIC Left yrs ago    (-)    NM COLONOSCOPY FLX DX W/COLLJ SPEC WHEN PFRMD  [id number]    dr. Lisbet Canales (barium enema)       PCP: Paco Hammond MD    Past History   Past Medical History:  Past Medical History:   Diagnosis Date    Anemia 3/3/2010    Arrhythmia     irregular heartbeat    Arthritis     CHF (congestive heart failure) ([facility] [address] 3/3/2010    Chronic pain     back    Chronic sinusitis 3/3/2010    CPAP (continuous positive airway pressure) dependence     Diverticulosis     DM (diabetes mellitus) ([facility] [address] 3/3/2010    Dyslipidemia 3/3/2010    GERD (gastroesophageal reflux disease)     HTN (hypertension) 3/3/2010    Ill-defined condition     being evaluated py pulmonolgist for \"trouble breathing\"    S/P TKR (total knee replacement) 3/3/2010    Unspecified sleep apnea     doesn't wear CPAP since back has been hurting       Past Surgical History:  Past Surgical History:   Procedure Laterality Date    HX APPENDECTOMY      thinks it was taken with hysterectomy    HX GYN      \"tubes opened\"    HX HEENT      sinus surgery    HX HEMORRHOIDECTOMY      HX HYSTERECTOMY      HX KNEE REPLACEMENT  1996    both knees- at same time    HX LUMBAR LAMINECTOMY  1980s    HX MOHS PROCEDURES      left shoulder    HX ORTHOPAEDIC  1980    neck fusion    HX ORTHOPAEDIC  1999    right knee replaced for second time    HX ORTHOPAEDIC      lumbar fusion    HX OTHER SURGICAL      CORNELL BIOPSY BREAST STEREOTACTIC Left yrs ago    (-)    NE COLONOSCOPY FLX DX W/COLLJ SPEC WHEN PFRMD  [id number]    dr. Lisbet Canales (barium enema)       Family History:  Family History   Problem Relation Age of Onset    Diabetes Mother     Heart Disease Father     Diabetes Brother     Blindness Brother     Diabetes Sister     Heart Disease Sister     Heart Disease Brother     Heart Disease Brother     Asthma Brother     Malignant Hyperthermia Neg Hx     Pseudocholinesterase Deficiency Neg Hx     Delayed Awakening Neg Hx     Post-op Nausea/Vomiting Neg Hx     Emergence Delirium Neg Hx     Post-op Cognitive Dysfunction Neg Hx        Social History:  Social History     Tobacco Use    Smoking status: Former Smoker     Packs/day: 0.30     Years: 5.00     Pack years: 1.50     Last attempt to quit: 1960     Years since quittin.9    Smokeless tobacco: Never Used   Substance Use Topics    Alcohol use: No     Alcohol/week: 0.0 standard drinks    Drug use: No       Allergies: Allergies   Allergen Reactions    Gabapentin Other (comments)     Muscles twitching and jerking    Levaquin [Levofloxacin] Swelling    Lyrica [Pregabalin] Other (comments)     Muscle twitching and jerking    Percodan [Oxycodone Hcl-Oxycodone-Asa] Itching       Current Medications:  No current facility-administered medications on file prior to encounter. Current Outpatient Medications on File Prior to Encounter   Medication Sig Dispense Refill    primidone (MYSOLINE) 50 mg tablet Take 3 tabs by mouth 2 times daily      diclofenac (VOLTAREN) 1 % gel Apply  to affected area four (4) times daily. 300 g 3    DULoxetine (CYMBALTA) 60 mg capsule Take 1 Cap by mouth daily. 90 Cap 3    predniSONE (DELTASONE) 5 mg tablet Take 2 tabs alternating with 3 tabs every other day.       carvedilol (COREG) 25 mg tablet TAKE 1 TABLET BY MOUTH TWICE DAILY WITH MEALS 180 Tab 0    ALPRAZolam (XANAX) 0.5 mg tablet TAKE 1/2 TO 1 TABLETS BY MOUTH EVERY NIGHT AT BEDTIME AS NEEDED FOR SLEEP 30 Tab 3    rosuvastatin (CRESTOR) 10 mg tablet Take 1 Tab by mouth nightly. 90 Tab 1    guaiFENesin (MUCINEX) 1,200 mg Ta12 ER tablet Take 1,200 mg by mouth daily as needed.  polyethylene glycol (MIRALAX) 17 gram/dose powder Take 17 g by mouth daily as needed.  tiotropium-olodaterol (STIOLTO RESPIMAT) 2.5-2.5 mcg/actuation inhaler Take 2 Puffs by inhalation daily.  MULTIVITAMIN PO Take 1 Tab by mouth daily.  Omeprazole delayed release (PRILOSEC D/R) 20 mg tablet Take 20 mg by mouth daily.  neomycin-polymyxin-hydrocortisone (CORTISPORIN) otic solution Administer 3-4 Drops in left ear three (3) times daily as needed (ear discomfort). 1 Bottle 1    furosemide (LASIX) 20 mg tablet TAKE 1 TABLET BY MOUTH EVERY MORNING AND TAKE 1 TABLET BY MOUTH EVERY AFTERNOON BETWEEN 2 AND 4  Tab 3    glipiZIDE SR (GLUCOTROL XL) 2.5 mg CR tablet TAKE ONE TABLET BY MOUTH DAILY 90 Tab 3    albuterol-ipratropium (DUO-NEB) 2.5 mg-0.5 mg/3 ml nebu TAKE CONTENTS OF ONE VIAL BY NEBULIZATION ROUTE EVERY 4 HOURS AS NEEDED(UP TO 6 PER DAY) (Patient taking differently: TAKE CONTENTS OF ONE VIAL BY NEBULIZATION ROUTE EVERY 4 HOURS AS NEEDED(UP TO 6 PER DAY)for difficulty breathing/wheezing) 1620 mL 3    albuterol (VENTOLIN HFA) 90 mcg/actuation inhaler INHALE 2 PUFFS BY MOUTH EVERY 4 HOURS AS NEEDED FOR WHEEZING 1 Inhaler 6    docusate sodium (STOOL SOFTENER PO) Take 1 Tab by mouth two (2) times a day.  lisinopril (PRINIVIL, ZESTRIL) 2.5 mg tablet Take 2.5 mg by mouth daily.  fluticasone (FLONASE) 50 mcg/actuation nasal spray SHAKE LIQUID AND USE 2 SPRAYS IN EACH NOSTRIL EVERY DAY 1 Bottle 11    cyanocobalamin 1,000 mcg tablet Take 1,000 mcg by mouth daily.       promethazine-dextromethorphan (PROMETHAZINE-DM) 6.25-15 mg/5 mL syrup TAKE ONE TEASPOONFUL BY MOUTH EVERY SIX HOURS AS NEEDED FOR COUGH 240 mL 1       Review of Systems   Review of Systems   Constitutional: Negative. Negative for appetite change, chills and fever. HENT: Negative for congestion, ear pain, rhinorrhea, sinus pain, trouble swallowing and voice change. Respiratory: Positive for cough, shortness of breath and wheezing. Negative for chest tightness and stridor. Cardiovascular: Positive for leg swelling. Negative for chest pain and palpitations. Gastrointestinal: Negative for abdominal pain, blood in stool, constipation, diarrhea, nausea and vomiting. Genitourinary: Negative for difficulty urinating, dysuria, flank pain, frequency and hematuria. Musculoskeletal: Negative for arthralgias and joint swelling. Skin: Negative. Neurological: Negative for dizziness, syncope, weakness, numbness and headaches. All other systems reviewed and are negative. Physical Exam   Physical Exam  Vitals signs and nursing note reviewed. Constitutional:       Appearance: She is obese. She is ill-appearing. HENT:      Head: Atraumatic. Mouth/Throat:      Mouth: Mucous membranes are moist.   Eyes:      General: No scleral icterus. Extraocular Movements: Extraocular movements intact. Conjunctiva/sclera: Conjunctivae normal.      Pupils: Pupils are equal, round, and reactive to light. Neck:      Musculoskeletal: Normal range of motion and neck supple. Vascular: JVD present. Trachea: No tracheal deviation. Cardiovascular:      Rate and Rhythm: Regular rhythm. Tachycardia present. Heart sounds: Normal heart sounds. Pulmonary:      Effort: Tachypnea and accessory muscle usage present. Breath sounds: No stridor. Wheezing, rhonchi and rales present. Chest:      Chest wall: No tenderness. Abdominal:      General: Bowel sounds are normal. There is no distension. Palpations: Abdomen is soft. Tenderness: There is no tenderness. Musculoskeletal: Normal range of motion. Right lower leg: Edema present. Left lower leg: Edema present.    Skin:     General: Skin is warm and dry. Capillary Refill: Capillary refill takes 2 to 3 seconds. Neurological:      General: No focal deficit present. Mental Status: She is alert and oriented to person, place, and time. Cranial Nerves: No cranial nerve deficit. Diagnostic Study Results     Labs -  Recent Results (from the past 24 hour(s))   EKG, 12 LEAD, INITIAL    Collection Time: 12/09/19  6:10 PM   Result Value Ref Range    Ventricular Rate 94 BPM    Atrial Rate 94 BPM    P-R Interval 132 ms    QRS Duration 100 ms    Q-T Interval 346 ms    QTC Calculation (Bezet) 432 ms    Calculated P Axis 61 degrees    Calculated R Axis -41 degrees    Calculated T Axis 99 degrees    Diagnosis       Sinus rhythm with occasional premature ventricular complexes  Left axis deviation  Anterior infarct , age undetermined  When compared with ECG of 17-DEC-2015 10:05,  premature ventricular complexes are now present  Inverted T waves have replaced nonspecific T wave abnormality in Lateral   leads     CBC WITH AUTOMATED DIFF    Collection Time: 12/09/19  6:23 PM   Result Value Ref Range    WBC 8.5 3.6 - 11.0 K/uL    RBC 3.74 (L) 3.80 - 5.20 M/uL    HGB 12.0 11.5 - 16.0 g/dL    HCT 39.7 35.0 - 47.0 %    .1 (H) 80.0 - 99.0 FL    MCH 32.1 26.0 - 34.0 PG    MCHC 30.2 30.0 - 36.5 g/dL    RDW 14.4 11.5 - 14.5 %    PLATELET 433 (L) 174 - 400 K/uL    MPV 11.0 8.9 - 12.9 FL    NRBC 0.0 0  WBC    ABSOLUTE NRBC 0.00 0.00 - 0.01 K/uL    NEUTROPHILS 87 (H) 32 - 75 %    BAND NEUTROPHILS 3 %    LYMPHOCYTES 8 (L) 12 - 49 %    MONOCYTES 2 (L) 5 - 13 %    EOSINOPHILS 0 0 - 7 %    BASOPHILS 0 0 - 1 %    IMMATURE GRANULOCYTES 0 0.0 - 0.5 %    ABS. NEUTROPHILS 7.6 1.8 - 8.0 K/UL    ABS. LYMPHOCYTES 0.7 (L) 0.8 - 3.5 K/UL    ABS. MONOCYTES 0.2 0.0 - 1.0 K/UL    ABS. EOSINOPHILS 0.0 0.0 - 0.4 K/UL    ABS. BASOPHILS 0.0 0.0 - 0.1 K/UL    ABS. IMM.  GRANS. 0.0 0.00 - 0.04 K/UL    DF AUTOMATED      RBC COMMENTS MACROCYTOSIS  PRESENT METABOLIC PANEL, COMPREHENSIVE    Collection Time: 12/09/19  6:23 PM   Result Value Ref Range    Sodium 141 136 - 145 mmol/L    Potassium 4.8 3.5 - 5.1 mmol/L    Chloride 108 97 - 108 mmol/L    CO2 31 21 - 32 mmol/L    Anion gap 2 (L) 5 - 15 mmol/L    Glucose 145 (H) 65 - 100 mg/dL    BUN 20 6 - 20 MG/DL    Creatinine 1.35 (H) 0.55 - 1.02 MG/DL    BUN/Creatinine ratio 15 12 - 20      GFR est AA 45 (L) >60 ml/min/1.73m2    GFR est non-AA 37 (L) >60 ml/min/1.73m2    Calcium 9.4 8.5 - 10.1 MG/DL    Bilirubin, total 0.4 0.2 - 1.0 MG/DL    ALT (SGPT) 28 12 - 78 U/L    AST (SGOT) 21 15 - 37 U/L    Alk. phosphatase 65 45 - 117 U/L    Protein, total 7.0 6.4 - 8.2 g/dL    Albumin 3.7 3.5 - 5.0 g/dL    Globulin 3.3 2.0 - 4.0 g/dL    A-G Ratio 1.1 1.1 - 2.2         Radiologic Studies -   XR CHEST PORT   Final Result   IMPRESSION: No acute findings. Medical Decision Making   I am the first provider for this patient. Records Reviewed: I reviewed our electronic medical record system for any past medical records that were available that may contribute to the patient's current condition, including their PMH, surgical history, social and family history. Reviewed the nursing notes and vital signs from today's visit. Vital Signs-Reviewed the patient's vital signs.   Patient Vitals for the past 24 hrs:   Temp Pulse Resp BP SpO2   12/10/19 0030 99.9 °F (37.7 °C) 89 21 (!) 148/101 95 %   12/09/19 2330 100.4 °F (38 °C) 91 20 (!) 150/94 97 %   12/09/19 2200  98 24 (!) 168/100 95 %   12/09/19 2132 (!) 102.1 °F (38.9 °C)       12/09/19 2130    127/90    12/09/19 2100 (!) 100.7 °F (38.2 °C) 96 20 (!) 153/100 99 %   12/09/19 2030  97 19 160/84 95 %   12/09/19 1805 (!) 100.5 °F (38.1 °C) 98 20 (!) 160/97 98 %       Pulse Oximetry Analysis -89% on RA    Cardiac Monitor:   Rate: 106 bpm  Rhythm: Sinus Tachycardia      ED ECG interpretation:  ECG shows normal sinus rhythm, with HR 94, LAD, PVCs, normal intervals and no ST changes concerning for ischemia. This ECG was interpreted by José Miguel Hollingsworth M.D. Provider Notes (Medical Decision Making):   79 yo F with multiple chronic medical problems presents with worsening SOB, productive cough, noted to be febrile here in the ED. Ddx: Pneumonia, bronchitis, influenza, less likely UTI, no congestion to suggest URI. On exam, the patient is oriented but using accessory muscles to breathe, is tachypneic and it is not clear how sustainable this is for her before she tires out. Problem list:  Fever/Infection: No evidence of severe sepsis or septic shock. Source of fever is most likely pulmonary as she has no other symptoms suggesting otherwise. CXR is clear and she likely has a viral bronchitis (influenza negative) that has brought on a copd exacerbation. Her exam is consistent with this given her wheezing and rales on exam along with long expiratory phase of breathing, productive cough. I will broaden her antibiotics to include ceftriaxone, she has already taken azithromycin today. Tachycardia: mild dehydration, fever, underlying infection all contribute to her tachycardia. She is in sinus rhythm. Initially was in the 140s, so I've treated her with a small fluid bolus and tylenol, both have improved her HR. This has permitted me to give her duonebs without being as concerned what effect this would have on her HR.    125 mg of Methylprednisolone also given. Trop negative. Her bnp is elevated and aspects of her exam suggest some heart failure as a possible contributing factor to her illness, however, in the setting of fever it can be addressed once primary issue is controlled. ED Course:   Initial assessment performed. The patients presenting problems have been discussed, and they are in agreement with the care plan formulated and outlined with them. I have encouraged them to ask questions as they arise throughout their visit.       Medications Administered During ED Course:  Medications   acetaminophen (TYLENOL) tablet 650 mg (has no administration in time range)   ondansetron (ZOFRAN) injection 4 mg (has no administration in time range)   sodium chloride 0.9 % bolus infusion 500 mL (0 mL IntraVENous IV Completed 12/9/19 2257)   methylPREDNISolone (PF) (Solu-MEDROL) injection 125 mg (125 mg IntraVENous Given 12/9/19 2251)   albuterol-ipratropium (DUO-NEB) 2.5 MG-0.5 MG/3 ML (3 mL Nebulization Given 12/9/19 2251)   sodium chloride 0.9 % bolus infusion 500 mL (500 mL IntraVENous New Bag 12/9/19 2252)   cefTRIAXone (ROCEPHIN) 1 g in 0.9% sodium chloride (MBP/ADV) 50 mL (1 g IntraVENous New Bag 12/9/19 2331)   acetaminophen (TYLENOL) tablet 1,000 mg (1,000 mg Oral Given 12/9/19 2340)     DISPOSITION: ADMIT  Patient is being admitted to the hospital.  Their test results and reasons for admission have been discussed. The patient and/or available family express agreement with and understanding of the need to be admitted and their admission diagnosis. Thank you for resuming the care of this patient. Please don't hesitate to contact me in the emergency department if you  have any additional questions. Keyona Srinivasan MD, MSc        Diagnosis     Clinical Impression:   1. COPD exacerbation ([address]    2. Acute bronchitis, unspecified organism    3. Congestive heart failure, unspecified HF chronicity, unspecified heart failure type [facility])        Attestation:  I personally performed the services described in this documentation on this date 12/9/2019 for patient Jonna Wu. Keyona Srinivsaan MD    Please note that this dictation was completed with [employer], the computer voice recognition software. Quite often unanticipated grammatical, syntax, homophones, and other interpretive errors are inadvertently transcribed by the computer software. Please disregard these errors. Please excuse any errors that have escaped final proofreading.

## 2019-12-10 NOTE — ED NOTES
TRANSFER - OUT REPORT:    Verbal report given to Tonie RN(name) on Renae Hoyt  being transferred to [facility] (unit) for routine progression of care       Report consisted of patients Situation, Background, Assessment and   Recommendations(SBAR). Information from the following report(s) SBAR, ED Summary, [facility] and Recent Results was reviewed with the receiving nurse. Lines:   Peripheral IV 12/09/19 Left Antecubital (Active)   Site Assessment Clean, dry, & intact 12/9/2019  7:37 PM   Phlebitis Assessment 0 12/9/2019  7:37 PM   Infiltration Assessment 0 12/9/2019  7:37 PM   Dressing Status Clean, dry, & intact 12/9/2019  7:37 PM   Dressing Type Transparent 12/9/2019  7:37 PM   Hub Color/Line Status Pink 12/9/2019  7:37 PM        Opportunity for questions and clarification was provided.

## 2019-12-10 NOTE — PROGRESS NOTES
Problem: Falls - Risk of  Goal: *Absence of Falls  Description  Document Micah Louise Fall Risk and appropriate interventions in the flowsheet.   Outcome: Progressing Towards Goal  Note: Fall Risk Interventions:  Mobility Interventions: Communicate number of staff needed for ambulation/transfer, Utilize walker, cane, or other assistive device              Elimination Interventions: Elevated toilet seat, Call light in reach, Toileting schedule/hourly rounds

## 2019-12-10 NOTE — H&P
Hospitalist Admission Note    NAME: Marian Koroma   :  1931   MRN:  [id number]     Date/Time:  2019 11:59 PM    Patient PCP: Ceci Cruz MD  ______________________________________________________________________  Given the patient's current clinical presentation, I have a high level of concern for decompensation if discharged from the emergency department. Complex decision making was performed, which includes reviewing the patient's available past medical records, laboratory results, and x-ray films. My assessment of this patient's clinical condition and my plan of care is as follows. Assessment / Plan:  Sepsis secondary to acute bronchitis and COPD exacerbation  Admit patient to telemetry unit  start patient on IV antibiotic Rocephin and azithromycin  start patient on IV Solu-Medrol  DuoNeb nebulizer    Hypertension  Continue home antihypertensive medication    DM  Start patient sliding scale with insulin    Hyperlipidemiacontinue  Crestor    Diastolic CHF chronic   -continue lasix         Code Status: Full   Surrogate Decision Maker:Pavan Zamarripa    DVT Prophylaxis: Lovenox   GI Prophylaxis: not indicated        Subjective:   CHIEF COMPLAINT: SOB   HISTORY OF PRESENT ILLNESS:       80years old female from home with past medical history significant for CHF, COPD, DM, oxygen dependent presented to the hospital for evaluation of worsening shortness of breath for 2 to 3 days, patient also complained from productive cough yellowish colored sputum, blood work in the ED show no significant acute abnormality, chest x-ray was done and showed no acute abnormality. We were asked to admit for work up and evaluation of the above problems.      Past Medical History:   Diagnosis Date    Anemia 3/3/2010    Arrhythmia     irregular heartbeat    Arthritis     CHF (congestive heart failure) (HCC) 3/3/2010    Chronic pain     back    Chronic sinusitis 3/3/2010    CPAP (continuous positive airway pressure) dependence     Diverticulosis     DM (diabetes mellitus) ([facility] [address] 3/3/2010    Dyslipidemia 3/3/2010    GERD (gastroesophageal reflux disease)     HTN (hypertension) 3/3/2010    Ill-defined condition     being evaluated py pulmonolgist for \"trouble breathing\"    S/P TKR (total knee replacement) 3/3/2010    Unspecified sleep apnea     doesn't wear CPAP since back has been hurting        Past Surgical History:   Procedure Laterality Date    HX APPENDECTOMY      thinks it was taken with hysterectomy    HX GYN      \"tubes opened\"    HX HEENT      sinus surgery    HX HEMORRHOIDECTOMY      HX HYSTERECTOMY      HX KNEE REPLACEMENT      both knees- at same time    HX LUMBAR LAMINECTOMY      HX MOHS PROCEDURES      left shoulder    HX ORTHOPAEDIC      neck fusion    HX ORTHOPAEDIC      right knee replaced for second time    HX ORTHOPAEDIC      lumbar fusion    HX OTHER SURGICAL      CORNELL BIOPSY BREAST STEREOTACTIC Left yrs ago    (-)    [place] COLONOSCOPY FLX DX W/COLLJ SPEC WHEN PFRMD  [id number]    dr. Rebeca Bland (barium enema)       Social History     Tobacco Use    Smoking status: Former Smoker     Packs/day: 0.30     Years: 5.00     Pack years: 1.50     Last attempt to quit: 1960     Years since quittin.9    Smokeless tobacco: Never Used   Substance Use Topics    Alcohol use: No     Alcohol/week: 0.0 standard drinks        Family History   Problem Relation Age of Onset    Diabetes Mother     Heart Disease Father     Diabetes Brother     Blindness Brother     Diabetes Sister     Heart Disease Sister     Heart Disease Brother     Heart Disease Brother     Asthma Brother     Malignant Hyperthermia Neg Hx     Pseudocholinesterase Deficiency Neg Hx     Delayed Awakening Neg Hx     Post-op Nausea/Vomiting Neg Hx     Emergence Delirium Neg Hx     Post-op Cognitive Dysfunction Neg Hx      Allergies   Allergen Reactions    Gabapentin Other (comments)     Muscles twitching and jerking    Levaquin [Levofloxacin] Swelling    Lyrica [Pregabalin] Other (comments)     Muscle twitching and jerking    Percodan [Oxycodone Hcl-Oxycodone-Asa] Itching        Prior to Admission medications    Medication Sig Start Date End Date Taking? Authorizing Provider   primidone (MYSOLINE) 50 mg tablet Take 3 tabs by mouth 2 times daily    Provider, Historical   diclofenac (VOLTAREN) 1 % gel Apply  to affected area four (4) times daily. 12/6/19   Cristel Garner MD   DULoxetine (CYMBALTA) 60 mg capsule Take 1 Cap by mouth daily. 12/6/19   Lu Machado MD   predniSONE (DELTASONE) 5 mg tablet Take 2 tabs alternating with 3 tabs every other day. Provider, Historical   carvedilol (COREG) 25 mg tablet TAKE 1 TABLET BY MOUTH TWICE DAILY WITH MEALS 11/13/19   Shawn Vance MD   ALPRAZolam Lurlean Norberto) 0.5 mg tablet TAKE 1/2 TO 1 TABLETS BY MOUTH EVERY NIGHT AT BEDTIME AS NEEDED FOR SLEEP 10/22/19   Cristel SCOTT MD   rosuvastatin (CRESTOR) 10 mg tablet Take 1 Tab by mouth nightly. 9/16/19   Lu Machado MD   guaiFENesin (MUCINEX) 1,200 mg Ta12 ER tablet Take 1,200 mg by mouth daily as needed. Provider, Historical   polyethylene glycol (MIRALAX) 17 gram/dose powder Take 17 g by mouth daily as needed. Provider, Historical   tiotropium-olodaterol (STIOLTO RESPIMAT) 2.5-2.5 mcg/actuation inhaler Take 2 Puffs by inhalation daily. Provider, Historical   MULTIVITAMIN PO Take 1 Tab by mouth daily. Provider, Historical   Omeprazole delayed release (PRILOSEC D/R) 20 mg tablet Take 20 mg by mouth daily. Provider, Historical   neomycin-polymyxin-hydrocortisone (CORTISPORIN) otic solution Administer 3-4 Drops in left ear three (3) times daily as needed (ear discomfort).  6/18/19   Lu Machado MD   furosemide (LASIX) 20 mg tablet TAKE 1 TABLET BY MOUTH EVERY MORNING AND TAKE 1 TABLET BY MOUTH EVERY AFTERNOON BETWEEN 2 AND 4 PM 2/26/19   Delores Machado MD   glipiZIDE SR (GLUCOTROL XL) 2.5 mg CR tablet TAKE ONE TABLET BY MOUTH DAILY 2/18/19   Jerry SCOTT MD   albuterol-ipratropium (DUO-NEB) 2.5 mg-0.5 mg/3 ml nebu TAKE CONTENTS OF ONE VIAL BY NEBULIZATION ROUTE EVERY 4 HOURS AS NEEDED(UP TO 6 PER DAY)  Patient taking differently: TAKE CONTENTS OF ONE VIAL BY NEBULIZATION ROUTE EVERY 4 HOURS AS NEEDED(UP TO 6 PER DAY)for difficulty breathing/wheezing 2/11/19   Delores Machado MD   albuterol (VENTOLIN HFA) 90 mcg/actuation inhaler INHALE 2 PUFFS BY MOUTH EVERY 4 HOURS AS NEEDED FOR WHEEZING 12/3/18   Delores Machado MD   docusate sodium (STOOL SOFTENER PO) Take 1 Tab by mouth two (2) times a day. Provider, Historical   lisinopril (PRINIVIL, ZESTRIL) 2.5 mg tablet Take 2.5 mg by mouth daily. Provider, Historical   fluticasone (FLONASE) 50 mcg/actuation nasal spray SHAKE LIQUID AND USE 2 SPRAYS IN EACH NOSTRIL EVERY DAY 1/22/18   Jerry Shay MD   cyanocobalamin 1,000 mcg tablet Take 1,000 mcg by mouth daily. Provider, Historical   promethazine-dextromethorphan (PROMETHAZINE-DM) 6.25-15 mg/5 mL syrup TAKE ONE TEASPOONFUL BY MOUTH EVERY SIX HOURS AS NEEDED FOR COUGH 5/22/17   Delores Machado MD       REVIEW OF SYSTEMS:     I am not able to complete the review of systems because:    The patient is intubated and sedated    The patient has altered mental status due to his acute medical problems    The patient has baseline aphasia from prior stroke(s)    The patient has baseline dementia and is not reliable historian    The patient is in acute medical distress and unable to provide information           Total of 12 systems reviewed as follows:       POSITIVE= underlined text  Negative = text not underlined  General:  fever, chills, sweats, generalized weakness, weight loss/gain,      loss of appetite   Eyes:    blurred vision, eye pain, loss of vision, double vision  ENT:    rhinorrhea, pharyngitis   Respiratory:   cough, sputum production, SOB, HIGH, wheezing, pleuritic pain   Cardiology:   chest pain, palpitations, orthopnea, PND, edema, syncope   Gastrointestinal:  abdominal pain , N/V, diarrhea, dysphagia, constipation, bleeding   Genitourinary:  frequency, urgency, dysuria, hematuria, incontinence   Muskuloskeletal :  arthralgia, myalgia, back pain  Hematology:  easy bruising, nose or gum bleeding, lymphadenopathy   Dermatological: rash, ulceration, pruritis, color change / jaundice  Endocrine:   hot flashes or polydipsia   Neurological:  headache, dizziness, confusion, focal weakness, paresthesia,     Speech difficulties, memory loss, gait difficulty  Psychological: Feelings of anxiety, depression, agitation    Objective:   VITALS:    Visit Vitals  BP (!) 150/94   Pulse 91   Temp 100.4 °F (38 °C)   Resp 20   Ht 5' 4\" (1.626 m)   Wt 100.7 kg (222 lb)   SpO2 97%   BMI 38.11 kg/m²       PHYSICAL EXAM:    General:    Alert, cooperative, no distress, appears stated age. HEENT: Atraumatic, anicteric sclerae, pink conjunctivae     No oral ulcers, mucosa moist, throat clear, dentition fair  Neck:  Supple, symmetrical,  thyroid: non tender  Lungs:   B/l  Rhonchi. Chest wall:  No tenderness  No Accessory muscle use. Heart:   Regular  rhythm,  No  murmur   No edema  Abdomen:   Soft, non-tender. Not distended. Bowel sounds normal  Extremities: No cyanosis. No clubbing,      Skin turgor normal, Capillary refill normal, Radial dial pulse 2+  Skin:     Not pale. Not Jaundiced  No rashes   Psych:  Good insight. Not depressed. Not anxious or agitated. Neurologic: EOMs intact. No facial asymmetry. No aphasia or slurred speech. Symmetrical strength, Sensation grossly intact.  Alert and oriented X 4.     _______________________________________________________________________  Care Plan discussed with:    Comments   Patient y    Family      RN y    Care Manager Consultant:      _______________________________________________________________________  Expected  Disposition:   Home with Family y   HH/PT/OT/RN    SNF/LTC    LYNN    ________________________________________________________________________  TOTAL TIME:  61  Minutes    Critical Care Provided     Minutes non procedure based      Comments    y Reviewed previous records   >50% of visit spent in counseling and coordination of care y Discussion with patient and/or family and questions answered       ________________________________________________________________________  Signed: Mayda Vásquez MD    Procedures: see electronic medical records for all procedures/Xrays and details which were not copied into this note but were reviewed prior to creation of Plan.     LAB DATA REVIEWED:    Recent Results (from the past 24 hour(s))   EKG, 12 LEAD, INITIAL    Collection Time: 12/09/19  6:10 PM   Result Value Ref Range    Ventricular Rate 94 BPM    Atrial Rate 94 BPM    P-R Interval 132 ms    QRS Duration 100 ms    Q-T Interval 346 ms    QTC Calculation (Bezet) 432 ms    Calculated P Axis 61 degrees    Calculated R Axis -41 degrees    Calculated T Axis 99 degrees    Diagnosis       Sinus rhythm with occasional premature ventricular complexes  Left axis deviation  Anterior infarct , age undetermined  When compared with ECG of 17-DEC-2015 10:05,  premature ventricular complexes are now present  Inverted T waves have replaced nonspecific T wave abnormality in Lateral   leads     CBC WITH AUTOMATED DIFF    Collection Time: 12/09/19  6:23 PM   Result Value Ref Range    WBC 8.5 3.6 - 11.0 K/uL    RBC 3.74 (L) 3.80 - 5.20 M/uL    HGB 12.0 11.5 - 16.0 g/dL    HCT 39.7 35.0 - 47.0 %    .1 (H) 80.0 - 99.0 FL    MCH 32.1 26.0 - 34.0 PG    MCHC 30.2 30.0 - 36.5 g/dL    RDW 14.4 11.5 - 14.5 %    PLATELET 956 (L) 498 - 400 K/uL    MPV 11.0 8.9 - 12.9 FL    NRBC 0.0 0  WBC    ABSOLUTE NRBC 0.00 0.00 - 0.01 K/uL NEUTROPHILS 87 (H) 32 - 75 %    BAND NEUTROPHILS 3 %    LYMPHOCYTES 8 (L) 12 - 49 %    MONOCYTES 2 (L) 5 - 13 %    EOSINOPHILS 0 0 - 7 %    BASOPHILS 0 0 - 1 %    IMMATURE GRANULOCYTES 0 0.0 - 0.5 %    ABS. NEUTROPHILS 7.6 1.8 - 8.0 K/UL    ABS. LYMPHOCYTES 0.7 (L) 0.8 - 3.5 K/UL    ABS. MONOCYTES 0.2 0.0 - 1.0 K/UL    ABS. EOSINOPHILS 0.0 0.0 - 0.4 K/UL    ABS. BASOPHILS 0.0 0.0 - 0.1 K/UL    ABS. IMM. GRANS. 0.0 0.00 - 0.04 K/UL    DF AUTOMATED      RBC COMMENTS MACROCYTOSIS  PRESENT       METABOLIC PANEL, COMPREHENSIVE    Collection Time: 12/09/19  6:23 PM   Result Value Ref Range    Sodium 141 136 - 145 mmol/L    Potassium 4.8 3.5 - 5.1 mmol/L    Chloride 108 97 - 108 mmol/L    CO2 31 21 - 32 mmol/L    Anion gap 2 (L) 5 - 15 mmol/L    Glucose 145 (H) 65 - 100 mg/dL    BUN 20 6 - 20 MG/DL    Creatinine 1.35 (H) 0.55 - 1.02 MG/DL    BUN/Creatinine ratio 15 12 - 20      GFR est AA 45 (L) >60 ml/min/1.73m2    GFR est non-AA 37 (L) >60 ml/min/1.73m2    Calcium 9.4 8.5 - 10.1 MG/DL    Bilirubin, total 0.4 0.2 - 1.0 MG/DL    ALT (SGPT) 28 12 - 78 U/L    AST (SGOT) 21 15 - 37 U/L    Alk.  phosphatase 65 45 - 117 U/L    Protein, total 7.0 6.4 - 8.2 g/dL    Albumin 3.7 3.5 - 5.0 g/dL    Globulin 3.3 2.0 - 4.0 g/dL    A-G Ratio 1.1 1.1 - 2.2     NT-PRO BNP    Collection Time: 12/09/19  6:23 PM   Result Value Ref Range    NT pro-BNP 2,541 (H) <450 PG/ML   TROPONIN I    Collection Time: 12/09/19  6:23 PM   Result Value Ref Range    Troponin-I, Qt. <0.05 <0.05 ng/mL   POC LACTIC ACID    Collection Time: 12/09/19  6:32 PM   Result Value Ref Range    Lactic Acid (POC) 1.37 0.40 - 2.00 mmol/L   URINALYSIS W/ RFLX MICROSCOPIC    Collection Time: 12/09/19  7:53 PM   Result Value Ref Range    Color YELLOW/STRAW      Appearance CLEAR CLEAR      Specific gravity 1.019 1.003 - 1.030      pH (UA) 7.5 5.0 - 8.0      Protein NEGATIVE  NEG mg/dL    Glucose NEGATIVE  NEG mg/dL    Ketone NEGATIVE  NEG mg/dL    Bilirubin NEGATIVE  NEG Blood NEGATIVE  NEG      Urobilinogen 0.2 0.2 - 1.0 EU/dL    Nitrites NEGATIVE  NEG      Leukocyte Esterase NEGATIVE  NEG     POC LACTIC ACID    Collection Time: 12/09/19  7:53 PM   Result Value Ref Range    Lactic Acid (POC) 1.02 0.40 - 2.00 mmol/L   INFLUENZA A & B AG (RAPID TEST)    Collection Time: 12/09/19  9:25 PM   Result Value Ref Range    Influenza A Antigen NEGATIVE  NEG      Influenza B Antigen NEGATIVE  NEG

## 2019-12-10 NOTE — PROGRESS NOTES
[facility]:    RN paged for patient with troponin up from negative to 0.09. Patient without CP per discussion with RN. Will repeat troponin at [facility], otherwise continue current plan of care.

## 2019-12-10 NOTE — PROGRESS NOTES
4 AM  Dr. Alida Wilder paged regarding patient wheezing and elevated BP upon admission. Order for stat chest xray and lasix received.

## 2019-12-10 NOTE — PROGRESS NOTES
BRUCE: home with hospital to home visit and follow up appointments. Patient's  to provide transportation     Reason for Admission: COPD exacerbation                  RRAT Score: 49                 Resources/supports as identified by patient/family: family support                   Top Challenges facing patient (as identified by patient/family and CM): Finances/Medication cost? No complications identified at this time     Pharmacy: [address] [facility] [address]                  Transportation? Patient does not drive and relies on her  for all transportation               Support system or lack thereof? Family support                       Living arrangements? Patient and spouse reside in a tri-level home with about 5 NOMAN. Patient reported that her daughter, Joe Clay, lives nearby              Self-care/ADLs/Cognition? Patient independent with ALDs, IADLs (difficulty going up and down stairs, uses cane while outside)    DME: nebulizer q4h, RW, rollator, cane, shower chair, wheelchair, and chairlift inside that assists patient to her bedroom           Current Advanced Directive/Advance Care Plan:  Not at this time                           Plan for utilizing home health:  No recommendations at this time. Patient to go home with [facility] visit                   Transition of Care Plan:                  Patient is a 81 y/o female who was admitted to [facility] 12/09/19 for COPD exacerbation. CM made room visit with patient who was alert and oriented with no visitors present at bedside. Patient confirmed demographics, insurance, and emergency contact on file. Patient's PCP is Dr. Josselin Palomino and was last seen 12/6/19. Patient has used [facility] in the past and denied any history of SNF or IPR. Patient does not have home O2 but does have a nebulizer that she uses q4h. Patient's plan is to return home with .  CM educated patient on [facility] as COPD is a qualifying diagnosis for the [facility] visit. Patient agreeable. Referral sent to [facility] for [facility] visit. Patient confirmed that her  will be able to transport patient home at d/c. Care Management Interventions  PCP Verified by CM: Yes  Last Visit to PCP: 12/06/19  Mode of Transport at Discharge:  Other (see comment)( )  Transition of Care Consult (CM Consult): Discharge Planning  Discharge Durable Medical Equipment: No  Physical Therapy Consult: No  Occupational Therapy Consult: No  Speech Therapy Consult: No  Current Support Network: Lives with Spouse, Own Home, Family Lives Nearby  Confirm Follow Up Transport: Family  Plan discussed with Pt/Family/Caregiver: Yes  Discharge Location  Discharge Placement: Home(H2H)    Jazmin Schroeder, [address] [facility], [address]

## 2019-12-10 NOTE — PROGRESS NOTES
Hospitalist Progress Note    NAME: Kasia Paiz   :  1931   MRN:  [id number]       Assessment / Plan:  CAP with concern for early sepsis  -Tmax 102.1, tachypnea RR 27  AECOPD with air trapping  -AonC chronic bronchitis  -appreciate pulm help  -cont rocephin and azithro  -CXR PA/Lat reveals mild pna in Lt retrocardiac region  -Tmax 102.1 overnight, fevers seem to be breaking today  -wheezing earlier this AM improved w breathing treatment, add scheduled nebs to prns  -cont IV steroids  -on RA    Hypertension  Continue home antihypertensive medication    DM2  cont SSI with POCs  -hold home glipizide    Hyperlipidemiacontinue  Crestor     Diastolic CHF chronic   -continue lasix     DC dispo: plan for DC home, hopefully in next 24hrs pending course. Hospital to home visit set up with case managment       Code Status: Full   Surrogate Decision Maker:Pavan Zamarripa     DVT Prophylaxis: Lovenox   GI Prophylaxis: not indicated     Subjective:     Chief Complaint / Reason for Physician Visit  C/o weezing earlier this AM, which improved after breathing treatment. No other new complaints      Discussed with RN events overnight. Review of Systems:  Symptom Y/N Comments  Symptom Y/N Comments   Fever/Chills y   Chest Pain n    Poor Appetite    Edema     Cough y   Abdominal Pain n    Sputum n   Joint Pain     SOB/HIGH y   Pruritis/Rash     Nausea/vomit n   Tolerating PT/OT     Diarrhea n   Tolerating Diet y    Constipation n   Other       Could NOT obtain due to:      Objective:     VITALS:   Last 24hrs VS reviewed since prior progress note.  Most recent are:  Patient Vitals for the past 24 hrs:   Temp Pulse Resp BP SpO2   12/10/19 1544 98 °F (36.7 °C) 84 20 135/86 95 %   12/10/19 1506     99 %   12/10/19 1235 98.5 °F (36.9 °C)  20 154/64 100 %   12/10/19 1129     95 %   12/10/19 0929     98 %   12/10/19 0742 98.5 °F (36.9 °C) 80 20 111/88 99 %   12/10/19 0525 98.5 °F (36.9 °C) 87 22 135/65  12/10/19 0346     98 %   12/10/19 0320 98.8 °F (37.1 °C) 94 24 (!) 168/93 98 %   12/10/19 0130  94 27 156/79 96 %   12/10/19 0100  89 26 156/75 96 %   12/10/19 0030 99.9 °F (37.7 °C) 89 21 (!) 148/101 95 %   12/09/19 2330 100.4 °F (38 °C) 91 20 (!) 150/94 97 %   12/09/19 2200  98 24 (!) 168/100 95 %   12/09/19 2132 (!) 102.1 °F (38.9 °C)       12/09/19 2130    127/90    12/09/19 2100 (!) 100.7 °F (38.2 °C) 96 20 (!) 153/100 99 %   12/09/19 2030  97 19 160/84 95 %   12/09/19 1805 (!) 100.5 °F (38.1 °C) 98 20 (!) 160/97 98 %       Intake/Output Summary (Last 24 hours) at 12/10/2019 1557  Last data filed at 12/10/2019 0503  Gross per 24 hour   Intake    Output 500 ml   Net -500 ml        PHYSICAL EXAM:  General: WD, WN. Alert, cooperative, no acute distress    EENT:  EOMI. Anicteric sclerae. MMM  Resp:  CTA bilaterally, diminished air entry b/l fields. No accessory muscle use  CV:  Regular  rhythm,  No edema  GI:  Soft, Non distended, Non tender.  +Bowel sounds  Neurologic:  Alert and oriented X 3, normal speech,   Psych:   Good insight. Not anxious nor agitated  Skin:  No rashes. No jaundice    Reviewed most current lab test results and cultures  YES  Reviewed most current radiology test results   YES  Review and summation of old records today    NO  Reviewed patient's current orders and MAR    YES  PMH/SH reviewed - no change compared to H&P  ________________________________________________________________________  Care Plan discussed with:    Comments   Patient x    Family      RN x    Care Manager     Consultant                       x Multidiciplinary team rounds were held today with , nursing, pharmacist and clinical coordinator. Patient's plan of care was discussed; medications were reviewed and discharge planning was addressed.      ________________________________________________________________________  Total NON critical care TIME:  30   Minutes    Total CRITICAL CARE TIME Spent: Minutes non procedure based      Comments   >50% of visit spent in counseling and coordination of care     ________________________________________________________________________  Reggie Lombard, DO     Procedures: see electronic medical records for all procedures/Xrays and details which were not copied into this note but were reviewed prior to creation of Plan. LABS:  I reviewed today's most current labs and imaging studies.   Pertinent labs include:  Recent Labs     12/10/19  0531 12/09/19  1823   WBC 9.4 8.5   HGB 11.5 12.0   HCT 36.8 39.7   * 137*     Recent Labs     12/10/19  0531 12/09/19  1823    141   K 4.6 4.8    108   CO2 26 31   * 145*   BUN 19 20   CREA 1.23* 1.35*   CA 9.5 9.4   ALB  --  3.7   TBILI  --  0.4   SGOT  --  21   ALT  --  28       Signed: Nnamdi Tapia DO

## 2019-12-11 VITALS
SYSTOLIC BLOOD PRESSURE: 158 MMHG | OXYGEN SATURATION: 96 % | HEART RATE: 89 BPM | WEIGHT: 222.6 LBS | BODY MASS INDEX: 38 KG/M2 | RESPIRATION RATE: 20 BRPM | TEMPERATURE: 98.6 F | DIASTOLIC BLOOD PRESSURE: 81 MMHG | HEIGHT: 64 IN

## 2019-12-11 LAB
ANION GAP SERPL CALC-SCNC: 7 MMOL/L (ref 5–15)
BASOPHILS # BLD: 0 K/UL (ref 0–0.1)
BASOPHILS NFR BLD: 0 % (ref 0–1)
BUN SERPL-MCNC: 26 MG/DL (ref 6–20)
BUN/CREAT SERPL: 17 (ref 12–20)
CALCIUM SERPL-MCNC: 9.2 MG/DL (ref 8.5–10.1)
CHLORIDE SERPL-SCNC: 106 MMOL/L (ref 97–108)
CO2 SERPL-SCNC: 27 MMOL/L (ref 21–32)
CREAT SERPL-MCNC: 1.52 MG/DL (ref 0.55–1.02)
DIFFERENTIAL METHOD BLD: ABNORMAL
EOSINOPHIL # BLD: 0 K/UL (ref 0–0.4)
EOSINOPHIL NFR BLD: 0 % (ref 0–7)
ERYTHROCYTE [DISTWIDTH] IN BLOOD BY AUTOMATED COUNT: 14.4 % (ref 11.5–14.5)
GLUCOSE BLD STRIP.AUTO-MCNC: 187 MG/DL (ref 65–100)
GLUCOSE SERPL-MCNC: 203 MG/DL (ref 65–100)
HCT VFR BLD AUTO: 34.9 % (ref 35–47)
HGB BLD-MCNC: 11 G/DL (ref 11.5–16)
IMM GRANULOCYTES # BLD AUTO: 0 K/UL (ref 0–0.04)
IMM GRANULOCYTES NFR BLD AUTO: 0 % (ref 0–0.5)
LYMPHOCYTES # BLD: 0.9 K/UL (ref 0.8–3.5)
LYMPHOCYTES NFR BLD: 11 % (ref 12–49)
MCH RBC QN AUTO: 32.8 PG (ref 26–34)
MCHC RBC AUTO-ENTMCNC: 31.5 G/DL (ref 30–36.5)
MCV RBC AUTO: 104.2 FL (ref 80–99)
MONOCYTES # BLD: 0.2 K/UL (ref 0–1)
MONOCYTES NFR BLD: 3 % (ref 5–13)
NEUTS SEG # BLD: 6.8 K/UL (ref 1.8–8)
NEUTS SEG NFR BLD: 86 % (ref 32–75)
NRBC # BLD: 0 K/UL (ref 0–0.01)
NRBC BLD-RTO: 0 PER 100 WBC
PLATELET # BLD AUTO: 126 K/UL (ref 150–400)
PMV BLD AUTO: 11.7 FL (ref 8.9–12.9)
POTASSIUM SERPL-SCNC: 4.4 MMOL/L (ref 3.5–5.1)
RBC # BLD AUTO: 3.35 M/UL (ref 3.8–5.2)
SERVICE CMNT-IMP: ABNORMAL
SODIUM SERPL-SCNC: 140 MMOL/L (ref 136–145)
WBC # BLD AUTO: 7.9 K/UL (ref 3.6–11)

## 2019-12-11 PROCEDURE — 85025 COMPLETE CBC W/AUTO DIFF WBC: CPT

## 2019-12-11 PROCEDURE — 82962 GLUCOSE BLOOD TEST: CPT

## 2019-12-11 PROCEDURE — 77010033678 HC OXYGEN DAILY

## 2019-12-11 PROCEDURE — 36415 COLL VENOUS BLD VENIPUNCTURE: CPT

## 2019-12-11 PROCEDURE — 74011250636 HC RX REV CODE- 250/636: Performed by: INTERNAL MEDICINE

## 2019-12-11 PROCEDURE — 94760 N-INVAS EAR/PLS OXIMETRY 1: CPT

## 2019-12-11 PROCEDURE — 94640 AIRWAY INHALATION TREATMENT: CPT

## 2019-12-11 PROCEDURE — 74011000250 HC RX REV CODE- 250: Performed by: INTERNAL MEDICINE

## 2019-12-11 PROCEDURE — 80048 BASIC METABOLIC PNL TOTAL CA: CPT

## 2019-12-11 RX ORDER — AZITHROMYCIN 250 MG/1
TABLET, FILM COATED ORAL
Qty: 3 TAB | Refills: 0 | Status: SHIPPED | OUTPATIENT
Start: 2019-12-12 | End: 2019-12-14

## 2019-12-11 RX ORDER — IPRATROPIUM BROMIDE AND ALBUTEROL SULFATE 2.5; .5 MG/3ML; MG/3ML
3 SOLUTION RESPIRATORY (INHALATION)
Status: DISCONTINUED | OUTPATIENT
Start: 2019-12-11 | End: 2019-12-11 | Stop reason: HOSPADM

## 2019-12-11 RX ADMIN — FAMOTIDINE 20 MG: 10 INJECTION, SOLUTION INTRAVENOUS at 08:56

## 2019-12-11 RX ADMIN — AZITHROMYCIN MONOHYDRATE 500 MG: 500 INJECTION, POWDER, LYOPHILIZED, FOR SOLUTION INTRAVENOUS at 01:06

## 2019-12-11 RX ADMIN — IPRATROPIUM BROMIDE AND ALBUTEROL SULFATE 3 ML: .5; 3 SOLUTION RESPIRATORY (INHALATION) at 07:37

## 2019-12-11 RX ADMIN — ENOXAPARIN SODIUM 40 MG: 40 INJECTION SUBCUTANEOUS at 08:55

## 2019-12-11 RX ADMIN — METHYLPREDNISOLONE SODIUM SUCCINATE 40 MG: 40 INJECTION, POWDER, FOR SOLUTION INTRAMUSCULAR; INTRAVENOUS at 06:26

## 2019-12-11 RX ADMIN — Medication 10 ML: at 06:26

## 2019-12-11 RX ADMIN — IPRATROPIUM BROMIDE AND ALBUTEROL SULFATE 3 ML: .5; 3 SOLUTION RESPIRATORY (INHALATION) at 03:28

## 2019-12-11 NOTE — PROGRESS NOTES
ADULT PROTOCOL: JET AEROSOL ASSESSMENT    Patient  Sagar Ratliff     80 y.o.   female     12/10/2019  9:30 PM    Breath Sounds Pre Procedure: Right Breath Sounds: Expiratory wheezing                               Left Breath Sounds: Expiratory wheezing    Breath Sounds Post Procedure: Right Breath Sounds: Expiratory wheezing                                 Left Breath Sounds: Expiratory wheezing    Breathing pattern: Pre procedure Breathing Pattern: Regular          Post procedure Breathing Pattern: Regular    Heart Rate: Pre procedure Pulse: 96           Post procedure Pulse: 96    Resp Rate: Pre procedure Respirations: 20           Post procedure Respirations: 17      Oxygen: O2 Device: Room air        Changed: YES    SpO2: Pre procedure SpO2: 90 %   without oxygen              Post procedure SpO2: 94 %  without oxygen    Nebulizer Therapy: Current medications Aerosolized Medications: DuoNeb      Changed: NO Q4 RESP    Smoking History: FORMER SMOKER    Problem List:   Patient Active Problem List   Diagnosis Code    CHF (congestive heart failure) ([facility]) I50.9    S/P TKR (total knee replacement) Z96.659    Anemia D64.9    s/p lumbar laminectomy Z98.890    S/P ASAD (total abdominal hysterectomy) Z90.710    S/P hemorrhoidectomy Z98.890, Z87.19    S/P rotator cuff surgery Z98.890    DM (diabetes mellitus) ([facility]) E11.9    Chronic sinusitis J32.9    S/P sinus surgery Z98.890    Dyslipidemia E78.5    Environmental allergies Z91.09    Obstructive sleep apnea (adult) (pediatric) G47.33    DJD (degenerative joint disease) M19.90    Chronic systolic heart failure ([facility]) I50.22    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    Asthma J45.909    Anxiety disorder F41.9    Diabetic neuropathy ([facility]) E11.40    Esophageal reflux K21.9    Essential hypertension, benign I10    Reactive airway disease with wheezing without complication G07.733    Encounter for medication monitoring Z51.81    CKD (chronic kidney disease) N18.9    Arthralgia of multiple joints M25.50    Plantar fasciitis of left foot M72.2    Type 2 diabetes with nephropathy (HCC) E11.21    Severe obesity (BMI 35.0-39. 9) with comorbidity ([facility] E66.01    Polymyalgia rheumatica ([facility] M35.3    COPD exacerbation ([facility] J44.1       Respiratory Therapist: Real Amaya, RT

## 2019-12-11 NOTE — PROGRESS NOTES
Problem: Diabetes Self-Management  Goal: *Disease process and treatment process  Description  Define diabetes and identify own type of diabetes; list 3 options for treating diabetes. Outcome: Progressing Towards Goal  Goal: *Incorporating nutritional management into lifestyle  Description  Describe effect of type, amount and timing of food on blood glucose; list 3 methods for planning meals. Outcome: Progressing Towards Goal  Goal: *Incorporating physical activity into lifestyle  Description  State effect of exercise on blood glucose levels. Outcome: Progressing Towards Goal  Goal: *Developing strategies to promote health/change behavior  Description  Define the ABC's of diabetes; identify appropriate screenings, schedule and personal plan for screenings. Outcome: Progressing Towards Goal  Goal: *Using medications safely  Description  State effect of diabetes medications on diabetes; name diabetes medication taking, action and side effects. Outcome: Progressing Towards Goal  Goal: *Monitoring blood glucose, interpreting and using results  Description  Identify recommended blood glucose targets  and personal targets. Outcome: Progressing Towards Goal  Goal: *Prevention, detection, treatment of acute complications  Description  List symptoms of hyper- and hypoglycemia; describe how to treat low blood sugar and actions for lowering  high blood glucose level. Outcome: Progressing Towards Goal  Goal: *Prevention, detection and treatment of chronic complications  Description  Define the natural course of diabetes and describe the relationship of blood glucose levels to long term complications of diabetes.   Outcome: Progressing Towards Goal  Goal: *Developing strategies to address psychosocial issues  Description  Describe feelings about living with diabetes; identify support needed and support network  Outcome: Progressing Towards Goal  Goal: *Insulin pump training  Outcome: Progressing Towards Goal  Goal: *Sick day guidelines  Outcome: Progressing Towards Goal  Goal: *Patient Specific Goal (EDIT GOAL, INSERT TEXT)  Outcome: Progressing Towards Goal     Problem: Patient Education: Go to Patient Education Activity  Goal: Patient/Family Education  Outcome: Progressing Towards Goal     Problem: Falls - Risk of  Goal: *Absence of Falls  Description  Document Nicole Phipps Fall Risk and appropriate interventions in the flowsheet.   Outcome: Progressing Towards Goal  Note: Fall Risk Interventions:  Mobility Interventions: Communicate number of staff needed for ambulation/transfer         Medication Interventions: Teach patient to arise slowly    Elimination Interventions: Elevated toilet seat              Problem: Patient Education: Go to Patient Education Activity  Goal: Patient/Family Education  Outcome: Progressing Towards Goal     Problem: Chronic Obstructive Pulmonary Disease (COPD)  Goal: *Oxygen saturation during activity within specified parameters  Outcome: Progressing Towards Goal

## 2019-12-11 NOTE — PROGRESS NOTES
Bedside and Verbal shift change report given to Guanaco Chow RN (oncoming nurse) by Sourav Quick (offgoing nurse). Report included the following information SBAR, MAR, Accordion and Recent Results.

## 2019-12-11 NOTE — PROGRESS NOTES
BRUCE: home with hospital to home visit and follow up appointments,  to provide transportation at d/c    9:45am  BRENDON has communicated with MD who stated that patient is likely to d/c today. CM has scheduled pulmonary follow up appointment for this Friday 12/13/19 at 9:30am. AVS updated. BRENDON has sent message to scheduling committee requesting PCP follow up appointment, which has already been completed. 9:00am  [facility] [address] has accepted patient for [facility] visit. AVS updated.      Óscar Gómez, [facility], [address]

## 2019-12-11 NOTE — PROGRESS NOTES
[facility]  Pulmonary, Critical Care, and Sleep Medicine    Initial Patient Consult    Name: Renae Hoyt MRN: [id number]   : 1931 [facility]   Date: 2019        IMPRESSION:   · COPD with acute exacerbation; FEV1 of 1.43, 61% pred. PFTs had good bronchodilator response. Has air trapping. Sees Dr. Yaquelin Lynn. · Acute on Chronic Bronchitis   · Allergic rhinitis, seems at baseline. Possible chronic bronchitis. On singulair. · CAD had mildly increased troponin. 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 · T2DM  · Baseline Hypertension  · DONNIE but has not been compliant with CPAP. · GERD-on ppi. · Obese  · Chronic venous insufficiency, uses compression hose. · Arthritis. · Chronic low back pain, had prior back surgery. RECOMMENDATIONS:   · Will need mucinex- monitor for plugging  · Finish Po abx and course of prednisone. · Has home nebs. · Could go home later today  · Pt should have home PT and then go to outpt [facility]   · follow up in office upon discharge for hospital follow up. Can call [phone] for follow up. · Will need to keep regular appt with DR. Delaney     PCP: Jeannette  Pulm: Dr. Ishmael Santiago  Cards: Charlet Patches  Renal: Dr. Rasta Sands    Walking in room with walker. No O2. Back to baseline and hoping to improve. Mild wheeze on exam.  at bedside    Subjective: This patient has been seen and evaluated at the request of Dr. Jessica Garcia for above. Patient is a 80 y.o. female who presents for increased shortness of breath, severe dyspnea, wheezing. Used her neb at home without relief. Acute onset. Has gotten better since being admitted. Was a smoker when she was young in her 25s. She last saw Dr. Yaquelin Lynn on 10/31/19. Prior to 3-4 days ago had been at baseline. Had dyspnea with moderate exertion. Had intermittent wheezing and chest congestion.      CT from  did not reveal pulmonary fibrosis. Did have abnormal area of RML. Past Medical History:   Diagnosis Date    Anemia 3/3/2010    Arrhythmia     irregular heartbeat    Arthritis     CHF [facility]congestive heart failure) ([facility]) 3/3/2010    Chronic pain     back    Chronic sinusitis 3/3/2010    CPAP (continuous positive airway pressure) dependence     Diverticulosis     DM (diabetes mellitus) ([facility] 3/3/2010    Dyslipidemia 3/3/2010    GERD (gastroesophageal reflux disease)     HTN (hypertension) 3/3/2010    Ill-defined condition     being evaluated py pulmonolgist for \"trouble breathing\"    S/P TKR (total knee replacement) 3/3/2010    Unspecified sleep apnea     doesn't wear CPAP since back has been hurting      Past Surgical History:   Procedure Laterality Date    HX APPENDECTOMY      thinks it was taken with hysterectomy    HX GYN      \"tubes opened\"    HX HEENT      sinus surgery    HX HEMORRHOIDECTOMY      HX HYSTERECTOMY      HX KNEE REPLACEMENT  1996    both knees- at same time    HX LUMBAR LAMINECTOMY  1980s    HX MOHS PROCEDURES      left shoulder    HX ORTHOPAEDIC  1980    neck fusion    HX ORTHOPAEDIC  1999    right knee replaced for second time    HX ORTHOPAEDIC      lumbar fusion    HX OTHER SURGICAL      CORNELL BIOPSY BREAST STEREOTACTIC Left yrs ago    (-)    PA COLONOSCOPY FLX DX W/COLLJ SPEC WHEN PFRMD  [id number]    dr. Zelda Lim (barium enema)      Prior to Admission medications    Medication Sig Start Date End Date Taking? Authorizing Provider   azithromycin (ZITHROMAX) 250 mg tablet Take one tab by mouth daily for 3 days 12/12/19 12/14/19 Yes [place], Shanda Downy, DO   primidone (MYSOLINE) 50 mg tablet Take 3 tabs by mouth 2 times daily    Provider, Brandon   diclofenac (VOLTAREN) 1 % gel Apply  to affected area four (4) times daily. 12/6/19   Ashley Mathew MD   DULoxetine (CYMBALTA) 60 mg capsule Take 1 Cap by mouth daily.  12/6/19   Ashley Mathew MD   predniSONE (DELTASONE) 5 mg tablet Take 2 tabs alternating with 3 tabs every other day. Provider, Historical   carvedilol (COREG) 25 mg tablet TAKE 1 TABLET BY MOUTH TWICE DAILY WITH MEALS 11/13/19   Evelin Omer MD   ALPRAZolam Matt Harper) 0.5 mg tablet TAKE 1/2 TO 1 TABLETS BY MOUTH EVERY NIGHT AT BEDTIME AS NEEDED FOR SLEEP 10/22/19   Geovanna SCOTT MD   rosuvastatin (CRESTOR) 10 mg tablet Take 1 Tab by mouth nightly. 9/16/19   Prisca Machado MD   guaiFENesin (MUCINEX) 1,200 mg Ta12 ER tablet Take 1,200 mg by mouth daily as needed. Provider, Historical   polyethylene glycol (MIRALAX) 17 gram/dose powder Take 17 g by mouth daily as needed. Provider, Historical   tiotropium-olodaterol (STIOLTO RESPIMAT) 2.5-2.5 mcg/actuation inhaler Take 2 Puffs by inhalation daily. Provider, Historical   MULTIVITAMIN PO Take 1 Tab by mouth daily. Provider, Historical   Omeprazole delayed release (PRILOSEC D/R) 20 mg tablet Take 20 mg by mouth daily. Provider, Historical   neomycin-polymyxin-hydrocortisone (CORTISPORIN) otic solution Administer 3-4 Drops in left ear three (3) times daily as needed (ear discomfort).  6/18/19   Prisca Machado MD   furosemide (LASIX) 20 mg tablet TAKE 1 TABLET BY MOUTH EVERY MORNING AND TAKE 1 TABLET BY MOUTH EVERY AFTERNOON BETWEEN 2 AND 4 PM 2/26/19   Prisca Machado MD   glipiZIDE SR (GLUCOTROL XL) 2.5 mg CR tablet TAKE ONE TABLET BY MOUTH DAILY 2/18/19   Geovanna SCOTT MD   albuterol-ipratropium (DUO-NEB) 2.5 mg-0.5 mg/3 ml nebu TAKE CONTENTS OF ONE VIAL BY NEBULIZATION ROUTE EVERY 4 HOURS AS NEEDED(UP TO 6 PER DAY)  Patient taking differently: TAKE CONTENTS OF ONE VIAL BY NEBULIZATION ROUTE EVERY 4 HOURS AS NEEDED(UP TO 6 PER DAY)for difficulty breathing/wheezing 2/11/19   Prisca Machado MD   albuterol (VENTOLIN HFA) 90 mcg/actuation inhaler INHALE 2 PUFFS BY MOUTH EVERY 4 HOURS AS NEEDED FOR WHEEZING 12/3/18   Jeannette Miriam Bee MD   docusate sodium (STOOL SOFTENER PO) Take 1 Tab by mouth two (2) times a day. Provider, Historical   lisinopril (PRINIVIL, ZESTRIL) 2.5 mg tablet Take 2.5 mg by mouth daily. Provider, Historical   fluticasone (FLONASE) 50 mcg/actuation nasal spray SHAKE LIQUID AND USE 2 SPRAYS IN EACH NOSTRIL EVERY DAY 18   Jessy Roberson MD   cyanocobalamin 1,000 mcg tablet Take 1,000 mcg by mouth daily.     Provider, Historical   promethazine-dextromethorphan (PROMETHAZINE-DM) 6.25-15 mg/5 mL syrup TAKE ONE TEASPOONFUL BY MOUTH EVERY SIX HOURS AS NEEDED FOR COUGH 17   Miriam Machado MD     Allergies   Allergen Reactions    Gabapentin Other (comments)     Muscles twitching and jerking    Levaquin [Levofloxacin] Swelling    Lyrica [Pregabalin] Other (comments)     Muscle twitching and jerking    Percodan [Oxycodone Hcl-Oxycodone-Asa] Itching      Social History     Tobacco Use    Smoking status: Former Smoker     Packs/day: 0.30     Years: 5.00     Pack years: 1.50     Last attempt to quit: 1960     Years since quittin.9    Smokeless tobacco: Never Used   Substance Use Topics    Alcohol use: No     Alcohol/week: 0.0 standard drinks      Family History   Problem Relation Age of Onset    Diabetes Mother     Heart Disease Father     Diabetes Brother     Blindness Brother     Diabetes Sister     Heart Disease Sister     Heart Disease Brother     Heart Disease Brother     Asthma Brother     Malignant Hyperthermia Neg Hx     Pseudocholinesterase Deficiency Neg Hx     Delayed Awakening Neg Hx     Post-op Nausea/Vomiting Neg Hx     Emergence Delirium Neg Hx     Post-op Cognitive Dysfunction Neg Hx         Current Facility-Administered Medications   Medication Dose Route Frequency    sodium chloride (NS) flush 5-40 mL  5-40 mL IntraVENous Q8H    enoxaparin (LOVENOX) injection 40 mg  40 mg SubCUTAneous DAILY    famotidine (PF) (PEPCID) injection 20 mg  20 mg IntraVENous Q12H    methylPREDNISolone (PF) (SOLU-MEDROL) injection 40 mg  40 mg IntraVENous Q8H    azithromycin (ZITHROMAX) 500 mg in 0.9% sodium chloride (MBP/ADV) 250 mL  500 mg IntraVENous Q24H    albuterol-ipratropium (DUO-NEB) 2.5 MG-0.5 MG/3 ML  3 mL Nebulization Q4H RT       Review of Systems:  Constitutional: positive for fatigue  Eyes: negative  Ears, nose, mouth, throat, and face: negative  Respiratory: positive for cough, hemoptysis, wheezing, dyspnea on exertion or chronic bronchitis  Cardiovascular: negative  Gastrointestinal: negative  Genitourinary:negative  Integument/breast: negative  Hematologic/lymphatic: negative  Musculoskeletal:negative  Neurological: negative  Behavioral/Psych: negative  Endocrine: negative  Allergic/Immunologic: negative    Objective:   Vital Signs:    Visit Vitals  /81 (BP 1 Location: Right arm, BP Patient Position: At rest)   Pulse 89   Temp 98.6 °F (37 °C)   Resp 20   Ht 5' 4\" (1.626 m)   Wt 101 kg (222 lb 9.6 oz)   LMP 1961 (Approximate)   SpO2 96%   BMI 38.21 kg/m²       O2 Device: Room air   O2 Flow Rate (L/min): 2 l/min   Temp (24hrs), Av.2 °F (36.8 °C), Min:97.5 °F (36.4 °C), Max:98.6 °F (37 °C)       Intake/Output:   Last shift:      No intake/output data recorded. Last 3 shifts:  1901 -  0700  In: 720 [P.O.:720]  Out: 900 [Urine:900]    Intake/Output Summary (Last 24 hours) at 2019 1004  Last data filed at 12/10/2019 1818  Gross per 24 hour   Intake 480 ml   Output    Net 480 ml      Physical Exam:   General:  Alert, cooperative, no distress, appears stated age. NO distress. Head:  Normocephalic, without obvious abnormality, atraumatic. Eyes:  Conjunctivae/corneas clear. PERRL, EOMs intact. Nose: Nares normal. Septum midline. Mucosa normal. No drainage or sinus tenderness.    Throat: Lips, mucosa, and tongue normal. Teeth and gums normal.   Neck: Supple, symmetrical, trachea midline, no adenopathy, thyroid: no enlargment/tenderness/nodules, no carotid bruit and no JVD. Back:   Symmetric, no curvature. ROM normal.   Lungs:   Clear to auscultation bilaterally. Seems to be breathing comfortably. Chest wall:  No tenderness or deformity. Heart:  Regular rate and rhythm, S1, S2 normal, no murmur, click, rub or gallop. Abdomen:   Soft, non-tender. Bowel sounds normal. No masses,  No organomegaly. Obese. Extremities: Extremities normal, atraumatic, no cyanosis or edema. Pulses: 2+ and symmetric all extremities. Skin: Skin color, texture, turgor normal. No rashes or lesions   Lymph nodes: Cervical, supraclavicular, and axillary nodes normal.   Neurologic: Grossly nonfocal. Motor seems to be intact. Psych: No overt anxiety or depression.       Data review:     Recent Results (from the past 24 hour(s))   TROPONIN I    Collection Time: 12/10/19 11:32 AM   Result Value Ref Range    Troponin-I, Qt. <0.05 <9.56 ng/mL   METABOLIC PANEL, BASIC    Collection Time: 12/11/19  3:38 AM   Result Value Ref Range    Sodium 140 136 - 145 mmol/L    Potassium 4.4 3.5 - 5.1 mmol/L    Chloride 106 97 - 108 mmol/L    CO2 27 21 - 32 mmol/L    Anion gap 7 5 - 15 mmol/L    Glucose 203 (H) 65 - 100 mg/dL    BUN 26 (H) 6 - 20 MG/DL    Creatinine 1.52 (H) 0.55 - 1.02 MG/DL    BUN/Creatinine ratio 17 12 - 20      GFR est AA 39 (L) >60 ml/min/1.73m2    GFR est non-AA 32 (L) >60 ml/min/1.73m2    Calcium 9.2 8.5 - 10.1 MG/DL   CBC WITH AUTOMATED DIFF    Collection Time: 12/11/19  3:38 AM   Result Value Ref Range    WBC 7.9 3.6 - 11.0 K/uL    RBC 3.35 (L) 3.80 - 5.20 M/uL    HGB 11.0 (L) 11.5 - 16.0 g/dL    HCT 34.9 (L) 35.0 - 47.0 %    .2 (H) 80.0 - 99.0 FL    MCH 32.8 26.0 - 34.0 PG    MCHC 31.5 30.0 - 36.5 g/dL    RDW 14.4 11.5 - 14.5 %    PLATELET 652 (L) 542 - 400 K/uL    MPV 11.7 8.9 - 12.9 FL    NRBC 0.0 0  WBC    ABSOLUTE NRBC 0.00 0.00 - 0.01 K/uL    NEUTROPHILS 86 (H) 32 - 75 %    LYMPHOCYTES 11 (L) 12 - 49 %    MONOCYTES 3 (L) 5 - 13 %    EOSINOPHILS 0 0 - 7 %    BASOPHILS 0 0 - 1 %    IMMATURE GRANULOCYTES 0 0.0 - 0.5 %    ABS. NEUTROPHILS 6.8 1.8 - 8.0 K/UL    ABS. LYMPHOCYTES 0.9 0.8 - 3.5 K/UL    ABS. MONOCYTES 0.2 0.0 - 1.0 K/UL    ABS. EOSINOPHILS 0.0 0.0 - 0.4 K/UL    ABS. BASOPHILS 0.0 0.0 - 0.1 K/UL    ABS. IMM. GRANS. 0.0 0.00 - 0.04 K/UL    DF AUTOMATED     GLUCOSE, POC    Collection Time: 12/11/19  8:23 AM   Result Value Ref Range    Glucose (POC) 187 (H) 65 - 100 mg/dL    Performed by Tahir Cobb (PCT)        Imaging:  I have personally reviewed the patients radiographs and have reviewed the reports:  12-10-19: CXR: IMPRESSION:  Possible left basilar airspace disease, recommend PA and lateral views when  Possible. 12-9-19: CXR was clear.          Tessie Wright MD

## 2019-12-11 NOTE — PROGRESS NOTES
Reviewed discharge instructions, prescriptions, and side effects with patient and her . Reviewed wound care, follow-up instructions, and medication instructions. Answered all questions and provided contact information for future questions. Took IV out per policy, Catheter tip intact. Provided Post-op Hygiene kit. Going home in a car with home health.

## 2019-12-12 ENCOUNTER — PATIENT OUTREACH (OUTPATIENT)
Dept: INTERNAL MEDICINE CLINIC | Age: 84
End: 2019-12-12

## 2019-12-12 NOTE — LETTER
12/12/2019 10:52 AM 
 
Ms. Xavier Schneider [address] [id number] My name is Yenni Santillan. I am a Care Transition Nurse on [address] [place] [address]. I contact patients who were recently discharged from the hospital or emergency department. I review discharge instructions; assist with scheduling follow up appointments, and update your medications on file. We are committed to providing you excellent care. I have enclosed information on [facility] for you to review. Please contact me at [phone] if you have any questions. We appreciate the confidence youve shown by selecting us to provide your healthcare needs and look forward to hearing from you soon. Sincerely,  
 
 
Murphy Nash. Yeni Fontenot, JULIANO  Care Transition Nurse Advance Care Plan Facilitator [address] [facility] [address]. [facility]. [address] 
(office) [phone]  (Phone) [phone]

## 2019-12-12 NOTE — PROGRESS NOTES
Hospital Discharge Follow-Up      Date/Time:  2019 9:32 AM    Patient was admitted to [facility] on  and discharged on  for  DX. COPD EXACERBATION. The physician discharge summary was not available at the time of outreach. Patient was contacted within 2 business days of discharge. Top Challenges reviewed with the provider     Advance Care Planning:   Does patient have an Advance Directive:  not on file        Method of communication with provider :chart routing    Inpatient RRAT score: 52  Was this a readmission? no     Care Transition Nurse (CTN) contacted the patient by telephone to perform post hospital discharge assessment. Verified name and  with patient as identifiers. Provided introduction to self, and explanation of the CTN role. Patient received hospital discharge instructions. CTN reviewed discharge instructions and red flags with patient who verbalized understanding. Patient given an opportunity to ask questions and does not have any further questions or concerns at this time. The patient agrees to contact the PCP office for questions related to their healthcare. CTN provided contact information for future reference. Disease Specific:   COPD    Patients top risk factors for readmission:  none    Home Health orders at discharge: [address]: [facility]  Date of initial visit: scheduled     Medication(s):   New Medications at Discharge:   azithromycin 250 mg tablet (ZITHROMAX) - take one tab by mouth daily for 3 days    Medication reconciliation was performed with patient, who verbalizes understanding of administration of home medications. There were no barriers to obtaining medications identified at this time.     Referral to Pharm D needed: no     Current Outpatient Medications   Medication Sig    azithromycin (ZITHROMAX) 250 mg tablet Take one tab by mouth daily for 3 days    primidone (MYSOLINE) 50 mg tablet Take 3 tabs by mouth 2 times daily    diclofenac (VOLTAREN) 1 % gel Apply  to affected area four (4) times daily.  DULoxetine (CYMBALTA) 60 mg capsule Take 1 Cap by mouth daily.  predniSONE (DELTASONE) 5 mg tablet Take 2 tabs alternating with 3 tabs every other day.  carvedilol (COREG) 25 mg tablet TAKE 1 TABLET BY MOUTH TWICE DAILY WITH MEALS    ALPRAZolam (XANAX) 0.5 mg tablet TAKE 1/2 TO 1 TABLETS BY MOUTH EVERY NIGHT AT BEDTIME AS NEEDED FOR SLEEP    rosuvastatin (CRESTOR) 10 mg tablet Take 1 Tab by mouth nightly.  guaiFENesin (MUCINEX) 1,200 mg Ta12 ER tablet Take 1,200 mg by mouth daily as needed.  polyethylene glycol (MIRALAX) 17 gram/dose powder Take 17 g by mouth daily as needed.  tiotropium-olodaterol (STIOLTO RESPIMAT) 2.5-2.5 mcg/actuation inhaler Take 2 Puffs by inhalation daily.  MULTIVITAMIN PO Take 1 Tab by mouth daily.  Omeprazole delayed release (PRILOSEC D/R) 20 mg tablet Take 20 mg by mouth daily.  neomycin-polymyxin-hydrocortisone (CORTISPORIN) otic solution Administer 3-4 Drops in left ear three (3) times daily as needed (ear discomfort).  furosemide (LASIX) 20 mg tablet TAKE 1 TABLET BY MOUTH EVERY MORNING AND TAKE 1 TABLET BY MOUTH EVERY AFTERNOON BETWEEN 2 AND 4 PM    glipiZIDE SR (GLUCOTROL XL) 2.5 mg CR tablet TAKE ONE TABLET BY MOUTH DAILY    albuterol-ipratropium (DUO-NEB) 2.5 mg-0.5 mg/3 ml nebu TAKE CONTENTS OF ONE VIAL BY NEBULIZATION ROUTE EVERY 4 HOURS AS NEEDED(UP TO 6 PER DAY) (Patient taking differently: TAKE CONTENTS OF ONE VIAL BY NEBULIZATION ROUTE EVERY 4 HOURS AS NEEDED(UP TO 6 PER DAY)for difficulty breathing/wheezing)    albuterol (VENTOLIN HFA) 90 mcg/actuation inhaler INHALE 2 PUFFS BY MOUTH EVERY 4 HOURS AS NEEDED FOR WHEEZING    docusate sodium (STOOL SOFTENER PO) Take 1 Tab by mouth two (2) times a day.  lisinopril (PRINIVIL, ZESTRIL) 2.5 mg tablet Take 2.5 mg by mouth daily.     fluticasone (FLONASE) 50 mcg/actuation nasal spray SHAKE LIQUID AND USE 2 SPRAYS IN EACH NOSTRIL EVERY DAY    cyanocobalamin 1,000 mcg tablet Take 1,000 mcg by mouth daily.  promethazine-dextromethorphan (PROMETHAZINE-DM) 6.25-15 mg/5 mL syrup TAKE ONE TEASPOONFUL BY MOUTH EVERY SIX HOURS AS NEEDED FOR COUGH     No current facility-administered medications for this visit. [facility] follow up appointment(s):   Future Appointments   Date Time Provider Danny Montalvo   12/16/2019 To Be Determined Maurice Sotelo LPN [address] [facility]   12/23/2019 11:15 AM Mookie SCOTT MD [address]   3/6/2020  2:00 PM Mookie Heredia MD [address]   4/24/2020  1:15 PM Carson Kwong MD [address] [facility]   5/18/2020 10:20 AM Lynda Ray  [address]:  information provided as a resource   Goals Addressed                 This Visit's Progress     Establish PCP relationships and regularly scheduled appointments. 12/12 Patient/PHI encouraged to attend  follow-up with PCP and specialist as directed, keep a list of her medications she take to bring to f/u appointments. PHI reports pt.will f/u with (Pulmonary) Dr. Peña Espinal today 12/12, and will f/u with PCP, Otf Davies on 12/23 @ 11:15 am. PHI reports [facility] contacted pt., [facility] requested [place] visit on 12/16 (Monday). CTN [facility] [phone])  explain briefly type of service;  contact information provided and mailed pamphlet. CTN will f/u with pt.in 1-2 weeks. -        Understands red flags post discharge. 12/12 CTN discuss with PHI COPD, HF and when contact physician. PHI verbalized understanding, CTN also mail information to pt.to review and to call with any questions. CTN will f/u with pt.in 1-2 weeks. -[facility]

## 2019-12-12 NOTE — LETTER
12/12/2019 11:01 AM 
 
Ms. Og Basurto [address] [id number] My name is Maria Reddy. I am a Care Transition Nurse on [address] [place] [address]. I contact patients who were recently discharged from the hospital or emergency department. I review discharge instructions; assist with scheduling follow up appointments, and update your medications on file. We are committed to providing you excellent care. I have enclosed educational material on Heart Failure, COPD and information on [facility] for you to review. Please contact me at [phone] if you have any questions. We appreciate the confidence youve shown by selecting us to provide your healthcare needs and look forward to hearing from you soon. Sincerely,  
 
 
 
Micheal Ibarra. Marian Langley, JULIANO  Care Transition Nurse Advance Care Plan Facilitator [address] [facility]. [facility]. [id number] 
(office) [phone]  (Phone) [phone]

## 2019-12-12 NOTE — PATIENT INSTRUCTIONS
COPD Exacerbation Plan: Care Instructions Your Care Instructions If you have chronic obstructive pulmonary disease (COPD), your usual shortness of breath could suddenly get worse. You may start coughing more and have more mucus. This flare-up is called a COPD exacerbation (say \"co-UWL-nu-BAY-emery\"). A lung infection or air pollution could set off an exacerbation. Sometimes it can happen after a quick change in temperature or being around chemicals. Work with your doctor to make a plan for dealing with an exacerbation. You can better manage it if you plan ahead. Follow-up care is a key part of your treatment and safety. Be sure to make and go to all appointments, and call your doctor if you are having problems. It's also a good idea to know your test results and keep a list of the medicines you take. How can you care for yourself at home? During an exacerbation · Do not panic if you start to have one. Quick treatment at home may help you prevent serious breathing problems. If you have a COPD exacerbation plan that you developed with your doctor, follow it. · Take your medicines exactly as your doctor tells you. 
? Use your inhaler as directed by your doctor. If your symptoms do not get better after you use your medicine, have someone take you to the emergency room. Call an ambulance if necessary. ? With inhaled medicines, a spacer or a nebulizer may help you get more medicine to your lungs. Ask your doctor or pharmacist how to use them properly. Practice using the spacer in front of a mirror before you have an exacerbation. This may help you get the medicine into your lungs quickly. ? If your doctor has given you steroid pills, take them as directed. ? Your doctor may have given you a prescription for antibiotics, which you can fill if you need it. ? Talk to your doctor if you have any problems with your medicine. And call your doctor if you have to use your antibiotic or steroid pills. Preventing an exacerbation · Do not smoke. This is the most important step you can take to prevent more damage to your lungs and prevent problems. If you already smoke, it is never too late to stop. If you need help quitting, talk to your doctor about stop-smoking programs and medicines. These can increase your chances of quitting for good. · Take your daily medicines as prescribed. · Avoid colds and flu. ? Get a pneumococcal vaccine. ? Get a flu vaccine each year, as soon as it is available. Ask those you live or work with to do the same, so they will not get the flu and infect you. ? Try to stay away from people with colds or the flu. ? Wash your hands often. · Avoid secondhand smoke; air pollution; cold, dry air; hot, humid air; and high altitudes. Stay at home with your windows closed when air pollution is bad. · Learn breathing techniques for COPD, such as breathing through pursed lips. These techniques can help you breathe easier during an exacerbation. When should you call for help? Call 911 anytime you think you may need emergency care. For example, call if: 
  · You have severe trouble breathing.  
  · You have severe chest pain.  
 Call your doctor now or seek immediate medical care if: 
  · You have new or worse shortness of breath.  
  · You develop new chest pain.  
  · You are coughing more deeply or more often, especially if you notice more mucus or a change in the color of your mucus.  
  · You cough up blood.  
  · You have new or increased swelling in your legs or belly.  
  · You have a fever.  
 Watch closely for changes in your health, and be sure to contact your doctor if: 
  · You need to use your antibiotic or steroid pills.  
  · Your symptoms are getting worse. Where can you learn more? Go to http://marla-chika.info/. Enter B298 in the search box to learn more about \"COPD Exacerbation Plan: Care Instructions. \" Current as of: June 9, 2019 Content Version: 12.2 © 7398-6905 [employer]. Care instructions adapted under license by [employer] (which disclaims liability or warranty for this information). If you have questions about a medical condition or this instruction, always ask your healthcare professional. [address] any warranty or liability for your use of this information. Learning About Heart Failure Zones What are heart failure zones? Heart failure zones give you an easy way to see changes in your heart failure symptoms. They also tell you when you need to get help. Check every day to see which zone you are in. Green zone. You are doing well. This is where you want to be. · Your weight is stable. This means it is not going up or down. · You breathe easily. · You are sleeping well. You are able to lie flat without shortness of breath. · You can do your usual activities. Yellow zone. Be careful. Your symptoms are changing. Call your doctor. · You have new or increased shortness of breath. · You are dizzy or lightheaded, or you feel like you may faint. · You have sudden weight gain, such as more than 2 to 3 pounds in a day or 5 pounds in a week. (Your doctor may suggest a different range of weight gain.) · You have increased swelling in your legs, ankles, or feet. · You are so tired or weak that you cannot do your usual activities. · You are not sleeping well. Shortness of breath wakes you up at night. You need extra pillows. Your doctor's name: ____________________________________________________________ Your doctor's contact information: _________________________________________________ Red zone. This is an emergency. Call 911. You have symptoms of sudden heart failure, such as: 
· You have severe trouble breathing. · You cough up pink, foamy mucus. · You have a new irregular or fast heartbeat. You have symptoms of a heart attack. These may include: · Chest pain or pressure, or a strange feeling in the chest. 
· Sweating. · Shortness of breath. · Nausea or vomiting. · Pain, pressure, or a strange feeling in the back, neck, jaw, or upper belly or in one or both shoulders or arms. · Lightheadedness or sudden weakness. · A fast or irregular heartbeat. If you have symptoms of a heart attack: After you call 911, the  may tell you to chew 1 adult-strength or 2 to 4 low-dose aspirin. Wait for an ambulance. Do not try to drive yourself. Follow-up care is a key part of your treatment and safety. Be sure to make and go to all appointments, and call your doctor if you are having problems. It's also a good idea to know your test results and keep a list of the medicines you take. Where can you learn more? Go to http://marla-chika.info/. Enter T174 in the search box to learn more about \"Learning About Heart Failure Zones. \" Current as of: April 9, 2019 Content Version: 12.2 © 0501-4963 [employer]. Care instructions adapted under license by [employer] (which disclaims liability or warranty for this information). If you have questions about a medical condition or this instruction, always ask your healthcare professional. [place] [address] any warranty or liability for your use of this information. Limiting Sodium With Heart Failure: Care Instructions Your Care Instructions Sodium causes your body to hold on to extra water. This may cause your heart failure symptoms to get worse. Limiting sodium may help you feel better and lower your risk of having to go to the hospital. 
People get most of their sodium from processed foods. Fast food and restaurant meals also tend to be very high in sodium. Your doctor may suggest that you limit sodium to 2,000 milligrams (mg) a day or less. That is less than 1 teaspoon of salt a day, including all the salt you eat in cooked or packaged foods. Follow-up care is a key part of your treatment and safety. Be sure to make and go to all appointments, and call your doctor if you are having problems. It's also a good idea to know your test results and keep a list of the medicines you take. How can you care for yourself at home? Read food labels · Read food labels on cans and food packages. The labels tell you how much sodium is in each serving. Make sure that you look at the serving size. If you eat more than the serving size, you have eaten more sodium than is listed for one serving. · Food labels also tell you the Percent Daily Value for sodium. Choose products with low Percent Daily Values for sodium. · Be aware that sodium can come in forms other than salt, including monosodium glutamate (MSG), sodium citrate, and sodium bicarbonate (baking soda). MSG is often added to Asian food. You can sometimes ask for food without MSG or salt. Buy low-sodium foods · Buy foods that are labeled \"unsalted\" (no salt added), \"sodium-free\" (less than 5 mg of sodium per serving), or \"low-sodium\" (less than 140 mg of sodium per serving). A food labeled \"light sodium\" has less than half of the full-sodium version of that food. Foods labeled \"reduced-sodium\" may still have too much sodium. · Buy fresh vegetables or plain, frozen vegetables. Buy low-sodium versions of canned vegetables, soups, and other canned goods. Prepare low-sodium meals · Use less salt each day when cooking. Reducing salt in this way will help you adjust to the taste. Do not add salt after cooking. Take the salt shaker off the table. · Flavor your food with garlic, lemon juice, onion, vinegar, herbs, and spices instead of salt. Do not use soy sauce, steak sauce, onion salt, garlic salt, mustard, or ketchup on your food. · Make your own salad dressings, sauces, and ketchup without adding salt. · Use less salt (or none) when recipes call for it.  You can often use half the salt a recipe calls for without losing flavor. Other dishes like rice, pasta, and grains do not need added salt. · Rinse canned vegetables. This removes somebut not allof the salt. · Avoid water that has a naturally high sodium content or that has been treated with water softeners, which add sodium. Call your local water company to find out the sodium content of your water supply. If you buy bottled water, read the label and choose a sodium-free brand. Avoid high-sodium foods, such as: 
· Smoked, cured, salted, and canned meat, fish, and poultry. · Ham, thakkar, hot dogs, and luncheon meats. · Regular, hard, and processed cheese and regular peanut butter. · Crackers with salted tops. · Frozen prepared meals. · Canned and dried soups, broths, and bouillon, unless labeled sodium-free or low-sodium. · Canned vegetables, unless labeled sodium-free or low-sodium. · Salted snack foods such as chips and pretzels. · [place] fries, pizza, tacos, and other fast foods. · Pickles, olives, ketchup, and other condiments, especially soy sauce, unless labeled sodium-free or low-sodium. If you cannot cook for yourself · Have family members or friends help you, or have someone cook low-sodium meals. · Check with your local senior nutrition program to find out where meals are served and whether they offer a low-sodium option. You can often find these programs through your local health department or hospital. 
· Have meals delivered to your home. Most [facility] have a [employer]. These programs provide one hot meal a day for older adults, delivered to their homes. Ask whether these meals are low-sodium. Let them know that you are on a low-sodium diet. Where can you learn more? Go to http://marla-chika.info/. Enter A166 in the search box to learn more about \"Limiting Sodium With Heart Failure: Care Instructions. \" Current as of: April 9, 2019 Content Version: 12.2 © 1530-2964 [employer]. Care instructions adapted under license by [employer] (which disclaims liability or warranty for this information). If you have questions about a medical condition or this instruction, always ask your healthcare professional. [address] any warranty or liability for your use of this information.

## 2019-12-14 LAB
BACTERIA SPEC CULT: NORMAL
SERVICE CMNT-IMP: NORMAL

## 2019-12-16 ENCOUNTER — HOME CARE VISIT (OUTPATIENT)
Dept: SCHEDULING | Facility: HOME HEALTH | Age: 84
End: 2019-12-16

## 2019-12-16 PROCEDURE — G0299 HHS/HOSPICE OF RN EA 15 MIN: HCPCS

## 2019-12-17 NOTE — CDMP QUERY
Patient admitted with COPD exacerbation. Noted documentation of Sepsis in H&P and PN's. Please document in progress notes clinical indicators (such as the ones below) to support this diagnosis or if Sepsis may have been ruled out after study. 1. At least 2 SIRS Criteria (Systemic Inflammatory Response Syndrome) (CMS) 
-Temp > 100.9 or < 96.8 
-HR > 90 
-RR > 20  
-WBC > 12,000 or < 4,000 or > 10% bands 2. Documented suspected or confirmed source of infection. (CMS) 3. Documented Organ Dysfunction by any ONE of the following. (CMS) 
     
 the CMS guidelines 
-AMS (from baseline if known) -SBP<90 or MAP<65 
-Documentation of respiratory failure and use of either invasive mechanical ventilation (intubation) or non-invasive mechanical ventilation (CPAP/BIPAP) -Creat. > 2.0 (with no history of Chronic Kidney Disease) -Bilirubin > 2 mg / dl (with no history of liver disease) -PLT < 100,000 (with no history of thrombocytopenia) -INR > 1.5 or aPTT > 60 sec (for patients not on Warfarin therapy) -Lactic Acid > 2 mmol/ L The medical record reflects the following: 
   Risk Factors: 88 BF w/hx: COPD Clinical Indicators: Admitted with acute bronchitis and COPD exacerbation, 12/9: CXR \"No acute findings\" and 12/10: CXR: \"Mild opacities in left retrocardiac region suggests mild pneumonia follow-up 
to resolution is suggested. \",  Admit Lactic; 1.37, Bands: 3, T: 100.5-102.1, HR: 96-98, RR: 20-24 Treatment: Duonebs, Zithromax IV, Rocephin IV, 1000 ml NS bolus IV Thank you, Juana Lopez, [email or web address] 
[phone]

## 2019-12-23 ENCOUNTER — OFFICE VISIT (OUTPATIENT)
Dept: FAMILY MEDICINE CLINIC | Age: 84
End: 2019-12-23

## 2019-12-23 VITALS
SYSTOLIC BLOOD PRESSURE: 125 MMHG | HEIGHT: 64 IN | OXYGEN SATURATION: 97 % | BODY MASS INDEX: 37.83 KG/M2 | WEIGHT: 221.6 LBS | DIASTOLIC BLOOD PRESSURE: 58 MMHG | RESPIRATION RATE: 18 BRPM | TEMPERATURE: 96.8 F | HEART RATE: 59 BPM

## 2019-12-23 DIAGNOSIS — I50.22 CHRONIC SYSTOLIC HEART FAILURE (HCC): ICD-10-CM

## 2019-12-23 DIAGNOSIS — M25.50 ARTHRALGIA OF MULTIPLE JOINTS: ICD-10-CM

## 2019-12-23 DIAGNOSIS — J44.1 COPD EXACERBATION (HCC): ICD-10-CM

## 2019-12-23 DIAGNOSIS — E11.21 TYPE 2 DIABETES WITH NEPHROPATHY (HCC): ICD-10-CM

## 2019-12-23 DIAGNOSIS — I10 ESSENTIAL HYPERTENSION, BENIGN: ICD-10-CM

## 2019-12-23 DIAGNOSIS — E78.2 MIXED HYPERLIPIDEMIA: ICD-10-CM

## 2019-12-23 DIAGNOSIS — N18.30 STAGE 3 CHRONIC KIDNEY DISEASE (HCC): ICD-10-CM

## 2019-12-23 DIAGNOSIS — Z51.81 ENCOUNTER FOR MEDICATION MONITORING: ICD-10-CM

## 2019-12-23 DIAGNOSIS — M35.3 POLYMYALGIA RHEUMATICA (HCC): ICD-10-CM

## 2019-12-23 DIAGNOSIS — Z09 ENCOUNTER FOR EXAMINATION FOLLOWING TREATMENT AT HOSPITAL: Primary | ICD-10-CM

## 2019-12-23 RX ORDER — GLYCOPYRROLATE AND FORMOTEROL FUMARATE 9; 4.8 UG/1; UG/1
AEROSOL, METERED RESPIRATORY (INHALATION)
COMMUNITY
Start: 2019-12-12

## 2019-12-23 RX ORDER — ALBUTEROL SULFATE 90 UG/1
2 AEROSOL, METERED RESPIRATORY (INHALATION)
Qty: 1 INHALER | COMMUNITY
Start: 2019-12-23

## 2019-12-23 NOTE — PROGRESS NOTES
HISTORY OF PRESENT ILLNESS  Sanket Boykin is a 80 y.o. female. HPI   [facility]  Pt is in the office with her . Admitted on 12/9 thru 12/11 for COPD exacerbation. Overall she is feeling much better. Her breathing is almost back to her usual baseline. Cough is minimal.  No cough or congestion now. No wheezing. She has still been using her nebulizer every 4 hours while awake. Pt was contacted in 2 days by NN. Follow up in the office from hospital care now on day 12 since discharge. Hospital records reviewed with Pt and her daughter and all questions were answered and discussed. Cardiovascular Review:  The patient has hypertension, hyperlipidemia and Systolic HF. Diet and Lifestyle: generally follows a low fat low cholesterol diet, generally follows a low sodium diet  Home BP Monitoring: is not measured at home. Pertinent ROS: taking medications as instructed, no medication side effects noted, no TIA's, no chest pain on exertion, no dyspnea on exertion, noting that her ankles swelling has been mild. She has been off of the statin but she did not see any improvemnet with her myalgia being off of the medication. DM type II follow up:  Compliant w/ meds, diabetic diet, and exercise. Obtains home glucose monitoring averaging in the mid 100s. Checks BS daily on most days and prn. Pt does not have BS log at visit today. No Rf needed for today. Denies any tingling sensation, polyuria and polydipsia. No blurred vision. No significant weight changes. Asthma Review:  The patient is being seen for follow up of chronic respiratory failure and allergies and chronic sinusitis. Seen by ENT prior to her hospital admission and told to use a sinus rinse to help with the congestion which has helped. Regimen compliance: The patient reports adherence to asthma action plan and regimen.     Patient Active Problem List   Diagnosis Code    CHF (congestive heart failure) ([facility]) I50.9    S/P TKR (total knee replacement) Z96.659    Anemia D64.9    s/p lumbar laminectomy Z98.890    S/P ASAD (total abdominal hysterectomy) Z90.710    S/P hemorrhoidectomy Z98.890, Z87.19    S/P rotator cuff surgery Z98.890    DM (diabetes mellitus) ([facility]) E11.9    Chronic sinusitis J32.9    S/P sinus surgery Z98.890    Dyslipidemia E78.5    Environmental allergies Z91.09    Obstructive sleep apnea (adult) (pediatric) G47.33    DJD (degenerative joint disease) M19.90    Chronic systolic heart failure ([facility]) I50.22    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 9) with comorbidity ([facility]) E66.01    Polymyalgia rheumatica ([facility]) M35.3    COPD exacerbation ([facility]) J44.1       Current Outpatient Medications   Medication Sig Dispense Refill    primidone (MYSOLINE) 50 mg tablet Take 3 tabs by mouth 2 times daily      diclofenac (VOLTAREN) 1 % gel Apply  to affected area four (4) times daily. 300 g 3    DULoxetine (CYMBALTA) 60 mg capsule Take 1 Cap by mouth daily. 90 Cap 3    predniSONE (DELTASONE) 5 mg tablet Take 2 tabs alternating with 3 tabs every other day.  carvedilol (COREG) 25 mg tablet TAKE 1 TABLET BY MOUTH TWICE DAILY WITH MEALS 180 Tab 0    ALPRAZolam (XANAX) 0.5 mg tablet TAKE 1/2 TO 1 TABLETS BY MOUTH EVERY NIGHT AT BEDTIME AS NEEDED FOR SLEEP 30 Tab 3    rosuvastatin (CRESTOR) 10 mg tablet Take 1 Tab by mouth nightly.  90 Tab 1    guaiFENesin (MUCINEX) 1,200 mg Ta12 ER tablet Take 1,200 mg by mouth daily as needed.  polyethylene glycol (MIRALAX) 17 gram/dose powder Take 17 g by mouth daily as needed.  tiotropium-olodaterol (STIOLTO RESPIMAT) 2.5-2.5 mcg/actuation inhaler Take 2 Puffs by inhalation daily.  MULTIVITAMIN PO Take 1 Tab by mouth daily.  Omeprazole delayed release (PRILOSEC D/R) 20 mg tablet Take 20 mg by mouth daily.  neomycin-polymyxin-hydrocortisone (CORTISPORIN) otic solution Administer 3-4 Drops in left ear three (3) times daily as needed (ear discomfort). 1 Bottle 1    furosemide (LASIX) 20 mg tablet TAKE 1 TABLET BY MOUTH EVERY MORNING AND TAKE 1 TABLET BY MOUTH EVERY AFTERNOON BETWEEN 2 AND 4  Tab 3    glipiZIDE SR (GLUCOTROL XL) 2.5 mg CR tablet TAKE ONE TABLET BY MOUTH DAILY 90 Tab 3    albuterol-ipratropium (DUO-NEB) 2.5 mg-0.5 mg/3 ml nebu TAKE CONTENTS OF ONE VIAL BY NEBULIZATION ROUTE EVERY 4 HOURS AS NEEDED(UP TO 6 PER DAY) (Patient taking differently: TAKE CONTENTS OF ONE VIAL BY NEBULIZATION ROUTE EVERY 4 HOURS AS NEEDED(UP TO 6 PER DAY)for difficulty breathing/wheezing) 1620 mL 3    albuterol (VENTOLIN HFA) 90 mcg/actuation inhaler INHALE 2 PUFFS BY MOUTH EVERY 4 HOURS AS NEEDED FOR WHEEZING 1 Inhaler 6    docusate sodium (STOOL SOFTENER PO) Take 1 Tab by mouth two (2) times a day.  lisinopril (PRINIVIL, ZESTRIL) 2.5 mg tablet Take 2.5 mg by mouth daily.  fluticasone (FLONASE) 50 mcg/actuation nasal spray SHAKE LIQUID AND USE 2 SPRAYS IN EACH NOSTRIL EVERY DAY 1 Bottle 11    cyanocobalamin 1,000 mcg tablet Take 1,000 mcg by mouth daily.       promethazine-dextromethorphan (PROMETHAZINE-DM) 6.25-15 mg/5 mL syrup TAKE ONE TEASPOONFUL BY MOUTH EVERY SIX HOURS AS NEEDED FOR COUGH 240 mL 1       Allergies   Allergen Reactions    Gabapentin Other (comments)     Muscles twitching and jerking    Levaquin [Levofloxacin] Swelling    Lyrica [Pregabalin] Other (comments)     Muscle twitching and jerking    Percodan [Oxycodone Hcl-Oxycodone-Asa] Itching       Past Medical History:   Diagnosis Date    Anemia 3/3/2010    Arrhythmia     irregular heartbeat    Arthritis     CHF [facility]congestive heart failure) ([facility] [address] 3/3/2010    Chronic pain     back    Chronic sinusitis 3/3/2010    CPAP (continuous positive airway pressure) dependence     Diverticulosis     [facility] 3/3/2010    Dyslipidemia 3/3/2010    GERD (gastroesophageal reflux disease)     HTN (hypertension) 3/3/2010    Ill-defined condition     being evaluated py pulmonolgist for \"trouble breathing\"    S/P TKR (total knee replacement) 3/3/2010    Unspecified sleep apnea     doesn't wear CPAP since back has been hurting       Past Surgical History:   Procedure Laterality Date    HX APPENDECTOMY      thinks it was taken with hysterectomy    HX GYN      \"tubes opened\"    HX HEENT      sinus surgery    HX HEMORRHOIDECTOMY      HX HYSTERECTOMY      HX KNEE REPLACEMENT  1996    both knees- at same time    HX LUMBAR LAMINECTOMY  1980s    HX MOHS PROCEDURES      left shoulder    HX ORTHOPAEDIC  1980    neck fusion    HX ORTHOPAEDIC  1999    right knee replaced for second time    HX ORTHOPAEDIC      lumbar fusion    HX OTHER SURGICAL      CORNELL BIOPSY BREAST STEREOTACTIC Left yrs ago    (-)    MA COLONOSCOPY FLX DX W/COLLJ SPEC WHEN PFRMD  [id number]    dr. Loida Siddiqi (barium enema)       Family History   Problem Relation Age of Onset    Diabetes Mother     Heart Disease Father     Diabetes Brother     Blindness Brother     Diabetes Sister     Heart Disease Sister     Heart Disease Brother     Heart Disease Brother     Asthma Brother     Malignant Hyperthermia Neg Hx     Pseudocholinesterase Deficiency Neg Hx     Delayed Awakening Neg Hx     Post-op Nausea/Vomiting Neg Hx     Emergence Delirium Neg Hx     Post-op Cognitive Dysfunction Neg Hx        Social History     Tobacco Use    Smoking status: Former Smoker     Packs/day: 0.30     Years: 5.00     Pack years: 1.50     Last attempt to quit: 1960     Years since quittin.0    Smokeless tobacco: Never Used   Substance Use Topics    Alcohol use: No     Alcohol/week: 0.0 standard drinks        Lab Results   Component Value Date/Time    WBC 7.9 2019 03:38 AM    HGB 11.0 (L) 2019 03:38 AM    HCT 34.9 (L) 2019 03:38 AM    PLATELET 851 (L)  03:38 AM    .2 (H) 2019 03:38 AM     Lab Results   Component Value Date/Time    Cholesterol, total 221 (H) 2019 10:19 AM    HDL Cholesterol 99 2019 10:19 AM    LDL, calculated 101 (H) 2019 10:19 AM    Triglyceride 105 2019 10:19 AM    CHOL/HDL Ratio 2.0 2010 02:10 PM     Lab Results   Component Value Date/Time    TSH 4.310 2018 11:05 AM      Lab Results   Component Value Date/Time    Sodium 140 2019 03:38 AM    Potassium 4.4 2019 03:38 AM    Chloride 106 2019 03:38 AM    CO2 27 2019 03:38 AM    Anion gap 7 2019 03:38 AM    Glucose 203 (H) 2019 03:38 AM    BUN 26 (H) 2019 03:38 AM    Creatinine 1.52 (H) 2019 03:38 AM    BUN/Creatinine ratio 17 2019 03:38 AM    GFR est AA 39 (L) 2019 03:38 AM    GFR est non-AA 32 (L) 2019 03:38 AM    Calcium 9.2 2019 03:38 AM    Bilirubin, total 0.4 2019 06:23 PM    ALT (SGPT) 28 2019 06:23 PM    AST (SGOT) 21 2019 06:23 PM    Alk. phosphatase 65 2019 06:23 PM    Protein, total 7.0 2019 06:23 PM    Albumin 3.7 2019 06:23 PM    Globulin 3.3 2019 06:23 PM    A-G Ratio 1.1 2019 06:23 PM      Lab Results   Component Value Date/Time    Hemoglobin A1c 6.4 (H) 2017 11:09 AM    Hemoglobin A1c (POC) 6.6 2019 10:13 AM         Review of Systems   Constitutional: Negative for malaise/fatigue. HENT: Negative for congestion. Eyes: Negative for blurred vision. Respiratory: Negative for cough and shortness of breath.     Cardiovascular: Negative for chest pain, palpitations and leg swelling. Gastrointestinal: Negative for abdominal pain, constipation and heartburn. Genitourinary: Negative for dysuria, frequency and urgency. Musculoskeletal: Negative for back pain and joint pain. Neurological: Negative for dizziness, tingling and headaches. Endo/Heme/Allergies: Negative for environmental allergies. Psychiatric/Behavioral: Negative for depression. The patient does not have insomnia. Physical Exam  Vitals signs and nursing note reviewed. Constitutional:       Appearance: Normal appearance. She is well-developed. Comments: /58   Pulse (!) 59   Temp 96.8 °F (36 °C) (Oral)   Resp 18   Ht 5' 4\" (1.626 m)   Wt 221 lb 9.6 oz (100.5 kg)   LMP 03/04/1961 (Approximate)   SpO2 97%    L/min   BMI 38.04 kg/m²    HENT:      Right Ear: Tympanic membrane and ear canal normal.      Left Ear: Tympanic membrane and ear canal normal.      Nose: No mucosal edema or rhinorrhea. Neck:      Musculoskeletal: Normal range of motion and neck supple. Thyroid: No thyromegaly. Cardiovascular:      Rate and Rhythm: Normal rate and regular rhythm. Heart sounds: Normal heart sounds. No gallop. Pulmonary:      Effort: Pulmonary effort is normal.      Breath sounds: Normal breath sounds and air entry. No decreased air movement. No decreased breath sounds, wheezing or rhonchi. Abdominal:      General: Bowel sounds are normal.      Palpations: Abdomen is soft. There is no mass. Tenderness: There is no tenderness. Musculoskeletal: Normal range of motion. General: Swelling (mild) present. Lymphadenopathy:      Cervical: No cervical adenopathy. Skin:     General: Skin is warm and dry. ASSESSMENT and PLAN  Diagnoses and all orders for this visit:    1. Encounter for examination following treatment at hospital//  2. COPD exacerbation ([address]  Improving. Continue with nebulizer treatment as needed.       3. Essential hypertension, benign  Stable     4. Type 2 diabetes with nephropathy (HCC)  Stable     5. Mixed hyperlipidemia  Continue to monitor. Work on diet and exercise. 6. Chronic systolic heart failure (HCC)  Stable     7. Stage 3 chronic kidney disease (HCC)  Stable     8. Arthralgia of multiple joints//  9. Polymyalgia rheumatica (HCC)  Joints felt wonderful while on the higher dose of the steroids. 10. Encounter for medication monitoring          reviewed diet, exercise and weight control  cardiovascular risk and specific lipid/LDL goals reviewed  reviewed medications and side effects in detail  specific diabetic recommendations: low cholesterol diet, weight control and daily exercise discussed, home glucose monitoring emphasized, foot care discussed and Podiatry visits discussed, annual eye examinations at Ophthalmology discussed and glycohemoglobin and other lab monitoring discussed     I have discussed diagnosis listed in this note with pt and/or family. I have discussed treatment plans and options and the risk/benefit analysis of those options, including safe use of medications and possible medication side effects. Through the use of shared decision making we have agreed to the above plan. The patient has received an after-visit summary and questions were answered concerning future plans and follow up. Advise pt of any urgent changes then to proceed to the ER.

## 2019-12-23 NOTE — PROGRESS NOTES
Chief Complaint   Patient presents with   [facility] Follow Up     COPD       1. Have you been to the ER, urgent care clinic since your last visit? Hospitalized since your last visit? yes    2. Have you seen or consulted any other health care providers outside of the [address] [place] [address] since your last visit? Include any pap smears or colon screening.  yes

## 2019-12-26 NOTE — DISCHARGE SUMMARY
Hospitalist Discharge Summary     Patient ID:  Marian Koroma  [id number]  80 y.o.  4/5/1931 12/9/2019    PCP on record: Ceci Cruz MD    Admit date: 12/9/2019  Discharge date and time: 12/11/2019    DISCHARGE DIAGNOSIS:    See below    CONSULTATIONS:  IP CONSULT TO PULMONOLOGY    Excerpted HPI from H&P of Zaria Gore MD:  80years old female from home with past medical history significant for CHF, COPD, DM, oxygen dependent presented to the hospital for evaluation of worsening shortness of breath for 2 to 3 days, patient also complained from productive cough yellowish colored sputum, blood work in the ED show no significant acute abnormality, chest x-ray was done and showed no acute abnormality. ______________________________________________________________________  DISCHARGE SUMMARY/HOSPITAL COURSE:  for full details see H&P, daily progress notes, labs, consult notes. CAP with concern for early sepsis  -Tmax 102.1, tachypnea RR 27  AECOPD with air trapping  -AonC chronic bronchitis  -appreciate pulm help  -IV rocephin and azithro as inpatient, to complete azithro PO as outpatient  -CXR PA/Lat reveals mild pna in Lt retrocardiac region  -Tmax 102.1 overnight, fevers seem to be breaking today  -wheezing earlier this AM improved w breathing treatment, add scheduled nebs to prns  -steroids switched to PO, on chronic steroid therapy  -on RA    Hypertension  Continue home antihypertensive medication    DM2  cont SSI with POCs  -hold home glipizide    Hyperlipidemiacontinue  Crestor     Diastolic CHF chronic   -continue lasix     _______________________________________________________________________  Patient seen and examined by me on discharge day. Pertinent Findings:  Gen:    Not in distress  Chest: Clear lungs  CVS:   Regular rhythm.   No edema  Abd:  Soft, not distended, not tender  Neuro:  Alert, oriented x 3  _______________________________________________________________________  DISCHARGE MEDICATIONS:   Discharge Medication List as of 12/11/2019 10:19 AM      START taking these medications    Details   azithromycin (ZITHROMAX) 250 mg tablet Take one tab by mouth daily for 3 days, Normal, Disp-3 Tab, R-0         CONTINUE these medications which have NOT CHANGED    Details   primidone (MYSOLINE) 50 mg tablet Take 3 tabs by mouth 2 times daily, Historical Med      diclofenac (VOLTAREN) 1 % gel Apply  to affected area four (4) times daily. , Normal, Disp-300 g, R-3      DULoxetine (CYMBALTA) 60 mg capsule Take 1 Cap by mouth daily. , Normal, Disp-90 Cap, R-3      predniSONE (DELTASONE) 5 mg tablet Take 2 tabs alternating with 3 tabs every other day., Historical Med      carvedilol (COREG) 25 mg tablet TAKE 1 TABLET BY MOUTH TWICE DAILY WITH MEALS, Normal, Disp-180 Tab, R-0      ALPRAZolam (XANAX) 0.5 mg tablet TAKE 1/2 TO 1 TABLETS BY MOUTH EVERY NIGHT AT BEDTIME AS NEEDED FOR SLEEP, Phone In, Disp-30 Tab, R-3      rosuvastatin (CRESTOR) 10 mg tablet Take 1 Tab by mouth nightly., Normal, Disp-90 Tab, R-1      guaiFENesin (MUCINEX) 1,200 mg Ta12 ER tablet Take 1,200 mg by mouth daily as needed., Historical Med      polyethylene glycol (MIRALAX) 17 gram/dose powder Take 17 g by mouth daily as needed., Historical Med      tiotropium-olodaterol (STIOLTO RESPIMAT) 2.5-2.5 mcg/actuation inhaler Take 2 Puffs by inhalation daily. , Historical Med      MULTIVITAMIN PO Take 1 Tab by mouth daily. , Historical Med      Omeprazole delayed release (PRILOSEC D/R) 20 mg tablet Take 20 mg by mouth daily. , Historical Med      neomycin-polymyxin-hydrocortisone (CORTISPORIN) otic solution Administer 3-4 Drops in left ear three (3) times daily as needed (ear discomfort). , Normal, Disp-1 Bottle, R-1      furosemide (LASIX) 20 mg tablet TAKE 1 TABLET BY MOUTH EVERY MORNING AND TAKE 1 TABLET BY MOUTH EVERY AFTERNOON BETWEEN 2 AND 4 PM, Normal, Disp-180 Tab, R-3      glipiZIDE SR (GLUCOTROL XL) 2.5 mg CR tablet TAKE ONE TABLET BY MOUTH DAILY, Normal, Disp-90 Tab, R-3      albuterol-ipratropium (DUO-NEB) 2.5 mg-0.5 mg/3 ml nebu TAKE CONTENTS OF ONE VIAL BY NEBULIZATION ROUTE EVERY 4 HOURS AS NEEDED(UP TO 6 PER DAY), Normal, Disp-1620 mL, R-3      albuterol (VENTOLIN HFA) 90 mcg/actuation inhaler INHALE 2 PUFFS BY MOUTH EVERY 4 HOURS AS NEEDED FOR WHEEZING, Normal, Disp-1 Inhaler, R-6      docusate sodium (STOOL SOFTENER PO) Take 1 Tab by mouth two (2) times a day., Historical Med      lisinopril (PRINIVIL, ZESTRIL) 2.5 mg tablet Take 2.5 mg by mouth daily. , Historical Med      fluticasone (FLONASE) 50 mcg/actuation nasal spray SHAKE LIQUID AND USE 2 SPRAYS IN EACH NOSTRIL EVERY DAY, Normal**Patient requests 90 days supply**Disp-1 Bottle, R-11      cyanocobalamin 1,000 mcg tablet Take 1,000 mcg by mouth daily. , Historical Med      promethazine-dextromethorphan (PROMETHAZINE-DM) 6.25-15 mg/5 mL syrup TAKE ONE TEASPOONFUL BY MOUTH EVERY SIX HOURS AS NEEDED FOR COUGH, Normal, Disp-240 mL, R-1               Patient Follow Up Instructions: Activity: Activity as tolerated  Diet: Resume previous diet  Wound Care: None needed    Follow-up with PCP one week, pulmonology as scheduled  Follow-up tests/labs none pending  Follow-up Information     Follow up With Specialties Details Why [facility]  This is your hospital to home agency.    [address]    Maria Elena Costello MD Pulmonary Disease On 12/13/2019 Your pulmonary hospital follow up appointment is on 12/13/19 at 9:30am [address] [place] [address]      Martha Ford MD Family Practice Go on 12/23/2019 For PCP hospital follow up appointment at 11:15AM  [address] [place] [address]  [phone] ________________________________________________________________    Risk of deterioration: Moderate    Condition at Discharge:  Stable  __________________________________________________________________    Disposition  Home with family, no needs    ____________________________________________________________________    Code Status: Full Code  ___________________________________________________________________      Total time in minutes spent coordinating this discharge (includes going over instructions, follow-up, prescriptions, and preparing report for sign off to her PCP) :  35 minutes    Signed:  Sweta Ashton DO

## 2020-01-15 ENCOUNTER — PATIENT OUTREACH (OUTPATIENT)
Dept: INTERNAL MEDICINE CLINIC | Age: 85
End: 2020-01-15

## 2020-01-15 NOTE — PROGRESS NOTES
Goals      Establish PCP relationships and regularly scheduled appointments. 12/12 Patient/PHI encouraged to attend  follow-up with PCP and specialist as directed, keep a list of her medications she take to bring to f/u appointments. PHI reports pt.will f/u with (Pulmonary) Dr. Arelis Canela today 12/12, and will f/u with PCP, Lynda Ferrear on 12/23 @ 11:15 am. PHI reports [facility] contacted pt., [facility] (Monday). CTN provided pt information on [employer] [phone])  explain briefly type of service;  contact information provided and mailed pamphlet. CTN will f/u with pt.in 1-2 weeks. -[address]     1/15 Pt. attend f/u with PCP, Dr. Lobo Lizama 12/23, (Pulmonary) on 12/23, pt.will attend upcoming appts. with (Pulmonary) Nidia Pearl on  2/11; Dr. Dasia Smith (Neuro) 5/18/20. CTN will f/u with pt.in 3-4 weeks. -       Understands red flags post discharge. 12/12 CTN discuss with PHI COPD, HF and when contact physician. PHI verbalized understanding, CTN also mail information to pt.to review and to call with any questions. CTN will f/u with pt.in 1-2 weeks. -[address]    1/15 PHI/Pt. reports information received via mail, pt.without any question or concerns at this time. Pt.will continue to avoid irritants and take all medications as directed, contact PCP sx;  increased SOB after mild exercise such as walking up stairs, change in sputum production and color, coughing, wheezing, chest tightness, pt will call PCP if symptoms unrelieved by rest and medications. Pt.verbalizes understanding. CTN will f/u with pt.in 3-4 weeks. -[address]

## 2020-02-10 RX ORDER — GLIPIZIDE 2.5 MG/1
TABLET, EXTENDED RELEASE ORAL
Qty: 90 TAB | Refills: 3 | Status: SHIPPED | OUTPATIENT
Start: 2020-02-10 | End: 2021-02-01

## 2020-02-11 DIAGNOSIS — R60.0 BILATERAL EDEMA OF LOWER EXTREMITY: ICD-10-CM

## 2020-02-11 RX ORDER — FUROSEMIDE 20 MG/1
TABLET ORAL
Qty: 180 TAB | Refills: 3 | Status: SHIPPED | OUTPATIENT
Start: 2020-02-11 | End: 2021-05-24

## 2020-02-11 RX ORDER — CARVEDILOL 25 MG/1
TABLET ORAL
Qty: 180 TAB | Refills: 0 | Status: SHIPPED | OUTPATIENT
Start: 2020-02-11 | End: 2020-06-08 | Stop reason: SDUPTHER

## 2020-02-25 ENCOUNTER — PATIENT OUTREACH (OUTPATIENT)
Dept: INTERNAL MEDICINE CLINIC | Age: 85
End: 2020-02-25

## 2020-02-25 NOTE — PROGRESS NOTES
Goals      Establish PCP relationships and regularly scheduled appointments. 12/12 Patient/PHI encouraged to attend  follow-up with PCP and specialist as directed, keep a list of her medications she take to bring to f/u appointments. PHI reports pt.will f/u with (Pulmonary) Dr. Julienne Monet today 12/12, and will f/u with PCP, Kanu Domingo on 12/23 @ 11:15 am. PHI reports [facility] contacted pt., [facility] on 12/16 (Monday). CTN provided pt information on [employer] [phone])  explain briefly type of service;  contact information provided and mailed pamphlet. CTN will f/u with pt.in 1-2 weeks. -[address]     1/15 Pt. attend f/u with PCP, Dr. Valentino Gtz 12/23, (Pulmonary) Dr. Cindi Lim on 12/23, pt.will attend upcoming appts. with (Pulmonary) Luzmaria Alexandra on  2/11; Dr. Justine Klein (Neuro) 5/18/20. CTN will f/u with pt.in 3-4 weeks. -[address]    2/25 Pt. PHI reports pt. is doing very well, reports f/u with (Pulmonary) RASHAWN Allan on 2/11. Pt.upcoming appts; (Cardiology) Dr. Manny Wyatt 4/24, (Neuro) Dr. Samanta Ferreira 5/18. CTN will f/u with pt.in 3-4 weeks. -       Understands red flags post discharge. 12/12 CTN discuss with PHI COPD, HF and when contact physician. PHI verbalized understanding, CTN also mail information to pt.to review and to call with any questions. CTN will f/u with pt.in 1-2 weeks. -[address]    1/15 PHI/Pt. reports information received via mail, pt.without any question or concerns at this time. Pt.will continue to avoid irritants and take all medications as directed, contact PCP sx;  increased SOB after mild exercise such as walking up stairs, change in sputum production and color, coughing, wheezing, chest tightness, pt will call PCP if symptoms unrelieved by rest and medications. Pt.verbalizes understanding. CTN will f/u with pt.in 3-4 weeks. -[address]     2/25 PHI reports pt.without s/s listed above, reports pt.doing well, FBS range 120-125, no weights to reports, pt.declines daily weights. CTN will f/u with pt.in 3-4 weeks. -[address]

## 2020-03-06 ENCOUNTER — HOSPITAL ENCOUNTER (OUTPATIENT)
Dept: LAB | Age: 85
Discharge: HOME | End: 2020-03-06
Payer: MEDICARE

## 2020-03-06 ENCOUNTER — OFFICE VISIT (OUTPATIENT)
Dept: FAMILY MEDICINE CLINIC | Age: 85
End: 2020-03-06

## 2020-03-06 VITALS
HEART RATE: 58 BPM | WEIGHT: 217.6 LBS | BODY MASS INDEX: 37.15 KG/M2 | SYSTOLIC BLOOD PRESSURE: 110 MMHG | OXYGEN SATURATION: 100 % | TEMPERATURE: 96.7 F | RESPIRATION RATE: 18 BRPM | DIASTOLIC BLOOD PRESSURE: 60 MMHG | HEIGHT: 64 IN

## 2020-03-06 DIAGNOSIS — E78.2 MIXED HYPERLIPIDEMIA: ICD-10-CM

## 2020-03-06 DIAGNOSIS — D64.9 CHRONIC ANEMIA: ICD-10-CM

## 2020-03-06 DIAGNOSIS — M25.50 ARTHRALGIA OF MULTIPLE JOINTS: ICD-10-CM

## 2020-03-06 DIAGNOSIS — J44.9 CHRONIC OBSTRUCTIVE PULMONARY DISEASE, UNSPECIFIED COPD TYPE (HCC): ICD-10-CM

## 2020-03-06 DIAGNOSIS — M35.3 POLYMYALGIA RHEUMATICA (HCC): ICD-10-CM

## 2020-03-06 DIAGNOSIS — I50.22 CHRONIC SYSTOLIC HEART FAILURE (HCC): ICD-10-CM

## 2020-03-06 DIAGNOSIS — E11.21 TYPE 2 DIABETES WITH NEPHROPATHY (HCC): ICD-10-CM

## 2020-03-06 DIAGNOSIS — Z51.81 ENCOUNTER FOR MEDICATION MONITORING: ICD-10-CM

## 2020-03-06 DIAGNOSIS — N18.30 STAGE 3 CHRONIC KIDNEY DISEASE (HCC): ICD-10-CM

## 2020-03-06 DIAGNOSIS — I10 ESSENTIAL HYPERTENSION, BENIGN: Primary | ICD-10-CM

## 2020-03-06 LAB
GLUCOSE POC: 140 MG/DL
HBA1C MFR BLD HPLC: 6.6 %

## 2020-03-06 PROCEDURE — 36415 COLL VENOUS BLD VENIPUNCTURE: CPT

## 2020-03-06 PROCEDURE — 80053 COMPREHEN METABOLIC PANEL: CPT

## 2020-03-06 PROCEDURE — 80061 LIPID PANEL: CPT

## 2020-03-06 PROCEDURE — 85027 COMPLETE CBC AUTOMATED: CPT

## 2020-03-06 RX ORDER — PREDNISONE 5 MG/1
TABLET ORAL
COMMUNITY
Start: 2020-03-06 | End: 2020-06-08

## 2020-03-06 NOTE — PROGRESS NOTES
HISTORY OF PRESENT ILLNESS  Sg Shelton is a 80 y.o. female. HPI   Follow up on chronic medical problems. Overall has dealing with a lot of pain in the joints and now in the neck. She is still having \"bad pain\" in the neck she she struck on head on the edge of the kitchen cabinet. Wants to go back to PT at [facility] that she never completed form the previous time. Cardiovascular Review:  The patient has hypertension, hyperlipidemia and Systolic HF. Diet and Lifestyle: generally follows a low fat low cholesterol diet, generally follows a low sodium diet  Home BP Monitoring: is not measured at home. Pertinent ROS: taking medications as instructed, no medication side effects noted, no TIA's, no chest pain on exertion, no dyspnea on exertion, noting that her ankles swelling has been mild. She has been off of the statin but she did not see any improvemnet with her myalgia being off of the medication. DM type II follow up:  Compliant w/ meds, diabetic diet, and exercise. Obtains home glucose monitoring averaging in the mid 100s. Checks BS daily on most days and prn. Pt does not have BS log at visit today. No Rf needed for today. Denies any tingling sensation, polyuria and polydipsia. No blurred vision. No significant weight changes. Asthma Review:  The patient is being seen for follow up of chronic respiratory failure and allergies and chronic sinusitits, not currently in exacerbation. Breathing has been good also with taking prednisone. Using nebulizer 3 to 4 times in a day as needed but has not recently had to use it. .        Regimen compliance: The patient reports adherence to asthma action plan and regimen. Encounter for pain management. 1./2. Medical history/Past medical History  Chronic Pain:  Osteoarthritis:  Patient has osteoarthritis in multiple joints but primarily in the knees and back. Seen by rheumatology and place on prednisone and plaquenil.   Told also that she has polymyalgia rheumatica. Her symptoms have been wax and wane. She is currently on prednisone. Pain has been 8/10 when it is severe. Rheumatology has discharge her from her care d/t nothing more to add and to get med refills thru her PCP. .        3. Applicable records from prior treatment providers are apart of [facility] under the media tab. 4. Diagnostic, therapeutic and laboratory results are available in the [facility] chart. 5. Consultation notes are available for review in the media tab of the [facility] chart. 6. Treatment goals include pain control so that the pt may be as active and function with her daily activities and improved comfort level. Previously pt has been limited by pain. 7. The risks and benefits of treatment has been discussed at this office visit with the pt. She understands that the medication has addicting potential.  Additionally the pt has been advised that narcotic pain medication may impair mental and/or physical ability required for performance of tasks such as driving or operating any other machinery. 8. Pt has an updated signed pain contract on file and can be found under the FYI section of the [facility] chart. 9. The pain contract is reviewed. Pain medication will be continued at the previous dosage. Urine drug screening will be done today. Diagnostic studies are not indicated at this time. Interventional procedure are not indicated at this time. 10. Medication prescibed is HYDROcodone-acetaminophen (NORCO)  mg tablet. No prescription is needed today. She has not recently use the hydrocodone but will call if needed. 11. Patient instructions have been reviewed in detail as outlined above and in the pain contract which is updated today. 12. Re-eval is planned for 3 months. Naloxone prescription is not warranted.      Patient Active Problem List   Diagnosis Code    CHF (congestive heart failure) ([facility]) I50.9    S/P TKR (total knee replacement) Z96.659    Anemia D64.9    s/p lumbar laminectomy Z98.890    S/P ASAD (total abdominal hysterectomy) Z90.710    S/P hemorrhoidectomy Z98.890, Z87.19    S/P rotator cuff surgery Z98.890    DM (diabetes mellitus) ([facility]) E11.9    Chronic sinusitis J32.9    S/P sinus surgery Z98.890    Dyslipidemia E78.5    Environmental allergies Z91.09    Obstructive sleep apnea (adult) (pediatric) G47.33    DJD (degenerative joint disease) M19.90    Chronic systolic heart failure ([facility]) I50.22    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 9) with comorbidity ([facility]) E66.01    Polymyalgia rheumatica ([facility]) M35.3    COPD exacerbation ([facility]) J44.1       Current Outpatient Medications   Medication Sig Dispense Refill    furosemide (LASIX) 20 mg tablet TAKE 1 TABLET BY MOUTH EVERY MORNING AND TAKE 1 TABLET BY MOUTH EVERY AFTERNOON BETWEEN 2 AND 4  Tab 3    carvediloL (COREG) 25 mg tablet TAKE 1 TABLET BY MOUTH TWICE DAILY WITH MEALS 180 Tab 0    glipiZIDE SR (GLUCOTROL XL) 2.5 mg CR tablet TAKE ONE TABLET BY MOUTH DAILY 90 Tab 3    BEVESPI AEROSPHERE 9-4.8 mcg HFA inhaler INHALE TWO PUFFS PO QD UTD      albuterol (PROVENTIL HFA, VENTOLIN HFA, PROAIR HFA) 90 mcg/actuation inhaler Take 2 Puffs by inhalation every four (4) hours as needed for Wheezing or Shortness of Breath. 1 Inhaler     primidone (MYSOLINE) 50 mg tablet Take 3 tabs by mouth 2 times daily      diclofenac (VOLTAREN) 1 % gel Apply  to affected area four (4) times daily.  300 g 3    DULoxetine (CYMBALTA) 60 mg capsule Take 1 Cap by mouth daily. 90 Cap 3    predniSONE (DELTASONE) 5 mg tablet Take 2 tabs alternating with 3 tabs every other day.  ALPRAZolam (XANAX) 0.5 mg tablet TAKE 1/2 TO 1 TABLETS BY MOUTH EVERY NIGHT AT BEDTIME AS NEEDED FOR SLEEP 30 Tab 3    rosuvastatin (CRESTOR) 10 mg tablet Take 1 Tab by mouth nightly. 90 Tab 1    guaiFENesin (MUCINEX) 1,200 mg Ta12 ER tablet Take 1,200 mg by mouth daily as needed.  polyethylene glycol (MIRALAX) 17 gram/dose powder Take 17 g by mouth daily as needed.  tiotropium-olodaterol (STIOLTO RESPIMAT) 2.5-2.5 mcg/actuation inhaler Take 2 Puffs by inhalation daily.  MULTIVITAMIN PO Take 1 Tab by mouth daily.  Omeprazole delayed release (PRILOSEC D/R) 20 mg tablet Take 20 mg by mouth daily.  neomycin-polymyxin-hydrocortisone (CORTISPORIN) otic solution Administer 3-4 Drops in left ear three (3) times daily as needed (ear discomfort). 1 Bottle 1    albuterol (VENTOLIN HFA) 90 mcg/actuation inhaler INHALE 2 PUFFS BY MOUTH EVERY 4 HOURS AS NEEDED FOR WHEEZING 1 Inhaler 6    docusate sodium (STOOL SOFTENER PO) Take 1 Tab by mouth two (2) times a day.  lisinopril (PRINIVIL, ZESTRIL) 2.5 mg tablet Take 2.5 mg by mouth daily.  fluticasone (FLONASE) 50 mcg/actuation nasal spray SHAKE LIQUID AND USE 2 SPRAYS IN EACH NOSTRIL EVERY DAY 1 Bottle 11    cyanocobalamin 1,000 mcg tablet Take 1,000 mcg by mouth daily.       promethazine-dextromethorphan (PROMETHAZINE-DM) 6.25-15 mg/5 mL syrup TAKE ONE TEASPOONFUL BY MOUTH EVERY SIX HOURS AS NEEDED FOR COUGH 240 mL 1       Allergies   Allergen Reactions    Gabapentin Other (comments)     Muscles twitching and jerking    Levaquin [Levofloxacin] Swelling    Lyrica [Pregabalin] Other (comments)     Muscle twitching and jerking    Percodan [Oxycodone Hcl-Oxycodone-Asa] Itching         Past Medical History:   Diagnosis Date    Anemia 3/3/2010    Arrhythmia     irregular heartbeat    Arthritis     CHF (congestive heart failure) ([facility] [address] 3/3/2010    Chronic obstructive pulmonary disease (HCC)     Chronic pain     back    Chronic sinusitis 3/3/2010    CPAP (continuous positive airway pressure) dependence     Diverticulosis     DM (diabetes mellitus) ([facility] [address] 3/3/2010    Dyslipidemia 3/3/2010    GERD (gastroesophageal reflux disease)     HTN (hypertension) 3/3/2010    Ill-defined condition     being evaluated py pulmonolgist for \"trouble breathing\"    S/P TKR (total knee replacement) 3/3/2010    Unspecified sleep apnea     doesn't wear CPAP since back has been hurting         Past Surgical History:   Procedure Laterality Date    HX APPENDECTOMY      thinks it was taken with hysterectomy    HX GYN      \"tubes opened\"    HX HEENT      sinus surgery    HX HEMORRHOIDECTOMY      HX HYSTERECTOMY      HX KNEE REPLACEMENT  1996    both knees- at same time    HX LUMBAR LAMINECTOMY  1980s    HX MOHS PROCEDURES      left shoulder    HX ORTHOPAEDIC  1980    neck fusion    HX ORTHOPAEDIC  1999    right knee replaced for second time    HX ORTHOPAEDIC      lumbar fusion    HX OTHER SURGICAL      CORNELL BIOPSY BREAST STEREOTACTIC Left yrs ago    (-)    MD COLONOSCOPY FLX DX W/COLLJ SPEC WHEN PFRMD  [id number]    dr. Carlos Cyr (barium enema)         Family History   Problem Relation Age of Onset    Diabetes Mother     Heart Disease Father     Diabetes Brother     Blindness Brother     Diabetes Sister     Heart Disease Sister     Heart Disease Brother     Heart Disease Brother     Asthma Brother     Malignant Hyperthermia Neg Hx     Pseudocholinesterase Deficiency Neg Hx     Delayed Awakening Neg Hx     Post-op Nausea/Vomiting Neg Hx     Emergence Delirium Neg Hx     Post-op Cognitive Dysfunction Neg Hx        Social History     Tobacco Use    Smoking status: Former Smoker     Packs/day: 0.30     Years: 5.00     Pack years: 1.50     Last attempt to quit: 1/1/1960     Years since quittin.2    Smokeless tobacco: Never Used   Substance Use Topics    Alcohol use: No     Alcohol/week: 0.0 standard drinks        Lab Results   Component Value Date/Time    WBC 7.9 2019 03:38 AM    HGB 11.0 (L) 2019 03:38 AM    HCT 34.9 (L) 2019 03:38 AM    PLATELET 530 (L)  03:38 AM    .2 (H) 2019 03:38 AM     Lab Results   Component Value Date/Time    Cholesterol, total 221 (H) 2019 10:19 AM    HDL Cholesterol 99 2019 10:19 AM    LDL, calculated 101 (H) 2019 10:19 AM    Triglyceride 105 2019 10:19 AM    CHOL/HDL Ratio 2.0 2010 02:10 PM     Lab Results   Component Value Date/Time    Sodium 140 2019 03:38 AM    Potassium 4.4 2019 03:38 AM    Chloride 106 2019 03:38 AM    CO2 27 2019 03:38 AM    Anion gap 7 2019 03:38 AM    Glucose 203 (H) 2019 03:38 AM    BUN 26 (H) 2019 03:38 AM    Creatinine 1.52 (H) 2019 03:38 AM    BUN/Creatinine ratio 17 2019 03:38 AM    GFR est AA 39 (L) 2019 03:38 AM    GFR est non-AA 32 (L) 2019 03:38 AM    Calcium 9.2 2019 03:38 AM    Bilirubin, total 0.4 2019 06:23 PM    ALT (SGPT) 28 2019 06:23 PM    AST (SGOT) 21 2019 06:23 PM    Alk. phosphatase 65 2019 06:23 PM    Protein, total 7.0 2019 06:23 PM    Albumin 3.7 2019 06:23 PM    Globulin 3.3 2019 06:23 PM    A-G Ratio 1.1 2019 06:23 PM      Lab Results   Component Value Date/Time    Hemoglobin A1c 6.4 (H) 2017 11:09 AM    Hemoglobin A1c (POC) 6.6 2019 10:13 AM         Review of Systems   Constitutional: Negative for malaise/fatigue. HENT: Negative for congestion. Eyes: Negative for blurred vision. Respiratory: Negative for cough and shortness of breath. Cardiovascular: Negative for chest pain, palpitations and leg swelling. Gastrointestinal: Negative for abdominal pain, constipation and heartburn. Genitourinary: Negative for dysuria, frequency and urgency. Musculoskeletal: Positive for back pain, joint pain and myalgias. Neurological: Negative for dizziness, tingling, sensory change, focal weakness and headaches. Endo/Heme/Allergies: Negative for environmental allergies. Psychiatric/Behavioral: Negative for depression. The patient does not have insomnia. Physical Exam   Constitutional: She is oriented to person, place, and time. She appears well-developed and well-nourished. /60 (BP 1 Location: Left arm, BP Patient Position: Sitting)   Pulse (!) 58   Temp 96.7 °F (35.9 °C) (Oral)   Resp 18   Ht 5' 4\" (1.626 m)   Wt 217 lb 9.6 oz (98.7 kg)   LMP 03/04/1961 (Approximate)   SpO2 100%    L/min   BMI 37.35 kg/m²      HENT:   Right Ear: Tympanic membrane and ear canal normal.   Left Ear: Tympanic membrane and ear canal normal.   Nose: No mucosal edema or rhinorrhea. Mouth/Throat: Oropharynx is clear and moist and mucous membranes are normal.   Neck: Neck supple. No spinous process tenderness and no muscular tenderness present. No neck rigidity. No thyromegaly present. Cardiovascular: Normal rate, regular rhythm and normal heart sounds. Exam reveals no gallop. Pulmonary/Chest: Effort normal and breath sounds normal.   Abdominal: Soft. Normal appearance and bowel sounds are normal. She exhibits no mass. There is no abdominal tenderness. Musculoskeletal: Normal range of motion. General: Tenderness (multiple areas of tenderness in the arm and legs.  ) and edema (mild) present. Lymphadenopathy:     She has no cervical adenopathy. Neurological: She is alert and oriented to person, place, and time. No cranial nerve deficit. Coordination normal.   Skin: Skin is warm and dry. Psychiatric: She has a normal mood and affect. Nursing note and vitals reviewed. ASSESSMENT and PLAN  Diagnoses and all orders for this visit:    1.  Essential hypertension, benign  Stable     2. Type 2 diabetes with nephropathy (HCC)  a1c stable at 6.6% on daily use of steroids.    -     AMB POC HEMOGLOBIN A1C  -     AMB POC GLUCOSE, QUANTITATIVE, BLOOD    3. Mixed hyperlipidemia  -     LIPID PANEL    4. Chronic systolic heart failure (HCC)  Stable     5. Stage 3 chronic kidney disease (HCC)  Stable     6. Chronic obstructive pulmonary disease, unspecified COPD type ([facility] [address]  Stable     7. Chronic anemia  -     CBC W/O DIFF    8. Arthralgia of multiple joints  9. Polymyalgia rheumatica (HCC)  -     REFERRAL TO PHYSICAL THERAPY  Continue with Prednisone which does help her to be able to function with less pain. 10. Encounter for medication monitoring  -     METABOLIC PANEL, COMPREHENSIVE      Follow-up and Dispositions    · Return in about 2 months (around 5/6/2020). reviewed diet, exercise and weight control  cardiovascular risk and specific lipid/LDL goals reviewed  reviewed medications and side effects in detail  specific diabetic recommendations: low cholesterol diet, weight control and daily exercise discussed, home glucose monitoring emphasized, all medications, side effects and compliance discussed carefully, foot care discussed and Podiatry visits discussed, annual eye examinations at Ophthalmology discussed and glycohemoglobin and other lab monitoring discussed     I have discussed diagnosis listed in this note with pt and/or family. I have discussed treatment plans and options and the risk/benefit analysis of those options, including safe use of medications and possible medication side effects. Through the use of shared decision making we have agreed to the above plan. The patient has received an after-visit summary and questions were answered concerning future plans and follow up. Advise pt of any urgent changes then to proceed to the ER.

## 2020-03-06 NOTE — PROGRESS NOTES
Chief Complaint   Patient presents with    Cholesterol Problem     follow up    Hypertension     follow up    Diabetes     follow up     A1C 12/6/2019    Microalbumin 6/18/2019    Mammogram 6/3/2019    Eye exam 5/28/2019 by Dr. Amanda Gilbert. Patient states she has an appt in 5/2020.    1. Have you been to the ER, urgent care clinic since your last visit? Hospitalized since your last visit? No    2. Have you seen or consulted any other health care providers outside of the [address] [place] [address] since your last visit? Include any pap smears or colon screening.  No

## 2020-03-06 NOTE — PROGRESS NOTES
Made an appointment with [facility] on March 17 th @ 10 am    Order faxed to [phone]

## 2020-03-07 LAB
ALBUMIN SERPL-MCNC: 4.6 G/DL (ref 3.6–4.6)
ALBUMIN/GLOB SERPL: 3.1 {RATIO} (ref 1.2–2.2)
ALP SERPL-CCNC: 58 IU/L (ref 39–117)
ALT SERPL-CCNC: 19 IU/L (ref 0–32)
AST SERPL-CCNC: 21 IU/L (ref 0–40)
BILIRUB SERPL-MCNC: 0.2 MG/DL (ref 0–1.2)
BUN SERPL-MCNC: 20 MG/DL (ref 8–27)
BUN/CREAT SERPL: 14 (ref 12–28)
CALCIUM SERPL-MCNC: 9.5 MG/DL (ref 8.7–10.3)
CHLORIDE SERPL-SCNC: 105 MMOL/L (ref 96–106)
CHOLEST SERPL-MCNC: 212 MG/DL (ref 100–199)
CO2 SERPL-SCNC: 24 MMOL/L (ref 20–29)
CREAT SERPL-MCNC: 1.46 MG/DL (ref 0.57–1)
ERYTHROCYTE [DISTWIDTH] IN BLOOD BY AUTOMATED COUNT: 13 % (ref 11.7–15.4)
GLOBULIN SER CALC-MCNC: 1.5 G/DL (ref 1.5–4.5)
GLUCOSE SERPL-MCNC: 138 MG/DL (ref 65–99)
HCT VFR BLD AUTO: 35.5 % (ref 34–46.6)
HDLC SERPL-MCNC: 90 MG/DL
HGB BLD-MCNC: 11.6 G/DL (ref 11.1–15.9)
INTERPRETATION, 910389: NORMAL
INTERPRETATION: NORMAL
LDLC SERPL CALC-MCNC: 98 MG/DL (ref 0–99)
MCH RBC QN AUTO: 32 PG (ref 26.6–33)
MCHC RBC AUTO-ENTMCNC: 32.7 G/DL (ref 31.5–35.7)
MCV RBC AUTO: 98 FL (ref 79–97)
PDF IMAGE, 910387: NORMAL
PLATELET # BLD AUTO: 139 X10E3/UL (ref 150–450)
POTASSIUM SERPL-SCNC: 4.6 MMOL/L (ref 3.5–5.2)
PROT SERPL-MCNC: 6.1 G/DL (ref 6–8.5)
RBC # BLD AUTO: 3.63 X10E6/UL (ref 3.77–5.28)
SODIUM SERPL-SCNC: 146 MMOL/L (ref 134–144)
TRIGL SERPL-MCNC: 121 MG/DL (ref 0–149)
VLDLC SERPL CALC-MCNC: 24 MG/DL (ref 5–40)
WBC # BLD AUTO: 5.8 X10E3/UL (ref 3.4–10.8)

## 2020-03-08 DIAGNOSIS — F51.01 PRIMARY INSOMNIA: ICD-10-CM

## 2020-03-09 RX ORDER — ALPRAZOLAM 0.5 MG/1
TABLET ORAL
Qty: 30 TAB | Refills: 3 | OUTPATIENT
Start: 2020-03-09 | End: 2020-07-01

## 2020-04-14 NOTE — PROGRESS NOTES
Subjective/HPI:     Priya Kelly is a 80 y.o. female who had an office visit conducted over the telephone on 4/15/20. The patient denies chest pain/worsening shortness of breath, orthopnea, PND, LE edema, palpitations, syncope, presyncope or fatigue. Her main issue is her arthritis. She was supposed to have physical therapy and had to postpone due to the [address]           PCP Provider  Daniela Vargas MD  Past Medical History:   Diagnosis Date    Anemia 3/3/2010    Arrhythmia     irregular heartbeat    Arthritis     CHF (congestive heart failure) ([facility] [address] 3/3/2010    Chronic obstructive pulmonary disease (HCC)     Chronic pain     back    Chronic sinusitis 3/3/2010    CPAP (continuous positive airway pressure) dependence     Diverticulosis     [facility] 3/3/2010    Dyslipidemia 3/3/2010    GERD (gastroesophageal reflux disease)     HTN (hypertension) 3/3/2010    Ill-defined condition     being evaluated py pulmonolgist for \"trouble breathing\"    S/P TKR (total knee replacement) 3/3/2010    Unspecified sleep apnea     doesn't wear CPAP since back has been hurting      Past Surgical History:   Procedure Laterality Date    HX APPENDECTOMY      thinks it was taken with hysterectomy    HX GYN      \"tubes opened\"    HX HEENT      sinus surgery    HX HEMORRHOIDECTOMY      HX HYSTERECTOMY      HX KNEE REPLACEMENT  1996    both knees- at same time    HX LUMBAR LAMINECTOMY  1980s    HX MOHS PROCEDURES      left shoulder    HX ORTHOPAEDIC  1980    neck fusion    HX ORTHOPAEDIC  1999    right knee replaced for second time    HX ORTHOPAEDIC      lumbar fusion    HX OTHER SURGICAL      CORNELL BIOPSY BREAST STEREOTACTIC Left yrs ago    (-)    MT COLONOSCOPY FLX DX W/COLLJ SPEC WHEN PFRMD  [id number]    dr. Jay Ratliff (barium enema)     Allergies   Allergen Reactions    Gabapentin Other (comments)     Muscles twitching and jerking    Levaquin [Levofloxacin] Swelling    Lyrica [Pregabalin] Other (comments)     Muscle twitching and jerking    Percodan [Oxycodone Hcl-Oxycodone-Asa] Itching      Family History   Problem Relation Age of Onset    Diabetes Mother     Heart Disease Father     Diabetes Brother     Blindness Brother     Diabetes Sister     Heart Disease Sister     Heart Disease Brother     Heart Disease Brother     Asthma Brother     Malignant Hyperthermia Neg Hx     Pseudocholinesterase Deficiency Neg Hx     Delayed Awakening Neg Hx     Post-op Nausea/Vomiting Neg Hx     Emergence Delirium Neg Hx     Post-op Cognitive Dysfunction Neg Hx       Current Outpatient Medications   Medication Sig    ALPRAZolam (XANAX) 0.5 mg tablet TAKE ONE-HALF TO 1 TABLET BY MOUTH EVERY NIGHT AT BEDTIME AS NEEDED    predniSONE (DELTASONE) 5 mg tablet Take 2 and 1/2 tabs alternating with 3 tabs every other day.  furosemide (LASIX) 20 mg tablet TAKE 1 TABLET BY MOUTH EVERY MORNING AND TAKE 1 TABLET BY MOUTH EVERY AFTERNOON BETWEEN 2 AND 4 PM    carvediloL (COREG) 25 mg tablet TAKE 1 TABLET BY MOUTH TWICE DAILY WITH MEALS    glipiZIDE SR (GLUCOTROL XL) 2.5 mg CR tablet TAKE ONE TABLET BY MOUTH DAILY    BEVESPI AEROSPHERE 9-4.8 mcg HFA inhaler Take 2 puffs by inhalation daily    albuterol (PROVENTIL HFA, VENTOLIN HFA, PROAIR HFA) 90 mcg/actuation inhaler Take 2 Puffs by inhalation every four (4) hours as needed for Wheezing or Shortness of Breath.  primidone (MYSOLINE) 50 mg tablet Take 3 tabs by mouth 2 times daily    diclofenac (VOLTAREN) 1 % gel Apply  to affected area four (4) times daily.  DULoxetine (CYMBALTA) 60 mg capsule Take 1 Cap by mouth daily.  rosuvastatin (CRESTOR) 10 mg tablet Take 1 Tab by mouth nightly.  guaiFENesin (MUCINEX) 1,200 mg Ta12 ER tablet Take 1,200 mg by mouth daily as needed.  polyethylene glycol (MIRALAX) 17 gram/dose powder Take 17 g by mouth daily as needed.  MULTIVITAMIN PO Take 1 Tab by mouth daily.     Omeprazole delayed release (PRILOSEC D/R) 20 mg tablet Take 20 mg by mouth daily.  docusate sodium (STOOL SOFTENER PO) Take 1 Tab by mouth two (2) times a day.  lisinopril (PRINIVIL, ZESTRIL) 2.5 mg tablet Take 2.5 mg by mouth daily.  fluticasone (FLONASE) 50 mcg/actuation nasal spray SHAKE LIQUID AND USE 2 SPRAYS IN EACH NOSTRIL EVERY DAY    cyanocobalamin 1,000 mcg tablet Take 1,000 mcg by mouth daily.  promethazine-dextromethorphan (PROMETHAZINE-DM) 6.25-15 mg/5 mL syrup TAKE ONE TEASPOONFUL BY MOUTH EVERY SIX HOURS AS NEEDED FOR COUGH     No current facility-administered medications for this visit. There were no vitals filed for this visit.   Social History     Socioeconomic History    Marital status:      Spouse name: Not on file    Number of children: Not on file    Years of education: Not on file    Highest education level: Not on file   Occupational History    Not on file   Social Needs    Financial resource strain: Not on file    Food insecurity     Worry: Not on file     Inability: Not on file    Transportation needs     Medical: Not on file     Non-medical: Not on file   Tobacco Use    Smoking status: Former Smoker     Packs/day: 0.30     Years: 5.00     Pack years: 1.50     Last attempt to quit: 1960     Years since quittin.3    Smokeless tobacco: Never Used   Substance and Sexual Activity    Alcohol use: No     Alcohol/week: 0.0 standard drinks    Drug use: No    Sexual activity: Never   Lifestyle    Physical activity     Days per week: Not on file     Minutes per session: Not on file    Stress: Not on file   Relationships    Social connections     Talks on phone: Not on file     Gets together: Not on file     Attends [employer] service: Not on file     Active member of club or organization: Not on file     Attends meetings of clubs or organizations: Not on file     Relationship status: Not on file    Intimate partner violence     Fear of current or ex partner: Not on file     Emotionally abused: Not on file     Physically abused: Not on file     Forced sexual activity: Not on file   Other Topics Concern    Not on file   Social History Narrative    ** Merged History Encounter **            I have reviewed the nurses notes, vitals, problem list, allergy list, medical history, family, social history and medications. Review of Symptoms:    General: Pt denies excessive weight gain or loss. Pt is able to conduct ADL's  HEENT: Denies blurred vision, headaches, epistaxis and difficulty swallowing. Respiratory: Denies shortness of breath, HIGH, wheezing or stridor. Cardiovascular: Denies precordial pain, palpitations, edema or PND  Gastrointestinal: Denies poor appetite, indigestion, abdominal pain or blood in stool  Urinary: Denies dysuria, pyuria  Musculoskeletal: Denies pain or swelling from muscles or joints  Neurologic: Denies tremor, paresthesias, or sensory motor disturbance  Skin: Denies rash, itching or texture change.   Psych: Denies depression        Physical Exam:    No physical exam completed, telephone only    Cardiographics    Results for orders placed or performed during the hospital encounter of 12/09/19   EKG, 12 LEAD, INITIAL   Result Value Ref Range    Ventricular Rate 94 BPM    Atrial Rate 94 BPM    P-R Interval 132 ms    QRS Duration 100 ms    Q-T Interval 346 ms    QTC Calculation (Bezet) 432 ms    Calculated P Axis 61 degrees    Calculated R Axis -41 degrees    Calculated T Axis 99 degrees    Diagnosis       Sinus rhythm with occasional premature ventricular complexes  Left axis deviation  Poor R-wave Progression (consider lead placement or loss of anterior forces)  Confirmed by Pao Waller ([id number]) on 12/10/2019 7:19:22 PM           Cardiology Labs:  Lab Results   Component Value Date/Time    Cholesterol, total 212 (H) 03/06/2020 10:29 AM    HDL Cholesterol 90 03/06/2020 10:29 AM    LDL, calculated 98 03/06/2020 10:29 AM    LDL, calculated 101 (H) 12/06/2019 10:19 AM    LDL, calculated 102 (H) 09/16/2019 10:58 AM    VLDL, calculated 24 03/06/2020 10:29 AM    CHOL/HDL Ratio 2.0 06/11/2010 02:10 PM     Lab Results   Component Value Date/Time    Sodium 146 (H) 03/06/2020 10:29 AM    Potassium 4.6 03/06/2020 10:29 AM    Chloride 105 03/06/2020 10:29 AM    CO2 24 03/06/2020 10:29 AM    Glucose 138 (H) 03/06/2020 10:29 AM    BUN 20 03/06/2020 10:29 AM    Creatinine 1.46 (H) 03/06/2020 10:29 AM    BUN/Creatinine ratio 14 03/06/2020 10:29 AM    GFR est AA 37 (L) 03/06/2020 10:29 AM    GFR est non-AA 32 (L) 03/06/2020 10:29 AM    Calcium 9.5 03/06/2020 10:29 AM    Anion gap 7 12/11/2019 03:38 AM    Bilirubin, total 0.2 03/06/2020 10:29 AM    ALT (SGPT) 19 03/06/2020 10:29 AM    AST (SGOT) 21 03/06/2020 10:29 AM    Alk. phosphatase 58 03/06/2020 10:29 AM    Protein, total 6.1 03/06/2020 10:29 AM    Albumin 4.6 03/06/2020 10:29 AM    Globulin 3.3 12/09/2019 06:23 PM    A-G Ratio 3.1 (H) 03/06/2020 10:29 AM     Lab Results   Component Value Date/Time    Hemoglobin A1c 6.4 (H) 09/08/2017 11:09 AM    Hemoglobin A1c (POC) 6.6 03/06/2020 01:29 AM        Assessment:     Assessment:     Diagnoses and all orders for this visit:    1. Chronic systolic heart failure ([facility] [address]    2. Essential hypertension, benign    3. Type 2 diabetes mellitus with diabetic neuropathy, without long-term current use of insulin ([facility] [address]    4. Dyslipidemia        ICD-10-CM ICD-9-CM    1. Chronic systolic heart failure (HCC) I50.22 428.22    2. Essential hypertension, benign I10 401.1    3. Type 2 diabetes mellitus with diabetic neuropathy, without long-term current use of insulin ([facility]) E11.40 250.60      357.2    4. Dyslipidemia E78.5 272.4           Plan:     1. Chronic systolic heart failure ([address]  She reports her SOB/HIGH is at baseline. No issues with edema. EF 51-55% per echo 5/19. Mild hypertrophy, grade II diastolic dysfunction, mild MR. Nuclear stress test 5/19 low risk study. Continue Lasix, Coreg, and Lisinopril. 2. Essential hypertension, benign  BP well controlled in office. Not monitoring at home. Continue current meds. 3. Type 2 diabetes mellitus with diabetic neuropathy, without long-term current use of insulin ([facility] [address]  Managed by PCP    4. Dyslipidemia  On Crestor. Labs managed by PCP, done in March 2020 and at goal.    F/U in 1 year with Dr Gregorio Johnson, [place]    Pursuant to the emergency declaration under the [facility], [facility] waiver authority and the [employer] Act, this telephone visit was conducted, with patient's previous consent to utilize this technology and understand the risks and benefits of proceeding, to reduce the patient's risk of exposure to COVID-19 and provide continuity of care for an established patient. Services were provided through telephone only to substitute for in-person clinic visit. I am speaking with this patient today from my office in [place], [place], and from their home located within [place]. Consent:  She and/or health care decision maker is aware that she may receive a bill for this telephone service, depending on his insurance coverage, and has provided verbal consent to proceed: Yes      I affirm this is a Patient Initiated Episode with an Established Patient who has not had a related appointment within my department in the past 7 days or scheduled within the next 24 hours.     Total Time: minutes: 11-20 minutes

## 2020-04-15 ENCOUNTER — VIRTUAL VISIT (OUTPATIENT)
Dept: CARDIOLOGY CLINIC | Age: 85
End: 2020-04-15

## 2020-04-15 DIAGNOSIS — E11.40 TYPE 2 DIABETES MELLITUS WITH DIABETIC NEUROPATHY, WITHOUT LONG-TERM CURRENT USE OF INSULIN (HCC): ICD-10-CM

## 2020-04-15 DIAGNOSIS — E78.5 DYSLIPIDEMIA: ICD-10-CM

## 2020-04-15 DIAGNOSIS — I10 ESSENTIAL HYPERTENSION, BENIGN: ICD-10-CM

## 2020-04-15 DIAGNOSIS — I50.22 CHRONIC SYSTOLIC HEART FAILURE (HCC): Primary | ICD-10-CM

## 2020-05-18 ENCOUNTER — OFFICE VISIT (OUTPATIENT)
Dept: NEUROLOGY | Age: 85
End: 2020-05-18

## 2020-05-18 DIAGNOSIS — G25.0 ESSENTIAL TREMOR: Primary | ICD-10-CM

## 2020-05-18 DIAGNOSIS — E11.42 DIABETIC POLYNEUROPATHY ASSOCIATED WITH TYPE 2 DIABETES MELLITUS (HCC): ICD-10-CM

## 2020-05-18 DIAGNOSIS — M54.17 LUMBOSACRAL RADICULOPATHY: ICD-10-CM

## 2020-05-18 NOTE — PROGRESS NOTES
NEUROLOGY FOLLOW UP NOTE    Chief Complaint   Patient presents with   Diego Loyd is a 80 y.o. female who presented to the neurology office for management of tremors. It has been going on since 2016. It is gradually progressive with time. It is postural and in both in the arms and legs. No falls. She does have constipation. She does have mild stiffness in legs. The patient was on gabapentin and when it was discontinued her tremors have improved but they are coming back recently. Patient is on primidone 100 mg p.o. twice daily and there is improvement in her tremors and she is overall satisfied with it. She does also have lumbar radiculopathy and a length dependent neuropathy. Both of them are stable    Interval history:   During the last visit her dosage of primidone was increased and she is taking 150 mg p.o. twice daily and has not noticed any tremors anymore. She does have fibromyalgia for which she takes Cymbalta 60 mg daily. She does notice back soreness and leg muscle soreness. She takes Tylenol for it and is able to get by with it. Medications tried:  Gabapentin and Lyrica causes tremors  Primidone    Current Outpatient Medications   Medication Sig    rosuvastatin (CRESTOR) 10 mg tablet TAKE 1 TABLET BY MOUTH EVERY NIGHT    ALPRAZolam (XANAX) 0.5 mg tablet TAKE ONE-HALF TO 1 TABLET BY MOUTH EVERY NIGHT AT BEDTIME AS NEEDED    predniSONE (DELTASONE) 5 mg tablet Take 2 and 1/2 tabs alternating with 3 tabs every other day.     furosemide (LASIX) 20 mg tablet TAKE 1 TABLET BY MOUTH EVERY MORNING AND TAKE 1 TABLET BY MOUTH EVERY AFTERNOON BETWEEN 2 AND 4 PM    carvediloL (COREG) 25 mg tablet TAKE 1 TABLET BY MOUTH TWICE DAILY WITH MEALS    glipiZIDE SR (GLUCOTROL XL) 2.5 mg CR tablet TAKE ONE TABLET BY MOUTH DAILY    BEVESPI AEROSPHERE 9-4.8 mcg HFA inhaler Take 2 puffs by inhalation daily    albuterol (PROVENTIL HFA, VENTOLIN HFA, PROAIR HFA) 90 mcg/actuation inhaler Take 2 Puffs by inhalation every four (4) hours as needed for Wheezing or Shortness of Breath.  primidone (MYSOLINE) 50 mg tablet Take 3 tabs by mouth 2 times daily    diclofenac (VOLTAREN) 1 % gel Apply  to affected area four (4) times daily.  DULoxetine (CYMBALTA) 60 mg capsule Take 1 Cap by mouth daily.  guaiFENesin (MUCINEX) 1,200 mg Ta12 ER tablet Take 1,200 mg by mouth daily as needed.  polyethylene glycol (MIRALAX) 17 gram/dose powder Take 17 g by mouth daily as needed.  MULTIVITAMIN PO Take 1 Tab by mouth daily.  Omeprazole delayed release (PRILOSEC D/R) 20 mg tablet Take 20 mg by mouth daily.  docusate sodium (STOOL SOFTENER PO) Take 1 Tab by mouth two (2) times a day.  lisinopril (PRINIVIL, ZESTRIL) 2.5 mg tablet Take 2.5 mg by mouth daily.  fluticasone (FLONASE) 50 mcg/actuation nasal spray SHAKE LIQUID AND USE 2 SPRAYS IN EACH NOSTRIL EVERY DAY    promethazine-dextromethorphan (PROMETHAZINE-DM) 6.25-15 mg/5 mL syrup TAKE ONE TEASPOONFUL BY MOUTH EVERY SIX HOURS AS NEEDED FOR COUGH     No current facility-administered medications for this visit.         REVIEW OF SYSTEMS:   A ten system review of constitutional, cardiovascular, respiratory, musculoskeletal, endocrine, skin, SHEENT, genitourinary, psychiatric and neurologic systems was obtained and is unremarkable except as stated in HPI    EXAMINATION:   Visit Vitals  LMP 03/04/1961 (Approximate)        Telephone encounter    Laboratory review:   Results for orders placed or performed in visit on 03/06/20   CBC W/O DIFF   Result Value Ref Range    WBC 5.8 3.4 - 10.8 x10E3/uL    RBC 3.63 (L) 3.77 - 5.28 x10E6/uL    HGB 11.6 11.1 - 15.9 g/dL    HCT 35.5 34.0 - 46.6 %    MCV 98 (H) 79 - 97 fL    MCH 32.0 26.6 - 33.0 pg    MCHC 32.7 31.5 - 35.7 g/dL    RDW 13.0 11.7 - 15.4 %    PLATELET 462 (L) 277 - 450 x10E3/uL   LIPID PANEL   Result Value Ref Range    Cholesterol, total 212 (H) 100 - 199 mg/dL    Triglyceride 121 0 - 149 mg/dL    HDL Cholesterol 90 >39 mg/dL    VLDL, calculated 24 5 - 40 mg/dL    LDL, calculated 98 0 - 99 mg/dL   METABOLIC PANEL, COMPREHENSIVE   Result Value Ref Range    Glucose 138 (H) 65 - 99 mg/dL    BUN 20 8 - 27 mg/dL    Creatinine 1.46 (H) 0.57 - 1.00 mg/dL    GFR est non-AA 32 (L) >59 mL/min/1.73    GFR est AA 37 (L) >59 mL/min/1.73    BUN/Creatinine ratio 14 12 - 28    Sodium 146 (H) 134 - 144 mmol/L    Potassium 4.6 3.5 - 5.2 mmol/L    Chloride 105 96 - 106 mmol/L    CO2 24 20 - 29 mmol/L    Calcium 9.5 8.7 - 10.3 mg/dL    Protein, total 6.1 6.0 - 8.5 g/dL    Albumin 4.6 3.6 - 4.6 g/dL    GLOBULIN, TOTAL 1.5 1.5 - 4.5 g/dL    A-G Ratio 3.1 (H) 1.2 - 2.2    Bilirubin, total 0.2 0.0 - 1.2 mg/dL    Alk. phosphatase 58 39 - 117 IU/L    AST (SGOT) 21 0 - 40 IU/L    ALT (SGPT) 19 0 - 32 IU/L   CVD REPORT   Result Value Ref Range    INTERPRETATION Note     PDF IMAGE Not applicable    CKD REPORT   Result Value Ref Range    Interpretation Note    AMB POC HEMOGLOBIN A1C   Result Value Ref Range    Hemoglobin A1c (POC) 6.6 %   AMB POC GLUCOSE, QUANTITATIVE, BLOOD   Result Value Ref Range    Glucose  mg/dL       Imaging review:   11/6/12  MRI L spine  1. Status post L4 and L5 laminectomies. Severe L4-5 disc space narrowing with moderate sized central disc herniation. No canal stenosis; moderate left foraminal narrowing. 2.  Diffuse degenerative changes. Canal stenosis mild at T10-11, borderline at T11-12 and T12-L1, mild at L1-2, mild to moderate at L2-3, severe at L3-4, and moderate at L5-S1.    12/6/12  MRI Brain  1. No mass or enhancement abnormality of the internal auditory canals or cerebellopontine angles. 2.  Extensive sinus disease with complete obscuration of the left frontal and sphenoid sinuses. 9/28/16  EMG/NCS  This study is abnormal. There is evidence for a moderate to severe degree of a length dependent polyneuropathy.  There is also residual multilevel lumbosacral radiculopathy without significant denervation in the distal leg muscles. Documentation review:  None    Assessment/Plan:   Raúl Mart is a 80 y.o. female who presented to the neurology office for management of postural tremors, lumbosacral radiculopathy and diabetic polyneuropathy. Her diabetic neuropathy and lumbosacral radiculopathy are stable. For essential tremors, patient is on primidone 150 mg p.o. twice daily and symptoms are better than the last visit. .  We will continue with the same dosage. Follow-up in 6 months            ICD-10-CM ICD-9-CM    1. Essential tremor G25.0 333.1    2. Lumbosacral radiculopathy M54.17 724.4    3. Diabetic polyneuropathy associated with type 2 diabetes mellitus [facility]) E11.42 250.60      357.2          Raúl Mart is a 80 y.o. female evaluated via telephone on 5/18/2020. Consent:  She and/or health care decision maker is aware that that she may receive a bill for this telephone service, depending on her insurance coverage, and has provided verbal consent to proceed: Yes    Documentation:  I communicated with the patient and/or health care decision maker about his presenting symptoms. Details of this discussion including any medical advice provided: See notes      I affirm this is a Patient Initiated Episode with an Established Patient who has not had a related appointment within my department in the past 7 days or scheduled within the next 24 hours.     Total Time: minutes: 21-30 minutes    Note: not billable if this call serves to triage the patient into an appointment for the relevant concern

## 2020-06-02 ENCOUNTER — TELEPHONE (OUTPATIENT)
Dept: FAMILY MEDICINE CLINIC | Age: 85
End: 2020-06-02

## 2020-06-02 NOTE — TELEPHONE ENCOUNTER
Pt would like a call about high blood sugar readings     Yesterday it was 188, she hasnt taken it today     Best number to reach her is [phone]

## 2020-06-03 NOTE — TELEPHONE ENCOUNTER
BS are running 170s- 180s. She is on prednisone 15 mg daily. Will monitor and to check some evening BS and call back on Friday.

## 2020-06-04 RX ORDER — BLOOD-GLUCOSE METER
EACH MISCELLANEOUS
Qty: 1 EACH | Refills: 0 | Status: SHIPPED | OUTPATIENT
Start: 2020-06-04

## 2020-06-04 RX ORDER — BLOOD SUGAR DIAGNOSTIC
STRIP MISCELLANEOUS
Qty: 300 STRIP | Refills: 3 | Status: SHIPPED | OUTPATIENT
Start: 2020-06-04 | End: 2021-08-03

## 2020-06-04 RX ORDER — LANCETS
EACH MISCELLANEOUS
Qty: 300 EACH | Refills: 3 | Status: SHIPPED | OUTPATIENT
Start: 2020-06-04 | End: 2021-08-03

## 2020-06-05 ENCOUNTER — TELEPHONE (OUTPATIENT)
Dept: FAMILY MEDICINE CLINIC | Age: 85
End: 2020-06-05

## 2020-06-05 NOTE — TELEPHONE ENCOUNTER
Spoke with patient and states she used her old glucometer 6/3/2020 to check BS.    8:30am  - using old machine  6:30pm - using old machine. Patient did buy a new glucometer and used to check blood sugars yesterday. 8:30am - using new machine. 6:00pm - using new machine. BS this morning at 8am was 109 using new machine. Informed patient will give BS readings to Dr. Madison Ibrahim. Patient verbalized understanding.

## 2020-06-05 NOTE — TELEPHONE ENCOUNTER
Called patient and informed per Dr. Swartz continue to monitor BS and call back if blood sugars go over 200. Patient verbalized understanding.

## 2020-06-05 NOTE — TELEPHONE ENCOUNTER
Pt said she was told to call back with information on her blood sugar today     She would like a call at [phone]

## 2020-06-07 DIAGNOSIS — M35.3 POLYMYALGIA RHEUMATICA (HCC): ICD-10-CM

## 2020-06-08 RX ORDER — PREDNISONE 5 MG/1
TABLET ORAL
Qty: 90 TAB | Refills: 3 | Status: SHIPPED | OUTPATIENT
Start: 2020-06-08 | End: 2020-07-15 | Stop reason: SDUPTHER

## 2020-06-08 RX ORDER — CARVEDILOL 25 MG/1
TABLET ORAL
Qty: 180 TAB | Refills: 1 | Status: SHIPPED | OUTPATIENT
Start: 2020-06-08 | End: 2020-11-28

## 2020-06-08 NOTE — TELEPHONE ENCOUNTER
Received fax from [facility] for refill on carvedilol. Last OV was 4/15/20. Last refilled on 2/11/20 # 180 with 0 refills.

## 2020-07-15 ENCOUNTER — TELEPHONE (OUTPATIENT)
Dept: FAMILY MEDICINE CLINIC | Age: 85
End: 2020-07-15

## 2020-07-15 DIAGNOSIS — M35.3 POLYMYALGIA RHEUMATICA (HCC): ICD-10-CM

## 2020-07-15 RX ORDER — PREDNISONE 5 MG/1
15 TABLET ORAL DAILY
Qty: 90 TAB | Refills: 5 | Status: SHIPPED | OUTPATIENT
Start: 2020-07-15 | End: 2021-01-25

## 2020-07-15 NOTE — TELEPHONE ENCOUNTER
Patient  is returning [facility] call for his wife to discuss medication prednisone she can be reached @ [phone]

## 2020-10-09 ENCOUNTER — OFFICE VISIT (OUTPATIENT)
Dept: FAMILY MEDICINE CLINIC | Age: 85
End: 2020-10-09
Payer: MEDICARE

## 2020-10-09 ENCOUNTER — HOSPITAL ENCOUNTER (OUTPATIENT)
Dept: LAB | Age: 85
Discharge: HOME | End: 2020-10-09
Payer: MEDICARE

## 2020-10-09 VITALS
OXYGEN SATURATION: 96 % | HEIGHT: 64 IN | SYSTOLIC BLOOD PRESSURE: 123 MMHG | WEIGHT: 215 LBS | HEART RATE: 52 BPM | BODY MASS INDEX: 36.7 KG/M2 | TEMPERATURE: 96.9 F | RESPIRATION RATE: 16 BRPM | DIASTOLIC BLOOD PRESSURE: 63 MMHG

## 2020-10-09 DIAGNOSIS — M25.50 ARTHRALGIA OF MULTIPLE JOINTS: ICD-10-CM

## 2020-10-09 DIAGNOSIS — E11.21 TYPE 2 DIABETES WITH NEPHROPATHY (HCC): ICD-10-CM

## 2020-10-09 DIAGNOSIS — E78.2 MIXED HYPERLIPIDEMIA: ICD-10-CM

## 2020-10-09 DIAGNOSIS — Z12.31 ENCOUNTER FOR SCREENING MAMMOGRAM FOR BREAST CANCER: ICD-10-CM

## 2020-10-09 DIAGNOSIS — N18.30 STAGE 3 CHRONIC KIDNEY DISEASE, UNSPECIFIED WHETHER STAGE 3A OR 3B CKD (HCC): ICD-10-CM

## 2020-10-09 DIAGNOSIS — I50.22 CHRONIC SYSTOLIC HEART FAILURE (HCC): ICD-10-CM

## 2020-10-09 DIAGNOSIS — D64.9 CHRONIC ANEMIA: ICD-10-CM

## 2020-10-09 DIAGNOSIS — M35.3 POLYMYALGIA RHEUMATICA (HCC): ICD-10-CM

## 2020-10-09 DIAGNOSIS — J44.9 CHRONIC OBSTRUCTIVE PULMONARY DISEASE, UNSPECIFIED COPD TYPE (HCC): ICD-10-CM

## 2020-10-09 DIAGNOSIS — I10 ESSENTIAL HYPERTENSION, BENIGN: Primary | ICD-10-CM

## 2020-10-09 DIAGNOSIS — G89.29 ENCOUNTER FOR CHRONIC PAIN MANAGEMENT: ICD-10-CM

## 2020-10-09 DIAGNOSIS — Z51.81 ENCOUNTER FOR MEDICATION MONITORING: ICD-10-CM

## 2020-10-09 DIAGNOSIS — Z23 NEEDS FLU SHOT: ICD-10-CM

## 2020-10-09 LAB
BILIRUB UR QL STRIP: NEGATIVE
CREATININE, EXTERNAL: 1.64
GLUCOSE POC: 123 MG/DL
GLUCOSE UR-MCNC: NEGATIVE MG/DL
HBA1C MFR BLD HPLC: 6.2 %
KETONES P FAST UR STRIP-MCNC: NEGATIVE MG/DL
PH UR STRIP: 5 [PH] (ref 4.6–8)
PROT UR QL STRIP: NEGATIVE
SP GR UR STRIP: 1.02 (ref 1–1.03)
UA UROBILINOGEN AMB POC: NORMAL (ref 0.2–1)
URINALYSIS CLARITY POC: CLEAR
URINALYSIS COLOR POC: YELLOW
URINE BLOOD POC: NEGATIVE
URINE LEUKOCYTES POC: NEGATIVE
URINE NITRITES POC: NEGATIVE

## 2020-10-09 PROCEDURE — 80061 LIPID PANEL: CPT

## 2020-10-09 PROCEDURE — 81001 URINALYSIS AUTO W/SCOPE: CPT | Performed by: FAMILY MEDICINE

## 2020-10-09 PROCEDURE — 1090F PRES/ABSN URINE INCON ASSESS: CPT | Performed by: FAMILY MEDICINE

## 2020-10-09 PROCEDURE — G8510 SCR DEP NEG, NO PLAN REQD: HCPCS | Performed by: FAMILY MEDICINE

## 2020-10-09 PROCEDURE — 83036 HEMOGLOBIN GLYCOSYLATED A1C: CPT | Performed by: FAMILY MEDICINE

## 2020-10-09 PROCEDURE — 90694 VACC AIIV4 NO PRSRV 0.5ML IM: CPT

## 2020-10-09 PROCEDURE — G8536 NO DOC ELDER MAL SCRN: HCPCS | Performed by: FAMILY MEDICINE

## 2020-10-09 PROCEDURE — 80307 DRUG TEST PRSMV CHEM ANLYZR: CPT

## 2020-10-09 PROCEDURE — 85027 COMPLETE CBC AUTOMATED: CPT

## 2020-10-09 PROCEDURE — 82947 ASSAY GLUCOSE BLOOD QUANT: CPT | Performed by: FAMILY MEDICINE

## 2020-10-09 PROCEDURE — 36415 COLL VENOUS BLD VENIPUNCTURE: CPT

## 2020-10-09 PROCEDURE — G8417 CALC BMI ABV UP PARAM F/U: HCPCS | Performed by: FAMILY MEDICINE

## 2020-10-09 PROCEDURE — 99214 OFFICE O/P EST MOD 30 MIN: CPT | Performed by: FAMILY MEDICINE

## 2020-10-09 PROCEDURE — 1101F PT FALLS ASSESS-DOCD LE1/YR: CPT | Performed by: FAMILY MEDICINE

## 2020-10-09 PROCEDURE — 82043 UR ALBUMIN QUANTITATIVE: CPT

## 2020-10-09 PROCEDURE — 80053 COMPREHEN METABOLIC PANEL: CPT

## 2020-10-09 PROCEDURE — G0463 HOSPITAL OUTPT CLINIC VISIT: HCPCS | Performed by: FAMILY MEDICINE

## 2020-10-09 PROCEDURE — G8427 DOCREV CUR MEDS BY ELIG CLIN: HCPCS | Performed by: FAMILY MEDICINE

## 2020-10-09 RX ORDER — HYDROCODONE BITARTRATE AND ACETAMINOPHEN 10; 325 MG/1; MG/1
1 TABLET ORAL
Qty: 60 TAB | Refills: 0 | Status: SHIPPED | OUTPATIENT
Start: 2020-10-09 | End: 2020-11-22 | Stop reason: SDUPTHER

## 2020-10-09 RX ORDER — DOXYCYCLINE 100 MG/1
100 CAPSULE ORAL 2 TIMES DAILY
Qty: 20 CAP | Refills: 0 | Status: SHIPPED | OUTPATIENT
Start: 2020-10-09 | End: 2020-10-09 | Stop reason: CLARIF

## 2020-10-09 NOTE — LETTER
CONTROLLED SUBSTANCE MEDICATION AGREEMENT Patient Name: oRlando Moctezuma Patient YOB: 1931 I understand, that controlled substance medications may be used to help better manage my symptoms and to improve my ability to function at home, work and in social settings. However, I also understand that these medications do have risks, which have been discussed with me, including possible development of physical or psychological dependence. I understand that successful treatment requires mutual trust and honesty between me and my provider. I understand and agree that following this Medication Agreement is necessary in continuing my provider-patient relationship and the success of my treatment plan. Explanation from my Provider: Benefits and Goals of Controlled Substance Medications: There are two potential goals for your treatment: (1) decreased pain and suffering (2) improved daily life functions. There are many possible treatments for your chronic condition(s). Alternatives such as physical therapy, yoga, massage, home daily exercise, meditation, relaxation techniques, injections, chiropractic manipulations, surgery, cognitive therapy, hypnosis and many medications that are not habit-forming may be used. Use of controlled substance medications may be helpful, but they are unlikely to resolve all symptoms or restore all function. Explanation from my Provider: Risks of Controlled Substance Medications: 
Opioid pain medications: These medications can lead to problems such as addiction/dependence, sedation, lightheadedness/dizziness, memory issues, falls, constipation, nausea, or vomiting. They may also impair the ability to drive or operate machinery. Additionally, these medications may lower testosterone levels, leading to loss of bone strength, stamina and sex drive.   They may cause problems with breathing, sleep apnea and reduced coughing, which is especially dangerous for patients with lung disease. Overdose or dangerous interactions with alcohol and other medications may occur, leading to death. Hyperalgesia may develop, which means patients receiving opioids for the treatment of pain may become more sensitive to certain painful stimuli, and in some cases, experience pain from ordinarily non-painful stimuli. Women between the ages of 14-53 who could become pregnant should carefully weigh the risks and benefits of opioids with their physicians, as these medications increase the risk of pregnancy complications, including miscarriage,  delivery and stillbirth. It is also possible for babies to be born addicted to opioids. Opioid dependence withdrawal symptoms may include; feelings of uneasiness, increased pain, irritability, belly pain, diarrhea, sweats and goose-flesh. Benzodiazepines and non-benzodiazepine sleep medications: These medications can lead to problems such as addiction/dependence, sedation, fatigue, lightheadedness, dizziness, incoordination, falls, depression, hallucinations, and impaired judgment, memory and concentration. The ability to drive and operate machinery may also be affected. Abnormal sleep-related behaviors have been reported, including sleepwalking, driving, making telephone calls, eating, or having sex while not fully awake. These medications can suppress breathing and worsen sleep apnea, particularly when combined with alcohol or other sedating medications, potentially leading to death. Dependence withdrawal symptoms may include tremors, anxiety, hallucinations and seizures. Initials:_______ Stimulants:  Common adverse effects include addiction/dependence, increased blood 
pressure and heart rate, decreased appetite, nausea, involuntary weight loss, insomnia,  irritability, and headaches.   These risks may increase when these medications are combined with other stimulants, such as caffeine pills or energy drinks, certain weight loss supplements and oral decongestants. Dependence withdrawal symptoms may include depressed mood, loss of interest, suicidal thoughts, anxiety, fatigue, appetite changes and agitation. Testosterone replacement therapy:  Potential side effects include increased risk of stroke and heart attack, blood clots, increased blood pressure, increased cholesterol, enlarged prostate, sleep apnea, irritability/aggression and other mood disorders, and decreased fertility. I agree and understand that I and my prescriber have the following rights and responsibilities regarding my treatment plan:  
 
1. MY RIGHTS: 
To be informed of my treatment and medication plan. To be an active participant in my health and wellbeing. 2. MY RESPONSIBILITY AND UNDERSTANDING FOR USE OF MEDICATIONS ? I will take medications at the dose and frequency as directed. For my safety, I will not increase or change how I take my medications without the recommendation of my healthcare provider. ? I will actively participate in any program recommended by my provider which may improve function, including social, physical, psychological programs. ? I will not take my medications with alcohol or other drugs not prescribed to me. I understand that drinking alcohol with my medications increases the chances of side effects, including reduced breathing rate and could lead to personal injury when operating machinery. ? I understand that if I have a history of substance use disorders, including alcohol or other illicit drugs, that I may be at increased risk of addiction to my medications. ? I agree to notify my provider immediately if I should become pregnant so that my treatment plan can be adjusted.  
? I agree and understand that I shall only receive controlled substance medications from the prescriber that signed this agreement unless there is written agreement among other prescribers of controlled substances outlining the responsibility of the medications being prescribed. ? I understand that the if the controlled medication is not helping to achieve goals, the dosage may be tapered and no longer prescribed. 3. MY RESPONSIBILITY FOR COMMUNICATION / PRESCRIPTION RENEWALS 
? I agree that all controlled substance medications that I take will be prescribed only by my provider. If another healthcare provider prescribes me medication in an emergency, I will notify my provider within seventy-two (72) hours. ? I will arrange for refills at the prescribed interval ONLY during regular office hours. I will not ask for refills earlier than agreed, after-hours, on holidays or weekends. Refills may take up to 72 hours for processing and prescriptions to reach the pharmacy. ? I will inform my other health care providers that I am taking these medications and of the existence of this [id number]. In the event of an emergency, I will provide the same information to the emergency department prescribers. Initials:_______ ? I will keep my provider updated on the pharmacy I am using for controlled medication prescription filling. 4. MY RESPONSIBILITY FOR PROTECTING MEDICATIONS ? I will protect my prescriptions and medications. I understand that lost or misplaced prescriptions will not be replaced. ? I will keep medications only for my own use and will not share them with others. I will keep all medications away from children. ? I agree that if my medications are adjusted or discontinued, I will properly dispose of any remaining medications. I understand that I will be required to dispose of any remaining controlled medications as, directed by my prescriber, prior to being provided with any prescriptions for other controlled medications.   Medication drop box locations can be found at: [email or web address] 5. MY RESPONSIBILITY WITH ILLEGAL DRUGS ? I will not use illegal or street drugs or another person's prescription medications not prescribed to me.  
? If there are identified addiction type symptoms, then referral to a program may be provided by my provider and I agree to follow through with this recommendation. 6. MY RESPONSIBILITY FOR COOPERATION WITH INVESTIGATIONS ? I understand that my provider will comply with any applicable law and may discuss my use and/or possible misuse/abuse of controlled substances and alcohol, as appropriate, with any health care provider involved in my care, pharmacist, or legal authority. ? I authorize my provider and pharmacy to cooperate fully with law enforcement agencies (as permitted by law) in the investigation of any possible misuse, sale, or other diversion of my controlled substances. ? I agree to waive any applicable privilege or right of privacy or confidentiality with respect to these authorizations. 7. PROVIDERS RIGHT TO MONITOR FOR SAFETY: PRESCRIPTION MONITORING / DRUG TESTING 
? I consent to drug/toxicology screening and will submit to a drug screen upon my providers request to assure I am only taking the prescribed drugs for my safety monitoring. I understand that a drug screen is a laboratory test in which a sample of my urine, blood or saliva is checked to see what drugs I have been taking. This may entail an observed urine specimen, which means that a nurse or other health care provider may watch me provide urine, and I will cooperate if I am asked to provide an observed specimen. ? I understand that my provider will check a copy of my State Prescription Monitoring Program () Report in order to safely prescribe medications. ? Pill Counts: I consent to pill counts when requested.   I may be asked to bring all my prescribed controlled substance medications, in their original bottles, to all of my scheduled appointments. In addition, my provider may ask me to come to the practice at any time for a random pill count. Initials:_______ 8. TERMINATION OF THIS AGREEMENT For my safety, my prescriber has the right to stop prescribing controlled substance medications and may end this agreement. ? Conditions that may result in termination of this agreement: 
a. I do not show any improvement in pain, or my activity has not improved. b. I develop rapid tolerance or loss of improvement, as described in my treatment plan. 
c. I develop significant side effects from the medication. d. My behavior is not consistent with the responsibilities outlined above, thereby causing safety concerns to continue prescribing controlled substance medications. e. I fail to follow the terms of this agreement. f. Other:____________________________ UNDERSTANDING THIS MEDICATION AGREEMENT:   
I have read the above and have had all my questions answered. For chronic disease management, I know that my symptoms can be managed with many types of treatments. A chronic medication trial may be part of my treatment, but I must be an active participant in my care. Medication therapy is only one part of my symptom management plan. In some cases, there may be limited scientific evidence to support the chronic use of certain medications to improve symptoms and daily function. Furthermore, in certain circumstances, there may be scientific information that suggests that the use of chronic controlled substances may worsen my symptoms and increase my risk of unintentional death directly related to this medication therapy. I know that if my provider feels my risk from controlled medications is greater than my benefit, I will have my controlled substance medication(s) compassionately lowered or removed altogether. I further agree to allow this office to contact my HIPAA contact if there are concerns about my safety and use of the controlled medications. I have agreed to use the prescribed controlled substance medications to me as instructed by my provider and as stated in this Medication Agreement. My initial on each page and my signature below shows that I have read each page and I have had the opportunity to ask questions with answers provided by my provider. Patient Name (Printed): _____________________________________ Patient Signature:  ______________________   Date: _____________ Prescriber Name (Printed): ___________________________________ Prescriber Signature: _____________________  Date: _____________

## 2020-10-09 NOTE — PROGRESS NOTES
HISTORY OF PRESENT ILLNESS  Raji Blank is a 80 y.o. female. HPI   Follow up on chronic medical problems. Tolerating her daily pain which waxes and wanes. Has good days and bad days. Remains independent with her ADLs.  does most of the cooking. Cardiovascular Review:  The patient has hypertension, hyperlipidemia and Systolic HF. Diet and Lifestyle: generally follows a low fat low cholesterol diet, generally follows a low sodium diet  Home BP Monitoring: is not measured at home. Pertinent ROS: taking medications as instructed, no medication side effects noted, no TIA's, no chest pain on exertion, no dyspnea on exertion, noting that her ankles swelling has been mild. She has been off of the statin but she did not see any improvemnet with her myalgia being off of the medication. DM type II follow up:  Compliant w/ meds, diabetic diet, and exercise. Obtains home glucose monitoring averaging in the mid 100s. Checks BS daily on most days and prn. Pt does not have BS log at visit today. No Rf needed for today. Denies any tingling sensation, polyuria and polydipsia. No blurred vision. No significant weight changes. Asthma Review:  The patient is being seen for follow up of chronic respiratory failure and allergies and chronic sinusitits, not currently in exacerbation. Breathing has been good also with taking prednisone. Using nebulizer 3 to 4 times in a day as needed but has not recently had to use it. .        Regimen compliance: The patient reports adherence to asthma action plan and regimen. Encounter for pain management. 1./2. Medical history/Past medical History  Chronic Pain:  Osteoarthritis:  Patient has osteoarthritis in multiple joints but primarily in the knees and back. Seen by rheumatology and place on prednisone and plaquenil. Told also that she has polymyalgia rheumatica. Her symptoms have been wax and wane. She is currently on prednisone.   Pain has been 8/10 when it is severe. Rheumatology has discharge her from her care d/t nothing more to add and to get med refills thru her PCP. .        3. Applicable records from prior treatment providers are apart of [facility] under the media tab. 4. Diagnostic, therapeutic and laboratory results are available in the [address] [place] [address] chart. 5. Consultation notes are available for review in the media tab of the [address] [place] [address] chart. 6. Treatment goals include pain control so that the pt may be as active and function with her daily activities and improved comfort level. Previously pt has been limited by pain. 7. The risks and benefits of treatment has been discussed at this office visit with the pt. She understands that the medication has addicting potential.  Additionally the pt has been advised that narcotic pain medication may impair mental and/or physical ability required for performance of tasks such as driving or operating any other machinery. 8. Pt has an updated signed pain contract on file and can be found under the FYI section of the [facility] chart. 9. The pain contract is reviewed. Pain medication will be continued at the previous dosage. Urine drug screening will be done today. Diagnostic studies are not indicated at this time. Interventional procedure are not indicated at this time. 10. Medication prescibed is HYDROcodone-acetaminophen (NORCO)  mg tablet. Prescription is needed today. 11. Patient instructions have been reviewed in detail as outlined above and in the pain contract which is updated today. 12. Re-eval is planned for 3 months.        Patient Active Problem List   Diagnosis Code    CHF (congestive heart failure) ([facility]) I50.9    S/P TKR (total knee replacement) Z96.659    Anemia D64.9    s/p lumbar laminectomy Z98.890    S/P ASAD (total abdominal hysterectomy) Z90.710    S/P hemorrhoidectomy Z98.890, Z87.19    S/P rotator cuff surgery Z98.890    DM (diabetes mellitus) ([address] E11.9    Chronic sinusitis J32.9    S/P sinus surgery Z98.890    Dyslipidemia E78.5    Environmental allergies Z91.09    Obstructive sleep apnea (adult) (pediatric) G47.33    DJD (degenerative joint disease) M19.90    Chronic systolic heart failure (HCC) I50.22    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    Asthma J45.909    Anxiety disorder F41.9    Diabetic neuropathy ([facility]) E11.40    Esophageal reflux K21.9    Essential hypertension, benign I10    Reactive airway disease with wheezing without complication X09.030    Encounter for medication monitoring Z51.81    CKD (chronic kidney disease) N18.9    Arthralgia of multiple joints M25.50    Plantar fasciitis of left foot M72.2    Type 2 diabetes with nephropathy ([facility]) E11.21    Severe obesity (BMI 35.0-39. 9) with comorbidity ([facility]) E66.01    Polymyalgia rheumatica ([facility]) M35.3    COPD exacerbation ([facility]) J44.1       Current Outpatient Medications   Medication Sig Dispense Refill    predniSONE (DELTASONE) 5 mg tablet Take 3 Tabs by mouth daily.  90 Tab 5    ALPRAZolam (XANAX) 0.5 mg tablet TAKE 1/2 TO 1 TABLETS BY MOUTH EVERY NIGHT AT BEDTIME AS NEEDED 30 Tab 3    primidone (MYSOLINE) 50 mg tablet TAKE 3 TABLET BY MOUTH TWICE DAILY 180 Tab 5    carvediloL (COREG) 25 mg tablet TAKE 1 TABLET BY MOUTH TWICE DAILY WITH MEALS 180 Tab 1    Accu-Chek Fastclix Lancet Drum misc USE TO CHECK BLOOD SUGAR TWICE DAILY 300 Each 3    Accu-Chek Guide test strips strip USE TO TEST BLOOD SUGAR TWICE DAILY 300 Strip 3    Accu-Chek Guide Me Glucose Mtr misc USE TO CHECK BLOOD SUGAR TWICE DAILY 1 Each 0    rosuvastatin (CRESTOR) 10 mg tablet TAKE 1 TABLET BY MOUTH EVERY NIGHT 90 Tab 3    furosemide (LASIX) 20 mg tablet TAKE 1 TABLET BY MOUTH EVERY MORNING AND TAKE 1 TABLET BY MOUTH EVERY AFTERNOON BETWEEN 2 AND 4  Tab 3    glipiZIDE SR (GLUCOTROL XL) 2.5 mg CR tablet TAKE ONE TABLET BY MOUTH DAILY 90 Tab 3    BEVESPI AEROSPHERE 9-4.8 mcg HFA inhaler Take 2 puffs by inhalation daily      albuterol (PROVENTIL HFA, VENTOLIN HFA, PROAIR HFA) 90 mcg/actuation inhaler Take 2 Puffs by inhalation every four (4) hours as needed for Wheezing or Shortness of Breath. 1 Inhaler     diclofenac (VOLTAREN) 1 % gel Apply  to affected area four (4) times daily. 300 g 3    DULoxetine (CYMBALTA) 60 mg capsule Take 1 Cap by mouth daily. 90 Cap 3    guaiFENesin (MUCINEX) 1,200 mg Ta12 ER tablet Take 1,200 mg by mouth daily as needed.  polyethylene glycol (MIRALAX) 17 gram/dose powder Take 17 g by mouth daily as needed.  MULTIVITAMIN PO Take 1 Tab by mouth daily.  Omeprazole delayed release (PRILOSEC D/R) 20 mg tablet Take 20 mg by mouth daily.  docusate sodium (STOOL SOFTENER PO) Take 1 Tab by mouth two (2) times a day.  lisinopril (PRINIVIL, ZESTRIL) 2.5 mg tablet Take 2.5 mg by mouth daily.       fluticasone (FLONASE) 50 mcg/actuation nasal spray SHAKE LIQUID AND USE 2 SPRAYS IN EACH NOSTRIL EVERY DAY 1 Bottle 11    promethazine-dextromethorphan (PROMETHAZINE-DM) 6.25-15 mg/5 mL syrup TAKE ONE TEASPOONFUL BY MOUTH EVERY SIX HOURS AS NEEDED FOR COUGH 240 mL 1       Allergies   Allergen Reactions    Gabapentin Other (comments)     Muscles twitching and jerking    Levaquin [Levofloxacin] Swelling    Lyrica [Pregabalin] Other (comments)     Muscle twitching and jerking    Percodan [Oxycodone Hcl-Oxycodone-Asa] Itching         Past Medical History:   Diagnosis Date    Anemia 3/3/2010    Arrhythmia     irregular heartbeat    Arthritis     CHF (congestive heart failure) (HCC) 3/3/2010    Chronic obstructive pulmonary disease (HCC)     Chronic pain     back    Chronic sinusitis 3/3/2010    CPAP (continuous positive airway pressure) dependence     Diverticulosis     DM [facility]di[facility] mellitus[facility] 3/3/2010    Dyslipidemia 3/3/2010    GERD (gastroesophageal reflux disease)     HTN (hypertension) 3/3/2010    Ill-defined condition     being evaluated py pulmonolgist for \"trouble breathing\"    S/P TKR (total knee replacement) 3/3/2010    Unspecified sleep apnea     doesn't wear CPAP since back has been hurting         Past Surgical History:   Procedure Laterality Date    HX APPENDECTOMY      thinks it was taken with hysterectomy    HX GYN      \"tubes opened\"    HX HEENT      sinus surgery    HX HEMORRHOIDECTOMY      HX HYSTERECTOMY      HX KNEE REPLACEMENT      both knees- at same time    HX LUMBAR LAMINECTOMY      HX MOHS PROCEDURES      left shoulder    HX ORTHOPAEDIC      neck fusion    HX [address] [facility]    right knee replaced for second time    HX ORTHOPAEDIC      lumbar fusion    HX OTHER SURGICAL      CORNELL BIOPSY BREAST STEREOTACTIC Left yrs ago    (-)    FL COLONOSCOPY FLX DX W/COLLJ SPEC WHEN PFRMD  [id number]    dr. Kennedy Rachell (barium enema)         Family History   Problem Relation Age of Onset    Diabetes Mother     Heart Disease Father     Diabetes Brother     Blindness Brother     Diabetes Sister     Heart Disease Sister     Heart Disease Brother     Heart Disease Brother     Asthma Brother     Malignant Hyperthermia Neg Hx     Pseudocholinesterase Deficiency Neg Hx     Delayed Awakening Neg Hx     Post-op Nausea/Vomiting Neg Hx     Emergence Delirium Neg Hx     Post-op Cognitive Dysfunction Neg Hx        Social History     Tobacco Use    Smoking status: Former Smoker     Packs/day: 0.30     Years: 5.00     Pack years: 1.50     Last attempt to quit: 1960     Years since quittin.8    Smokeless tobacco: Never Used   Substance Use Topics    Alcohol use: No     Alcohol/week: 0.0 standard drinks        Lab Results   Component Value Date/Time    WBC 5.8 2020 10:29 AM    HGB 11.6 2020 10:29 AM    HCT 35.5 2020 10:29 AM    PLATELET 102 (L)  10:29 AM    MCV 98 (H) 2020 10:29 AM     Lab Results   Component Value Date/Time    Cholesterol, total 212 (H) 03/06/2020 10:29 AM    HDL Cholesterol 90 03/06/2020 10:29 AM    LDL, calculated 98 03/06/2020 10:29 AM    Triglyceride 121 03/06/2020 10:29 AM    CHOL/HDL Ratio 2.0 06/11/2010 02:10 PM     Lab Results   Component Value Date/Time    Sodium 146 (H) 03/06/2020 10:29 AM    Potassium 4.6 03/06/2020 10:29 AM    Chloride 105 03/06/2020 10:29 AM    CO2 24 03/06/2020 10:29 AM    Anion gap 7 12/11/2019 03:38 AM    Glucose 138 (H) 03/06/2020 10:29 AM    BUN 20 03/06/2020 10:29 AM    Creatinine 1.46 (H) 03/06/2020 10:29 AM    BUN/Creatinine ratio 14 03/06/2020 10:29 AM    GFR est AA 37 (L) 03/06/2020 10:29 AM    GFR est non-AA 32 (L) 03/06/2020 10:29 AM    Calcium 9.5 03/06/2020 10:29 AM    Bilirubin, total 0.2 03/06/2020 10:29 AM    ALT (SGPT) 19 03/06/2020 10:29 AM    Alk. phosphatase 58 03/06/2020 10:29 AM    Protein, total 6.1 03/06/2020 10:29 AM    Albumin 4.6 03/06/2020 10:29 AM    Globulin 3.3 12/09/2019 06:23 PM    A-G Ratio 3.1 (H) 03/06/2020 10:29 AM      Lab Results   Component Value Date/Time    Hemoglobin A1c 6.4 (H) 09/08/2017 11:09 AM    Hemoglobin A1c (POC) 6.6 03/06/2020 01:29 AM         Review of Systems   Constitutional: Negative for malaise/fatigue. HENT: Negative for congestion. Eyes: Negative for blurred vision. Respiratory: Negative for cough and shortness of breath. Cardiovascular: Negative for chest pain, palpitations and leg swelling. Gastrointestinal: Negative for abdominal pain, constipation and heartburn. Genitourinary: Negative for dysuria, frequency and urgency. Musculoskeletal: Positive for back pain, joint pain and myalgias. Neurological: Negative for dizziness, tingling, sensory change, focal weakness and headaches. Endo/Heme/Allergies: Negative for environmental allergies. Psychiatric/Behavioral: Negative for depression. The patient does not have insomnia. Physical Exam   Constitutional: She is oriented to person, place, and time.  She appears well-developed and well-nourished. /63 (BP 1 Location: Left arm, BP Patient Position: Sitting)   Pulse (!) 52   Temp 96.9 °F (36.1 °C) (Temporal)   Resp 16   Ht 5' 4\" (1.626 m)   Wt 215 lb (97.5 kg)   LMP 03/04/1961 (Approximate)   SpO2 96%   BMI 36.90 kg/m²      HENT:   Right Ear: Tympanic membrane and ear canal normal.   Left Ear: Tympanic membrane and ear canal normal.   Neck: Neck supple. No spinous process tenderness and no muscular tenderness present. No neck rigidity. No thyromegaly present. Cardiovascular: Normal rate, regular rhythm and normal heart sounds. Exam reveals no gallop. Pulmonary/Chest: Effort normal and breath sounds normal.   Abdominal: Soft. Normal appearance and bowel sounds are normal. She exhibits no mass. There is no abdominal tenderness. Musculoskeletal: Normal range of motion. General: Tenderness (multiple areas of tenderness in the arm and legs.  ) and edema (mild) present. Lymphadenopathy:     She has no cervical adenopathy. Neurological: She is alert and oriented to person, place, and time. No cranial nerve deficit. Coordination normal.   Skin: Skin is warm and dry. Psychiatric: She has a normal mood and affect. Nursing note and vitals reviewed. ASSESSMENT and PLAN  Diagnoses and all orders for this visit:    1. Essential hypertension, benign  Stable     2. Type 2 diabetes with nephropathy (HCC)  a1c stable at 6.2%  -     MICROALBUMIN, UR, RAND W/ MICROALB/CREAT RATIO  -     AMB POC URINALYSIS DIP STICK AUTO W/ MICRO  -     AMB POC HEMOGLOBIN A1C  -     AMB POC GLUCOSE, QUANTITATIVE, BLOOD    3. Mixed hyperlipidemia  -     LIPID PANEL    4. Chronic systolic heart failure (HCC)  Stable     5. Stage 3 chronic kidney disease, unspecified whether stage 3a or 3b CKD  Continue to monitor. 6. Chronic obstructive pulmonary disease, unspecified COPD type ([address]  Stable     7. Chronic anemia  -     CBC W/O DIFF    8.  Polymyalgia rheumatica ([facility])// 9. Arthralgia of multiple joints  -     HYDROcodone-acetaminophen (NORCO)  mg tablet; Take 1 Tab by mouth every six (6) hours as needed for Pain for up to 99 days. Max Daily Amount: 2 Tabs. 10. Encounter for chronic pain management  -     MONITOR SCREEN 10-DRUG CLASS PROFILE  The pt has signed medication agreement. Pain contract is reviewed. Pain medications will be continued at the previous dosage. Urine drug screening will be done today. Diagnostic  studies are not indicated at this time. Interventional procedure are not indicated at this time. Re-eval in 3 months. 11. Encounter for medication monitoring  -     METABOLIC PANEL, COMPREHENSIVE    12. Needs flu shot  -     FLU (FLUAD QUAD INFLUENZA VACCINE,QUAD,ADJUVANTED)    13. Encounter for screening mammogram for breast cancer  -     [facility] MAMMO BI SCREENING INCL CAD; Future      Follow-up and Dispositions    · Return in about 6 months (around 4/9/2021). reviewed diet, exercise and weight control  cardiovascular risk and specific lipid/LDL goals reviewed  reviewed medications and side effects in detail  specific diabetic recommendations: low cholesterol diet, weight control and daily exercise discussed and glycohemoglobin and other lab monitoring discussed      I have discussed diagnosis listed in this note with pt and/or family. I have discussed treatment plans and options and the risk/benefit analysis of those options, including safe use of medications and possible medication side effects. Through the use of shared decision making we have agreed to the above plan. The patient has received an after-visit summary and questions were answered concerning future plans and follow up. Advise pt of any urgent changes then to proceed to the ER.

## 2020-10-09 NOTE — PROGRESS NOTES
Chief Complaint   Patient presents with    Hypertension     follow up    Cholesterol Problem     follow up    Diabetes     follow up     1. Have you been to the ER, urgent care clinic since your last visit? Hospitalized since your last visit? no  2. Have you seen or consulted any other health care providers outside of the [address] [place] [address] since your last visit? Include any pap smears or colon screening.  no      Mammogram  6/3/2019    Bone Density  9/20/2016    A1C  3/6/2020    Micro Albumin  6/18/2019    Eye Exam 5/28/2019  [facility]    Flu 0.5 ml given IM no reaction noted, VO PCP

## 2020-10-10 LAB
ALBUMIN SERPL-MCNC: 4.5 G/DL (ref 3.6–4.6)
ALBUMIN/CREAT UR: 7 MG/G CREAT (ref 0–29)
ALBUMIN/GLOB SERPL: 2.5 {RATIO} (ref 1.2–2.2)
ALP SERPL-CCNC: 59 IU/L (ref 39–117)
ALT SERPL-CCNC: 15 IU/L (ref 0–32)
AMPHETAMINES UR QL SCN: NEGATIVE NG/ML
AST SERPL-CCNC: 15 IU/L (ref 0–40)
BARBITURATES UR QL SCN: POSITIVE NG/ML
BENZODIAZ UR QL SCN: NEGATIVE NG/ML
BILIRUB SERPL-MCNC: 0.2 MG/DL (ref 0–1.2)
BUN SERPL-MCNC: 29 MG/DL (ref 8–27)
BUN/CREAT SERPL: 18 (ref 12–28)
BZE UR QL SCN: NEGATIVE NG/ML
CALCIUM SERPL-MCNC: 9.4 MG/DL (ref 8.7–10.3)
CANNABINOIDS UR QL SCN: NEGATIVE NG/ML
CHLORIDE SERPL-SCNC: 103 MMOL/L (ref 96–106)
CHOLEST SERPL-MCNC: 228 MG/DL (ref 100–199)
CO2 SERPL-SCNC: 29 MMOL/L (ref 20–29)
CREAT SERPL-MCNC: 1.64 MG/DL (ref 0.57–1)
CREAT UR-MCNC: 42.9 MG/DL
CREAT UR-MCNC: 45.3 MG/DL (ref 20–300)
ERYTHROCYTE [DISTWIDTH] IN BLOOD BY AUTOMATED COUNT: 12.8 % (ref 11.7–15.4)
GLOBULIN SER CALC-MCNC: 1.8 G/DL (ref 1.5–4.5)
GLUCOSE SERPL-MCNC: 122 MG/DL (ref 65–99)
HCT VFR BLD AUTO: 33 % (ref 34–46.6)
HDLC SERPL-MCNC: 93 MG/DL
HGB BLD-MCNC: 10.9 G/DL (ref 11.1–15.9)
INTERPRETATION, 910389: NORMAL
INTERPRETATION: NORMAL
LDLC SERPL CALC-MCNC: 119 MG/DL (ref 0–99)
Lab: NORMAL
MCH RBC QN AUTO: 32.9 PG (ref 26.6–33)
MCHC RBC AUTO-ENTMCNC: 33 G/DL (ref 31.5–35.7)
MCV RBC AUTO: 100 FL (ref 79–97)
METHADONE UR QL SCN: NEGATIVE NG/ML
MICROALBUMIN UR-MCNC: 3.2 UG/ML
OPIATES UR QL SCN: POSITIVE NG/ML
OXYCODONE+OXYMORPHONE UR QL SCN: NEGATIVE NG/ML
PCP UR QL: NEGATIVE NG/ML
PDF IMAGE, 910387: NORMAL
PH UR: 4.9 [PH] (ref 4.5–8.9)
PLATELET # BLD AUTO: 143 X10E3/UL (ref 150–450)
PLEASE NOTE:, 733163: ABNORMAL
POTASSIUM SERPL-SCNC: 4.7 MMOL/L (ref 3.5–5.2)
PROPOXYPH UR QL SCN: NEGATIVE NG/ML
PROT SERPL-MCNC: 6.3 G/DL (ref 6–8.5)
RBC # BLD AUTO: 3.31 X10E6/UL (ref 3.77–5.28)
SODIUM SERPL-SCNC: 145 MMOL/L (ref 134–144)
TRIGL SERPL-MCNC: 95 MG/DL (ref 0–149)
VLDLC SERPL CALC-MCNC: 16 MG/DL (ref 5–40)
WBC # BLD AUTO: 5.7 X10E3/UL (ref 3.4–10.8)

## 2020-11-22 DIAGNOSIS — M25.50 ARTHRALGIA OF MULTIPLE JOINTS: ICD-10-CM

## 2020-11-22 RX ORDER — HYDROCODONE BITARTRATE AND ACETAMINOPHEN 10; 325 MG/1; MG/1
1 TABLET ORAL
Qty: 60 TAB | Refills: 0 | Status: SHIPPED | OUTPATIENT
Start: 2020-11-22 | End: 2021-01-25 | Stop reason: SDUPTHER

## 2020-12-01 ENCOUNTER — HOSPITAL ENCOUNTER (OUTPATIENT)
Dept: MAMMOGRAPHY | Age: 85
Discharge: HOME | End: 2020-12-01
Attending: FAMILY MEDICINE
Payer: MEDICARE

## 2020-12-01 DIAGNOSIS — Z12.31 ENCOUNTER FOR SCREENING MAMMOGRAM FOR BREAST CANCER: ICD-10-CM

## 2020-12-01 PROCEDURE — 77067 SCR MAMMO BI INCL CAD: CPT

## 2020-12-11 RX ORDER — DICLOFENAC SODIUM 10 MG/G
GEL TOPICAL
Qty: 300 G | Refills: 3 | Status: SHIPPED | OUTPATIENT
Start: 2020-12-11 | End: 2021-10-04

## 2020-12-14 RX ORDER — PRIMIDONE 50 MG/1
TABLET ORAL
Qty: 180 TAB | Refills: 5 | Status: CANCELLED | OUTPATIENT
Start: 2020-12-14

## 2020-12-14 NOTE — TELEPHONE ENCOUNTER
Future Appointments   Date Time Provider Danny Montalvo   2/16/2021  3:00 PM Charlotte Chairez MD [facility] BS AMB   4/9/2021 10:40 AM Roseanna Malik MD [facility] BS AMB   4/14/2021 10:15 AM Jhoan England MD [facility] BS AMB                         Last Appointment My Department:  Visit date not found    Please advise of refill below.   Requested Prescriptions     Pending Prescriptions Disp Refills    primidone (MYSOLINE) 50 mg tablet 180 Tab 5     Sig: TAKE 3 TABLET BY MOUTH TWICE DAILY

## 2020-12-16 NOTE — TELEPHONE ENCOUNTER
----- Message from [facility] sent at 12/16/2020 11:26 AM EST -----  Regarding: Dr. Marquez Valadez (if not patient): Ximena Snowball      Relationship of caller (if not patient):  Spouse      Best contact number(s):  [phone] or home phone [phone]      Name of medication and dosage if known:  Primidone, 50mg      Is patient out of this medication (yes/no): Y      Pharmacy name:  [address] [place] [address] listed in chart? (yes/no): Y    Pharmacy phone number: [phone]    Details to clarify the request:  Bettye has been trying to fax the refill request over for a couple days and has not heard back.       [facility]

## 2020-12-17 RX ORDER — PRIMIDONE 50 MG/1
TABLET ORAL
Qty: 180 TAB | Refills: 5 | Status: SHIPPED | OUTPATIENT
Start: 2020-12-17 | End: 2020-12-31 | Stop reason: SDUPTHER

## 2020-12-17 NOTE — TELEPHONE ENCOUNTER
Future Appointments   Date Time Provider Danny Rosasi   2/16/2021  3:00 PM MD AARON Byrd BS AMB   4/9/2021 10:40 AM Sanaz Donovan MD [facility] BS AMB   4/14/2021 10:15 AM Keisha Clark MD [facility] BS AMB                         Last Appointment My Department:  Visit date not found    Please advise of refill below.   Requested Prescriptions     Pending Prescriptions Disp Refills    primidone (MYSOLINE) 50 mg tablet 180 Tab 5     Sig: TAKE 3 TABLET BY MOUTH TWICE DAILY

## 2021-01-06 RX ORDER — PRIMIDONE 50 MG/1
TABLET ORAL
Qty: 180 TAB | Refills: 5 | Status: SHIPPED | OUTPATIENT
Start: 2021-01-06 | End: 2021-03-29

## 2021-01-12 DIAGNOSIS — F51.01 PRIMARY INSOMNIA: ICD-10-CM

## 2021-01-12 RX ORDER — ALPRAZOLAM 0.5 MG/1
TABLET ORAL
Qty: 30 TAB | Refills: 1 | Status: SHIPPED | OUTPATIENT
Start: 2021-01-12 | End: 2021-03-10

## 2021-01-22 DIAGNOSIS — M25.50 ARTHRALGIA OF MULTIPLE JOINTS: ICD-10-CM

## 2021-01-22 RX ORDER — DULOXETIN HYDROCHLORIDE 60 MG/1
CAPSULE, DELAYED RELEASE ORAL
Qty: 90 CAP | Refills: 3 | Status: SHIPPED | OUTPATIENT
Start: 2021-01-22 | End: 2022-01-31

## 2021-01-24 DIAGNOSIS — M35.3 POLYMYALGIA RHEUMATICA (HCC): ICD-10-CM

## 2021-01-24 DIAGNOSIS — M25.50 ARTHRALGIA OF MULTIPLE JOINTS: ICD-10-CM

## 2021-01-25 RX ORDER — PREDNISONE 5 MG/1
TABLET ORAL
Qty: 90 TAB | Refills: 5 | Status: SHIPPED | OUTPATIENT
Start: 2021-01-25 | End: 2021-07-21

## 2021-01-25 RX ORDER — HYDROCODONE BITARTRATE AND ACETAMINOPHEN 10; 325 MG/1; MG/1
1 TABLET ORAL
Qty: 60 TAB | Refills: 0 | Status: SHIPPED | OUTPATIENT
Start: 2021-01-25 | End: 2021-04-09 | Stop reason: SDUPTHER

## 2021-01-27 ENCOUNTER — IMMUNIZATION (OUTPATIENT)
Dept: INTERNAL MEDICINE CLINIC | Age: 86
End: 2021-01-27
Payer: MEDICARE

## 2021-01-27 DIAGNOSIS — Z23 ENCOUNTER FOR IMMUNIZATION: Primary | ICD-10-CM

## 2021-01-27 PROCEDURE — 91301 COVID-19, MRNA, LNP-S, PF, 100MCG/0.5ML DOSE(MODERNA): CPT | Performed by: FAMILY MEDICINE

## 2021-01-27 PROCEDURE — 0011A PR IMM ADMN SARSCOV2 100 MCG/0.5 ML 1ST DOSE: CPT | Performed by: FAMILY MEDICINE

## 2021-02-01 RX ORDER — GLIPIZIDE 2.5 MG/1
TABLET, EXTENDED RELEASE ORAL
Qty: 90 TAB | Refills: 3 | Status: SHIPPED | OUTPATIENT
Start: 2021-02-01 | End: 2022-01-25

## 2021-02-24 ENCOUNTER — IMMUNIZATION (OUTPATIENT)
Dept: INTERNAL MEDICINE CLINIC | Age: 86
End: 2021-02-24
Payer: MEDICARE

## 2021-02-24 DIAGNOSIS — Z23 ENCOUNTER FOR IMMUNIZATION: Primary | ICD-10-CM

## 2021-02-24 PROCEDURE — 91301 COVID-19, MRNA, LNP-S, PF, 100MCG/0.5ML DOSE(MODERNA): CPT | Performed by: FAMILY MEDICINE

## 2021-02-24 PROCEDURE — 0012A COVID-19, MRNA, LNP-S, PF, 100MCG/0.5ML DOSE(MODERNA): CPT | Performed by: FAMILY MEDICINE

## 2021-03-09 DIAGNOSIS — F51.01 PRIMARY INSOMNIA: ICD-10-CM

## 2021-03-10 RX ORDER — ALPRAZOLAM 0.5 MG/1
TABLET ORAL
Qty: 30 TAB | Refills: 1 | Status: SHIPPED | OUTPATIENT
Start: 2021-03-10 | End: 2021-04-26 | Stop reason: DRUGHIGH

## 2021-03-29 ENCOUNTER — OFFICE VISIT (OUTPATIENT)
Dept: NEUROLOGY | Age: 86
End: 2021-03-29
Payer: MEDICARE

## 2021-03-29 VITALS
TEMPERATURE: 97.2 F | HEART RATE: 58 BPM | HEIGHT: 64 IN | OXYGEN SATURATION: 96 % | DIASTOLIC BLOOD PRESSURE: 51 MMHG | WEIGHT: 215 LBS | BODY MASS INDEX: 36.7 KG/M2 | SYSTOLIC BLOOD PRESSURE: 107 MMHG

## 2021-03-29 DIAGNOSIS — M54.17 LUMBOSACRAL RADICULOPATHY: ICD-10-CM

## 2021-03-29 DIAGNOSIS — G25.0 ESSENTIAL TREMOR: Primary | ICD-10-CM

## 2021-03-29 DIAGNOSIS — E11.42 DIABETIC POLYNEUROPATHY ASSOCIATED WITH TYPE 2 DIABETES MELLITUS (HCC): ICD-10-CM

## 2021-03-29 PROCEDURE — G8417 CALC BMI ABV UP PARAM F/U: HCPCS | Performed by: PSYCHIATRY & NEUROLOGY

## 2021-03-29 PROCEDURE — 99214 OFFICE O/P EST MOD 30 MIN: CPT | Performed by: PSYCHIATRY & NEUROLOGY

## 2021-03-29 PROCEDURE — G8536 NO DOC ELDER MAL SCRN: HCPCS | Performed by: PSYCHIATRY & NEUROLOGY

## 2021-03-29 PROCEDURE — G8427 DOCREV CUR MEDS BY ELIG CLIN: HCPCS | Performed by: PSYCHIATRY & NEUROLOGY

## 2021-03-29 PROCEDURE — 1101F PT FALLS ASSESS-DOCD LE1/YR: CPT | Performed by: PSYCHIATRY & NEUROLOGY

## 2021-03-29 PROCEDURE — 1090F PRES/ABSN URINE INCON ASSESS: CPT | Performed by: PSYCHIATRY & NEUROLOGY

## 2021-03-29 PROCEDURE — G8510 SCR DEP NEG, NO PLAN REQD: HCPCS | Performed by: PSYCHIATRY & NEUROLOGY

## 2021-03-29 RX ORDER — PRIMIDONE 50 MG/1
TABLET ORAL
Qty: 240 TAB | Refills: 5 | Status: SHIPPED | OUTPATIENT
Start: 2021-03-29 | End: 2021-05-03 | Stop reason: DRUGHIGH

## 2021-03-29 NOTE — PROGRESS NOTES
NEUROLOGY FOLLOW UP NOTE    Chief Complaint   Patient presents with    Follow-up       [address] Raza is a 80 y.o. female who presented to the neurology office for management of tremors. It has been going on since 2016. It is gradually progressive with time. It is postural and in both in the arms and legs. No falls. She does have constipation. She does have mild stiffness in legs. The patient was on gabapentin and when it was discontinued her tremors have improved but they are coming back recently. Patient is on primidone 100 mg p.o. twice daily and there is improvement in her tremors and she is overall satisfied with it. She does also have lumbar radiculopathy and a length dependent neuropathy. Both of them are stable    Interval history:   Patient is taking primidone 150 mg p.o. twice daily. She is noticing that her tremors are worsening. Diabetic polyneuropathy and lumbar radiculopathy stable    Medications tried:  Gabapentin and Lyrica causes tremors  Primidone    Current Outpatient Medications   Medication Sig    ALPRAZolam (XANAX) 0.5 mg tablet TAKE 1/2 TO 1 TABLET BY MOUTH EVERY NIGHT AT BEDTIME AS NEEDED    glipiZIDE SR (GLUCOTROL XL) 2.5 mg CR tablet TAKE 1 TABLET BY MOUTH DAILY    predniSONE (DELTASONE) 5 mg tablet TAKE 3 TABLETS BY MOUTH DAILY    HYDROcodone-acetaminophen (NORCO)  mg tablet Take 1 Tab by mouth every six (6) hours as needed for Pain for up to 99 days. Max Daily Amount: 2 Tabs.     DULoxetine (CYMBALTA) 60 mg capsule TAKE 1 CAPSULE BY MOUTH DAILY    primidone (MYSOLINE) 50 mg tablet TAKE 3 TABLET BY MOUTH TWICE DAILY    diclofenac (VOLTAREN) 1 % gel APPLY EXTERNALLY TO THE AFFECTED AREA FOUR TIMES DAILY    carvediloL (COREG) 25 mg tablet TAKE 1 TABLET BY MOUTH TWICE DAILY WITH MEALS    Accu-Chek Fastclix Lancet Drum misc USE TO CHECK BLOOD SUGAR TWICE DAILY    Accu-Chek Guide test strips strip USE TO TEST BLOOD SUGAR TWICE DAILY    Accu-Chek Guide Me Glucose Mtr misc USE TO CHECK BLOOD SUGAR TWICE DAILY    rosuvastatin (CRESTOR) 10 mg tablet TAKE 1 TABLET BY MOUTH EVERY NIGHT    furosemide (LASIX) 20 mg tablet TAKE 1 TABLET BY MOUTH EVERY MORNING AND TAKE 1 TABLET BY MOUTH EVERY AFTERNOON BETWEEN 2 AND 4 PM    BEVESPI AEROSPHERE 9-4.8 mcg HFA inhaler Take 2 puffs by inhalation daily    albuterol (PROVENTIL HFA, VENTOLIN HFA, PROAIR HFA) 90 mcg/actuation inhaler Take 2 Puffs by inhalation every four (4) hours as needed for Wheezing or Shortness of Breath.  guaiFENesin (MUCINEX) 1,200 mg Ta12 ER tablet Take 1,200 mg by mouth daily as needed.  polyethylene glycol (MIRALAX) 17 gram/dose powder Take 17 g by mouth daily as needed.  MULTIVITAMIN PO Take 1 Tab by mouth daily.  Omeprazole delayed release (PRILOSEC D/R) 20 mg tablet Take 20 mg by mouth daily.  docusate sodium (STOOL SOFTENER PO) Take 1 Tab by mouth two (2) times a day.  lisinopril (PRINIVIL, ZESTRIL) 2.5 mg tablet Take 2.5 mg by mouth daily.  fluticasone (FLONASE) 50 mcg/actuation nasal spray SHAKE LIQUID AND USE 2 SPRAYS IN EACH NOSTRIL EVERY DAY    promethazine-dextromethorphan (PROMETHAZINE-DM) 6.25-15 mg/5 mL syrup TAKE ONE TEASPOONFUL BY MOUTH EVERY SIX HOURS AS NEEDED FOR COUGH     No current facility-administered medications for this visit. EXAMINATION  Visit Vitals  BP (!) 107/51   Pulse (!) 58   Temp 97.2 °F (36.2 °C)   Ht 5' 4\" (1.626 m)   Wt 215 lb (97.5 kg)   LMP 03/04/1961 (Approximate)   SpO2 96%   BMI 36.90 kg/m²        General:   General appearance: Pt is in no acute distress   Distal pulses are preserved    Neurological Examination:   Mental Status: AAO x3. Speech is fluent. Follows commands, has normal fund of knowledge, attention, short term recall, comprehension and insight. Cranial Nerves: Visual fields are full. PERRL, Extraocular movements are full. Facial sensation intact V1- V3. Facial movement intact, symmetric.  Hearing intact to conversation. Palate elevates symmetrically. Shoulder shrug symmetric. Tongue midline. Motor: Strength is 5/5 in all 4 ext. Normal tone. No atrophy. Sensation: Light touch - Normal    Coordination/Cerebellar: Intact to finger-nose-finger     Gait: Romberg is negative and casual gait is normal.     Laboratory review:   Results for orders placed or performed in visit on 11/12/20   AMB EXT CREATININE   Result Value Ref Range    Creatinine, External 1.64        Imaging review:   11/6/12  MRI L spine  1. Status post L4 and L5 laminectomies. Severe L4-5 disc space narrowing with moderate sized central disc herniation. No canal stenosis; moderate left foraminal narrowing. 2.  Diffuse degenerative changes. Canal stenosis mild at T10-11, borderline at T11-12 and T12-L1, mild at L1-2, mild to moderate at L2-3, severe at L3-4, and moderate at L5-S1.    12/6/12  MRI Brain  1. No mass or enhancement abnormality of the internal auditory canals or cerebellopontine angles. 2.  Extensive sinus disease with complete obscuration of the left frontal and sphenoid sinuses. 9/28/16  EMG/NCS  This study is abnormal. There is evidence for a moderate to severe degree of a length dependent polyneuropathy. There is also residual multilevel lumbosacral radiculopathy without significant denervation in the distal leg muscles. Documentation review:  None    Assessment/Plan:   America Kimball is a 80 y.o. female who presented to the neurology office for management of postural tremors, lumbosacral radiculopathy and diabetic polyneuropathy. Her diabetic neuropathy and lumbosacral radiculopathy are stable. For essential tremors, patient is on primidone 150 mg p.o. twice daily and symptoms are worsening. I do plan to increase the dosage of primidone to 200 mg p.o. twice daily      Follow-up in 6 months            ICD-10-CM ICD-9-CM    1. Essential tremor  G25.0 333.1    2. Lumbosacral radiculopathy  M54.17 724.4    3. Diabetic polyneuropathy associated with type 2 diabetes mellitus (HCC)  E11.42 250.60      357.2

## 2021-04-09 ENCOUNTER — TELEPHONE (OUTPATIENT)
Dept: FAMILY MEDICINE CLINIC | Age: 86
End: 2021-04-09

## 2021-04-09 ENCOUNTER — OFFICE VISIT (OUTPATIENT)
Dept: FAMILY MEDICINE CLINIC | Age: 86
End: 2021-04-09

## 2021-04-09 DIAGNOSIS — M25.50 ARTHRALGIA OF MULTIPLE JOINTS: ICD-10-CM

## 2021-04-09 RX ORDER — HYDROCODONE BITARTRATE AND ACETAMINOPHEN 10; 325 MG/1; MG/1
1 TABLET ORAL
Qty: 60 TAB | Refills: 0 | Status: SHIPPED | OUTPATIENT
Start: 2021-04-09 | End: 2021-06-07 | Stop reason: SDUPTHER

## 2021-04-09 NOTE — TELEPHONE ENCOUNTER
Spoke to patient and states earlier this morning she was dizzy and felt nauseated but feels better now. Patient reports her blood sugar was 125 at that time. Writer asked patient when next cardiologist appt was and patient reports she had one in 3/2021 but cancelled it because she felt bad/some dizziness and next appt is in 6/2021. Informed patient and  to call cardiologist to call to see if there is something sooner and if symptoms get worse over the weekend or if patient experiences the dizziness and nausea she will need to go to the ER to be evaluated.  verbalized understanding.

## 2021-04-09 NOTE — TELEPHONE ENCOUNTER
----- Message from [employer] [employer] sent at 4/9/2021 10:02 AM EDT -----  Regarding: Dr. Kate Carlos first and last name: Dariana Price       Reason for call: would like nurse to call back in regard to his wife not feeling well and may needs some pain Medicaine. Pt  would like to speak to nurse.       Callback required yes/no and why: yes, please      Best contact number(s): [phone]      Details to clarify the request:      Narcisa Ramos

## 2021-04-09 NOTE — PROGRESS NOTES
Mammogram  12/1/2021      A1C  10/9/2020    Microalbumin  10/9/2020    Eye Exam 5/20/2020 by Dr. Que Palma      1. Have you been to the ER, urgent care clinic since your last visit? Hospitalized since your last visit? no    2. Have you seen or consulted any other health care providers outside of the [address] [place] [address] since your last visit? Include any pap smears or colon screening.  no

## 2021-04-09 NOTE — PROGRESS NOTES
HISTORY OF PRESENT ILLNESS Sandrita Hernandez is a 80 y.o. female. HPI Follow up on chronic medical problems. Tolerating her daily pain which waxes and wanes. Has good days and bad days. Remains independent with her ADLs.  does most of the cooking. Cardiovascular Review: 
The patient has hypertension, hyperlipidemia and Systolic HF. Diet and Lifestyle: generally follows a low fat low cholesterol diet, generally follows a low sodium diet Home BP Monitoring: is not measured at home. Pertinent ROS: taking medications as instructed, no medication side effects noted, no TIA's, no chest pain on exertion, no dyspnea on exertion, noting that her ankles swelling has been mild. She has been off of the statin but she did not see any improvemnet with her myalgia being off of the medication. DM type II follow up: 
Compliant w/ meds, diabetic diet, and exercise. Obtains home glucose monitoring averaging in the mid 100s. Checks BS daily on most days and prn. Pt does not have BS log at visit today. No Rf needed for today. Denies any tingling sensation, polyuria and polydipsia. No blurred vision. No significant weight changes. Asthma Review: 
The patient is being seen for follow up of chronic respiratory failure and allergies and chronic sinusitits, not currently in exacerbation. Breathing has been good also with taking prednisone. Using nebulizer 3 to 4 times in a day as needed but has not recently had to use it. .       
Regimen compliance: The patient reports adherence to asthma action plan and regimen. Encounter for pain management. 1./2. Medical history/Past medical History Chronic Pain: 
Osteoarthritis: 
Patient has osteoarthritis in multiple joints but primarily in the knees and back. Seen by rheumatology and place on prednisone and plaquenil. Told also that she has polymyalgia rheumatica. Her symptoms have been wax and wane. She is currently on prednisone.   Pain has been 8/10 when it is severe. Rheumatology has discharge her from her care d/t nothing more to add and to get med refills thru her PCP. .       
3. Applicable records from prior treatment providers are apart of [facility] under the media tab. 4. Diagnostic, therapeutic and laboratory results are available in the [address] [place] [address] chart. 5. Consultation notes are available for review in the media tab of the [address] [place] [address] chart. 6. Treatment goals include pain control so that the pt may be as active and function with her daily activities and improved comfort level. Previously pt has been limited by pain. 7. The risks and benefits of treatment has been discussed at this office visit with the pt. She understands that the medication has addicting potential.  Additionally the pt has been advised that narcotic pain medication may impair mental and/or physical ability required for performance of tasks such as driving or operating any other machinery. 8. Pt has an updated signed pain contract on file and can be found under the FYI section of the [facility] chart. 9. The pain contract is reviewed. Pain medication will be continued at the previous dosage. Urine drug screening will be done today. Diagnostic studies are not indicated at this time. Interventional procedure are not indicated at this time. 10. Medication prescibed is HYDROcodone-acetaminophen (NORCO)  mg tablet. Prescription is needed today. 11. Patient instructions have been reviewed in detail as outlined above and in the pain contract which is updated today. 12. Re-eval is planned for 3 months. Patient Active Problem List  
Diagnosis Code  CHF (congestive heart failure) ([facility]) I50.9  S/P TKR (total knee replacement) K12.677  Anemia D64.9  
 s/p lumbar laminectomy Z98.890  
 S/P ASAD (total abdominal hysterectomy) Z90.710  S/P hemorrhoidectomy Z98.890, Z87.19  
 S/P rotator cuff surgery Z98.890  
 DM (diabetes mellitus) ([facility] E11.9  Chronic sinusitis J32.9  S/P sinus surgery Z98.890  
 Dyslipidemia E78.5  Environmental allergies Z91.09  
 Obstructive sleep apnea (adult) (pediatric) G47.33  
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  
 Severe obesity (BMI 35.0-39. 9) with comorbidity ([facility]) E66.01  
 Polymyalgia rheumatica ([facility] M35.3  COPD exacerbation ([facility] J44.1 Current Outpatient Medications Medication Sig Dispense Refill  primidone (MYSOLINE) 50 mg tablet TAKE 4 TABLET BY MOUTH TWICE DAILY 240 Tab 5  ALPRAZolam (XANAX) 0.5 mg tablet TAKE 1/2 TO 1 TABLET BY MOUTH EVERY NIGHT AT BEDTIME AS NEEDED 30 Tab 1  
 glipiZIDE SR (GLUCOTROL XL) 2.5 mg CR tablet TAKE 1 TABLET BY MOUTH DAILY 90 Tab 3  predniSONE (DELTASONE) 5 mg tablet TAKE 3 TABLETS BY MOUTH DAILY 90 Tab 5  
 HYDROcodone-acetaminophen (NORCO)  mg tablet Take 1 Tab by mouth every six (6) hours as needed for Pain for up to 99 days. Max Daily Amount: 2 Tabs. 60 Tab 0  
 DULoxetine (CYMBALTA) 60 mg capsule TAKE 1 CAPSULE BY MOUTH DAILY 90 Cap 3  
 diclofenac (VOLTAREN) 1 % gel APPLY EXTERNALLY TO THE AFFECTED AREA FOUR TIMES DAILY 300 g 3  carvediloL (COREG) 25 mg tablet TAKE 1 TABLET BY MOUTH TWICE DAILY WITH MEALS 180 Tab 1  Accu-Chek Fastclix Lancet Drum misc USE TO CHECK BLOOD SUGAR TWICE DAILY 300 Each 3  
 Accu-Chek Guide test strips strip USE TO TEST BLOOD SUGAR TWICE DAILY 300 Strip 3  Accu-Chek Guide Me Glucose Mtr misc USE TO CHECK BLOOD SUGAR TWICE DAILY 1 Each 0  
 rosuvastatin (CRESTOR) 10 mg tablet TAKE 1 TABLET BY MOUTH EVERY NIGHT 90 Tab 3  furosemide (LASIX) 20 mg tablet TAKE 1 TABLET BY MOUTH EVERY MORNING AND TAKE 1 TABLET BY MOUTH EVERY AFTERNOON BETWEEN 2 AND 4  Tab 3  BEVESPI AEROSPHERE 9-4.8 mcg HFA inhaler Take 2 puffs by inhalation daily  albuterol (PROVENTIL HFA, VENTOLIN HFA, PROAIR HFA) 90 mcg/actuation inhaler Take 2 Puffs by inhalation every four (4) hours as needed for Wheezing or Shortness of Breath. 1 Inhaler  guaiFENesin (MUCINEX) 1,200 mg Ta12 ER tablet Take 1,200 mg by mouth daily as needed.  polyethylene glycol (MIRALAX) 17 gram/dose powder Take 17 g by mouth daily as needed.  MULTIVITAMIN PO Take 1 Tab by mouth daily.  Omeprazole delayed release (PRILOSEC D/R) 20 mg tablet Take 20 mg by mouth daily.  docusate sodium (STOOL SOFTENER PO) Take 1 Tab by mouth two (2) times a day.  lisinopril (PRINIVIL, ZESTRIL) 2.5 mg tablet Take 2.5 mg by mouth daily.  fluticasone (FLONASE) 50 mcg/actuation nasal spray SHAKE LIQUID AND USE 2 SPRAYS IN EACH NOSTRIL EVERY DAY 1 Bottle 11  
 promethazine-dextromethorphan (PROMETHAZINE-DM) 6.25-15 mg/5 mL syrup TAKE ONE TEASPOONFUL BY MOUTH EVERY SIX HOURS AS NEEDED FOR COUGH 240 mL 1 Allergies Allergen Reactions  Gabapentin Other (comments) Muscles twitching and jerking  Levaquin [Levofloxacin] Swelling  Lyrica [Pregabalin] Other (comments) Muscle twitching and jerking  Percodan [Oxycodone Hcl-Oxycodone-Asa] Itching Past Medical History:  
Diagnosis Date  Anemia 3/3/2010  Arrhythmia   
 irregular heartbeat  Arthritis  CHF (congestive heart failure) ([facility] 3/3/2010  Chronic obstructive pulmonary disease ([facility]  Chronic pain   
 back  Chronic sinusitis 3/3/2010  CPAP (continuous positive airway pressure) dependence  Diverticulosis  DM (diabetes mellitus) ([facility] 3/3/2010  Dyslipidemia 3/3/2010  GERD (gastroesophageal reflux disease)  HTN (hypertension) 3/3/2010  Ill-defined condition   
 being evaluated py pulmonolgist for \"trouble breathing\"  S/P TKR (total knee replacement) 3/3/2010  Unspecified sleep apnea   
 doesn't wear CPAP since back has been hurting Past Surgical History:  
Procedure Laterality Date  HX APPENDECTOMY    
 thinks it was taken with hysterectomy  HX GYN \"tubes opened\"  HX HEENT    
 sinus surgery  HX HEMORRHOIDECTOMY  HX HYSTERECTOMY  HX KNEE REPLACEMENT    
 both knees- at same time  HX LUMBAR LAMINECTOMY    HX MOHS PROCEDURES    
 left shoulder  HX ORTHOPAEDIC    
 neck fusion  HX ORTHOPAEDIC    
 right knee replaced for second time  HX ORTHOPAEDIC    
 lumbar fusion  HX OTHER SURGICAL  CORNELL BIOPSY BREAST STEREOTACTIC Left yrs ago (-)  IL COLONOSCOPY FLX DX W/COLLJ SPEC WHEN PFRMD  [id number]  
 dr. Carey [place] (barium enema) Family History Problem Relation Age of Onset  Diabetes Mother  Heart Disease Father  Diabetes Brother  Blindness Brother  Diabetes Sister  Heart Disease Sister  Heart Disease Brother  Heart Disease Brother  Asthma Brother  Malignant Hyperthermia Neg Hx  Pseudocholinesterase Deficiency Neg Hx  Delayed Awakening Neg Hx  Post-op Nausea/Vomiting Neg Hx  Emergence Delirium Neg Hx  Post-op Cognitive Dysfunction Neg Hx Social History Tobacco Use  Smoking status: Former Smoker Packs/day: 0.30 Years: 5.00 Pack years: 1.50 Quit date: 1960 Years since quittin.3  Smokeless tobacco: Never Used Substance Use Topics  Alcohol use: No  
  Alcohol/week: 0.0 standard drinks Lab Results Component Value Date/Time  WBC 5.7 10/09/2020 11:45 AM  
 HGB 10.9 (L) 10/09/2020 11:45 AM  
 HCT 33.0 (L) 10/09/2020 11:45 AM  
 PLATELET 838 (L) 10/34/0957 11:45 AM  
  (H) 10/09/2020 11:45 AM  
 
Lab Results Component Value Date/Time Cholesterol, total 228 (H) 10/09/2020 11:45 AM  
 HDL Cholesterol 93 10/09/2020 11:45 AM  
 LDL, calculated 119 (H) 10/09/2020 11:45 AM  
 LDL, calculated 98 03/06/2020 10:29 AM  
 Triglyceride 95 10/09/2020 11:45 AM  
 CHOL/HDL Ratio 2.0 06/11/2010 02:10 PM  
 
Lab Results Component Value Date/Time Sodium 145 (H) 10/09/2020 11:45 AM  
 Potassium 4.7 10/09/2020 11:45 AM  
 Chloride 103 10/09/2020 11:45 AM  
 CO2 29 10/09/2020 11:45 AM  
 Anion gap 7 12/11/2019 03:38 AM  
 Glucose 122 (H) 10/09/2020 11:45 AM  
 BUN 29 (H) 10/09/2020 11:45 AM  
 Creatinine 1.64 (H) 10/09/2020 11:45 AM  
 BUN/Creatinine ratio 18 10/09/2020 11:45 AM  
 GFR est AA 32 (L) 10/09/2020 11:45 AM  
 GFR est non-AA 28 (L) 10/09/2020 11:45 AM  
 Calcium 9.4 10/09/2020 11:45 AM  
 Bilirubin, total 0.2 10/09/2020 11:45 AM  
 ALT (SGPT) 15 10/09/2020 11:45 AM  
 Alk. phosphatase 59 10/09/2020 11:45 AM  
 Protein, total 6.3 10/09/2020 11:45 AM  
 Albumin 4.5 10/09/2020 11:45 AM  
 Globulin 3.3 12/09/2019 06:23 PM  
 A-G Ratio 2.5 (H) 10/09/2020 11:45 AM  
  
Lab Results Component Value Date/Time Hemoglobin A1c 6.4 (H) 09/08/2017 11:09 AM  
 Hemoglobin A1c (POC) 6.2 10/09/2020 11:45 AM  
   
 
Review of Systems Constitutional: Negative for malaise/fatigue. HENT: Negative for congestion. Eyes: Negative for blurred vision. Respiratory: Negative for cough and shortness of breath. Cardiovascular: Negative for chest pain, palpitations and leg swelling. Gastrointestinal: Negative for abdominal pain, constipation and heartburn. Genitourinary: Negative for dysuria, frequency and urgency. Musculoskeletal: Positive for back pain, joint pain and myalgias. Neurological: Negative for dizziness, tingling, sensory change, focal weakness and headaches.   
Endo/Heme/Allergies: Negative for environmental allergies. Psychiatric/Behavioral: Negative for depression. The patient does not have insomnia. Physical Exam  
Constitutional: She is oriented to person, place, and time. She appears well-developed and well-nourished. /63 (BP 1 Location: Left arm, BP Patient Position: Sitting)   Pulse (!) 52   Temp 96.9 °F (36.1 °C) (Temporal)   Resp 16   Ht 5' 4\" (1.626 m)   Wt 215 lb (97.5 kg)   LMP 03/04/1961 (Approximate)   SpO2 96%   BMI 36.90 kg/m² HENT:  
Right Ear: Tympanic membrane and ear canal normal.  
Left Ear: Tympanic membrane and ear canal normal.  
Neck: Neck supple. No spinous process tenderness and no muscular tenderness present. No neck rigidity. No thyromegaly present. Cardiovascular: Normal rate, regular rhythm and normal heart sounds. Exam reveals no gallop. Pulmonary/Chest: Effort normal and breath sounds normal.  
Abdominal: Soft. Normal appearance and bowel sounds are normal. She exhibits no mass. There is no abdominal tenderness. Musculoskeletal: Normal range of motion. General: Tenderness (multiple areas of tenderness in the arm and legs.  ) and edema (mild) present. Lymphadenopathy:  
  She has no cervical adenopathy. Neurological: She is alert and oriented to person, place, and time. No cranial nerve deficit. Coordination normal.  
Skin: Skin is warm and dry. Psychiatric: She has a normal mood and affect. Nursing note and vitals reviewed. ASSESSMENT and PLAN 
{ASSESSMENT/PLAN:[id number]}

## 2021-04-26 ENCOUNTER — OFFICE VISIT (OUTPATIENT)
Dept: FAMILY MEDICINE CLINIC | Age: 86
End: 2021-04-26
Payer: MEDICARE

## 2021-04-26 VITALS
WEIGHT: 217.4 LBS | HEIGHT: 64 IN | RESPIRATION RATE: 16 BRPM | HEART RATE: 53 BPM | TEMPERATURE: 97.6 F | OXYGEN SATURATION: 98 % | BODY MASS INDEX: 37.11 KG/M2 | SYSTOLIC BLOOD PRESSURE: 128 MMHG | DIASTOLIC BLOOD PRESSURE: 56 MMHG

## 2021-04-26 DIAGNOSIS — N18.30 STAGE 3 CHRONIC KIDNEY DISEASE, UNSPECIFIED WHETHER STAGE 3A OR 3B CKD (HCC): ICD-10-CM

## 2021-04-26 DIAGNOSIS — M25.50 ARTHRALGIA OF MULTIPLE JOINTS: ICD-10-CM

## 2021-04-26 DIAGNOSIS — J44.9 CHRONIC OBSTRUCTIVE PULMONARY DISEASE, UNSPECIFIED COPD TYPE (HCC): ICD-10-CM

## 2021-04-26 DIAGNOSIS — E11.21 TYPE 2 DIABETES WITH NEPHROPATHY (HCC): ICD-10-CM

## 2021-04-26 DIAGNOSIS — M35.3 POLYMYALGIA RHEUMATICA (HCC): ICD-10-CM

## 2021-04-26 DIAGNOSIS — I10 ESSENTIAL HYPERTENSION, BENIGN: Primary | ICD-10-CM

## 2021-04-26 DIAGNOSIS — E78.2 MIXED HYPERLIPIDEMIA: ICD-10-CM

## 2021-04-26 DIAGNOSIS — I50.22 CHRONIC SYSTOLIC HEART FAILURE (HCC): ICD-10-CM

## 2021-04-26 DIAGNOSIS — G89.29 ENCOUNTER FOR CHRONIC PAIN MANAGEMENT: ICD-10-CM

## 2021-04-26 DIAGNOSIS — Z51.81 ENCOUNTER FOR MEDICATION MONITORING: ICD-10-CM

## 2021-04-26 DIAGNOSIS — D64.9 CHRONIC ANEMIA: ICD-10-CM

## 2021-04-26 PROCEDURE — G8510 SCR DEP NEG, NO PLAN REQD: HCPCS | Performed by: FAMILY MEDICINE

## 2021-04-26 PROCEDURE — G0463 HOSPITAL OUTPT CLINIC VISIT: HCPCS | Performed by: FAMILY MEDICINE

## 2021-04-26 PROCEDURE — G8417 CALC BMI ABV UP PARAM F/U: HCPCS | Performed by: FAMILY MEDICINE

## 2021-04-26 PROCEDURE — 99214 OFFICE O/P EST MOD 30 MIN: CPT | Performed by: FAMILY MEDICINE

## 2021-04-26 PROCEDURE — 1090F PRES/ABSN URINE INCON ASSESS: CPT | Performed by: FAMILY MEDICINE

## 2021-04-26 PROCEDURE — 1101F PT FALLS ASSESS-DOCD LE1/YR: CPT | Performed by: FAMILY MEDICINE

## 2021-04-26 PROCEDURE — G8536 NO DOC ELDER MAL SCRN: HCPCS | Performed by: FAMILY MEDICINE

## 2021-04-26 PROCEDURE — G8427 DOCREV CUR MEDS BY ELIG CLIN: HCPCS | Performed by: FAMILY MEDICINE

## 2021-04-26 RX ORDER — BACLOFEN 20 MG
TABLET ORAL
COMMUNITY
End: 2021-07-23 | Stop reason: ALTCHOICE

## 2021-04-26 NOTE — PROGRESS NOTES
HISTORY OF PRESENT ILLNESS  Margret Avelar is a 80 y.o. female. HPI   Follow up on chronic medical problems. Overall has dealing with a lot of pain in the joints and muscles all over. There are some days that she is hurting so bad that she can not even get out of bed. Most days are bad for her. Cardiovascular Review:  The patient has hypertension, hyperlipidemia and Systolic HF. Diet and Lifestyle: generally follows a low fat low cholesterol diet, generally follows a low sodium diet  Home BP Monitoring: is not measured at home. Pertinent ROS: taking medications as instructed, no medication side effects noted, no TIA's, no chest pain on exertion, no dyspnea on exertion, noting that her ankles swelling has been mild. She has been off of the statin but she did not see any improvemnet with her myalgia being off of the medication so she has started this back. DM type II follow up:  Compliant w/ meds, diabetic diet, and exercise. Obtains home glucose monitoring averaging in the mid 100s. Checks BS daily on most days and prn. Pt does not have BS log at visit today. No Rf needed for today. Denies any tingling sensation, polyuria and polydipsia. No blurred vision. No significant weight changes. Asthma Review:  The patient is being seen for follow up of chronic respiratory failure and allergies and chronic sinusitits, not currently in exacerbation. Breathing has been good also with taking prednisone. Using nebulizer 3 to 4 times in a day as needed but has not recently had to use it. .        Regimen compliance: The patient reports adherence to asthma action plan and regimen. Encounter for pain management. 1./2. Medical history/Past medical History  Chronic Pain:  Osteoarthritis:  Patient has osteoarthritis in multiple joints. Primarily in the knees, hips and back are worse. Has seen by rheumatology in the past but was told nothing more to do. Told that she has polymyalgia rheumatica.   Her symptoms have been wax and wane. She is currently on prednisone. Pain has been 8/10 when it is severe. Rheumatology has discharge her from her care d/t nothing more to add and to get med refills thru her PCP. 3. Applicable records from prior treatment providers are apart of [facility] under the media tab. 4. Diagnostic, therapeutic and laboratory results are available in the [address] [place] [address] chart. 5. Consultation notes are available for review in the media tab of the [address] [place] [address] chart. 6. Treatment goals include pain control so that the pt may be as active and function with her daily activities and improved comfort level. Previously pt has been limited by pain. 7. The risks and benefits of treatment has been discussed at this office visit with the pt. She understands that the medication has addicting potential.  Additionally the pt has been advised that narcotic pain medication may impair mental and/or physical ability required for performance of tasks such as driving or operating any other machinery. 8. Pt has an updated signed pain contract on file and can be found under the FYI section of the [facility] chart. 9. The pain contract is reviewed. Pain medication will be continued at the previous dosage. Urine drug screening will be done today. Diagnostic studies are not indicated at this time. Interventional procedure are not indicated at this time. 10. Medication prescibed is HYDROcodone-acetaminophen (NORCO)  mg tablet. No prescription is needed today. 11. Patient instructions have been reviewed in detail as outlined above and in the pain contract which is updated today. 12. Re-eval is planned for 3 months. Naloxone prescription is not warranted.      Patient Active Problem List   Diagnosis Code    CHF (congestive heart failure) ([facility]) I50.9    S/P TKR (total knee replacement) Z96.659    Anemia D64.9    s/p lumbar laminectomy Z98.890    S/P ASAD (total abdominal hysterectomy) Z90.710    S/P hemorrhoidectomy Z98.890, Z87.19    S/P rotator cuff surgery Z98.890    DM (diabetes mellitus) ([facility] [address] E11.9    Chronic sinusitis J32.9    S/P sinus surgery Z98.890    Dyslipidemia E78.5    Environmental allergies Z91.09    Obstructive sleep apnea (adult) (pediatric) G47.33    DJD (degenerative joint disease) M19.90    Chronic systolic heart failure ([facility]) I50.22    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    Asthma J45.909    Anxiety disorder F41.9    Diabetic neuropathy ([facility]) E11.40    Esophageal reflux K21.9    Essential hypertension, benign I10    Reactive airway disease with wheezing without complication L88.895    Encounter for medication monitoring Z51.81    CKD (chronic kidney disease) N18.9    Arthralgia of multiple joints M25.50    Plantar fasciitis of left foot M72.2    Type 2 diabetes with nephropathy ([facility]) E11.21    Severe obesity (BMI 35.0-39. 9) with comorbidity ([facility]) E66.01    Polymyalgia rheumatica ([facility]) M35.3    COPD exacerbation ([facility]) J44.1       Current Outpatient Medications   Medication Sig Dispense Refill    HYDROcodone-acetaminophen (NORCO)  mg tablet Take 1 Tab by mouth every six (6) hours as needed for Pain for up to 99 days. Max Daily Amount: 2 Tabs.  60 Tab 0    primidone (MYSOLINE) 50 mg tablet TAKE 4 TABLET BY MOUTH TWICE DAILY 240 Tab 5    ALPRAZolam (XANAX) 0.5 mg tablet TAKE 1/2 TO 1 TABLET BY MOUTH EVERY NIGHT AT BEDTIME AS NEEDED 30 Tab 1    glipiZIDE SR (GLUCOTROL XL) 2.5 mg CR tablet TAKE 1 TABLET BY MOUTH DAILY 90 Tab 3    predniSONE (DELTASONE) 5 mg tablet TAKE 3 TABLETS BY MOUTH DAILY 90 Tab 5    DULoxetine (CYMBALTA) 60 mg capsule TAKE 1 CAPSULE BY MOUTH DAILY 90 Cap 3    diclofenac (VOLTAREN) 1 % gel APPLY EXTERNALLY TO THE AFFECTED AREA FOUR TIMES DAILY 300 g 3    carvediloL (COREG) 25 mg tablet TAKE 1 TABLET BY MOUTH TWICE DAILY WITH MEALS 180 Tab 1    Accu-Chek Fastclix Lancet Drum misc USE TO CHECK BLOOD SUGAR TWICE DAILY 300 Each 3    Accu-Chek Guide test strips strip USE TO TEST BLOOD SUGAR TWICE DAILY 300 Strip 3    Accu-Chek Guide Me Glucose Mtr misc USE TO CHECK BLOOD SUGAR TWICE DAILY 1 Each 0    rosuvastatin (CRESTOR) 10 mg tablet TAKE 1 TABLET BY MOUTH EVERY NIGHT 90 Tab 3    furosemide (LASIX) 20 mg tablet TAKE 1 TABLET BY MOUTH EVERY MORNING AND TAKE 1 TABLET BY MOUTH EVERY AFTERNOON BETWEEN 2 AND 4  Tab 3    BEVESPI AEROSPHERE 9-4.8 mcg HFA inhaler Take 2 puffs by inhalation daily      albuterol (PROVENTIL HFA, VENTOLIN HFA, PROAIR HFA) 90 mcg/actuation inhaler Take 2 Puffs by inhalation every four (4) hours as needed for Wheezing or Shortness of Breath. 1 Inhaler     guaiFENesin (MUCINEX) 1,200 mg Ta12 ER tablet Take 1,200 mg by mouth daily as needed.  polyethylene glycol (MIRALAX) 17 gram/dose powder Take 17 g by mouth daily as needed.  MULTIVITAMIN PO Take 1 Tab by mouth daily.  Omeprazole delayed release (PRILOSEC D/R) 20 mg tablet Take 20 mg by mouth daily.  docusate sodium (STOOL SOFTENER PO) Take 1 Tab by mouth two (2) times a day.  lisinopril (PRINIVIL, ZESTRIL) 2.5 mg tablet Take 2.5 mg by mouth daily.       fluticasone (FLONASE) 50 mcg/actuation nasal spray SHAKE LIQUID AND USE 2 SPRAYS IN EACH NOSTRIL EVERY DAY 1 Bottle 11    promethazine-dextromethorphan (PROMETHAZINE-DM) 6.25-15 mg/5 mL syrup TAKE ONE TEASPOONFUL BY MOUTH EVERY SIX HOURS AS NEEDED FOR COUGH 240 mL 1       Allergies   Allergen Reactions    Gabapentin Other (comments)     Muscles twitching and jerking    Levaquin [Levofloxacin] Swelling    Lyrica [Pregabalin] Other (comments)     Muscle twitching and jerking    Percodan [Oxycodone Hcl-Oxycodone-Asa] Itching         Past Medical History:   Diagnosis Date    Anemia 3/3/2010    Arrhythmia     irregular heartbeat    Arthritis     CHF (congestive heart failure) (HCC) 3/3/2010    Chronic obstructive pulmonary disease ([facility] [address]     Chronic pain     back    Chronic sinusitis 3/3/2010    CPAP (continuous positive airway pressure) dependence     Diverticulosis     DM (diabetes mellitus) ([facility] [address] 3/3/2010    Dyslipidemia 3/3/2010    GERD (gastroesophageal reflux disease)     HTN (hypertension) 3/3/2010    Ill-defined condition     being evaluated py pulmonolgist for \"trouble breathing\"    S/P TKR (total knee replacement) 3/3/2010    Unspecified sleep apnea     doesn't wear CPAP since back has been hurting         Past Surgical History:   Procedure Laterality Date    HX APPENDECTOMY      thinks it was taken with hysterectomy    HX GYN      \"tubes opened\"    HX HEENT      sinus surgery    HX HEMORRHOIDECTOMY      HX HYSTERECTOMY      HX KNEE REPLACEMENT      both knees- at same time    HX LUMBAR LAMINECTOMY      HX MOHS PROCEDURES      left shoulder    HX ORTHOPAEDIC      neck fusion    HX ORTHOPAEDIC      right knee replaced for second time    HX ORTHOPAEDIC      lumbar fusion    HX OTHER SURGICAL      CORNELL BIOPSY BREAST STEREOTACTIC Left yrs ago    (-)    VA COLONOSCOPY FLX DX W/COLLJ SPEC WHEN PFRMD  [id number]    dr. Rehana Danielle (barium enema)         Family History   Problem Relation Age of Onset    Diabetes Mother     Heart Disease Father     Diabetes Brother     Blindness Brother     Diabetes Sister     Heart Disease Sister     Heart Disease Brother     Heart Disease Brother     Asthma Brother     Malignant Hyperthermia Neg Hx     Pseudocholinesterase Deficiency Neg Hx     Delayed Awakening Neg Hx     Post-op Nausea/Vomiting Neg Hx     Emergence Delirium Neg Hx     Post-op Cognitive Dysfunction Neg Hx        Social History     Tobacco Use    Smoking status: Former Smoker     Packs/day: 0.30     Years: 5.00     Pack years: 1.50     Quit date: 1960     Years since quittin.3    Smokeless tobacco: Never Used   Substance Use Topics    Alcohol use: No     Alcohol/week: 0.0 standard drinks Lab Results   Component Value Date/Time    WBC 5.7 10/09/2020 11:45 AM    HGB 10.9 (L) 10/09/2020 11:45 AM    HCT 33.0 (L) 10/09/2020 11:45 AM    PLATELET 346 (L) 26/98/6105 11:45 AM     (H) 10/09/2020 11:45 AM     Lab Results   Component Value Date/Time    Cholesterol, total 228 (H) 10/09/2020 11:45 AM    HDL Cholesterol 93 10/09/2020 11:45 AM    LDL, calculated 119 (H) 10/09/2020 11:45 AM    LDL, calculated 98 03/06/2020 10:29 AM    Triglyceride 95 10/09/2020 11:45 AM    CHOL/HDL Ratio 2.0 06/11/2010 02:10 PM     Lab Results   Component Value Date/Time    Sodium 145 (H) 10/09/2020 11:45 AM    Potassium 4.7 10/09/2020 11:45 AM    Chloride 103 10/09/2020 11:45 AM    CO2 29 10/09/2020 11:45 AM    Anion gap 7 12/11/2019 03:38 AM    Glucose 122 (H) 10/09/2020 11:45 AM    BUN 29 (H) 10/09/2020 11:45 AM    Creatinine 1.64 (H) 10/09/2020 11:45 AM    BUN/Creatinine ratio 18 10/09/2020 11:45 AM    GFR est AA 32 (L) 10/09/2020 11:45 AM    GFR est non-AA 28 (L) 10/09/2020 11:45 AM    Calcium 9.4 10/09/2020 11:45 AM    Bilirubin, total 0.2 10/09/2020 11:45 AM    ALT (SGPT) 15 10/09/2020 11:45 AM    Alk. phosphatase 59 10/09/2020 11:45 AM    Protein, total 6.3 10/09/2020 11:45 AM    Albumin 4.5 10/09/2020 11:45 AM    Globulin 3.3 12/09/2019 06:23 PM    A-G Ratio 2.5 (H) 10/09/2020 11:45 AM      Lab Results   Component Value Date/Time    Hemoglobin A1c 6.4 (H) 09/08/2017 11:09 AM    Hemoglobin A1c (POC) 6.2 10/09/2020 11:45 AM         Review of Systems   Constitutional: Negative for malaise/fatigue. HENT: Negative for congestion. Eyes: Negative for blurred vision. Respiratory: Negative for cough and shortness of breath. Cardiovascular: Negative for chest pain, palpitations and leg swelling. Gastrointestinal: Negative for abdominal pain, constipation and heartburn. Genitourinary: Negative for dysuria, frequency and urgency. Musculoskeletal: Positive for back pain, joint pain and myalgias. Neurological: Negative for dizziness, tingling, sensory change, focal weakness and headaches. Endo/Heme/Allergies: Negative for environmental allergies. Psychiatric/Behavioral: Negative for depression. The patient does not have insomnia. Physical Exam   Constitutional: She is oriented to person, place, and time. She appears well-developed and well-nourished. BP (!) 128/56 (BP 1 Location: Left upper arm, BP Patient Position: Sitting, BP Cuff Size: Adult)   Pulse (!) 53   Temp 97.6 °F (36.4 °C) (Temporal)   Resp 16   Ht 5' 4\" (1.626 m)   Wt 217 lb 6.4 oz (98.6 kg)   LMP 03/04/1961 (Approximate)   SpO2 98%   BMI 37.32 kg/m²        HENT:   Right Ear: Tympanic membrane and ear canal normal.   Left Ear: Tympanic membrane and ear canal normal.   Nose: No mucosal edema or rhinorrhea. Mouth/Throat: Oropharynx is clear and moist and mucous membranes are normal.   Neck: Neck supple. No spinous process tenderness and no muscular tenderness present. No neck rigidity. No thyromegaly present. Cardiovascular: Normal rate, regular rhythm and normal heart sounds. Exam reveals no gallop. Pulmonary/Chest: Effort normal and breath sounds normal.   Abdominal: Soft. Normal appearance and bowel sounds are normal. She exhibits no mass. There is no abdominal tenderness. Musculoskeletal: Normal range of motion. General: Tenderness (multiple areas of tenderness in the arm and legs.  ) and edema (mild) present. Lymphadenopathy:     She has no cervical adenopathy. Neurological: She is alert and oriented to person, place, and time. No cranial nerve deficit. Coordination normal.   Skin: Skin is warm and dry. Psychiatric: She has a normal mood and affect. Nursing note and vitals reviewed. ASSESSMENT and PLAN  Diagnoses and all orders for this visit:    1. Essential hypertension, benign  Stable at goal.     2. Type 2 diabetes with nephropathy (HCC)  -     HEMOGLOBIN A1C WITH EAG; Future    3.  Mixed hyperlipidemia  -     LIPID PANEL; Future    4. Chronic systolic heart failure (HCC)  Stable     5. Stage 3 chronic kidney disease, unspecified whether stage 3a or 3b CKD ([facility] [address]  Continue to monitor. 6. Chronic obstructive pulmonary disease, unspecified COPD type ([facility] [address]  Stable     7. Chronic anemia  -     CBC W/O DIFF; Future    8. Polymyalgia rheumatica (HCC)//  9. Arthralgia of multiple joints  -     SED RATE (ESR); Future  -     REFERRAL TO RHEUMATOLOGY for another opinion on treatment plan. 10. Encounter for chronic pain management  -     MONITOR SCREEN 10-DRUG CLASS PROFILE; Future    11. Encounter for medication monitoring  -     METABOLIC PANEL, COMPREHENSIVE; Future  -     CK; Future      Follow-up and Dispositions    · Return in about 5 months (around 9/26/2021). reviewed diet, exercise and weight control  cardiovascular risk and specific lipid/LDL goals reviewed  reviewed medications and side effects in detail  specific diabetic recommendations: low cholesterol diet, weight control and daily exercise discussed, all medications, side effects and compliance discussed carefully, foot care discussed and Podiatry visits discussed, annual eye examinations at Ophthalmology discussed and glycohemoglobin and other lab monitoring discussed     I have discussed diagnosis listed in this note with pt and/or family. I have discussed treatment plans and options and the risk/benefit analysis of those options, including safe use of medications and possible medication side effects. Through the use of shared decision making we have agreed to the above plan. The patient has received an after-visit summary and questions were answered concerning future plans and follow up. Advise pt of any urgent changes then to proceed to the ER.

## 2021-04-26 NOTE — PROGRESS NOTES
Chief Complaint   Patient presents with    Cholesterol Problem     follow up    Diabetes    Hypertension     1. Have you been to the ER, urgent care clinic since your last visit? Hospitalized since your last visit? NO    2. Have you seen or consulted any other health care providers outside of the [address] [place] [address] since your last visit? Include any pap smears or colon screening.   NO      Mammogram  12/1/20    Bone Density  9/20/12    Micro albumin  10/9/20    Eye Exam  Dr.Ethnie Can Hatchet  2021

## 2021-04-27 LAB
ALBUMIN SERPL-MCNC: 3.6 G/DL (ref 3.5–5)
ALBUMIN/GLOB SERPL: 1.3 {RATIO} (ref 1.1–2.2)
ALP SERPL-CCNC: 65 U/L (ref 45–117)
ALT SERPL-CCNC: 26 U/L (ref 12–78)
ANION GAP SERPL CALC-SCNC: 3 MMOL/L (ref 5–15)
AST SERPL-CCNC: 20 U/L (ref 15–37)
BILIRUB SERPL-MCNC: 0.3 MG/DL (ref 0.2–1)
BUN SERPL-MCNC: 31 MG/DL (ref 6–20)
BUN/CREAT SERPL: 17 (ref 12–20)
CALCIUM SERPL-MCNC: 8.7 MG/DL (ref 8.5–10.1)
CHLORIDE SERPL-SCNC: 107 MMOL/L (ref 97–108)
CHOLEST SERPL-MCNC: 252 MG/DL
CK SERPL-CCNC: 42 U/L (ref 26–192)
CO2 SERPL-SCNC: 33 MMOL/L (ref 21–32)
CREAT SERPL-MCNC: 1.79 MG/DL (ref 0.55–1.02)
ERYTHROCYTE [DISTWIDTH] IN BLOOD BY AUTOMATED COUNT: 14.6 % (ref 11.5–14.5)
ERYTHROCYTE [SEDIMENTATION RATE] IN BLOOD: 73 MM/HR (ref 0–30)
EST. AVERAGE GLUCOSE BLD GHB EST-MCNC: 148 MG/DL
GLOBULIN SER CALC-MCNC: 2.8 G/DL (ref 2–4)
GLUCOSE SERPL-MCNC: 137 MG/DL (ref 65–100)
HBA1C MFR BLD: 6.8 % (ref 4–5.6)
HCT VFR BLD AUTO: 34.5 % (ref 35–47)
HDLC SERPL-MCNC: 96 MG/DL
HDLC SERPL: 2.6 {RATIO} (ref 0–5)
HGB BLD-MCNC: 10.3 G/DL (ref 11.5–16)
LDLC SERPL CALC-MCNC: 126.8 MG/DL (ref 0–100)
LIPID PROFILE,FLP: ABNORMAL
MCH RBC QN AUTO: 32.8 PG (ref 26–34)
MCHC RBC AUTO-ENTMCNC: 29.9 G/DL (ref 30–36.5)
MCV RBC AUTO: 109.9 FL (ref 80–99)
NRBC # BLD: 0 K/UL (ref 0–0.01)
NRBC BLD-RTO: 0 PER 100 WBC
PLATELET # BLD AUTO: 176 K/UL (ref 150–400)
PMV BLD AUTO: 10.7 FL (ref 8.9–12.9)
POTASSIUM SERPL-SCNC: 5.4 MMOL/L (ref 3.5–5.1)
PROT SERPL-MCNC: 6.4 G/DL (ref 6.4–8.2)
RBC # BLD AUTO: 3.14 M/UL (ref 3.8–5.2)
SODIUM SERPL-SCNC: 143 MMOL/L (ref 136–145)
TRIGL SERPL-MCNC: 146 MG/DL (ref ?–150)
VLDLC SERPL CALC-MCNC: 29.2 MG/DL
WBC # BLD AUTO: 4.8 K/UL (ref 3.6–11)

## 2021-04-28 LAB
AMPHETAMINES UR QL SCN: NEGATIVE NG/ML
BARBITURATES UR QL SCN: POSITIVE NG/ML
BENZODIAZ UR QL SCN: POSITIVE NG/ML
BZE UR QL SCN: NEGATIVE NG/ML
CANNABINOIDS UR QL SCN: NEGATIVE NG/ML
CREAT UR-MCNC: 202.3 MG/DL (ref 20–300)
METHADONE UR QL SCN: NEGATIVE NG/ML
OPIATES UR QL SCN: POSITIVE NG/ML
OXYCODONE+OXYMORPHONE UR QL SCN: NEGATIVE NG/ML
PCP UR QL: NEGATIVE NG/ML
PH UR: 6.7 [PH] (ref 4.5–8.9)
PLEASE NOTE:, 733163: ABNORMAL
PROPOXYPH UR QL SCN: NEGATIVE NG/ML

## 2021-04-29 ENCOUNTER — TELEPHONE (OUTPATIENT)
Dept: FAMILY MEDICINE CLINIC | Age: 86
End: 2021-04-29

## 2021-05-03 ENCOUNTER — TELEPHONE (OUTPATIENT)
Dept: FAMILY MEDICINE CLINIC | Age: 86
End: 2021-05-03

## 2021-05-03 DIAGNOSIS — G25.0 ESSENTIAL TREMOR: ICD-10-CM

## 2021-05-03 RX ORDER — PRIMIDONE 50 MG/1
TABLET ORAL
Qty: 240 TAB | Refills: 5
Start: 2021-05-03 | End: 2021-06-02

## 2021-05-03 NOTE — TELEPHONE ENCOUNTER
----- Message from Blessing Fagan sent at 5/3/2021  3:25 PM EDT -----  Regarding: Dr. Laina Welsh first and last name: Michael Singh      Reason for call: needs Dr. Bryant Moses to call him back;  did say why or what was in regard to.       Callback required yes/no and why: yes      Best contact number(s): [phone]      Details to clarify the request:      Blessing Fagan

## 2021-05-11 ENCOUNTER — TELEPHONE (OUTPATIENT)
Dept: FAMILY MEDICINE CLINIC | Age: 86
End: 2021-05-11

## 2021-05-11 NOTE — TELEPHONE ENCOUNTER
----- Message from Gracy Shen MD sent at 5/11/2021  8:28 AM EDT -----  Please call and let her know that her potassium level is back in the normal range.

## 2021-05-11 NOTE — TELEPHONE ENCOUNTER
Spoke with patient's , per Dr. Velma Beasley, potassium level was back in normal range.  will inform patient due to patient was sleeping at the time.

## 2021-05-23 DIAGNOSIS — R60.0 BILATERAL EDEMA OF LOWER EXTREMITY: ICD-10-CM

## 2021-05-24 RX ORDER — FUROSEMIDE 20 MG/1
TABLET ORAL
Qty: 180 TABLET | Refills: 3 | Status: SHIPPED | OUTPATIENT
Start: 2021-05-24 | End: 2022-07-08

## 2021-05-31 DIAGNOSIS — E78.2 MIXED HYPERLIPIDEMIA: ICD-10-CM

## 2021-06-01 ENCOUNTER — TELEPHONE (OUTPATIENT)
Dept: NEUROLOGY | Age: 86
End: 2021-06-01

## 2021-06-01 RX ORDER — ROSUVASTATIN CALCIUM 10 MG/1
TABLET, COATED ORAL
Qty: 90 TABLET | Refills: 3 | Status: SHIPPED | OUTPATIENT
Start: 2021-06-01 | End: 2022-06-07

## 2021-06-01 RX ORDER — CARVEDILOL 25 MG/1
TABLET ORAL
Qty: 180 TABLET | Refills: 1 | Status: ON HOLD | OUTPATIENT
Start: 2021-06-01 | End: 2021-06-23 | Stop reason: SDUPTHER

## 2021-06-01 NOTE — TELEPHONE ENCOUNTER
stated she is shaking worse since her last appointment. Patient had a fall last Tuesday. I have scheduled this patient an appointment on 6/2/21. Stated to call with any other issues.

## 2021-06-01 NOTE — TELEPHONE ENCOUNTER
----- Message from Wesley Villarreal sent at 6/1/2021 10:41 AM EDT -----  Regarding: /telephone  General Message/Vendor Calls    Caller's first and last name: Roxana Oroukre      Reason for call: tremors      Callback required yes/no and why: yes      Best contact number(s): [phone]      Details to clarify the request: Pt  would like to discuss pts tremors increasing and pt had a fall last week and he would like to discuss with the provider or nurse.         Wesley Villarreal

## 2021-06-02 ENCOUNTER — OFFICE VISIT (OUTPATIENT)
Dept: NEUROLOGY | Age: 86
End: 2021-06-02
Payer: MEDICARE

## 2021-06-02 DIAGNOSIS — E11.42 DIABETIC POLYNEUROPATHY ASSOCIATED WITH TYPE 2 DIABETES MELLITUS (HCC): ICD-10-CM

## 2021-06-02 DIAGNOSIS — M54.17 LUMBOSACRAL RADICULOPATHY: ICD-10-CM

## 2021-06-02 DIAGNOSIS — G25.0 ESSENTIAL TREMOR: Primary | ICD-10-CM

## 2021-06-02 PROCEDURE — 99443 PR PHYS/QHP TELEPHONE EVALUATION 21-30 MIN: CPT | Performed by: PSYCHIATRY & NEUROLOGY

## 2021-06-02 RX ORDER — PRIMIDONE 250 MG/1
250 TABLET ORAL 2 TIMES DAILY
Qty: 180 TABLET | Refills: 1 | Status: SHIPPED | OUTPATIENT
Start: 2021-06-02 | End: 2021-07-23 | Stop reason: DRUGHIGH

## 2021-06-02 NOTE — PROGRESS NOTES
Chief Complaint   Patient presents with    Follow-up     increased tremors      Questions regarding medication Primidone

## 2021-06-02 NOTE — PROGRESS NOTES
NEUROLOGY FOLLOW UP NOTE    Chief Complaint   Patient presents with    Follow-up     increased tremors       SUBJECTIVE  Jani Mondragon is a 80 y.o. female who presented to the neurology office for management of tremors. It has been going on since 2016. It is gradually progressive with time. It is postural and in both in the arms and legs. No falls. She does have constipation. She does have mild stiffness in legs. The patient was on gabapentin and when it was discontinued her tremors have improved but they are coming back recently. Patient is on primidone 100 mg p.o. twice daily and there is improvement in her tremors and she is overall satisfied with it. She does also have lumbar radiculopathy and a length dependent neuropathy. Both of them are stable    Interval history:   Patient is taking primidone 200 mg p.o. twice daily. She is noticing that her tremors are worsening. Diabetic polyneuropathy and lumbar radiculopathy stable    Medications tried:  Gabapentin and Lyrica causes tremors  Primidone    Current Outpatient Medications   Medication Sig    rosuvastatin (CRESTOR) 10 mg tablet TAKE 1 TABLET BY MOUTH EVERY NIGHT    carvediloL (COREG) 25 mg tablet TAKE 1 TABLET BY MOUTH TWICE DAILY WITH MEALS    furosemide (LASIX) 20 mg tablet TAKE 1 TABLET BY MOUTH EVERY MORNING AND 1 TABLET EVERY AFTERNOON BETWEEN 2 TO 4 PM    primidone (MYSOLINE) 50 mg tablet TAKE 2 TABLET BY MOUTH TWICE DAILY    magnesium oxide (Chin) 500 mg tab Take  by mouth. Take 2 tablets nightly    HYDROcodone-acetaminophen (NORCO)  mg tablet Take 1 Tab by mouth every six (6) hours as needed for Pain for up to 99 days. Max Daily Amount: 2 Tabs.     glipiZIDE SR (GLUCOTROL XL) 2.5 mg CR tablet TAKE 1 TABLET BY MOUTH DAILY    predniSONE (DELTASONE) 5 mg tablet TAKE 3 TABLETS BY MOUTH DAILY    DULoxetine (CYMBALTA) 60 mg capsule TAKE 1 CAPSULE BY MOUTH DAILY    diclofenac (VOLTAREN) 1 % gel APPLY EXTERNALLY TO THE AFFECTED AREA FOUR TIMES DAILY    Accu-Chek Fastclix Lancet Drum misc USE TO CHECK BLOOD SUGAR TWICE DAILY    Accu-Chek Guide test strips strip USE TO TEST BLOOD SUGAR TWICE DAILY    Accu-Chek Guide Me Glucose Mtr misc USE TO CHECK BLOOD SUGAR TWICE DAILY    BEVESPI AEROSPHERE 9-4.8 mcg HFA inhaler Take 2 puffs by inhalation daily    albuterol (PROVENTIL HFA, VENTOLIN HFA, PROAIR HFA) 90 mcg/actuation inhaler Take 2 Puffs by inhalation every four (4) hours as needed for Wheezing or Shortness of Breath.  guaiFENesin (MUCINEX) 1,200 mg Ta12 ER tablet Take 1,200 mg by mouth daily as needed.  MULTIVITAMIN PO Take 1 Tab by mouth daily.  Omeprazole delayed release (PRILOSEC D/R) 20 mg tablet Take 20 mg by mouth daily.  lisinopril (PRINIVIL, ZESTRIL) 2.5 mg tablet Take 2.5 mg by mouth daily.  fluticasone (FLONASE) 50 mcg/actuation nasal spray SHAKE LIQUID AND USE 2 SPRAYS IN EACH NOSTRIL EVERY DAY    promethazine-dextromethorphan (PROMETHAZINE-DM) 6.25-15 mg/5 mL syrup TAKE ONE TEASPOONFUL BY MOUTH EVERY SIX HOURS AS NEEDED FOR COUGH     No current facility-administered medications for this visit. EXAMINATION  Visit Vitals  LMP 03/04/1961 (Approximate)        Telephone encounter    Laboratory review:   Results for orders placed or performed in visit on 05/04/21   POTASSIUM   Result Value Ref Range    Potassium 5.0 3.5 - 5.1 mmol/L       Imaging review:   11/6/12  MRI L spine  1. Status post L4 and L5 laminectomies. Severe L4-5 disc space narrowing with moderate sized central disc herniation. No canal stenosis; moderate left foraminal narrowing. 2.  Diffuse degenerative changes. Canal stenosis mild at T10-11, borderline at T11-12 and T12-L1, mild at L1-2, mild to moderate at L2-3, severe at L3-4, and moderate at L5-S1.    12/6/12  MRI Brain  1. No mass or enhancement abnormality of the internal auditory canals or cerebellopontine angles.   2.  Extensive sinus disease with complete obscuration of the left frontal and sphenoid sinuses. 9/28/16  EMG/NCS  This study is abnormal. There is evidence for a moderate to severe degree of a length dependent polyneuropathy. There is also residual multilevel lumbosacral radiculopathy without significant denervation in the distal leg muscles. Documentation review:  None    Assessment/Plan:   Rush Ibarra is a 80 y.o. female who presented to the neurology office for management of postural tremors, lumbosacral radiculopathy and diabetic polyneuropathy. Her diabetic neuropathy and lumbosacral radiculopathy are stable. For essential tremors, patient is on primidone 200 mg p.o. twice daily and symptoms are worsening. I do plan to increase the dosage of primidone to 250 mg p.o. twice daily. If no improvement, plan for [facility] to check for Parkinson's disease. I did not appreciate parkinsonian features in prior visits. Follow-up in 6 weeks            ICD-10-CM ICD-9-CM    1. Essential tremor  G25.0 333.1    2. Lumbosacral radiculopathy  M54.17 724.4    3. Diabetic polyneuropathy associated with type 2 diabetes mellitus [facility])  E11.42 250.60      357.2        Rush Ibarra is a 80 y.o. female evaluated via telephone on 6/2/2021. Consent:  She and/or health care decision maker is aware that that she may receive a bill for this telephone service, depending on her insurance coverage, and has provided verbal consent to proceed: Yes    Documentation:  I communicated with the patient and/or health care decision maker about his presenting symptoms. Details of this discussion including any medical advice provided: See notes      I affirm this is a Patient Initiated Episode with an Established Patient who has not had a related appointment within my department in the past 7 days or scheduled within the next 24 hours.     Total Time: minutes: 21-30 minutes    Note: not billable if this call serves to triage the patient into an appointment for the relevant concern

## 2021-06-07 ENCOUNTER — OFFICE VISIT (OUTPATIENT)
Dept: FAMILY MEDICINE CLINIC | Age: 86
End: 2021-06-07

## 2021-06-07 DIAGNOSIS — Z51.81 ENCOUNTER FOR MEDICATION MONITORING: ICD-10-CM

## 2021-06-07 DIAGNOSIS — J44.9 CHRONIC OBSTRUCTIVE PULMONARY DISEASE, UNSPECIFIED COPD TYPE (HCC): ICD-10-CM

## 2021-06-07 DIAGNOSIS — M25.50 ARTHRALGIA OF MULTIPLE JOINTS: ICD-10-CM

## 2021-06-07 DIAGNOSIS — R60.0 BILATERAL EDEMA OF LOWER EXTREMITY: ICD-10-CM

## 2021-06-07 DIAGNOSIS — I50.22 CHRONIC SYSTOLIC HEART FAILURE (HCC): ICD-10-CM

## 2021-06-07 DIAGNOSIS — G89.29 ENCOUNTER FOR CHRONIC PAIN MANAGEMENT: ICD-10-CM

## 2021-06-07 DIAGNOSIS — E78.2 MIXED HYPERLIPIDEMIA: ICD-10-CM

## 2021-06-07 DIAGNOSIS — I10 ESSENTIAL HYPERTENSION, BENIGN: Primary | ICD-10-CM

## 2021-06-07 DIAGNOSIS — N18.30 STAGE 3 CHRONIC KIDNEY DISEASE, UNSPECIFIED WHETHER STAGE 3A OR 3B CKD (HCC): ICD-10-CM

## 2021-06-07 DIAGNOSIS — D64.9 CHRONIC ANEMIA: ICD-10-CM

## 2021-06-07 DIAGNOSIS — E11.21 TYPE 2 DIABETES WITH NEPHROPATHY (HCC): ICD-10-CM

## 2021-06-07 RX ORDER — HYDROCODONE BITARTRATE AND ACETAMINOPHEN 10; 325 MG/1; MG/1
1 TABLET ORAL
Qty: 60 TABLET | Refills: 0 | Status: SHIPPED | OUTPATIENT
Start: 2021-06-07 | End: 2021-06-23

## 2021-06-07 NOTE — PROGRESS NOTES
HISTORY OF PRESENT ILLNESS Karin Aponte is a 80 y.o. female. HPI Follow up on chronic medical problems. Overall has dealing with a lot of pain in the joints and muscles all over. There are some days that she is hurting so bad that she can not even get out of bed. Most days are bad for her. Cardiovascular Review: 
The patient has hypertension, hyperlipidemia and Systolic HF. Diet and Lifestyle: generally follows a low fat low cholesterol diet, generally follows a low sodium diet Home BP Monitoring: is not measured at home. Pertinent ROS: taking medications as instructed, no medication side effects noted, no TIA's, no chest pain on exertion, no dyspnea on exertion, noting that her ankles swelling has been mild. She has been off of the statin but she did not see any improvemnet with her myalgia being off of the medication so she has started this back. DM type II follow up: 
Compliant w/ meds, diabetic diet, and exercise. Obtains home glucose monitoring averaging in the mid 100s. Checks BS daily on most days and prn. Pt does not have BS log at visit today. No Rf needed for today. Denies any tingling sensation, polyuria and polydipsia. No blurred vision. No significant weight changes. Asthma Review: 
The patient is being seen for follow up of chronic respiratory failure and allergies and chronic sinusitits, not currently in exacerbation. Breathing has been good also with taking prednisone. Using nebulizer 3 to 4 times in a day as needed but has not recently had to use it. .       
Regimen compliance: The patient reports adherence to asthma action plan and regimen. Encounter for pain management. 1./2. Medical history/Past medical History Chronic Pain: 
Osteoarthritis: 
Patient has osteoarthritis in multiple joints. Primarily in the knees, hips and back are worse. Has seen by rheumatology in the past but was told nothing more to do. Told that she has polymyalgia rheumatica.   Her symptoms have been wax and wane. She is currently on prednisone. Pain has been 8/10 when it is severe. Rheumatology has discharge her from her care d/t nothing more to add and to get med refills thru her PCP. 3. Applicable records from prior treatment providers are apart of [facility] under the media tab. 4. Diagnostic, therapeutic and laboratory results are available in the [facility] chart. 5. Consultation notes are available for review in the media tab of the [facility] chart. 6. Treatment goals include pain control so that the pt may be as active and function with her daily activities and improved comfort level. Previously pt has been limited by pain. 7. The risks and benefits of treatment has been discussed at this office visit with the pt. She understands that the medication has addicting potential.  Additionally the pt has been advised that narcotic pain medication may impair mental and/or physical ability required for performance of tasks such as driving or operating any other machinery. 8. Pt has an updated signed pain contract on file and can be found under the FYI section of the [facility] chart. 9. The pain contract is reviewed. Pain medication will be continued at the previous dosage. Urine drug screening will be done today. Diagnostic studies are not indicated at this time. Interventional procedure are not indicated at this time. 10. Medication prescibed is HYDROcodone-acetaminophen (NORCO)  mg tablet. No prescription is needed today. 11. Patient instructions have been reviewed in detail as outlined above and in the pain contract which is updated today. 12. Re-eval is planned for 3 months. Naloxone prescription is not warranted. Patient Active Problem List  
Diagnosis Code  CHF (congestive heart failure) ([facility]) I50.9  S/P TKR (total knee replacement) S05.703  Anemia D64.9  
 s/p lumbar laminectomy Z98.890  
 S/P ASAD (total abdominal hysterectomy) Z90.710  S/P hemorrhoidectomy Z98.890, Z87.19  
 S/P rotator cuff surgery Z98.890  
 DM (diabetes mellitus) ([facility] E11.9  Chronic sinusitis J32.9  S/P sinus surgery Z98.890  
 Dyslipidemia E78.5  Environmental allergies Z91.09  
 Obstructive sleep apnea (adult) (pediatric) G47.33  
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  
 Severe obesity (BMI 35.0-39. 9) with comorbidity ([facility]) E66.01  
 Polymyalgia rheumatica ([facility] M35.3  COPD exacerbation ([facility] J44.1 Current Outpatient Medications Medication Sig Dispense Refill  primidone (MYSOLINE) 250 mg tablet Take 1 Tablet by mouth two (2) times a day. 180 Tablet 1  
 rosuvastatin (CRESTOR) 10 mg tablet TAKE 1 TABLET BY MOUTH EVERY NIGHT 90 Tablet 3  carvediloL (COREG) 25 mg tablet TAKE 1 TABLET BY MOUTH TWICE DAILY WITH MEALS 180 Tablet 1  
 furosemide (LASIX) 20 mg tablet TAKE 1 TABLET BY MOUTH EVERY MORNING AND 1 TABLET EVERY AFTERNOON BETWEEN 2 TO 4  Tablet 3  
 magnesium oxide (Chin) 500 mg tab Take  by mouth. Take 2 tablets nightly  HYDROcodone-acetaminophen (NORCO)  mg tablet Take 1 Tab by mouth every six (6) hours as needed for Pain for up to 99 days. Max Daily Amount: 2 Tabs. 60 Tab 0  
 glipiZIDE SR (GLUCOTROL XL) 2.5 mg CR tablet TAKE 1 TABLET BY MOUTH DAILY 90 Tab 3  predniSONE (DELTASONE) 5 mg tablet TAKE 3 TABLETS BY MOUTH DAILY 90 Tab 5  DULoxetine (CYMBALTA) 60 mg capsule TAKE 1 CAPSULE BY MOUTH DAILY 90 Cap 3  
 diclofenac (VOLTAREN) 1 % gel APPLY EXTERNALLY TO THE AFFECTED AREA FOUR TIMES DAILY 300 g 3  
 Accu-Chek Fastclix Lancet Drum misc USE TO CHECK BLOOD SUGAR TWICE DAILY 300 Each 3  
 Accu-Chek Guide test strips strip USE TO TEST BLOOD SUGAR TWICE DAILY 300 Strip 3  
 Accu-Chek Guide Me Glucose Mtr misc USE TO CHECK BLOOD SUGAR TWICE DAILY 1 Each 0  
 BEVESPI AEROSPHERE 9-4.8 mcg HFA inhaler Take 2 puffs by inhalation daily  albuterol (PROVENTIL HFA, VENTOLIN HFA, PROAIR HFA) 90 mcg/actuation inhaler Take 2 Puffs by inhalation every four (4) hours as needed for Wheezing or Shortness of Breath. 1 Inhaler  guaiFENesin (MUCINEX) 1,200 mg Ta12 ER tablet Take 1,200 mg by mouth daily as needed.  MULTIVITAMIN PO Take 1 Tab by mouth daily.  Omeprazole delayed release (PRILOSEC D/R) 20 mg tablet Take 20 mg by mouth daily.  lisinopril (PRINIVIL, ZESTRIL) 2.5 mg tablet Take 2.5 mg by mouth daily.  fluticasone (FLONASE) 50 mcg/actuation nasal spray SHAKE LIQUID AND USE 2 SPRAYS IN EACH NOSTRIL EVERY DAY 1 Bottle 11  
 promethazine-dextromethorphan (PROMETHAZINE-DM) 6.25-15 mg/5 mL syrup TAKE ONE TEASPOONFUL BY MOUTH EVERY SIX HOURS AS NEEDED FOR COUGH 240 mL 1 Allergies Allergen Reactions  Gabapentin Other (comments) Muscles twitching and jerking  Levaquin [Levofloxacin] Swelling  Lyrica [Pregabalin] Other (comments) Muscle twitching and jerking  Percodan [Oxycodone Hcl-Oxycodone-Asa] Itching Past Medical History:  
Diagnosis Date  Anemia 3/3/2010  Arrhythmia   
 irregular heartbeat  Arthritis  CHF (congestive heart failure) ([facility] 3/3/2010  Chronic obstructive pulmonary disease ([facility]  Chronic pain   
 back  Chronic sinusitis 3/3/2010  CPAP (continuous positive airway pressure) dependence  Diverticulosis  DM (diabetes mellitus) ([facility] 3/3/2010  Dyslipidemia 3/3/2010  GERD (gastroesophageal reflux disease)  HTN (hypertension) 3/3/2010  Ill-defined condition   
 being evaluated py pulmonolgist for \"trouble breathing\"  S/P TKR (total knee replacement) 3/3/2010  Unspecified sleep apnea   
 doesn't wear CPAP since back has been hurting Past Surgical History:  
Procedure Laterality Date  HX APPENDECTOMY    
 thinks it was taken with hysterectomy  HX GYN \"tubes opened\"  HX HEENT    
 sinus surgery  HX HEMORRHOIDECTOMY  HX HYSTERECTOMY  HX KNEE REPLACEMENT    
 both knees- at same time  HX LUMBAR LAMINECTOMY    HX MOHS PROCEDURES    
 left shoulder  HX ORTHOPAEDIC    
 neck fusion  HX ORTHOPAEDIC    
 right knee replaced for second time  HX ORTHOPAEDIC    
 lumbar fusion  HX OTHER SURGICAL  CORNELL BIOPSY BREAST STEREOTACTIC Left yrs ago (-)  [place] COLONOSCOPY FLX DX W/COLLJ SPEC WHEN PFRMD  [id number]  
 dr. Georgia Posada (barium enema) Family History Problem Relation Age of Onset  Diabetes Mother  Heart Disease Father  Diabetes Brother  Blindness Brother  Diabetes Sister  Heart Disease Sister  Heart Disease Brother  Heart Disease Brother  Asthma Brother  Malignant Hyperthermia Neg Hx  Pseudocholinesterase Deficiency Neg Hx  Delayed Awakening Neg Hx  Post-op Nausea/Vomiting Neg Hx  Emergence Delirium Neg Hx  Post-op Cognitive Dysfunction Neg Hx Social History Tobacco Use  Smoking status: Former Smoker Packs/day: 0.30 Years: 5.00 Pack years: 1.50 Quit date: 1960 Years since quittin.4  Smokeless tobacco: Never Used Substance Use Topics  Alcohol use: No  
  Alcohol/week: 0.0 standard drinks Lab Results Component Value Date/Time  WBC 4.8 2021 10:23 AM  
 HGB 10.3 (L) 2021 10:23 AM  
 HCT 34.5 (L) 2021 10:23 AM  
 PLATELET 013  10:23 AM  
 MCV 109.9 (H) 04/26/2021 10:23 AM  
 
Lab Results Component Value Date/Time Cholesterol, total 252 (H) 04/26/2021 10:23 AM  
 HDL Cholesterol 96 04/26/2021 10:23 AM  
 LDL, calculated 126.8 (H) 04/26/2021 10:23 AM  
 Triglyceride 146 04/26/2021 10:23 AM  
 CHOL/HDL Ratio 2.6 04/26/2021 10:23 AM  
 
Lab Results Component Value Date/Time Sodium 143 04/26/2021 10:23 AM  
 Potassium 5.0 05/10/2021 12:30 PM  
 Chloride 107 04/26/2021 10:23 AM  
 CO2 33 (H) 04/26/2021 10:23 AM  
 Anion gap 3 (L) 04/26/2021 10:23 AM  
 Glucose 137 (H) 04/26/2021 10:23 AM  
 BUN 31 (H) 04/26/2021 10:23 AM  
 Creatinine 1.79 (H) 04/26/2021 10:23 AM  
 BUN/Creatinine ratio 17 04/26/2021 10:23 AM  
 GFR est AA 32 (L) 04/26/2021 10:23 AM  
 GFR est non-AA 27 (L) 04/26/2021 10:23 AM  
 Calcium 8.7 04/26/2021 10:23 AM  
 Bilirubin, total 0.3 04/26/2021 10:23 AM  
 ALT (SGPT) 26 04/26/2021 10:23 AM  
 Alk. phosphatase 65 04/26/2021 10:23 AM  
 Protein, total 6.4 04/26/2021 10:23 AM  
 Albumin 3.6 04/26/2021 10:23 AM  
 Globulin 2.8 04/26/2021 10:23 AM  
 A-G Ratio 1.3 04/26/2021 10:23 AM  
  
Lab Results Component Value Date/Time Hemoglobin A1c 6.8 (H) 04/26/2021 10:23 AM  
 Hemoglobin A1c (POC) 6.2 10/09/2020 11:45 AM  
   
 
Review of Systems Constitutional: Negative for malaise/fatigue. HENT: Negative for congestion. Eyes: Negative for blurred vision. Respiratory: Negative for cough and shortness of breath. Cardiovascular: Negative for chest pain, palpitations and leg swelling. Gastrointestinal: Negative for abdominal pain, constipation and heartburn. Genitourinary: Negative for dysuria, frequency and urgency. Musculoskeletal: Positive for back pain, joint pain and myalgias. Neurological: Negative for dizziness, tingling, sensory change, focal weakness and headaches. Endo/Heme/Allergies: Negative for environmental allergies. Psychiatric/Behavioral: Negative for depression. The patient does not have insomnia. Physical Exam 
Vitals and nursing note reviewed. Constitutional:   
   Appearance: Normal appearance. She is well-developed. Comments: BP (!) 128/56 (BP 1 Location: Left upper arm, BP Patient Position: Sitting, BP Cuff Size: Adult)   Pulse (!) 53   Temp 97.6 °F (36.4 °C) (Temporal)   Resp 16   Ht 5' 4\" (1.626 m)   Wt 217 lb 6.4 oz (98.6 kg)   LMP 03/04/1961 (Approximate)   SpO2 98%   BMI 37.32 kg/m² HENT:  
   Right Ear: Tympanic membrane and ear canal normal.  
   Left Ear: Tympanic membrane and ear canal normal.  
   Nose: No mucosal edema or rhinorrhea. Neck:  
   Thyroid: No thyromegaly. Cardiovascular:  
   Rate and Rhythm: Normal rate and regular rhythm. Heart sounds: Normal heart sounds. No gallop. Pulmonary:  
   Effort: Pulmonary effort is normal.  
   Breath sounds: Normal breath sounds. Abdominal:  
   General: Bowel sounds are normal.  
   Palpations: Abdomen is soft. There is no mass. Tenderness: There is no abdominal tenderness. Musculoskeletal:     
   General: Tenderness (multiple areas of tenderness in the arm and legs.  ) present. Normal range of motion. Cervical back: Neck supple. No rigidity. No spinous process tenderness or muscular tenderness. Lymphadenopathy:  
   Cervical: No cervical adenopathy. Skin: 
   General: Skin is warm and dry. Neurological:  
   Mental Status: She is alert and oriented to person, place, and time. Cranial Nerves: No cranial nerve deficit. Coordination: Coordination normal.  
 
 
 
ASSESSMENT and PLAN 
{ASSESSMENT/PLAN:[id number]}

## 2021-06-07 NOTE — PROGRESS NOTES
Mammogram  12/1/2021    Eye Exam 5/20/2020 by Dr. Vannessa Lew      1. Have you been to the ER, urgent care clinic since your last visit? Hospitalized since your last visit? no    2. Have you seen or consulted any other health care providers outside of the [address] [place] [address] since your last visit? Include any pap smears or colon screening.  no

## 2021-06-18 ENCOUNTER — APPOINTMENT (OUTPATIENT)
Dept: CT IMAGING | Age: 86
DRG: 092 | End: 2021-06-18
Attending: EMERGENCY MEDICINE
Payer: MEDICARE

## 2021-06-18 ENCOUNTER — APPOINTMENT (OUTPATIENT)
Dept: GENERAL RADIOLOGY | Age: 86
DRG: 092 | End: 2021-06-18
Attending: EMERGENCY MEDICINE
Payer: MEDICARE

## 2021-06-18 ENCOUNTER — HOSPITAL ENCOUNTER (INPATIENT)
Age: 86
LOS: 5 days | Discharge: NURSING, REHAB OR LONG-TERM CARE | DRG: 092 | End: 2021-06-23
Attending: EMERGENCY MEDICINE | Admitting: INTERNAL MEDICINE
Payer: MEDICARE

## 2021-06-18 ENCOUNTER — TELEPHONE (OUTPATIENT)
Dept: FAMILY MEDICINE CLINIC | Age: 86
End: 2021-06-18

## 2021-06-18 DIAGNOSIS — R54 SENILE DEBILITY: Primary | ICD-10-CM

## 2021-06-18 DIAGNOSIS — G25.0 ESSENTIAL TREMOR: ICD-10-CM

## 2021-06-18 DIAGNOSIS — R53.81 DEBILITY: ICD-10-CM

## 2021-06-18 DIAGNOSIS — G89.4 CHRONIC PAIN SYNDROME: ICD-10-CM

## 2021-06-18 DIAGNOSIS — M25.562 ACUTE PAIN OF BOTH KNEES: ICD-10-CM

## 2021-06-18 DIAGNOSIS — R27.0 ATAXIA: ICD-10-CM

## 2021-06-18 DIAGNOSIS — E78.2 MIXED HYPERLIPIDEMIA: ICD-10-CM

## 2021-06-18 DIAGNOSIS — M25.561 ACUTE PAIN OF BOTH KNEES: ICD-10-CM
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
## 2021-06-18 PROBLEM — R25.1 TREMORS OF NERVOUS SYSTEM: Status: ACTIVE | Noted: 2021-06-18

## 2021-06-18 PROBLEM — R53.1 GENERAL WEAKNESS: Status: ACTIVE | Noted: 2021-06-18

## 2021-06-18 LAB
ALBUMIN SERPL-MCNC: 3.3 G/DL (ref 3.5–5)
ALBUMIN/GLOB SERPL: 1.1 {RATIO} (ref 1.1–2.2)
ALP SERPL-CCNC: 60 U/L (ref 45–117)
ALT SERPL-CCNC: 22 U/L (ref 12–78)
ANION GAP SERPL CALC-SCNC: 1 MMOL/L (ref 5–15)
APPEARANCE UR: CLEAR
AST SERPL-CCNC: 20 U/L (ref 15–37)
BACTERIA URNS QL MICRO: NEGATIVE /HPF
BASOPHILS # BLD: 0 K/UL (ref 0–0.1)
BASOPHILS NFR BLD: 0 % (ref 0–1)
BILIRUB SERPL-MCNC: 0.3 MG/DL (ref 0.2–1)
BILIRUB UR QL: NEGATIVE
BUN SERPL-MCNC: 30 MG/DL (ref 6–20)
BUN/CREAT SERPL: 16 (ref 12–20)
CALCIUM SERPL-MCNC: 8.8 MG/DL (ref 8.5–10.1)
CHLORIDE SERPL-SCNC: 106 MMOL/L (ref 97–108)
CO2 SERPL-SCNC: 34 MMOL/L (ref 21–32)
COLOR UR: NORMAL
COMMENT, HOLDF: NORMAL
CREAT SERPL-MCNC: 1.85 MG/DL (ref 0.55–1.02)
DIFFERENTIAL METHOD BLD: ABNORMAL
EOSINOPHIL # BLD: 0.1 K/UL (ref 0–0.4)
EOSINOPHIL NFR BLD: 1 % (ref 0–7)
EPITH CASTS URNS QL MICRO: NORMAL /LPF
ERYTHROCYTE [DISTWIDTH] IN BLOOD BY AUTOMATED COUNT: 15.4 % (ref 11.5–14.5)
GLOBULIN SER CALC-MCNC: 3.1 G/DL (ref 2–4)
GLUCOSE BLD STRIP.AUTO-MCNC: 108 MG/DL (ref 65–117)
GLUCOSE SERPL-MCNC: 82 MG/DL (ref 65–100)
GLUCOSE UR STRIP.AUTO-MCNC: NEGATIVE MG/DL
HCT VFR BLD AUTO: 32 % (ref 35–47)
HGB BLD-MCNC: 9.8 G/DL (ref 11.5–16)
HGB UR QL STRIP: NEGATIVE
HYALINE CASTS URNS QL MICRO: NORMAL /LPF (ref 0–5)
IMM GRANULOCYTES # BLD AUTO: 0 K/UL (ref 0–0.04)
IMM GRANULOCYTES NFR BLD AUTO: 0 % (ref 0–0.5)
KETONES UR QL STRIP.AUTO: NEGATIVE MG/DL
LEUKOCYTE ESTERASE UR QL STRIP.AUTO: NEGATIVE
LYMPHOCYTES # BLD: 1.4 K/UL (ref 0.8–3.5)
LYMPHOCYTES NFR BLD: 32 % (ref 12–49)
MCH RBC QN AUTO: 33.3 PG (ref 26–34)
MCHC RBC AUTO-ENTMCNC: 30.6 G/DL (ref 30–36.5)
MCV RBC AUTO: 108.8 FL (ref 80–99)
MONOCYTES # BLD: 0.3 K/UL (ref 0–1)
MONOCYTES NFR BLD: 7 % (ref 5–13)
NEUTS SEG # BLD: 2.6 K/UL (ref 1.8–8)
NEUTS SEG NFR BLD: 60 % (ref 32–75)
NITRITE UR QL STRIP.AUTO: NEGATIVE
NRBC # BLD: 0 K/UL (ref 0–0.01)
NRBC BLD-RTO: 0 PER 100 WBC
PH UR STRIP: 7 [PH] (ref 5–8)
PLATELET # BLD AUTO: 140 K/UL (ref 150–400)
PMV BLD AUTO: 10.9 FL (ref 8.9–12.9)
POTASSIUM SERPL-SCNC: 4.9 MMOL/L (ref 3.5–5.1)
PROT SERPL-MCNC: 6.4 G/DL (ref 6.4–8.2)
PROT UR STRIP-MCNC: NEGATIVE MG/DL
RBC # BLD AUTO: 2.94 M/UL (ref 3.8–5.2)
RBC #/AREA URNS HPF: NORMAL /HPF (ref 0–5)
SAMPLES BEING HELD,HOLD: NORMAL
SERVICE CMNT-IMP: NORMAL
SODIUM SERPL-SCNC: 141 MMOL/L (ref 136–145)
SP GR UR REFRACTOMETRY: 1.02 (ref 1–1.03)
TROPONIN I SERPL-MCNC: <0.05 NG/ML
UA: UC IF INDICATED,UAUC: NORMAL
UROBILINOGEN UR QL STRIP.AUTO: 0.2 EU/DL (ref 0.2–1)
WBC # BLD AUTO: 4.4 K/UL (ref 3.6–11)
WBC URNS QL MICRO: NORMAL /HPF (ref 0–4)

## 2021-06-18 PROCEDURE — 74011250636 HC RX REV CODE- 250/636: Performed by: EMERGENCY MEDICINE

## 2021-06-18 PROCEDURE — 93005 ELECTROCARDIOGRAM TRACING: CPT

## 2021-06-18 PROCEDURE — 65660000000 HC RM CCU STEPDOWN

## 2021-06-18 PROCEDURE — 36415 COLL VENOUS BLD VENIPUNCTURE: CPT

## 2021-06-18 PROCEDURE — 84484 ASSAY OF TROPONIN QUANT: CPT

## 2021-06-18 PROCEDURE — 74011250636 HC RX REV CODE- 250/636: Performed by: INTERNAL MEDICINE

## 2021-06-18 PROCEDURE — 81001 URINALYSIS AUTO W/SCOPE: CPT

## 2021-06-18 PROCEDURE — 73560 X-RAY EXAM OF KNEE 1 OR 2: CPT

## 2021-06-18 PROCEDURE — 71045 X-RAY EXAM CHEST 1 VIEW: CPT

## 2021-06-18 PROCEDURE — 99285 EMERGENCY DEPT VISIT HI MDM: CPT

## 2021-06-18 PROCEDURE — 80053 COMPREHEN METABOLIC PANEL: CPT

## 2021-06-18 PROCEDURE — 85025 COMPLETE CBC W/AUTO DIFF WBC: CPT

## 2021-06-18 PROCEDURE — 94640 AIRWAY INHALATION TREATMENT: CPT

## 2021-06-18 PROCEDURE — 2709999900 HC NON-CHARGEABLE SUPPLY

## 2021-06-18 PROCEDURE — 74011000250 HC RX REV CODE- 250: Performed by: GENERAL ACUTE CARE HOSPITAL

## 2021-06-18 PROCEDURE — 70450 CT HEAD/BRAIN W/O DYE: CPT

## 2021-06-18 PROCEDURE — 82962 GLUCOSE BLOOD TEST: CPT

## 2021-06-18 PROCEDURE — 74011250637 HC RX REV CODE- 250/637: Performed by: INTERNAL MEDICINE

## 2021-06-18 PROCEDURE — 77030029684 HC NEB SM VOL KT MONA -A

## 2021-06-18 RX ORDER — DEXTROSE 50 % IN WATER (D50W) INTRAVENOUS SYRINGE
12.5-25 AS NEEDED
Status: DISCONTINUED | OUTPATIENT
Start: 2021-06-18 | End: 2021-06-23 | Stop reason: HOSPADM

## 2021-06-18 RX ORDER — INSULIN LISPRO 100 [IU]/ML
INJECTION, SOLUTION INTRAVENOUS; SUBCUTANEOUS
Status: DISCONTINUED | OUTPATIENT
Start: 2021-06-18 | End: 2021-06-23 | Stop reason: HOSPADM

## 2021-06-18 RX ORDER — DULOXETIN HYDROCHLORIDE 30 MG/1
60 CAPSULE, DELAYED RELEASE ORAL DAILY
Status: DISCONTINUED | OUTPATIENT
Start: 2021-06-19 | End: 2021-06-23 | Stop reason: HOSPADM

## 2021-06-18 RX ORDER — FUROSEMIDE 20 MG/1
20 TABLET ORAL DAILY
Status: DISCONTINUED | OUTPATIENT
Start: 2021-06-19 | End: 2021-06-23 | Stop reason: HOSPADM

## 2021-06-18 RX ORDER — CARVEDILOL 12.5 MG/1
12.5 TABLET ORAL 2 TIMES DAILY WITH MEALS
Status: DISCONTINUED | OUTPATIENT
Start: 2021-06-19 | End: 2021-06-23 | Stop reason: HOSPADM

## 2021-06-18 RX ORDER — IPRATROPIUM BROMIDE 0.5 MG/2.5ML
0.5 SOLUTION RESPIRATORY (INHALATION)
Status: DISCONTINUED | OUTPATIENT
Start: 2021-06-18 | End: 2021-06-23 | Stop reason: HOSPADM

## 2021-06-18 RX ORDER — FLUTICASONE PROPIONATE 50 MCG
2 SPRAY, SUSPENSION (ML) NASAL DAILY
Status: DISCONTINUED | OUTPATIENT
Start: 2021-06-19 | End: 2021-06-23 | Stop reason: HOSPADM

## 2021-06-18 RX ORDER — GUAIFENESIN 100 MG/5ML
81 LIQUID (ML) ORAL DAILY
Status: DISCONTINUED | OUTPATIENT
Start: 2021-06-19 | End: 2021-06-23 | Stop reason: HOSPADM

## 2021-06-18 RX ORDER — GLIPIZIDE 5 MG/1
2.5 TABLET ORAL DAILY
Status: DISCONTINUED | OUTPATIENT
Start: 2021-06-19 | End: 2021-06-23 | Stop reason: HOSPADM

## 2021-06-18 RX ORDER — LABETALOL HYDROCHLORIDE 5 MG/ML
5 INJECTION, SOLUTION INTRAVENOUS
Status: DISCONTINUED | OUTPATIENT
Start: 2021-06-18 | End: 2021-06-23 | Stop reason: HOSPADM

## 2021-06-18 RX ORDER — ROSUVASTATIN CALCIUM 5 MG/1
10 TABLET, COATED ORAL
Status: DISCONTINUED | OUTPATIENT
Start: 2021-06-18 | End: 2021-06-23 | Stop reason: HOSPADM

## 2021-06-18 RX ORDER — MAGNESIUM SULFATE 100 %
4 CRYSTALS MISCELLANEOUS AS NEEDED
Status: DISCONTINUED | OUTPATIENT
Start: 2021-06-18 | End: 2021-06-23 | Stop reason: HOSPADM

## 2021-06-18 RX ORDER — HEPARIN SODIUM 5000 [USP'U]/ML
5000 INJECTION, SOLUTION INTRAVENOUS; SUBCUTANEOUS EVERY 8 HOURS
Status: DISCONTINUED | OUTPATIENT
Start: 2021-06-18 | End: 2021-06-23 | Stop reason: HOSPADM

## 2021-06-18 RX ORDER — ARFORMOTEROL TARTRATE 15 UG/2ML
15 SOLUTION RESPIRATORY (INHALATION)
Status: DISCONTINUED | OUTPATIENT
Start: 2021-06-18 | End: 2021-06-23 | Stop reason: HOSPADM

## 2021-06-18 RX ORDER — PREDNISONE 5 MG/1
15 TABLET ORAL
Status: DISCONTINUED | OUTPATIENT
Start: 2021-06-19 | End: 2021-06-23 | Stop reason: HOSPADM

## 2021-06-18 RX ORDER — DIAZEPAM 10 MG/2ML
5 INJECTION INTRAMUSCULAR ONCE
Status: DISCONTINUED | OUTPATIENT
Start: 2021-06-19 | End: 2021-06-19

## 2021-06-18 RX ADMIN — SODIUM CHLORIDE 500 ML: 9 INJECTION, SOLUTION INTRAVENOUS at 21:59

## 2021-06-18 RX ADMIN — ARFORMOTEROL TARTRATE 15 MCG: 15 SOLUTION RESPIRATORY (INHALATION) at 23:17

## 2021-06-18 RX ADMIN — IPRATROPIUM BROMIDE 0.5 MG: 0.5 SOLUTION RESPIRATORY (INHALATION) at 23:17

## 2021-06-18 RX ADMIN — ROSUVASTATIN CALCIUM 10 MG: 5 TABLET, COATED ORAL at 22:44

## 2021-06-18 RX ADMIN — HEPARIN SODIUM 5000 UNITS: 5000 INJECTION INTRAVENOUS; SUBCUTANEOUS at 22:46

## 2021-06-18 NOTE — ED NOTES
1645: Assumed care of patient from EMS at this time. Patient placed on monitor x 3. SR x 2. Call bell in reach. 1730: Patient placed on external cath system at this time. 1818: pt taken to CT  2100: Patient up to bedside commode with assist x 2. Returned to stretcher. Monitor x 3. SR x 2. Call bell in reach.

## 2021-06-18 NOTE — TELEPHONE ENCOUNTER
----- Message from Ross Cage sent at 6/18/2021  2:50 PM EDT -----  Regarding: Dr. Denzel Najera Message/Vendor Calls    Caller's first and last name: Chema Santos      Reason for call: needs call back      Callback required yes/no and why: yes, please      Best contact number(s): [phone]       Details to clarify the request: pt having hard time walking with a walker and she fell 12 days go and she is also confused.       Ross Cage

## 2021-06-18 NOTE — ED PROVIDER NOTES
EMERGENCY DEPARTMENT HISTORY AND PHYSICAL EXAM      Please note that this dictation was completed with the assistance of \"Dragon\", the computer voice recognition software. Quite often unanticipated grammatical, syntax, homophones, and other interpretive errors are inadvertently transcribed by the computer software. Please disregard these errors and any errors that have escaped final proofreading. Thank you. Patient Name: Jason Stokes  : 1931  MRN: [id number]  History of Presenting Illness     Chief Complaint   Patient presents with    Extremity Weakness     pt had a fall 2 weeks ago landing on her knees, did not hit her head. Since has been getting progressively weaker to today where she is unable to walk     History Provided By: Patient, EMS and family    HPI: Jason Stokes, 80 y.o. female with past medical history as documented below presents to the ED with c/o of worsening generalized weakness with worsening tremors and ataxia for the last 2 weeks. Pt does have hx of multiple falls at home. denies LOC or head injury. States she landed on both knees which has been hurting on her. Pt states she was started on gabapentin for pain which resulted in tremors shortly after. She was taken off the medication and has been placed on primidone which has worsened the tremors. Pt now unable to walk due to unsteady gait. .Pt denies any other alleviating or exacerbating factors. Additionally, pt specifically denies any recent fever, chills, headache, nausea, vomiting, abdominal pain, CP, SOB, lightheadedness, dizziness, numbness, lower extremity swelling, heart palpitations, urinary sxs, diarrhea, constipation, melena, hematochezia, cough, or congestion. There are no other complaints, changes or physical findings pertinent to the HPI at this time.     PCP: Chan Nicole MD    Past History   Past Medical History:  Past Medical History:   Diagnosis Date    Anemia 3/3/2010    Arrhythmia irregular heartbeat    Arthritis     CHF (congestive heart failure) ([facility] [address] 3/3/2010    Chronic obstructive pulmonary disease (HCC)     Chronic pain     back    Chronic sinusitis 3/3/2010    CPAP (continuous positive airway pressure) dependence     Diverticulosis     DM (diabetes mellitus) ([facility] [address] 3/3/2010    Dyslipidemia 3/3/2010    GERD (gastroesophageal reflux disease)     HTN (hypertension) 3/3/2010    Ill-defined condition     being evaluated py pulmonolgist for \"trouble breathing\"    S/P TKR (total knee replacement) 3/3/2010    Unspecified sleep apnea     doesn't wear CPAP since back has been hurting       Past Surgical History:  Past Surgical History:   Procedure Laterality Date    HX APPENDECTOMY      thinks it was taken with hysterectomy    HX GYN      \"tubes opened\"    HX HEENT      sinus surgery    HX HEMORRHOIDECTOMY      HX HYSTERECTOMY      HX KNEE REPLACEMENT  1996    both knees- at same time    HX LUMBAR LAMINECTOMY  1980s    HX MOHS PROCEDURES      left shoulder    HX ORTHOPAEDIC  1980    neck fusion    HX ORTHOPAEDIC  1999    right knee replaced for second time    HX ORTHOPAEDIC      lumbar fusion    HX OTHER SURGICAL      CORNELL BIOPSY BREAST STEREOTACTIC Left yrs ago    (-)    SC COLONOSCOPY FLX DX W/COLLJ SPEC WHEN PFRMD  [id number]    dr. Chika Bond (barium enema)       Family History:  Family History   Problem Relation Age of Onset    Diabetes Mother     Heart Disease Father     Diabetes Brother     Blindness Brother     Diabetes Sister     Heart Disease Sister     Heart Disease Brother     Heart Disease Brother     Asthma Brother     Malignant Hyperthermia Neg Hx     Pseudocholinesterase Deficiency Neg Hx     Delayed Awakening Neg Hx     Post-op Nausea/Vomiting Neg Hx     Emergence Delirium Neg Hx     Post-op Cognitive Dysfunction Neg Hx        Social History:  Social History     Tobacco Use    Smoking status: Former Smoker     Packs/day: 0.30     Years: 5.00 Pack years: 1.50     Quit date: 1960     Years since quittin.5    Smokeless tobacco: Never Used   Vaping Use    Vaping Use: Never used   Substance Use Topics    Alcohol use: No     Alcohol/week: 0.0 standard drinks    Drug use: No       Allergies: Allergies   Allergen Reactions    Gabapentin Other (comments)     Muscles twitching and jerking    Levaquin [Levofloxacin] Swelling    Lyrica [Pregabalin] Other (comments)     Muscle twitching and jerking    Percodan [Oxycodone Hcl-Oxycodone-Asa] Itching       Current Medications:  No current facility-administered medications on file prior to encounter. Current Outpatient Medications on File Prior to Encounter   Medication Sig Dispense Refill    HYDROcodone-acetaminophen (NORCO)  mg tablet Take 1 Tablet by mouth every six (6) hours as needed for Pain for up to 99 days. Max Daily Amount: 2 Tablets. 60 Tablet 0    primidone (MYSOLINE) 250 mg tablet Take 1 Tablet by mouth two (2) times a day. 180 Tablet 1    rosuvastatin (CRESTOR) 10 mg tablet TAKE 1 TABLET BY MOUTH EVERY NIGHT 90 Tablet 3    carvediloL (COREG) 25 mg tablet TAKE 1 TABLET BY MOUTH TWICE DAILY WITH MEALS 180 Tablet 1    furosemide (LASIX) 20 mg tablet TAKE 1 TABLET BY MOUTH EVERY MORNING AND 1 TABLET EVERY AFTERNOON BETWEEN 2 TO 4  Tablet 3    magnesium oxide (Chin) 500 mg tab Take  by mouth.  Take 2 tablets nightly      glipiZIDE SR (GLUCOTROL XL) 2.5 mg CR tablet TAKE 1 TABLET BY MOUTH DAILY 90 Tab 3    predniSONE (DELTASONE) 5 mg tablet TAKE 3 TABLETS BY MOUTH DAILY 90 Tab 5    DULoxetine (CYMBALTA) 60 mg capsule TAKE 1 CAPSULE BY MOUTH DAILY 90 Cap 3    diclofenac (VOLTAREN) 1 % gel APPLY EXTERNALLY TO THE AFFECTED AREA FOUR TIMES DAILY 300 g 3    Accu-Chek Fastclix Lancet Drum misc USE TO CHECK BLOOD SUGAR TWICE DAILY 300 Each 3    Accu-Chek Guide test strips strip USE TO TEST BLOOD SUGAR TWICE DAILY 300 Strip 3    Accu-Chek Guide Me Glucose Mtr misc USE TO CHECK BLOOD SUGAR TWICE DAILY 1 Each 0    BEVESPI AEROSPHERE 9-4.8 mcg HFA inhaler Take 2 puffs by inhalation daily      albuterol (PROVENTIL HFA, VENTOLIN HFA, PROAIR HFA) 90 mcg/actuation inhaler Take 2 Puffs by inhalation every four (4) hours as needed for Wheezing or Shortness of Breath. 1 Inhaler     guaiFENesin (MUCINEX) 1,200 mg Ta12 ER tablet Take 1,200 mg by mouth daily as needed.  MULTIVITAMIN PO Take 1 Tab by mouth daily.  Omeprazole delayed release (PRILOSEC D/R) 20 mg tablet Take 20 mg by mouth daily.  lisinopril (PRINIVIL, ZESTRIL) 2.5 mg tablet Take 2.5 mg by mouth daily.  fluticasone (FLONASE) 50 mcg/actuation nasal spray SHAKE LIQUID AND USE 2 SPRAYS IN EACH NOSTRIL EVERY DAY 1 Bottle 11    promethazine-dextromethorphan (PROMETHAZINE-DM) 6.25-15 mg/5 mL syrup TAKE ONE TEASPOONFUL BY MOUTH EVERY SIX HOURS AS NEEDED FOR COUGH 240 mL 1     Review of Systems   Review of Systems   Constitutional: Negative. Negative for chills and fever. HENT: Negative. Negative for congestion and sore throat. Eyes: Negative. Respiratory: Negative. Negative for cough, chest tightness, shortness of breath and wheezing. Cardiovascular: Negative. Negative for chest pain, palpitations and leg swelling. Gastrointestinal: Negative. Negative for abdominal distention, abdominal pain, blood in stool, constipation, diarrhea, nausea and vomiting. Endocrine: Negative. Genitourinary: Negative. Negative for dysuria, flank pain, frequency, hematuria and urgency. Musculoskeletal: Negative. Negative for arthralgias, back pain and myalgias. Skin: Negative. Negative for color change and rash. Neurological: Positive for tremors and weakness. Negative for dizziness, syncope, speech difficulty, light-headedness, numbness and headaches. Hematological: Negative. Psychiatric/Behavioral: Negative. Negative for confusion and self-injury. The patient is not nervous/anxious.     All other systems reviewed and are negative. Physical Exam   Physical Exam  Vitals and nursing note reviewed. Constitutional:       General: She is in acute distress. Appearance: She is well-developed. She is ill-appearing, toxic-appearing and diaphoretic. HENT:      Head: Normocephalic and atraumatic. Mouth/Throat:      Pharynx: No oropharyngeal exudate. Eyes:      Conjunctiva/sclera: Conjunctivae normal.   Cardiovascular:      Rate and Rhythm: Normal rate and regular rhythm. Heart sounds: Normal heart sounds. Pulmonary:      Effort: Pulmonary effort is normal. No respiratory distress. Breath sounds: Normal breath sounds. No wheezing or rales. Chest:      Chest wall: No tenderness. Abdominal:      General: Bowel sounds are normal. There is no distension. Palpations: Abdomen is soft. There is no mass. Tenderness: There is no abdominal tenderness. There is no guarding or rebound. Musculoskeletal:         General: Tenderness (TTP bilateral knees, no obvious deformity) present. Normal range of motion. Cervical back: Normal range of motion. Skin:     General: Skin is warm. Neurological:      Mental Status: She is alert and oriented to person, place, and time. Cranial Nerves: No cranial nerve deficit. Motor: Weakness present. No abnormal muscle tone. Coordination: Coordination abnormal.         Diagnostic Study Results     Labs -   I have personally reviewed and interpreted all available laboratory results.    Recent Results (from the past 24 hour(s))   CBC WITH AUTOMATED DIFF    Collection Time: 06/18/21  7:18 PM   Result Value Ref Range    WBC 4.4 3.6 - 11.0 K/uL    RBC 2.94 (L) 3.80 - 5.20 M/uL    HGB 9.8 (L) 11.5 - 16.0 g/dL    HCT 32.0 (L) 35.0 - 47.0 %    .8 (H) 80.0 - 99.0 FL    MCH 33.3 26.0 - 34.0 PG    MCHC 30.6 30.0 - 36.5 g/dL    RDW 15.4 (H) 11.5 - 14.5 %    PLATELET 187 (L) 435 - 400 K/uL    MPV 10.9 8.9 - 12.9 FL    NRBC 0.0 0 PER 100 WBC    ABSOLUTE NRBC 0.00 0.00 - 0.01 K/uL    NEUTROPHILS 60 32 - 75 %    LYMPHOCYTES 32 12 - 49 %    MONOCYTES 7 5 - 13 %    EOSINOPHILS 1 0 - 7 %    BASOPHILS 0 0 - 1 %    IMMATURE GRANULOCYTES 0 0.0 - 0.5 %    ABS. NEUTROPHILS 2.6 1.8 - 8.0 K/UL    ABS. LYMPHOCYTES 1.4 0.8 - 3.5 K/UL    ABS. MONOCYTES 0.3 0.0 - 1.0 K/UL    ABS. EOSINOPHILS 0.1 0.0 - 0.4 K/UL    ABS. BASOPHILS 0.0 0.0 - 0.1 K/UL    ABS. IMM. GRANS. 0.0 0.00 - 0.04 K/UL    DF AUTOMATED     METABOLIC PANEL, COMPREHENSIVE    Collection Time: 06/18/21  7:18 PM   Result Value Ref Range    Sodium 141 136 - 145 mmol/L    Potassium 4.9 3.5 - 5.1 mmol/L    Chloride 106 97 - 108 mmol/L    CO2 34 (H) 21 - 32 mmol/L    Anion gap 1 (L) 5 - 15 mmol/L    Glucose 82 65 - 100 mg/dL    BUN 30 (H) 6 - 20 MG/DL    Creatinine 1.85 (H) 0.55 - 1.02 MG/DL    BUN/Creatinine ratio 16 12 - 20      GFR est AA 31 (L) >60 ml/min/1.73m2    GFR est non-AA 26 (L) >60 ml/min/1.73m2    Calcium 8.8 8.5 - 10.1 MG/DL    Bilirubin, total 0.3 0.2 - 1.0 MG/DL    ALT (SGPT) 22 12 - 78 U/L    AST (SGOT) 20 15 - 37 U/L    Alk.  phosphatase 60 45 - 117 U/L    Protein, total 6.4 6.4 - 8.2 g/dL    Albumin 3.3 (L) 3.5 - 5.0 g/dL    Globulin 3.1 2.0 - 4.0 g/dL    A-G Ratio 1.1 1.1 - 2.2     TROPONIN I    Collection Time: 06/18/21  7:45 PM   Result Value Ref Range    Troponin-I, Qt. <0.05 <0.05 ng/mL   URINALYSIS W/ REFLEX CULTURE    Collection Time: 06/18/21  8:53 PM    Specimen: Urine   Result Value Ref Range    Color YELLOW/STRAW      Appearance CLEAR CLEAR      Specific gravity 1.019 1.003 - 1.030      pH (UA) 7.0 5.0 - 8.0      Protein Negative NEG mg/dL    Glucose Negative NEG mg/dL    Ketone Negative NEG mg/dL    Bilirubin Negative NEG      Blood Negative NEG      Urobilinogen 0.2 0.2 - 1.0 EU/dL    Nitrites Negative NEG      Leukocyte Esterase Negative NEG      WBC 0-4 0 - 4 /hpf    RBC 0-5 0 - 5 /hpf    Epithelial cells FEW FEW /lpf    Bacteria Negative NEG /hpf    UA:UC IF INDICATED CULTURE NOT INDICATED BY UA RESULT CNI      Hyaline cast 0-2 0 - 5 /lpf   SAMPLES BEING HELD    Collection Time: 06/18/21  9:00 PM   Result Value Ref Range    SAMPLES BEING HELD  PST     COMMENT        Add-on orders for these samples will be processed based on acceptable specimen integrity and analyte stability, which may vary by analyte. Radiologic Studies -   I have personally reviewed and interpreted all available imaging studies and agree with radiology interpretation and report. CT HEAD WO CONT   Final Result   No evidence of acute intracranial process. XR CHEST PORT   Final Result   Unchanged mild cardiomegaly         XR KNEES BI AP LAT   Final Result   Status post bilateral total knee arthroplasties. No evidence of   acute process. MRI BRAIN WO CONT    (Results Pending)     CT Results  (Last 48 hours)               06/18/21 1824  CT HEAD WO CONT Final result    Impression:  No evidence of acute intracranial process. Narrative:  EXAM: CT HEAD WO CONT       INDICATION: acute weakness       COMPARISON: CT 10/18/2019. CONTRAST: None. TECHNIQUE: Unenhanced CT of the head was performed using 5 mm images. Brain and   bone windows were generated. Coronal and sagittal reformats. CT dose reduction   was achieved through use of a standardized protocol tailored for this   examination and automatic exposure control for dose modulation. FINDINGS:   The ventricles and sulci are normal in size, shape and configuration. . There is   no significant white matter disease. There is no intracranial hemorrhage,   extra-axial collection, or mass effect. The basilar cisterns are open. No CT   evidence of acute infarct. The bone windows demonstrate no abnormalities. Postsurgical changes are seen in   the maxillary sinuses. There is partial opacification of the right maxillary   sinus and ethmoid air cells and left sphenoid sinus. .               CXR Results  (Last 48 hours)               06/18/21 1803  XR CHEST PORT Final result    Impression:  Unchanged mild cardiomegaly           Narrative:  EXAM: XR CHEST PORT       HISTORY: weakness. COMPARISON: None       FINDINGS: Single view(s) of the chest. The lungs are well inflated. No focal   consolidation, pleural effusion, or pneumothorax. The heart is mildly enlarged,   but unchanged. The visualized osseous structures are unremarkable. Medical Decision Making   I reviewed the vital signs, available nursing notes, past medical history, past surgical history, family history and social history. Vital Signs-Reviewed the patient's vital signs. Patient Vitals for the past 24 hrs:   Temp Pulse Resp BP SpO2   06/18/21 2157 98.3 °F (36.8 °C) 60 16 (!) 153/73    06/18/21 2108 98.1 °F (36.7 °C) 71  (!) 147/71 96 %   06/18/21 1655     98 %   06/18/21 1624 98 °F (36.7 °C) (!) 51 15 127/70 100 %       Pulse Oximetry Analysis - 100% on RA    Cardiac Monitor:   Rate: 71 bpm  The cardiac monitor revealed the following rhythm as interpreted by me: Normal Sinus Rhythm     The cardiac monitor was ordered secondary to the patient's history of weakness and to monitor the patient for dysrhythmia    ED EKG interpretation:  Rhythm: normal sinus rhythm; and regular . Rate (approx.): 63; Axis: left axis deviation; P wave: normal; QRS interval: normal ; ST/T wave: normal; Other findings: incomplete LBBB . This EKG was interpreted by Mary Kate Sarkar M.D. Records Reviewed: Nursing Notes, Old Medical Records, Previous electrocardiograms, Previous Radiology Studies and Previous Laboratory Studies    Provider Notes (Medical Decision Making):   Patient presenting with generalized fatigue/weakness, tremors and inability to walk. DDx: infection, anemia, electrolyte anomoly (hypo or hyperkalemia, hypomagnesemia), hypothyroid, dehydration, depression, CA, ACS. Will obtain EKG, UA, labwork for any urgent/emergent pathology.  Will reassess and monitor while in ED. ED Course:   I am the first provider for this patient's ED visit today. Initial assessment performed. I discussed presenting problems, concerns and my formulated plan for today's visit with the patient and any available family members at bedside. I encouraged them to ask questions as they arise throughout the visit. I reviewed our electronic medical record system for any past medical records that were available that may contribute to the patient's current condition, the nursing notes and vital signs from today's visit. ED Orders Placed :  Orders Placed This Encounter    XR CHEST PORT    XR KNEES BI AP LAT    CT HEAD WO CONT    MRI BRAIN WO CONT    URINALYSIS W/ REFLEX CULTURE    CBC WITH AUTOMATED DIFF    METABOLIC PANEL, COMPREHENSIVE    Hold Sample    TROPONIN I    LIPID PANEL    HEMOGLOBIN A1C    ADULT DIET Regular; 4 carb choices (60 gm/meal); Low Fat/Low Chol/High Fiber/2 gm Na    CARDIAC/RESPIRATORY MONITORING    STRAIGHT CATHETER (NURSING) ONE TIME STAT    NOTIFY PROVIDER: SPECIFY Notify provider on pt's arrival to floor ONE TIME STAT    INTAKE AND OUTPUT    VITAL SIGNS PER UNIT ROUTINE    BEDREST WITH BATHROOM PRIVILEGES    NURSING-MISCELLANEOUS: Initiate Stroke / TIA Monitoring per facility guidelines CONTINUOUS    VITAL SIGNS PER UNIT ROUTINE - [address] / TIA    NOTIFY PROVIDER: VITAL SIGNS CHANGES    NEURO/VASCULAR CHECKS PER UNIT ROUTINE    INTAKE AND OUTPUT PER UNIT ROUTINE    MEASURE HEIGHT    WEIGH PATIENT    COMPLETE DYSPHAGIA SCREEN - PRIOR TO PROVIDING PATIENT ANY ORAL INTAKE    NURSING-MISCELLANEOUS: Provide patient with stroke education binder.  ONE TIME    POC GLUCOSE    FULL CODE    IP CONSULT TO NEUROLOGY    OT EVAL AND TREAT    PT EVAL AND TREAT    SLP EVAL AND TREAT    EKG 12 LEAD INITIAL    INSERT PERIPHERAL IV ONE TIME STAT    sodium chloride 0.9 % bolus infusion 500 mL  DISCONTD: glycopyrrolate-formoterol (BEVESPI AEROSPHERE) 9 mcg-4.8 mcg inhaler    carvediloL (COREG) tablet 12.5 mg    DULoxetine (CYMBALTA) capsule 60 mg    fluticasone propionate (FLONASE) 50 mcg/actuation nasal spray 2 Spray    furosemide (LASIX) tablet 20 mg    glipiZIDE (GLUCOTROL) tablet 2.5 mg    insulin lispro (HUMALOG) injection    glucose chewable tablet 16 g    dextrose (D50W) injection syrg 12.5-25 g    glucagon (GLUCAGEN) injection 1 mg    predniSONE (DELTASONE) tablet 15 mg    rosuvastatin (CRESTOR) tablet 10 mg    aspirin chewable tablet 81 mg    labetaloL (NORMODYNE;TRANDATE) injection 5 mg    heparin (porcine) injection 5,000 Units    AND Linked Order Group     arformoteroL (BROVANA) neb solution 15 mcg     ipratropium (ATROVENT) 0.02 % nebulizer solution 0.5 mg    INITIAL PHYSICIAN ORDER: INPATIENT Neuro/Stroke; No; 1.  Patient Failed outpatient treatment (further clarification in H&P documentation)    IP CONSULT TO CASE MANAGEMENT       ED Medications Administered:  Medications   sodium chloride 0.9 % bolus infusion 500 mL (500 mL IntraVENous New Bag 6/18/21 2687)   carvediloL (COREG) tablet 12.5 mg (has no administration in time range)   DULoxetine (CYMBALTA) capsule 60 mg (has no administration in time range)   fluticasone propionate (FLONASE) 50 mcg/actuation nasal spray 2 Spray (has no administration in time range)   furosemide (LASIX) tablet 20 mg (has no administration in time range)   glipiZIDE (GLUCOTROL) tablet 2.5 mg (has no administration in time range)   insulin lispro (HUMALOG) injection (has no administration in time range)   glucose chewable tablet 16 g (has no administration in time range)   dextrose (D50W) injection syrg 12.5-25 g (has no administration in time range)   glucagon (GLUCAGEN) injection 1 mg (has no administration in time range)   predniSONE (DELTASONE) tablet 15 mg (has no administration in time range)   rosuvastatin (CRESTOR) tablet 10 mg (has no administration in time range)   aspirin chewable tablet 81 mg (has no administration in time range)   labetaloL (NORMODYNE;TRANDATE) injection 5 mg (has no administration in time range)   heparin (porcine) injection 5,000 Units (has no administration in time range)   arformoteroL (BROVANA) neb solution 15 mcg (has no administration in time range)     And   ipratropium (ATROVENT) 0.02 % nebulizer solution 0.5 mg (has no administration in time range)         Progress Note:  Stat CT head negative, will need MRI brain, PT/OT evaluation    Progress Note:  Trauma w/u including CXR and knee x-rays unremarkable. Family expressed concern if discharged as pt unable to walk without full assistance for which her  cannot provide, will need PT/OT evaluation, Neuro evaluation, start aspirin    Consult Note:  Philip Chaparro MD spoke with Dr. Sukh Umanzor  Specialty: Hospitalist  Discussed pt's hx, disposition, and available diagnostic and imaging results. Reviewed care plans. Agree with management and plan thus far. Consultant will evaluate pt. CRITICAL CARE NOTE :  IMPENDING DETERIORATION -Cardiovascular, CNS, Metabolic and Renal  ASSOCIATED RISK FACTORS - Hypotension, Metabolic changes, Dehydration, Vascular Compromise and CNS Decompensation  MANAGEMENT- Bedside Assessment and Supervision of Care  INTERPRETATION -  Xrays, CT Scan, ECG, Blood Pressure and Cardiac Output Measures   INTERVENTIONS - hemodynamic mngmt and Metobolic interventions  CASE REVIEW - Hospitalist/Intensivist, Nursing and Family  TREATMENT RESPONSE -Improved  PERFORMED BY - Self    NOTES   :  I personally spent 35 minutes of critical care time with this patient. This is time spent at this critically ill patient's bedside actively involved in patient care as well as the coordination of care and discussions with the patient's family.  This includes time involved in lab review, consultations with specialist, family decision-making, bedside attention and documentation. During this entire length of time I was immediately available to the patient. This does not include time spent on separately reported billable procedures. Critical Care: The reason for providing this level of medical care for this critically-ill patient was due to a critical illness that impaired one or more vital organ systems, such that there was a high probability of imminent or life-threatening deterioration in the patient's condition. This care involved the highest level of preparedness to intervene urgently. This care involved high complexity decision making to assess, manipulate, and support vital system functions, to treat this degree of vital organ system failure, and to prevent further life threatening deterioration of the patients condition requiring frequent assessments and interventions. Odilia Duvall MD    Disposition:   ADMIT  Given the patient's current clinical presentation, I have a high level of concern for decompensation if discharged from the emergency department. Patient is being admitted to the hospital.  The results of their tests and reasons for their admission have been discussed with them and/or available family. They convey agreement and understanding for the need to be admitted and for their admission diagnosis. Consultation has been made with the inpatient physician specialist for hospitalization. Diagnosis   Clinical Impression:  1. Senile debility    2. Debility    3. Essential tremor    4. Mixed hyperlipidemia    5. Ataxia    6. Acute pain of both knees    7. Chronic pain syndrome    8. Stroke-like symptoms  9. Accerated hypertension    Attestation:  Guerita Morris MD, am the attending of record for this patient. I personally performed the services described in this documentation on this date, 6/18/2021 for patient, Climmie Route. I have reviewed the chart and verified that the record is accurate and complete.

## 2021-06-19 ENCOUNTER — APPOINTMENT (OUTPATIENT)
Dept: MRI IMAGING | Age: 86
DRG: 092 | End: 2021-06-19
Attending: INTERNAL MEDICINE
Payer: MEDICARE

## 2021-06-19 LAB
CHOLEST SERPL-MCNC: 218 MG/DL
EST. AVERAGE GLUCOSE BLD GHB EST-MCNC: 137 MG/DL
GLUCOSE BLD STRIP.AUTO-MCNC: 107 MG/DL (ref 65–117)
GLUCOSE BLD STRIP.AUTO-MCNC: 141 MG/DL (ref 65–117)
GLUCOSE BLD STRIP.AUTO-MCNC: 169 MG/DL (ref 65–117)
GLUCOSE BLD STRIP.AUTO-MCNC: 83 MG/DL (ref 65–117)
HBA1C MFR BLD: 6.4 % (ref 4–5.6)
HDLC SERPL-MCNC: 91 MG/DL
HDLC SERPL: 2.4 {RATIO} (ref 0–5)
LDLC SERPL CALC-MCNC: 105.4 MG/DL (ref 0–100)
SERVICE CMNT-IMP: ABNORMAL
SERVICE CMNT-IMP: ABNORMAL
SERVICE CMNT-IMP: NORMAL
SERVICE CMNT-IMP: NORMAL
TRIGL SERPL-MCNC: 108 MG/DL (ref ?–150)
VLDLC SERPL CALC-MCNC: 21.6 MG/DL

## 2021-06-19 PROCEDURE — 74011000250 HC RX REV CODE- 250: Performed by: GENERAL ACUTE CARE HOSPITAL

## 2021-06-19 PROCEDURE — 65660000000 HC RM CCU STEPDOWN

## 2021-06-19 PROCEDURE — 74011250637 HC RX REV CODE- 250/637: Performed by: INTERNAL MEDICINE

## 2021-06-19 PROCEDURE — 74011250637 HC RX REV CODE- 250/637: Performed by: PSYCHIATRY & NEUROLOGY

## 2021-06-19 PROCEDURE — 97161 PT EVAL LOW COMPLEX 20 MIN: CPT

## 2021-06-19 PROCEDURE — 82962 GLUCOSE BLOOD TEST: CPT

## 2021-06-19 PROCEDURE — 74011636637 HC RX REV CODE- 636/637: Performed by: INTERNAL MEDICINE

## 2021-06-19 PROCEDURE — 97535 SELF CARE MNGMENT TRAINING: CPT | Performed by: OCCUPATIONAL THERAPIST

## 2021-06-19 PROCEDURE — 97530 THERAPEUTIC ACTIVITIES: CPT

## 2021-06-19 PROCEDURE — 36415 COLL VENOUS BLD VENIPUNCTURE: CPT

## 2021-06-19 PROCEDURE — 97116 GAIT TRAINING THERAPY: CPT

## 2021-06-19 PROCEDURE — 74011250636 HC RX REV CODE- 250/636: Performed by: INTERNAL MEDICINE

## 2021-06-19 PROCEDURE — 83036 HEMOGLOBIN GLYCOSYLATED A1C: CPT

## 2021-06-19 PROCEDURE — 97165 OT EVAL LOW COMPLEX 30 MIN: CPT | Performed by: OCCUPATIONAL THERAPIST

## 2021-06-19 PROCEDURE — 94640 AIRWAY INHALATION TREATMENT: CPT

## 2021-06-19 PROCEDURE — 99223 1ST HOSP IP/OBS HIGH 75: CPT | Performed by: PSYCHIATRY & NEUROLOGY

## 2021-06-19 PROCEDURE — 80061 LIPID PANEL: CPT

## 2021-06-19 RX ORDER — PRIMIDONE 50 MG/1
150 TABLET ORAL 2 TIMES DAILY
Status: DISCONTINUED | OUTPATIENT
Start: 2021-06-19 | End: 2021-06-23 | Stop reason: HOSPADM

## 2021-06-19 RX ORDER — DIAZEPAM 10 MG/2ML
5 INJECTION INTRAMUSCULAR
Status: COMPLETED | OUTPATIENT
Start: 2021-06-19 | End: 2021-06-20

## 2021-06-19 RX ADMIN — IPRATROPIUM BROMIDE 0.5 MG: 0.5 SOLUTION RESPIRATORY (INHALATION) at 07:52

## 2021-06-19 RX ADMIN — ARFORMOTEROL TARTRATE 15 MCG: 15 SOLUTION RESPIRATORY (INHALATION) at 20:18

## 2021-06-19 RX ADMIN — CARVEDILOL 12.5 MG: 12.5 TABLET, FILM COATED ORAL at 08:20

## 2021-06-19 RX ADMIN — PRIMIDONE 150 MG: 50 TABLET ORAL at 17:23

## 2021-06-19 RX ADMIN — IPRATROPIUM BROMIDE 0.5 MG: 0.5 SOLUTION RESPIRATORY (INHALATION) at 20:18

## 2021-06-19 RX ADMIN — GLIPIZIDE 2.5 MG: 5 TABLET ORAL at 08:20

## 2021-06-19 RX ADMIN — FUROSEMIDE 20 MG: 20 TABLET ORAL at 08:20

## 2021-06-19 RX ADMIN — ROSUVASTATIN CALCIUM 10 MG: 5 TABLET, COATED ORAL at 21:58

## 2021-06-19 RX ADMIN — HEPARIN SODIUM 5000 UNITS: 5000 INJECTION INTRAVENOUS; SUBCUTANEOUS at 03:30

## 2021-06-19 RX ADMIN — ASPIRIN 81 MG: 81 TABLET, CHEWABLE ORAL at 08:20

## 2021-06-19 RX ADMIN — DULOXETINE HYDROCHLORIDE 60 MG: 30 CAPSULE, DELAYED RELEASE ORAL at 08:20

## 2021-06-19 RX ADMIN — FLUTICASONE PROPIONATE 2 SPRAY: 50 SPRAY, METERED NASAL at 08:50

## 2021-06-19 RX ADMIN — HEPARIN SODIUM 5000 UNITS: 5000 INJECTION INTRAVENOUS; SUBCUTANEOUS at 21:58

## 2021-06-19 RX ADMIN — HEPARIN SODIUM 5000 UNITS: 5000 INJECTION INTRAVENOUS; SUBCUTANEOUS at 11:59

## 2021-06-19 RX ADMIN — ARFORMOTEROL TARTRATE 15 MCG: 15 SOLUTION RESPIRATORY (INHALATION) at 07:52

## 2021-06-19 RX ADMIN — PREDNISONE 15 MG: 5 TABLET ORAL at 08:20

## 2021-06-19 NOTE — PROGRESS NOTES

## 2021-06-19 NOTE — PROGRESS NOTES
Problem: Self Care Deficits Care Plan (Adult)  Goal: *Acute Goals and Plan of Care (Insert Text)  Description:   FUNCTIONAL STATUS PRIOR TO ADMISSION: Pt lives with her  who assists pt with lower body adls and perform all IADLs.  reports pt has slowed down over the past 6 months. Pt has had one fall at the beginning of May. All tasks take pt additional time to perform. She has tremors, noted to be mostly intention tremors. Pt uses a RW. There is a stair lift at home--they live in a tri level house    HOME SUPPORT: The patient lived with her . Granddaughter also helps. Occupational Therapy Goals  Initiated 6/19/2021  1. Patient will perform grooming in standing  with supervision/set-up within 7 day(s). 2.  Patient will perform  bathing with minimal assistance/contact guard assist within 7 day(s). 3.  Patient will perform lower body dressing, using adaptive aids prn with minimal assistance/contact guard assist within 7 day(s). 4.  Patient will perform toilet transfers with supervision/set-up within 7 day(s). 5.  Patient will perform all aspects of toileting with supervision/set-up within 7 day(s). 6.  Patient will participate in upper extremity therapeutic exercise/activities with supervision/set-up for 10 minutes within 7 day(s). 7.  Patient will perform standing adls for at least 8 minutes  within 7 day(s). 8.  Patient will demonstrate safe technique during all functional mobility activities within 7 days. Outcome: Not Met   OCCUPATIONAL THERAPY EVALUATION  Patient: Margret Avelar (38 y.o. female)  Date: 6/19/2021  Primary Diagnosis: General weakness [R53.1]  Tremors of nervous system [R25.1]  Ataxia [R27.0]  Fall at home [W19. Blu Mata, Y92.009]        Precautions:   Fall    ASSESSMENT  Based on the objective data described below, the patient presents with mild ptosis B eyes (? Baseline), generalized weakness, mildly decreased coordination, intention tremors noted this date, and decreased endurance impairing adls and functional mobility. Pt has assistance from her  at baseline for lower body adls and IADLs. She is functioning below her baseline and has had a recent fall at home. Pt will benefit from acute OT services. Will determine best discharge plan based on pt's progress. Current Level of Function Impacting Discharge (ADLs/self-care): up to maximal/total A for self care. Min/Mod A for functional mobility. Tolerated short distance only. Functional Outcome Measure: The patient scored Total: 40/100 on the Barthel Index outcome measure which is indicative of 60% impaired ability to care for basic self needs/dependency on others; inferred dependency on others for instrumental ADLs. Other factors to consider for discharge:  reports 6 month decline     Patient will benefit from skilled therapy intervention to address the above noted impairments. PLAN :  Recommendations and Planned Interventions: self care training, functional mobility training, therapeutic exercise, balance training, visual/perceptual training, therapeutic activities, endurance activities, neuromuscular re-education, patient education, home safety training, and family training/education    Frequency/Duration: Patient will be followed by occupational therapy 4 times a week to address goals. Recommendation for discharge: (in order for the patient to meet his/her long term goals)  To be determined: will need (and has at baseline) 24 hour assistance. [facility] vs. Rehab placement    This discharge recommendation:  Has not yet been discussed the attending provider and/or case management    IF patient discharges home will need the following DME: tbd       SUBJECTIVE:   Patient stated no, I can't see that .   (able to see the large print only on board in room)    OBJECTIVE DATA SUMMARY:   HISTORY:   Past Medical History:   Diagnosis Date    Anemia 3/3/2010    Arrhythmia     irregular heartbeat    Arthritis     CHF (congestive heart failure) ([facility] [address] 3/3/2010    Chronic obstructive pulmonary disease (HCC)     Chronic pain     back    Chronic sinusitis 3/3/2010    CPAP (continuous positive airway pressure) dependence     Diverticulosis     DM (diabetes mellitus) ([facility] [address] 3/3/2010    Dyslipidemia 3/3/2010    GERD (gastroesophageal reflux disease)     HTN (hypertension) 3/3/2010    Ill-defined condition     being evaluated py pulmonolgist for \"trouble breathing\"    S/P TKR (total knee replacement) 3/3/2010    Unspecified sleep apnea     doesn't wear CPAP since back has been hurting     Past Surgical History:   Procedure Laterality Date    HX APPENDECTOMY      thinks it was taken with hysterectomy    HX GYN      \"tubes opened\"    HX HEENT      sinus surgery    HX HEMORRHOIDECTOMY      HX HYSTERECTOMY      HX KNEE REPLACEMENT  1996    both knees- at same time    HX LUMBAR LAMINECTOMY  1980s    HX MOHS PROCEDURES      left shoulder    HX ORTHOPAEDIC  1980    neck fusion    [address]    right knee replaced for second time    HX ORTHOPAEDIC      lumbar fusion    HX OTHER SURGICAL      CORNELL BIOPSY BREAST STEREOTACTIC Left yrs ago    (-)    OR COLONOSCOPY FLX DX W/COLLJ SPEC WHEN PFRMD  [id number]    dr. Lalo Melendez (barium enema)       Expanded or extensive additional review of patient history:     Home Situation  Home Environment: Private residence  # Steps to Enter: 5  Rails to Enter: Yes  Hand Rails : Bilateral (can only hold one)  One/Two Story Residence: Other (Comment) (tri level)  Lift Chair Available: Yes  Living Alone: No  Support Systems: Family member(s), Spouse/Significant Other/Partner (one other help)  Current DME Used/Available at Home: Grab bars, Lift chair, Walker, rollator, Walker, rolling (tall toilet  and has chair lift)  Tub or Shower Type: Shower    Hand dominance: Right    EXAMINATION OF PERFORMANCE DEFICITS:  Cognitive/Behavioral Status:  Neurologic State: Alert;Drowsy  Orientation Level: Oriented X4  Cognition: Follows commands;Memory loss  Perception: Appears intact  Perseveration: No perseveration noted  Safety/Judgement: Decreased awareness of need for safety; Fall prevention    Skin: generally intact    Edema: none observed    Hearing: Auditory  Auditory Impairment: Hard of hearing, left side, Hearing aid(s) (hearing aid at home)  Hearing Aids/Status: At home    Vision/Perceptual:         Saccades: Within Defined Limits         Visual Fields:  (intact peripheral)  Diplopia: No    Acuity: Impaired near vision; Impaired far vision    Corrective Lenses: Glasses    Range of Motion:  BUEs:    AROM: Generally decreased, functional                         Strength:  BUEs:  Strength: Generally decreased, functional      Generalized weakness overall;           Coordination:  Coordination: Generally decreased, functional  Fine Motor Skills-Upper: Left Intact; Right Intact    Gross Motor Skills-Upper: Left Intact; Right Intact (mildly decreased coordination )    Tone & Sensation:    Tone: Normal  Sensation: Intact                      Balance:  Sitting: Intact  Standing: Impaired  Standing - Static: Fair;Constant support  Standing - Dynamic : Poor;Constant support    Functional Mobility and Transfers for ADLs:  Bed Mobility:  Rolling: Contact guard assistance  Supine to Sit: Contact guard assistance;Minimum assistance  Sit to Supine: Contact guard assistance  Scooting: Minimum assistance    Transfers:  Sit to Stand: Moderate assistance; Additional time  Stand to Sit: Moderate assistance  Bed to Chair: Minimum assistance  Bathroom Mobility: Minimum assistance  Toilet Transfer : Minimum assistance; Moderate assistance (multiple cues for safety)  Assistive Device : Walker    ADL Assessment:  Feeding: Minimum assistance (pt and family report tremors)    Oral Facial Hygiene/Grooming: Minimum assistance    Bathing: Maximum assistance    Upper Body Dressing: Moderate assistance    Lower Body Dressing: Total assistance ( assists pt at [facility])    Toileting: Minimum assistance                ADL Intervention and task modifications:   Pt was able to stand with moderate assistance using the RW for support, ambulated to [facility] with min/mod A X1 for toilet transfer. Performed hygiene with supervision. O2 sats were WNL-98% post activity, but pt appeared SOB, HR in 60s. She needed to rest and did not tolerate standing at sink for hand washing. Provided . Pt was able to ambulate and return to the chair, her lunch had arrived. Needed safety cues throughout mobility and transfers. Cognitive Retraining  Safety/Judgement: Decreased awareness of need for safety; Fall prevention      Functional Measure:  Barthel Index:    Bathin  Bladder: 5  Bowels: 10  Groomin  Dressin  Feedin  Mobility: 0  Stairs: 0  Toilet Use: 5  Transfer (Bed to Chair and Back): 5  Total: 40/100        The Barthel ADL Index: Guidelines  1. The index should be used as a record of what a patient does, not as a record of what a patient could do. 2. The main aim is to establish degree of independence from any help, physical or verbal, however minor and for whatever reason. 3. The need for supervision renders the patient not independent. 4. A patient's performance should be established using the best available evidence. Asking the patient, friends/relatives and nurses are the usual sources, but direct observation and common sense are also important. However direct testing is not needed. 5. Usually the patient's performance over the preceding 24-48 hours is important, but occasionally longer periods will be relevant. 6. Middle categories imply that the patient supplies over 50 per cent of the effort. 7. Use of aids to be independent is allowed. Steven Miner., Barthel, D.W. ([id number]). Functional evaluation: the Barthel Index.  [address] [facility] ([address], J.J.M.F, Alvin Sylvester., Anyi Burnham., Elizabeth Abdullahi. (1999). Measuring the change indisability after inpatient rehabilitation; comparison of the responsiveness of the Barthel Index and Functional Angelina Measure. Journal of Neurology, Neurosurgery, and Psychiatry, 66(4), 677-278. SEVERIANO Hawkins, FAUSTO Moreno, & Dawn Merchant M.A. (2004.) Assessment of post-stroke quality of life in cost-effectiveness studies: The usefulness of the Barthel Index and the EuroQoL-5D. Quality of Life Research, 13, 427-43     Fugl-Swanson Assessment of Motor Recovery after Stroke:     Reflex Activity  Flexors/Biceps/Fingers: Can be elicited  Extensors/Triceps: Can be elicited  Reflex Subtotal: 4    Volitional Movement Within Synergies  Shoulder Retraction: Full  Shoulder Elevation: Full  Shoulder Abduction (90 degrees): Full  Shoulder External Rotation: Full  Elbow Flexion: Full  Forearm Supination: Full  Shoulder Adduction/Internal Rotation: Full  Elbow Extension: Full  Forearm Pronation: Full  Subtotal: 18    Volitional Movement Mixing Synergies  Hand to Lumbar Spine: Full  Shoulder Flexion (0-90 degrees): Full  Pronation-Supination: Full  Subtotal: 6    Volitional Movement With Little or No Synergy  Shoulder Abduction (0-90 degrees): Full  Shoulder Flexion ( degrees): Full  Pronation/Supination: Full  Subtotal : 6    Normal Reflex Activity  Biceps, Triceps, Finger Flexors:  Full  Subtotal : 2    Upper Extremity Total   Upper Extremity Total: 36    Wrist  Stability at 15 Degree Dorsiflexion: Full  Repeated Dorsiflexion/ Volar Flexion: Full  Stability at 15 Degree Dorsiflexion: Full  Repeated Dorsiflexion/ Volar Flexion: Full  Circumduction: Full  Wrist Total: 10    Hand  Mass Flexion: Full  Mass Extension: Full  Grasp A: Full  Grasp B: Full  Grasp C: Full  Grasp D: Full  Grasp E: Full  Hand Total: 14    Coordination/Speed  Tremor: Marked (qwith intention)  Dysmetria: Slight  Time: 2-5s  Coordination/Speed Total : 2    Total A-D  Total A-D (Motor Function): 62/66         This is a reliable/valid measure of arm function after a neurological event. It has established value to characterize functional status and for measuring spontaneous and therapy-induced recovery; tests proximal and distal motor functions. Fugl-Swanson Assessment - UE scores recorded between five and 30 days post neurologic event can be used to predict UE recovery at six months post neurologic event. Severe = 0-21 points   Moderately Severe = 22-33 points   Moderate = 34-47 points   Mild = 48-66 points  FAIZAN Fox, DIANE Dillon, & JARROD Sales (1992). Measurement of motor recovery after stroke: Outcome assessment and sample size requirements. Stroke, 23, pp. 4602-8522.   --------------------------------------------------------------------------------------------------------------------------------------------------------------------  MCID:  Stroke:   Barb Garrett et al, 2001; n = 171; mean age 79 (6) years; assessed within 16 (12) days of stroke, Acute Stroke)  FMA Motor Scores from Admission to Discharge   10 point increase in FMA Upper Extremity = 1.5 change in discharge FIM   10 point increase in FMA Lower Extremity = 1.9 change in discharge FIM  MDC:   Stroke:   Antione Jarrell et al, 2008, n = 14, mean age = 59.9 (14.6) years, assessed on average 14 (6.5) months post stroke, Chronic Stroke)   FMA = 5.2 points for the Upper Extremity portion of the assessment        Occupational Therapy Evaluation Charge Determination   History Examination Decision-Making   LOW Complexity : Brief history review  MEDIUM Complexity : 3-5 performance deficits relating to physical, cognitive , or psychosocial skils that result in activity limitations and / or participation restrictions MEDIUM Complexity : Patient may present with comorbidities that affect occupational performnce.  Miniml to moderate modification of tasks or assistance (eg, physical or verbal ) with assesment(s) is necessary to enable patient to complete evaluation       Based on the above components, the patient evaluation is determined to be of the following complexity level: LOW   Pain Rating:  No complaints of pain    Activity Tolerance:   Poor, SpO2 stable on RA, requires rest breaks, and observed SOB with activity    After treatment patient left in no apparent distress:    Sitting in chair, Call bell within reach, Bed / chair alarm activated, and Caregiver / family present    COMMUNICATION/EDUCATION:   The patients plan of care was discussed with: Physical therapist and Registered nurse. Patient/family have participated as able in goal setting and plan of care. This patients plan of care is appropriate for delegation to [facility].     Thank you for this referral.  Joe Ivey OTR/L  Time Calculation: 35 mins

## 2021-06-19 NOTE — PROGRESS NOTES
Problem: Mobility Impaired (Adult and Pediatric)  Goal: *Acute Goals and Plan of Care (Insert Text)  Description: FUNCTIONAL STATUS PRIOR TO ADMISSION: Patient is poor historian, reporting she was indep PLOF. Spouse clarifies that patient requires assist depending on the day. ADLs can take patient upwards of 2 hours to complete, spouse will assist to speed the process up and ensure safety. Uses lift chair to get to bedroom on 2nd floor. Ambulates short distances with cane or RW (depending on day). Spouse reports physical decline over the past month. HOME SUPPORT PRIOR TO ADMISSION: The patient lived with spouse who provides [de-identified] of caregiving, granddaughter is an RN and provides support. Physical Therapy Goals  Initiated 6/19/2021  1. Patient will move from supine to sit and sit to supine , scoot up and down, and roll side to side in bed with modified independence within 7 day(s). 2.  Patient will transfer from bed to chair and chair to bed with supervision/set-up using the least restrictive device within 7 day(s). 3.  Patient will perform sit to stand with supervision/set-up within 7 day(s). 4.  Patient will ambulate with supervision/set-up for 100 feet with the least restrictive device within 7 day(s). Outcome: Not Met   PHYSICAL THERAPY EVALUATION  Patient: Nicole Carrasco (60 y.o. female)  Date: 6/19/2021  Primary Diagnosis: General weakness [R53.1]  Tremors of nervous system [R25.1]  Ataxia [R27.0]  Fall at home [W19. Tracy De La Rosa, Y92.009]        Precautions:  Fall    ASSESSMENT  Based on the objective data described below, the patient presents with generalized weakness, decreased activity tolerance, impaired transfers, impaired standing balance, impaired gait mechanics, high risk for falling, and decreased overall independence following admission after ground level fall at home, CVA workup.   Patient with baseline tremors though only noted during strength & ROM assessment, intermittent delayed motor planning/following commands noted. Patient appears to be poor historian and spouse clarifying information throughout session. Performs supine<>sit Min A, sit<>stand Mod A with increased time, and ambulation 2x15ft FWW Min A. Patient with poor standing balance demonstrating posterior tendency, 1 LOB posteriorly resulting in patient falling back into stable chair uncontrolled. Difficulty with transfers due to limited R knee ROM following TKA. Patient is well below baseline functioning status and has had recent decline in overall ability, recommend d/c to rehab facility in order to address functional impairments and decrease overall burden of care. Current Level of Function Impacting Discharge (mobility/balance): Min A bed mobility, Mod A transfer, Min A ambulation    Functional Outcome Measure: The patient scored 40/100 on the Barthel Index outcome measure which is indicative of significantly impaired functional ability. Other factors to consider for discharge: High risk for falling     Patient will benefit from skilled therapy intervention to address the above noted impairments. PLAN :  Recommendations and Planned Interventions: bed mobility training, transfer training, gait training, therapeutic exercises, neuromuscular re-education, patient and family training/education and therapeutic activities      Frequency/Duration: Patient will be followed by physical therapy:  5 times a week to address goals.     Recommendation for discharge: (in order for the patient to meet his/her long term goals)  Therapy up to 5 days/week in rehab setting    This discharge recommendation:  Has not yet been discussed the attending provider and/or case management    IF patient discharges home will need the following DME: 3:1 commode     SUBJECTIVE:   Patient stated I didn't sleep at all last night, I have been up since 3 am.    OBJECTIVE DATA SUMMARY:   HISTORY:    Past Medical History:   Diagnosis Date    Anemia 3/3/2010    Arrhythmia     irregular heartbeat    Arthritis     CHF (congestive heart failure) (HCC) 3/3/2010    Chronic obstructive pulmonary disease (HCC)     Chronic pain     back    Chronic sinusitis 3/3/2010    CPAP (continuous positive airway pressure) dependence     Diverticulosis     DM (diabetes mellitus) ([facility] [address] 3/3/2010    Dyslipidemia 3/3/2010    GERD (gastroesophageal reflux disease)     HTN (hypertension) 3/3/2010    Ill-defined condition     being evaluated py pulmonolgist for \"trouble breathing\"    S/P TKR (total knee replacement) 3/3/2010    Unspecified sleep apnea     doesn't wear CPAP since back has been hurting     Past Surgical History:   Procedure Laterality Date    HX APPENDECTOMY      thinks it was taken with hysterectomy    HX GYN      \"tubes opened\"    HX HEENT      sinus surgery    HX HEMORRHOIDECTOMY      HX HYSTERECTOMY      HX KNEE REPLACEMENT  1996    both knees- at same time    HX LUMBAR LAMINECTOMY  1980s    HX MOHS PROCEDURES      left shoulder    HX ORTHOPAEDIC  1980    neck fusion    HX ORTHOPAEDIC  1999    right knee replaced for second time    HX ORTHOPAEDIC      lumbar fusion    HX OTHER SURGICAL      CORNELL BIOPSY BREAST STEREOTACTIC Left yrs ago    (-)    NM COLONOSCOPY FLX DX W/COLLJ SPEC WHEN PFRMD  [id number]    dr. Renee Rocha (barium enema)       Personal factors and/or comorbidities impacting plan of care: CHF, chronic pain, DM, COPD    Home Situation  Home Environment: Private residence  # Steps to Enter: 5  Rails to Enter: Yes  Hand Rails : Bilateral (can only hold one)  One/Two Story Residence: Other (Comment) (tri level)  Lift Chair Available: Yes  Living Alone: No  Support Systems: Family member(s), Spouse/Significant Other/Partner (one other help)  Current DME Used/Available at Home: Grab bars, Lift chair, Walker, rollator, Walker, rolling (tall toilet)  Tub or Shower Type: Shower    EXAMINATION/PRESENTATION/DECISION MAKING:   Critical Behavior:  Neurologic State: Alert, Drowsy  Orientation Level: Oriented X4  Cognition: Follows commands, Memory loss     Hearing: Auditory  Auditory Impairment: Hard of hearing, left side, Hearing aid(s) (hearing aid at home)  Hearing Aids/Status: At home  Range Of Motion:  AROM: Generally decreased, functional  Strength:    Strength: Generally decreased, functional  Tone & Sensation:   Tone: Normal  Sensation: Intact    Coordination:  Coordination: Generally decreased, functional  Functional Mobility:  Bed Mobility:  Rolling: Contact guard assistance  Supine to Sit: Contact guard assistance;Minimum assistance  Sit to Supine: Contact guard assistance  Scooting: Minimum assistance  Transfers:  Sit to Stand: Moderate assistance; Additional time  Stand to Sit: Moderate assistance  Bed to Chair: Minimum assistance  Balance:   Sitting: Intact  Standing: Impaired  Standing - Static: Fair;Constant support  Standing - Dynamic : Poor;Constant support  Ambulation/Gait Training:  Distance (ft): 15 Feet (ft) (x2)  Assistive Device: Walker, rolling;Gait belt  Ambulation - Level of Assistance: Minimal assistance  Gait Abnormalities: Decreased step clearance;Shuffling gait  Base of Support: Widened  Speed/Shannon: Slow  Step Length: Left shortened;Right shortened    Functional Measure:  Barthel Index:    Bathin  Bladder: 5  Bowels: 10  Groomin  Dressin  Feedin  Mobility: 0  Stairs: 0  Toilet Use: 5  Transfer (Bed to Chair and Back): 5  Total: 40/100       The Barthel ADL Index: Guidelines  1. The index should be used as a record of what a patient does, not as a record of what a patient could do. 2. The main aim is to establish degree of independence from any help, physical or verbal, however minor and for whatever reason. 3. The need for supervision renders the patient not independent. 4. A patient's performance should be established using the best available evidence.  Asking the patient, friends/relatives and nurses are the usual sources, but direct observation and common sense are also important. However direct testing is not needed. 5. Usually the patient's performance over the preceding 24-48 hours is important, but occasionally longer periods will be relevant. 6. Middle categories imply that the patient supplies over 50 per cent of the effort. 7. Use of aids to be independent is allowed. Saad Lam., Barthel, D.W. (3575). Functional evaluation: the Barthel Index. [facility] (14)2. NICOLE Munson, Analilia May., Georgia Emery., Ravindra, [address] (1999). Measuring the change indisability after inpatient rehabilitation; comparison of the responsiveness of the Barthel Index and Functional [place] Measure. Journal of Neurology, Neurosurgery, and Psychiatry, 66(4), 986-223. SEVERIANO Cruz, FAUSTO Moreno, & Satinder Catherine M.A. (2004.) Assessment of post-stroke quality of life in cost-effectiveness studies: The usefulness of the Barthel Index and the EuroQoL-5D. Quality of Life Research, 15, 919-75      Physical Therapy Evaluation Charge Determination   History Examination Presentation Decision-Making   HIGH Complexity :3+ comorbidities / personal factors will impact the outcome/ POC  MEDIUM Complexity : 3 Standardized tests and measures addressing body structure, function, activity limitation and / or participation in recreation  LOW Complexity : Stable, uncomplicated  Other outcome measures Barthel Index 40/100  HIGH       Based on the above components, the patient evaluation is determined to be of the following complexity level: LOW     Activity Tolerance:   Fair and SpO2 stable on RA    After treatment patient left in no apparent distress:   Supine in bed, Call bell within reach, Bed / chair alarm activated, Caregiver / family present and Side rails x 3    COMMUNICATION/EDUCATION:   The patients plan of care was discussed with: Registered nurse.      Fall prevention education was provided and the patient/caregiver indicated understanding., Patient/family have participated as able in goal setting and plan of care. and Patient/family agree to work toward stated goals and plan of care.     Thank you for this referral.  Nguyen Morris, PT   Time Calculation: 40 mins

## 2021-06-19 NOTE — PROGRESS NOTES
Comprehensive Nutrition Assessment    Type and Reason for Visit: Initial, Positive nutrition screen    Nutrition Recommendations/Plan:   Continue current diet  Encourage intake of meals  Please document % meals and supplements consumed in flowsheet I/O's under intake     Recommend daily MVI    Nutrition Assessment:      MST triggered RD chart review. Pt noted for generalized weakness, ataxia, falls. Hx of HTN, HLD, DM2, CKD3, COPD, chronic pain. Plans for MRI to r/o stroke. RD visited pt and family at bedside. They report her appetite has been down for about a month, and recently she has been eating <50% of her usual intake. Pt reports usual weight around 215lb, current weight per standing scale 217lb indicates no weight loss. No obvious wasting with BMI >37. She has already started eating better since coming to the hospital. She has tried Ensure in the past, but does not like them and declined ONS at this time. Will continue monitoring needs. Wt Readings from Last 5 Encounters:   06/18/21 98.4 kg (217 lb)   04/26/21 98.6 kg (217 lb 6.4 oz)   03/29/21 97.5 kg (215 lb)   10/09/20 97.5 kg (215 lb)   03/06/20 98.7 kg (217 lb 9.6 oz)   ]    Malnutrition Assessment:  Malnutrition Status: At risk for malnutrition (specify)    Context:  Acute illness     Findings of the 6 clinical characteristics of malnutrition:   Energy Intake:  1 - 75% or less of est energy req for 7 or more days       Estimated Daily Nutrient Needs:  Energy (kcal): 1506 kcal (BMR 1389 x 1. 3AF - 300); Weight Used for Energy Requirements: Current  Protein (g): 59-79g (0.6-0.8g/kg); Weight Used for Protein Requirements: Current  Fluid (ml/day): 1500mL; Method Used for Fluid Requirements: 1 ml/kcal      Nutrition Related Findings:  Labs: BG , A1c 6.4. Meds: lasix, glipizide, humalog, prednisone. Edema: 1+BLE. BM 6/17. Wounds:    None       Current Nutrition Therapies:  ADULT DIET Regular; 4 carb choices (60 gm/meal);  Low Fat/Low Chol/High Fiber/2 gm Na    Anthropometric Measures:  · Height:  5' 4\" (162.6 cm)  · Current Body Wt:  98.4 kg (216 lb 14.9 oz)   · Ideal Body Wt:  120 lbs:  180.8 %    · BMI Category:  Obese class 2 (BMI 35.0-39. 9)       Nutrition Diagnosis:   · Inadequate protein-energy intake related to  (decreased appetite) as evidenced by poor intake prior to admission      Nutrition Interventions:   Food and/or Nutrient Delivery: Continue current diet  Nutrition Education and Counseling: No recommendations at this time  Coordination of Nutrition Care: Continue to monitor while inpatient    Goals:  PO intake >70% meals next 2-4 days       Nutrition Monitoring and Evaluation:   Behavioral-Environmental Outcomes: None identified  Food/Nutrient Intake Outcomes: Food and nutrient intake  Physical Signs/Symptoms Outcomes: Biochemical data, Weight, GI status, Fluid status or edema    Discharge Planning:    Continue current diet     Electronically signed by Flor Fleming RD on 6/19/2021 at 4:41 PM    Contact: [phone]

## 2021-06-19 NOTE — ED NOTES
20:38: Patient is being transferred to Neuro Tele , Room # 5162. Report given to Kurt Rosario RN on Abrahan Escobedo for routine progression of care. Report consisted of the following information SBAR, Kardex, ED Summary, Intake/Output, MAR and Recent Results. Patient transferred to receiving unit by: Patient Transport (RN or tech name). Outstanding consults needed: No     Next labs due: No     The following personal items will be sent with the patient during transfer to the floor:     All valuables:    Cardiac monitoring ordered: No     The following CURRENT information was reported to the receiving RN:    Code status: Prior at time of transfer    Last set of vital signs:  Vital Signs  Level of Consciousness: Alert (0) (06/18/21 1624)  Temp: 98 °F (36.7 °C) (06/18/21 1624)  Temp Source: Oral (06/18/21 1624)  Pulse (Heart Rate): (!) 51 (06/18/21 1624)  Resp Rate: 15 (06/18/21 1624)  BP: 127/70 (06/18/21 1624)  MAP (Calculated): 89 (06/18/21 1624)  BP 1 Location: Left upper arm (06/18/21 1624)  BP 1 Method: Automatic (06/18/21 1624)  BP Patient Position: At rest (06/18/21 1624)  MEWS Score: 1 (06/18/21 1624)         Oxygen Therapy  O2 Sat (%): 98 % (06/18/21 1655)  Pulse via Oximetry: 50 beats per minute (06/18/21 1655)  O2 Device: None (Room air) (06/18/21 1655)      Last pain assessment:  Pain 1  Pain Scale 1: Numeric (0 - 10)  Pain Intensity 1: 10  Pain Location 1: Knee  Pain Orientation 1: Right, Left  Pain Description 1: Aching  Pain Intervention(s) 1: Therapeutic presence      Wounds: No     Urinary catheter: external catheter  Is there a grewal order: No     LDAs:       Peripheral IV 06/18/21 Left Arm (Active)   Site Assessment Clean, dry, & intact 06/18/21 2006   Phlebitis Assessment 0 06/18/21 2006   Infiltration Assessment 0 06/18/21 2006   Dressing Status Clean, dry, & intact 06/18/21 2006   Dressing Type Tape;Transparent 06/18/21 2006   Hub Color/Line Status Pink;Flushed;Patent 06/18/21 2006   Action Taken Blood drawn 06/18/21 2006   Alcohol Cap Used No 06/18/21 2006         Opportunity for questions and clarification was provided.     Yana Vidales RN

## 2021-06-19 NOTE — PROGRESS NOTES
End of Shift Note    Bedside shift change report given to [address] [place] [address] (oncoming nurse) by Paula Kraft (offgoing nurse). Report included the following information SBAR, Kardex, MAR and Recent Results    Shift worked:  night   Shift summary and any significant changes:     admission. MRI form done & faxed       Concerns for physician to address:  none   Zone phone for oncoming shift:   [phone]     Patient Information  Danny Garcia  80 y.o.  6/18/2021  4:36 PM by Camelia Michelle MD. Danny Garcia was admitted from Home    Problem List  Patient Active Problem List    Diagnosis Date Noted    Ataxia 06/18/2021    General weakness 06/18/2021    Tremors of nervous system 06/18/2021    Fall at home 06/18/2021    COPD exacerbation ([address] 12/09/2019    Polymyalgia rheumatica ([address] 06/15/2018    Type 2 diabetes with nephropathy ([address] 04/11/2018    Severe obesity (BMI 35.0-39. 9) with comorbidity ([address] 04/11/2018    Plantar fasciitis of left foot 11/02/2017    CKD (chronic kidney disease) 10/12/2016    Arthralgia of multiple joints 10/12/2016    Encounter for medication monitoring 01/29/2016    Reactive airway disease with wheezing without complication 92/33/7779    Essential hypertension, benign 08/25/2015    Asthma 02/23/2014    Anxiety disorder 02/23/2014    Diabetic neuropathy ([address] 02/23/2014    Esophageal reflux 02/23/2014    Degenerative arthritis of lumbar spine 06/18/2013    DJD (degenerative joint disease) 08/01/2012    Chronic systolic heart failure ([address] 08/01/2012    Obstructive sleep apnea (adult) (pediatric) 01/12/2012    Environmental allergies 10/12/2010    CHF (congestive heart failure) ([address] 03/03/2010    S/P TKR (total knee replacement) 03/03/2010    Anemia 03/03/2010    s/p lumbar laminectomy 03/03/2010    S/P ASAD (total abdominal hysterectomy) 03/03/2010    S/P hemorrhoidectomy 03/03/2010    S/P rotator cuff surgery 03/03/2010    DM (diabetes mellitus) ([address] 03/03/2010    Chronic sinusitis 03/03/2010    S/P sinus surgery 03/03/2010    Dyslipidemia 03/03/2010     Past Medical History:   Diagnosis Date    Anemia 3/3/2010    Arrhythmia     irregular heartbeat    Arthritis     CHF (congestive heart failure) ([facility] [address] 3/3/2010    Chronic obstructive pulmonary disease (HCC)     Chronic pain     back    Chronic sinusitis 3/3/2010    CPAP (continuous positive airway pressure) dependence     Diverticulosis     DM (diabetes mellitus) ([facility] [address] 3/3/2010    Dyslipidemia 3/3/2010    GERD (gastroesophageal reflux disease)     HTN (hypertension) 3/3/2010    Ill-defined condition     being evaluated py pulmonolgist for \"trouble breathing\"    S/P TKR (total knee replacement) 3/3/2010    Unspecified sleep apnea     doesn't wear CPAP since back has been hurting       Core Measures:  CVA: Yes Yes  CHF:No Not applicable  PNA:No Not applicable    Activity:  Activity Level: Up with Assistance  Number times ambulated in hallways past shift: 0  Number of times OOB to chair past shift: 0    Cardiac:   Cardiac Monitoring: Yes           Access:   Current line(s): PIV   Central Line? No   PICC LINE? No     Genitourinary:   Urinary status: voiding  Urinary Catheter? No     Respiratory:   O2 Device: None (Room air)  Chronic home O2 use?: NO  Incentive spirometer at bedside: N/A       GI:  Last Bowel Movement Date: 06/17/21 (reported by patient)  Current diet:  ADULT DIET Regular; 4 carb choices (60 gm/meal); Low Fat/Low Chol/High Fiber/2 gm Na  Passing flatus: YES  Tolerating current diet: YES       Pain Management:   Patient states pain is manageable on current regimen: YES    Skin:  Capo Score: 20  Interventions: increase time out of bed and limit briefs    Patient Safety:  Fall Score:  Total Score: 4  Interventions: bed/chair alarm and pt to call before getting OOB  High Fall Risk: Yes  @Rollbelt no    DVT prophylaxis:  DVT prophylaxis Med- Yes  DVT prophylaxis SCD or BIANCA- No Wounds: (If Applicable)  Wounds- No  Location none    Active Consults:  IP CONSULT TO NEUROLOGY    Length of Stay:  Expected LOS: - - -  Actual LOS: 1  Discharge Plan: No just admitted      Marissa Rosenthal

## 2021-06-19 NOTE — H&P
Hospitalist Admission Note    NAME: Monique Andrade   :  1931   MRN:  [id number]     Date/Time:  2021 8:16 PM    Patient PCP: Dinah Friedman MD neurology Dr. Leeann Beatty  ______________________________________________________________________  Given the patient's current clinical presentation, I have a high level of concern for decompensation if discharged from the emergency department. Complex decision making was performed, which includes reviewing the patient's available past medical records, laboratory results, and x-ray films. My assessment of this patient's clinical condition and my plan of care is as follows.     Assessment / Plan:  Ataxia with tremors POA-worsening after starting on primidone as per patient/family  Causing falls at home POA-inability to ambulate  Rule out CVA  Hypertension  Hyperlipidemia  CT head negative acute  Chest x-ray negative acute  X-ray bilateral knees= negative acute  Creatinine 1.8    Admit to neuro telemetry bed  Check MRI brain  Inpatient neurology consult for further recommendations  Will hold off on primidone for now  Inpatient PT OT speech eval per stroke protocol  Start aspirin for now  Continue home statin   Check lipid panel and A1c as per protocol  Decrease the Coreg dose for sinus bradycardia  Hold off lisinopril  Decrease Lasix dose to once daily  Follow BMP in a.m. for creatinine    Diabetes type 2-controlled POA  CKD stage III POA-creatinine at baseline at 1.8  Chronic pain syndrome  COPD  DONNIE not on CPAP    Fingersticks before every meal and at bedtime here, continue home glipizide, SSI lispro here  Continue Cymbalta  Continue home inhalers  Continue home prednisone      Code Status: Full code as per patient's wishes in the ED  Surrogate Decision Maker:     DVT Prophylaxis: Subcu heparin  GI Prophylaxis: not indicated    Baseline: Patient lives with  at home has been having falls at home for the past couple of weeks due to ataxia and tremors      Subjective:   CHIEF COMPLAINT: Patient present with chief complaint of worsening generalized weakness with tremors and frequent falls at home    HISTORY OF PRESENT ILLNESS:     Dre Billy is a 80 y.o.  female who presents with above complaints from home with . Patient present with chief complaint of worsening generalized weakness with worsening tremors and ataxia for the last 2 weeks   History history of multiple falls at home  Patient has history of being started on gabapentin which caused her to have tremors which improved after she was taken off it, since then has been put on primidone by neurology as outpatient-as per the  the tremors have worsened now. Patient was found to have mild dehydration with stable creatinine at 1.8 with negative CT head and otherwise unremarkable work-up in the ED. We were asked to admit for work up and evaluation of the above problems.      Past Medical History:   Diagnosis Date    Anemia 3/3/2010    Arrhythmia     irregular heartbeat    Arthritis     CHF [facility]congestive heart failure) [facility]) 3/3/2010    Chronic obstructive pulmonary disease ([facility])     Chronic pain     back    Chronic sinusitis 3/3/2010    CPAP (continuous positive airway pressure) dependence     Diverticulosis     DM (diabetes mellitus) ([facility] [address] 3/3/2010    Dyslipidemia 3/3/2010    GERD (gastroesophageal reflux disease)     HTN (hypertension) 3/3/2010    Ill-defined condition     being evaluated py pulmonolgist for \"trouble breathing\"    S/P TKR (total knee replacement) 3/3/2010    Unspecified sleep apnea     doesn't wear CPAP since back has been hurting        Past Surgical History:   Procedure Laterality Date    HX APPENDECTOMY      thinks it was taken with hysterectomy    HX GYN      \"tubes opened\"    HX HEENT      sinus surgery    HX HEMORRHOIDECTOMY      HX HYSTERECTOMY      HX KNEE REPLACEMENT  1996    both knees- at same time    HX LUMBAR LAMINECTOMY      HX MOHS PROCEDURES      left shoulder    HX ORTHOPAEDIC      neck fusion    HX ORTHOPAEDIC      right knee replaced for second time    HX ORTHOPAEDIC      lumbar fusion    HX OTHER SURGICAL      CORNELL BIOPSY BREAST STEREOTACTIC Left yrs ago    (-)    MN COLONOSCOPY FLX DX W/COLLJ SPEC WHEN PFRMD  [id number]    dr. Maribel Rosenthal (barium enema)       Social History     Tobacco Use    Smoking status: Former Smoker     Packs/day: 0.30     Years: 5.00     Pack years: 1.50     Quit date: 1960     Years since quittin.5    Smokeless tobacco: Never Used   Substance Use Topics    Alcohol use: No     Alcohol/week: 0.0 standard drinks        Family History   Problem Relation Age of Onset    Diabetes Mother     Heart Disease Father     Diabetes Brother     Blindness Brother     Diabetes Sister     Heart Disease Sister     Heart Disease Brother     Heart Disease Brother     Asthma Brother     Malignant Hyperthermia Neg Hx     Pseudocholinesterase Deficiency Neg Hx     Delayed Awakening Neg Hx     Post-op Nausea/Vomiting Neg Hx     Emergence Delirium Neg Hx     Post-op Cognitive Dysfunction Neg Hx      Allergies   Allergen Reactions    Gabapentin Other (comments)     Muscles twitching and jerking    Levaquin [Levofloxacin] Swelling    Lyrica [Pregabalin] Other (comments)     Muscle twitching and jerking    Percodan [Oxycodone Hcl-Oxycodone-Asa] Itching        Prior to Admission medications    Medication Sig Start Date End Date Taking? Authorizing Provider   HYDROcodone-acetaminophen (NORCO)  mg tablet Take 1 Tablet by mouth every six (6) hours as needed for Pain for up to 99 days. Max Daily Amount: 2 Tablets. 21  Eve Nuñez MD   primidone (MYSOLINE) 250 mg tablet Take 1 Tablet by mouth two (2) times a day.  21   Dejah Barrow MD   rosuvastatin (CRESTOR) 10 mg tablet TAKE 1 TABLET BY MOUTH EVERY NIGHT 21   Jeannette Thony Mcknight MD   carvediloL (COREG) 25 mg tablet TAKE 1 TABLET BY MOUTH TWICE DAILY WITH MEALS 6/1/21   Gardner Abhi SCOTT NP   furosemide (LASIX) 20 mg tablet TAKE 1 TABLET BY MOUTH EVERY MORNING AND 1 TABLET EVERY AFTERNOON BETWEEN 2 TO 4 PM 5/24/21   Thony Machado MD   magnesium oxide [place]ier [place]) 500 mg tab Take  by mouth. Take 2 tablets nightly    Provider, Historical   glipiZIDE SR (GLUCOTROL XL) 2.5 mg CR tablet TAKE 1 TABLET BY MOUTH DAILY 2/1/21   Abdelrahman SCOTT MD   predniSONE (DELTASONE) 5 mg tablet TAKE 3 TABLETS BY MOUTH DAILY 1/25/21   Abdelrahman Milian MD   DULoxetine (CYMBALTA) 60 mg capsule TAKE 1 CAPSULE BY MOUTH DAILY 1/22/21   Abdelrahman Milian MD   diclofenac (VOLTAREN) 1 % gel APPLY EXTERNALLY TO THE AFFECTED AREA FOUR TIMES DAILY 12/11/20   Abdelrahman Milian MD   Accu-Chek Fastclix Lancet Drum misc USE TO CHECK BLOOD SUGAR TWICE DAILY 6/4/20   Abdelrahman Milian MD   AccuSonamChek Guide test strips strip USE TO TEST BLOOD SUGAR TWICE DAILY 6/4/20   Abdelrahman Milian MD   AccuSonamChemahesh Guide Me Glucose Mtr misc USE TO CHECK BLOOD SUGAR TWICE DAILY 6/4/20   MD DARIAN FitzgeraldVESPI AEROSPHERE 9-4.8 mcg HFA inhaler Take 2 puffs by inhalation daily 12/12/19   Provider, Historical   albuterol (PROVENTIL HFA, VENTOLIN HFA, PROAIR HFA) 90 mcg/actuation inhaler Take 2 Puffs by inhalation every four (4) hours as needed for Wheezing or Shortness of Breath. 12/23/19   Abdelrahman Milian MD   guaiFENesin (MUCINEX) 1,200 mg Ta12 ER tablet Take 1,200 mg by mouth daily as needed. Provider, Historical   MULTIVITAMIN PO Take 1 Tab by mouth daily. Provider, Historical   Omeprazole delayed release (PRILOSEC D/R) 20 mg tablet Take 20 mg by mouth daily. Provider, Historical   lisinopril (PRINIVIL, ZESTRIL) 2.5 mg tablet Take 2.5 mg by mouth daily.     Provider, Historical   fluticasone (FLONASE) 50 mcg/actuation nasal spray SHAKE LIQUID AND USE 2 SPRAYS IN EACH NOSTRIL EVERY DAY 1/22/18   Chata Fleming MD   promethazine-dextromethorphan (PROMETHAZINE-DM) 6.25-15 mg/5 mL syrup TAKE ONE TEASPOONFUL BY MOUTH EVERY SIX HOURS AS NEEDED FOR COUGH 5/22/17   Thony Machado MD       REVIEW OF SYSTEMS:        Total of 12 systems reviewed as follows:       POSITIVE= underlined text  Negative = text not underlined  General:  fever, chills, sweats, generalized weakness, weight loss/gain,      loss of appetite   Eyes:    blurred vision, eye pain, loss of vision, double vision  ENT:    rhinorrhea, pharyngitis   Respiratory:   cough, sputum production, SOB, HIGH, wheezing, pleuritic pain   Cardiology:   chest pain, palpitations, orthopnea, PND, edema, syncope   Gastrointestinal:  abdominal pain , N/V, diarrhea, dysphagia, constipation, bleeding   Genitourinary:  frequency, urgency, dysuria, hematuria, incontinence   Muskuloskeletal :  arthralgia, myalgia, back pain  Hematology:  easy bruising, nose or gum bleeding, lymphadenopathy   Dermatological: rash, ulceration, pruritis, color change / jaundice  Endocrine:   hot flashes or polydipsia   Neurological:  headache, dizziness, confusion, focal weakness, paresthesia,     Speech difficulties, memory loss, gait difficulty, tremors  Psychological: Feelings of anxiety, depression, agitation    Objective:   VITALS:    Visit Vitals  /70 (BP 1 Location: Left upper arm, BP Patient Position: At rest)   Pulse (!) 51   Temp 98 °F (36.7 °C)   Resp 15   Wt 98.4 kg (217 lb)   SpO2 98%   BMI 37.25 kg/m²       PHYSICAL EXAM:    General:    Alert, cooperative, no distress, appears stated age. HEENT: Atraumatic, anicteric sclerae, pink conjunctivae     No oral ulcers, mucosa moist, throat clear, dentition fair  Neck:  Supple, symmetrical,  thyroid: non tender  Lungs:   Clear to auscultation bilaterally. No Wheezing or Rhonchi. No rales. Chest wall:  No tenderness  No Accessory muscle use.   Heart:   Regular rhythm,  No  murmur   No edema  Abdomen:   Soft, non-tender. Not distended. Bowel sounds normal  Extremities: No cyanosis. No clubbing,      Skin turgor normal, Capillary refill normal, Radial dial pulse 2+  Skin:     Not pale. Not Jaundiced  No rashes   Psych:  Good insight. Not depressed. Not anxious or agitated. Neurologic: EOMs intact. No facial asymmetry. No aphasia or slurred speech. Symmetrical strength, tremors noted+,  sensation grossly intact. Alert and oriented X 4.     _______________________________________________________________________  Care Plan discussed with:    Comments   Patient x    Family  x   and granddaughter at bedside in ED   RN x    Care Manager                    Consultant:  x ED MD Marv Blanc   _______________________________________________________________________  Expected  Disposition:   Home with Family    HH/PT/OT/RN x   SNF/LTC ? [facility]    ________________________________________________________________________  TOTAL TIME:  54 Minutes    Critical Care Provided     Minutes non procedure based      Comments    x Reviewed previous records   >50% of visit spent in counseling and coordination of care x Discussion with patient and family and questions answered       ________________________________________________________________________  Signed: Marielena Beard MD    Procedures: see electronic medical records for all procedures/Xrays and details which were not copied into this note but were reviewed prior to creation of Plan.     LAB DATA REVIEWED:    Recent Results (from the past 24 hour(s))   CBC WITH AUTOMATED DIFF    Collection Time: 06/18/21  7:18 PM   Result Value Ref Range    WBC 4.4 3.6 - 11.0 K/uL    RBC 2.94 (L) 3.80 - 5.20 M/uL    HGB 9.8 (L) 11.5 - 16.0 g/dL    HCT 32.0 (L) 35.0 - 47.0 %    .8 (H) 80.0 - 99.0 FL    MCH 33.3 26.0 - 34.0 PG    MCHC 30.6 30.0 - 36.5 g/dL    RDW 15.4 (H) 11.5 - 14.5 %    PLATELET 443 (L) 515 - 400 K/uL    MPV 10.9 8.9 - 12.9 FL    NRBC 0.0 0  WBC    ABSOLUTE NRBC 0.00 0.00 - 0.01 K/uL    NEUTROPHILS 60 32 - 75 %    LYMPHOCYTES 32 12 - 49 %    MONOCYTES 7 5 - 13 %    EOSINOPHILS 1 0 - 7 %    BASOPHILS 0 0 - 1 %    IMMATURE GRANULOCYTES 0 0.0 - 0.5 %    ABS. NEUTROPHILS 2.6 1.8 - 8.0 K/UL    ABS. LYMPHOCYTES 1.4 0.8 - 3.5 K/UL    ABS. MONOCYTES 0.3 0.0 - 1.0 K/UL    ABS. EOSINOPHILS 0.1 0.0 - 0.4 K/UL    ABS. BASOPHILS 0.0 0.0 - 0.1 K/UL    ABS. IMM. GRANS. 0.0 0.00 - 0.04 K/UL    DF AUTOMATED     METABOLIC PANEL, COMPREHENSIVE    Collection Time: 06/18/21  7:18 PM   Result Value Ref Range    Sodium 141 136 - 145 mmol/L    Potassium 4.9 3.5 - 5.1 mmol/L    Chloride 106 97 - 108 mmol/L    CO2 34 (H) 21 - 32 mmol/L    Anion gap 1 (L) 5 - 15 mmol/L    Glucose 82 65 - 100 mg/dL    BUN 30 (H) 6 - 20 MG/DL    Creatinine 1.85 (H) 0.55 - 1.02 MG/DL    BUN/Creatinine ratio 16 12 - 20      GFR est AA 31 (L) >60 ml/min/1.73m2    GFR est non-AA 26 (L) >60 ml/min/1.73m2    Calcium 8.8 8.5 - 10.1 MG/DL    Bilirubin, total 0.3 0.2 - 1.0 MG/DL    ALT (SGPT) 22 12 - 78 U/L    AST (SGOT) 20 15 - 37 U/L    Alk.  phosphatase 60 45 - 117 U/L    Protein, total 6.4 6.4 - 8.2 g/dL    Albumin 3.3 (L) 3.5 - 5.0 g/dL    Globulin 3.1 2.0 - 4.0 g/dL    A-G Ratio 1.1 1.1 - 2.2

## 2021-06-19 NOTE — CONSULTS
IP CONSULT TO NEUROLOGY  Consult performed by: Anum Gonzalez MD  Consult ordered by: Tran Sexton MD            NEUROLOGY CONSULT    NAME Richard Huerta AGE 80 y.o. MRN [id number]  1931     REQUESTING PHYSICIAN: Sundeep Pack MD      CHIEF COMPLAINT: Tremor     This is a 80 y.o. right-handed -American female with medical history of essential tremor and bilateral knee replacement. The patient is admitted for generalized weakness and falls at home. She has no lateralizing weakness or sensory loss. Tremors have been ongoing for several years and she is maintained on primidone at a relatively high dose. ASSESSMENT AND PLAN     1. Debility  Recommend inpatient physical therapy given the patient's body habitus and age there would be little benefit from doing this at home. 2.  Essential tremor  Continue primidone. Seems to be a high dose but she seems to be tolerating it as well and will decrease this outpatient would not stop this abruptly for fear of withdrawal and seizures.     3.  Hyperlipidemia  Continue atorvastatin         ALLERGIES:  Gabapentin, Levaquin [levofloxacin], Lyrica [pregabalin], and Percodan [oxycodone hcl-oxycodone-asa]     Current Facility-Administered Medications   Medication Dose Route Frequency    diazePAM (VALIUM) injection 5 mg  5 mg IntraVENous ON CALL    carvediloL (COREG) tablet 12.5 mg  12.5 mg Oral BID WITH MEALS    DULoxetine (CYMBALTA) capsule 60 mg  60 mg Oral DAILY    fluticasone propionate (FLONASE) 50 mcg/actuation nasal spray 2 Spray  2 Spray Both Nostrils DAILY    furosemide (LASIX) tablet 20 mg  20 mg Oral DAILY    glipiZIDE (GLUCOTROL) tablet 2.5 mg  2.5 mg Oral DAILY    insulin lispro (HUMALOG) injection   SubCUTAneous AC&HS    glucose chewable tablet 16 g  4 Tablet Oral PRN    dextrose (D50W) injection syrg 12.5-25 g  12.5-25 g IntraVENous PRN    glucagon (GLUCAGEN) injection 1 mg  1 mg IntraMUSCular PRN    predniSONE (DELTASONE) tablet 15 mg  15 mg Oral DAILY WITH BREAKFAST    rosuvastatin (CRESTOR) tablet 10 mg  10 mg Oral QHS    aspirin chewable tablet 81 mg  81 mg Oral DAILY    labetaloL (NORMODYNE;TRANDATE) injection 5 mg  5 mg IntraVENous Q10MIN PRN    heparin (porcine) injection 5,000 Units  5,000 Units SubCUTAneous Q8H    arformoteroL (BROVANA) neb solution 15 mcg  15 mcg Nebulization BID RT    And    ipratropium (ATROVENT) 0.02 % nebulizer solution 0.5 mg  0.5 mg Nebulization BID RT       Past Medical History:   Diagnosis Date    Anemia 3/3/2010    Arrhythmia     irregular heartbeat    Arthritis     CHF [facility]congestive heart failure) ([facility] [address] 3/3/2010    Chronic obstructive pulmonary disease (HCC)     Chronic pain     back    Chronic sinusitis 3/3/2010    CPAP (continuous positive airway pressure) dependence     Diverticulosis     DM (diabetes mellitus) ([facility] [address] 3/3/2010    Dyslipidemia 3/3/2010    GERD (gastroesophageal reflux disease)     HTN (hypertension) 3/3/2010    Ill-defined condition     being evaluated py pulmonolgist for \"trouble breathing\"    S/P TKR (total knee replacement) 3/3/2010    Unspecified sleep apnea     doesn't wear CPAP since back has been hurting       Social History     Tobacco Use    Smoking status: Former Smoker     Packs/day: 0.30     Years: 5.00     Pack years: 1.50     Quit date: 1960     Years since quittin.5    Smokeless tobacco: Never Used   Substance Use Topics    Alcohol use: No     Alcohol/week: 0.0 standard drinks       Family History   Problem Relation Age of Onset    Diabetes Mother     Heart Disease Father     Diabetes Brother     Blindness Brother     Diabetes Sister     Heart Disease Sister     Heart Disease Brother     Heart Disease Brother     Asthma Brother     Malignant Hyperthermia Neg Hx     Pseudocholinesterase Deficiency Neg Hx     Delayed Awakening Neg Hx     Post-op Nausea/Vomiting Neg Hx     Emergence Delirium Neg Hx     Post-op Cognitive Dysfunction Neg Hx      Review of Systems   Constitutional: Negative for chills and fever. HENT: Negative for ear pain. Eyes: Negative for pain and discharge. Respiratory: Negative for cough and hemoptysis. Cardiovascular: Negative for chest pain and claudication. Gastrointestinal: Negative for constipation and diarrhea. Genitourinary: Negative for flank pain and hematuria. Musculoskeletal: Positive for joint pain and myalgias. Negative for back pain. Skin: Negative for itching and rash. Neurological: Positive for weakness. Negative for headaches. Endo/Heme/Allergies: Negative for environmental allergies. Does not bruise/bleed easily. Psychiatric/Behavioral: Negative for depression and hallucinations. Visit Vitals  /60 (BP 1 Location: Left upper arm, BP Patient Position: At rest)   Pulse 64   Temp 98.6 °F (37 °C)   Resp 16   Ht 5' 4\" (1.626 m)   Wt 217 lb (98.4 kg)   LMP 03/04/1961 (Approximate)   SpO2 95%   BMI 37.25 kg/m²      General: Well developed, well nourished. Patient in no distress   Head: Normocephalic, atraumatic, anicteric sclera   Neck Normal ROM, No thyromegally   Lungs:  Clear to auscultation bilaterally   Cardiac: Regular rate and rhythm with no murmurs. Abd: Bowel sounds were audible. Ext: No pedal edema   Skin: Supple no rash     NeurologicExam:  Mental Status: Alert and oriented to person place and time   Speech: Fluent no aphasia or dysarthria. Cranial Nerves:  II - XII Intact   Motor:   Full symmetric strength With proximal lower extremity weakness approximately 4 out of 5. Reflexes:   Deep tendon reflexes 0+/4 and symmetric. Sensory:   Symmetric distal reduction in sensory perception affecting all modalities   Gait:   Needs assistance   Tremor:    Essential tremor affecting both upper extremities   Cerebellar:  Coordination intact. Neurovascular: No carotid bruits.  No JVD           REVIEWED IMAGING:    MRI :  Results from Hospital Encounter encounter on 10/06/16    MRI CERV SPINE WO CONT    Narrative  Indication: Cramping, spasms and jerking in extremities    Exam: MRI cervical spine. Comparisons: May 27, 2015    Sequences: Sagittal T1, T2, and STIR. Axial T1, T2. No contrast.    FINDINGS: There is straightening of the normal cervical lordosis. There is  multilevel endplate degenerative change. Patient is post C5-C6 anterior  decompression and fusion. Given motion artifact no definite cord signal  abnormality. There sinus mucosal inflammatory change sphenoid sinus. C2-C3: No stenosis    C3-C4: There is a small disc osteophytic bulge resulting in mild narrowing of  the canal. There are uncovertebral degenerative changes and left facet  arthropathy with moderate left and mild right foraminal narrowing    C4-C5: There is a diffuse disc osteophytic bulge resulting in moderate to severe  narrowing of the canal with flattening of the ventral cord with uncovertebral  degenerative changes resulting in mild to moderate narrowing of the foramen    C5-C6: This level has been decompressed    C6-C7: Small disc osteophytic bulge and uncovertebral degenerative change. There  is mild narrowing the canal and foramen    C7-T1: There are bilateral facet degenerative changes mildly narrowing the  foramen. Impression  IMPRESSION:  1. Multilevel degenerative change detailed by level above most significant at  C4-C5      CT:  Results from Hospital Encounter encounter on 06/18/21    CT HEAD WO CONT    Narrative  EXAM: CT HEAD WO CONT    INDICATION: acute weakness    COMPARISON: CT 10/18/2019. CONTRAST: None. TECHNIQUE: Unenhanced CT of the head was performed using 5 mm images. Brain and  bone windows were generated. Coronal and sagittal reformats. CT dose reduction  was achieved through use of a standardized protocol tailored for this  examination and automatic exposure control for dose modulation.     FINDINGS:  The ventricles and sulci are normal in size, shape and configuration. . There is  no significant white matter disease. There is no intracranial hemorrhage,  extra-axial collection, or mass effect. The basilar cisterns are open. No CT  evidence of acute infarct. The bone windows demonstrate no abnormalities. Postsurgical changes are seen in  the maxillary sinuses. There is partial opacification of the right maxillary  sinus and ethmoid air cells and left sphenoid sinus. .    Impression  No evidence of acute intracranial process.       REVIEWED LABS:  Lab Results   Component Value Date/Time    WBC 4.4 06/18/2021 07:18 PM    HCT 32.0 (L) 06/18/2021 07:18 PM    HGB 9.8 (L) 06/18/2021 07:18 PM    PLATELET 279 (L) 97/64/5873 07:18 PM     Lab Results   Component Value Date/Time    Sodium 141 06/18/2021 07:18 PM    Potassium 4.9 06/18/2021 07:18 PM    Chloride 106 06/18/2021 07:18 PM    CO2 34 (H) 06/18/2021 07:18 PM    Glucose 82 06/18/2021 07:18 PM    BUN 30 (H) 06/18/2021 07:18 PM    Creatinine 1.85 (H) 06/18/2021 07:18 PM    Calcium 8.8 06/18/2021 07:18 PM     Lab Results   Component Value Date/Time    Vitamin B12 >2000 (H) 12/19/2016 04:24 PM    Folate >20.0 12/19/2016 04:24 PM     Lab Results   Component Value Date/Time    LDL, calculated 105.4 (H) 06/19/2021 02:43 AM     Lab Results   Component Value Date/Time    Hemoglobin A1c 6.4 (H) 06/19/2021 02:43 AM    Hemoglobin A1c (POC) 6.2 10/09/2020 11:45 AM

## 2021-06-19 NOTE — PROGRESS NOTES
Hospitalist Progress Note    NAME: Matt Toledo   :  1931   MRN:  [id number]       Assessment / Plan:  Debilitiy  Essential tremor  Stroke work up pending  Needs PT/OT evals and CM for likely placemeent  Continue Primidone    Diabetes type 2-controlled POA  CKD stage III POA-creatinine at baseline at 1.8  Chronic pain syndrome  COPD  DONNIE not on CPAP     Fingersticks before every meal and at bedtime here, continue home glipizide, SSI lispro here  Continue Cymbalta  Continue home inhalers  Continue home prednisone    30.0 - 39.9 Obese / Body mass index is 37.25 kg/m². Estimated discharge date:   Barriers:    Code status: Full  Prophylaxis: Hep SQ  Recommended Disposition: TBD     Subjective:     Chief Complaint / Reason for Physician Visit  States that she is fine when sitting down but has balance issues when trying to ambulate. Discussed with RN events overnight. Review of Systems:  Symptom Y/N Comments  Symptom Y/N Comments   Fever/Chills    Chest Pain     Poor Appetite    Edema     Cough    Abdominal Pain     Sputum    Joint Pain     SOB/HIGH    Pruritis/Rash     Nausea/vomit    Tolerating PT/OT     Diarrhea    Tolerating Diet     Constipation    Other       Could NOT obtain due to:      Objective:     VITALS:   Last 24hrs VS reviewed since prior progress note.  Most recent are:  Patient Vitals for the past 24 hrs:   Temp Pulse Resp BP SpO2   21 0755 98.6 °F (37 °C) 64 16 139/60 95 %   21 0754     95 %   21 0238 98.1 °F (36.7 °C) 72 18 (!) 145/64 95 %   21 2346 97.7 °F (36.5 °C) 65 18 (!) 140/65 97 %   21 2316     97 %   21 2157 98.3 °F (36.8 °C) 60 16 (!) 153/73    21 2108 98.1 °F (36.7 °C) 71  (!) 147/71 96 %   21 1655     98 %   21 1624 98 °F (36.7 °C) (!) 51 15 127/70 100 %     No intake or output data in the 24 hours ending 21 1351     I had a face to face encounter and independently examined this patient on 6/19/2021, as outlined below:  PHYSICAL EXAM:  General: Alert, cooperative, elderly  EENT:  EOMI. Anicteric sclerae. MMM  Resp:  CTA bilaterally, no wheezing or rales. No accessory muscle use  CV:  Regular  rhythm,  No edema  GI:  Soft, Non distended, Non tender. +Bowel sounds  Neurologic:  Alert and oriented X 3, normal speech, essential tremor  Psych:   Fair insight  Skin:  No rashes. No jaundice    Reviewed most current lab test results and cultures  YES  Reviewed most current radiology test results   YES  Review and summation of old records today    NO  Reviewed patient's current orders and MAR    YES  PMH/SH reviewed - no change compared to H&P  ________________________________________________________________________  Care Plan discussed with:    Comments   Patient x    Family  x    RN x    Care Manager     Consultant                        Multidiciplinary team rounds were held today with , nursing, pharmacist and clinical coordinator. Patient's plan of care was discussed; medications were reviewed and discharge planning was addressed. ________________________________________________________________________  Total NON critical care TIME:  35   Minutes    Total CRITICAL CARE TIME Spent:   Minutes non procedure based      Comments   >50% of visit spent in counseling and coordination of care     ________________________________________________________________________  Fredi Howard MD     Procedures: see electronic medical records for all procedures/Xrays and details which were not copied into this note but were reviewed prior to creation of Plan. LABS:  I reviewed today's most current labs and imaging studies.   Pertinent labs include:  Recent Labs     06/18/21 1918   WBC 4.4   HGB 9.8*   HCT 32.0*   *     Recent Labs     06/18/21 1918      K 4.9      CO2 34*   GLU 82   BUN 30*   CREA 1.85*   CA 8.8   ALB 3.3*   TBILI 0.3   ALT 22       Signed: Cole Copeland, MD

## 2021-06-19 NOTE — PROGRESS NOTES
ADULT PROTOCOL: JET AEROSOL ASSESSMENT    Patient  Cynthia León     80 y.o.   female     6/19/2021  1:52 PM    Breath Sounds Pre Procedure: Right Breath Sounds: Diminished                               Left Breath Sounds: Diminished    Breath Sounds Post Procedure: Right Breath Sounds: Diminished, Clear                                 Left Breath Sounds: Diminished, Clear    Breathing pattern: Pre procedure Breathing Pattern: Regular          Post procedure Breathing Pattern: Regular    Heart Rate: Pre procedure Pulse: 70           Post procedure Pulse: 62    Resp Rate: Pre procedure Respirations: 20           Post procedure Respirations: 18    Oxygen: Room Air     Changed: NO    SpO2: Pre procedure SpO2: 95 %                 Post procedure SpO2: 95 %      Nebulizer Therapy: Current medications Aerosolized Medications: Brovana, Ipratropium bromide, Pulmicort      Changed: NO        Problem List:   Patient Active Problem List   Diagnosis Code    CHF (congestive heart failure) ([facility]) I50.9    S/P TKR (total knee replacement) Z96.659    Anemia D64.9    s/p lumbar laminectomy Z98.890    S/P ASAD (total abdominal hysterectomy) Z90.710    S/P hemorrhoidectomy Z98.890, Z87.19    S/P rotator cuff surgery Z98.890    DM (diabetes mellitus) ([facility]) E11.9    Chronic sinusitis J32.9    S/P sinus surgery Z98.890    Dyslipidemia E78.5    Environmental allergies Z91.09    Obstructive sleep apnea (adult) (pediatric) G47.33    DJD (degenerative joint disease) M19.90    Chronic systolic heart failure ([facility]) I50.22    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    Asthma J45.909    Anxiety disorder F41.9    Diabetic neuropathy ([facility]) E11.40    Esophageal reflux K21.9    Essential hypertension, benign I10    Reactive airway disease with wheezing without complication C94.404    Encounter for medication monitoring Z51.81    CKD (chronic kidney disease) N18.9    Arthralgia of multiple joints M25.50    Plantar fasciitis of left foot M72.2    Type 2 diabetes with nephropathy ([facility] E11.21    Severe obesity (BMI 35.0-39. 9) with comorbidity ([facility]) E66.01    Polymyalgia rheumatica ([facility]) M35.3    COPD exacerbation ([facility]) J44.1    Ataxia R27.0    General weakness R53.1    Tremors of nervous system R25.1    Fall at home W19. Hunter Arzate, V08.714       Respiratory Therapist: Tk Rodriguez RT

## 2021-06-19 NOTE — PROGRESS NOTES
End of Shift Note    Bedside shift change report given to [facility], RN (oncoming nurse) by Lissette Hobbs RN (offgoing nurse). Report included the following information SBAR, Kardex, MAR and Recent Results    Shift worked:  DAYS   Shift summary and any significant changes:     -MRI NOR ECHO HAVE BEEN COMPLETED  -PATIENT REQUIRES VALIUM PRIOR TO MRI     Concerns for physician to address:  NONE   Zone phone for oncoming shift:   [phone]     Patient Information  Cynthia León  80 y.o.  6/18/2021  4:36 PM by Osvaldo Cohen MD. Cynthia León was admitted from Home    Problem List  Patient Active Problem List    Diagnosis Date Noted    Ataxia 06/18/2021    General weakness 06/18/2021    Tremors of nervous system 06/18/2021    Fall at home 06/18/2021    COPD exacerbation ([address] 12/09/2019    Polymyalgia rheumatica ([address] 06/15/2018    Type 2 diabetes with nephropathy ([address] 04/11/2018    Severe obesity (BMI 35.0-39. 9) with comorbidity ([address] 04/11/2018    Plantar fasciitis of left foot 11/02/2017    CKD (chronic kidney disease) 10/12/2016    Arthralgia of multiple joints 10/12/2016    Encounter for medication monitoring 01/29/2016    Reactive airway disease with wheezing without complication 11/83/7219    Essential hypertension, benign 08/25/2015    Asthma 02/23/2014    Anxiety disorder 02/23/2014    Diabetic neuropathy ([address] 02/23/2014    Esophageal reflux 02/23/2014    Degenerative arthritis of lumbar spine 06/18/2013    DJD (degenerative joint disease) 08/01/2012    Chronic systolic heart failure ([address] 08/01/2012    Obstructive sleep apnea (adult) (pediatric) 01/12/2012    Environmental allergies 10/12/2010    CHF (congestive heart failure) ([address] 03/03/2010    S/P TKR (total knee replacement) 03/03/2010    Anemia 03/03/2010    s/p lumbar laminectomy 03/03/2010    S/P ASAD (total abdominal hysterectomy) 03/03/2010    S/P hemorrhoidectomy 03/03/2010    S/P rotator cuff surgery 03/03/2010  DM (diabetes mellitus) ([facility] 03/03/2010    Chronic sinusitis 03/03/2010    S/P sinus surgery 03/03/2010    Dyslipidemia 03/03/2010     Past Medical History:   Diagnosis Date    Anemia 3/3/2010    Arrhythmia     irregular heartbeat    Arthritis     CHF (congestive heart failure) ([facility] 3/3/2010    Chronic obstructive pulmonary disease (HCC)     Chronic pain     back    Chronic sinusitis 3/3/2010    CPAP (continuous positive airway pressure) dependence     Diverticulosis     DM (diabetes mellitus) ([facility] 3/3/2010    Dyslipidemia 3/3/2010    GERD (gastroesophageal reflux disease)     HTN (hypertension) 3/3/2010    Ill-defined condition     being evaluated py pulmonolgist for \"trouble breathing\"    S/P TKR (total knee replacement) 3/3/2010    Unspecified sleep apnea     doesn't wear CPAP since back has been hurting       Core Measures:  CVA: Yes Yes  CHF:No Not applicable  PNA:No Not applicable    Activity:  Activity Level: Bath Room Privileges  Number times ambulated in hallways past shift: 0  Number of times OOB to chair past shift: 5    Cardiac:   Cardiac Monitoring: Yes           Access:   Current line(s): PIV     Genitourinary:   Urinary status: voiding  Urinary Catheter? No     Respiratory:   O2 Device: None (Room air)  Chronic home O2 use?: NO  Incentive spirometer at bedside: N/A       GI:  Last Bowel Movement Date: 06/17/21 (reported by patient)  Current diet:  ADULT DIET Regular; 4 carb choices (60 gm/meal); Low Fat/Low Chol/High Fiber/2 gm Na  Passing flatus: YES  Tolerating current diet: YES       Pain Management:   Patient states pain is manageable on current regimen: YES    Skin:  Capo Score: 19  Interventions: increase time out of bed and limit briefs    Patient Safety:  Fall Score:  Total Score: 3  Interventions: bed/chair alarm and pt to call before getting OOB  High Fall Risk: Yes  @Rollbelt no    DVT prophylaxis:  DVT prophylaxis Med- Yes  DVT prophylaxis SCD or BIANCA- No Active Consults:  IP CONSULT TO NEUROLOGY    Length of Stay:  Expected LOS: - - -  Actual LOS: 1  Discharge Plan: No just admitted      Tung Harden RN

## 2021-06-19 NOTE — PROGRESS NOTES
Received message from patient's nurse Tod Mackenzie stating :    Request order for something to help patient tolerate MRI. Patient is admitted for R/O CVA and has MRI ordered. States the last time she had an MRI she had trouble \"staying still\" and asked to have MRI stopped. Not sure when she will be taken in the morning but would like to have something available if MRI schedules her early before her hospitalist has time to see her. Discussion / orders:    Valium 5 mg iv x 1 on call to MRI           Please note that this note was dictated using Dragon computer voice recognition software. Quite often unanticipated grammatical, syntax, homophones, and other interpretive errors are inadvertently transcribed by the computer software. Please disregard these errors. Please excuse any errors that have escaped final proofreading.

## 2021-06-20 ENCOUNTER — APPOINTMENT (OUTPATIENT)
Dept: NON INVASIVE DIAGNOSTICS | Age: 86
DRG: 092 | End: 2021-06-20
Attending: GENERAL ACUTE CARE HOSPITAL
Payer: MEDICARE

## 2021-06-20 ENCOUNTER — APPOINTMENT (OUTPATIENT)
Dept: MRI IMAGING | Age: 86
DRG: 092 | End: 2021-06-20
Attending: INTERNAL MEDICINE
Payer: MEDICARE

## 2021-06-20 LAB
ATRIAL RATE: 63 BPM
CALCULATED P AXIS, ECG09: 69 DEGREES
CALCULATED R AXIS, ECG10: -34 DEGREES
CALCULATED T AXIS, ECG11: 57 DEGREES
DIAGNOSIS, 93000: NORMAL
GLUCOSE BLD STRIP.AUTO-MCNC: 102 MG/DL (ref 65–117)
GLUCOSE BLD STRIP.AUTO-MCNC: 143 MG/DL (ref 65–117)
GLUCOSE BLD STRIP.AUTO-MCNC: 172 MG/DL (ref 65–117)
GLUCOSE BLD STRIP.AUTO-MCNC: 85 MG/DL (ref 65–117)
P-R INTERVAL, ECG05: 168 MS
Q-T INTERVAL, ECG07: 420 MS
QRS DURATION, ECG06: 108 MS
QTC CALCULATION (BEZET), ECG08: 429 MS
SERVICE CMNT-IMP: ABNORMAL
SERVICE CMNT-IMP: ABNORMAL
SERVICE CMNT-IMP: NORMAL
SERVICE CMNT-IMP: NORMAL
VENTRICULAR RATE, ECG03: 63 BPM

## 2021-06-20 PROCEDURE — 82962 GLUCOSE BLOOD TEST: CPT

## 2021-06-20 PROCEDURE — 94640 AIRWAY INHALATION TREATMENT: CPT

## 2021-06-20 PROCEDURE — 74011250637 HC RX REV CODE- 250/637: Performed by: GENERAL ACUTE CARE HOSPITAL

## 2021-06-20 PROCEDURE — 70551 MRI BRAIN STEM W/O DYE: CPT

## 2021-06-20 PROCEDURE — 74011250637 HC RX REV CODE- 250/637: Performed by: INTERNAL MEDICINE

## 2021-06-20 PROCEDURE — 94760 N-INVAS EAR/PLS OXIMETRY 1: CPT

## 2021-06-20 PROCEDURE — 65660000000 HC RM CCU STEPDOWN

## 2021-06-20 PROCEDURE — 74011250636 HC RX REV CODE- 250/636: Performed by: INTERNAL MEDICINE

## 2021-06-20 PROCEDURE — 74011636637 HC RX REV CODE- 636/637: Performed by: INTERNAL MEDICINE

## 2021-06-20 PROCEDURE — 93306 TTE W/DOPPLER COMPLETE: CPT

## 2021-06-20 PROCEDURE — 74011250637 HC RX REV CODE- 250/637: Performed by: PSYCHIATRY & NEUROLOGY

## 2021-06-20 PROCEDURE — 74011000250 HC RX REV CODE- 250: Performed by: GENERAL ACUTE CARE HOSPITAL

## 2021-06-20 PROCEDURE — 74011250636 HC RX REV CODE- 250/636: Performed by: GENERAL ACUTE CARE HOSPITAL

## 2021-06-20 RX ORDER — AMOXICILLIN 250 MG
1 CAPSULE ORAL 2 TIMES DAILY
Status: DISCONTINUED | OUTPATIENT
Start: 2021-06-20 | End: 2021-06-23 | Stop reason: HOSPADM

## 2021-06-20 RX ORDER — PANTOPRAZOLE SODIUM 40 MG/1
40 TABLET, DELAYED RELEASE ORAL
Status: DISCONTINUED | OUTPATIENT
Start: 2021-06-20 | End: 2021-06-23 | Stop reason: HOSPADM

## 2021-06-20 RX ADMIN — PREDNISONE 15 MG: 5 TABLET ORAL at 09:47

## 2021-06-20 RX ADMIN — HEPARIN SODIUM 5000 UNITS: 5000 INJECTION INTRAVENOUS; SUBCUTANEOUS at 21:19

## 2021-06-20 RX ADMIN — FUROSEMIDE 20 MG: 20 TABLET ORAL at 09:47

## 2021-06-20 RX ADMIN — ASPIRIN 81 MG: 81 TABLET, CHEWABLE ORAL at 09:46

## 2021-06-20 RX ADMIN — DIAZEPAM 5 MG: 5 INJECTION, SOLUTION INTRAMUSCULAR; INTRAVENOUS at 07:23

## 2021-06-20 RX ADMIN — IPRATROPIUM BROMIDE 0.5 MG: 0.5 SOLUTION RESPIRATORY (INHALATION) at 11:29

## 2021-06-20 RX ADMIN — DOCUSATE SODIUM 50MG AND SENNOSIDES 8.6MG 1 TABLET: 8.6; 5 TABLET, FILM COATED ORAL at 17:49

## 2021-06-20 RX ADMIN — IPRATROPIUM BROMIDE 0.5 MG: 0.5 SOLUTION RESPIRATORY (INHALATION) at 21:12

## 2021-06-20 RX ADMIN — GLIPIZIDE 2.5 MG: 5 TABLET ORAL at 09:47

## 2021-06-20 RX ADMIN — PRIMIDONE 150 MG: 50 TABLET ORAL at 17:49

## 2021-06-20 RX ADMIN — ROSUVASTATIN CALCIUM 10 MG: 5 TABLET, COATED ORAL at 21:19

## 2021-06-20 RX ADMIN — PRIMIDONE 150 MG: 50 TABLET ORAL at 09:47

## 2021-06-20 RX ADMIN — HEPARIN SODIUM 5000 UNITS: 5000 INJECTION INTRAVENOUS; SUBCUTANEOUS at 05:56

## 2021-06-20 RX ADMIN — HEPARIN SODIUM 5000 UNITS: 5000 INJECTION INTRAVENOUS; SUBCUTANEOUS at 13:54

## 2021-06-20 RX ADMIN — ARFORMOTEROL TARTRATE 15 MCG: 15 SOLUTION RESPIRATORY (INHALATION) at 21:10

## 2021-06-20 RX ADMIN — ARFORMOTEROL TARTRATE 15 MCG: 15 SOLUTION RESPIRATORY (INHALATION) at 11:29

## 2021-06-20 RX ADMIN — PANTOPRAZOLE SODIUM 40 MG: 40 TABLET, DELAYED RELEASE ORAL at 17:49

## 2021-06-20 RX ADMIN — DULOXETINE HYDROCHLORIDE 60 MG: 30 CAPSULE, DELAYED RELEASE ORAL at 09:47

## 2021-06-20 NOTE — PROGRESS NOTES
End of Shift Note    Bedside shift change report given to [facility] (oncoming nurse) by Leroy Spivey RN (offgoing nurse). Report included the following information SBAR, Kardex, MAR and Recent Results    Shift worked:  7p-7a   Shift summary and any significant changes:     none   Concerns for physician to address:  NONE   Zone phone for oncoming shift:   [phone]     Patient Information  Abrahan Escobedo  80 y.o.  6/18/2021  4:36 PM by eNeraj Sylvester MD. Abrahan Escobedo was admitted from Home    Problem List  Patient Active Problem List    Diagnosis Date Noted    Ataxia 06/18/2021    General weakness 06/18/2021    Tremors of nervous system 06/18/2021    Fall at home 06/18/2021    COPD exacerbation ([address] 12/09/2019    Polymyalgia rheumatica ([address] 06/15/2018    Type 2 diabetes with nephropathy ([address] 04/11/2018    Severe obesity (BMI 35.0-39. 9) with comorbidity ([address] 04/11/2018    Plantar fasciitis of left foot 11/02/2017    CKD (chronic kidney disease) 10/12/2016    Arthralgia of multiple joints 10/12/2016    Encounter for medication monitoring 01/29/2016    Reactive airway disease with wheezing without complication 04/78/8671    Essential hypertension, benign 08/25/2015    Asthma 02/23/2014    Anxiety disorder 02/23/2014    Diabetic neuropathy ([address] 02/23/2014    Esophageal reflux 02/23/2014    Degenerative arthritis of lumbar spine 06/18/2013    DJD (degenerative joint disease) 08/01/2012    Chronic systolic heart failure ([address] 08/01/2012    Obstructive sleep apnea (adult) (pediatric) 01/12/2012    Environmental allergies 10/12/2010    CHF (congestive heart failure) ([address] 03/03/2010    S/P TKR (total knee replacement) 03/03/2010    Anemia 03/03/2010    s/p lumbar laminectomy 03/03/2010    S/P ASAD (total abdominal hysterectomy) 03/03/2010    S/P hemorrhoidectomy 03/03/2010    S/P rotator cuff surgery 03/03/2010    DM (diabetes mellitus) ([address] 03/03/2010    Chronic sinusitis 03/03/2010    S/P sinus surgery 03/03/2010    Dyslipidemia 03/03/2010     Past Medical History:   Diagnosis Date    Anemia 3/3/2010    Arrhythmia     irregular heartbeat    Arthritis     CHF (congestive heart failure) ([facility] 3/3/2010    Chronic obstructive pulmonary disease (HCC)     Chronic pain     back    Chronic sinusitis 3/3/2010    CPAP (continuous positive airway pressure) dependence     Diverticulosis     DM [facility] mellitus[facility] 3/3/2010    Dyslipidemia 3/3/2010    GERD (gastroesophageal reflux disease)     HTN (hypertension) 3/3/2010    Ill-defined condition     being evaluated py pulmonolgist for \"trouble breathing\"    S/P TKR (total knee replacement) 3/3/2010    Unspecified sleep apnea     doesn't wear CPAP since back has been hurting       Core Measures:  CVA: Yes Yes  CHF:No Not applicable  PNA:No Not applicable    Activity:  Activity Level: Up with Assistance  Number times ambulated in hallways past shift: 0  Number of times OOB to chair past shift: 5    Cardiac:   Cardiac Monitoring: Yes           Access:   Current line(s): PIV     Genitourinary:   Urinary status: voiding  Urinary Catheter? No     Respiratory:   O2 Device: None (Room air)  Chronic home O2 use?: NO  Incentive spirometer at bedside: N/A       GI:  Last Bowel Movement Date: 06/17/21  Current diet:  ADULT DIET Regular; 4 carb choices (60 gm/meal); Low Fat/Low Chol/High Fiber/2 gm Na  Passing flatus: YES  Tolerating current diet: YES       Pain Management:   Patient states pain is manageable on current regimen: YES    Skin:  Capo Score: 19  Interventions: increase time out of bed and limit briefs    Patient Safety:  Fall Score:  Total Score: 4  Interventions: bed/chair alarm and pt to call before getting OOB  High Fall Risk: Yes  @Rollbelt no    DVT prophylaxis:  DVT prophylaxis Med- Yes  DVT prophylaxis SCD or BIANCA- No     Active Consults:  IP CONSULT TO NEUROLOGY    Length of Stay:  Expected LOS: - - -  Actual LOS: 2  Discharge Plan: No just admitted      Orlando Quezada RN

## 2021-06-20 NOTE — PROGRESS NOTES
ADULT PROTOCOL: JET AEROSOL  REASSESSMENT    Patient  Parker Vasquez     80 y.o.   female     6/20/2021  11:43 AM    Breath Sounds Pre Procedure: Right Breath Sounds: Diminished, Clear                               Left Breath Sounds: Diminished, Clear    Breath Sounds Post Procedure: Right Breath Sounds: Diminished, Coarse                                 Left Breath Sounds: Diminished, Coarse    Breathing pattern: Pre procedure Breathing Pattern: Regular          Post procedure Breathing Pattern: Regular    Heart Rate: Pre procedure Pulse: 64           Post procedure Pulse: 60    Resp Rate: Pre procedure Respirations: 18           Post procedure Respirations: 18      Oxygen: Room Air     Changed:NO    SpO2: Pre procedure SpO2: 95 %                 Post procedure SpO2: 95 %      Nebulizer Therapy: Current medications Aerosolized Medications: Brovana, Ipratropium bromide      Changed: NO      Problem List:   Patient Active Problem List   Diagnosis Code    [facility]congest[facility] failure) ([facility]) I50.9    S/P TKR (total knee replacement) Z96.659    Anemia D64.9    s/p lumbar laminectomy Z98.890    S/P ASAD (total abdominal hysterectomy) Z90.710    S/P hemorrhoidectomy Z98.890, Z87.19    S/P rotator cuff surgery Z98.890    DM (diabetes mellitus) ([facility]) E11.9    Chronic sinusitis J32.9    S/P sinus surgery Z98.890    Dyslipidemia E78.5    Environmental allergies Z91.09    Obstructive sleep apnea (adult) (pediatric) G47.33    DJD (degenerative joint disease) M19.90    Chronic systolic heart failure ([facility]) I50.22    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    Asthma J45.909    Anxiety disorder F41.9    Diabetic neuropathy ([facility]) E11.40    Esophageal reflux K21.9    Essential hypertension, benign I10    Reactive airway disease with wheezing without complication I64.832    Encounter for medication monitoring Z51.81    CKD (chronic kidney disease) N18.9    Arthralgia of multiple joints M25.50    Plantar fasciitis of left foot M72.2    Type 2 diabetes with nephropathy ([facility]) E11.21    Severe obesity (BMI 35.0-39. 9) with comorbidity ([facility]) E66.01    Polymyalgia rheumatica ([facility]) M35.3    COPD exacerbation ([facility]) J44.1    Ataxia R27.0    General weakness R53.1    Tremors of nervous system R25.1    Fall at home W19. Estela Lopes, Z60.847       Respiratory Therapist: Porsche Gudino, RT

## 2021-06-20 NOTE — PROGRESS NOTES
End of Shift Note    Bedside shift change report given to Flor Palafox RN (oncoming nurse) by Hanna Polanco RN (offgoing nurse). Report included the following information SBAR, Kardex, MAR and Recent Results    Shift worked:  DAYS   Shift summary and any significant changes:     -MRI AND ECHO HAVE BEEN COMPLETED  -GAVE PATIENT FULL SOAP AND WATER BATH     Concerns for physician to address:  NONE   Zone phone for oncoming shift:   [phone]     Patient Information  Parker Vasquez  80 y.o.  6/18/2021  4:36 PM by Bishop Brown MD. Parker Vasquez was admitted from Home    Problem List  Patient Active Problem List    Diagnosis Date Noted    Ataxia 06/18/2021    General weakness 06/18/2021    Tremors of nervous system 06/18/2021    Fall at home 06/18/2021    COPD exacerbation ([address] 12/09/2019    Polymyalgia rheumatica ([address] 06/15/2018    Type 2 diabetes with nephropathy ([address] 04/11/2018    Severe obesity (BMI 35.0-39. 9) with comorbidity ([address] 04/11/2018    Plantar fasciitis of left foot 11/02/2017    CKD (chronic kidney disease) 10/12/2016    Arthralgia of multiple joints 10/12/2016    Encounter for medication monitoring 01/29/2016    Reactive airway disease with wheezing without complication 67/82/0139    Essential hypertension, benign 08/25/2015    Asthma 02/23/2014    Anxiety disorder 02/23/2014    Diabetic neuropathy ([address] 02/23/2014    Esophageal reflux 02/23/2014    Degenerative arthritis of lumbar spine 06/18/2013    DJD (degenerative joint disease) 08/01/2012    Chronic systolic heart failure ([address] 08/01/2012    Obstructive sleep apnea (adult) (pediatric) 01/12/2012    Environmental allergies 10/12/2010    CHF (congestive heart failure) ([address] 03/03/2010    S/P TKR (total knee replacement) 03/03/2010    Anemia 03/03/2010    s/p lumbar laminectomy 03/03/2010    S/P ASAD (total abdominal hysterectomy) 03/03/2010    S/P hemorrhoidectomy 03/03/2010    S/P rotator cuff surgery 03/03/2010    DM (diabetes mellitus) ([facility] 03/03/2010    Chronic sinusitis 03/03/2010    S/P sinus surgery 03/03/2010    Dyslipidemia 03/03/2010     Past Medical History:   Diagnosis Date    Anemia 3/3/2010    Arrhythmia     irregular heartbeat    Arthritis     CHF (congestive heart failure) ([facility] 3/3/2010    Chronic obstructive pulmonary disease (HCC)     Chronic pain     back    Chronic sinusitis 3/3/2010    CPAP (continuous positive airway pressure) dependence     Diverticulosis     DM (diabetes mellitus) ([facility] 3/3/2010    Dyslipidemia 3/3/2010    GERD (gastroesophageal reflux disease)     HTN (hypertension) 3/3/2010    Ill-defined condition     being evaluated py pulmonolgist for \"trouble breathing\"    S/P TKR (total knee replacement) 3/3/2010    Unspecified sleep apnea     doesn't wear CPAP since back has been hurting       Core Measures:  CVA: Yes Yes  CHF:No Not applicable  PNA:No Not applicable    Activity:  Activity Level: Up with Assistance, Bath Room Privileges  Number times ambulated in hallways past shift: 0  Number of times OOB to chair past shift: 5    Cardiac:   Cardiac Monitoring: Yes           Access:   Current line(s): PIV     Genitourinary:   Urinary status: voiding  Urinary Catheter? No     Respiratory:   O2 Device: None (Room air)  Chronic home O2 use?: NO  Incentive spirometer at bedside: N/A       GI:  Last Bowel Movement Date: 06/17/21  Current diet:  ADULT DIET Regular; 4 carb choices (60 gm/meal); Low Fat/Low Chol/High Fiber/2 gm Na  Passing flatus: YES  Tolerating current diet: YES       Pain Management:   Patient states pain is manageable on current regimen: YES    Skin:  Capo Score: 19  Interventions: increase time out of bed and limit briefs    Patient Safety:  Fall Score:  Total Score: 4  Interventions: bed/chair alarm and pt to call before getting OOB  High Fall Risk: Yes  @Rollbelt no    DVT prophylaxis:  DVT prophylaxis Med- Yes  DVT prophylaxis SCD or BIANCA- No Active Consults:  IP CONSULT TO NEUROLOGY    Length of Stay:  Expected LOS: - - -  Actual LOS: 2  Discharge Plan: No just admitted      Niru Diaz RN

## 2021-06-20 NOTE — PROGRESS NOTES
Hospitalist Progress Note    NAME: Kiran Muir   :  1931   MRN:  [id number]       Assessment / Plan:  Debility  Essential tremor  MRI Brain negative  PT/OT rec is for SNF  CM to search for placement  Continue Primidone    Diabetes type 2-controlled POA  CKD stage III POA-creatinine at baseline at 1.8  Chronic pain syndrome  COPD  DONNIE not on CPAP     Fingersticks before every meal and at bedtime here, continue home glipizide, SSI lispro here  Continue Cymbalta  Continue home inhalers  Continue home prednisone    30.0 - 39.9 Obese / Body mass index is 36.71 kg/m². Estimated discharge date:   Barriers:    Code status: Full  Prophylaxis: Hep SQ  Recommended Disposition: TBD     Subjective:     Chief Complaint / Reason for Physician Visit  States that she is fine when sitting down but has balance issues when trying to ambulate. Discussed with RN events overnight. Review of Systems:  Symptom Y/N Comments  Symptom Y/N Comments   Fever/Chills    Chest Pain     Poor Appetite    Edema     Cough    Abdominal Pain     Sputum    Joint Pain     SOB/HIGH    Pruritis/Rash     Nausea/vomit    Tolerating PT/OT     Diarrhea    Tolerating Diet     Constipation    Other       Could NOT obtain due to:      Objective:     VITALS:   Last 24hrs VS reviewed since prior progress note. Most recent are:  Patient Vitals for the past 24 hrs:   Temp Pulse Resp BP SpO2   21 0429 98.9 °F (37.2 °C) 62 16 128/60 94 %   21 2334 98.5 °F (36.9 °C) 61 16 (!) 154/68 97 %   21 2018     95 %   21 1858 98.6 °F (37 °C) 71 16 (!) 126/98 96 %   21 1540 98 °F (36.7 °C) 64 16 (!) 118/56 94 %   21 1205 98.6 °F (37 °C) 72 18 131/60 95 %     No intake or output data in the 24 hours ending 21 1048     I had a face to face encounter and independently examined this patient on 2021, as outlined below:  PHYSICAL EXAM:  General: Alert, cooperative, elderly  EENT:  EOMI. Anicteric sclerae. MMM  Resp:  CTA bilaterally, no wheezing or rales. No accessory muscle use  CV:  Regular  rhythm,  No edema  GI:  Soft, Non distended, Non tender. +Bowel sounds  Neurologic:  Alert and oriented X 3, normal speech, essential tremor  Psych:   Fair insight  Skin:  No rashes. No jaundice    Reviewed most current lab test results and cultures  YES  Reviewed most current radiology test results   YES  Review and summation of old records today    NO  Reviewed patient's current orders and MAR    YES  PMH/SH reviewed - no change compared to H&P  ________________________________________________________________________  Care Plan discussed with:    Comments   Patient x    Family  x    RN x    Care Manager     Consultant                        Multidiciplinary team rounds were held today with , nursing, pharmacist and clinical coordinator. Patient's plan of care was discussed; medications were reviewed and discharge planning was addressed. ________________________________________________________________________  Total NON critical care TIME:  35   Minutes    Total CRITICAL CARE TIME Spent:   Minutes non procedure based      Comments   >50% of visit spent in counseling and coordination of care     ________________________________________________________________________  Chayo Landrum MD     Procedures: see electronic medical records for all procedures/Xrays and details which were not copied into this note but were reviewed prior to creation of Plan. LABS:  I reviewed today's most current labs and imaging studies.   Pertinent labs include:  Recent Labs     06/18/21 1918   WBC 4.4   HGB 9.8*   HCT 32.0*   *     Recent Labs     06/18/21 1918      K 4.9      CO2 34*   GLU 82   BUN 30*   CREA 1.85*   CA 8.8   ALB 3.3*   TBILI 0.3   ALT 22       Signed: Chayo Landrum MD

## 2021-06-21 LAB
COVID-19 RAPID TEST, COVR: NOT DETECTED
ECHO AV AREA PEAK VELOCITY: 1.46 CM2
ECHO AV AREA PEAK VELOCITY: 1.46 CM2
ECHO AV AREA PEAK VELOCITY: 1.53 CM2
ECHO AV AREA PEAK VELOCITY: 1.53 CM2
ECHO AV AREA VTI: 1.63 CM2
ECHO AV AREA/BSA VTI: 0.8 CM2/M2
ECHO AV CUSP MM: 1.85 CM
ECHO AV MEAN GRADIENT: 8.27 MMHG
ECHO AV PEAK GRADIENT: 16.1 MMHG
ECHO AV PEAK GRADIENT: 16.1 MMHG
ECHO AV PEAK VELOCITY: 200.62 CM/S
ECHO AV PEAK VELOCITY: 200.62 CM/S
ECHO AV VTI: 48.7 CM
ECHO LA AREA 4C: 30.58 CM2
ECHO LA MAJOR AXIS: 4.51 CM
ECHO LA MINOR AXIS: 2.24 CM
ECHO LA TO AORTIC ROOT RATIO: 1.26
ECHO LA TO AORTIC ROOT RATIO: 1.26
ECHO LA VOL 2C: 85.71 ML (ref 22–52)
ECHO LA VOL 4C: 118.31 ML (ref 22–52)
ECHO LA VOL BP: 107.15 ML (ref 22–52)
ECHO LA VOL/BSA BIPLANE: 53.31 ML/M2 (ref 16–28)
ECHO LA VOLUME INDEX A2C: 42.64 ML/M2 (ref 16–28)
ECHO LA VOLUME INDEX A4C: 58.86 ML/M2 (ref 16–28)
ECHO LV E' LATERAL VELOCITY: 9.08 CM/S
ECHO LV E' SEPTAL VELOCITY: 7.69 CM/S
ECHO LV INTERNAL DIMENSION DIASTOLIC: 4.38 CM (ref 3.9–5.3)
ECHO LV INTERNAL DIMENSION SYSTOLIC: 3.46 CM
ECHO LV IVSD: 1.25 CM (ref 0.6–0.9)
ECHO LV IVSS: 1.74 CM
ECHO LV MASS 2D: 219.7 G (ref 67–162)
ECHO LV MASS INDEX 2D: 109.3 G/M2 (ref 43–95)
ECHO LV POSTERIOR WALL DIASTOLIC: 1.4 CM (ref 0.6–0.9)
ECHO LV POSTERIOR WALL SYSTOLIC: 1.89 CM
ECHO LVOT CARDIAC OUTPUT: 9.52 LITER/MINUTE
ECHO LVOT DIAM: 2.13 CM
ECHO LVOT PEAK GRADIENT: 2.72 MMHG
ECHO LVOT PEAK GRADIENT: 2.97 MMHG
ECHO LVOT PEAK VELOCITY: 82.48 CM/S
ECHO LVOT PEAK VELOCITY: 86.12 CM/S
ECHO LVOT SV: 79.4 ML
ECHO LVOT VTI: 22.32 CM
ECHO MV A VELOCITY: 68.76 CM/S
ECHO MV E DECELERATION TIME (DT): 169.34 MS
ECHO MV E VELOCITY: 88.22 CM/S
ECHO MV E/A RATIO: 1.28
ECHO MV E/E' LATERAL: 9.72
ECHO MV E/E' RATIO (AVERAGED): 10.59
ECHO MV E/E' SEPTAL: 11.47
ECHO MV EROA PISA: 0.3 CM2
ECHO MV REGURGITANT RADIUS PISA: 0.86 CM
ECHO MV REGURGITANT VOLUME: 60.74 ML
ECHO MV REGURGITANT VTIA: 205.14 CM
ECHO PV MAX VELOCITY: 82.4 CM/S
ECHO PV PEAK INSTANTANEOUS GRADIENT SYSTOLIC: 2.72 MMHG
ECHO RV INTERNAL DIMENSION: 3.44 CM
ECHO TV REGURGITANT MAX VELOCITY: 165.87 CM/S
ECHO TV REGURGITANT MAX VELOCITY: 483.29 CM/S
ECHO TV REGURGITANT PEAK GRADIENT: 11.02 MMHG
GLUCOSE BLD STRIP.AUTO-MCNC: 100 MG/DL (ref 65–117)
GLUCOSE BLD STRIP.AUTO-MCNC: 131 MG/DL (ref 65–117)
GLUCOSE BLD STRIP.AUTO-MCNC: 151 MG/DL (ref 65–117)
GLUCOSE BLD STRIP.AUTO-MCNC: 154 MG/DL (ref 65–117)
LVOT MG: 1.58 MMHG
MR PISA PV: 554.15 CM/S
SERVICE CMNT-IMP: ABNORMAL
SERVICE CMNT-IMP: NORMAL
SOURCE, COVRS: NORMAL

## 2021-06-21 PROCEDURE — 99232 SBSQ HOSP IP/OBS MODERATE 35: CPT | Performed by: PSYCHIATRY & NEUROLOGY

## 2021-06-21 PROCEDURE — 94640 AIRWAY INHALATION TREATMENT: CPT

## 2021-06-21 PROCEDURE — 97535 SELF CARE MNGMENT TRAINING: CPT

## 2021-06-21 PROCEDURE — 74011000250 HC RX REV CODE- 250: Performed by: GENERAL ACUTE CARE HOSPITAL

## 2021-06-21 PROCEDURE — 97530 THERAPEUTIC ACTIVITIES: CPT

## 2021-06-21 PROCEDURE — 87635 SARS-COV-2 COVID-19 AMP PRB: CPT

## 2021-06-21 PROCEDURE — 74011250637 HC RX REV CODE- 250/637: Performed by: GENERAL ACUTE CARE HOSPITAL

## 2021-06-21 PROCEDURE — 82962 GLUCOSE BLOOD TEST: CPT

## 2021-06-21 PROCEDURE — 74011250637 HC RX REV CODE- 250/637: Performed by: INTERNAL MEDICINE

## 2021-06-21 PROCEDURE — 97530 THERAPEUTIC ACTIVITIES: CPT | Performed by: PHYSICAL THERAPIST

## 2021-06-21 PROCEDURE — 65660000000 HC RM CCU STEPDOWN

## 2021-06-21 PROCEDURE — 97116 GAIT TRAINING THERAPY: CPT | Performed by: PHYSICAL THERAPIST

## 2021-06-21 PROCEDURE — 74011250636 HC RX REV CODE- 250/636: Performed by: INTERNAL MEDICINE

## 2021-06-21 PROCEDURE — 74011636637 HC RX REV CODE- 636/637: Performed by: INTERNAL MEDICINE

## 2021-06-21 PROCEDURE — 94760 N-INVAS EAR/PLS OXIMETRY 1: CPT

## 2021-06-21 PROCEDURE — 74011250637 HC RX REV CODE- 250/637: Performed by: PSYCHIATRY & NEUROLOGY

## 2021-06-21 RX ADMIN — DOCUSATE SODIUM 50MG AND SENNOSIDES 8.6MG 1 TABLET: 8.6; 5 TABLET, FILM COATED ORAL at 17:55

## 2021-06-21 RX ADMIN — HEPARIN SODIUM 5000 UNITS: 5000 INJECTION INTRAVENOUS; SUBCUTANEOUS at 12:52

## 2021-06-21 RX ADMIN — PREDNISONE 15 MG: 5 TABLET ORAL at 08:36

## 2021-06-21 RX ADMIN — PRIMIDONE 150 MG: 50 TABLET ORAL at 08:35

## 2021-06-21 RX ADMIN — IPRATROPIUM BROMIDE 0.5 MG: 0.5 SOLUTION RESPIRATORY (INHALATION) at 21:04

## 2021-06-21 RX ADMIN — ARFORMOTEROL TARTRATE 15 MCG: 15 SOLUTION RESPIRATORY (INHALATION) at 09:30

## 2021-06-21 RX ADMIN — HEPARIN SODIUM 5000 UNITS: 5000 INJECTION INTRAVENOUS; SUBCUTANEOUS at 05:01

## 2021-06-21 RX ADMIN — ARFORMOTEROL TARTRATE 15 MCG: 15 SOLUTION RESPIRATORY (INHALATION) at 21:04

## 2021-06-21 RX ADMIN — ASPIRIN 81 MG: 81 TABLET, CHEWABLE ORAL at 08:36

## 2021-06-21 RX ADMIN — PANTOPRAZOLE SODIUM 40 MG: 40 TABLET, DELAYED RELEASE ORAL at 08:35

## 2021-06-21 RX ADMIN — GLIPIZIDE 2.5 MG: 5 TABLET ORAL at 08:36

## 2021-06-21 RX ADMIN — PRIMIDONE 150 MG: 50 TABLET ORAL at 17:55

## 2021-06-21 RX ADMIN — ROSUVASTATIN CALCIUM 10 MG: 5 TABLET, COATED ORAL at 21:24

## 2021-06-21 RX ADMIN — FUROSEMIDE 20 MG: 20 TABLET ORAL at 08:36

## 2021-06-21 RX ADMIN — DULOXETINE HYDROCHLORIDE 60 MG: 30 CAPSULE, DELAYED RELEASE ORAL at 08:36

## 2021-06-21 RX ADMIN — FLUTICASONE PROPIONATE 2 SPRAY: 50 SPRAY, METERED NASAL at 08:39

## 2021-06-21 RX ADMIN — DOCUSATE SODIUM 50MG AND SENNOSIDES 8.6MG 1 TABLET: 8.6; 5 TABLET, FILM COATED ORAL at 08:35

## 2021-06-21 RX ADMIN — HEPARIN SODIUM 5000 UNITS: 5000 INJECTION INTRAVENOUS; SUBCUTANEOUS at 20:25

## 2021-06-21 RX ADMIN — IPRATROPIUM BROMIDE 0.5 MG: 0.5 SOLUTION RESPIRATORY (INHALATION) at 09:30

## 2021-06-21 NOTE — PROGRESS NOTES
Hospitalist Progress Note    NAME: Jani Mondragon   :  1931   MRN:  [id number]       Assessment / Plan:  Debility  Essential tremor  MRI Brain negative  PT/OT rec is for SNF  CM to search for placement  Continue Primidone    : [facility]    Diabetes type 2-controlled POA  CKD stage III POA-creatinine at baseline at 1.8  Chronic pain syndrome  COPD  DONNIE not on CPAP     Fingersticks before every meal and at bedtime here, continue home glipizide, SSI lispro here  Continue Cymbalta  Continue home inhalers  Continue home prednisone    30.0 - 39.9 Obese / Body mass index is 36.71 kg/m². Estimated discharge date:   Barriers:    Code status: Full  Prophylaxis: Hep SQ  Recommended Disposition: TBD     Subjective:     Chief Complaint / Reason for Physician Visit  States that she is fine when sitting down but has balance issues when trying to ambulate. Discussed with RN events overnight. Review of Systems:  Symptom Y/N Comments  Symptom Y/N Comments   Fever/Chills    Chest Pain     Poor Appetite    Edema     Cough    Abdominal Pain     Sputum    Joint Pain     SOB/HIGH    Pruritis/Rash     Nausea/vomit    Tolerating PT/OT     Diarrhea    Tolerating Diet     Constipation    Other       Could NOT obtain due to:      Objective:     VITALS:   Last 24hrs VS reviewed since prior progress note.  Most recent are:  Patient Vitals for the past 24 hrs:   Temp Pulse Resp BP SpO2   21 1137 98.6 °F (37 °C) 60 16 129/67 97 %   21 0930     95 %   21 0830 98.8 °F (37.1 °C) 60 16 (!) 141/67 96 %   21 0308 97.8 °F (36.6 °C) 68 16 (!) 157/70 97 %   21 2324 98.9 °F (37.2 °C) 65 16 (!) 156/70 98 %   21 2109     98 %   21 1920 98.1 °F (36.7 °C) 63 16 137/64 97 %   21 1549 97.8 °F (36.6 °C) (!) 57 16 134/68 98 %     No intake or output data in the 24 hours ending 21 1531     I had a face to face encounter and independently examined this patient on 6/21/2021, as outlined below:  PHYSICAL EXAM:  General: Alert, cooperative, elderly  EENT:  EOMI. Anicteric sclerae. MMM  Resp:  CTA bilaterally, no wheezing or rales. No accessory muscle use  CV:  Regular  rhythm,  No edema  GI:  Soft, Non distended, Non tender. +Bowel sounds  Neurologic:  Alert and oriented X 3, normal speech, essential tremor  Psych:   Fair insight  Skin:  No rashes. No jaundice    Reviewed most current lab test results and cultures  YES  Reviewed most current radiology test results   YES  Review and summation of old records today    NO  Reviewed patient's current orders and MAR    YES  PMH/ reviewed - no change compared to H&P  ________________________________________________________________________  Care Plan discussed with:    Comments   Patient x    Family  x    RN x    Care Manager     Consultant                        Multidiciplinary team rounds were held today with , nursing, pharmacist and clinical coordinator. Patient's plan of care was discussed; medications were reviewed and discharge planning was addressed. ________________________________________________________________________  Total NON critical care TIME:  35   Minutes    Total CRITICAL CARE TIME Spent:   Minutes non procedure based      Comments   >50% of visit spent in counseling and coordination of care     ________________________________________________________________________  Nicki Szymanski MD     Procedures: see electronic medical records for all procedures/Xrays and details which were not copied into this note but were reviewed prior to creation of Plan. LABS:  I reviewed today's most current labs and imaging studies.   Pertinent labs include:  Recent Labs     06/18/21 1918   WBC 4.4   HGB 9.8*   HCT 32.0*   *     Recent Labs     06/18/21 1918      K 4.9      CO2 34*   GLU 82   BUN 30*   CREA 1.85*   CA 8.8   ALB 3.3*   TBILI 0.3   ALT 22       Signed: Rakel Mackey MD

## 2021-06-21 NOTE — PROGRESS NOTES
Problem: Self Care Deficits Care Plan (Adult)  Goal: *Acute Goals and Plan of Care (Insert Text)  Description:   FUNCTIONAL STATUS PRIOR TO ADMISSION: Pt lives with her  who assists pt with lower body adls and perform all IADLs.  reports pt has slowed down over the past 6 months. Pt has had one fall at the beginning of May. All tasks take pt additional time to perform. She has tremors, noted to be mostly intention tremors. Pt uses a RW. There is a stair lift at home--they live in a tri level house    HOME SUPPORT: The patient lived with her . Granddaughter also helps. Occupational Therapy Goals  Initiated 6/19/2021  1. Patient will perform grooming in standing  with supervision/set-up within 7 day(s). 2.  Patient will perform  bathing with minimal assistance/contact guard assist within 7 day(s). 3.  Patient will perform lower body dressing, using adaptive aids prn with minimal assistance/contact guard assist within 7 day(s). 4.  Patient will perform toilet transfers with supervision/set-up within 7 day(s). 5.  Patient will perform all aspects of toileting with supervision/set-up within 7 day(s). 6.  Patient will participate in upper extremity therapeutic exercise/activities with supervision/set-up for 10 minutes within 7 day(s). 7.  Patient will perform standing adls for at least 8 minutes  within 7 day(s). 8.  Patient will demonstrate safe technique during all functional mobility activities within 7 days. Outcome: Progressing Towards Goal   OCCUPATIONAL THERAPY TREATMENT  Patient: Derrek Cortez (14 y.o. female)  Date: 6/21/2021  Diagnosis: General weakness [R53.1]  Tremors of nervous system [R25.1]  Ataxia [R27.0]  Fall at home [W19. XXXA, I48.617] <principal problem not specified>       Precautions: Fall  Chart, occupational therapy assessment, plan of care, and goals were reviewed.     ASSESSMENT  Patient continues with skilled OT services and is progressing towards goals. Pt was supine in bed and family was in room. Pt was able to perform bed mobility,with CGA and extra time. She was able to scoot to EOb with CGA, and she was able to stand with CGA of 2 , and with RW pt was CGA for walking to door of room. Pt was CGA for transfer to toilet,and would recommend that pt have BSC over toielt to increase the height of toilet. Pt was able to clean self and stood with CGA, and able to stand at sink and wash hands with CGA to SBA. Pt was left sitting up in chair, with family in room. Daughter stated that she has a BSC that her mother could use, and recommend that  try to see if [facility] fits in pts shower at home to assist with bathing and also for increased height of toilet. Talked to pts family about a transport wheelchair, that would be lighter for  to put into back of car. Pt is moving well and made a great deal of progress, and at this time, feel that pt have rehab at In pt and be at a better functional level when she returns home. If pt does not go to rehab she will need BSC, transport wheelchair, and home care OT and PT. Current Level of Function Impacting Discharge (ADLs): max to mod for LB ADLs              PLAN :  Patient continues to benefit from skilled intervention to address the above impairments. Continue treatment per established plan of care to address goals. Recommend with staff: Thanh Pham for meals     Recommend next OT session: work on washing deonna area and buttocks at the sink    Recommendation for discharge: (in order for the patient to meet his/her long term goals)  Therapy 3 hours per day 5-7 days per week    This discharge recommendation:  Has been made in collaboration with the attending provider and/or case management    IF patient discharges home will need the following DME: tbd       SUBJECTIVE:   Patient stated I can move better today. My legs are not shaking.  Blease Party    OBJECTIVE DATA SUMMARY:   Cognitive/Behavioral Status:  Neurologic State: Alert  Orientation Level: Oriented X4  Cognition: Follows commands  Perception: Appears intact  Perseveration: No perseveration noted  Safety/Judgement: Fall prevention    Functional Mobility and Transfers for ADLs:  Bed Mobility:  Rolling: Contact guard assistance  Supine to Sit: Contact guard assistance  Sit to Supine: Contact guard assistance  Scooting: Contact guard assistance    Transfers:  Sit to Stand: Contact guard assistance          Balance:  Sitting: Intact  Standing: Intact  Standing - Static: Constant support;Good  Standing - Dynamic : Constant support; Fair    ADL Intervention:  Feeding  Feeding Assistance: Independent    Grooming  Grooming Assistance: Set-up  Position Performed: Seated in chair  Washing Face: Independent  Washing Hands: Independent  Brushing Teeth: Independent    Upper Body Bathing  Bathing Assistance: Set-up  Position Performed: Seated in chair    Lower Body Bathing  Bathing Assistance: Maximum assistance;Minimum assistance  Perineal  : Maximum assistance;Minimum assistance  Position Performed: Standing          Toileting  Bladder Hygiene: Contact guard assistance;Set-up  Bowel Hygiene: Contact guard assistance;Set-up  Clothing Management: Contact guard assistance    Cognitive Retraining  Safety/Judgement: Fall prevention    Activity Tolerance:   Fair    After treatment patient left in no apparent distress:   Sitting in chair, Call bell within reach, Bed / chair alarm activated, and Caregiver / family present    COMMUNICATION/COLLABORATION:   The patients plan of care was discussed with: Physical therapist and Registered nurse.      Ksenia Hanson OT  Time Calculation: 27 mins

## 2021-06-21 NOTE — PROGRESS NOTES
End of Shift Note    Bedside shift change report given to Rosario Morin RN (oncoming nurse) by ILA Pham (offgoing nurse). Report included the following information SBAR, Kardex, MAR and Recent Results    Shift worked: 7a-7p   Shift summary and any significant changes:    -covid test sent, referalls sent for in-pt rehab     Concerns for physician to address:  NONE   Zone phone for oncoming shift:   [phone]     Patient Information  Karine Espinal  80 y.o.  6/18/2021  4:36 PM by Nir Rivero MD. Karine Espinal was admitted from Home    Problem List  Patient Active Problem List    Diagnosis Date Noted    Ataxia 06/18/2021    General weakness 06/18/2021    Tremors of nervous system 06/18/2021    Fall at home 06/18/2021    COPD exacerbation ([address] 12/09/2019    Polymyalgia rheumatica ([address] 06/15/2018    Type 2 diabetes with nephropathy ([address] 04/11/2018    Severe obesity (BMI 35.0-39. 9) with comorbidity ([address] 04/11/2018    Plantar fasciitis of left foot 11/02/2017    CKD (chronic kidney disease) 10/12/2016    Arthralgia of multiple joints 10/12/2016    Encounter for medication monitoring 01/29/2016    Reactive airway disease with wheezing without complication 29/16/6507    Essential hypertension, benign 08/25/2015    Asthma 02/23/2014    Anxiety disorder 02/23/2014    Diabetic neuropathy ([address] 02/23/2014    Esophageal reflux 02/23/2014    Degenerative arthritis of lumbar spine 06/18/2013    DJD (degenerative joint disease) 08/01/2012    Chronic systolic heart failure ([address] 08/01/2012    Obstructive sleep apnea (adult) (pediatric) 01/12/2012    Environmental allergies 10/12/2010    CHF (congestive heart failure) ([address] 03/03/2010    S/P TKR (total knee replacement) 03/03/2010    Anemia 03/03/2010    s/p lumbar laminectomy 03/03/2010    S/P ASAD (total abdominal hysterectomy) 03/03/2010    S/P hemorrhoidectomy 03/03/2010    S/P rotator cuff surgery 03/03/2010    DM (diabetes mellitus) ([facility] 03/03/2010    Chronic sinusitis 03/03/2010    S/P sinus surgery 03/03/2010    Dyslipidemia 03/03/2010     Past Medical History:   Diagnosis Date    Anemia 3/3/2010    Arrhythmia     irregular heartbeat    Arthritis     CHF (congestive heart failure) ([facility] 3/3/2010    Chronic obstructive pulmonary disease (HCC)     Chronic pain     back    Chronic sinusitis 3/3/2010    CPAP (continuous positive airway pressure) dependence     Diverticulosis     DM (diabetes mellitus) ([facility] 3/3/2010    Dyslipidemia 3/3/2010    GERD (gastroesophageal reflux disease)     HTN (hypertension) 3/3/2010    Ill-defined condition     being evaluated py pulmonolgist for \"trouble breathing\"    S/P TKR (total knee replacement) 3/3/2010    Unspecified sleep apnea     doesn't wear CPAP since back has been hurting       Core Measures:  CVA: Yes Yes  CHF:No Not applicable  PNA:No Not applicable    Activity:  Activity Level: Up with Assistance  Number times ambulated in hallways past shift: 0  Number of times OOB to chair past shift: 5    Cardiac:   Cardiac Monitoring: Yes      Cardiac Rhythm: Sinus Rhythm    Access:   Current line(s): PIV     Genitourinary:   Urinary status: voiding  Urinary Catheter? No     Respiratory:   O2 Device: None (Room air)  Chronic home O2 use?: NO  Incentive spirometer at bedside: N/A       GI:  Last Bowel Movement Date: 06/17/21  Current diet:  ADULT DIET Regular; 4 carb choices (60 gm/meal); Low Fat/Low Chol/High Fiber/2 gm Na  Passing flatus: YES  Tolerating current diet: YES       Pain Management:   Patient states pain is manageable on current regimen: YES    Skin:  Capo Score: 19  Interventions: increase time out of bed and limit briefs    Patient Safety:  Fall Score:  Total Score: 4  Interventions: bed/chair alarm and pt to call before getting OOB  High Fall Risk: Yes  @Rollbelt no    DVT prophylaxis:  DVT prophylaxis Med- Yes  DVT prophylaxis SCD or BIANCA- No     Active Consults:  IP CONSULT TO NEUROLOGY    Length of Stay:  Expected LOS: 3d 0h  Actual LOS: 3  Discharge Plan: No just admitted      ILA Pham

## 2021-06-21 NOTE — PROGRESS NOTES
Problem: Mobility Impaired (Adult and Pediatric)  Goal: *Acute Goals and Plan of Care (Insert Text)  Description: FUNCTIONAL STATUS PRIOR TO ADMISSION: Patient is poor historian, reporting she was indep PLOF. Spouse clarifies that patient requires assist depending on the day. ADLs can take patient upwards of 2 hours to complete, spouse will assist to speed the process up and ensure safety. Uses lift chair to get to bedroom on 2nd floor. Ambulates short distances with cane or RW (depending on day). Spouse reports decline in physical ability over the past month. HOME SUPPORT PRIOR TO ADMISSION: The patient lived with spouse who provides [de-identified] of caregiving, granddaughter is an RN and provides support. Physical Therapy Goals  Initiated 6/19/2021  1. Patient will move from supine to sit and sit to supine , scoot up and down, and roll side to side in bed with modified independence within 7 day(s). 2.  Patient will transfer from bed to chair and chair to bed with supervision/set-up using the least restrictive device within 7 day(s). 3.  Patient will perform sit to stand with supervision/set-up within 7 day(s). 4.  Patient will ambulate with supervision/set-up for 100 feet with the least restrictive device within 7 day(s). Outcome: Progressing Towards Goal   PHYSICAL THERAPY TREATMENT  Patient: Mp Smith (19 y.o. female)  Date: 6/21/2021  Diagnosis: General weakness [R53.1]  Tremors of nervous system [R25.1]  Ataxia [R27.0]  Fall at home [W19. XXXA, X24.447] <principal problem not specified>       Precautions: Fall  Chart, physical therapy assessment, plan of care and goals were reviewed. ASSESSMENT  Patient continues with skilled PT services and is progressing towards goals. Patient overall needing CGA to come to EOB and CGA for transfers this session from bed. Needing more assistance to stand from a low surface.   Amb approx 80 feet with RW and CGA with no overt LOB but slow karly and decreased step clearance.  concerned regarding patient being able to manage at home. He is interested in IPR setting and anticipate she would do well in this setting. They are not interested in SNF and would recommend [facility] PT if does not qualify for IPR. Other factors to consider for discharge: at risk for falls,  cannot provide much physical assistance         PLAN :  Patient continues to benefit from skilled intervention to address the above impairments. Continue treatment per established plan of care. to address goals. Recommendation for discharge: (in order for the patient to meet his/her long term goals)  Therapy 3 hours per day 5-7 days per week. If does not qualify then recommend  PT        IF patient discharges home will need the following DME:  interested in transport wheelchair for home       SUBJECTIVE:   Patient stated I'm doing better.     OBJECTIVE DATA SUMMARY:   Critical Behavior:  Neurologic State: Alert, Appropriate for age  Orientation Level: Oriented X4  Cognition: Follows commands, Appropriate decision making, Appropriate for age attention/concentration, Appropriate safety awareness  Safety/Judgement: Decreased awareness of need for safety, Fall prevention  Functional Mobility Training:  Bed Mobility:  Rolling: Contact guard assistance  Supine to Sit: Contact guard assistance  Sit to Supine: Contact guard assistance  Scooting: Contact guard assistance        Transfers:  Sit to Stand: Contact guard assistance  Stand to Sit: Contact guard assistance                             Balance:  Sitting: Intact  Standing: Intact  Standing - Static: Constant support;Good  Standing - Dynamic : Constant support; Fair  Ambulation/Gait Training:  Distance (ft): 80 Feet (ft)  Assistive Device: Gait belt;Walker, rolling  Ambulation - Level of Assistance: Contact guard assistance        Gait Abnormalities: Decreased step clearance;Shuffling gait        Base of Support: Widened Speed/Shannon: Pace decreased (<100 feet/min); Slow  Step Length: Left shortened;Right shortened         Therapeutic Exercises:   Instructed in sitting HEP  Pain Rating:  No c/o pain    Activity Tolerance:   Fair and requires rest breaks    After treatment patient left in no apparent distress:   Sitting in chair, Call bell within reach, Bed / chair alarm activated, and Caregiver / family present    COMMUNICATION/COLLABORATION:   The patients plan of care was discussed with: Physical therapist, Occupational therapist, and Registered nurse.      Stephie Rapp, PT, DPT   Time Calculation: 25 mins

## 2021-06-21 NOTE — PROGRESS NOTES
0830: AM medications and VS taken. Pt in bed resting quietly. Sitting on side of bed eating breakfast, bed alarm on. Visitor present at bedside. 1000: In bed, resting quietly. Visitor present. 1100: In chair, resting quietly. Visitors present at bedside. 1300: In chair, eating lunch. Visitor present at bedside. Denies any needs at this time. 1400: Returned to be. Visitor present at bedside. Pt resting quietly. Denies any needs at this time. 1500: In bed, resting quietly. Appears to be sleeping. Visitor present at bedside. 1700: Sitting on side of bed. Bed alarm on. Visitor present at bedside. Denies any additional needs at this time.

## 2021-06-21 NOTE — PROGRESS NOTES
Transition of Care Plan:    RUR: 21% - \"moderate risk\"  Disposition: Plan A) in-pt rehab (pending acceptance) vs Plan B) Home with HH, 24/7 supervision/support, & f/u apts  > CM sent referral via [employer] to [facility] for review 6/21/21; referral pending at this time  Follow up appointments: PCP & specialist as indicated   DME needed: TBD pending disposition - CM to continue to follow for any DME needs for d/c  Transportation at Discharge: TBD - family vs medical transport  Nessa Prows or means to access home: Pt's  has access to the home       IM Medicare letter: 2nd IM needed prior to d/c  Caregiver Contact: Pt's  Excell Shelby: [phone])  Discharge Caregiver contacted prior to discharge? To be contacted prior to d/c  COVID-19 test: CM requested PCR COVID-19 test in anticipation for in-pt rehab placement in [place] 6/21/21; MD to place order, RN to complete            Initial note: CM reviewed pt's chart and consulted with attending MD prior to moving forward with d/c planning. MD reported pt is medically stable for d/c pending disposition. CM entered a delay in d/c to reflect the current barriers preventing pt from transitioning out of [facility]. CM met with pt and  Excell Shelby: [phone]) at bedside to introduce role, complete initial assessment, and discuss BRUCE plan for d/c. Upon conducting room visit, pt presented as A&O x4. CM confirmed pt's demographic information is up-to-date in the chart. CM discussed therapy team's recommendations for d/c re: in-pt rehab. CM confirmed both pt and  are agreeable to utilizing in-pt rehab intervention at d/c; pt identified [facility] as her preference for placement. FOC offered, verbal consent received. Copy of [address] placed on chart. Both pt and  reported that if [facility] doesn't accept for placement, they would like to move forward with [facility] intervention.  Both pt and  made it very clear they are not interested in considering SNF placement at this time; CM acknowledged input. CM inquired if pt and/or  had any additional questions, concerns, or needs related to the d/c plan; both parties denied. CM will send referral via [employer] to [facility] for review; CM to report updates on the status of the referral as they become available. Full assessment detailed below:    Reason for Admission: General weakness, tremors of nervous system, ataxia, fall at home                 RUR Score: 21% - \"moderate risk\"                PCP: First and Last name:   Rolanda Ochoa MD   Name of Practice: [facility]   Are you a current patient: Yes/No: Yes   Approximate date of last visit: March 2021   Can you participate in a virtual visit if needed: No    Do you (patient/family) have any concerns for transition/discharge? CM didn't identify any barriers throughout the initial assessment that present an immediate need for concern at this time. CM will continue to follow throughout course of admission for any d/c planning needs. Plan for utilizing home health:  PT/OT/SLP consulted; recommendations suggest pt would benefit from in-pt rehab placement. Pt reported that she needs assistance with ADL's at baseline, especially with bathing. Pt's  provides assistance with ADL's as needed. Pt identified DME in the form of a cane, RW, and shower chair. Pt reported [facility] intervention but is unable to recall the agency that provided services. Pt reported prior hx utilizing in-pt rehab intervention with services provided by [facility]. Pt reported no prior hx of utilizing SNF intervention. Current Advanced Directive/Advance Care Plan:  Full Code - CM to f/u with pt and  regarding AMD tomorrow (6/22/21). Care Management Interventions  PCP Verified by CM:  Yes  Palliative Care Criteria Met (RRAT>21 & CHF Dx)?: Yes  Palliative Consult Recommended?: No  Reason Palliative Care Not Recommended?: Palliative Care not utilized by provider  Mode of Transport at Discharge: S  Transition of Care Consult (CM Consult): Discharge Planning  Discharge Durable Medical Equipment: Yes  Physical Therapy Consult: Yes  Occupational Therapy Consult: Yes  Speech Therapy Consult: Yes  Current Support Network: Lives with Spouse, Own Home  Confirm Follow Up Transport: Family  The Plan for Transition of Care is Related to the Following Treatment Goals : in-pt rehab  The Patient and/or Patient Representative was Provided with a Choice of Provider and Agrees with the Discharge Plan?: Yes  Name of the Patient Representative Who was Provided with a Choice of Provider and Agrees with the Discharge Plan: patient and  Tristan Pollock  Freedom of Choice List was Provided with Basic Dialogue that Supports the Patient's Individualized Plan of Care/Goals, Treatment Preferences and Shares the Quality Data Associated with the Providers?: Yes   Resource Information Provided?: No  Discharge Location  Discharge Placement: Rehab hospital/unit acute    [facility], [address] [place] [address], [address] [place] [address]

## 2021-06-21 NOTE — PROGRESS NOTES
Chief Complaint: Tremor  She is tolerating the lower dose of primidone.  was asking with regards to the medications he has at home which were dosed at to 50 mg.  I offered that he may split it in half and that should not make much of a difference in the total dosage. The patient herself is feeling better. Assesment and Plan  1. Debility  Recommend inpatient physical therapy given the patient's body habitus and age there would be little benefit from doing this at home.     2.  Essential tremor  Continue primidone at 150 or 125mg bid     3.   Hyperlipidemia  Continue atorvastatin         Allergies  Gabapentin, Levaquin [levofloxacin], Lyrica [pregabalin], and Percodan [oxycodone hcl-oxycodone-asa]     Medications  Current Facility-Administered Medications   Medication Dose Route Frequency    pantoprazole (PROTONIX) tablet 40 mg  40 mg Oral ACB    senna-docusate (PERICOLACE) 8.6-50 mg per tablet 1 Tablet  1 Tablet Oral BID    primidone (MYSOLINE) tablet 150 mg  150 mg Oral BID    [Held by provider] carvediloL (COREG) tablet 12.5 mg  12.5 mg Oral BID WITH MEALS    DULoxetine (CYMBALTA) capsule 60 mg  60 mg Oral DAILY    fluticasone propionate (FLONASE) 50 mcg/actuation nasal spray 2 Spray  2 Spray Both Nostrils DAILY    furosemide (LASIX) tablet 20 mg  20 mg Oral DAILY    glipiZIDE (GLUCOTROL) tablet 2.5 mg  2.5 mg Oral DAILY    insulin lispro (HUMALOG) injection   SubCUTAneous AC&HS    glucose chewable tablet 16 g  4 Tablet Oral PRN    dextrose (D50W) injection syrg 12.5-25 g  12.5-25 g IntraVENous PRN    glucagon (GLUCAGEN) injection 1 mg  1 mg IntraMUSCular PRN    predniSONE (DELTASONE) tablet 15 mg  15 mg Oral DAILY WITH BREAKFAST    rosuvastatin (CRESTOR) tablet 10 mg  10 mg Oral QHS    aspirin chewable tablet 81 mg  81 mg Oral DAILY    labetaloL (NORMODYNE;TRANDATE) injection 5 mg  5 mg IntraVENous Q10MIN PRN    heparin (porcine) injection 5,000 Units  5,000 Units SubCUTAneous Q8H    arformoteroL (BROVANA) neb solution 15 mcg  15 mcg Nebulization BID RT    And    ipratropium (ATROVENT) 0.02 % nebulizer solution 0.5 mg  0.5 mg Nebulization BID RT        Medical History  Past Medical History:   Diagnosis Date    Anemia 3/3/2010    Arrhythmia     irregular heartbeat    Arthritis     [facility] 3/3/2010    Chronic obstructive pulmonary disease (HCC)     Chronic pain     back    Chronic sinusitis 3/3/2010    CPAP (continuous positive airway pressure) dependence     Diverticulosis     DM (diabetes mellitus) ([facility] 3/3/2010    Dyslipidemia 3/3/2010    GERD (gastroesophageal reflux disease)     HTN (hypertension) 3/3/2010    Ill-defined condition     being evaluated py pulmonolgist for \"trouble breathing\"    S/P TKR (total knee replacement) 3/3/2010    Unspecified sleep apnea     doesn't wear CPAP since back has been hurting     Review of Systems   Constitutional: Negative for chills and fever. HENT: Negative for ear pain. Eyes: Negative for pain and discharge. Respiratory: Negative for cough and hemoptysis. Cardiovascular: Negative for chest pain and claudication. Gastrointestinal: Negative for constipation and diarrhea. Genitourinary: Negative for flank pain and hematuria. Musculoskeletal: Positive for back pain, joint pain and myalgias. Skin: Negative for itching and rash. Neurological: Positive for weakness. Negative for headaches. Endo/Heme/Allergies: Negative for environmental allergies. Does not bruise/bleed easily. Psychiatric/Behavioral: Positive for memory loss. Negative for depression and hallucinations. Exam:    Visit Vitals  /67 (BP 1 Location: Left upper arm, BP Patient Position: At rest;Sitting)   Pulse 60   Temp 98.6 °F (37 °C)   Resp 16   Ht 5' 4\" (1.626 m)   Wt 213 lb 13.5 oz (97 kg)   LMP 03/04/1961 (Approximate)   SpO2 97%   BMI 36.71 kg/m²      General: Well developed, well nourished.  Patient in no distress   Head: Normocephalic, atraumatic, anicteric sclera   Neck Normal ROM, No thyromegally   Lungs:  Clear to auscultation bilaterally   Cardiac: Regular rate and rhythm with no murmurs. Abd: Bowel sounds were audible. Ext: No pedal edema   Skin: Supple no rash      NeurologicExam:  Mental Status: Alert and oriented to person place and time   Speech: Fluent no aphasia or dysarthria. Cranial Nerves:  II - XII Intact   Motor:   Full symmetric strength With proximal lower extremity weakness approximately 4 out of 5. Reflexes:   Deep tendon reflexes 0+/4 and symmetric. Sensory:   Symmetric distal reduction in sensory perception affecting all modalities   Gait:   Needs assistance   Tremor:    Essential tremor affecting both upper extremities   Cerebellar:  Coordination intact. Neurovascular: No carotid bruits.  No JVD             Lab Review  Lab Results   Component Value Date/Time    WBC 4.4 06/18/2021 07:18 PM    HCT 32.0 (L) 06/18/2021 07:18 PM    HGB 9.8 (L) 06/18/2021 07:18 PM    PLATELET 036 (L) 96/94/7279 07:18 PM       Lab Results   Component Value Date/Time    Sodium 141 06/18/2021 07:18 PM    Potassium 4.9 06/18/2021 07:18 PM    Chloride 106 06/18/2021 07:18 PM    CO2 34 (H) 06/18/2021 07:18 PM    Glucose 82 06/18/2021 07:18 PM    BUN 30 (H) 06/18/2021 07:18 PM    Creatinine 1.85 (H) 06/18/2021 07:18 PM    Calcium 8.8 06/18/2021 07:18 PM         Lab Results   Component Value Date/Time    Hemoglobin A1c 6.4 (H) 06/19/2021 02:43 AM    Hemoglobin A1c (POC) 6.2 10/09/2020 11:45 AM        Lab Results   Component Value Date/Time    Vitamin B12 >2000 (H) 12/19/2016 04:24 PM    Folate >20.0 12/19/2016 04:24 PM       No results found for: Fidencio POON XBANA    Lab Results   Component Value Date/Time    Cholesterol, total 218 (H) 06/19/2021 02:43 AM    HDL Cholesterol 91 06/19/2021 02:43 AM    LDL, calculated 105.4 (H) 06/19/2021 02:43 AM    VLDL, calculated 21.6 06/19/2021 02:43 AM Triglyceride 108 06/19/2021 02:43 AM    CHOL/HDL Ratio 2.4 06/19/2021 02:43 AM

## 2021-06-21 NOTE — PROGRESS NOTES
Speech path   Patient was referred for evaluation per stroke protocol but has no communication deficits. MRI is negative. Patient is fluent and appropriate with no motor speech deficits in conversation. Her spouse and daughter report her to have no new deficits related to communication or swallowing. We will sign off.   Moises Sidhu, SLP

## 2021-06-22 LAB
GLUCOSE BLD STRIP.AUTO-MCNC: 101 MG/DL (ref 65–117)
GLUCOSE BLD STRIP.AUTO-MCNC: 123 MG/DL (ref 65–117)
GLUCOSE BLD STRIP.AUTO-MCNC: 148 MG/DL (ref 65–117)
GLUCOSE BLD STRIP.AUTO-MCNC: 201 MG/DL (ref 65–117)
SARS-COV-2, COV2: NORMAL
SERVICE CMNT-IMP: ABNORMAL
SERVICE CMNT-IMP: NORMAL

## 2021-06-22 PROCEDURE — 97116 GAIT TRAINING THERAPY: CPT

## 2021-06-22 PROCEDURE — 74011000250 HC RX REV CODE- 250: Performed by: GENERAL ACUTE CARE HOSPITAL

## 2021-06-22 PROCEDURE — 74011250637 HC RX REV CODE- 250/637: Performed by: INTERNAL MEDICINE

## 2021-06-22 PROCEDURE — 97535 SELF CARE MNGMENT TRAINING: CPT

## 2021-06-22 PROCEDURE — 82962 GLUCOSE BLOOD TEST: CPT

## 2021-06-22 PROCEDURE — 74011250636 HC RX REV CODE- 250/636: Performed by: INTERNAL MEDICINE

## 2021-06-22 PROCEDURE — 74011250637 HC RX REV CODE- 250/637: Performed by: GENERAL ACUTE CARE HOSPITAL

## 2021-06-22 PROCEDURE — 74011250637 HC RX REV CODE- 250/637: Performed by: NURSE PRACTITIONER

## 2021-06-22 PROCEDURE — U0005 INFEC AGEN DETEC AMPLI PROBE: HCPCS

## 2021-06-22 PROCEDURE — 65660000000 HC RM CCU STEPDOWN

## 2021-06-22 PROCEDURE — 74011250637 HC RX REV CODE- 250/637: Performed by: PSYCHIATRY & NEUROLOGY

## 2021-06-22 PROCEDURE — 74011636637 HC RX REV CODE- 636/637: Performed by: INTERNAL MEDICINE

## 2021-06-22 PROCEDURE — 94640 AIRWAY INHALATION TREATMENT: CPT

## 2021-06-22 RX ORDER — ACETAMINOPHEN 325 MG/1
650 TABLET ORAL
Status: DISCONTINUED | OUTPATIENT
Start: 2021-06-22 | End: 2021-06-23 | Stop reason: HOSPADM

## 2021-06-22 RX ADMIN — ACETAMINOPHEN 650 MG: 325 TABLET ORAL at 19:57

## 2021-06-22 RX ADMIN — GLIPIZIDE 2.5 MG: 5 TABLET ORAL at 08:44

## 2021-06-22 RX ADMIN — PREDNISONE 15 MG: 5 TABLET ORAL at 08:44

## 2021-06-22 RX ADMIN — DOCUSATE SODIUM 50MG AND SENNOSIDES 8.6MG 1 TABLET: 8.6; 5 TABLET, FILM COATED ORAL at 17:18

## 2021-06-22 RX ADMIN — ARFORMOTEROL TARTRATE 15 MCG: 15 SOLUTION RESPIRATORY (INHALATION) at 19:36

## 2021-06-22 RX ADMIN — ROSUVASTATIN CALCIUM 10 MG: 5 TABLET, COATED ORAL at 21:24

## 2021-06-22 RX ADMIN — HEPARIN SODIUM 5000 UNITS: 5000 INJECTION INTRAVENOUS; SUBCUTANEOUS at 12:12

## 2021-06-22 RX ADMIN — DULOXETINE HYDROCHLORIDE 60 MG: 30 CAPSULE, DELAYED RELEASE ORAL at 08:45

## 2021-06-22 RX ADMIN — ARFORMOTEROL TARTRATE 15 MCG: 15 SOLUTION RESPIRATORY (INHALATION) at 08:50

## 2021-06-22 RX ADMIN — IPRATROPIUM BROMIDE 0.5 MG: 0.5 SOLUTION RESPIRATORY (INHALATION) at 08:50

## 2021-06-22 RX ADMIN — FUROSEMIDE 20 MG: 20 TABLET ORAL at 08:44

## 2021-06-22 RX ADMIN — PRIMIDONE 150 MG: 50 TABLET ORAL at 08:44

## 2021-06-22 RX ADMIN — PRIMIDONE 150 MG: 50 TABLET ORAL at 17:18

## 2021-06-22 RX ADMIN — DOCUSATE SODIUM 50MG AND SENNOSIDES 8.6MG 1 TABLET: 8.6; 5 TABLET, FILM COATED ORAL at 08:44

## 2021-06-22 RX ADMIN — ASPIRIN 81 MG: 81 TABLET, CHEWABLE ORAL at 08:43

## 2021-06-22 RX ADMIN — HEPARIN SODIUM 5000 UNITS: 5000 INJECTION INTRAVENOUS; SUBCUTANEOUS at 19:57

## 2021-06-22 RX ADMIN — HEPARIN SODIUM 5000 UNITS: 5000 INJECTION INTRAVENOUS; SUBCUTANEOUS at 05:07

## 2021-06-22 RX ADMIN — PANTOPRAZOLE SODIUM 40 MG: 40 TABLET, DELAYED RELEASE ORAL at 08:44

## 2021-06-22 RX ADMIN — INSULIN LISPRO 2 UNITS: 100 INJECTION, SOLUTION INTRAVENOUS; SUBCUTANEOUS at 12:12

## 2021-06-22 RX ADMIN — FLUTICASONE PROPIONATE 2 SPRAY: 50 SPRAY, METERED NASAL at 09:00

## 2021-06-22 RX ADMIN — IPRATROPIUM BROMIDE 0.5 MG: 0.5 SOLUTION RESPIRATORY (INHALATION) at 19:36

## 2021-06-22 NOTE — PROGRESS NOTES
Hospitalist Progress Note    NAME: Richard Huerta   :  1931   MRN:  [id number]       Assessment / Plan:  Debility  Essential tremor  MRI Brain negative  PT/OT rec is for SNF  CM to search for placement  Continue Primidone    :  [facility] accepted to [facility] - insurance paperwork pending  Likely for dc tomorrow    Diabetes type 2-controlled POA  CKD stage III POA-creatinine at baseline at 1.8  Chronic pain syndrome  COPD  DONNIE not on CPAP     Fingersticks before every meal and at bedtime here, continue home glipizide, SSI lispro here  Continue Cymbalta  Continue home inhalers  Continue home prednisone    30.0 - 39.9 Obese / Body mass index is 36.71 kg/m². Estimated discharge date:   Barriers:    Code status: Full  Prophylaxis: Hep SQ  Recommended Disposition: TBD     Subjective:     Chief Complaint / Reason for Physician Visit  States that she is fine when sitting down but has balance issues when trying to ambulate. Discussed with RN events overnight. Review of Systems:  Symptom Y/N Comments  Symptom Y/N Comments   Fever/Chills    Chest Pain     Poor Appetite    Edema     Cough    Abdominal Pain     Sputum    Joint Pain     SOB/HIGH    Pruritis/Rash     Nausea/vomit    Tolerating PT/OT     Diarrhea    Tolerating Diet     Constipation    Other       Could NOT obtain due to:      Objective:     VITALS:   Last 24hrs VS reviewed since prior progress note.  Most recent are:  Patient Vitals for the past 24 hrs:   Temp Pulse Resp BP SpO2   21 1159 98.2 °F (36.8 °C) 76 16 (!) 148/76 94 %   21 0851     96 %   21 0749 98.5 °F (36.9 °C) 69 16 (!) 158/75 96 %   21 0320 98.2 °F (36.8 °C) 67 16 (!) 166/72 94 %   21 2326 98.4 °F (36.9 °C) 64 16 (!) 169/70 96 %   21 2103     98 %   21 1931 98.2 °F (36.8 °C) 66 16 (!) 155/74 96 %   21 1622 98.7 °F (37.1 °C) 68 15 (!) 145/69 98 %     No intake or output data in the 24 hours ending 21 1235     I had a face to face encounter and independently examined this patient on 6/22/2021, as outlined below:  PHYSICAL EXAM:  General: Alert, cooperative, elderly  EENT:  EOMI. Anicteric sclerae. MMM  Resp:  CTA bilaterally, no wheezing or rales. No accessory muscle use  CV:  Regular  rhythm,  No edema  GI:  Soft, Non distended, Non tender. +Bowel sounds  Neurologic:  Alert and oriented X 3, normal speech, essential tremor  Psych:   Fair insight  Skin:  No rashes. No jaundice    Reviewed most current lab test results and cultures  YES  Reviewed most current radiology test results   YES  Review and summation of old records today    NO  Reviewed patient's current orders and MAR    YES  PMH/SH reviewed - no change compared to H&P  ________________________________________________________________________  Care Plan discussed with:    Comments   Patient x    Family  x    RN x    Care Manager     Consultant                        Multidiciplinary team rounds were held today with , nursing, pharmacist and clinical coordinator. Patient's plan of care was discussed; medications were reviewed and discharge planning was addressed. ________________________________________________________________________  Total NON critical care TIME:  35   Minutes    Total CRITICAL CARE TIME Spent:   Minutes non procedure based      Comments   >50% of visit spent in counseling and coordination of care     ________________________________________________________________________  Mick Miranda MD     Procedures: see electronic medical records for all procedures/Xrays and details which were not copied into this note but were reviewed prior to creation of Plan. LABS:  I reviewed today's most current labs and imaging studies. Pertinent labs include:  No results for input(s): WBC, HGB, HCT, PLT, HGBEXT, HCTEXT, PLTEXT, HGBEXT, HCTEXT, PLTEXT in the last 72 hours.   No results for input(s): NA, K, CL, CO2, GLU, BUN, CREA, CA, MG, PHOS, ALB, TBIL, TBILI, ALT, INR, INREXT, INREXT in the last 72 hours.     No lab exists for component: SGOT    Signed: Clinton Mike MD

## 2021-06-22 NOTE — PROGRESS NOTES
End of Shift Note     Bedside shift change report given to Isreal Johnson RN (oncoming nurse) by Snehal Alfaro RN(offgoing nurse). Report included the following information SBAR, Kardex, MAR and Recent Results     Shift worked: night   Shift summary and any significant changes:     -covid test sent, referalls sent for in-pt rehab      Concerns for physician to address:  NONE   Zone phone for oncoming shift:  [phone]      Patient Information  Karin Aponte  80 y.o.  6/18/2021  4:36 PM by Yas Jones MD. Karin Aponte was admitted from Home     Problem List       Patient Active Problem List     Diagnosis Date Noted    Ataxia 06/18/2021    General weakness 06/18/2021    Tremors of nervous system 06/18/2021    Fall at home 06/18/2021    COPD exacerbation ([address] 12/09/2019    Polymyalgia rheumatica ([address] 06/15/2018    Type 2 diabetes with nephropathy ([address] 04/11/2018    Severe obesity (BMI 35.0-39. 9) with comorbidity ([address] 04/11/2018    Plantar fasciitis of left foot 11/02/2017    CKD (chronic kidney disease) 10/12/2016    Arthralgia of multiple joints 10/12/2016    Encounter for medication monitoring 01/29/2016    Reactive airway disease with wheezing without complication 10/81/4605    Essential hypertension, benign 08/25/2015    Asthma 02/23/2014    Anxiety disorder 02/23/2014    Diabetic neuropathy ([address] 02/23/2014    Esophageal reflux 02/23/2014    Degenerative arthritis of lumbar spine 06/18/2013    DJD (degenerative joint disease) 08/01/2012    Chronic systolic heart failure ([address] 08/01/2012    Obstructive sleep apnea (adult) (pediatric) 01/12/2012    Environmental allergies 10/12/2010    CHF (congestive heart failure) ([address] 03/03/2010    S/P TKR (total knee replacement) 03/03/2010    Anemia 03/03/2010    s/p lumbar laminectomy 03/03/2010    S/P ASAD (total abdominal hysterectomy) 03/03/2010    S/P hemorrhoidectomy 03/03/2010    S/P rotator cuff surgery 03/03/2010    DM (diabetes mellitus) ([facility] 03/03/2010    Chronic sinusitis 03/03/2010    S/P sinus surgery 03/03/2010    Dyslipidemia 03/03/2010           Past Medical History:   Diagnosis Date    Anemia 3/3/2010    Arrhythmia       irregular heartbeat    Arthritis      CHF (congestive heart failure) (HCC) 3/3/2010    Chronic obstructive pulmonary disease (HCC)      Chronic pain       back    Chronic sinusitis 3/3/2010    CPAP (continuous positive airway pressure) dependence      Diverticulosis      DM (diabetes mellitus) ([facility] 3/3/2010    Dyslipidemia 3/3/2010    GERD (gastroesophageal reflux disease)      HTN (hypertension) 3/3/2010    Ill-defined condition       being evaluated py pulmonolgist for \"trouble breathing\"    S/P TKR (total knee replacement) 3/3/2010    Unspecified sleep apnea       doesn't wear CPAP since back has been hurting         Core Measures:  CVA: Yes Yes  CHF:No Not applicable  PNA:No Not applicable     Activity:  Activity Level: Up with Assistance  Number times ambulated in hallways past shift: 0  Number of times OOB to chair past shift: 5     Cardiac:   Cardiac Monitoring: Yes      Cardiac Rhythm: Sinus Rhythm     Access:   Current line(s): PIV      Genitourinary:   Urinary status: voiding  Urinary Catheter? No      Respiratory:   O2 Device: None (Room air)  Chronic home O2 use?: NO  Incentive spirometer at bedside: N/A     GI:  Last Bowel Movement Date: 06/17/21  Current diet:  ADULT DIET Regular; 4 carb choices (60 gm/meal); Low Fat/Low Chol/High Fiber/2 gm Na  Passing flatus: YES  Tolerating current diet: YES     Pain Management:   Patient states pain is manageable on current regimen: YES     Skin:  Capo Score: 19  Interventions: increase time out of bed and limit briefs    Patient Safety:  Fall Score:  Total Score: 4  Interventions: bed/chair alarm and pt to call before getting OOB  High Fall Risk: Yes  @Rollbelt no     DVT prophylaxis:  DVT prophylaxis Med- Yes  DVT prophylaxis SCD or BIANCA- No    Active Consults:  IP CONSULT TO NEUROLOGY     Length of Stay:  Expected LOS: 3d 0h  Actual LOS: 3  Discharge Plan: No just admitted        TAMARA RN

## 2021-06-22 NOTE — PROGRESS NOTES
End of Shift Note     Bedside shift change report given to Melchor Sauer RN (oncoming nurse) by Anival Cr RN(offgoing nurse). Report included the following information SBAR, Kardex, MAR and Recent Results     Shift worked: days   Shift summary and any significant changes:     -covid test sent PCR       Concerns for physician to address:  NONE   Zone phone for oncoming shift:  [phone]      Patient Information  Maico Coleman  [address] [facility] y.o.  6/18/2021  4:36 PM by Mark Mckeon MD. Maico Coleman was admitted from Home     Problem List       Patient Active Problem List     Diagnosis Date Noted    Ataxia 06/18/2021    General weakness 06/18/2021    Tremors of nervous system 06/18/2021    Fall at home 06/18/2021    COPD exacerbation ([address] 12/09/2019    Polymyalgia rheumatica ([address] 06/15/2018    Type 2 diabetes with nephropathy ([address] 04/11/2018    Severe obesity (BMI 35.0-39. 9) with comorbidity ([address] 04/11/2018    Plantar fasciitis of left foot 11/02/2017    CKD (chronic kidney disease) 10/12/2016    Arthralgia of multiple joints 10/12/2016    Encounter for medication monitoring 01/29/2016    Reactive airway disease with wheezing without complication 15/18/8986    Essential hypertension, benign 08/25/2015    Asthma 02/23/2014    Anxiety disorder 02/23/2014    Diabetic neuropathy ([address] 02/23/2014    Esophageal reflux 02/23/2014    Degenerative arthritis of lumbar spine 06/18/2013    DJD (degenerative joint disease) 08/01/2012    Chronic systolic heart failure ([address] 08/01/2012    Obstructive sleep apnea (adult) (pediatric) 01/12/2012    Environmental allergies 10/12/2010    CHF (congestive heart failure) ([address] 03/03/2010    S/P TKR (total knee replacement) 03/03/2010    Anemia 03/03/2010    s/p lumbar laminectomy 03/03/2010    S/P ASAD (total abdominal hysterectomy) 03/03/2010    S/P hemorrhoidectomy 03/03/2010    S/P rotator cuff surgery 03/03/2010    DM (diabetes mellitus) ([address] 03/03/2010    Chronic sinusitis 03/03/2010    S/P sinus surgery 03/03/2010    Dyslipidemia 03/03/2010           Past Medical History:   Diagnosis Date    Anemia 3/3/2010    Arrhythmia       irregular heartbeat    Arthritis      CHF (congestive heart failure) (HCC) 3/3/2010    Chronic obstructive pulmonary disease (HCC)      Chronic pain       back    Chronic sinusitis 3/3/2010    CPAP (continuous positive airway pressure) dependence      Diverticulosis      DM (di[facility] mellitus) [facility] 3/3/2010    Dyslipidemia 3/3/2010    GERD (gastroesophageal reflux disease)      HTN (hypertension) 3/3/2010    Ill-defined condition       being evaluated py pulmonolgist for \"trouble breathing\"    S/P TKR (total knee replacement) 3/3/2010    Unspecified sleep apnea       doesn't wear CPAP since back has been hurting         Core Measures:  CVA: Yes Yes  CHF:No Not applicable  PNA:No Not applicable     Activity:  Activity Level: Up with Assistance  Number times ambulated in hallways past shift: 0  Number of times OOB to chair past shift: 5     Cardiac:   Cardiac Monitoring: Yes      Cardiac Rhythm: Sinus Rhythm     Access:   Current line(s): PIV      Genitourinary:   Urinary status: voiding  Urinary Catheter? No      Respiratory:   O2 Device: None (Room air)  Chronic home O2 use?: NO  Incentive spirometer at bedside: N/A     GI:  Last Bowel Movement Date: 06/17/21  Current diet:  ADULT DIET Regular; 4 carb choices (60 gm/meal); Low Fat/Low Chol/High Fiber/2 gm Na  Passing flatus: YES  Tolerating current diet: YES     Pain Management:   Patient states pain is manageable on current regimen: YES     Skin:  Capo Score: 19  Interventions: increase time out of bed and limit briefs    Patient Safety:  Fall Score:  Total Score: 4  Interventions: bed/chair alarm and pt to call before getting OOB  High Fall Risk: Yes  @Rollbelt no     DVT prophylaxis:  DVT prophylaxis Med- Yes  DVT prophylaxis SCD or BIANCA- No      Active Consults:  IP CONSULT TO NEUROLOGY     Length of Stay:  Expected LOS: 3d 0h  Actual LOS: 3  Discharge Plan: No just admitted        J, RN

## 2021-06-22 NOTE — PROGRESS NOTES
ADULT PROTOCOL: JET AEROSOL  REASSESSMENT    Patient  Maico Coleman     80 y.o.   female     6/22/2021  4:41 AM    Breath Sounds Pre Procedure: Right Breath Sounds: Clear                               Left Breath Sounds: Clear    Breath Sounds Post Procedure: Right Breath Sounds: Clear                                 Left Breath Sounds: Clear    Breathing pattern: Pre procedure Breathing Pattern: Regular          Post procedure Breathing Pattern: Regular    Heart Rate: Pre procedure Pulse: 62           Post procedure Pulse: 60    Resp Rate: Pre procedure Respirations: 16           Post procedure Respirations: 18       Cough: Pre procedure Cough: Non-productive               Post procedure Cough: Non-productive    Suctioned: NO      Oxygen: O2 Device: None (Room air)        Changed: NO    SpO2: Pre procedure SpO2: 98 %   without oxygen              Post procedure SpO2: 98 %  without oxygen    Nebulizer Therapy: Current medications Aerosolized Medications: Brovana, Ipratropium bromide      Changed: NO    Smoking History: former smoker    Problem List:   Patient Active Problem List   Diagnosis Code    CHF (congestive heart failure) ([facility]) I50.9    S/P TKR (total knee replacement) Z96.659    Anemia D64.9    s/p lumbar laminectomy Z98.890    S/P ASAD (total abdominal hysterectomy) Z90.710    S/P hemorrhoidectomy Z98.890, Z87.19    S/P rotator cuff surgery Z98.890    DM (diabetes mellitus) ([facility]) E11.9    Chronic sinusitis J32.9    S/P sinus surgery Z98.890    Dyslipidemia E78.5    Environmental allergies Z91.09    Obstructive sleep apnea (adult) (pediatric) G47.33    DJD (degenerative joint disease) M19.90    Chronic systolic heart failure ([facility]) I50.22    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    Asthma J45.909    Anxiety disorder F41.9    Diabetic neuropathy ([facility]) E11.40    Esophageal reflux K21.9    Essential hypertension, benign I10    Reactive airway disease with wheezing without complication F79.032    Encounter for medication monitoring Z51.81    CKD (chronic kidney disease) N18.9    Arthralgia of multiple joints M25.50    Plantar fasciitis of left foot M72.2    Type 2 diabetes with nephropathy ([facility]) E11.21    Severe obesity (BMI 35.0-39. 9) with comorbidity ([facility]) E66.01    Polymyalgia rheumatica ([facility]) M35.3    COPD exacerbation ([facility]) J44.1    Ataxia R27.0    General weakness R53.1    Tremors of nervous system R25.1    Fall at home W19. Osmani Nava, Y92.009       Respiratory Therapist: Kaylynn Campos, RT

## 2021-06-22 NOTE — PROGRESS NOTES
Problem: Self Care Deficits Care Plan (Adult)  Goal: *Acute Goals and Plan of Care (Insert Text)  Description:   FUNCTIONAL STATUS PRIOR TO ADMISSION: Pt lives with her  who assists pt with lower body adls and perform all IADLs.  reports pt has slowed down over the past 6 months. Pt has had one fall at the beginning of May. All tasks take pt additional time to perform. She has tremors, noted to be mostly intention tremors. Pt uses a RW. There is a stair lift at home--they live in a tri level house    HOME SUPPORT: The patient lived with her . Granddaughter also helps. Occupational Therapy Goals  Initiated 6/19/2021  1. Patient will perform grooming in standing  with supervision/set-up within 7 day(s). 2.  Patient will perform  bathing with minimal assistance/contact guard assist within 7 day(s). 3.  Patient will perform lower body dressing, using adaptive aids prn with minimal assistance/contact guard assist within 7 day(s). 4.  Patient will perform toilet transfers with supervision/set-up within 7 day(s). 5.  Patient will perform all aspects of toileting with supervision/set-up within 7 day(s). 6.  Patient will participate in upper extremity therapeutic exercise/activities with supervision/set-up for 10 minutes within 7 day(s). 7.  Patient will perform standing adls for at least 8 minutes  within 7 day(s). 8.  Patient will demonstrate safe technique during all functional mobility activities within 7 days. Outcome: Progressing Towards Goal   OCCUPATIONAL THERAPY TREATMENT  Patient: Yamile Wade (18 y.o. female)  Date: 6/22/2021  Diagnosis: General weakness [R53.1]  Tremors of nervous system [R25.1]  Ataxia [R27.0]  Fall at home [W19. XXXA, H92.474] <principal problem not specified>       Precautions: Fall  Chart, occupational therapy assessment, plan of care, and goals were reviewed.     ASSESSMENT  Patient continues with skilled OT services and is progressing towards goals. Pt was standing at sink in bathroom when OT arrived and she was washing UB and LB . She was tired but had completed the job when OT arrived. Pt was stating that she felt stronger today and her knee was not hurting as bad. Pt continues to have difficulty with LB ADLs due to pain in knees. Feel that pt is making progress and short in pt stay in rehab will greatly benefit pt to reach her max functional level    Pt was left sitting up in recliner with  in room. Current Level of Function Impacting Discharge (ADLs): max to mod to andrew socks and shoes , setup for UB and deonna aera/buttocks              PLAN :  Patient continues to benefit from skilled intervention to address the above impairments. Continue treatment per established plan of care to address goals. Recommend with staff: [address] [place] [address] for meals and walk to bathroom with RW    Recommend next OT session: work on LB ADLs     Recommendation for discharge: (in order for the patient to meet his/her long term goals)  Therapy 3 hours per day 5-7 days per week    This discharge recommendation:  Has been made in collaboration with the attending provider and/or case management    IF patient discharges home will need the following DME: tbd       SUBJECTIVE:   Patient stated I feel better today.     OBJECTIVE DATA SUMMARY:   Cognitive/Behavioral Status:  Neurologic State: Alert  Orientation Level: Appropriate for age  Cognition: Follows commands  Perception: Appears intact  Perseveration: No perseveration noted  Safety/Judgement: Fall prevention    Functional Mobility and Transfers for ADLs:  Bed Mobility:  Scooting: Stand-by assistance    Transfers:  Sit to Stand: Contact guard assistance  Functional Transfers  Bathroom Mobility: Contact guard assistance  Toilet Transfer : Contact guard assistance       Balance:  Sitting: Intact  Standing: Impaired; With support  Standing - Static: Good;Constant support  Standing - Dynamic : Fair;Constant support    ADL Intervention:  Feeding  Feeding Assistance: Independent    Grooming  Grooming Assistance: Set-up  Position Performed: Standing  Washing Face: Independent  Washing Hands: Independent  Brushing Teeth: Independent    Upper Body Bathing  Bathing Assistance: Set-up; Supervision  Position Performed: Standing    Lower Body Bathing  Bathing Assistance: Supervision;Set-up  Perineal  : Supervision;Set-up  Position Performed: Standing       Toileting  Toileting Assistance: Stand-by assistance  Bladder Hygiene: Stand-by assistance  Bowel Hygiene: Stand-by assistance    Cognitive Retraining  Safety/Judgement: Fall prevention      Activity Tolerance:   Fair    After treatment patient left in no apparent distress:   Sitting in chair, Call bell within reach, and Caregiver / family present    COMMUNICATION/COLLABORATION:   The patients plan of care was discussed with: Physical therapist and Registered nurse.      Ksenia Priest OT  Time Calculation: 19 mins

## 2021-06-22 NOTE — PROGRESS NOTES
Problem: Mobility Impaired (Adult and Pediatric)  Goal: *Acute Goals and Plan of Care (Insert Text)  Description: FUNCTIONAL STATUS PRIOR TO ADMISSION: Patient is poor historian, reporting she was indep PLOF. Spouse clarifies that patient requires assist depending on the day. ADLs can take patient upwards of 2 hours to complete, spouse will assist to speed the process up and ensure safety. Uses lift chair to get to bedroom on 2nd floor. Ambulates short distances with cane or RW (depending on day). Spouse reports decline in physical ability over the past month. HOME SUPPORT PRIOR TO ADMISSION: The patient lived with spouse who provides [de-identified] of caregiving, granddaughter is an RN and provides support. Physical Therapy Goals  Initiated 6/19/2021  1. Patient will move from supine to sit and sit to supine , scoot up and down, and roll side to side in bed with modified independence within 7 day(s). 2.  Patient will transfer from bed to chair and chair to bed with supervision/set-up using the least restrictive device within 7 day(s). 3.  Patient will perform sit to stand with supervision/set-up within 7 day(s). 4.  Patient will ambulate with supervision/set-up for 100 feet with the least restrictive device within 7 day(s). Outcome: Progressing Towards Goal   PHYSICAL THERAPY TREATMENT  Patient: Germán Cassidy (70 y.o. female)  Date: 6/22/2021  Diagnosis: General weakness [R53.1]  Tremors of nervous system [R25.1]  Ataxia [R27.0]  Fall at home [W19. XXXA, E95.632] <principal problem not specified>       Precautions: Fall  Chart, physical therapy assessment, plan of care and goals were reviewed. ASSESSMENT  Patient continues with skilled PT services and is slowly progressing towards goals.  Patient continues to demonstrate limited functional mobility and decreased independence secondary to impaired standing balance, generalized weakness, impaired gait mechanics, limited endurance, decreased ROM, and decreased activity tolerance. Received standing in the bathroom and agreeable to PT intervention. Patient continues to exhibit impaired standing balance with multiple balance checks noted during session despite use of RW. Required increased time during transfers due to increased stiffness and decreased ROM in MARTHA knees. Tolerated increased gait distance with RW support, however needed cueing to improve trunk and neck extension and for safe positioning of RW during gait. Pt was left sitting in bedside chair with all needs met, RN aware,  present, and chair alarm on following therapy session. Recommend patient discharge to IP rehab to address functional impairments as noted above as patient with decline from baseline mobility and is at an increased risk for falls. Current Level of Function Impacting Discharge (mobility/balance): CGA for transfers and gait training with RW support    Other factors to consider for discharge: decline from baseline mobility; increased risk for falls         PLAN :  Patient continues to benefit from skilled intervention to address the above impairments. Continue treatment per established plan of care. to address goals. Recommendation for discharge: (in order for the patient to meet his/her long term goals)  Therapy 3 hours per day 5-7 days per week    This discharge recommendation:  Has been made in collaboration with the attending provider and/or case management    IF patient discharges home will need the following DME: to be determined (TBD)       SUBJECTIVE:   Patient stated I'm going to stay here a few more weeks.     OBJECTIVE DATA SUMMARY:   Critical Behavior:  Neurologic State: Alert, Appropriate for age  Orientation Level: Oriented X4  Cognition: Follows commands  Safety/Judgement: Fall prevention  Functional Mobility Training:  Bed Mobility:           Scooting: Stand-by assistance        Transfers:  Sit to Stand: Contact guard assistance  Stand to Sit: Contact guard assistance                             Balance:  Sitting: Intact  Standing: Impaired; With support  Standing - Static: Good;Constant support  Standing - Dynamic : Fair;Constant support  Ambulation/Gait Training:  Distance (ft): 100 Feet (ft)  Assistive Device: Gait belt;Walker, rolling  Ambulation - Level of Assistance: Contact guard assistance;Assist x1;Additional time; Adaptive equipment        Gait Abnormalities: Decreased step clearance;Shuffling gait (mild trunk flexion)        Base of Support: Widened     Speed/Shannon: Pace decreased (<100 feet/min); Slow  Step Length: Left shortened;Right shortened    Pain Rating:  No pain complaints    Activity Tolerance:   Fair and requires rest breaks    After treatment patient left in no apparent distress:   Sitting in chair, Call bell within reach, Bed / chair alarm activated, and Caregiver / family present    COMMUNICATION/COLLABORATION:   The patients plan of care was discussed with: Physical therapist, Occupational therapist, Registered nurse, and Case management.      Danny Vale, PT, DPT   Time Calculation: 20 mins

## 2021-06-22 NOTE — PROGRESS NOTES
Transition of Care Plan:     RUR: 16% - \"low risk\"  Disposition: in-pt rehab - [facility] (pending bed availability)  Follow up appointments: PCP & specialist as indicated   DME needed: Defer to rehab  Transportation at Discharge: Pt on will call with [facility] for anticipated d/c within the next 24 hrs; PCS & EMTALA to be completed  Keys or means to access home: Pt transitioning to rehab at d/c      IM Medicare letter: 2nd IM needed prior to d/c  Caregiver Contact: Pt's  Lauren Caban: [phone])  Discharge Caregiver contacted prior to discharge? To be contacted prior to d/c  COVID-19 test: CM requested PCR COVID-19 test in anticipation for in-pt rehab placement in [facility] 6/21/21; MD to place order, RN to complete       Initial note: CM reviewed pt's chart prior to moving forward with d/c planning. BRENDON received update from [address] [place] [address] Carty Ala: [phone]) that pt has been accepted to the facility for in-pt rehab pending bed availability. Brittaney Petra reported she anticipates bed availability today (6/22/21) vs tomorrow (6/23/21); Brittaney Lambert to contact CM with updates as they become available. Upon reviewing chart, BRENDON noted pt had rapid COVID-19 test completed yesterday (6/22/21); results were negative. BRENDON confirmed with [facility] liaison Machelle Mchugh), that the facility requires PCR COVID-19 test for placement. CM contacted pt's RN to report update & request PCR COVID-19 test; RN to consult with MD to relay request for updated order. CM met with pt and  Lauren Caban: [phone]) at bedside to report update and address any immediate questions/concerns. CM provided update & answered questions as needed. CM provided folder of information regarding [facility] services. CM discussed transportation for d/c; both pt and  agreeable to utilizing medical transport. Pt on will call with [facility] for anticipated d/c within the next 24 hours; PCS & EMTALA to be completed. CM will continue to follow & report updates as they become available.  CM will update the delay in d/c to reflect current barriers preventing pt from transitioning out of ED [facility].     STALIN Savage  Care Manager, [facility]

## 2021-06-23 VITALS
HEIGHT: 64 IN | DIASTOLIC BLOOD PRESSURE: 58 MMHG | TEMPERATURE: 98.5 F | RESPIRATION RATE: 16 BRPM | OXYGEN SATURATION: 99 % | BODY MASS INDEX: 37.49 KG/M2 | SYSTOLIC BLOOD PRESSURE: 133 MMHG | WEIGHT: 219.58 LBS | HEART RATE: 60 BPM

## 2021-06-23 LAB
GLUCOSE BLD STRIP.AUTO-MCNC: 110 MG/DL (ref 65–117)
GLUCOSE BLD STRIP.AUTO-MCNC: 134 MG/DL (ref 65–117)
SARS-COV-2, XPLCVT: NOT DETECTED
SERVICE CMNT-IMP: ABNORMAL
SERVICE CMNT-IMP: NORMAL
SOURCE, COVRS: NORMAL

## 2021-06-23 PROCEDURE — 74011000250 HC RX REV CODE- 250: Performed by: GENERAL ACUTE CARE HOSPITAL

## 2021-06-23 PROCEDURE — 74011250637 HC RX REV CODE- 250/637: Performed by: PSYCHIATRY & NEUROLOGY

## 2021-06-23 PROCEDURE — 94760 N-INVAS EAR/PLS OXIMETRY 1: CPT

## 2021-06-23 PROCEDURE — 74011636637 HC RX REV CODE- 636/637: Performed by: INTERNAL MEDICINE

## 2021-06-23 PROCEDURE — 82962 GLUCOSE BLOOD TEST: CPT

## 2021-06-23 PROCEDURE — 74011250637 HC RX REV CODE- 250/637: Performed by: INTERNAL MEDICINE

## 2021-06-23 PROCEDURE — 94640 AIRWAY INHALATION TREATMENT: CPT

## 2021-06-23 PROCEDURE — 74011250636 HC RX REV CODE- 250/636: Performed by: INTERNAL MEDICINE

## 2021-06-23 PROCEDURE — 74011250637 HC RX REV CODE- 250/637: Performed by: GENERAL ACUTE CARE HOSPITAL

## 2021-06-23 RX ORDER — CARVEDILOL 25 MG/1
12.5 TABLET ORAL 2 TIMES DAILY
Qty: 30 TABLET | Refills: 1 | Status: SHIPPED | OUTPATIENT
Start: 2021-06-23 | End: 2022-06-27 | Stop reason: DRUGHIGH

## 2021-06-23 RX ORDER — PRIMIDONE 50 MG/1
150 TABLET ORAL 2 TIMES DAILY
Qty: 180 TABLET | Refills: 1 | Status: SHIPPED | OUTPATIENT
Start: 2021-06-23 | End: 2021-09-13 | Stop reason: SDUPTHER

## 2021-06-23 RX ADMIN — ASPIRIN 81 MG: 81 TABLET, CHEWABLE ORAL at 09:23

## 2021-06-23 RX ADMIN — PRIMIDONE 150 MG: 50 TABLET ORAL at 09:24

## 2021-06-23 RX ADMIN — GLIPIZIDE 2.5 MG: 5 TABLET ORAL at 09:24

## 2021-06-23 RX ADMIN — HEPARIN SODIUM 5000 UNITS: 5000 INJECTION INTRAVENOUS; SUBCUTANEOUS at 12:45

## 2021-06-23 RX ADMIN — DOCUSATE SODIUM 50MG AND SENNOSIDES 8.6MG 1 TABLET: 8.6; 5 TABLET, FILM COATED ORAL at 09:24

## 2021-06-23 RX ADMIN — PREDNISONE 15 MG: 5 TABLET ORAL at 09:23

## 2021-06-23 RX ADMIN — DULOXETINE HYDROCHLORIDE 60 MG: 30 CAPSULE, DELAYED RELEASE ORAL at 09:24

## 2021-06-23 RX ADMIN — FUROSEMIDE 20 MG: 20 TABLET ORAL at 09:24

## 2021-06-23 RX ADMIN — FLUTICASONE PROPIONATE 2 SPRAY: 50 SPRAY, METERED NASAL at 09:25

## 2021-06-23 RX ADMIN — PANTOPRAZOLE SODIUM 40 MG: 40 TABLET, DELAYED RELEASE ORAL at 09:24

## 2021-06-23 RX ADMIN — IPRATROPIUM BROMIDE 0.5 MG: 0.5 SOLUTION RESPIRATORY (INHALATION) at 09:44

## 2021-06-23 RX ADMIN — ARFORMOTEROL TARTRATE 15 MCG: 15 SOLUTION RESPIRATORY (INHALATION) at 09:43

## 2021-06-23 RX ADMIN — HEPARIN SODIUM 5000 UNITS: 5000 INJECTION INTRAVENOUS; SUBCUTANEOUS at 04:38

## 2021-06-23 NOTE — DISCHARGE SUMMARY
Hospitalist Discharge Summary     Patient ID:  Sara Lind  [id number]  41 y.o.  4/5/1931 6/18/2021    PCP on record: Yuval Huitron MD    Admit date: 6/18/2021  Discharge date and time: 6/23/2021    DISCHARGE DIAGNOSIS:  See below    CONSULTATIONS:  IP CONSULT TO NEUROLOGY    Excerpted HPI from H&P of Marielena Beard MD:  Ralph Peralta is a 80 y.o.  female who presents with above complaints from home with . Patient present with chief complaint of worsening generalized weakness with worsening tremors and ataxia for the last 2 weeks   History history of multiple falls at home  Patient has history of being started on gabapentin which caused her to have tremors which improved after she was taken off it, since then has been put on primidone by neurology as outpatient-as per the  the tremors have worsened now. Patient was found to have mild dehydration with stable creatinine at 1.8 with negative CT head and otherwise unremarkable work-up in the ED.    ______________________________________________________________________  DISCHARGE SUMMARY/HOSPITAL COURSE:  for full details see H&P, daily progress notes, labs, consult notes. Debility  Essential tremor  MRI Brain negative  PT/OT rec is for SNF  CM to search for placement  Continue Primidone     6/22:  Pt accepted to  - insurance paperwork pending  Likely for dc tomorrow     6/23:  Pt has been accepted to  and auth has been obtained.   Pt stable for discharge this afternoon at 3pm.    Diabetes type 2-controlled POA  CKD stage III POA-creatinine at baseline at 1.8  Chronic pain syndrome  COPD  DONNIE not on CPAP     Fingersticks before every meal and at bedtime here, continue home glipizide, SSI lispro here  Continue Cymbalta  Continue home inhalers  Continue home prednisone     6/23:  Resume home meds      _______________________________________________________________________  Patient seen and examined by me on discharge day. Pertinent Findings:  Gen:    Not in distress  Chest: Clear lungs  CVS:   Regular rhythm. No edema  Abd:  Soft, not distended, not tender  Neuro:  Alert,   _______________________________________________________________________  DISCHARGE MEDICATIONS:   Current Discharge Medication List      START taking these medications    Details   ! ! primidone (MYSOLINE) 50 mg tablet Take 3 Tablets by mouth two (2) times a day. Qty: 180 Tablet, Refills: 1  Start date: 6/23/2021       !! - Potential duplicate medications found. Please discuss with provider. CONTINUE these medications which have CHANGED    Details   carvediloL (COREG) 25 mg tablet Take 0.5 Tablets by mouth two (2) times a day. Qty: 30 Tablet, Refills: 1  Start date: 6/23/2021         CONTINUE these medications which have NOT CHANGED    Details   ! ! primidone (MYSOLINE) 250 mg tablet Take 1 Tablet by mouth two (2) times a day. Qty: 180 Tablet, Refills: 1    Associated Diagnoses: Essential tremor      rosuvastatin (CRESTOR) 10 mg tablet TAKE 1 TABLET BY MOUTH EVERY NIGHT  Qty: 90 Tablet, Refills: 3    Associated Diagnoses: Mixed hyperlipidemia      furosemide (LASIX) 20 mg tablet TAKE 1 TABLET BY MOUTH EVERY MORNING AND 1 TABLET EVERY AFTERNOON BETWEEN 2 TO 4 PM  Qty: 180 Tablet, Refills: 3    Associated Diagnoses: Bilateral edema of lower extremity      magnesium oxide (Chin) 500 mg tab Take  by mouth.  Take 2 tablets nightly      glipiZIDE SR (GLUCOTROL XL) 2.5 mg CR tablet TAKE 1 TABLET BY MOUTH DAILY  Qty: 90 Tab, Refills: 3      predniSONE (DELTASONE) 5 mg tablet TAKE 3 TABLETS BY MOUTH DAILY  Qty: 90 Tab, Refills: 5    Associated Diagnoses: Polymyalgia rheumatica (HCC)      DULoxetine (CYMBALTA) 60 mg capsule TAKE 1 CAPSULE BY MOUTH DAILY  Qty: 90 Cap, Refills: 3    Associated Diagnoses: Arthralgia of multiple joints      diclofenac (VOLTAREN) 1 % gel APPLY EXTERNALLY TO THE AFFECTED AREA FOUR TIMES DAILY  Qty: 300 g, Refills: 3      Accu-Chek Fastclix Lancet Drum misc USE TO CHECK BLOOD SUGAR TWICE DAILY  Qty: 300 Each, Refills: 3      Accu-Chek Guide test strips strip USE TO TEST BLOOD SUGAR TWICE DAILY  Qty: 300 Strip, Refills: 3      Accu-Chek Guide Me Glucose Mtr misc USE TO CHECK BLOOD SUGAR TWICE DAILY  Qty: 1 Each, Refills: 0      BEVESPI AEROSPHERE 9-4.8 mcg HFA inhaler Take 2 puffs by inhalation daily      albuterol (PROVENTIL HFA, VENTOLIN HFA, PROAIR HFA) 90 mcg/actuation inhaler Take 2 Puffs by inhalation every four (4) hours as needed for Wheezing or Shortness of Breath. Qty: 1 Inhaler      guaiFENesin (MUCINEX) 1,200 mg Ta12 ER tablet Take 1,200 mg by mouth daily as needed. MULTIVITAMIN PO Take 1 Tab by mouth daily. Omeprazole delayed release (PRILOSEC D/R) 20 mg tablet Take 20 mg by mouth daily. lisinopril (PRINIVIL, ZESTRIL) 2.5 mg tablet Take 2.5 mg by mouth daily. fluticasone (FLONASE) 50 mcg/actuation nasal spray SHAKE LIQUID AND USE 2 SPRAYS IN EACH NOSTRIL EVERY DAY  Qty: 1 Bottle, Refills: 11    Comments: **Patient requests 90 days supply**      promethazine-dextromethorphan (PROMETHAZINE-DM) 6.25-15 mg/5 mL syrup TAKE ONE TEASPOONFUL BY MOUTH EVERY SIX HOURS AS NEEDED FOR COUGH  Qty: 240 mL, Refills: 1    Associated Diagnoses: Environmental allergies       ! ! - Potential duplicate medications found. Please discuss with provider. STOP taking these medications       HYDROcodone-acetaminophen (NORCO)  mg tablet Comments:   Reason for Stopping:                 Patient Follow Up Instructions:    Activity: PT/OT Eval and Treat  Diet: Resume previous diet  Wound Care: None needed      Follow-up Information    None       ________________________________________________________________    Risk of deterioration: Moderate    Condition at Discharge:  Stable  __________________________________________________________________    Disposition  IP Rehab    ____________________________________________________________________    Code Status: Full Code  ___________________________________________________________________      Total time in minutes spent coordinating this discharge (includes going over instructions, follow-up, prescriptions, and preparing report for sign off to her PCP) :  >30 minutes    Signed:  Mick Miranda MD

## 2021-06-23 NOTE — PROGRESS NOTES
Sbar report called [facility] to nurse Erika Velez RN  At [phone] with the given opportunity for questions and clarification. 3:47 pm pt. Transfer to [facility] via ambulance,v/s stable, pt A&0 X 4.family at bedside, no episodes of acute distress noted offered no c/o pain.

## 2021-06-23 NOTE — PROGRESS NOTES
End of Shift Note     Bedside shift change report given to Annemarie Craig RN (oncoming nurse) by Zac thomas RN(offgoing nurse). Report included the following information SBAR, Kardex, MAR and Recent Results     Shift worked: 7P-7A   Shift summary and any significant changes:     -covid test sent PCR, awaiting bed placement to [facility]      Concerns for physician to address:  NONE   Zone phone for oncoming shift:  [phone]        Patient Information  Marleny Faulkner  80 y.o.  6/18/2021  4:36 PM by Tiffanie Mcintosh MD. Marleny Faulkner was admitted from Home     Problem List       Patient Active Problem List     Diagnosis Date Noted    Ataxia 06/18/2021    General weakness 06/18/2021    Tremors of nervous system 06/18/2021    Fall at home 06/18/2021    COPD exacerbation ([address] 12/09/2019    Polymyalgia rheumatica ([address] 06/15/2018    Type 2 diabetes with nephropathy ([address] 04/11/2018    Severe obesity (BMI 35.0-39. 9) with comorbidity ([address] 04/11/2018    Plantar fasciitis of left foot 11/02/2017    CKD (chronic kidney disease) 10/12/2016    Arthralgia of multiple joints 10/12/2016    Encounter for medication monitoring 01/29/2016    Reactive airway disease with wheezing without complication 35/89/4316    Essential hypertension, benign 08/25/2015    Asthma 02/23/2014    Anxiety disorder 02/23/2014    Diabetic neuropathy ([address] 02/23/2014    Esophageal reflux 02/23/2014    Degenerative arthritis of lumbar spine 06/18/2013    DJD (degenerative joint disease) 08/01/2012    Chronic systolic heart failure ([address] 08/01/2012    Obstructive sleep apnea (adult) (pediatric) 01/12/2012    Environmental allergies 10/12/2010    CHF (congestive heart failure) ([address] 03/03/2010    S/P TKR (total knee replacement) 03/03/2010    Anemia 03/03/2010    s/p lumbar laminectomy 03/03/2010    S/P ASAD (total abdominal hysterectomy) 03/03/2010    S/P hemorrhoidectomy 03/03/2010    S/P rotator cuff surgery 03/03/2010    DM (diabetes mellitus) ([facility] 03/03/2010    Chronic sinusitis 03/03/2010    S/P sinus surgery 03/03/2010    Dyslipidemia 03/03/2010           Past Medical History:   Diagnosis Date    Anemia 3/3/2010    Arrhythmia       irregular heartbeat    Arthritis      CHF (congestive heart failure) (HCC) 3/3/2010    Chronic obstructive pulmonary disease (HCC)      Chronic pain       back    Chronic sinusitis 3/3/2010    CPAP (continuous positive airway pressure) dependence      Diverticulosis      DM (diabetes mellitus) ([facility] 3/3/2010    Dyslipidemia 3/3/2010    GERD (gastroesophageal reflux disease)      HTN (hypertension) 3/3/2010    Ill-defined condition       being evaluated py pulmonolgist for \"trouble breathing\"    S/P TKR (total knee replacement) 3/3/2010    Unspecified sleep apnea       doesn't wear CPAP since back has been hurting         Core Measures:  CVA: Yes Yes  CHF:No Not applicable  PNA:No Not applicable     Activity:  Activity Level: Up with Assistance  Number times ambulated in hallways past shift: 0  Number of times OOB to chair past shift: 5     Cardiac:   Cardiac Monitoring: Yes      Cardiac Rhythm: Sinus Rhythm     Access:   Current line(s): PIV      Genitourinary:   Urinary status: voiding  Urinary Catheter? No      Respiratory:   O2 Device: None (Room air)  Chronic home O2 use?: NO  Incentive spirometer at bedside: N/A     GI:  Last Bowel Movement Date: 06/17/21  Current diet:  ADULT DIET Regular; 4 carb choices (60 gm/meal); Low Fat/Low Chol/High Fiber/2 gm Na  Passing flatus: YES  Tolerating current diet: YES     Pain Management:   Patient states pain is manageable on current regimen: YES     Skin:  Capo Score: 19  Interventions: increase time out of bed and limit briefs    Patient Safety:  Fall Score:  Total Score: 4  Interventions: bed/chair alarm and pt to call before getting OOB  High Fall Risk: Yes  @[place] no     DVT prophylaxis:  DVT prophylaxis Med- Yes  DVT prophylaxis SCD or BIANCA- No      Active Consults:  IP CONSULT TO NEUROLOGY     Length of Stay:  Expected LOS: 3d 0h  Actual LOS: 3  Discharge Plan: No just admitted        TAMARA RN

## 2021-06-23 NOTE — PROGRESS NOTES
Transition of Care Plan:     RUR: 17% - \"low risk\"  Disposition: in-pt rehab placement - [facility]    *Report: [phone]   *Room assignment: 56   *Accepting MD: Dr. Carolanne Riedel  Follow up appointments: PCP & specialist as indicated   DME needed: Defer to rehab  Transportation at [address] transport secured for 3:00 PM today (6/23/21); PCS completed & placed on chart. EMTALA to be completed once accepting MD is confirmed  Keys or means to access home: Pt transitioning to rehab at d/c      IM Medicare letter: 2nd IM provided at bedside (6/23/21)  Caregiver Contact: Pt's  Chela Shock: [phone])  Discharge Caregiver contacted prior to discharge? To be contacted prior to d/c  COVID-19 test: Pt had PCR COVID-19 test completed 6/22/21 in anticipation for in-pt rehab placement; results pending at this time    Update - 1:17 PM: Accepting MD, number for report, and room assignment placed in [facility] plan above. CM will inform RN & MD. CM will also provide information to pt and  at bedside. Update - 11:20 AM: Pt's PCR COVID-19 test results are still pending at this time. [facility] transport secured for 3:00 PM; PCS completed, copy on chart. CM provided update to medical team in [address] [place] [address]; both RN & MD aware of EMTALA. CM met with pt and her  at bedside to provide update and review plan for d/c. CM informed pt and  of [facility] transport secured for 3:00 PM; both parties verbalized understanding. CM to provide pt and her  with an index card detailing room assignment & nurse's station number at [facility] prior to d/c. CM inquired if pt and/or  had any additional questions, concerns, or needs related to the d/c plan; both parties declined. CM will provide accepting MD & number for report as soon as the information becomes available. Medicare pt has received, reviewed, and provided verbal consent for 2nd IM letter informing them of their right to appeal the discharge.  Copy has been placed on pt bedside chart. No further CM needs identified. Initial note: CM reviewed pt's chart prior to moving forward with d/c planning. CM checked on the status of pt's PCR COVID-19 test completed yesterday (6/22/21); results pending at this time. CM received call from [id number] [place] [address] Eder Million: [phone]) reporting that the facility will likely have bed availability for placement today (pending results of COVID-19 test). Shanna Blood requested for CM to secure medical transport after 2:00 PM; CM to complete request. CM will take pt off will call with [facility] & request pick-up time of 3:00 PM; PCS & EMTALA to be completed. CM will report update to pt's medical team in [address] [place] [address]. CM will also meet with pt and her  [employer]: [phone]) to report update & address any immediate questions/concerns related to the d/c plan. CM will continue to follow & report updates as they become available. Care Management Interventions  PCP Verified by CM: Yes  Palliative Care Criteria Met (RRAT>21 & CHF Dx)?: No  Palliative Consult Recommended?: No  Reason Palliative Care Not Recommended?: Palliative Care not utilized by provider  Mode of Transport at Discharge: [facility] Transport Time of Discharge: 1500  Transition of Care Consult (CM Consult):  Other (in-pt rehab)  Discharge Durable Medical Equipment: No  Physical Therapy Consult: Yes  Occupational Therapy Consult: Yes  Speech Therapy Consult: Yes  Current Support Network: Own Home, Lives with Spouse  Confirm Follow Up Transport: Family  The Plan for Transition of Care is Related to the Following Treatment Goals : in-pt rehab  The Patient and/or Patient Representative was Provided with a Choice of Provider and Agrees with the Discharge Plan?: Yes  Name of the Patient Representative Who was Provided with a Choice of Provider and Agrees with the Discharge Plan: patient and  [employer])  [place] of Choice List was Provided with Basic Dialogue that Supports the Patient's Individualized Plan of Care/Goals, Treatment Preferences and Shares the Quality Data Associated with the Providers?: Yes   Resource Information Provided?: No  Discharge Location  Discharge Placement: Rehab hospital/unit acute (in-pt rehab ([facility]))    Ena Bettencourt, [address] [facility], [address] [place] [address]

## 2021-06-24 ENCOUNTER — PATIENT OUTREACH (OUTPATIENT)
Dept: CASE MANAGEMENT | Age: 86
End: 2021-06-24

## 2021-06-24 NOTE — PROGRESS NOTES
Transition of care outreach postponed for 7 days due to patient's discharge to inpatient rehab facility.   [facility] 6/18-6/23/21 CHF ataxia d/c [facility] f/u 7d

## 2021-07-12 ENCOUNTER — PATIENT OUTREACH (OUTPATIENT)
Dept: CASE MANAGEMENT | Age: 86
End: 2021-07-12

## 2021-07-12 NOTE — PROGRESS NOTES
[address] [place] [address] Dr Discharge Follow-Up      Date/Time:  2021 2:13 PM    Patient was admitted to [facility] on 21 and discharged to [facility] on 21. The patients discharge diagnosis was ataxia. Patient was discharge on 21 from [facility]. The discharge summary from the post acute facilty was not available at the time of outreach. Patient was contacted within 9 business days of discharge from the post acute facility. LPN Care Coordinator contacted the patient by telephone to perform post hospital discharge follow up. Verified name and  with patient as identifiers. Provided introduction to self, and explanation of the LPN Care Coordinator role. Patient received post acute facility discharge instructions. LPN Care Coordinator reviewed discharge instructions and red flags with patient who verbalized understanding. Patient given an opportunity to ask questions and does not have any further questions or concerns at this time. The patient agrees to contact the PCP office for questions related to their healthcare. LPN Care Coordinator provided contact information for future reference. Advance Care Planning:   Does patient have an Advance Directive:  not on file     [address] orders at discharge: PT, OT, [address]: Advanced care  Date of initial visit: 21    [facility] ordered at discharge: none  [address] received: patient already had rollator at home    Medication(s):   The post acute facility medication discharge list was not available for this call. hospital Medication review was performed with family, who verbalizes understanding of administration of home medications. There were no barriers to obtaining medications identified at this time.     [facility] follow up appointment(s):   Future Appointments   Date Time Provider Danny Montalvo   2021 11:20 AM Gertrudis Machado MD [facility] BS AMB   7/30/2021 10:00 AM Mellissa Ferreira MD [facility] BS AMB   8/9/2021  1:20 PM Shahbaz Ruiz MD [facility] BS AMB   9/13/2021  3:00 PM MD VIRIDIANA Osborn BS AMB   9/27/2021 11:40 AM Dominga Bartlett MD [facility] BS AMB      Non-[facility] follow up appointment(s): n/a  [facility]:  information provided as a resource

## 2021-07-21 DIAGNOSIS — M35.3 POLYMYALGIA RHEUMATICA (HCC): ICD-10-CM

## 2021-07-21 RX ORDER — PREDNISONE 5 MG/1
TABLET ORAL
Qty: 90 TABLET | Refills: 5 | Status: SHIPPED | OUTPATIENT
Start: 2021-07-21 | End: 2022-01-07

## 2021-07-22 ENCOUNTER — TELEPHONE (OUTPATIENT)
Dept: FAMILY MEDICINE CLINIC | Age: 86
End: 2021-07-22

## 2021-07-22 NOTE — TELEPHONE ENCOUNTER
Called patient at [phone] left a message that Dr. Marjorie Schafer can see the patient on 7/23/21 at 9:00am, patient put on schedule ( given to [facility] to put on the schedule its blocked)

## 2021-07-23 ENCOUNTER — OFFICE VISIT (OUTPATIENT)
Dept: FAMILY MEDICINE CLINIC | Age: 86
End: 2021-07-23
Payer: MEDICARE

## 2021-07-23 VITALS
RESPIRATION RATE: 18 BRPM | TEMPERATURE: 98.4 F | SYSTOLIC BLOOD PRESSURE: 127 MMHG | OXYGEN SATURATION: 96 % | BODY MASS INDEX: 35.71 KG/M2 | HEART RATE: 58 BPM | HEIGHT: 64 IN | DIASTOLIC BLOOD PRESSURE: 60 MMHG | WEIGHT: 209.2 LBS

## 2021-07-23 DIAGNOSIS — E78.2 MIXED HYPERLIPIDEMIA: ICD-10-CM

## 2021-07-23 DIAGNOSIS — H53.8 BLURRED VISION, RIGHT EYE: ICD-10-CM

## 2021-07-23 DIAGNOSIS — M25.50 ARTHRALGIA OF MULTIPLE JOINTS: ICD-10-CM

## 2021-07-23 DIAGNOSIS — G89.29 ENCOUNTER FOR CHRONIC PAIN MANAGEMENT: ICD-10-CM

## 2021-07-23 DIAGNOSIS — D64.9 CHRONIC ANEMIA: ICD-10-CM

## 2021-07-23 DIAGNOSIS — J44.9 CHRONIC OBSTRUCTIVE PULMONARY DISEASE, UNSPECIFIED COPD TYPE (HCC): ICD-10-CM

## 2021-07-23 DIAGNOSIS — N18.30 STAGE 3 CHRONIC KIDNEY DISEASE, UNSPECIFIED WHETHER STAGE 3A OR 3B CKD (HCC): ICD-10-CM

## 2021-07-23 DIAGNOSIS — I10 ESSENTIAL HYPERTENSION, BENIGN: Primary | ICD-10-CM

## 2021-07-23 DIAGNOSIS — Z51.81 ENCOUNTER FOR MEDICATION MONITORING: ICD-10-CM

## 2021-07-23 DIAGNOSIS — E11.21 TYPE 2 DIABETES WITH NEPHROPATHY (HCC): ICD-10-CM

## 2021-07-23 DIAGNOSIS — R60.0 BILATERAL EDEMA OF LOWER EXTREMITY: ICD-10-CM

## 2021-07-23 DIAGNOSIS — M35.3 POLYMYALGIA RHEUMATICA (HCC): ICD-10-CM

## 2021-07-23 DIAGNOSIS — I50.22 CHRONIC SYSTOLIC HEART FAILURE (HCC): ICD-10-CM

## 2021-07-23 LAB
ALBUMIN SERPL-MCNC: 3.8 G/DL (ref 3.5–5)
ALBUMIN/GLOB SERPL: 1.4 {RATIO} (ref 1.1–2.2)
ALP SERPL-CCNC: 62 U/L (ref 45–117)
ALT SERPL-CCNC: 37 U/L (ref 12–78)
ANION GAP SERPL CALC-SCNC: 4 MMOL/L (ref 5–15)
AST SERPL-CCNC: 18 U/L (ref 15–37)
BILIRUB SERPL-MCNC: 0.3 MG/DL (ref 0.2–1)
BUN SERPL-MCNC: 25 MG/DL (ref 6–20)
BUN/CREAT SERPL: 15 (ref 12–20)
CALCIUM SERPL-MCNC: 9.2 MG/DL (ref 8.5–10.1)
CHLORIDE SERPL-SCNC: 110 MMOL/L (ref 97–108)
CHOLEST SERPL-MCNC: 232 MG/DL
CO2 SERPL-SCNC: 28 MMOL/L (ref 21–32)
CREAT SERPL-MCNC: 1.7 MG/DL (ref 0.55–1.02)
ERYTHROCYTE [DISTWIDTH] IN BLOOD BY AUTOMATED COUNT: 15.2 % (ref 11.5–14.5)
GLOBULIN SER CALC-MCNC: 2.7 G/DL (ref 2–4)
GLUCOSE POC: 119 MG/DL
GLUCOSE SERPL-MCNC: 124 MG/DL (ref 65–100)
HBA1C MFR BLD HPLC: 6.5 %
HCT VFR BLD AUTO: 34.3 % (ref 35–47)
HDLC SERPL-MCNC: 89 MG/DL
HDLC SERPL: 2.6 {RATIO} (ref 0–5)
HGB BLD-MCNC: 10.7 G/DL (ref 11.5–16)
LDLC SERPL CALC-MCNC: 113.2 MG/DL (ref 0–100)
MCH RBC QN AUTO: 34 PG (ref 26–34)
MCHC RBC AUTO-ENTMCNC: 31.2 G/DL (ref 30–36.5)
MCV RBC AUTO: 108.9 FL (ref 80–99)
NRBC # BLD: 0 K/UL (ref 0–0.01)
NRBC BLD-RTO: 0 PER 100 WBC
PLATELET # BLD AUTO: 143 K/UL (ref 150–400)
PMV BLD AUTO: 11.6 FL (ref 8.9–12.9)
POTASSIUM SERPL-SCNC: 5.1 MMOL/L (ref 3.5–5.1)
PROT SERPL-MCNC: 6.5 G/DL (ref 6.4–8.2)
RBC # BLD AUTO: 3.15 M/UL (ref 3.8–5.2)
SODIUM SERPL-SCNC: 142 MMOL/L (ref 136–145)
TRIGL SERPL-MCNC: 149 MG/DL (ref ?–150)
VLDLC SERPL CALC-MCNC: 29.8 MG/DL
WBC # BLD AUTO: 5.2 K/UL (ref 3.6–11)

## 2021-07-23 PROCEDURE — 1101F PT FALLS ASSESS-DOCD LE1/YR: CPT | Performed by: FAMILY MEDICINE

## 2021-07-23 PROCEDURE — G0463 HOSPITAL OUTPT CLINIC VISIT: HCPCS | Performed by: FAMILY MEDICINE

## 2021-07-23 PROCEDURE — 82947 ASSAY GLUCOSE BLOOD QUANT: CPT | Performed by: FAMILY MEDICINE

## 2021-07-23 PROCEDURE — G8536 NO DOC ELDER MAL SCRN: HCPCS | Performed by: FAMILY MEDICINE

## 2021-07-23 PROCEDURE — 99214 OFFICE O/P EST MOD 30 MIN: CPT | Performed by: FAMILY MEDICINE

## 2021-07-23 PROCEDURE — 83036 HEMOGLOBIN GLYCOSYLATED A1C: CPT | Performed by: FAMILY MEDICINE

## 2021-07-23 PROCEDURE — G8510 SCR DEP NEG, NO PLAN REQD: HCPCS | Performed by: FAMILY MEDICINE

## 2021-07-23 PROCEDURE — G8417 CALC BMI ABV UP PARAM F/U: HCPCS | Performed by: FAMILY MEDICINE

## 2021-07-23 PROCEDURE — G8427 DOCREV CUR MEDS BY ELIG CLIN: HCPCS | Performed by: FAMILY MEDICINE

## 2021-07-23 PROCEDURE — 1090F PRES/ABSN URINE INCON ASSESS: CPT | Performed by: FAMILY MEDICINE

## 2021-07-23 PROCEDURE — 1111F DSCHRG MED/CURRENT MED MERGE: CPT | Performed by: FAMILY MEDICINE

## 2021-07-23 NOTE — PROGRESS NOTES
HISTORY OF PRESENT ILLNESS  Brandy Shah is a 80 y.o. female. Follow up on chronic medical problems. Overall has dealing with a lot of pain in the joints and muscles all over. Has good days and some days that the pain is worse. Overall has been stable in the past few weeks. She is doing PT/OT. Cardiovascular Review:  The patient has hypertension, hyperlipidemia and Systolic HF. Diet and Lifestyle: generally follows a low fat low cholesterol diet, generally follows a low sodium diet  Home BP Monitoring: is not measured at home. Pertinent ROS: taking medications as instructed, no medication side effects noted, no TIA's, no chest pain on exertion, no dyspnea on exertion, noting that her ankles swelling has been mild. She has been off of the statin but she did not see any improvemnet with her myalgia being off of the medication so she has started this back. DM type II follow up:  Compliant w/ meds, diabetic diet, and exercise. Obtains home glucose monitoring averaging in the mid 100s. Checks BS daily on most days and prn. Pt does have BS log at visit today. No Rf needed for today. Denies any tingling sensation, polyuria and polydipsia. UTD with her eye exam but want to see another MD.  Annitta Goldberg like she has a flim over the right eye that make her vision blurred. No significant weight changes. Asthma Review:  The patient is being seen for follow up of chronic respiratory failure/COPD and allergies and chronic sinusitits, not currently in exacerbation. Breathing has been good also with taking prednisone. Using nebulizer 3 to 4 times in a day as needed but has not recently had to use it. .        Regimen compliance: The patient reports adherence to asthma action plan and regimen. Encounter for pain management. 1./2. Medical history/Past medical History  Chronic Pain:  Osteoarthritis:  Patient has osteoarthritis in multiple joints. Primarily in the knees, hips and back are worse.   Has seen by rheumatology in the past but was told nothing more to do. Told that she has polymyalgia rheumatica. Her symptoms have been wax and wane. She is currently on prednisone. Pain has been 8/10 when it is severe. Rheumatology has discharge her from her care d/t nothing more to add and to get med refills thru her PCP. 3. Applicable records from prior treatment providers are apart of [facility] under the media tab. 4. Diagnostic, therapeutic and laboratory results are available in the [address] [place] [address] chart. 5. Consultation notes are available for review in the media tab of the [address] [place] [address] chart. 6. Treatment goals include pain control so that the pt may be as active and function with her daily activities and improved comfort level. Previously pt has been limited by pain. 7. The risks and benefits of treatment has been discussed at this office visit with the pt. She understands that the medication has addicting potential.  Additionally the pt has been advised that narcotic pain medication may impair mental and/or physical ability required for performance of tasks such as driving or operating any other machinery. 8. Pt has an updated signed pain contract on file and can be found under the FYI section of the [facility] chart. 9. The pain contract is reviewed. Pain medication will be continued at the previous dosage. Urine drug screening will be done today. Diagnostic studies are not indicated at this time. Interventional procedure are not indicated at this time. 10. Medication prescibed is HYDROcodone-acetaminophen (NORCO)  mg tablet. No prescription is needed today. 11. Patient instructions have been reviewed in detail as outlined above and in the pain contract which is updated today. 12. Re-eval is planned for 3 months. Naloxone prescription is not warranted.      Patient Active Problem List   Diagnosis Code    CHF (congestive heart failure) ([facility]) I50.9    S/P TKR (total knee replacement) Z96.659    Anemia D64.9    s/p lumbar laminectomy Z98.890    S/P ASAD (total abdominal hysterectomy) Z90.710    S/P hemorrhoidectomy Z98.890, Z87.19    S/P rotator cuff surgery Z98.890    DM (diabetes mellitus) ([facility]) E11.9    Chronic sinusitis J32.9    S/P sinus surgery Z98.890    Dyslipidemia E78.5    Environmental allergies Z91.09    Obstructive sleep apnea (adult) (pediatric) G47.33    DJD (degenerative joint disease) M19.90    Chronic systolic heart failure ([facility]) I50.22    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    Asthma J45.909    Anxiety disorder F41.9    Diabetic neuropathy ([facility]) E11.40    Esophageal reflux K21.9    Essential hypertension, benign I10    Reactive airway disease with wheezing without complication Y43.451    Encounter for medication monitoring Z51.81    CKD (chronic kidney disease) N18.9    Arthralgia of multiple joints M25.50    Plantar fasciitis of left foot M72.2    Type 2 diabetes with nephropathy ([facility]) E11.21    Severe obesity (BMI 35.0-39. 9) with comorbidity ([facility]) E66.01    Polymyalgia rheumatica ([facility]) M35.3    COPD exacerbation ([facility]) J44.1    Ataxia R27.0    General weakness R53.1    Tremors of nervous system R25.1    Fall at home W19. Kelsey Velázquez, Y92.009       Current Outpatient Medications   Medication Sig Dispense Refill    predniSONE (DELTASONE) 5 mg tablet TAKE 3 TABLETS BY MOUTH DAILY 90 Tablet 5    primidone (MYSOLINE) 50 mg tablet Take 3 Tablets by mouth two (2) times a day. 180 Tablet 1    carvediloL (COREG) 25 mg tablet Take 0.5 Tablets by mouth two (2) times a day.  30 Tablet 1    rosuvastatin (CRESTOR) 10 mg tablet TAKE 1 TABLET BY MOUTH EVERY NIGHT 90 Tablet 3    furosemide (LASIX) 20 mg tablet TAKE 1 TABLET BY MOUTH EVERY MORNING AND 1 TABLET EVERY AFTERNOON BETWEEN 2 TO 4  Tablet 3    glipiZIDE SR (GLUCOTROL XL) 2.5 mg CR tablet TAKE 1 TABLET BY MOUTH DAILY 90 Tab 3    DULoxetine (CYMBALTA) 60 mg capsule TAKE 1 CAPSULE BY MOUTH DAILY 90 Cap 3    diclofenac (VOLTAREN) 1 % gel APPLY EXTERNALLY TO THE AFFECTED AREA FOUR TIMES DAILY 300 g 3    Accu-Chek Fastclix Lancet Drum misc USE TO CHECK BLOOD SUGAR TWICE DAILY 300 Each 3    Accu-Chek Guide test strips strip USE TO TEST BLOOD SUGAR TWICE DAILY 300 Strip 3    Accu-Chek Guide Me Glucose Mtr misc USE TO CHECK BLOOD SUGAR TWICE DAILY 1 Each 0    BEVESPI AEROSPHERE 9-4.8 mcg HFA inhaler Take 2 puffs by inhalation daily      albuterol (PROVENTIL HFA, VENTOLIN HFA, PROAIR HFA) 90 mcg/actuation inhaler Take 2 Puffs by inhalation every four (4) hours as needed for Wheezing or Shortness of Breath. 1 Inhaler     guaiFENesin (MUCINEX) 1,200 mg Ta12 ER tablet Take 1,200 mg by mouth daily as needed.  MULTIVITAMIN PO Take 1 Tab by mouth daily.  Omeprazole delayed release (PRILOSEC D/R) 20 mg tablet Take 20 mg by mouth daily.  lisinopril (PRINIVIL, ZESTRIL) 2.5 mg tablet Take 2.5 mg by mouth daily.       fluticasone (FLONASE) 50 mcg/actuation nasal spray SHAKE LIQUID AND USE 2 SPRAYS IN EACH NOSTRIL EVERY DAY 1 Bottle 11    promethazine-dextromethorphan (PROMETHAZINE-DM) 6.25-15 mg/5 mL syrup TAKE ONE TEASPOONFUL BY MOUTH EVERY SIX HOURS AS NEEDED FOR COUGH 240 mL 1       Allergies   Allergen Reactions    Gabapentin Other (comments)     Muscles twitching and jerking    Levaquin [Levofloxacin] Swelling    Lyrica [Pregabalin] Other (comments)     Muscle twitching and jerking    Percodan [Oxycodone Hcl-Oxycodone-Asa] Itching         Past Medical History:   Diagnosis Date    Anemia 3/3/2010    Arrhythmia     irregular heartbeat    Arthritis     CHF (congestive heart failure) (HCC) 3/3/2010    Chronic obstructive pulmonary disease (HCC)     Chronic pain     back    Chronic sinusitis 3/3/2010    CPAP (continuous positive airway pressure) dependence     Diverticulosis     DM (diabetes mellitus) ([facility] [address] 3/3/2010    Dyslipidemia 3/3/2010    GERD (gastroesophageal reflux disease)     HTN (hypertension) 3/3/2010    Ill-defined condition     being evaluated py pulmonolgist for \"trouble breathing\"    S/P TKR (total knee replacement) 3/3/2010    Unspecified sleep apnea     doesn't wear CPAP since back has been hurting         Past Surgical History:   Procedure Laterality Date    HX APPENDECTOMY      thinks it was taken with hysterectomy    HX GYN      \"tubes opened\"    HX HEENT      sinus surgery    HX HEMORRHOIDECTOMY      HX HYSTERECTOMY      HX KNEE REPLACEMENT      both knees- at same time    HX LUMBAR LAMINECTOMY      HX MOHS PROCEDURES      left shoulder    HX ORTHOPAEDIC      neck fusion    HX ORTHOPAEDIC      right knee replaced for second time    HX ORTHOPAEDIC      lumbar fusion    HX OTHER SURGICAL      CORNELL BIOPSY BREAST STEREOTACTIC Left yrs ago    (-)    VA COLONOSCOPY FLX DX W/COLLJ SPEC WHEN PFRMD  [id number]     [employer] (barium enema)         Family History   Problem Relation Age of Onset    Diabetes Mother     Heart Disease Father     Diabetes Brother     Blindness Brother     Diabetes Sister     Heart Disease Sister     Heart Disease Brother     Heart Disease Brother     Asthma Brother     Malignant Hyperthermia Neg Hx     Pseudocholinesterase Deficiency Neg Hx     Delayed Awakening Neg Hx     Post-op Nausea/Vomiting Neg Hx     Emergence Delirium Neg Hx     Post-op Cognitive Dysfunction Neg Hx        Social History     Tobacco Use    Smoking status: Former Smoker     Packs/day: 0.30     Years: 5.00     Pack years: 1.50     Quit date: 1960     Years since quittin.6    Smokeless tobacco: Never Used   Substance Use Topics    Alcohol use: No     Alcohol/week: 0.0 standard drinks        Lab Results   Component Value Date/Time    WBC 4.4 2021 07:18 PM    HGB 9.8 (L) 2021 07:18 PM    HCT 32.0 (L) 2021 07:18 PM    PLATELET 087 (L) 1026 07:18 PM    .8 (H) 06/18/2021 07:18 PM     Lab Results   Component Value Date/Time    Cholesterol, total 218 (H) 06/19/2021 02:43 AM    HDL Cholesterol 91 06/19/2021 02:43 AM    LDL, calculated 105.4 (H) 06/19/2021 02:43 AM    Triglyceride 108 06/19/2021 02:43 AM    CHOL/HDL Ratio 2.4 06/19/2021 02:43 AM     Lab Results   Component Value Date/Time    Sodium 141 06/18/2021 07:18 PM    Potassium 4.9 06/18/2021 07:18 PM    Chloride 106 06/18/2021 07:18 PM    CO2 34 (H) 06/18/2021 07:18 PM    Anion gap 1 (L) 06/18/2021 07:18 PM    Glucose 82 06/18/2021 07:18 PM    BUN 30 (H) 06/18/2021 07:18 PM    Creatinine 1.85 (H) 06/18/2021 07:18 PM    BUN/Creatinine ratio 16 06/18/2021 07:18 PM    GFR est AA 31 (L) 06/18/2021 07:18 PM    GFR est non-AA 26 (L) 06/18/2021 07:18 PM    Calcium 8.8 06/18/2021 07:18 PM    Bilirubin, total 0.3 06/18/2021 07:18 PM    ALT (SGPT) 22 06/18/2021 07:18 PM    Alk. phosphatase 60 06/18/2021 07:18 PM    Protein, total 6.4 06/18/2021 07:18 PM    Albumin 3.3 (L) 06/18/2021 07:18 PM    Globulin 3.1 06/18/2021 07:18 PM    A-G Ratio 1.1 06/18/2021 07:18 PM      Lab Results   Component Value Date/Time    Hemoglobin A1c 6.4 (H) 06/19/2021 02:43 AM    Hemoglobin A1c (POC) 6.5 07/23/2021 09:33 AM         Review of Systems   Constitutional: Negative for malaise/fatigue. HENT: Negative for congestion. Eyes: Negative for blurred vision. Respiratory: Negative for cough and shortness of breath. Cardiovascular: Negative for chest pain, palpitations and leg swelling. Gastrointestinal: Negative for abdominal pain, constipation and heartburn. Genitourinary: Negative for dysuria, frequency and urgency. Musculoskeletal: Positive for back pain, joint pain and myalgias. Neurological: Negative for dizziness, tingling, sensory change, focal weakness and headaches. Endo/Heme/Allergies: Negative for environmental allergies. Psychiatric/Behavioral: Negative for depression. The patient does not have insomnia. Physical Exam  Vitals and nursing note reviewed. Constitutional:       Appearance: Normal appearance. She is well-developed. Comments: /60 (BP 1 Location: Right arm, BP Patient Position: Sitting)   Pulse (!) 58   Temp 98.4 °F (36.9 °C) (Oral)   Resp 18   Ht 5' 4\" (1.626 m)   Wt 209 lb 3.2 oz (94.9 kg)   LMP 03/04/1961 (Approximate)   SpO2 96%   BMI 35.91 kg/m²          HENT:      Right Ear: Tympanic membrane and ear canal normal.      Left Ear: Tympanic membrane and ear canal normal.      Nose: No mucosal edema. Neck:      Thyroid: No thyromegaly. Cardiovascular:      Rate and Rhythm: Normal rate and regular rhythm. Heart sounds: Normal heart sounds. No gallop. Pulmonary:      Effort: Pulmonary effort is normal.      Breath sounds: Normal breath sounds. Abdominal:      General: Bowel sounds are normal.      Palpations: Abdomen is soft. There is no mass. Tenderness: There is no abdominal tenderness. Musculoskeletal:         General: Tenderness (multiple areas of tenderness in the arm and legs.  ) present. Normal range of motion. Cervical back: Neck supple. No rigidity. No spinous process tenderness or muscular tenderness. Right lower leg: Edema (mild) present. Left lower leg: Edema (mild) present. Lymphadenopathy:      Cervical: No cervical adenopathy. Skin:     General: Skin is warm and dry. Neurological:      Mental Status: She is alert and oriented to person, place, and time. Cranial Nerves: No cranial nerve deficit. Coordination: Coordination normal.       ASSESSMENT and PLAN  Diagnoses and all orders for this visit:    1. Essential hypertension, benign  Stable     2. Type 2 diabetes with nephropathy (HCC)  a1c 6.5%  -     AMB POC HEMOGLOBIN A1C  -     AMB POC GLUCOSE, QUANTITATIVE, BLOOD  -     REFERRAL TO OPHTHALMOLOGY    3. Chronic systolic heart failure (HCC)  Stable     4.  Stage 3 chronic kidney disease, unspecified whether stage 3a or 3b CKD ([facility] [address]  Stable     5. Chronic obstructive pulmonary disease, unspecified COPD type ([facility] [address]  Stable     6. Mixed hyperlipidemia  Continue to monitor. Work on diet and exercise.  -     LIPID PANEL; Future    7. Bilateral edema of lower extremity  Stable     8. Polymyalgia rheumatica (HCC)//  9. Arthralgia of multiple joints  Seeing rheumatology on next month. 10. Encounter for chronic pain management  -     MONITOR SCREEN 10-DRUG CLASS PROFILE; Future    11. Chronic anemia  -     CBC W/O DIFF; Future    12. Blurred vision, right eye  -     REFERRAL TO OPHTHALMOLOGY    13. Encounter for medication monitoring  -     METABOLIC PANEL, COMPREHENSIVE; Future      Follow-up and Dispositions    · Return in about 3 months (around 10/19/2021). reviewed diet, exercise and weight control  cardiovascular risk and specific lipid/LDL goals reviewed  reviewed medications and side effects in detail  specific diabetic recommendations: low cholesterol diet, weight control and daily exercise discussed, home glucose monitoring emphasized, all medications, side effects and compliance discussed carefully, annual eye examinations at Ophthalmology discussed and glycohemoglobin and other lab monitoring discussed    I have discussed diagnosis listed in this note with pt and/or family. I have discussed treatment plans and options and the risk/benefit analysis of those options, including safe use of medications and possible medication side effects. Through the use of shared decision making we have agreed to the above plan. The patient has received an after-visit summary and questions were answered concerning future plans and follow up. Advise pt of any urgent changes then to proceed to the ER.

## 2021-07-23 NOTE — PROGRESS NOTES
Chief Complaint   Patient presents with    Hypertension     follow up    Cholesterol Problem     follow up    Diabetes     follow up             Mammogram  12/1/2020      Microalbumin  10/9/2020    Eye Exam 2/4/2021 by Dr. Ronal Jensen      1. Have you been to the ER, urgent care clinic since your last visit? Hospitalized since your last visit? Yes ED [facility] 6/18/2021 for not being able to walk. 2. Have you seen or consulted any other health care providers outside of the [address] [place] [address] since your last visit? Include any pap smears or colon screening.  no

## 2021-07-24 LAB
AMPHETAMINES UR QL SCN: NEGATIVE NG/ML
BARBITURATES UR QL SCN: POSITIVE NG/ML
BENZODIAZ UR QL SCN: NEGATIVE NG/ML
BZE UR QL SCN: NEGATIVE NG/ML
CANNABINOIDS UR QL SCN: NEGATIVE NG/ML
CREAT UR-MCNC: 225.6 MG/DL (ref 20–300)
METHADONE UR QL SCN: NEGATIVE NG/ML
OPIATES UR QL SCN: NEGATIVE NG/ML
OXYCODONE+OXYMORPHONE UR QL SCN: NEGATIVE NG/ML
PCP UR QL: NEGATIVE NG/ML
PH UR: 5.3 [PH] (ref 4.5–8.9)
PLEASE NOTE:, 733163: ABNORMAL
PROPOXYPH UR QL SCN: NEGATIVE NG/ML

## 2021-07-28 ENCOUNTER — PATIENT OUTREACH (OUTPATIENT)
Dept: CASE MANAGEMENT | Age: 86
End: 2021-07-28

## 2021-07-30 ENCOUNTER — OFFICE VISIT (OUTPATIENT)
Dept: CARDIOLOGY CLINIC | Age: 86
End: 2021-07-30
Payer: MEDICARE

## 2021-07-30 VITALS
HEART RATE: 57 BPM | DIASTOLIC BLOOD PRESSURE: 78 MMHG | OXYGEN SATURATION: 99 % | SYSTOLIC BLOOD PRESSURE: 140 MMHG | RESPIRATION RATE: 18 BRPM | WEIGHT: 208.7 LBS | HEIGHT: 64 IN | BODY MASS INDEX: 35.63 KG/M2

## 2021-07-30 DIAGNOSIS — I50.22 CHRONIC SYSTOLIC HEART FAILURE (HCC): ICD-10-CM

## 2021-07-30 DIAGNOSIS — I10 ESSENTIAL HYPERTENSION, BENIGN: Primary | ICD-10-CM

## 2021-07-30 PROCEDURE — 93010 ELECTROCARDIOGRAM REPORT: CPT | Performed by: INTERNAL MEDICINE

## 2021-07-30 PROCEDURE — G8536 NO DOC ELDER MAL SCRN: HCPCS | Performed by: INTERNAL MEDICINE

## 2021-07-30 PROCEDURE — G8427 DOCREV CUR MEDS BY ELIG CLIN: HCPCS | Performed by: INTERNAL MEDICINE

## 2021-07-30 PROCEDURE — 93005 ELECTROCARDIOGRAM TRACING: CPT | Performed by: INTERNAL MEDICINE

## 2021-07-30 PROCEDURE — 1090F PRES/ABSN URINE INCON ASSESS: CPT | Performed by: INTERNAL MEDICINE

## 2021-07-30 PROCEDURE — G0463 HOSPITAL OUTPT CLINIC VISIT: HCPCS | Performed by: INTERNAL MEDICINE

## 2021-07-30 PROCEDURE — 99214 OFFICE O/P EST MOD 30 MIN: CPT | Performed by: INTERNAL MEDICINE

## 2021-07-30 PROCEDURE — G8417 CALC BMI ABV UP PARAM F/U: HCPCS | Performed by: INTERNAL MEDICINE

## 2021-07-30 PROCEDURE — G8510 SCR DEP NEG, NO PLAN REQD: HCPCS | Performed by: INTERNAL MEDICINE

## 2021-07-30 PROCEDURE — 1101F PT FALLS ASSESS-DOCD LE1/YR: CPT | Performed by: INTERNAL MEDICINE

## 2021-07-30 NOTE — LETTER
7/30/2021    Patient: Estrellita Puente   YOB: 1931   Date of Visit: 7/30/2021     Arsalan Saha MD  [address] [place] [address]    Dear Arsalan Saha MD,      Thank you for referring Ms. Chelsea Ramon to [facility] for evaluation. My notes for this consultation are attached. If you have questions, please do not hesitate to call me. I look forward to following your patient along with you.       Sincerely,    Nikole Torres MD

## 2021-07-30 NOTE — PROGRESS NOTES
Awilda Alan MD          NAME:  Ashlie Salmeron   :   1931   MRN:   [id number]   PCP:  Flo Hill MD           Subjective: The patient is a 80y.o. year old female  who returns for a routine follow-up. Since the last visit, patient reports no change in exercise tolerance, chest pain, edema, medication intolerance, palpitations, shortness of breath, PND/orthopnea wheezing, sputum, syncope, dizziness or light headedness. Doing well. Past Medical History:   Diagnosis Date    Anemia 3/3/2010    Arrhythmia     irregular heartbeat    Arthritis     Asthma     CHF (congestive heart failure) (HCC) 3/3/2010    Chronic kidney disease     Chronic obstructive pulmonary disease (HCC)     Chronic pain     back    Chronic sinusitis 3/3/2010    CPAP (continuous positive airway pressure) dependence     Diverticulosis     DM (diabetes mellitus) ([facility] [address] 3/3/2010    Dyslipidemia 3/3/2010    GERD (gastroesophageal reflux disease)     HTN (hypertension) 3/3/2010    Ill-defined condition     being evaluated py pulmonolgist for \"trouble breathing\"    S/P TKR (total knee replacement) 3/3/2010    Unspecified sleep apnea     doesn't wear CPAP since back has been hurting        ICD-10-CM ICD-9-CM    1. Essential hypertension, benign  I10 401.1 AMB POC EKG ROUTINE W/ 12 LEADS, INTER & REP   2.  Chronic systolic heart failure (HCC)  I50.22 428.22       Social History     Tobacco Use    Smoking status: Former Smoker     Packs/day: 0.30     Years: 5.00     Pack years: 1.50     Quit date: 1960     Years since quittin.6    Smokeless tobacco: Never Used   Substance Use Topics    Alcohol use: No     Alcohol/week: 0.0 standard drinks      Family History   Problem Relation Age of Onset    Diabetes Mother     Heart Disease Father     Diabetes Brother     Blindness Brother     Diabetes Sister     Heart Disease Sister     Heart Disease Brother     Heart Disease Brother     Asthma Brother  Malignant Hyperthermia Neg Hx     Pseudocholinesterase Deficiency Neg Hx     Delayed Awakening Neg Hx     Post-op Nausea/Vomiting Neg Hx     Emergence Delirium Neg Hx     Post-op Cognitive Dysfunction Neg Hx         Review of Systems  Cardiovascular: Negative except as noted in HPI      Objective:       Vitals:    07/30/21 1008   BP: (!) 140/78   Pulse: (!) 57   Resp: 18   SpO2: 99%   Weight: 208 lb 11.2 oz (94.7 kg)   Height: 5' 4\" (1.626 m)    Body mass index is 35.82 kg/m². General PE  Mental Status - Alert. General Appearance - Not in acute distress. Chest and Lung Exam   Inspection: Accessory muscles - No use of accessory muscles in breathing. Auscultation:   Breath sounds: - Normal.    Cardiovascular   Inspection: Jugular vein - Bilateral - Inspection Normal.  Palpation/Percussion:   Apical Impulse: - Normal.  Auscultation: Rhythm - Regular. Heart Sounds - S1 WNL and S2 WNL. No S3 or S4. Murmurs & Other Heart Sounds: Auscultation of the heart reveals - No Murmurs. Peripheral Vascular   Upper Extremity: Inspection - Bilateral - No Cyanotic nailbeds or Digital clubbing. Lower Extremity:   Palpation: Edema - Bilateral - No edema. Data Review:     EKG -  EKG: normal EKG, normal sinus rhythm, unchanged from previous tracings. LABS- @brieflabs@      Allergies reviewed  Allergies   Allergen Reactions    Gabapentin Other (comments)     Muscles twitching and jerking    Levaquin [Levofloxacin] Swelling    Lyrica [Pregabalin] Other (comments)     Muscle twitching and jerking    Percodan [Oxycodone Hcl-Oxycodone-Asa] Itching       Medications reviewed  Current Outpatient Medications   Medication Sig    predniSONE (DELTASONE) 5 mg tablet TAKE 3 TABLETS BY MOUTH DAILY    primidone (MYSOLINE) 50 mg tablet Take 3 Tablets by mouth two (2) times a day.  carvediloL (COREG) 25 mg tablet Take 0.5 Tablets by mouth two (2) times a day.     rosuvastatin (CRESTOR) 10 mg tablet TAKE 1 TABLET BY MOUTH EVERY NIGHT    furosemide (LASIX) 20 mg tablet TAKE 1 TABLET BY MOUTH EVERY MORNING AND 1 TABLET EVERY AFTERNOON BETWEEN 2 TO 4 PM    glipiZIDE SR (GLUCOTROL XL) 2.5 mg CR tablet TAKE 1 TABLET BY MOUTH DAILY    DULoxetine (CYMBALTA) 60 mg capsule TAKE 1 CAPSULE BY MOUTH DAILY    diclofenac (VOLTAREN) 1 % gel APPLY EXTERNALLY TO THE AFFECTED AREA FOUR TIMES DAILY    Accu-Chek Fastclix Lancet Drum misc USE TO CHECK BLOOD SUGAR TWICE DAILY    Accu-Chek Guide test strips strip USE TO TEST BLOOD SUGAR TWICE DAILY    Accu-Chek Guide Me Glucose Mtr misc USE TO CHECK BLOOD SUGAR TWICE DAILY    BEVESPI AEROSPHERE 9-4.8 mcg HFA inhaler Take 2 puffs by inhalation daily    albuterol (PROVENTIL HFA, VENTOLIN HFA, PROAIR HFA) 90 mcg/actuation inhaler Take 2 Puffs by inhalation every four (4) hours as needed for Wheezing or Shortness of Breath.  guaiFENesin (MUCINEX) 1,200 mg Ta12 ER tablet Take 1,200 mg by mouth daily as needed.  MULTIVITAMIN PO Take 1 Tab by mouth daily.  Omeprazole delayed release (PRILOSEC D/R) 20 mg tablet Take 20 mg by mouth daily.  lisinopril (PRINIVIL, ZESTRIL) 2.5 mg tablet Take 2.5 mg by mouth daily.  fluticasone (FLONASE) 50 mcg/actuation nasal spray SHAKE LIQUID AND USE 2 SPRAYS IN EACH NOSTRIL EVERY DAY    promethazine-dextromethorphan (PROMETHAZINE-DM) 6.25-15 mg/5 mL syrup TAKE ONE TEASPOONFUL BY MOUTH EVERY SIX HOURS AS NEEDED FOR COUGH     No current facility-administered medications for this visit. Assessment:       ICD-10-CM ICD-9-CM    1. Essential hypertension, benign  I10 401.1 AMB POC EKG ROUTINE W/ 12 LEADS, INTER & REP   2. Chronic systolic heart failure (HCC)  I50.22 428.22         Orders Placed This Encounter    AMB POC EKG ROUTINE W/ 12 LEADS, INTER & REP     Order Specific Question:   Reason for Exam:     Answer:   routine       Plan:     CHF compensated. Recent EF fine. Continue current regimen at reduced dose of coreg.   F/U 6 mo    Starlette Old J Carlos Aguilar MD

## 2021-07-30 NOTE — PROGRESS NOTES
1. Have you been to the ER, urgent care clinic since your last visit? Hospitalized since your last visit? 6-8-21 [address], [place]     2. Have you seen or consulted any other health care providers outside of the [address] [place] [address] since your last visit? Include any pap smears or colon screening. No      Chief Complaint   Patient presents with    Follow-up     1 mo f/up.

## 2021-08-03 RX ORDER — BLOOD SUGAR DIAGNOSTIC
STRIP MISCELLANEOUS
Qty: 300 STRIP | Refills: 3 | Status: SHIPPED | OUTPATIENT
Start: 2021-08-03 | End: 2022-02-21 | Stop reason: SDUPTHER

## 2021-08-03 RX ORDER — LANCETS
EACH MISCELLANEOUS
Qty: 300 EACH | Refills: 3 | Status: SHIPPED | OUTPATIENT
Start: 2021-08-03 | End: 2022-02-21 | Stop reason: SDUPTHER

## 2021-08-04 DIAGNOSIS — G47.09 OTHER INSOMNIA: Primary | ICD-10-CM

## 2021-08-04 NOTE — TELEPHONE ENCOUNTER
Patient  states that his wife need a RX refill ALPRAZolam (XANAX) 0.5 mg tablet she is not sleeping at night he can be reached @ [phone]

## 2021-08-05 RX ORDER — ALPRAZOLAM 0.5 MG/1
TABLET ORAL
Qty: 30 TABLET | Refills: 0 | Status: SHIPPED | OUTPATIENT
Start: 2021-08-05 | End: 2021-10-07

## 2021-08-09 ENCOUNTER — OFFICE VISIT (OUTPATIENT)
Dept: RHEUMATOLOGY | Age: 86
End: 2021-08-09
Payer: MEDICARE

## 2021-08-09 VITALS
HEIGHT: 64 IN | RESPIRATION RATE: 20 BRPM | DIASTOLIC BLOOD PRESSURE: 80 MMHG | HEART RATE: 59 BPM | OXYGEN SATURATION: 97 % | SYSTOLIC BLOOD PRESSURE: 134 MMHG | TEMPERATURE: 98.7 F | WEIGHT: 206 LBS | BODY MASS INDEX: 35.17 KG/M2

## 2021-08-09 DIAGNOSIS — E55.9 VITAMIN D DEFICIENCY: ICD-10-CM

## 2021-08-09 DIAGNOSIS — M19.041 PRIMARY OSTEOARTHRITIS OF BOTH HANDS: ICD-10-CM

## 2021-08-09 DIAGNOSIS — R70.0 ELEVATED SED RATE: Primary | ICD-10-CM

## 2021-08-09 DIAGNOSIS — Z79.52 CURRENT CHRONIC USE OF SYSTEMIC STEROIDS: ICD-10-CM

## 2021-08-09 DIAGNOSIS — M19.042 PRIMARY OSTEOARTHRITIS OF BOTH HANDS: ICD-10-CM

## 2021-08-09 DIAGNOSIS — M79.7 FIBROMYALGIA: ICD-10-CM

## 2021-08-09 DIAGNOSIS — M89.8X9 BONE PAIN: ICD-10-CM

## 2021-08-09 PROCEDURE — 99205 OFFICE O/P NEW HI 60 MIN: CPT | Performed by: INTERNAL MEDICINE

## 2021-08-09 PROCEDURE — G8427 DOCREV CUR MEDS BY ELIG CLIN: HCPCS | Performed by: INTERNAL MEDICINE

## 2021-08-09 PROCEDURE — 1090F PRES/ABSN URINE INCON ASSESS: CPT | Performed by: INTERNAL MEDICINE

## 2021-08-09 PROCEDURE — G8417 CALC BMI ABV UP PARAM F/U: HCPCS | Performed by: INTERNAL MEDICINE

## 2021-08-09 PROCEDURE — G8536 NO DOC ELDER MAL SCRN: HCPCS | Performed by: INTERNAL MEDICINE

## 2021-08-09 PROCEDURE — G8510 SCR DEP NEG, NO PLAN REQD: HCPCS | Performed by: INTERNAL MEDICINE

## 2021-08-09 PROCEDURE — 1101F PT FALLS ASSESS-DOCD LE1/YR: CPT | Performed by: INTERNAL MEDICINE

## 2021-08-09 PROCEDURE — G0463 HOSPITAL OUTPT CLINIC VISIT: HCPCS | Performed by: INTERNAL MEDICINE

## 2021-08-09 NOTE — PATIENT INSTRUCTIONS
1. At the moment I think most of your pain is related to osteoarthritis and fibromyalgia. It is unusual for polymyalgia rheumatica to remain this long or require this much prednisone, so I'm looking for signs of other autoimmune joint pain which could be contributing. 2. Labs at your earliest convenience, any [facility] or [facility] lab. 3. Xrays, if you don't get them done before you leave then you can also take the orders to a [facility] location such as [facility], [employer] [facility], or [facility] without an appointment. 4. Try reducing prednisone to 12.5mg daily for 2 weeks, then 10mg daily until next visit if you're not too uncomfortable. 5. Take at least 2000 units cholecalciferol (vitamin D3) over the counter while on prednisone, to help protect your bones. 6. Return in 4 weeks to review next steps.

## 2021-08-09 NOTE — LETTER
8/18/2021    Patient: Dotty Leigh   YOB: 1931   Date of Visit: 8/9/2021     Pao Mike MD  [address] [place] [address]  [place]ia [place] [address] [facility] [address] [phone] [phone]  Via Fax: [phone]    Dear MD Fang Jain MD,      Thank you for referring Ms. Nitesh Aldana to [facility] for evaluation. My notes for this consultation are attached. If you have questions, please do not hesitate to call me. I look forward to following your patient along with you.       Sincerely,    Usha Fonseca MD

## 2021-08-09 NOTE — PROGRESS NOTES
REASON FOR VISIT:    is a 80 y.o. female with history of polymyalgia rheumatica on chronic prednisone and osteoarthritis s/p B TKAs who is being referred to [facility] at the request of Dr. Serjio Awad, regarding a diagnosis of joint pain. HISTORY OF PRESENT ILLNESS    Per patient, she hasn't seen Rheumatology to her recollection, but appears to have labs ordered in past by [address] [place] [address]. Patient believes she has been on prednisone since at least , when she had been started on 30mg daily for overall soreness. Over the years, this has been reduced as low as 3mg, but in  was increased back to 15mg daily for persistent sensitivity; she no longer follows with Rheumatology per PCP notes after having been told she required no additional Rheumatologic followup. She is now on 15mg of prednisone, still is experiencing about an hour of morning stiffness. Finger joints can be frankly painful, unlike her more distributed body aches. Has been diagnosed with fibromyalgia. Feels she can feel every change in weather, with increased soreness in every joint. .     Denies significant headaches with this. No acute visual changes though finding her near vision is changing gradually. Has lost about 8 pounds in the last year, she attributes to having eliminated sweets from her diet after years of being a \"chocoholic. \"     A1c has been in low-6's. Never smoker, uses Dulera and albuterol every morning. Not sure of her Pulmonologist, cites Dr. Libby Clay in cardiology as her lung doctor, records in [facility] from [facility] 44 with [facility] note management of COPD. She follows with Dr. Alonso Carter in Nephrology for Stage 3CKD, weaned off of antihypertensives 2/2 mild hypotension. Baseline Cr has been between 1.5 and 2.0     Brother  at 81yo of COVID. She has received her COVID vaccines. No particular rashes. No sun sensitivity.     Had both knees replaced around , and the right knee revised about 3 years later for difficulty bending the right knee and residual swelling. Now has difficulty getting onto the bed, ambulatory with cane. Has had remote L4/5 laminectomies with known at least moderate central canal stenosis at multiple lumbar levels radiographically on last MRI in 2012, as well as C5/6 decompression and fusion with residual mod-severe central canal stenosis at C4-5. REVIEW OF SYSTEMS  A comprehensive review of systems was negative except for that written in the HPI. A 10-point review of systems is per the new patient questionnaire, which has been reviewed extensively and scanned into the electronic medical record for future reference. Review of systems is as above and is otherwise negative. ALLERGIES  Gabapentin, Levaquin [levofloxacin], Lyrica [pregabalin], and Percodan [oxycodone hcl-oxycodone-asa]    MEDICATIONS  Current Outpatient Medications   Medication Sig    Accu-Chek Guide test strips strip USE TO TEST BLOOD SUGAR TWICE DAILY    Accu-Chek Fastclix Lancet Drum misc USE TO TEST BLOOD SUGAR TWICE DAILY    predniSONE (DELTASONE) 5 mg tablet TAKE 3 TABLETS BY MOUTH DAILY    primidone (MYSOLINE) 50 mg tablet Take 3 Tablets by mouth two (2) times a day.  carvediloL (COREG) 25 mg tablet Take 0.5 Tablets by mouth two (2) times a day.     rosuvastatin (CRESTOR) 10 mg tablet TAKE 1 TABLET BY MOUTH EVERY NIGHT    furosemide (LASIX) 20 mg tablet TAKE 1 TABLET BY MOUTH EVERY MORNING AND 1 TABLET EVERY AFTERNOON BETWEEN 2 TO 4 PM    glipiZIDE SR (GLUCOTROL XL) 2.5 mg CR tablet TAKE 1 TABLET BY MOUTH DAILY    DULoxetine (CYMBALTA) 60 mg capsule TAKE 1 CAPSULE BY MOUTH DAILY    diclofenac (VOLTAREN) 1 % gel APPLY EXTERNALLY TO THE AFFECTED AREA FOUR TIMES DAILY    Accu-Chek Guide Me Glucose Mtr misc USE TO CHECK BLOOD SUGAR TWICE DAILY    BEVESPI AEROSPHERE 9-4.8 mcg HFA inhaler Take 2 puffs by inhalation daily    albuterol (PROVENTIL HFA, VENTOLIN HFA, PROAIR HFA) 90 mcg/actuation inhaler Take 2 Puffs by inhalation every four (4) hours as needed for Wheezing or Shortness of Breath.  guaiFENesin (MUCINEX) 1,200 mg Ta12 ER tablet Take 1,200 mg by mouth daily as needed.  MULTIVITAMIN PO Take 1 Tab by mouth daily.  Omeprazole delayed release (PRILOSEC D/R) 20 mg tablet Take 20 mg by mouth daily.  lisinopril (PRINIVIL, ZESTRIL) 2.5 mg tablet Take 2.5 mg by mouth daily.  fluticasone (FLONASE) 50 mcg/actuation nasal spray SHAKE LIQUID AND USE 2 SPRAYS IN EACH NOSTRIL EVERY DAY    promethazine-dextromethorphan (PROMETHAZINE-DM) 6.25-15 mg/5 mL syrup TAKE ONE TEASPOONFUL BY MOUTH EVERY SIX HOURS AS NEEDED FOR COUGH    ALPRAZolam (XANAX) 0.5 mg tablet Take 1/2 tab by mouth at bedtime as needed (Patient not taking: Reported on 8/9/2021)     No current facility-administered medications for this visit. PAST MEDICAL HISTORY  Past Medical History:   Diagnosis Date    Anemia 3/3/2010    Arrhythmia     irregular heartbeat    Arthritis     Asthma     CHF (congestive heart failure) (HCC) 3/3/2010    Chronic kidney disease     Chronic obstructive pulmonary disease (HCC)     Chronic pain     back    Chronic sinusitis 3/3/2010    CPAP (continuous positive airway pressure) dependence     Diverticulosis     DM (diabetes mellitus) ([facility] [address] 3/3/2010    Dyslipidemia 3/3/2010    GERD (gastroesophageal reflux disease)     HTN (hypertension) 3/3/2010    Ill-defined condition     being evaluated py pulmonolgist for \"trouble breathing\"    S/P TKR (total knee replacement) 3/3/2010    Unspecified sleep apnea     doesn't wear CPAP since back has been hurting       FAMILY HISTORY  family history includes Asthma in her brother; Blindness in her brother; Diabetes in her brother, mother, and sister; Heart Disease in her brother, brother, father, and sister. SOCIAL HISTORY  She  reports that she quit smoking about 61 years ago.  She has a 1.50 pack-year smoking history. She has never used smokeless tobacco. She reports that she does not drink alcohol and does not use drugs. Social History     Social History Narrative    ** Merged History Encounter **            DATA  Visit Vitals  /80 (BP 1 Location: Left upper arm, BP Patient Position: Sitting)   Pulse (!) 59   Temp 98.7 °F (37.1 °C) (Oral)   Resp 20   Ht 5' 4\" (1.626 m)   Wt 206 lb (93.4 kg)   LMP 03/04/1961 (Approximate)   SpO2 97%   BMI 35.36 kg/m²    Body mass index is 35.36 kg/m². No flowsheet data found. General:  The patient is well developed, well nourished, alert, and in no apparent distress. Eyes: Sclera are anicteric. No conjunctival injection. HEENT: No temporal tenderness, pulsations intact bilaterally. Oropharynx is clear. No oral ulcers. Adequate salivary pooling. No cervical or supraclavicular lymphadenopathy. Lungs:  Clear to auscultation bilaterally, without wheeze or stridor. Normal respiratory effort. Cor:  Regular rate and rhythm. No murmur rub or gallop. Abdomen: Soft, non-tender, without hepatomegaly or masses. Extremities: No calf tenderness. 1+ RLE edema, trace LLE edema. Warm and well perfused. Skin:  No significant abnormalities. No petechial, purpuric, or psoriaform rashes. Neuro: Nonfocal. Ambulatory with cane. Musculoskeletal:    A comprehensive musculoskeletal exam was performed for all joints of each upper and lower extremity and assessed for swelling, tenderness and range of motion. Results are documented as below:  Right shoulder limited to 90deg abduction  Left postsurgical shoulder with full active ROM. Prominent left 3rd Michael node without david synovitis  Mild prominence of MCPs bilaterally  Bilateral TKA scars with prominent soft tissue right knee, unable to flex beyond 60deg  No evidence of synovitis in the small joints of the hands, wrists, shoulders, elbows, hips, knees or ankles.      Labs:  7/23/21: WBC 5.2, Hgb 10.7, Pl 143, Cr 1.7, LFTs WNL, HDL 89, , A1c 6.5  4/26/21: ESR 73  4/25/19: CRP <5mg/L  5/26/15: ESR 51     Imaging:  10/6/16 MR Cspine:  FINDINGS: There is straightening of the normal cervical lordosis. There is  multilevel endplate degenerative change. Patient is post C5-C6 anterior  decompression and fusion. Given motion artifact no definite cord signal  abnormality. There sinus mucosal inflammatory change sphenoid sinus. C2-C3: No stenosis  C3-C4: There is a small disc osteophytic bulge resulting in mild narrowing of  the canal. There are uncovertebral degenerative changes and left facet  arthropathy with moderate left and mild right foraminal narrowing  C4-C5: There is a diffuse disc osteophytic bulge resulting in moderate to severe  narrowing of the canal with flattening of the ventral cord with uncovertebral  degenerative changes resulting in mild to moderate narrowing of the foramen  C5-C6: This level has been decompressed  C6-C7: Small disc osteophytic bulge and uncovertebral degenerative change. There  is mild narrowing the canal and foramen  C7-T1: There are bilateral facet degenerative changes mildly narrowing the  foramen. 11/6/12 MR Lspine:  1.  Status post L4 and L5 laminectomies.  Severe L4-5 disc space narrowing   with moderate sized central disc herniation.  No canal stenosis; moderate   left foraminal narrowing. 2.  Diffuse degenerative changes.  Canal stenosis mild at T10-11, borderline   at T11-12 and T12-L1, mild at L1-2, mild to moderate at L2-3, severe at   L3-4, and moderate at L5-S1.           ASSESSMENT AND PLAN  Ms. Jeny Galindo is a 80 y.o. female who presents for evaluation of longstanding myalgias and stiffness responsive to moderate doses of prednisone with moderately elevated acute phase reactants, on a background of CKD and extensive osteoarthritis. Suspect much of her pain at this point is secondary to her OA and a degree of central pain sensitization/fibromyalgia.  Would continue to taper prednisone to reduce further risk of vertebral fractures, worsening of diabetes, and acceleration of cardiovascular disease; this is most likely require continued multimodal pain management for her other pain generators which are being covered by prednisone. Will explore role for anabolic bone agents on followup given radiographic notes of L1 fractures is diagnostic for osteoporosis (and fracture risk for 89yo on chronic prednisone elevated even without updated DXA). 1. Primary osteoarthritis of both hands  - XR HAND RT MIN 3 V; Future  - XR HAND LT MIN 3 V; Future  - ANTI-NUCLEAR AB BY IFA (RDL); Future  - RHEUMATOID FACTOR BY TURB (RDL); Future  - CYCLIC CITRUL PEPTIDE AB, IGG; Future  - PROTEIN ELECTROPHORESIS W/ REFLX GURVINDER; Future    2. Elevated sed rate  - ANTI-NUCLEAR AB BY IFA (RDL); Future  - RHEUMATOID FACTOR BY TURB (RDL); Future  - CYCLIC CITRUL PEPTIDE AB, IGG; Future  - PROTEIN ELECTROPHORESIS W/ REFLX GURVINDER; Future  - SED RATE (ESR); Future  - C REACTIVE PROTEIN, QT; Future    3. Fibromyalgia  - ANTI-NUCLEAR AB BY IFA (RDL); Future  - RHEUMATOID FACTOR BY TURB (RDL); Future  - CYCLIC CITRUL PEPTIDE AB, IGG; Future  - PROTEIN ELECTROPHORESIS W/ REFLX GURVINDER; Future  - SED RATE (ESR); Future  - C REACTIVE PROTEIN, QT; Future    4. Bone pain  - VITAMIN D, 25 HYDROXY; Future    5. Current chronic use of systemic steroids  - VITAMIN D, 25 HYDROXY; Future    6. Vitamin D deficiency  - VITAMIN D, 25 HYDROXY; Future    Patient Instructions   1. At the moment I think most of your pain is related to osteoarthritis and fibromyalgia. It is unusual for polymyalgia rheumatica to remain this long or require this much prednisone, so I'm looking for signs of other autoimmune joint pain which could be contributing. 2. Labs at your earliest convenience, any [facility] or [facility] lab.     3. Xrays, if you don't get them done before you leave then you can also take the orders to a [facility] location such as [facility], [facility] or [facility] without an appointment. 4. Try reducing prednisone to 12.5mg daily for 2 weeks, then 10mg daily until next visit if you're not too uncomfortable. 5. Take at least 2000 units cholecalciferol (vitamin D3) over the counter while on prednisone, to help protect your bones. 6. Return in 4 weeks to review next steps. Orders Placed This Encounter    XR HAND RT MIN 3 V    XR HAND LT MIN 3 V    ANTI-NUCLEAR AB BY IFA (RDL)    RHEUMATOID FACTOR BY TURB (RDL)    CYCLIC CITRUL PEPTIDE AB, IGG    PROTEIN ELECTROPHORESIS W/ REFLX GURVINDER    SED RATE (ESR)    C REACTIVE PROTEIN, QT    VITAMIN D, 25 HYDROXY       Medications: I am having Linda NIÑOBrittney Hernandez Lightning maintain her promethazine-dextromethorphan, fluticasone propionate, lisinopriL, Omeprazole delayed release, guaiFENesin, MULTIVITAMIN PO, Bevespi Aerosphere, albuterol, Accu-Chek Guide Me Glucose Mtr, diclofenac, DULoxetine, glipiZIDE SR, furosemide, rosuvastatin, primidone, carvediloL, predniSONE, Accu-Chek Guide test strips, Accu-Chek [employer], and ALPRAZolam.    Follow up: Return in about 4 weeks (around 9/6/2021).     Face to face time: 55 minutes  Note preparation and records review day of service: 20 minutes  Total provider time day of service: 75 minutes    Shi Ramirez MD    Adult Rheumatology   [address] [place] [address], [address] [place] [address]   Phone [phone]  Fax [phone]

## 2021-08-27 LAB
25(OH)D3+25(OH)D2 SERPL-MCNC: 26 NG/ML (ref 30–100)
ALBUMIN SERPL ELPH-MCNC: 3.7 G/DL (ref 2.9–4.4)
ALBUMIN/GLOB SERPL: 1.5 {RATIO} (ref 0.7–1.7)
ALPHA1 GLOB SERPL ELPH-MCNC: 0.2 G/DL (ref 0–0.4)
ALPHA2 GLOB SERPL ELPH-MCNC: 0.7 G/DL (ref 0.4–1)
ANA SER QL IF: NEGATIVE
B-GLOBULIN SERPL ELPH-MCNC: 0.9 G/DL (ref 0.7–1.3)
CCP IGA+IGG SERPL IA-ACNC: 6 UNITS (ref 0–19)
CRP SERPL-MCNC: 4 MG/L (ref 0–10)
ENA SS-A AB SER IA-ACNC: 46 UNITS
ERYTHROCYTE [SEDIMENTATION RATE] IN BLOOD BY WESTERGREN METHOD: 12 MM/HR (ref 0–40)
GAMMA GLOB SERPL ELPH-MCNC: 0.6 G/DL (ref 0.4–1.8)
GLOBULIN SER CALC-MCNC: 2.4 G/DL (ref 2.2–3.9)
M PROTEIN SERPL ELPH-MCNC: NORMAL G/DL
PLEASE NOTE, 011150: NORMAL
PROT PATTERN SERPL ELPH-IMP: NORMAL
PROT SERPL-MCNC: 6.1 G/DL (ref 6–8.5)
RHEUMATOID FACT SERPL-ACNC: <15 IU/ML (ref 0–15)

## 2021-09-07 ENCOUNTER — OFFICE VISIT (OUTPATIENT)
Dept: RHEUMATOLOGY | Age: 86
End: 2021-09-07
Payer: MEDICARE

## 2021-09-07 VITALS
DIASTOLIC BLOOD PRESSURE: 61 MMHG | TEMPERATURE: 98.5 F | WEIGHT: 204.6 LBS | HEART RATE: 56 BPM | RESPIRATION RATE: 20 BRPM | BODY MASS INDEX: 34.93 KG/M2 | HEIGHT: 64 IN | SYSTOLIC BLOOD PRESSURE: 111 MMHG | OXYGEN SATURATION: 94 %

## 2021-09-07 DIAGNOSIS — M19.042 PRIMARY OSTEOARTHRITIS OF BOTH HANDS: ICD-10-CM

## 2021-09-07 DIAGNOSIS — M79.7 FIBROMYALGIA: ICD-10-CM

## 2021-09-07 DIAGNOSIS — M19.041 PRIMARY OSTEOARTHRITIS OF BOTH HANDS: ICD-10-CM

## 2021-09-07 DIAGNOSIS — M06.00 SERONEGATIVE RHEUMATOID ARTHRITIS (HCC): ICD-10-CM

## 2021-09-07 DIAGNOSIS — M80.88XD PATHOLOGICAL FRACTURE OF VERTEBRA DUE TO OTHER OSTEOPOROSIS WITH ROUTINE HEALING, SUBSEQUENT ENCOUNTER: ICD-10-CM

## 2021-09-07 DIAGNOSIS — M35.01 SJOGREN'S SYNDROME WITH KERATOCONJUNCTIVITIS SICCA (HCC): Primary | ICD-10-CM

## 2021-09-07 PROCEDURE — 1090F PRES/ABSN URINE INCON ASSESS: CPT | Performed by: INTERNAL MEDICINE

## 2021-09-07 PROCEDURE — G8510 SCR DEP NEG, NO PLAN REQD: HCPCS | Performed by: INTERNAL MEDICINE

## 2021-09-07 PROCEDURE — G0463 HOSPITAL OUTPT CLINIC VISIT: HCPCS | Performed by: INTERNAL MEDICINE

## 2021-09-07 PROCEDURE — 99215 OFFICE O/P EST HI 40 MIN: CPT | Performed by: INTERNAL MEDICINE

## 2021-09-07 PROCEDURE — G8417 CALC BMI ABV UP PARAM F/U: HCPCS | Performed by: INTERNAL MEDICINE

## 2021-09-07 PROCEDURE — G8536 NO DOC ELDER MAL SCRN: HCPCS | Performed by: INTERNAL MEDICINE

## 2021-09-07 PROCEDURE — 1101F PT FALLS ASSESS-DOCD LE1/YR: CPT | Performed by: INTERNAL MEDICINE

## 2021-09-07 PROCEDURE — G8427 DOCREV CUR MEDS BY ELIG CLIN: HCPCS | Performed by: INTERNAL MEDICINE

## 2021-09-07 RX ORDER — HYDROXYCHLOROQUINE SULFATE 200 MG/1
200 TABLET, FILM COATED ORAL DAILY
Qty: 30 TABLET | Refills: 5 | Status: SHIPPED | OUTPATIENT
Start: 2021-09-07 | End: 2021-10-19

## 2021-09-07 NOTE — PROGRESS NOTES
Chief Complaint   Patient presents with    Joint Pain     hands, knees, shoulders     1. Have you been to the ER, urgent care clinic since your last visit? Hospitalized since your last visit? No    2. Have you seen or consulted any other health care providers outside of the [facility] since your last visit? Include any pap smears or colon screening.  Yes Where: Eye doctor

## 2021-09-07 NOTE — PROGRESS NOTES
REASON FOR VISIT:    is a 80 y.o. female with history of polymyalgia rheumatica on chronic prednisone and osteoarthritis s/p B TKAs who is returning for followup of polymyalgia rheumatica. HISTORY OF PRESENT ILLNESS    Had decreased prednisone down to 12.5mg from the 15mg daily, but felt she had developed unbearable tenderness in the muscles. Feels knees are chronically warm, working with PT still but limited by pain. Does feel she is making some progress in range of motion now, able to again walk up and down stairs now without having to swing her legs around the side. Labs had shown elevated SSA. She notes chronic watering of eyes. Follows with ophthalmologist, not currently on prescriptoin eye drops. Disease History:  Initial visit with this clinic 8/9/2021. Per patient, she hasn't seen Rheumatology to her recollection, but appears to have labs ordered in past by Chela Matos. Had both knees replaced around 1993, and the right knee revised about 3 years later for difficulty bending the right knee and residual swelling. Now has difficulty getting onto the bed, ambulatory with cane. Has had remote L4/5 laminectomies with known at least moderate central canal stenosis at multiple lumbar levels radiographically on last MRI in 2012, as well as C5/6 decompression and fusion with residual mod-severe central canal stenosis at C4-5. Patient believes she has been on prednisone since at least 2015, when she had been started on 30mg daily for overall soreness. Over the years, this has been reduced as low as 3mg, but in 2020 was increased back to 15mg daily for persistent sensitivity; she no longer follows with Rheumatology per PCP notes after having been told she required no additional Rheumatologic followup. She is now on 15mg of prednisone, still is experiencing about an hour of morning stiffness. Finger joints can be frankly painful, unlike her more distributed body aches.    Has been diagnosed with fibromyalgia. Feels she can feel every change in weather, with increased soreness in every joint. .     Denies significant headaches with this. No acute visual changes though finding her near vision is changing gradually. Has lost about 8 pounds in the last year, she attributes to having eliminated sweets from her diet after years of being a \"chocoholic. \"   A1c has been in low-6's. Never smoker, uses Dulera and albuterol every morning. Not sure of her Pulmonologist, cites Dr. Shanique Kerr in cardiology as her lung doctor, records in [facility] from [facility] with [facility] note management of COPD. She follows with Dr. Loly Espinoza in Nephrology for Stage 3CKD, weaned off of antihypertensives 2/2 mild hypotension. Baseline Cr has been between 1.5 and 2.0   No particular rashes. No sun sensitivity. REVIEW OF SYSTEMS  A comprehensive review of systems was negative except for that written in the HPI. A 10-point review of systems is per the new patient questionnaire, which has been reviewed extensively and scanned into the electronic medical record for future reference. Review of systems is as above and is otherwise negative. ALLERGIES  Gabapentin, Levaquin [levofloxacin], Lyrica [pregabalin], and Percodan [oxycodone hcl-oxycodone-asa]    MEDICATIONS  Current Outpatient Medications   Medication Sig    ALPRAZolam (XANAX) 0.5 mg tablet Take 1/2 tab by mouth at bedtime as needed    Accu-Chek Guide test strips strip USE TO TEST BLOOD SUGAR TWICE DAILY    Accu-Chek Fastclix Lancet Drum misc USE TO TEST BLOOD SUGAR TWICE DAILY    predniSONE (DELTASONE) 5 mg tablet TAKE 3 TABLETS BY MOUTH DAILY    primidone (MYSOLINE) 50 mg tablet Take 3 Tablets by mouth two (2) times a day.  carvediloL (COREG) 25 mg tablet Take 0.5 Tablets by mouth two (2) times a day.     rosuvastatin (CRESTOR) 10 mg tablet TAKE 1 TABLET BY MOUTH EVERY NIGHT    furosemide (LASIX) 20 mg tablet TAKE 1 TABLET BY MOUTH EVERY MORNING AND 1 TABLET EVERY AFTERNOON BETWEEN 2 TO 4 PM    glipiZIDE SR (GLUCOTROL XL) 2.5 mg CR tablet TAKE 1 TABLET BY MOUTH DAILY    DULoxetine (CYMBALTA) 60 mg capsule TAKE 1 CAPSULE BY MOUTH DAILY    diclofenac (VOLTAREN) 1 % gel APPLY EXTERNALLY TO THE AFFECTED AREA FOUR TIMES DAILY    Accu-Chek Guide Me Glucose Mtr misc USE TO CHECK BLOOD SUGAR TWICE DAILY    BEVESPI AEROSPHERE 9-4.8 mcg HFA inhaler Take 2 puffs by inhalation daily    albuterol (PROVENTIL HFA, VENTOLIN HFA, PROAIR HFA) 90 mcg/actuation inhaler Take 2 Puffs by inhalation every four (4) hours as needed for Wheezing or Shortness of Breath.  guaiFENesin (MUCINEX) 1,200 mg Ta12 ER tablet Take 1,200 mg by mouth daily as needed.  MULTIVITAMIN PO Take 1 Tab by mouth daily.  Omeprazole delayed release (PRILOSEC D/R) 20 mg tablet Take 20 mg by mouth daily.  lisinopril (PRINIVIL, ZESTRIL) 2.5 mg tablet Take 2.5 mg by mouth daily.  fluticasone (FLONASE) 50 mcg/actuation nasal spray SHAKE LIQUID AND USE 2 SPRAYS IN EACH NOSTRIL EVERY DAY    promethazine-dextromethorphan (PROMETHAZINE-DM) 6.25-15 mg/5 mL syrup TAKE ONE TEASPOONFUL BY MOUTH EVERY SIX HOURS AS NEEDED FOR COUGH (Patient not taking: Reported on 9/7/2021)     No current facility-administered medications for this visit.        PAST MEDICAL HISTORY  Past Medical History:   Diagnosis Date    Anemia 3/3/2010    Arrhythmia     irregular heartbeat    Arthritis     Asthma     CHF (congestive heart failure) (HCC) 3/3/2010    Chronic kidney disease     Chronic obstructive pulmonary disease (HCC)     Chronic pain     back    Chronic sinusitis 3/3/2010    CPAP (continuous positive airway pressure) dependence     Diverticulosis     DM (diabetes mellitus) ([facility] [address] 3/3/2010    Dyslipidemia 3/3/2010    GERD (gastroesophageal reflux disease)     HTN (hypertension) 3/3/2010    Ill-defined condition     being evaluated py pulmonolgist for \"trouble breathing\"    S/P TKR (total knee replacement) 3/3/2010    Unspecified sleep apnea     doesn't wear CPAP since back has been hurting       FAMILY HISTORY  family history includes Asthma in her brother; Blindness in her brother; Diabetes in her brother, mother, and sister; Heart Disease in her brother, brother, father, and sister. SOCIAL HISTORY  She  reports that she quit smoking about 61 years ago. She has a 1.50 pack-year smoking history. She has never used smokeless tobacco. She reports that she does not drink alcohol and does not use drugs. Social History     Social History Narrative    ** Merged History Encounter **            DATA  Visit Vitals  /61 (BP 1 Location: Left upper arm, BP Patient Position: Sitting)   Pulse (!) 56   Temp 98.5 °F (36.9 °C) (Oral)   Resp 20   Ht 5' 4\" (1.626 m)   Wt 204 lb 9.6 oz (92.8 kg)   LMP 03/04/1961 (Approximate)   SpO2 94%   BMI 35.12 kg/m²    Body mass index is 35.12 kg/m². No flowsheet data found. General:  The patient is well developed, well nourished, alert, and in no apparent distress. Eyes: Sclera are anicteric. No conjunctival injection. HEENT: No temporal tenderness, pulsations intact bilaterally. Oropharynx is clear. No oral ulcers. Adequate salivary pooling. No cervical or supraclavicular lymphadenopathy. Lungs:  Clear to auscultation bilaterally, without wheeze or stridor. Normal respiratory effort. Cor:  Regular rate and rhythm. No murmur rub or gallop. Abdomen: Soft, non-tender, without hepatomegaly or masses. Extremities: No calf tenderness. 1+ RLE edema, trace LLE edema. Warm and well perfused. Skin:  No significant abnormalities. No petechial, purpuric, or psoriaform rashes. Neuro: Nonfocal. Ambulatory with cane. Musculoskeletal:    A comprehensive musculoskeletal exam was performed for all joints of each upper and lower extremity and assessed for swelling, tenderness and range of motion.  Results are documented as below:  Right shoulder limited to 90deg abduction  Left postsurgical shoulder with full active ROM. Prominent left 3rd Michael node still without david synovitis  Mild prominence of MCPs bilaterally  Bilateral TKA scars with prominent soft tissue bilateral knees, interval improved ROM knees. Diffuse muscular tenderness and allodynia ivana right proximal arm, bilateral thighs, calves. No evidence of synovitis in the small joints of the hands, wrists, shoulders, elbows, hips, knees or ankles. Labs:  8/16/21: ESR 12, AYAH IFA neg, RF neg, CCP neg, SPEP neg for Mspike, CRP 4, Vit D 26, SSA 46 [<20]  7/23/21: WBC 5.2, Hgb 10.7, Pl 143, Cr 1.7, LFTs WNL, HDL 89, , A1c 6.5  4/26/21: ESR 73  4/25/19: CRP <5mg/L  5/26/15: ESR 51     Imaging:    10/6/16 MR Cspine:  FINDINGS: There is straightening of the normal cervical lordosis. There is  multilevel endplate degenerative change. Patient is post C5-C6 anterior  decompression and fusion. Given motion artifact no definite cord signal  abnormality. There sinus mucosal inflammatory change sphenoid sinus. C2-C3: No stenosis  C3-C4: There is a small disc osteophytic bulge resulting in mild narrowing of  the canal. There are uncovertebral degenerative changes and left facet  arthropathy with moderate left and mild right foraminal narrowing  C4-C5: There is a diffuse disc osteophytic bulge resulting in moderate to severe  narrowing of the canal with flattening of the ventral cord with uncovertebral  degenerative changes resulting in mild to moderate narrowing of the foramen  C5-C6: This level has been decompressed  C6-C7: Small disc osteophytic bulge and uncovertebral degenerative change. There  is mild narrowing the canal and foramen  C7-T1: There are bilateral facet degenerative changes mildly narrowing the  foramen.     11/6/12 MR Lspine:  1.  Status post L4 and L5 laminectomies.  Severe L4-5 disc space narrowing   with moderate sized central disc herniation.  No canal stenosis; moderate   left foraminal narrowing. 2.  Diffuse degenerative changes.  Canal stenosis mild at T10-11, borderline   at T11-12 and T12-L1, mild at L1-2, mild to moderate at L2-3, severe at   L3-4, and moderate at L5-S1.         ASSESSMENT AND PLAN  Ms. Stiven Hernandez is a 80 y.o. female who presents for evaluation of longstanding myalgias and acute phase reactant elevation responsive to moderate doses of prednisone, with finding also of SSA+ raising suspicion for Sjogren's-related small fiber neuropathy contributing to her allodynia. We discussed weighing risk/benefit of interventions with emphasis on quality of life, for which reasonable for now to continue her moderate prednisone at the lowest tolerated dose. Unable to confirm L1 compression fracture, but for 89yo on chronic prednisone her fracture risk is high, and updating DXA is unlikely to provide meaningful additional data. Applying for denosumab for presumptive osteoporosis and bone protection on sustained prednisone. 1. Sjogren's syndrome with keratoconjunctivitis sicca (HCC)  - hydrOXYchloroQUINE (PlaqueniL) 200 mg tablet; Take 1 Tablet by mouth daily. Dispense: 30 Tablet; Refill: 5  - SED RATE (ESR); Future  - C REACTIVE PROTEIN, QT; Future  - COMPLEMENT, C3 & C4; Future  - METABOLIC PANEL, COMPREHENSIVE; Future  - CBC WITH AUTOMATED DIFF; Future  - URINALYSIS W/ REFLEX CULTURE; Future  - PROT+CREATU (RANDOM); Future    2. Pathological fracture of vertebra due to other osteoporosis with routine healing, subsequent encounter  - SED RATE (ESR); Future  - C REACTIVE PROTEIN, QT; Future  - COMPLEMENT, C3 & C4; Future  - METABOLIC PANEL, COMPREHENSIVE; Future  - CBC WITH AUTOMATED DIFF; Future  - URINALYSIS W/ REFLEX CULTURE; Future  - PROT+CREATU (RANDOM); Future    3. Fibromyalgia  -Fibromyalgia is a disease characterized by chronic widespread musculoskeletal pain.  Fibromyalgia is caused by abnormal processing of pain signals in the central nervous system, leading to exaggerated pain responses. Non-pharmacologic therapies such as cardiovascular exercise and Cognitive Behavioral Therapy have been shown to be of benefit ([facility], Am J Med 2009). Giacomo Chi in particular has proven efficacy in the treatment of fibromyalgia MONTSERRAT Robledo 2010). If pharmacotherapy is pursued, pregabalin (Lyrica), gabapentin (Neurontin), milnacipran (Darcia Jewish), and duloxetine (Cymbalta) are FDA approved medications for the treatment of fibromyalgia. Narcotics have not been proven to be efficacious in the treatment of fibromyalgia. In fact, narcotic use in this patient population has been observed to exacerbate depression, and may enhance the hyperalgesia which is characteristic of this condition ([facility]um 2006). They also are at increased risk for opioid-induced hyperalgesia due predominantly to central sensitization Romeo MARCANO et al. Lyle Denisse Clin Rheumatol. 2013 Mar;19(2):72-7). Specifically, a double-blind placebo-controlled trial by Alison Ruiz al published in 1995 demonstrated that intravenous morphine did not reduce pain in fibromyalgia patients. A study by Alethea Núñez al published in 2003 showed that fibromyalgia patients taking oral opiates did not experience improvement in their pain at four years of follow up, and also reported increased depression over the last two years of the study. There is subsequent concern that the prolonged use of narcotics to treat fibromyalgia may cause harm to these patients [facility], Pain 2005). Opioid use in fibromyalgia had poorer symptoms and functional and occupational status compared to nonusers (Harvey AGRAWAL et al. Pain Res Treat. 3248;9268:460905). We therefore recommend that narcotics be avoided in all patients with fibromyalgia. Furthermore, naltrexone 4.5mg daily has been shown to improve daily pain scores in fibromyalgia in a randomized, controlled clinical trial (Arthritis Rheum.  2013 Feb;65(2):529-38); this can be prescribed through a compounding pharmacy and is a consideration for patients not already dependent on opiate-type medications. For all patients, we recommend Giacomo Chi stretching exercises for at least 30 minutes per day. The Ar[employer] has made a videotape of Alethea Garcia that she can borrow from [employer], purchase online or watch for free on [employer] [email or web address] com Giacomo Chi for Arthritis. We discussed treating secondary causes, such as sleep apnea, poor sleep quality, depression, anxiety, weight loss, vitamin deficiencies, such as vitamin D, and pursuing aquatherapy. I encouraged her to do Marcine Radha. My recommendations were provided to her and she was informed to follow up with her primary care physician for further management of her fibromyalgia. 4. Primary osteoarthritis of both hands  -Reviewed Tiger balm, arthritis gloves    5. Seronegative rheumatoid arthritis (HCC)  - hydrOXYchloroQUINE (PlaqueniL) 200 mg tablet; Take 1 Tablet by mouth daily. Dispense: 30 Tablet; Refill: 5      Patient Instructions   1. Fine for the short/medium term to stay on 15mg daily prednisone if you are unable to bear reductions lower than this. 2. Keep working with your PCP on adjusting pain medications, such as switching Savella in place of duloxetine, or adding amitriptyline. 3. I'm starting you on Plaquenil (hydroxychloroquine), to take 1 pill daily. This doesn't work quickly, but I'm hoping it will help some contributions of your chronic joint pain so we can reduce prednisone in the future. 4. Talk to your ophthalmologist about treatments for dry eyes, as you have an SSA antibody in your blood work and probable Sjogren's syndrome. 5. I'm arranging for Prolia infusion to protect your bones with all the prednisone use. 6. For hand pain, try arthritis gloves and topical treatments like Salonpas or [place]. 7. Repeat labs in 6-8 weeks, return in 2 months.     Orders Placed This Encounter    SED RATE (ESR)    C REACTIVE PROTEIN, QT    COMPLEMENT, C3 & C4    METABOLIC PANEL, COMPREHENSIVE    CBC WITH AUTOMATED DIFF    URINALYSIS W/ REFLEX CULTURE    PROT+CREATU (RANDOM)    hydrOXYchloroQUINE (PlaqueniL) 200 mg tablet       Medications: I am having Linda Wise maintain her promethazine-dextromethorphan, fluticasone propionate, lisinopriL, Omeprazole delayed release, guaiFENesin, MULTIVITAMIN PO, Bevespi Aerosphere, albuterol, Accu-Chek Guide Me Glucose Mtr, diclofenac, DULoxetine, glipiZIDE SR, furosemide, rosuvastatin, primidone, carvediloL, predniSONE, Accu-Chek Guide test strips, Accu-Chek [employer], and ALPRAZolam.    Follow up: Return in about 2 months (around 11/7/2021).     Face to face time: 40 minutes  Note preparation and records review day of service: 20 minutes  Total provider time day of service: 60 minutes    Alessandro Vazquez MD    Adult Rheumatology   [address] [place] [address]   Phone [phone]  Fax [phone]

## 2021-09-07 NOTE — PATIENT INSTRUCTIONS
1. Fine for the short/medium term to stay on 15mg daily prednisone if you are unable to bear reductions lower than this. 2. Keep working with your PCP on adjusting pain medications, such as switching Savella in place of duloxetine, or adding amitriptyline. 3. I'm starting you on Plaquenil (hydroxychloroquine), to take 1 pill daily. This doesn't work quickly, but I'm hoping it will help some contributions of your chronic joint pain so we can reduce prednisone in the future. 4. Talk to your ophthalmologist about treatments for dry eyes, as you have an SSA antibody in your blood work and probable Sjogren's syndrome. 5. I'm arranging for Prolia infusion to protect your bones with all the prednisone use. 6. For hand pain, try arthritis gloves and topical treatments like Salonpas or [place]. 7. Repeat labs in 6-8 weeks, return in 2 months.

## 2021-09-13 ENCOUNTER — OFFICE VISIT (OUTPATIENT)
Dept: NEUROLOGY | Age: 86
End: 2021-09-13
Payer: MEDICARE

## 2021-09-13 VITALS
HEART RATE: 79 BPM | BODY MASS INDEX: 34.83 KG/M2 | HEIGHT: 64 IN | OXYGEN SATURATION: 96 % | TEMPERATURE: 97.2 F | DIASTOLIC BLOOD PRESSURE: 62 MMHG | SYSTOLIC BLOOD PRESSURE: 100 MMHG | WEIGHT: 204 LBS

## 2021-09-13 DIAGNOSIS — E11.42 DIABETIC POLYNEUROPATHY ASSOCIATED WITH TYPE 2 DIABETES MELLITUS (HCC): ICD-10-CM

## 2021-09-13 DIAGNOSIS — G25.0 ESSENTIAL TREMOR: Primary | ICD-10-CM

## 2021-09-13 DIAGNOSIS — G25.0 ESSENTIAL TREMOR: ICD-10-CM

## 2021-09-13 DIAGNOSIS — M54.17 LUMBOSACRAL RADICULOPATHY: ICD-10-CM

## 2021-09-13 PROCEDURE — G8510 SCR DEP NEG, NO PLAN REQD: HCPCS | Performed by: PSYCHIATRY & NEUROLOGY

## 2021-09-13 PROCEDURE — G8427 DOCREV CUR MEDS BY ELIG CLIN: HCPCS | Performed by: PSYCHIATRY & NEUROLOGY

## 2021-09-13 PROCEDURE — G8417 CALC BMI ABV UP PARAM F/U: HCPCS | Performed by: PSYCHIATRY & NEUROLOGY

## 2021-09-13 PROCEDURE — G8536 NO DOC ELDER MAL SCRN: HCPCS | Performed by: PSYCHIATRY & NEUROLOGY

## 2021-09-13 PROCEDURE — 1090F PRES/ABSN URINE INCON ASSESS: CPT | Performed by: PSYCHIATRY & NEUROLOGY

## 2021-09-13 PROCEDURE — 1101F PT FALLS ASSESS-DOCD LE1/YR: CPT | Performed by: PSYCHIATRY & NEUROLOGY

## 2021-09-13 PROCEDURE — 99214 OFFICE O/P EST MOD 30 MIN: CPT | Performed by: PSYCHIATRY & NEUROLOGY

## 2021-09-13 RX ORDER — PRIMIDONE 50 MG/1
150 TABLET ORAL 2 TIMES DAILY
Qty: 180 TABLET | Refills: 1 | Status: SHIPPED | OUTPATIENT
Start: 2021-09-13 | End: 2021-09-22

## 2021-09-13 NOTE — PROGRESS NOTES
Chief Complaint   Patient presents with    Follow-up     patient met Dr Rex Rendon at the hospital.  and is here for follow up.

## 2021-09-17 NOTE — PROGRESS NOTES
.  Chief Complaint: Tremor  Lower dose of primidone worked well for her. SHe has no adverse reactions. She is not somnolent on this dose and the tremors are manageable. SHe is up and walking now which is a relief for her . Assesment and Plan  1. Debility  Recommend inpatient physical therapy given the patient's body habitus and age there would be little benefit from doing this at home.     2.  Essential tremor  Continue primidone at 150 bid     3. Hyperlipidemia  Continue atorvastatin    4. Lumbosacral radiculopathy  Pain is controlled    5. Diabetic neuropathy  Continue strict diabetes control       Allergies  Gabapentin, Levaquin [levofloxacin], Lyrica [pregabalin], and Percodan [oxycodone hcl-oxycodone-asa]     Medications  Current Outpatient Medications   Medication Sig    primidone (MYSOLINE) 50 mg tablet Take 3 Tablets by mouth two (2) times a day.  hydrOXYchloroQUINE (PlaqueniL) 200 mg tablet Take 1 Tablet by mouth daily.  ALPRAZolam (XANAX) 0.5 mg tablet Take 1/2 tab by mouth at bedtime as needed    Accu-Chek Guide test strips strip USE TO TEST BLOOD SUGAR TWICE DAILY    Accu-Chek Fastclix Lancet Drum misc USE TO TEST BLOOD SUGAR TWICE DAILY    predniSONE (DELTASONE) 5 mg tablet TAKE 3 TABLETS BY MOUTH DAILY    carvediloL (COREG) 25 mg tablet Take 0.5 Tablets by mouth two (2) times a day.     rosuvastatin (CRESTOR) 10 mg tablet TAKE 1 TABLET BY MOUTH EVERY NIGHT    furosemide (LASIX) 20 mg tablet TAKE 1 TABLET BY MOUTH EVERY MORNING AND 1 TABLET EVERY AFTERNOON BETWEEN 2 TO 4 PM    glipiZIDE SR (GLUCOTROL XL) 2.5 mg CR tablet TAKE 1 TABLET BY MOUTH DAILY    DULoxetine (CYMBALTA) 60 mg capsule TAKE 1 CAPSULE BY MOUTH DAILY    diclofenac (VOLTAREN) 1 % gel APPLY EXTERNALLY TO THE AFFECTED AREA FOUR TIMES DAILY    Accu-Chek Guide Me Glucose Mtr misc USE TO CHECK BLOOD SUGAR TWICE DAILY    BEVESPI AEROSPHERE 9-4.8 mcg HFA inhaler Take 2 puffs by inhalation daily    albuterol (PROVENTIL HFA, VENTOLIN HFA, PROAIR HFA) 90 mcg/actuation inhaler Take 2 Puffs by inhalation every four (4) hours as needed for Wheezing or Shortness of Breath.  guaiFENesin (MUCINEX) 1,200 mg Ta12 ER tablet Take 1,200 mg by mouth daily as needed.  MULTIVITAMIN PO Take 1 Tab by mouth daily.  Omeprazole delayed release (PRILOSEC D/R) 20 mg tablet Take 20 mg by mouth daily.  lisinopril (PRINIVIL, ZESTRIL) 2.5 mg tablet Take 2.5 mg by mouth daily.  fluticasone (FLONASE) 50 mcg/actuation nasal spray SHAKE LIQUID AND USE 2 SPRAYS IN EACH NOSTRIL EVERY DAY    promethazine-dextromethorphan (PROMETHAZINE-DM) 6.25-15 mg/5 mL syrup TAKE ONE TEASPOONFUL BY MOUTH EVERY SIX HOURS AS NEEDED FOR COUGH     No current facility-administered medications for this visit. Medical History  Past Medical History:   Diagnosis Date    Anemia 3/3/2010    Arrhythmia     irregular heartbeat    Arthritis     Asthma     [facility]congest[facility] failure[facility]) 3/3/2010    Chronic kidney disease     Chronic obstructive pulmonary disease (HCC)     Chronic pain     back    Chronic sinusitis 3/3/2010    CPAP (continuous positive airway pressure) dependence     Diverticulosis     DM (diabetes mellitus) ([facility] [address] 3/3/2010    Dyslipidemia 3/3/2010    GERD (gastroesophageal reflux disease)     HTN (hypertension) 3/3/2010    Ill-defined condition     being evaluated py pulmonolgist for \"trouble breathing\"    S/P TKR (total knee replacement) 3/3/2010    Unspecified sleep apnea     doesn't wear CPAP since back has been hurting     Review of Systems   Constitutional: Negative for chills and fever. HENT: Negative for ear pain. Eyes: Negative for pain and discharge. Respiratory: Negative for cough and hemoptysis. Cardiovascular: Negative for chest pain and claudication. Gastrointestinal: Negative for constipation and diarrhea. Genitourinary: Negative for flank pain and hematuria.    Musculoskeletal: Positive for back pain, joint pain and myalgias. Skin: Negative for itching and rash. Neurological: Positive for tremors and weakness. Negative for headaches. Endo/Heme/Allergies: Negative for environmental allergies. Does not bruise/bleed easily. Psychiatric/Behavioral: Positive for memory loss. Negative for depression and hallucinations. Exam:    Visit Vitals  /62 (BP 1 Location: Right arm, BP Patient Position: Sitting, BP Cuff Size: Adult)   Pulse 79   Temp 97.2 °F (36.2 °C) (Oral)   Ht 5' 4\" (1.626 m)   Wt 204 lb (92.5 kg)   LMP 03/04/1961 (Approximate)   SpO2 96%   BMI 35.02 kg/m²      General: Well developed, well nourished. Patient in no distress   Head: Normocephalic, atraumatic, anicteric sclera   Neck Normal ROM, No thyromegally   Lungs:  Clear to auscultation bilaterally   Cardiac: Regular rate and rhythm with no murmurs. Abd: Bowel sounds were audible. Ext: No pedal edema   Skin: Supple no rash      NeurologicExam:  Mental Status: Alert and oriented to person place and time   Speech: Fluent no aphasia or dysarthria. Cranial Nerves:  II - XII Intact   Motor:   Full symmetric strength With proximal lower extremity weakness approximately 4 out of 5. Reflexes:   Deep tendon reflexes 0+/4 and symmetric. Sensory:   Symmetric distal reduction in sensory perception affecting all modalities   Gait:   Needs assistance   Tremor:    Essential tremor affecting both upper extremities   Cerebellar:  Coordination intact. Neurovascular: No carotid bruits.  No JVD             Lab Review  Lab Results   Component Value Date/Time    WBC 5.2 07/23/2021 09:28 AM    HCT 34.3 (L) 07/23/2021 09:28 AM    HGB 10.7 (L) 07/23/2021 09:28 AM    PLATELET 425 (L) 11/17/8314 09:28 AM       Lab Results   Component Value Date/Time    Sodium 142 07/23/2021 09:28 AM    Potassium 5.1 07/23/2021 09:28 AM    Chloride 110 (H) 07/23/2021 09:28 AM    CO2 28 07/23/2021 09:28 AM    Glucose 124 (H) 07/23/2021 09:28 AM    BUN 25 (H) 07/23/2021 09:28 AM    Creatinine 1.70 (H) 07/23/2021 09:28 AM    Calcium 9.2 07/23/2021 09:28 AM         Lab Results   Component Value Date/Time    Hemoglobin A1c 6.4 (H) 06/19/2021 02:43 AM    Hemoglobin A1c (POC) 6.5 07/23/2021 09:33 AM        Lab Results   Component Value Date/Time    Vitamin B12 >2000 (H) 12/19/2016 04:24 PM    Folate >20.0 12/19/2016 04:24 PM           Lab Results   Component Value Date/Time    Cholesterol, total 232 (H) 07/23/2021 09:28 AM    HDL Cholesterol 89 07/23/2021 09:28 AM    LDL, calculated 113.2 (H) 07/23/2021 09:28 AM    VLDL, calculated 29.8 07/23/2021 09:28 AM    Triglyceride 149 07/23/2021 09:28 AM    CHOL/HDL Ratio 2.6 07/23/2021 09:28 AM

## 2021-09-22 RX ORDER — PRIMIDONE 50 MG/1
TABLET ORAL
Qty: 540 TABLET | Refills: 3 | Status: SHIPPED | OUTPATIENT
Start: 2021-09-22 | End: 2021-11-17

## 2021-10-04 RX ORDER — DICLOFENAC SODIUM 10 MG/G
GEL TOPICAL
Qty: 300 G | Refills: 3 | Status: SHIPPED | OUTPATIENT
Start: 2021-10-04 | End: 2022-08-09

## 2021-10-06 DIAGNOSIS — G47.09 OTHER INSOMNIA: ICD-10-CM

## 2021-10-07 RX ORDER — ALPRAZOLAM 0.5 MG/1
TABLET ORAL
Qty: 30 TABLET | Refills: 1 | Status: SHIPPED | OUTPATIENT
Start: 2021-10-07 | End: 2022-02-07

## 2021-10-19 ENCOUNTER — OFFICE VISIT (OUTPATIENT)
Dept: FAMILY MEDICINE CLINIC | Age: 86
End: 2021-10-19
Payer: MEDICARE

## 2021-10-19 VITALS
HEART RATE: 64 BPM | WEIGHT: 205.2 LBS | TEMPERATURE: 97.4 F | HEIGHT: 64 IN | DIASTOLIC BLOOD PRESSURE: 62 MMHG | RESPIRATION RATE: 16 BRPM | BODY MASS INDEX: 35.03 KG/M2 | OXYGEN SATURATION: 98 % | SYSTOLIC BLOOD PRESSURE: 122 MMHG

## 2021-10-19 DIAGNOSIS — N18.30 STAGE 3 CHRONIC KIDNEY DISEASE, UNSPECIFIED WHETHER STAGE 3A OR 3B CKD (HCC): ICD-10-CM

## 2021-10-19 DIAGNOSIS — G89.29 ENCOUNTER FOR CHRONIC PAIN MANAGEMENT: ICD-10-CM

## 2021-10-19 DIAGNOSIS — M35.3 POLYMYALGIA RHEUMATICA (HCC): ICD-10-CM

## 2021-10-19 DIAGNOSIS — I50.22 CHRONIC SYSTOLIC HEART FAILURE (HCC): ICD-10-CM

## 2021-10-19 DIAGNOSIS — E78.2 MIXED HYPERLIPIDEMIA: ICD-10-CM

## 2021-10-19 DIAGNOSIS — Z51.81 ENCOUNTER FOR MEDICATION MONITORING: ICD-10-CM

## 2021-10-19 DIAGNOSIS — M25.50 ARTHRALGIA OF MULTIPLE JOINTS: ICD-10-CM

## 2021-10-19 DIAGNOSIS — E11.21 TYPE 2 DIABETES WITH NEPHROPATHY (HCC): ICD-10-CM

## 2021-10-19 DIAGNOSIS — Z23 NEEDS FLU SHOT: ICD-10-CM

## 2021-10-19 DIAGNOSIS — I10 ESSENTIAL HYPERTENSION, BENIGN: Primary | ICD-10-CM

## 2021-10-19 DIAGNOSIS — D64.9 CHRONIC ANEMIA: ICD-10-CM

## 2021-10-19 DIAGNOSIS — J44.9 CHRONIC OBSTRUCTIVE PULMONARY DISEASE, UNSPECIFIED COPD TYPE (HCC): ICD-10-CM

## 2021-10-19 LAB
ALBUMIN SERPL-MCNC: 3.8 G/DL (ref 3.5–5)
ALBUMIN/GLOB SERPL: 1.4 {RATIO} (ref 1.1–2.2)
ALP SERPL-CCNC: 60 U/L (ref 45–117)
ALT SERPL-CCNC: 25 U/L (ref 12–78)
ANION GAP SERPL CALC-SCNC: 5 MMOL/L (ref 5–15)
APPEARANCE UR: CLEAR
AST SERPL-CCNC: 20 U/L (ref 15–37)
BACTERIA URNS QL MICRO: NEGATIVE /HPF
BILIRUB SERPL-MCNC: 0.3 MG/DL (ref 0.2–1)
BILIRUB UR QL: NEGATIVE
BUN SERPL-MCNC: 23 MG/DL (ref 6–20)
BUN/CREAT SERPL: 11 (ref 12–20)
CALCIUM SERPL-MCNC: 9.5 MG/DL (ref 8.5–10.1)
CHLORIDE SERPL-SCNC: 109 MMOL/L (ref 97–108)
CHOLEST SERPL-MCNC: 206 MG/DL
CO2 SERPL-SCNC: 28 MMOL/L (ref 21–32)
COLOR UR: ABNORMAL
CREAT SERPL-MCNC: 2.03 MG/DL (ref 0.55–1.02)
CREAT UR-MCNC: 363 MG/DL
EPITH CASTS URNS QL MICRO: ABNORMAL /LPF
ERYTHROCYTE [DISTWIDTH] IN BLOOD BY AUTOMATED COUNT: 14.2 % (ref 11.5–14.5)
GLOBULIN SER CALC-MCNC: 2.8 G/DL (ref 2–4)
GLUCOSE POC: 132 MG/DL
GLUCOSE SERPL-MCNC: 115 MG/DL (ref 65–100)
GLUCOSE UR STRIP.AUTO-MCNC: NEGATIVE MG/DL
HBA1C MFR BLD HPLC: 6.7 %
HCT VFR BLD AUTO: 35.9 % (ref 35–47)
HDLC SERPL-MCNC: 105 MG/DL
HDLC SERPL: 2 {RATIO} (ref 0–5)
HGB BLD-MCNC: 10.9 G/DL (ref 11.5–16)
HGB UR QL STRIP: NEGATIVE
KETONES UR QL STRIP.AUTO: ABNORMAL MG/DL
LDLC SERPL CALC-MCNC: 77.6 MG/DL (ref 0–100)
LEUKOCYTE ESTERASE UR QL STRIP.AUTO: NEGATIVE
MCH RBC QN AUTO: 32.9 PG (ref 26–34)
MCHC RBC AUTO-ENTMCNC: 30.4 G/DL (ref 30–36.5)
MCV RBC AUTO: 108.5 FL (ref 80–99)
MICROALBUMIN UR-MCNC: 4.5 MG/DL
MICROALBUMIN/CREAT UR-RTO: 12 MG/G (ref 0–30)
MUCOUS THREADS URNS QL MICRO: ABNORMAL /LPF
NITRITE UR QL STRIP.AUTO: NEGATIVE
NRBC # BLD: 0 K/UL (ref 0–0.01)
NRBC BLD-RTO: 0 PER 100 WBC
PH UR STRIP: 5 [PH] (ref 5–8)
PLATELET # BLD AUTO: 152 K/UL (ref 150–400)
PMV BLD AUTO: 11.7 FL (ref 8.9–12.9)
POTASSIUM SERPL-SCNC: 4.5 MMOL/L (ref 3.5–5.1)
PROT SERPL-MCNC: 6.6 G/DL (ref 6.4–8.2)
PROT UR STRIP-MCNC: ABNORMAL MG/DL
RBC # BLD AUTO: 3.31 M/UL (ref 3.8–5.2)
RBC #/AREA URNS HPF: ABNORMAL /HPF (ref 0–5)
SODIUM SERPL-SCNC: 142 MMOL/L (ref 136–145)
SP GR UR REFRACTOMETRY: 1.02 (ref 1–1.03)
TRIGL SERPL-MCNC: 117 MG/DL (ref ?–150)
UA: UC IF INDICATED,UAUC: ABNORMAL
UROBILINOGEN UR QL STRIP.AUTO: 0.2 EU/DL (ref 0.2–1)
VLDLC SERPL CALC-MCNC: 23.4 MG/DL
WBC # BLD AUTO: 4.7 K/UL (ref 3.6–11)
WBC URNS QL MICRO: ABNORMAL /HPF (ref 0–4)

## 2021-10-19 PROCEDURE — 90694 VACC AIIV4 NO PRSRV 0.5ML IM: CPT | Performed by: FAMILY MEDICINE

## 2021-10-19 PROCEDURE — 1090F PRES/ABSN URINE INCON ASSESS: CPT | Performed by: FAMILY MEDICINE

## 2021-10-19 PROCEDURE — G8510 SCR DEP NEG, NO PLAN REQD: HCPCS | Performed by: FAMILY MEDICINE

## 2021-10-19 PROCEDURE — 82947 ASSAY GLUCOSE BLOOD QUANT: CPT | Performed by: FAMILY MEDICINE

## 2021-10-19 PROCEDURE — G8427 DOCREV CUR MEDS BY ELIG CLIN: HCPCS | Performed by: FAMILY MEDICINE

## 2021-10-19 PROCEDURE — 99214 OFFICE O/P EST MOD 30 MIN: CPT | Performed by: FAMILY MEDICINE

## 2021-10-19 PROCEDURE — G8417 CALC BMI ABV UP PARAM F/U: HCPCS | Performed by: FAMILY MEDICINE

## 2021-10-19 PROCEDURE — G8536 NO DOC ELDER MAL SCRN: HCPCS | Performed by: FAMILY MEDICINE

## 2021-10-19 PROCEDURE — 1101F PT FALLS ASSESS-DOCD LE1/YR: CPT | Performed by: FAMILY MEDICINE

## 2021-10-19 PROCEDURE — 83036 HEMOGLOBIN GLYCOSYLATED A1C: CPT | Performed by: FAMILY MEDICINE

## 2021-10-19 NOTE — PROGRESS NOTES
Chief Complaint   Patient presents with    Cholesterol Problem     follow up    Hypertension     follow up    Diabetes     follow up           Mammogram  12/1/2020      Microalbumin  10/9/2020    Eye Exam 7/29/2021 by Dr. Calvin Mariano    Verbal order received by Dr. Jame Uriostegui dose flu vaccine IM. Pt received high dose flu vaccine IM in left deltoid without any difficulty. 1. Have you been to the ER, urgent care clinic since your last visit? Hospitalized since your last visit? No    2. Have you seen or consulted any other health care providers outside of the [address] [place] [address] since your last visit? Include any pap smears or colon screening.  no

## 2021-10-19 NOTE — PROGRESS NOTES
HISTORY OF PRESENT ILLNESS  Ab Wilson is a 80 y.o. female. Follow up on chronic medical problems. Overall has dealing with a lot of pain in the joints and muscles all over. Has good days and some days that the pain is worse. Overall has been stable in the past few weeks. Cardiovascular Review:  The patient has hypertension, hyperlipidemia and Systolic HF. Diet and Lifestyle: generally follows a low fat low cholesterol diet, generally follows a low sodium diet  Home BP Monitoring: is not measured at home. Pertinent ROS: taking medications as instructed, no medication side effects noted, no TIA's, no chest pain on exertion, no dyspnea on exertion, noting that her ankles swelling has been mild. She has been off of the statin but she did not see any improvemnet with her myalgia being off of the medication so she has started this back. DM type II follow up:  Compliant w/ meds, diabetic diet, and exercise. Obtains home glucose monitoring averaging in the mid 100s. Checks BS daily on most days and prn. Pt does have BS log at visit today. No Rf needed for today. Denies any tingling sensation, polyuria and polydipsia. UTD with her eye exam but want to see another MD.  Lor Morrow like she has a flim over the right eye that make her vision blurred. No significant weight changes. Asthma Review:  The patient is being seen for follow up of chronic respiratory failure/COPD and allergies and chronic sinusitits, not currently in exacerbation. Breathing has been good also with taking prednisone. Using nebulizer 3 to 4 times in a day as needed but has not recently had to use it. .        Regimen compliance: The patient reports adherence to asthma action plan and regimen. Encounter for pain management. 1./2. Medical history/Past medical History  Chronic Pain:  Osteoarthritis:  Patient has osteoarthritis in multiple joints. Primarily in the knees, hips and back are worse.   Has seen by rheumatology but she does not want to go back. They did not let her  come back to the exam room so they are not comfortable going back for follow up. Her symptoms have been wax and wane. She is currently on prednisone and we have discussed long term side effects related to chronic steroid use. Pain has been 8/10 when it is severe. 3. Applicable records from prior treatment providers are apart of [facility] under the media tab. 4. Diagnostic, therapeutic and laboratory results are available in the [address] [place] [address] chart. 5. Consultation notes are available for review in the media tab of the [address] [place] [address] chart. 6. Treatment goals include pain control so that the pt may be as active and function with her daily activities and improved comfort level. Previously pt has been limited by pain. 7. The risks and benefits of treatment has been discussed at this office visit with the pt. She understands that the medication has addicting potential.  Additionally the pt has been advised that narcotic pain medication may impair mental and/or physical ability required for performance of tasks such as driving or operating any other machinery. 8. Pt has an updated signed pain contract on file and can be found under the FYI section of the [facility] chart. 9. The pain contract is reviewed. Pain medication will be continued at the previous dosage. Urine drug screening will be done today. Diagnostic studies are not indicated at this time. Interventional procedure are not indicated at this time. 10. Medication prescibed is HYDROcodone-acetaminophen (NORCO)  mg tablet. No prescription is needed today. 11. Patient instructions have been reviewed in detail as outlined above and in the pain contract which is updated today. 12. Re-eval is planned for 3 months. Naloxone prescription is not warranted.      Patient Active Problem List   Diagnosis Code    CHF (congestive heart failure) ([facility]) I50.9    S/P TKR (total knee replacement) Z96.659    Anemia D64.9    s/p lumbar laminectomy Z98.890    S/P ASAD (total abdominal hysterectomy) Z90.710    S/P hemorrhoidectomy Z98.890, Z87.19    S/P rotator cuff surgery Z98.890    DM (diabetes mellitus) ([facility]) E11.9    Chronic sinusitis J32.9    S/P sinus surgery Z98.890    Dyslipidemia E78.5    Environmental allergies Z91.09    Obstructive sleep apnea (adult) (pediatric) G47.33    DJD (degenerative joint disease) M19.90    Chronic systolic heart failure ([facility]) I50.22    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    Asthma J45.909    Anxiety disorder F41.9    Diabetic neuropathy ([facility]) E11.40    Esophageal reflux K21.9    Essential hypertension, benign I10    Reactive airway disease with wheezing without complication P02.111    Encounter for medication monitoring Z51.81    CKD (chronic kidney disease) N18.9    Arthralgia of multiple joints M25.50    Plantar fasciitis of left foot M72.2    Type 2 diabetes with nephropathy ([facility]) E11.21    Severe obesity (BMI 35.0-39. 9) with comorbidity ([facility]) E66.01    Polymyalgia rheumatica ([facility]) M35.3    COPD exacerbation ([facility]) J44.1    Ataxia R27.0    General weakness R53.1    Tremors of nervous system R25.1    Fall at home W19. Zackarygregory Raji, Y92.009       Current Outpatient Medications   Medication Sig Dispense Refill    ALPRAZolam (XANAX) 0.5 mg tablet TAKE 1/2 TABLET BY MOUTH AT BEDTIME AS NEEDED 30 Tablet 1    diclofenac (VOLTAREN) 1 % gel APPLY EXTERNALLY TO THE AFFECTED AREA FOUR TIMES DAILY 300 g 3    primidone (MYSOLINE) 50 mg tablet TAKE 3 TABLETS BY MOUTH TWICE DAILY 540 Tablet 3    hydrOXYchloroQUINE (PlaqueniL) 200 mg tablet Take 1 Tablet by mouth daily.  30 Tablet 5    Accu-Chek Guide test strips strip USE TO TEST BLOOD SUGAR TWICE DAILY 300 Strip 3    Accu-Chek Fastclix Lancet Drum misc USE TO TEST BLOOD SUGAR TWICE DAILY 300 Each 3    predniSONE (DELTASONE) 5 mg tablet TAKE 3 TABLETS BY MOUTH DAILY 90 Tablet 5    carvediloL (COREG) 25 mg tablet Take 0.5 Tablets by mouth two (2) times a day. 30 Tablet 1    rosuvastatin (CRESTOR) 10 mg tablet TAKE 1 TABLET BY MOUTH EVERY NIGHT 90 Tablet 3    furosemide (LASIX) 20 mg tablet TAKE 1 TABLET BY MOUTH EVERY MORNING AND 1 TABLET EVERY AFTERNOON BETWEEN 2 TO 4  Tablet 3    glipiZIDE SR (GLUCOTROL XL) 2.5 mg CR tablet TAKE 1 TABLET BY MOUTH DAILY 90 Tab 3    DULoxetine (CYMBALTA) 60 mg capsule TAKE 1 CAPSULE BY MOUTH DAILY 90 Cap 3    Accu-Chek Guide Me Glucose Mtr misc USE TO CHECK BLOOD SUGAR TWICE DAILY 1 Each 0    BEVESPI AEROSPHERE 9-4.8 mcg HFA inhaler Take 2 puffs by inhalation daily      albuterol (PROVENTIL HFA, VENTOLIN HFA, PROAIR HFA) 90 mcg/actuation inhaler Take 2 Puffs by inhalation every four (4) hours as needed for Wheezing or Shortness of Breath. 1 Inhaler     guaiFENesin (MUCINEX) 1,200 mg Ta12 ER tablet Take 1,200 mg by mouth daily as needed.  MULTIVITAMIN PO Take 1 Tab by mouth daily.  Omeprazole delayed release (PRILOSEC D/R) 20 mg tablet Take 20 mg by mouth daily.  lisinopril (PRINIVIL, ZESTRIL) 2.5 mg tablet Take 2.5 mg by mouth daily.       fluticasone (FLONASE) 50 mcg/actuation nasal spray SHAKE LIQUID AND USE 2 SPRAYS IN EACH NOSTRIL EVERY DAY 1 Bottle 11    promethazine-dextromethorphan (PROMETHAZINE-DM) 6.25-15 mg/5 mL syrup TAKE ONE TEASPOONFUL BY MOUTH EVERY SIX HOURS AS NEEDED FOR COUGH 240 mL 1       Allergies   Allergen Reactions    Gabapentin Other (comments)     Muscles twitching and jerking    Levaquin [Levofloxacin] Swelling    Lyrica [Pregabalin] Other (comments)     Muscle twitching and jerking    Percodan [Oxycodone Hcl-Oxycodone-Asa] Itching         Past Medical History:   Diagnosis Date    Anemia 3/3/2010    Arrhythmia     irregular heartbeat    Arthritis     Asthma     CHF (congestive heart failure) (HCC) 3/3/2010    Chronic kidney disease     Chronic obstructive pulmonary disease (HCC)  Chronic pain     back    Chronic sinusitis 3/3/2010    CPAP (continuous positive airway pressure) dependence     Diverticulosis     DM (diabetes mellitus) ([facility] [address] 3/3/2010    Dyslipidemia 3/3/2010    GERD (gastroesophageal reflux disease)     HTN (hypertension) 3/3/2010    Ill-defined condition     being evaluated py pulmonolgist for \"trouble breathing\"    S/P TKR (total knee replacement) 3/3/2010    Unspecified sleep apnea     doesn't wear CPAP since back has been hurting         Past Surgical History:   Procedure Laterality Date    HX APPENDECTOMY      thinks it was taken with hysterectomy    HX GYN      \"tubes opened\"    HX HEENT      sinus surgery    HX HEMORRHOIDECTOMY      HX HYSTERECTOMY      HX KNEE REPLACEMENT      both knees- at same time    HX LUMBAR LAMINECTOMY      HX MOHS PROCEDURES      left shoulder    HX ORTHOPAEDIC      neck fusion    HX ORTHOPAEDIC      right knee replaced for second time    HX ORTHOPAEDIC      lumbar fusion    HX OTHER SURGICAL      CORNELL BIOPSY BREAST STEREOTACTIC Left yrs ago    (-)    AR COLONOSCOPY FLX DX W/COLLJ SPEC WHEN PFRMD  [id number]    dr. Yi Tompkins (barium enema)         Family History   Problem Relation Age of Onset    Diabetes Mother     Heart Disease Father     Diabetes Brother     Blindness Brother     Diabetes Sister     Heart Disease Sister     Heart Disease Brother     Heart Disease Brother     Asthma Brother     Malignant Hyperthermia Neg Hx     Pseudocholinesterase Deficiency Neg Hx     Delayed Awakening Neg Hx     Post-op Nausea/Vomiting Neg Hx     Emergence Delirium Neg Hx     Post-op Cognitive Dysfunction Neg Hx        Social History     Tobacco Use    Smoking status: Former Smoker     Packs/day: 0.30     Years: 5.00     Pack years: 1.50     Quit date: 1960     Years since quittin.8    Smokeless tobacco: Never Used   Substance Use Topics    Alcohol use: No     Alcohol/week: 0.0 standard drinks        Lab Results   Component Value Date/Time    WBC 5.2 07/23/2021 09:28 AM    HGB 10.7 (L) 07/23/2021 09:28 AM    HCT 34.3 (L) 07/23/2021 09:28 AM    PLATELET 890 (L) 30/39/2961 09:28 AM    .9 (H) 07/23/2021 09:28 AM     Lab Results   Component Value Date/Time    Cholesterol, total 232 (H) 07/23/2021 09:28 AM    HDL Cholesterol 89 07/23/2021 09:28 AM    LDL, calculated 113.2 (H) 07/23/2021 09:28 AM    Triglyceride 149 07/23/2021 09:28 AM    CHOL/HDL Ratio 2.6 07/23/2021 09:28 AM     Lab Results   Component Value Date/Time    Sodium 142 07/23/2021 09:28 AM    Potassium 5.1 07/23/2021 09:28 AM    Chloride 110 (H) 07/23/2021 09:28 AM    CO2 28 07/23/2021 09:28 AM    Anion gap 4 (L) 07/23/2021 09:28 AM    Glucose 124 (H) 07/23/2021 09:28 AM    BUN 25 (H) 07/23/2021 09:28 AM    Creatinine 1.70 (H) 07/23/2021 09:28 AM    BUN/Creatinine ratio 15 07/23/2021 09:28 AM    GFR est AA 34 (L) 07/23/2021 09:28 AM    GFR est non-AA 28 (L) 07/23/2021 09:28 AM    Calcium 9.2 07/23/2021 09:28 AM    Bilirubin, total 0.3 07/23/2021 09:28 AM    ALT (SGPT) 37 07/23/2021 09:28 AM    Alk. phosphatase 62 07/23/2021 09:28 AM    Protein, total 6.1 08/16/2021 12:44 PM    Albumin 3.8 07/23/2021 09:28 AM    Globulin 2.7 07/23/2021 09:28 AM    A-G Ratio 1.4 07/23/2021 09:28 AM      Lab Results   Component Value Date/Time    Hemoglobin A1c 6.4 (H) 06/19/2021 02:43 AM    Hemoglobin A1c (POC) 6.5 07/23/2021 09:33 AM         Review of Systems   Constitutional: Negative for malaise/fatigue. HENT: Negative for congestion. Eyes: Negative for blurred vision. Respiratory: Negative for cough and shortness of breath. Cardiovascular: Negative for chest pain, palpitations and leg swelling. Gastrointestinal: Negative for abdominal pain, constipation and heartburn. Genitourinary: Negative for dysuria, frequency and urgency. Musculoskeletal: Positive for back pain, joint pain and myalgias.    Neurological: Negative for dizziness, tingling, sensory change, focal weakness and headaches. Endo/Heme/Allergies: Negative for environmental allergies. Psychiatric/Behavioral: Negative for depression. The patient does not have insomnia. Physical Exam  Vitals and nursing note reviewed. Constitutional:       Appearance: Normal appearance. She is well-developed. Comments: /62 (BP 1 Location: Left arm, BP Patient Position: Sitting)   Pulse 64   Temp 97.4 °F (36.3 °C) (Oral)   Resp 16   Ht 5' 4\" (1.626 m)   Wt 205 lb 3.2 oz (93.1 kg)   LMP 03/04/1961 (Approximate)   SpO2 98%   BMI 35.22 kg/m²            HENT:      Right Ear: Tympanic membrane and ear canal normal.      Left Ear: Tympanic membrane and ear canal normal.      Nose: No mucosal edema. Neck:      Thyroid: No thyromegaly. Cardiovascular:      Rate and Rhythm: Normal rate and regular rhythm. Heart sounds: Normal heart sounds. No gallop. Pulmonary:      Effort: Pulmonary effort is normal.      Breath sounds: Normal breath sounds. Abdominal:      General: Bowel sounds are normal.      Palpations: Abdomen is soft. There is no mass. Tenderness: There is no abdominal tenderness. Musculoskeletal:         General: Tenderness (multiple areas of tenderness in the arm and legs.  ) present. Normal range of motion. Cervical back: Neck supple. No rigidity. No spinous process tenderness or muscular tenderness. Right lower leg: Edema (mild) present. Left lower leg: Edema (mild) present. Lymphadenopathy:      Cervical: No cervical adenopathy. Skin:     General: Skin is warm and dry. Neurological:      Mental Status: She is alert and oriented to person, place, and time. Cranial Nerves: No cranial nerve deficit. Coordination: Coordination normal.       ASSESSMENT and PLAN  Diagnoses and all orders for this visit:    1.  Essential hypertension, benign  Discussed sodium restriction, high k rich diet, maintaining ideal body weight and regular exercise program such as daily walking 30 min perday 4-5 times per week, as physiologic means to achieve blood pressure control. Medication compliance advised. 2. Type 2 diabetes with nephropathy (HCC)  a1c stable at 6.7%  -     AMB POC HEMOGLOBIN A1C  -     AMB POC GLUCOSE, QUANTITATIVE, BLOOD  -     MICROALBUMIN, UR, RAND W/ MICROALB/CREAT RATIO; Future    3. Mixed hyperlipidemia  -     LIPID PANEL; Future    4. Chronic systolic heart failure (HCC)  Stable     5. Stage 3 chronic kidney disease  Stable     6. Chronic obstructive pulmonary disease, unspecified COPD type ([facility] [address]  Stable     7. Polymyalgia rheumatica (HCC)//  8. Arthralgia of multiple joints  Wants to continue with prednisone which she has been stable on.     9. Encounter for chronic pain management  -     MONITOR SCREEN 10-DRUG CLASS PROFILE; Future  The pt has signed medication agreement. Pain contract is reviewed. Pain medications will be continued at the previous dosage. Urine drug screening will be done today. Diagnostic  studies are not indicated at this time. Interventional procedure are not indicated at this time. Re-eval in 3 months. 10. Chronic anemia  -     CBC W/O DIFF; Future    11. Encounter for medication monitoring  -     METABOLIC PANEL, COMPREHENSIVE; Future  -     URINALYSIS W/ REFLEX CULTURE; Future    12. Needs flu shot  -     FLU (FLUAD QUAD INFLUENZA VACCINE,QUAD,ADJUVANTED)      Follow-up and Dispositions    · Return in about 4 months (around 2/19/2022).        reviewed diet, exercise and weight control  cardiovascular risk and specific lipid/LDL goals reviewed  reviewed medications and side effects in detail  specific diabetic recommendations: low cholesterol diet, weight control and daily exercise discussed, foot care discussed and Podiatry visits discussed, annual eye examinations at Ophthalmology discussed and glycohemoglobin and other lab monitoring discussed     I have discussed diagnosis listed in this note with pt and/or family. I have discussed treatment plans and options and the risk/benefit analysis of those options, including safe use of medications and possible medication side effects. Through the use of shared decision making we have agreed to the above plan. The patient has received an after-visit summary and questions were answered concerning future plans and follow up. Advise pt of any urgent changes then to proceed to the ER.

## 2021-10-21 LAB
AMPHETAMINES UR QL SCN: NEGATIVE NG/ML
BARBITURATES UR QL SCN: POSITIVE NG/ML
BENZODIAZ UR QL SCN: POSITIVE NG/ML
BZE UR QL SCN: NEGATIVE NG/ML
CANNABINOIDS UR QL SCN: NEGATIVE NG/ML
CREAT UR-MCNC: 318.9 MG/DL (ref 20–300)
METHADONE UR QL SCN: NEGATIVE NG/ML
OPIATES UR QL SCN: NEGATIVE NG/ML
OXYCODONE+OXYMORPHONE UR QL SCN: NEGATIVE NG/ML
PCP UR QL: NEGATIVE NG/ML
PH UR: 5.4 [PH] (ref 4.5–8.9)
PLEASE NOTE:, 733163: ABNORMAL
PROPOXYPH UR QL SCN: NEGATIVE NG/ML

## 2021-11-08 ENCOUNTER — TRANSCRIBE ORDER (OUTPATIENT)
Dept: SCHEDULING | Age: 86
End: 2021-11-08

## 2021-11-08 DIAGNOSIS — Z12.31 SCREENING MAMMOGRAM FOR HIGH-RISK PATIENT: Primary | ICD-10-CM

## 2021-11-13 DIAGNOSIS — G25.0 ESSENTIAL TREMOR: ICD-10-CM

## 2021-11-17 RX ORDER — PRIMIDONE 50 MG/1
TABLET ORAL
Qty: 180 TABLET | Refills: 1 | Status: SHIPPED | OUTPATIENT
Start: 2021-11-17 | End: 2021-11-23

## 2021-11-19 DIAGNOSIS — G25.0 ESSENTIAL TREMOR: ICD-10-CM

## 2021-11-23 RX ORDER — PRIMIDONE 50 MG/1
TABLET ORAL
Qty: 540 TABLET | Refills: 0 | Status: SHIPPED | OUTPATIENT
Start: 2021-11-23 | End: 2022-01-10 | Stop reason: SDUPTHER

## 2021-12-14 ENCOUNTER — HOSPITAL ENCOUNTER (OUTPATIENT)
Dept: MAMMOGRAPHY | Age: 86
Discharge: HOME | End: 2021-12-14
Attending: FAMILY MEDICINE
Payer: MEDICARE

## 2021-12-14 DIAGNOSIS — Z12.31 SCREENING MAMMOGRAM FOR HIGH-RISK PATIENT: ICD-10-CM

## 2021-12-14 PROCEDURE — 77067 SCR MAMMO BI INCL CAD: CPT

## 2022-01-07 DIAGNOSIS — M35.3 POLYMYALGIA RHEUMATICA (HCC): ICD-10-CM

## 2022-01-07 RX ORDER — PREDNISONE 5 MG/1
TABLET ORAL
Qty: 90 TABLET | Refills: 5 | Status: SHIPPED | OUTPATIENT
Start: 2022-01-07 | End: 2022-07-12

## 2022-01-10 DIAGNOSIS — G25.0 ESSENTIAL TREMOR: ICD-10-CM

## 2022-01-10 RX ORDER — PRIMIDONE 50 MG/1
TABLET ORAL
Qty: 540 TABLET | Refills: 0 | Status: SHIPPED | OUTPATIENT
Start: 2022-01-10 | End: 2022-03-14 | Stop reason: SDUPTHER

## 2022-01-24 ENCOUNTER — TELEPHONE (OUTPATIENT)
Dept: FAMILY MEDICINE CLINIC | Age: 87
End: 2022-01-24

## 2022-01-24 DIAGNOSIS — R49.0 HOARSENESS: Primary | ICD-10-CM

## 2022-01-24 NOTE — TELEPHONE ENCOUNTER
Spoke with patient and c/o being hoarse for a month. Patient also states \"feels like something is stuck in my throat\". Patient denies any sore throat just being hoarse and has tried warm salt water gargles with no relieve. Informed patient will check with Dr. Radha Oakley.

## 2022-01-24 NOTE — TELEPHONE ENCOUNTER
----- Message from Ashley Rosenthal sent at 1/24/2022 12:02 PM EST -----  Subject: Message to Provider    QUESTIONS  Information for Provider? Patient would like a call from the PCP or nurse   concerning her throat.   ---------------------------------------------------------------------------  --------------  CALL BACK INFO  What is the best way for the office to contact you? OK to leave message on   voicemail  Preferred Call Back Phone Number? [phone]  ---------------------------------------------------------------------------  --------------  SCRIPT ANSWERS  Relationship to Patient? Other  Representative Name? Linda Torres   Is the Representative on the appropriate HIPAA document in Epic?  Yes

## 2022-01-25 RX ORDER — GLIPIZIDE 2.5 MG/1
TABLET, EXTENDED RELEASE ORAL
Qty: 90 TABLET | Refills: 3 | Status: SHIPPED | OUTPATIENT
Start: 2022-01-25

## 2022-01-25 NOTE — TELEPHONE ENCOUNTER
Spoke with patient and per Dr. Swartz she will refer back to Dr. Maurice. Patient verified that who is she had seen before. Informed patient once Alber Napier in the referral office schedules appt she will call with appt.

## 2022-01-25 NOTE — TELEPHONE ENCOUNTER
Spoke to patient's  and informed an appt has been made with Dr. Claritza Fajardo 2/3/2022 at 1:30pm and need to arrive by 1:15pm and located at [place] [address].  verbalized understanding.

## 2022-01-29 DIAGNOSIS — M25.50 ARTHRALGIA OF MULTIPLE JOINTS: ICD-10-CM

## 2022-01-31 RX ORDER — DULOXETIN HYDROCHLORIDE 60 MG/1
CAPSULE, DELAYED RELEASE ORAL
Qty: 90 CAPSULE | Refills: 3 | Status: SHIPPED | OUTPATIENT
Start: 2022-01-31

## 2022-02-06 DIAGNOSIS — G47.09 OTHER INSOMNIA: ICD-10-CM

## 2022-02-07 RX ORDER — ALPRAZOLAM 0.5 MG/1
TABLET ORAL
Qty: 30 TABLET | Refills: 1 | Status: SHIPPED | OUTPATIENT
Start: 2022-02-07 | End: 2022-06-07

## 2022-02-21 ENCOUNTER — OFFICE VISIT (OUTPATIENT)
Dept: FAMILY MEDICINE CLINIC | Age: 87
End: 2022-02-21
Payer: MEDICARE

## 2022-02-21 VITALS
RESPIRATION RATE: 17 BRPM | DIASTOLIC BLOOD PRESSURE: 53 MMHG | TEMPERATURE: 97.3 F | WEIGHT: 202.4 LBS | HEIGHT: 64 IN | BODY MASS INDEX: 34.56 KG/M2 | HEART RATE: 55 BPM | OXYGEN SATURATION: 98 % | SYSTOLIC BLOOD PRESSURE: 126 MMHG

## 2022-02-21 DIAGNOSIS — I50.22 CHRONIC SYSTOLIC HEART FAILURE (HCC): ICD-10-CM

## 2022-02-21 DIAGNOSIS — D64.9 CHRONIC ANEMIA: ICD-10-CM

## 2022-02-21 DIAGNOSIS — M25.50 ARTHRALGIA OF MULTIPLE JOINTS: ICD-10-CM

## 2022-02-21 DIAGNOSIS — E11.21 TYPE 2 DIABETES WITH NEPHROPATHY (HCC): ICD-10-CM

## 2022-02-21 DIAGNOSIS — E78.2 MIXED HYPERLIPIDEMIA: ICD-10-CM

## 2022-02-21 DIAGNOSIS — N18.4 STAGE 4 CHRONIC KIDNEY DISEASE (HCC): ICD-10-CM

## 2022-02-21 DIAGNOSIS — I10 ESSENTIAL HYPERTENSION, BENIGN: ICD-10-CM

## 2022-02-21 DIAGNOSIS — J44.9 CHRONIC OBSTRUCTIVE PULMONARY DISEASE, UNSPECIFIED COPD TYPE (HCC): ICD-10-CM

## 2022-02-21 DIAGNOSIS — Z00.00 MEDICARE ANNUAL WELLNESS VISIT, SUBSEQUENT: Primary | ICD-10-CM

## 2022-02-21 DIAGNOSIS — Z51.81 ENCOUNTER FOR MEDICATION MONITORING: ICD-10-CM

## 2022-02-21 DIAGNOSIS — G89.29 ENCOUNTER FOR CHRONIC PAIN MANAGEMENT: ICD-10-CM

## 2022-02-21 DIAGNOSIS — M35.3 POLYMYALGIA RHEUMATICA (HCC): ICD-10-CM

## 2022-02-21 LAB
ALBUMIN SERPL-MCNC: 4 G/DL (ref 3.5–5)
ALBUMIN/GLOB SERPL: 1.5 {RATIO} (ref 1.1–2.2)
ALP SERPL-CCNC: 62 U/L (ref 45–117)
ALT SERPL-CCNC: 26 U/L (ref 12–78)
ANION GAP SERPL CALC-SCNC: 1 MMOL/L (ref 5–15)
AST SERPL-CCNC: 24 U/L (ref 15–37)
BILIRUB SERPL-MCNC: 0.3 MG/DL (ref 0.2–1)
BUN SERPL-MCNC: 28 MG/DL (ref 6–20)
BUN/CREAT SERPL: 16 (ref 12–20)
CALCIUM SERPL-MCNC: 9.3 MG/DL (ref 8.5–10.1)
CHLORIDE SERPL-SCNC: 105 MMOL/L (ref 97–108)
CHOLEST SERPL-MCNC: 255 MG/DL
CO2 SERPL-SCNC: 33 MMOL/L (ref 21–32)
CREAT SERPL-MCNC: 1.73 MG/DL (ref 0.55–1.02)
ERYTHROCYTE [DISTWIDTH] IN BLOOD BY AUTOMATED COUNT: 14.6 % (ref 11.5–14.5)
GLOBULIN SER CALC-MCNC: 2.7 G/DL (ref 2–4)
GLUCOSE POC: 137 MG/DL
GLUCOSE SERPL-MCNC: 134 MG/DL (ref 65–100)
HBA1C MFR BLD HPLC: 6.3 %
HCT VFR BLD AUTO: 37.3 % (ref 35–47)
HDLC SERPL-MCNC: 109 MG/DL
HDLC SERPL: 2.3 {RATIO} (ref 0–5)
HGB BLD-MCNC: 11.2 G/DL (ref 11.5–16)
LDLC SERPL CALC-MCNC: 125 MG/DL (ref 0–100)
MCH RBC QN AUTO: 32.7 PG (ref 26–34)
MCHC RBC AUTO-ENTMCNC: 30 G/DL (ref 30–36.5)
MCV RBC AUTO: 108.7 FL (ref 80–99)
NRBC # BLD: 0 K/UL (ref 0–0.01)
NRBC BLD-RTO: 0 PER 100 WBC
PLATELET # BLD AUTO: 164 K/UL (ref 150–400)
PMV BLD AUTO: 11.4 FL (ref 8.9–12.9)
POTASSIUM SERPL-SCNC: 4.7 MMOL/L (ref 3.5–5.1)
PROT SERPL-MCNC: 6.7 G/DL (ref 6.4–8.2)
RBC # BLD AUTO: 3.43 M/UL (ref 3.8–5.2)
SODIUM SERPL-SCNC: 139 MMOL/L (ref 136–145)
TRIGL SERPL-MCNC: 105 MG/DL (ref ?–150)
VLDLC SERPL CALC-MCNC: 21 MG/DL
WBC # BLD AUTO: 8 K/UL (ref 3.6–11)

## 2022-02-21 PROCEDURE — 1101F PT FALLS ASSESS-DOCD LE1/YR: CPT | Performed by: FAMILY MEDICINE

## 2022-02-21 PROCEDURE — G8427 DOCREV CUR MEDS BY ELIG CLIN: HCPCS | Performed by: FAMILY MEDICINE

## 2022-02-21 PROCEDURE — 1090F PRES/ABSN URINE INCON ASSESS: CPT | Performed by: FAMILY MEDICINE

## 2022-02-21 PROCEDURE — G8536 NO DOC ELDER MAL SCRN: HCPCS | Performed by: FAMILY MEDICINE

## 2022-02-21 PROCEDURE — G0463 HOSPITAL OUTPT CLINIC VISIT: HCPCS | Performed by: FAMILY MEDICINE

## 2022-02-21 PROCEDURE — G8417 CALC BMI ABV UP PARAM F/U: HCPCS | Performed by: FAMILY MEDICINE

## 2022-02-21 PROCEDURE — 99214 OFFICE O/P EST MOD 30 MIN: CPT | Performed by: FAMILY MEDICINE

## 2022-02-21 PROCEDURE — G8510 SCR DEP NEG, NO PLAN REQD: HCPCS | Performed by: FAMILY MEDICINE

## 2022-02-21 PROCEDURE — 82947 ASSAY GLUCOSE BLOOD QUANT: CPT | Performed by: FAMILY MEDICINE

## 2022-02-21 PROCEDURE — G0439 PPPS, SUBSEQ VISIT: HCPCS | Performed by: FAMILY MEDICINE

## 2022-02-21 PROCEDURE — 83036 HEMOGLOBIN GLYCOSYLATED A1C: CPT | Performed by: FAMILY MEDICINE

## 2022-02-21 RX ORDER — LANCETS
EACH MISCELLANEOUS
Qty: 300 EACH | Refills: 3 | Status: SHIPPED | OUTPATIENT
Start: 2022-02-21 | End: 2022-02-21 | Stop reason: SDUPTHER

## 2022-02-21 RX ORDER — BLOOD SUGAR DIAGNOSTIC
STRIP MISCELLANEOUS
Qty: 300 STRIP | Refills: 3 | Status: SHIPPED | OUTPATIENT
Start: 2022-02-21

## 2022-02-21 RX ORDER — CIPROFLOXACIN 500 MG/1
500 TABLET ORAL 2 TIMES DAILY
COMMUNITY
Start: 2022-02-08 | End: 2022-02-21 | Stop reason: ALTCHOICE

## 2022-02-21 RX ORDER — BLOOD SUGAR DIAGNOSTIC
STRIP MISCELLANEOUS
Qty: 300 STRIP | Refills: 3 | Status: SHIPPED | OUTPATIENT
Start: 2022-02-21 | End: 2022-02-21 | Stop reason: SDUPTHER

## 2022-02-21 RX ORDER — HYDROCODONE BITARTRATE AND ACETAMINOPHEN 10; 325 MG/1; MG/1
1 TABLET ORAL
Qty: 60 TABLET | Refills: 0 | Status: SHIPPED | OUTPATIENT
Start: 2022-02-21 | End: 2022-05-31

## 2022-02-21 RX ORDER — LANCETS
EACH MISCELLANEOUS
Qty: 300 EACH | Refills: 3 | Status: SHIPPED | OUTPATIENT
Start: 2022-02-21

## 2022-02-21 RX ORDER — CARVEDILOL 12.5 MG/1
12.5 TABLET ORAL 2 TIMES DAILY WITH MEALS
Qty: 180 TABLET | Refills: 3 | Status: SHIPPED | OUTPATIENT
Start: 2022-02-21

## 2022-02-21 NOTE — PROGRESS NOTES
Identified pt with two pt identifiers(name and ). Reviewed record in preparation for visit and have obtained necessary documentation. All patient medications has been reviewed. Chief Complaint   Patient presents with    Follow-up       3 most recent PHQ Screens 2022   Little interest or pleasure in doing things Not at all   Feeling down, depressed, irritable, or hopeless Not at all   Total Score PHQ 2 0     Abuse Screening Questionnaire 2022   Do you ever feel afraid of your partner? N   Are you in a relationship with someone who physically or mentally threatens you? N   Is it safe for you to go home? Y       Health Maintenance Due   Topic    Foot Exam Q1      Health Maintenance Review: Patient reminded of \"due or due soon\" health maintenance. I have asked the patient to contact his/her primary care provider (PCP) for follow-up on his/her health maintenance. Vitals:    22 0858   BP: (!) 126/53   Pulse: (!) 50   Resp: 17   Temp: 97.3 °F (36.3 °C)   TempSrc: Oral   SpO2: 98%   Weight: 202 lb 6.4 oz (91.8 kg)   Height: 5' 4\" (1.626 m)   PainSc:   7   PainLoc: Hip   LMP: 1961       Wt Readings from Last 3 Encounters:   22 202 lb 6.4 oz (91.8 kg)   10/19/21 205 lb 3.2 oz (93.1 kg)   21 204 lb (92.5 kg)     Temp Readings from Last 3 Encounters:   22 97.3 °F (36.3 °C) (Oral)   10/19/21 97.4 °F (36.3 °C) (Oral)   21 97.2 °F (36.2 °C) (Oral)     BP Readings from Last 3 Encounters:   22 (!) 126/53   10/19/21 122/62   21 100/62     Pulse Readings from Last 3 Encounters:   22 (!) 50   10/19/21 64   21 79       Coordination of Care Questionnaire:   1) Have you been to an emergency room, urgent care, or hospitalized since your last visit? No      2. Have seen or consulted any other health care provider since your last visit?  No

## 2022-02-21 NOTE — PROGRESS NOTES
This is a Subsequent Medicare Annual Wellness Exam (AWV) (Performed 12 months after IPPE or effective date of Medicare Part B enrollment)    I have reviewed the patient's medical history in detail and updated the computerized patient record. History     Patient Active Problem List   Diagnosis Code    CHF (congestive heart failure) ([facility]) I50.9    S/P TKR (total knee replacement) Z96.659    Anemia D64.9    s/p lumbar laminectomy Z98.890    S/P ASAD (total abdominal hysterectomy) Z90.710    S/P hemorrhoidectomy Z98.890, Z87.19    S/P rotator cuff surgery Z98.890    DM (diabetes mellitus) ([facility] [address] E11.9    Chronic sinusitis J32.9    S/P sinus surgery Z98.890    Dyslipidemia E78.5    Environmental allergies Z91.09    Obstructive sleep apnea (adult) (pediatric) G47.33    DJD (degenerative joint disease) M19.90    Chronic systolic heart failure ([facility]) I50.22    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    Asthma J45.909    Anxiety disorder F41.9    Diabetic neuropathy ([facility]) E11.40    Esophageal reflux K21.9    Essential hypertension, benign I10    Reactive airway disease with wheezing without complication K72.489    Encounter for medication monitoring Z51.81    CKD (chronic kidney disease) N18.9    Arthralgia of multiple joints M25.50    Plantar fasciitis of left foot M72.2    Type 2 diabetes with nephropathy ([facility]) E11.21    Severe obesity (BMI 35.0-39. 9) with comorbidity ([facility]) E66.01    Polymyalgia rheumatica ([facility]) M35.3    COPD exacerbation ([facility]) J44.1    Ataxia R27.0    General weakness R53.1    Tremors of nervous system R25.1    Fall at home W19. Gogo Odom, Y92.009       Current Outpatient Medications   Medication Sig Dispense Refill    ALPRAZolam (XANAX) 0.5 mg tablet TAKE 1/2 TABLET BY MOUTH AT BEDTIME AS NEEDED 30 Tablet 1    DULoxetine (CYMBALTA) 60 mg capsule TAKE 1 CAPSULE BY MOUTH DAILY 90 Capsule 3    glipiZIDE SR (GLUCOTROL XL) 2.5 mg CR tablet TAKE 1 TABLET BY MOUTH DAILY 90 Tablet 3    primidone (MYSOLINE) 50 mg tablet TAKE 3 TABLETS BY MOUTH TWICE DAILY 540 Tablet 0    predniSONE (DELTASONE) 5 mg tablet TAKE 3 TABLETS BY MOUTH DAILY 90 Tablet 5    diclofenac (VOLTAREN) 1 % gel APPLY EXTERNALLY TO THE AFFECTED AREA FOUR TIMES DAILY 300 g 3    Accu-Chek Guide test strips strip USE TO TEST BLOOD SUGAR TWICE DAILY 300 Strip 3    Accu-Chek Fastclix Lancet Drum misc USE TO TEST BLOOD SUGAR TWICE DAILY 300 Each 3    carvediloL (COREG) 25 mg tablet Take 0.5 Tablets by mouth two (2) times a day. 30 Tablet 1    rosuvastatin (CRESTOR) 10 mg tablet TAKE 1 TABLET BY MOUTH EVERY NIGHT 90 Tablet 3    furosemide (LASIX) 20 mg tablet TAKE 1 TABLET BY MOUTH EVERY MORNING AND 1 TABLET EVERY AFTERNOON BETWEEN 2 TO 4  Tablet 3    Accu-Chek Guide Me Glucose Mtr misc USE TO CHECK BLOOD SUGAR TWICE DAILY 1 Each 0    BEVESPI AEROSPHERE 9-4.8 mcg HFA inhaler Take 2 puffs by inhalation daily      albuterol (PROVENTIL HFA, VENTOLIN HFA, PROAIR HFA) 90 mcg/actuation inhaler Take 2 Puffs by inhalation every four (4) hours as needed for Wheezing or Shortness of Breath. 1 Inhaler     guaiFENesin (MUCINEX) 1,200 mg Ta12 ER tablet Take 1,200 mg by mouth daily as needed.  MULTIVITAMIN PO Take 1 Tab by mouth daily.  Omeprazole delayed release (PRILOSEC D/R) 20 mg tablet Take 20 mg by mouth daily.  lisinopril (PRINIVIL, ZESTRIL) 2.5 mg tablet Take 2.5 mg by mouth daily.       fluticasone (FLONASE) 50 mcg/actuation nasal spray SHAKE LIQUID AND USE 2 SPRAYS IN EACH NOSTRIL EVERY DAY 1 Bottle 11    promethazine-dextromethorphan (PROMETHAZINE-DM) 6.25-15 mg/5 mL syrup TAKE ONE TEASPOONFUL BY MOUTH EVERY SIX HOURS AS NEEDED FOR COUGH 240 mL 1       Allergies   Allergen Reactions    Gabapentin Other (comments)     Muscles twitching and jerking    Levaquin [Levofloxacin] Swelling    Lyrica [Pregabalin] Other (comments)     Muscle twitching and jerking    Percodan [Oxycodone Hcl-Oxycodone-Asa] Itching       Past Medical History:   Diagnosis Date    Anemia 3/3/2010    Arrhythmia     irregular heartbeat    Arthritis     Asthma     CHF (congestive heart failure) ([facility] [address] 3/3/2010    Chronic kidney disease     Chronic obstructive pulmonary disease (HCC)     Chronic pain     back    Chronic sinusitis 3/3/2010    CPAP (continuous positive airway pressure) dependence     Diverticulosis     DM (diabetes mellitus) ([facility] [address] 3/3/2010    Dyslipidemia 3/3/2010    GERD (gastroesophageal reflux disease)     HTN (hypertension) 3/3/2010    Ill-defined condition     being evaluated py pulmonolgist for \"trouble breathing\"    S/P TKR (total knee replacement) 3/3/2010    Unspecified sleep apnea     doesn't wear CPAP since back has been hurting       Past Surgical History:   Procedure Laterality Date    HX APPENDECTOMY      thinks it was taken with hysterectomy    HX GYN      \"tubes opened\"    HX HEENT      sinus surgery    HX HEMORRHOIDECTOMY      HX HYSTERECTOMY      HX KNEE REPLACEMENT  1996    both knees- at same time    HX LUMBAR LAMINECTOMY  1980s    HX MOHS PROCEDURES      left shoulder    HX ORTHOPAEDIC  1980    neck fusion    HX ORTHOPAEDIC  1999    right knee replaced for second time    HX ORTHOPAEDIC      lumbar fusion    HX OTHER SURGICAL      CORNELL BIOPSY BREAST STEREOTACTIC Left yrs ago    (-)    NV COLONOSCOPY FLX DX W/COLLJ SPEC WHEN PFRMD  [id number]    dr. Derek Fernando (barium enema)       Family History   Problem Relation Age of Onset    Diabetes Mother     Heart Disease Father     Diabetes Brother     Blindness Brother     Diabetes Sister     Heart Disease Sister     Heart Disease Brother     Heart Disease Brother     Asthma Brother     Malignant Hyperthermia Neg Hx     Pseudocholinesterase Deficiency Neg Hx     Delayed Awakening Neg Hx     Post-op Nausea/Vomiting Neg Hx     Emergence Delirium Neg Hx     Post-op Cognitive Dysfunction Neg Hx        Social History     Tobacco Use    Smoking status: Former Smoker     Packs/day: 0.30     Years: 5.00     Pack years: 1.50     Quit date: 1960     Years since quittin.1    Smokeless tobacco: Never Used   Substance Use Topics    Alcohol use: No     Alcohol/week: 0.0 standard drinks         Depression Risk Factor Screening:     PHQ over the last two weeks    Little interest or pleasure in doing things Not at all   Feeling down, depressed or hopeless Not at all   Total Score PHQ 2 0     Alcohol Risk Factor Screening: You do not drink alcohol or very rarely. Functional Ability and Level of Safety:   Hearing Loss  Hearing is good. Activities of Daily Living  The home contains: discussed safety equipment. Patient does total self care    Fall RiskFall Risk Assessment, last 12 mths    Able to walk? Yes   Fall in past 12 months? No     Functional Ability:   Does the patient exhibit a steady gait? yes    How long did it take the patient to get up and walk from a sitting position? seconds    Is the patient self reliant? (ie can do own laundry, meals, household chores)  yes   Does the patient handle his/her own medications? yes   Does the patient handle his/her own money? yes   Is the patients home safe (ie good lighting, handrails on stairs and bath, etc.)? yes   Did you notice or did patient express any hearing difficulties? no   Did you notice or did patient express any vision difficulties? no        Advance Care Planning:   Patient was offered the opportunity to discuss advance care planning:  yes    Does patient have an Advance Directive:  no   If no, did you provide information on [facility]?   yes      Abuse Screen  Patient is not abused    Cognitive Screening   Evaluation of Cognitive Function:  Has your family/caregiver stated any concerns about your memory: no      Patient Care Team   Patient Care Team:  Arie Collins MD as PCP - General    Assessment/Plan   Education and counseling provided:  Are appropriate based on today's review and evaluation  End-of-Life planning (with patient's consent)    ASSESSMENT and PLAN    Medicare Annual Wellness  Continue current treatment plan. Continue annual follow up. I have discussed diagnosis listed in this note with pt and/or family. I have discussed treatment plans and options and the risk/benefit analysis of those options, including safe use of medications and possible medication side effects. Through the use of shared decision making we have agreed to the above plan. The patient has received an after-visit summary and questions were answered concerning future plans and follow up. Advise pt of any urgent changes then to proceed to the ER.

## 2022-02-21 NOTE — PROGRESS NOTES
HISTORY OF PRESENT ILLNESS  Slade Dawn is a 80 y.o. female. Follow up on chronic medical problems. Overall has dealing with a lot of pain in the joints and muscles all over. Has good days and some days that the pain is worse. Overall has been stable in the past few weeks. Cardiovascular Review:  The patient has hypertension, hyperlipidemia and Systolic HF. Diet and Lifestyle: generally follows a low fat low cholesterol diet, generally follows a low sodium diet  Home BP Monitoring: is not measured at home. Pertinent ROS: taking medications as instructed, no medication side effects noted, no TIA's, no chest pain on exertion, no dyspnea on exertion, noting that her ankles swelling has been mild. She has been off of the statin but she did not see any improvemnet with her myalgia being off of the medication so she has started this back. DM type II follow up:  Compliant w/ meds, diabetic diet, and exercise. Obtains home glucose monitoring averaging in the mid 100s. Checks BS daily on most days and prn. Pt does have BS log at visit today. No Rf needed for today. Denies any tingling sensation, polyuria and polydipsia. UTD with her eye exam but want to see another MD.  Delores Hoffman like she has a flim over the right eye that make her vision blurred. No significant weight changes. Asthma Review:  The patient is being seen for follow up of chronic respiratory failure/COPD and allergies and chronic sinusitits, not currently in exacerbation. Breathing has been good also with taking prednisone. Using nebulizer as needed but has not recently had to use it. Regimen compliance: The patient reports adherence to asthma action plan and regimen. Encounter for pain management. 1./2. Medical history/Past medical History  Chronic Pain:  Osteoarthritis:  Patient has osteoarthritis in multiple joints. Primarily in the knees, hips and back are worse.   Has seen by rheumatology but she does not want to go back.  Her symptoms have been wax and wane. She is currently on prednisone and we have discussed long term side effects related to chronic steroid use. Pain has been 8/10 when it is severe. 3. Applicable records from prior treatment providers are apart of [facility] under the media tab. 4. Diagnostic, therapeutic and laboratory results are available in the [address] [place] [address] chart. 5. Consultation notes are available for review in the media tab of the [address] [place] [address] chart. 6. Treatment goals include pain control so that the pt may be as active and function with her daily activities and improved comfort level. Previously pt has been limited by pain. 7. The risks and benefits of treatment has been discussed at this office visit with the pt. She understands that the medication has addicting potential.  Additionally the pt has been advised that narcotic pain medication may impair mental and/or physical ability required for performance of tasks such as driving or operating any other machinery. 8. Pt has an updated signed pain contract on file and can be found under the FYI section of the [facility] chart. 9. The pain contract is reviewed. Pain medication will be continued at the previous dosage. Urine drug screening will be done today. Diagnostic studies are not indicated at this time. Interventional procedure are not indicated at this time. 10. Medication prescibed is HYDROcodone-acetaminophen (NORCO)  mg tablet. No prescription is needed today. 11. Patient instructions have been reviewed in detail as outlined above and in the pain contract which is updated today. 12. Re-eval is planned for 3 months. Naloxone prescription is not warranted.      Patient Active Problem List   Diagnosis Code    CHF (congestive heart failure) ([facility]) I50.9    S/P TKR (total knee replacement) Z96.659    Anemia D64.9    s/p lumbar laminectomy Z98.890    S/P ASAD (total abdominal hysterectomy) Z90.710  S/P hemorrhoidectomy Z98.890, Z87.19    S/P rotator cuff surgery Z98.890    DM (diabetes mellitus) ([facility] [address] E11.9    Chronic sinusitis J32.9    S/P sinus surgery Z98.890    Dyslipidemia E78.5    Environmental allergies Z91.09    Obstructive sleep apnea (adult) (pediatric) G47.33    DJD (degenerative joint disease) M19.90    Chronic systolic heart failure (HCC) I50.22    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 9) with comorbidity ([facility]) E66.01    Polymyalgia rheumatica ([facility]) M35.3    COPD exacerbation ([facility]) J44.1    Ataxia R27.0    General weakness R53.1    Tremors of nervous system R25.1    Fall at home W19. Obey Perrin, Y92.009       Current Outpatient Medications   Medication Sig Dispense Refill    ALPRAZolam (XANAX) 0.5 mg tablet TAKE 1/2 TABLET BY MOUTH AT BEDTIME AS NEEDED 30 Tablet 1    DULoxetine (CYMBALTA) 60 mg capsule TAKE 1 CAPSULE BY MOUTH DAILY 90 Capsule 3    glipiZIDE SR (GLUCOTROL XL) 2.5 mg CR tablet TAKE 1 TABLET BY MOUTH DAILY 90 Tablet 3    primidone (MYSOLINE) 50 mg tablet TAKE 3 TABLETS BY MOUTH TWICE DAILY 540 Tablet 0    predniSONE (DELTASONE) 5 mg tablet TAKE 3 TABLETS BY MOUTH DAILY 90 Tablet 5    diclofenac (VOLTAREN) 1 % gel APPLY EXTERNALLY TO THE AFFECTED AREA FOUR TIMES DAILY 300 g 3    Accu-Chek Guide test strips strip USE TO TEST BLOOD SUGAR TWICE DAILY 300 Strip 3    Accu-Chek Fastclix Lancet Drum misc USE TO TEST BLOOD SUGAR TWICE DAILY 300 Each 3    carvediloL (COREG) 25 mg tablet Take 0.5 Tablets by mouth two (2) times a day. 30 Tablet 1    rosuvastatin (CRESTOR) 10 mg tablet TAKE 1 TABLET BY MOUTH EVERY NIGHT 90 Tablet 3    furosemide (LASIX) 20 mg tablet TAKE 1 TABLET BY MOUTH EVERY MORNING AND 1 TABLET EVERY AFTERNOON BETWEEN 2 TO 4  Tablet 3    Accu-Chek Guide Me Glucose Mtr misc USE TO CHECK BLOOD SUGAR TWICE DAILY 1 Each 0    BEVESPI AEROSPHERE 9-4.8 mcg HFA inhaler Take 2 puffs by inhalation daily      albuterol (PROVENTIL HFA, VENTOLIN HFA, PROAIR HFA) 90 mcg/actuation inhaler Take 2 Puffs by inhalation every four (4) hours as needed for Wheezing or Shortness of Breath. 1 Inhaler     guaiFENesin (MUCINEX) 1,200 mg Ta12 ER tablet Take 1,200 mg by mouth daily as needed.  MULTIVITAMIN PO Take 1 Tab by mouth daily.  Omeprazole delayed release (PRILOSEC D/R) 20 mg tablet Take 20 mg by mouth daily.  lisinopril (PRINIVIL, ZESTRIL) 2.5 mg tablet Take 2.5 mg by mouth daily.       fluticasone (FLONASE) 50 mcg/actuation nasal spray SHAKE LIQUID AND USE 2 SPRAYS IN EACH NOSTRIL EVERY DAY 1 Bottle 11    promethazine-dextromethorphan (PROMETHAZINE-DM) 6.25-15 mg/5 mL syrup TAKE ONE TEASPOONFUL BY MOUTH EVERY SIX HOURS AS NEEDED FOR COUGH 240 mL 1       Allergies   Allergen Reactions    Gabapentin Other (comments)     Muscles twitching and jerking    Levaquin [Levofloxacin] Swelling    Lyrica [Pregabalin] Other (comments)     Muscle twitching and jerking    Percodan [Oxycodone Hcl-Oxycodone-Asa] Itching         Past Medical History:   Diagnosis Date    Anemia 3/3/2010    Arrhythmia     irregular heartbeat    Arthritis     Asthma     CHF (congestive heart failure) (HCC) 3/3/2010    Chronic kidney disease     Chronic obstructive pulmonary disease (HCC)     Chronic pain     back    Chronic sinusitis 3/3/2010    CPAP (continuous positive airway pressure) dependence     Diverticulosis     DM (diabetes mellitus) ([facility] [address] 3/3/2010    Dyslipidemia 3/3/2010    GERD (gastroesophageal reflux disease)     HTN (hypertension) 3/3/2010    Ill-defined condition     being evaluated py pulmonolgist for \"trouble breathing\"    S/P TKR (total knee replacement) 3/3/2010    Unspecified sleep apnea     doesn't wear CPAP since back has been hurting         Past Surgical History:   Procedure Laterality Date    HX APPENDECTOMY      thinks it was taken with hysterectomy    HX GYN      \"tubes opened\"    HX HEENT      sinus surgery    HX HEMORRHOIDECTOMY      HX HYSTERECTOMY      HX KNEE REPLACEMENT      both knees- at same time    HX LUMBAR LAMINECTOMY      HX MOHS PROCEDURES      left shoulder    HX ORTHOPAEDIC      neck fusion    HX ORTHOPAEDIC      right knee replaced for second time    HX ORTHOPAEDIC      lumbar fusion    HX OTHER SURGICAL      CORNELL BIOPSY BREAST STEREOTACTIC Left yrs ago    (-)    [place] COLONOSCOPY FLX DX W/COLLJ SPEC WHEN PFRMD  [id number]    dr. Irish Harvey (barium enema)         Family History   Problem Relation Age of Onset    Diabetes Mother     Heart Disease Father     Diabetes Brother     Blindness Brother     Diabetes Sister     Heart Disease Sister     Heart Disease Brother     Heart Disease Brother     Asthma Brother     Malignant Hyperthermia Neg Hx     Pseudocholinesterase Deficiency Neg Hx     Delayed Awakening Neg Hx     Post-op Nausea/Vomiting Neg Hx     Emergence Delirium Neg Hx     Post-op Cognitive Dysfunction Neg Hx        Social History     Tobacco Use    Smoking status: Former Smoker     Packs/day: 0.30     Years: 5.00     Pack years: 1.50     Quit date: 1960     Years since quittin.1    Smokeless tobacco: Never Used   Substance Use Topics    Alcohol use: No     Alcohol/week: 0.0 standard drinks        Lab Results   Component Value Date/Time    WBC 4.7 10/19/2021 09:43 AM    HGB 10.9 (L) 10/19/2021 09:43 AM    HCT 35.9 10/19/2021 09:43 AM    PLATELET 128  09:43 AM    .5 (H) 10/19/2021 09:43 AM     Lab Results   Component Value Date/Time    Cholesterol, total 206 (H) 10/19/2021 09:43 AM    HDL Cholesterol 105 10/19/2021 09:43 AM    LDL, calculated 77.6 10/19/2021 09:43 AM    Triglyceride 117 10/19/2021 09:43 AM    CHOL/HDL Ratio 2.0 10/19/2021 09:43 AM     Lab Results   Component Value Date/Time    Sodium 142 10/19/2021 09:43 AM    Potassium 4.5 10/19/2021 09:43 AM    Chloride 109 (H) 10/19/2021 09:43 AM    CO2 28 10/19/2021 09:43 AM    Anion gap 5 10/19/2021 09:43 AM    Glucose 115 (H) 10/19/2021 09:43 AM    BUN 23 (H) 10/19/2021 09:43 AM    Creatinine 2.03 (H) 10/19/2021 09:43 AM    BUN/Creatinine ratio 11 (L) 10/19/2021 09:43 AM    GFR est AA 28 (L) 10/19/2021 09:43 AM    GFR est non-AA 23 (L) 10/19/2021 09:43 AM    Calcium 9.5 10/19/2021 09:43 AM    Bilirubin, total 0.3 10/19/2021 09:43 AM    ALT (SGPT) 25 10/19/2021 09:43 AM    Alk. phosphatase 60 10/19/2021 09:43 AM    Protein, total 6.6 10/19/2021 09:43 AM    Albumin 3.8 10/19/2021 09:43 AM    Globulin 2.8 10/19/2021 09:43 AM    A-G Ratio 1.4 10/19/2021 09:43 AM      Lab Results   Component Value Date/Time    Hemoglobin A1c 6.4 (H) 06/19/2021 02:43 AM    Hemoglobin A1c (POC) 6.7 10/19/2021 09:13 AM         Review of Systems   Constitutional: Negative for malaise/fatigue. HENT: Positive for congestion. Had follow up with ENT for her sinus drainage and hoarseness. Overall is improved. Still has some hoarseness that comes and goes. Goes back on this Thursday. Eyes: Negative for blurred vision. Respiratory: Negative for cough and shortness of breath. Cardiovascular: Negative for chest pain, palpitations and leg swelling. Gastrointestinal: Negative for abdominal pain, constipation and heartburn. Genitourinary: Negative for dysuria, frequency and urgency. Musculoskeletal: Positive for back pain, joint pain and myalgias. Neurological: Negative for dizziness, tingling, sensory change, focal weakness and headaches. Endo/Heme/Allergies: Negative for environmental allergies. Psychiatric/Behavioral: Negative for depression. The patient does not have insomnia. Physical Exam  Vitals and nursing note reviewed. Constitutional:       Appearance: Normal appearance. She is well-developed. Comments: BP (!) 126/53 (BP 1 Location: Left upper arm, BP Patient Position: Sitting, BP Cuff Size: Large adult)   Pulse (!) 55   Temp 97.3 °F (36.3 °C) (Oral)   Resp 17   Ht 5' 4\" (1.626 m)   Wt 202 lb 6.4 oz (91.8 kg)   LMP 03/04/1961 (LMP Unknown)   SpO2 98%   BMI 34.74 kg/m²              HENT:      Right Ear: Tympanic membrane and ear canal normal.      Left Ear: Tympanic membrane and ear canal normal.      Nose: No mucosal edema. Neck:      Thyroid: No thyromegaly. Cardiovascular:      Rate and Rhythm: Normal rate and regular rhythm. Heart sounds: Normal heart sounds. No gallop. Pulmonary:      Effort: Pulmonary effort is normal.      Breath sounds: Normal breath sounds. Abdominal:      General: Bowel sounds are normal.      Palpations: Abdomen is soft. There is no mass. Tenderness: There is no abdominal tenderness. Musculoskeletal:         General: Tenderness (multiple areas of tenderness in the arm and legs.  ) present. Normal range of motion. Cervical back: Neck supple. No rigidity. No spinous process tenderness or muscular tenderness. Right lower leg: No edema. Left lower leg: No edema. Lymphadenopathy:      Cervical: No cervical adenopathy. Skin:     General: Skin is warm and dry. Neurological:      Mental Status: She is alert and oriented to person, place, and time. Cranial Nerves: No cranial nerve deficit. Coordination: Coordination normal.       ASSESSMENT and PLAN  Diagnoses and all orders for this visit:    1. Medicare annual wellness visit, subsequent    2. Essential hypertension, benign  Stable     3.  Type 2 diabetes with nephropathy (HCC)  a1c stable at 6.3%.  Bone Density Study shows that you have osteoporosis which means that your bones are thinning and that you have a high fracture risk. -     AMB POC HEMOGLOBIN A1C  -     AMB POC GLUCOSE, QUANTITATIVE, BLOOD  -     lancets (Accu-Chek Fastclix Lancet Drum) misc; USE TO TEST BLOOD SUGAR TWICE DAILY DX E11.9  -     glucose blood VI test strips (Accu-Chek Guide test strips) strip; USE TO TEST BLOOD SUGAR TWICE DAILY DX E11.9    4. Mixed hyperlipidemia  -     LIPID PANEL; Future    5. Chronic systolic heart failure (HCC)  -     Refill carvediloL (COREG) 12.5 mg tablet; Take 1 Tablet by mouth two (2) times daily (with meals). *NEW DOSE    6. Chronic obstructive pulmonary disease, unspecified COPD type ([facility] [address]  Stable     7. Stage 4 chronic kidney disease (HCC)  Stable. Has upcoming appt with renal.     8. Chronic anemia  -     CBC W/O DIFF; Future    9. Arthralgia of multiple joints//  10. Polymyalgia rheumatica (HCC)  -     HYDROcodone-acetaminophen (NORCO)  mg tablet; Take 1 Tablet by mouth every six (6) hours as needed for Pain for up to 99 days. Max Daily Amount: 2 Tablets. 11. Encounter for chronic pain management  -     MONITOR SCREEN 10-DRUG CLASS PROFILE; Future  The pt has signed medication agreement. Pain contract is reviewed. Pain medications will be continued at the previous dosage. Urine drug screening will be done today. Diagnostic  studies are not indicated at this time. Interventional procedure are not indicated at this time. Re-eval in 3 months. 12. Encounter for medication monitoring  -     METABOLIC PANEL, COMPREHENSIVE; Future      Follow-up and Dispositions    · Return in about 4 months (around 6/21/2022).        current treatment plan is effective, no change in therapy  reviewed diet, exercise and weight control  cardiovascular risk and specific lipid/LDL goals reviewed  reviewed medications and side effects in detail  specific diabetic recommendations: low cholesterol diet, weight control and daily exercise discussed, foot care discussed and Podiatry visits discussed, annual eye examinations at Ophthalmology discussed and glycohemoglobin and other lab monitoring discussed     I have discussed diagnosis listed in this note with pt and/or family. I have discussed treatment plans and options and the risk/benefit analysis of those options, including safe use of medications and possible medication side effects. Through the use of shared decision making we have agreed to the above plan. The patient has received an after-visit summary and questions were answered concerning future plans and follow up. Advise pt of any urgent changes then to proceed to the ER.

## 2022-02-22 LAB
AMPHETAMINES UR QL SCN: NEGATIVE NG/ML
BARBITURATES UR QL SCN: POSITIVE NG/ML
BENZODIAZ UR QL SCN: POSITIVE NG/ML
BZE UR QL SCN: NEGATIVE NG/ML
CANNABINOIDS UR QL SCN: NEGATIVE NG/ML
CREAT UR-MCNC: 245.8 MG/DL (ref 20–300)
METHADONE UR QL SCN: NEGATIVE NG/ML
OPIATES UR QL SCN: NEGATIVE NG/ML
OXYCODONE+OXYMORPHONE UR QL SCN: NEGATIVE NG/ML
PCP UR QL: NEGATIVE NG/ML
PH UR: 5.3 [PH] (ref 4.5–8.9)
PLEASE NOTE:, 733163: ABNORMAL
PROPOXYPH UR QL SCN: NEGATIVE NG/ML

## 2022-02-25 ENCOUNTER — TRANSCRIBE ORDER (OUTPATIENT)
Dept: SCHEDULING | Age: 87
End: 2022-02-25

## 2022-02-25 DIAGNOSIS — R49.0 HOARSENESS: Primary | ICD-10-CM

## 2022-02-25 DIAGNOSIS — R13.10 DYSPHAGIA: ICD-10-CM

## 2022-02-25 DIAGNOSIS — J32.9 CHRONIC SINUSITIS: ICD-10-CM

## 2022-03-04 ENCOUNTER — HOSPITAL ENCOUNTER (OUTPATIENT)
Dept: GENERAL RADIOLOGY | Age: 87
Discharge: HOME | End: 2022-03-04
Attending: NURSE PRACTITIONER
Payer: MEDICARE

## 2022-03-04 ENCOUNTER — TELEPHONE (OUTPATIENT)
Dept: FAMILY MEDICINE CLINIC | Age: 87
End: 2022-03-04

## 2022-03-04 DIAGNOSIS — J32.9 CHRONIC SINUSITIS: ICD-10-CM

## 2022-03-04 DIAGNOSIS — R49.0 HOARSENESS: ICD-10-CM

## 2022-03-04 DIAGNOSIS — R13.10 DYSPHAGIA: ICD-10-CM

## 2022-03-04 PROCEDURE — 92611 MOTION FLUOROSCOPY/SWALLOW: CPT

## 2022-03-04 PROCEDURE — 74230 X-RAY XM SWLNG FUNCJ C+: CPT

## 2022-03-04 NOTE — TELEPHONE ENCOUNTER
Spoke with patient's  and have faxed form for diabetic supplies to [employer] on 3/1/2022 and 3/2/2022 and will refax again today.  verbalized understanding.     Form refaxed to [employer]

## 2022-03-04 NOTE — PROGRESS NOTES
[address] [facility], [address] [facility] STUDY  Patient: Deonna Navarro (21 y.o. female)  Date: 3/4/2022  Referring Provider:  Claritza Iyer NP    SUBJECTIVE:   Patient reported globus sensation in her throat as well as getting strangled with water. She does fine with one sip at a time, however when she drinks two sips in a row, she gets strangled. PMH includes GERD and COPD.     OBJECTIVE:   Past Medical History:   Past Medical History:   Diagnosis Date    Anemia 3/3/2010    Arrhythmia     irregular heartbeat    Arthritis     Asthma     CHF (congestive heart failure) (HCC) 3/3/2010    Chronic kidney disease     Chronic obstructive pulmonary disease (HCC)     Chronic pain     back    Chronic sinusitis 3/3/2010    CPAP (continuous positive airway pressure) dependence     Diverticulosis     DM (diabetes mellitus) ([facility] [address] 3/3/2010    Dyslipidemia 3/3/2010    GERD (gastroesophageal reflux disease)     HTN (hypertension) 3/3/2010    Ill-defined condition     being evaluated py pulmonolgist for \"trouble breathing\"    S/P TKR (total knee replacement) 3/3/2010    Unspecified sleep apnea     doesn't wear CPAP since back has been hurting     Past Surgical History:   Procedure Laterality Date    HX APPENDECTOMY      thinks it was taken with hysterectomy    HX GYN      \"tubes opened\"    HX HEENT      sinus surgery    HX HEMORRHOIDECTOMY      HX HYSTERECTOMY      HX KNEE REPLACEMENT  1996    both knees- at same time    HX LUMBAR LAMINECTOMY  1980s    HX MOHS PROCEDURES      left shoulder    HX ORTHOPAEDIC  1980    neck fusion    HX ORTHOPAEDIC  1999    right knee replaced for second time    HX ORTHOPAEDIC      lumbar fusion    HX OTHER SURGICAL      CORNELL BIOPSY BREAST STEREOTACTIC Left yrs ago    (-)    [place] COLONOSCOPY FLX DX W/COLLJ SPEC WHEN PFRMD  [id number]    dr. Bard So (barium enema)     Current Dietary Status:  regular/thin  Radiologist: Dr. Miesha Zhang Views: Lateral;Fluoro  Patient Position: upright in chair    Trial 1:   Consistency Presented: Thin liquid;Puree; Solid   How Presented: Self-fed/presented;Cup/gulp;Straw; Other (comment) (cued for successive swallows but only took single)         Aspiration/Penetration Score: 1 (No penetration or aspiration-Contrast does not enter the airway)      The Modified Barium Swallow Impairment Profile: MBSImP  ORAL Impairment  Component 1--Lip Closure: 0=No labial escape  Component 2--Tongue Control During Bolus Hold: 1=Escape to lateral buccal cavity/floor of mouth  Component 3--Bolus Preparation/Mastication: 1=Slow prolonged chewing/mashing with complete re-collection  Component 4--Bolus Transport/Lingual Motion: 2=Slowed tongue motion  Component 5--Oral Residue: 1=Trace residue lining oral structures  Component 6--Initiation of Pharyngeal Swallow: 3=Bolus head in pyriforms    PHARYNGEAL Impairment  Component 7--Soft Palate Elevation: 0=No bolus between soft palate/pharyngeal wall  Component 8--Laryngeal Elevation: 0=Complete superior movement of thyroid cartilage with complete approximation of arytenoids to epiglottic petiole  Component 9--Anterior Hyoid Excursion: 0=Complete anterior movement  Component 10--Epiglottic Movement: 0=Complete inversion  Component 11--Laryngeal Vestibular Closure: 0=Complete; no air/contrast in laryngeal vestibule  Component 12--Pharyngeal Stripping Wave: 0=Present - complete  Component 13--Pharyngeal Contraction: Not assessed  Component 14--Pharyngoesophageal Segment Openin=Complete distension and complete duration; no obstruction of flow  Component 15--Tongue Base Retraction: 1=Trace column of contrast or air between tongue base and pharyngeal wall  Component 16--Pharyngeal Residue: 1=Trace residue within or on pharyngeal structures    ESOPHAGEAL Impairment  Component 17--Esophageal Clearance Upright Position: Not assessed Oral Phase Severity: No impairment  Pharyngeal Phase Severity: N/A     The NOMS functional outcome measure was used to quantify this patient's level of swallowing impairment. Based on the NOMS, the patient was determined to be at level 6 for swallow function         NOMS Swallowing Levels:  Level 1 (CN): NPO  Level 2 (CM): NPO but takes consistency in therapy  Level 3 (CL): Takes less than 50% of nutrition p.o. and continues with nonoral feedings; and/or safe with mod cues; and/or max diet restriction  Level 4 (CK): Safe swallow but needs mod cues; and/or mod diet restriction; and/or still requires some nonoral feeding/supplements  Level 5 (CJ): Safe swallow with min diet restriction; and/or needs min cues  Level 6 (CI): Independent with p.o.; rare cues; usually self cues; may need to avoid some foods or needs extra time  Level 7 ([address] [place] [address]): Independent for all p.o.  ALLIE. (2003). National Outcomes Measurement System (NOMS): Adult Speech-Language Pathology User's Guide. ASSESSMENT :  Based on the objective data described above, the patient presents with [facility] oral/pharyngeal swallow, and no penetration, aspiration, or pharyngeal residue observed. PLAN/RECOMMENDATIONS :  -Continue current diet, with small/single sips as patient feels she tolerates this better  -No further SLP treatment indicated     COMMUNICATION/EDUCATION:   The above findings and recommendations were discussed with:  patient  who verbalized understanding.     Thank you for this referral.  Anton Solares, SLP  Time Calculation: 21 mins

## 2022-03-04 NOTE — TELEPHONE ENCOUNTER
----- Message from Trae Lu sent at 3/4/2022  9:59 AM EST -----  Subject: Message to Provider    QUESTIONS  Information for Provider? Patient  stated cmn form needs to be   filled out by Dr. Yuniel Russell. Patient  would like a call back as soon   as possible today  ---------------------------------------------------------------------------  --------------  CALL BACK INFO  What is the best way for the office to contact you? OK to leave message on   voicemail  Preferred Call Back Phone Number? [phone]  ---------------------------------------------------------------------------  --------------  SCRIPT ANSWERS  Relationship to Patient? Other  Representative Name? Angeles Blanco  Is the Representative on the appropriate HIPAA document in Epic?  Yes

## 2022-03-14 ENCOUNTER — OFFICE VISIT (OUTPATIENT)
Dept: NEUROLOGY | Age: 87
End: 2022-03-14
Payer: MEDICARE

## 2022-03-14 VITALS
HEIGHT: 64 IN | OXYGEN SATURATION: 98 % | WEIGHT: 204 LBS | SYSTOLIC BLOOD PRESSURE: 135 MMHG | RESPIRATION RATE: 16 BRPM | BODY MASS INDEX: 34.83 KG/M2 | TEMPERATURE: 97.7 F | DIASTOLIC BLOOD PRESSURE: 53 MMHG | HEART RATE: 65 BPM

## 2022-03-14 DIAGNOSIS — G25.0 ESSENTIAL TREMOR: ICD-10-CM

## 2022-03-14 DIAGNOSIS — E66.01 SEVERE OBESITY (BMI 35.0-39.9) WITH COMORBIDITY (HCC): ICD-10-CM

## 2022-03-14 DIAGNOSIS — E09.42 DIABETIC POLYNEUROPATHY ASSOCIATED WITH DRUG OR CHEMICAL INDUCED DIABETES MELLITUS (HCC): Primary | ICD-10-CM

## 2022-03-14 PROCEDURE — 1101F PT FALLS ASSESS-DOCD LE1/YR: CPT | Performed by: PSYCHIATRY & NEUROLOGY

## 2022-03-14 PROCEDURE — 99215 OFFICE O/P EST HI 40 MIN: CPT | Performed by: PSYCHIATRY & NEUROLOGY

## 2022-03-14 PROCEDURE — G8417 CALC BMI ABV UP PARAM F/U: HCPCS | Performed by: PSYCHIATRY & NEUROLOGY

## 2022-03-14 PROCEDURE — G8536 NO DOC ELDER MAL SCRN: HCPCS | Performed by: PSYCHIATRY & NEUROLOGY

## 2022-03-14 PROCEDURE — G8427 DOCREV CUR MEDS BY ELIG CLIN: HCPCS | Performed by: PSYCHIATRY & NEUROLOGY

## 2022-03-14 PROCEDURE — 1090F PRES/ABSN URINE INCON ASSESS: CPT | Performed by: PSYCHIATRY & NEUROLOGY

## 2022-03-14 PROCEDURE — 3044F HG A1C LEVEL LT 7.0%: CPT | Performed by: PSYCHIATRY & NEUROLOGY

## 2022-03-14 PROCEDURE — G8432 DEP SCR NOT DOC, RNG: HCPCS | Performed by: PSYCHIATRY & NEUROLOGY

## 2022-03-14 RX ORDER — PRIMIDONE 50 MG/1
TABLET ORAL
Qty: 540 TABLET | Refills: 3 | Status: SHIPPED | OUTPATIENT
Start: 2022-03-14

## 2022-03-14 NOTE — PROGRESS NOTES
Chief Complaint   Patient presents with    Tremors    Peripheral Neuropathy     1. Have you been to the ER, urgent care clinic since your last visit? Hospitalized since your last visit? No    2. Have you seen or consulted any other health care providers outside of the [address] [place] [address] since your last visit? Include any pap smears or colon screening.  No

## 2022-03-14 NOTE — PROGRESS NOTES
Neurology  Note      HISTORY PROVIDED BY: patient    Chief Complaint:   Chief Complaint   Patient presents with    Tremors    Peripheral Neuropathy      Subjective: eMhul Field is a 80 y.o. right handed female who presents in the office continuation of care for tremors and peripheral neuropathy. This is a 80-year-old right-handed female with history of anemia, cardiac dysrhythmia, degenerative joint disease, congestive heart failure, asthma, chronic kidney disease, chronic obstructive pulmonary disease, chronic back pain, chronic sinusitis, sleep apnea on CPAP, diverticulosis, diabetes mellitus, dyslipidemia, GERD, status post total knee replacement, former patient of Dr. Violette Ferreira currently establishing care with me. No new complaints. To recap: Mehul Field is a 6 y.o. female who presented to the neurology office for management of tremors. It has been going on for the last 2 yrs. It is gradually progressive with time. It is postural and in both in the arms and legs. No falls. She does have constipation. She does have mild stiffness in legs. The patient was on gabapentin and when it was discontinued her tremors have improved but they are coming back recently. She does also have lumbar radiculopathy and a length dependent neuropathy. Medications tried:  Gabapentin and Lyrica causes tremors    Patient was accompanied by her  to the visit. She is still experiencing tremor but has not gotten any worse.   No fall reported  Review of Systems - General ROS: positive for  - fever and sleep disturbance  Psychological ROS: positive for - anxiety and sleep disturbances  Ophthalmic ROS: positive for - blurry vision and decreased vision  ENT ROS: positive for - tinnitus, vertigo and visual changes  Allergy and Immunology ROS: negative  Hematological and Lymphatic ROS: negative  Endocrine ROS: negative  Respiratory ROS: no cough, shortness of breath, or wheezing  Cardiovascular ROS: no chest pain or dyspnea on exertion  Gastrointestinal ROS: no abdominal pain, change in bowel habits, or black or bloody stools  Genito-Urinary ROS: no dysuria, trouble voiding, or hematuria  Musculoskeletal ROS: positive for - gait disturbance, joint pain, joint stiffness, muscle pain and muscular weakness  Neurological ROS: positive for - dizziness, gait disturbance, impaired coordination/balance, numbness/tingling, tremors, visual changes and weakness  Dermatological ROS: negative  Past Medical History:   Diagnosis Date    Anemia 3/3/2010    Arrhythmia     irregular heartbeat    Arthritis     Asthma     CHF (congestive heart failure) ([facility] 3/3/2010    Chronic kidney disease     Chronic obstructive pulmonary disease (HCC)     Chronic pain     back    Chronic sinusitis 3/3/2010    CPAP (continuous positive airway pressure) dependence     Diverticulosis     DM (diabetes mellitus) ([facility] 3/3/2010    Dyslipidemia 3/3/2010    GERD (gastroesophageal reflux disease)     HTN (hypertension) 3/3/2010    Ill-defined condition     being evaluated py pulmonolgist for \"trouble breathing\"    S/P TKR (total knee replacement) 3/3/2010    Unspecified sleep apnea     doesn't wear CPAP since back has been hurting      Past Surgical History:   Procedure Laterality Date    HX APPENDECTOMY      thinks it was taken with hysterectomy    HX GYN      \"tubes opened\"    HX HEENT      sinus surgery    HX HEMORRHOIDECTOMY      HX HYSTERECTOMY      HX KNEE REPLACEMENT  1996    both knees- at same time    HX LUMBAR LAMINECTOMY  1980s    HX MOHS PROCEDURES      left shoulder    HX ORTHOPAEDIC  1980    neck fusion    HX ORTHOPAEDIC  1999    right knee replaced for second time    HX ORTHOPAEDIC      lumbar fusion    HX OTHER SURGICAL      CORNELL BIOPSY BREAST STEREOTACTIC Left yrs ago    (-)    CA COLONOSCOPY FLX DX W/COLLJ SPEC WHEN PFRMD  [id number]    dr. Victorino Hoang (barium enema)      Social History     Socioeconomic History    Marital status:      Spouse name: Not on file    Number of children: Not on file    Years of education: Not on file    Highest education level: Not on file   Occupational History    Not on file   Tobacco Use    Smoking status: Former Smoker     Packs/day: 0.30     Years: 5.00     Pack years: 1.50     Quit date: 1960     Years since quittin.2    Smokeless tobacco: Never Used   Vaping Use    Vaping Use: Never used   Substance and Sexual Activity    Alcohol use: No     Alcohol/week: 0.0 standard drinks    Drug use: No    Sexual activity: Never   Other Topics Concern    Not on file   Social History Narrative    ** Merged History Encounter **          Social Determinants of Health     Financial Resource Strain:     Difficulty of Paying Living Expenses: Not on file   Food Insecurity:     Worried About Running Out of Food in the Last Year: Not on file    Cyrus of Food in the Last Year: Not on file   Transportation Needs:     Lack of Transportation (Medical): Not on file    Lack of Transportation (Non-Medical):  Not on file   Physical Activity:     Days of Exercise per Week: Not on file    Minutes of Exercise per Session: Not on file   Stress:     Feeling of Stress : Not on file   Social Connections:     Frequency of Communication with Friends and Family: Not on file    Frequency of Social Gatherings with Friends and Family: Not on file    Attends [employer] Services: Not on file    Active Member of [address] [place] [address] or Organizations: Not on file    Attends Club or Organization Meetings: Not on file    Marital Status: Not on file   Intimate Partner Violence:     Fear of Current or Ex-Partner: Not on file    Emotionally Abused: Not on file    Physically Abused: Not on file    Sexually Abused: Not on file   Housing Stability:     Unable to Pay for Housing in the Last Year: Not on file    Number of [place] in the Last Year: Not on file    Unstable Housing in the Last Year: Not on file Family History   Problem Relation Age of Onset    Diabetes Mother     Heart Disease Father     Diabetes Brother     Blindness Brother     Diabetes Sister     Heart Disease Sister     Heart Disease Brother     Heart Disease Brother     Asthma Brother     Malignant Hyperthermia Neg Hx     Pseudocholinesterase Deficiency Neg Hx     Delayed Awakening Neg Hx     Post-op Nausea/Vomiting Neg Hx     Emergence Delirium Neg Hx     Post-op Cognitive Dysfunction Neg Hx          Objective:   ROS  As per HPI  Allergies   Allergen Reactions    Gabapentin Other (comments)     Muscles twitching and jerking    Levaquin [Levofloxacin] Swelling    Lyrica [Pregabalin] Other (comments)     Muscle twitching and jerking    Percodan [Oxycodone Hcl-Oxycodone-Asa] Itching        Meds:  Outpatient Medications Prior to Visit   Medication Sig Dispense Refill    carvediloL (COREG) 12.5 mg tablet Take 1 Tablet by mouth two (2) times daily (with meals). *NEW DOSE 180 Tablet 3    HYDROcodone-acetaminophen (NORCO)  mg tablet Take 1 Tablet by mouth every six (6) hours as needed for Pain for up to 99 days. Max Daily Amount: 2 Tablets.  60 Tablet 0    lancets (Accu-Chek Fastclix Lancet Drum) misc USE TO TEST BLOOD SUGAR TWICE DAILY DX E11.9 300 Each 3    glucose blood VI test strips (Accu-Chek Guide test strips) strip USE TO TEST BLOOD SUGAR TWICE DAILY DX E11.9 300 Strip 3    ALPRAZolam (XANAX) 0.5 mg tablet TAKE 1/2 TABLET BY MOUTH AT BEDTIME AS NEEDED 30 Tablet 1    DULoxetine (CYMBALTA) 60 mg capsule TAKE 1 CAPSULE BY MOUTH DAILY 90 Capsule 3    glipiZIDE SR (GLUCOTROL XL) 2.5 mg CR tablet TAKE 1 TABLET BY MOUTH DAILY 90 Tablet 3    predniSONE (DELTASONE) 5 mg tablet TAKE 3 TABLETS BY MOUTH DAILY 90 Tablet 5    diclofenac (VOLTAREN) 1 % gel APPLY EXTERNALLY TO THE AFFECTED AREA FOUR TIMES DAILY 300 g 3    rosuvastatin (CRESTOR) 10 mg tablet TAKE 1 TABLET BY MOUTH EVERY NIGHT 90 Tablet 3    furosemide (LASIX) 20 mg tablet TAKE 1 TABLET BY MOUTH EVERY MORNING AND 1 TABLET EVERY AFTERNOON BETWEEN 2 TO 4  Tablet 3    Accu-Chek Guide Me Glucose Mtr misc USE TO CHECK BLOOD SUGAR TWICE DAILY 1 Each 0    BEVESPI AEROSPHERE 9-4.8 mcg HFA inhaler Take 2 puffs by inhalation daily      albuterol (PROVENTIL HFA, VENTOLIN HFA, PROAIR HFA) 90 mcg/actuation inhaler Take 2 Puffs by inhalation every four (4) hours as needed for Wheezing or Shortness of Breath. 1 Inhaler     guaiFENesin (MUCINEX) 1,200 mg Ta12 ER tablet Take 1,200 mg by mouth daily as needed.  MULTIVITAMIN PO Take 1 Tab by mouth daily.  Omeprazole delayed release (PRILOSEC D/R) 20 mg tablet Take 20 mg by mouth daily.  lisinopril (PRINIVIL, ZESTRIL) 2.5 mg tablet Take 2.5 mg by mouth daily.  fluticasone (FLONASE) 50 mcg/actuation nasal spray SHAKE LIQUID AND USE 2 SPRAYS IN EACH NOSTRIL EVERY DAY 1 Bottle 11    promethazine-dextromethorphan (PROMETHAZINE-DM) 6.25-15 mg/5 mL syrup TAKE ONE TEASPOONFUL BY MOUTH EVERY SIX HOURS AS NEEDED FOR COUGH 240 mL 1    primidone (MYSOLINE) 50 mg tablet TAKE 3 TABLETS BY MOUTH TWICE DAILY 540 Tablet 0    carvediloL (COREG) 25 mg tablet Take 0.5 Tablets by mouth two (2) times a day. (Patient not taking: Reported on 3/14/2022) 30 Tablet 1     No facility-administered medications prior to visit. Imaging:  MRI Results (most recent):  Results from Hospital Encounter encounter on 06/18/21    MRI BRAIN WO CONT    Narrative  EXAM: MRI BRAIN WO CONT    INDICATION: R/o CVA    COMPARISON: CT head 6/18/2021, MRI brain 12/6/2012. CONTRAST: None. TECHNIQUE:  Multiplanar multisequence acquisition without contrast of the brain. FINDINGS:  The ventricles are normal in size and position. Minimal scattered  periventricular and deep white matter T2/FLAIR hyperintensities, and minimal  patchy T2/FLAIR hyperintensity in the ronaldo, consistent with minimal chronic  microvascular ischemic disease.  There is no acute infarct, hemorrhage,  extra-axial fluid collection, or mass effect. There is no cerebellar tonsillar  herniation. Expected arterial flow-voids are present. Complete opacification of the left sphenoid sinus, near complete opacification  of the right maxillary sinus, and scattered mucosal thickening throughout the  remaining paranasal sinuses. The mastoid air cells and middle ears are clear. The orbital contents are within normal limits with bilateral lens implants. No  significant osseous or scalp lesions are identified. Multilevel degenerative  disc disease and facet arthropathy in the cervical spine. Impression  1. No acute intracranial abnormality. Minimal chronic microvascular ischemic  disease. 2. Pansinus mucosal thickening. CT Results (most recent):  Results from Hospital Encounter encounter on 06/18/21    CT HEAD WO CONT    Narrative  EXAM: CT HEAD WO CONT    INDICATION: acute weakness    COMPARISON: CT 10/18/2019. CONTRAST: None. TECHNIQUE: Unenhanced CT of the head was performed using 5 mm images. Brain and  bone windows were generated. Coronal and sagittal reformats. CT dose reduction  was achieved through use of a standardized protocol tailored for this  examination and automatic exposure control for dose modulation. FINDINGS:  The ventricles and sulci are normal in size, shape and configuration. . There is  no significant white matter disease. There is no intracranial hemorrhage,  extra-axial collection, or mass effect. The basilar cisterns are open. No CT  evidence of acute infarct. The bone windows demonstrate no abnormalities. Postsurgical changes are seen in  the maxillary sinuses. There is partial opacification of the right maxillary  sinus and ethmoid air cells and left sphenoid sinus. .    Impression  No evidence of acute intracranial process.        Reviewed records in [employer] and [employer] tab today    Lab Review   Results for orders placed or performed in visit on 02/21/22 MONITOR SCREEN 10-DRUG CLASS PROFILE   Result Value Ref Range    Amphetamine Screen, urine Negative Talsry=2799 ng/mL    Barbiturates Screen, urine Positive (A) Zgqtqz=501 ng/mL    Benzodiazepines Screen, urine Positive (A) Wpxouc=519 ng/mL    Cannabinoid Screen, urine Negative Cutoff=20 ng/mL    Cocaine Metab. Screen, urine Negative Mibzdm=142 ng/mL    Opiate Screen, urine Negative Ekkwxn=310 ng/mL    Oxycodone/Oxymorphone, urine Negative Ouobru=075 ng/mL    Phencyclidine Screen, urine Negative Cutoff=25 ng/mL    Methadone Screen, urine Negative Lpxxez=142 ng/mL    Propoxyphene Screen, urine Negative Fmzcqr=226 ng/mL    Creatinine, urine 245.8 20.0 - 300.0 mg/dL    pH, urine 5.3 4.5 - 8.9      Please note: Comment     METABOLIC PANEL, COMPREHENSIVE   Result Value Ref Range    Sodium 139 136 - 145 mmol/L    Potassium 4.7 3.5 - 5.1 mmol/L    Chloride 105 97 - 108 mmol/L    CO2 33 (H) 21 - 32 mmol/L    Anion gap 1 (L) 5 - 15 mmol/L    Glucose 134 (H) 65 - 100 mg/dL    BUN 28 (H) 6 - 20 MG/DL    Creatinine 1.73 (H) 0.55 - 1.02 MG/DL    BUN/Creatinine ratio 16 12 - 20      GFR est AA 34 (L) >60 ml/min/1.73m2    GFR est non-AA 28 (L) >60 ml/min/1.73m2    Calcium 9.3 8.5 - 10.1 MG/DL    Bilirubin, total 0.3 0.2 - 1.0 MG/DL    ALT (SGPT) 26 12 - 78 U/L    AST (SGOT) 24 15 - 37 U/L    Alk.  phosphatase 62 45 - 117 U/L    Protein, total 6.7 6.4 - 8.2 g/dL    Albumin 4.0 3.5 - 5.0 g/dL    Globulin 2.7 2.0 - 4.0 g/dL    A-G Ratio 1.5 1.1 - 2.2     LIPID PANEL   Result Value Ref Range    Cholesterol, total 255 (H) <200 MG/DL    Triglyceride 105 <150 MG/DL    HDL Cholesterol 109 MG/DL    LDL, calculated 125 (H) 0 - 100 MG/DL    VLDL, calculated 21 MG/DL    CHOL/HDL Ratio 2.3 0.0 - 5.0     CBC W/O DIFF   Result Value Ref Range    WBC 8.0 3.6 - 11.0 K/uL    RBC 3.43 (L) 3.80 - 5.20 M/uL    HGB 11.2 (L) 11.5 - 16.0 g/dL    HCT 37.3 35.0 - 47.0 %    .7 (H) 80.0 - 99.0 FL    MCH 32.7 26.0 - 34.0 PG    MCHC 30.0 30.0 - 36.5 g/dL    RDW 14.6 (H) 11.5 - 14.5 %    PLATELET 785 408 - 764 K/uL    MPV 11.4 8.9 - 12.9 FL    NRBC 0.0 0  WBC    ABSOLUTE NRBC 0.00 0.00 - 0.01 K/uL   AMB POC HEMOGLOBIN A1C   Result Value Ref Range    Hemoglobin A1c (POC) 6.3 %   AMB POC GLUCOSE, QUANTITATIVE, BLOOD   Result Value Ref Range    Glucose  MG/DL        Exam:  Visit Vitals  BP (!) 135/53 (BP 1 Location: Right upper arm, BP Patient Position: Sitting)   Pulse 65   Temp 97.7 °F (36.5 °C) (Temporal)   Resp 16   Ht 5' 4\" (1.626 m)   Wt 204 lb (92.5 kg)   LMP 03/04/1961 (LMP Unknown)   SpO2 98%   BMI 35.02 kg/m²     General:  Alert, cooperative, no distress. Head:  Normocephalic, without obvious abnormality, atraumatic. Respiratory:  Heart:   Non labored breathing  Regular rate and rhythm, no murmurs   Neck:   2+ carotids, no bruits   Extremities: Warm, no cyanosis or edema. Pulses: 2+ radial pulses. Neurologic:  MS: Alert and oriented x 3, speech intact. Language intact, able to name, repeat, and follow all commands. Attention and fund of knowledge appropriate. Recent and remote memory intact.   Cranial Nerves:  II: visual fields Full to confrontation   II: pupils Equal, round, reactive to light   II: optic disc No papilledema   III,VII: ptosis none   III,IV,VI: extraocular muscles  EOMI, no nystagmus or diplopia   V: facial light touch sensation  normal   VII: facial muscle function   symmetric   VIII: hearing intact   IX: soft palate elevation  normal   XI: trapezius strength  5/5   XI: sternocleidomastoid strength 5/5   XII: tongue  Midline     Motor: normal bulk and tone,  Tremor++              Strength: 5-/5 throughout, no PD  Sensory: Creased sensation to LT, PP, temperature and vibration bilateral lower extremity up to the knee  Coordination: FTN and HTS abnormal, ALLYSON abnormal,Romberg positive  Gait: normal gait, able to heel, toe, and tandem walk  Reflexes: 2+ symmetric  Plantar: Withdrawn           Assessment/Plan ICD-10-CM ICD-9-CM    1. 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 Essential tremor  G25.0 333.1 primidone (MYSOLINE) 50 mg tablet   3. Severe obesity (BMI 35.0-39. 9) with comorbidity ([facility]  E66.01 278.01    Plan:  Continue Mysoline 50 mg p.o. 3 times daily    Follow-up and Dispositions    · Return in about 6 months (around 9/14/2022).          Signed:  Manuela Garcia MD  3/14/2022
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
## 2022-03-18 PROBLEM — M35.3 POLYMYALGIA RHEUMATICA (HCC): Status: ACTIVE | Noted: 2018-06-15

## 2022-03-19 PROBLEM — E11.21 TYPE 2 DIABETES WITH NEPHROPATHY (HCC): Status: ACTIVE | Noted: 2018-04-11

## 2022-03-19 PROBLEM — J44.1 COPD EXACERBATION (HCC): Status: ACTIVE | Noted: 2019-12-09

## 2022-03-19 PROBLEM — E66.01 SEVERE OBESITY (BMI 35.0-39.9) WITH COMORBIDITY (HCC): Status: ACTIVE | Noted: 2018-04-11

## 2022-03-19 PROBLEM — M72.2 PLANTAR FASCIITIS OF LEFT FOOT: Status: ACTIVE | Noted: 2017-11-02

## 2022-03-19 PROBLEM — R25.1 TREMORS OF NERVOUS SYSTEM: Status: ACTIVE | Noted: 2021-06-18

## 2022-03-20 PROBLEM — R27.0 ATAXIA: Status: ACTIVE | Noted: 2021-06-18

## 2022-03-20 PROBLEM — R53.1 GENERAL WEAKNESS: Status: ACTIVE | Noted: 2021-06-18

## 2022-06-07 DIAGNOSIS — E78.2 MIXED HYPERLIPIDEMIA: ICD-10-CM

## 2022-06-07 DIAGNOSIS — G47.09 OTHER INSOMNIA: ICD-10-CM

## 2022-06-07 RX ORDER — ALPRAZOLAM 0.5 MG/1
TABLET ORAL
Qty: 30 TABLET | Refills: 1 | Status: SHIPPED | OUTPATIENT
Start: 2022-06-07 | End: 2022-10-05

## 2022-06-07 RX ORDER — ROSUVASTATIN CALCIUM 10 MG/1
TABLET, COATED ORAL
Qty: 90 TABLET | Refills: 3 | Status: SHIPPED | OUTPATIENT
Start: 2022-06-07

## 2022-06-27 ENCOUNTER — OFFICE VISIT (OUTPATIENT)
Dept: FAMILY MEDICINE CLINIC | Age: 87
End: 2022-06-27
Payer: MEDICARE

## 2022-06-27 VITALS
RESPIRATION RATE: 18 BRPM | HEART RATE: 53 BPM | WEIGHT: 202 LBS | DIASTOLIC BLOOD PRESSURE: 40 MMHG | HEIGHT: 64 IN | SYSTOLIC BLOOD PRESSURE: 111 MMHG | BODY MASS INDEX: 34.49 KG/M2 | OXYGEN SATURATION: 98 % | TEMPERATURE: 98.7 F

## 2022-06-27 DIAGNOSIS — E11.21 TYPE 2 DIABETES WITH NEPHROPATHY (HCC): ICD-10-CM

## 2022-06-27 DIAGNOSIS — G89.29 ENCOUNTER FOR CHRONIC PAIN MANAGEMENT: ICD-10-CM

## 2022-06-27 DIAGNOSIS — M25.50 ARTHRALGIA OF MULTIPLE JOINTS: ICD-10-CM

## 2022-06-27 DIAGNOSIS — Z51.81 ENCOUNTER FOR MEDICATION MONITORING: ICD-10-CM

## 2022-06-27 DIAGNOSIS — J04.0 REFLUX LARYNGITIS: ICD-10-CM

## 2022-06-27 DIAGNOSIS — D64.9 CHRONIC ANEMIA: ICD-10-CM

## 2022-06-27 DIAGNOSIS — E78.2 MIXED HYPERLIPIDEMIA: ICD-10-CM

## 2022-06-27 DIAGNOSIS — N18.32 STAGE 3B CHRONIC KIDNEY DISEASE (HCC): ICD-10-CM

## 2022-06-27 DIAGNOSIS — I10 ESSENTIAL HYPERTENSION, BENIGN: Primary | ICD-10-CM

## 2022-06-27 DIAGNOSIS — I50.22 CHRONIC SYSTOLIC HEART FAILURE (HCC): ICD-10-CM

## 2022-06-27 DIAGNOSIS — R49.0 CHRONIC HOARSENESS: ICD-10-CM

## 2022-06-27 DIAGNOSIS — M35.3 POLYMYALGIA RHEUMATICA (HCC): ICD-10-CM

## 2022-06-27 DIAGNOSIS — K21.9 REFLUX LARYNGITIS: ICD-10-CM

## 2022-06-27 DIAGNOSIS — J44.9 CHRONIC OBSTRUCTIVE PULMONARY DISEASE, UNSPECIFIED COPD TYPE (HCC): ICD-10-CM

## 2022-06-27 PROBLEM — N18.30 CHRONIC RENAL DISEASE, STAGE III (HCC): Status: ACTIVE | Noted: 2022-06-27

## 2022-06-27 PROBLEM — N18.4 CKD (CHRONIC KIDNEY DISEASE) STAGE 4, GFR 15-29 ML/MIN (HCC): Status: ACTIVE | Noted: 2022-06-27

## 2022-06-27 LAB
ALBUMIN SERPL-MCNC: 3.5 G/DL (ref 3.5–5)
ALBUMIN/GLOB SERPL: 1.2 {RATIO} (ref 1.1–2.2)
ALP SERPL-CCNC: 69 U/L (ref 45–117)
ALT SERPL-CCNC: 21 U/L (ref 12–78)
ANION GAP SERPL CALC-SCNC: 7 MMOL/L (ref 5–15)
AST SERPL-CCNC: 15 U/L (ref 15–37)
BILIRUB SERPL-MCNC: 0.4 MG/DL (ref 0.2–1)
BUN SERPL-MCNC: 34 MG/DL (ref 6–20)
BUN/CREAT SERPL: 18 (ref 12–20)
CALCIUM SERPL-MCNC: 9.2 MG/DL (ref 8.5–10.1)
CHLORIDE SERPL-SCNC: 104 MMOL/L (ref 97–108)
CHOLEST SERPL-MCNC: 218 MG/DL
CO2 SERPL-SCNC: 29 MMOL/L (ref 21–32)
CREAT SERPL-MCNC: 1.89 MG/DL (ref 0.55–1.02)
GLOBULIN SER CALC-MCNC: 2.9 G/DL (ref 2–4)
GLUCOSE POC: 136 MG/DL
GLUCOSE SERPL-MCNC: 142 MG/DL (ref 65–100)
HBA1C MFR BLD HPLC: 6.5 %
HDLC SERPL-MCNC: 98 MG/DL
HDLC SERPL: 2.2 {RATIO} (ref 0–5)
LDLC SERPL CALC-MCNC: 101.2 MG/DL (ref 0–100)
POTASSIUM SERPL-SCNC: 5 MMOL/L (ref 3.5–5.1)
PROT SERPL-MCNC: 6.4 G/DL (ref 6.4–8.2)
SODIUM SERPL-SCNC: 140 MMOL/L (ref 136–145)
TRIGL SERPL-MCNC: 94 MG/DL (ref ?–150)
VLDLC SERPL CALC-MCNC: 18.8 MG/DL

## 2022-06-27 PROCEDURE — 1123F ACP DISCUSS/DSCN MKR DOCD: CPT | Performed by: FAMILY MEDICINE

## 2022-06-27 PROCEDURE — G8427 DOCREV CUR MEDS BY ELIG CLIN: HCPCS | Performed by: FAMILY MEDICINE

## 2022-06-27 PROCEDURE — 99214 OFFICE O/P EST MOD 30 MIN: CPT | Performed by: FAMILY MEDICINE

## 2022-06-27 PROCEDURE — 1101F PT FALLS ASSESS-DOCD LE1/YR: CPT | Performed by: FAMILY MEDICINE

## 2022-06-27 PROCEDURE — G0463 HOSPITAL OUTPT CLINIC VISIT: HCPCS | Performed by: FAMILY MEDICINE

## 2022-06-27 PROCEDURE — G8510 SCR DEP NEG, NO PLAN REQD: HCPCS | Performed by: FAMILY MEDICINE

## 2022-06-27 PROCEDURE — 3044F HG A1C LEVEL LT 7.0%: CPT | Performed by: FAMILY MEDICINE

## 2022-06-27 PROCEDURE — 82947 ASSAY GLUCOSE BLOOD QUANT: CPT | Performed by: FAMILY MEDICINE

## 2022-06-27 PROCEDURE — G8417 CALC BMI ABV UP PARAM F/U: HCPCS | Performed by: FAMILY MEDICINE

## 2022-06-27 PROCEDURE — G8536 NO DOC ELDER MAL SCRN: HCPCS | Performed by: FAMILY MEDICINE

## 2022-06-27 PROCEDURE — 1090F PRES/ABSN URINE INCON ASSESS: CPT | Performed by: FAMILY MEDICINE

## 2022-06-27 PROCEDURE — 83036 HEMOGLOBIN GLYCOSYLATED A1C: CPT | Performed by: FAMILY MEDICINE

## 2022-06-27 RX ORDER — PREDNISONE 10 MG/1
TABLET ORAL
Qty: 21 TABLET | Refills: 0 | Status: SHIPPED | OUTPATIENT
Start: 2022-06-27 | End: 2022-08-09 | Stop reason: ALTCHOICE

## 2022-06-27 RX ORDER — PANTOPRAZOLE SODIUM 40 MG/1
40 TABLET, DELAYED RELEASE ORAL
Qty: 30 TABLET | Refills: 3 | Status: SHIPPED | OUTPATIENT
Start: 2022-06-27 | End: 2022-10-19

## 2022-06-27 NOTE — PROGRESS NOTES
Chief Complaint   Patient presents with    Follow-up   Patient stated that she feels like she is burning up inside. Thinks it fibromyalgia    1. \"Have you been to the ER, urgent care clinic since your last visit? Hospitalized since your last visit? \" No    2. \"Have you seen or consulted any other health care providers outside of the [address] [place] [address] since your last visit? \" No     3. For patients aged 39-70: Has the patient had a colonoscopy / FIT/ Cologuard? No      If the patient is female:    4. For patients aged 41-77: Has the patient had a mammogram within the past 2 years? Yes - no Care Gap present      5. For patients aged 21-65: Has the patient had a pap smear?  NA - based on age or sex    Health Maintenance Due   Topic Date Due    Foot Exam Q1  07/06/2018

## 2022-06-27 NOTE — PROGRESS NOTES
HISTORY OF PRESENT ILLNESS  Silvana Dominguez is a 80 y.o. female. Follow up on chronic medical problems. Cardiovascular Review:  The patient has hypertension, hyperlipidemia and Systolic HF. Diet and Lifestyle: generally follows a low fat low cholesterol diet, generally follows a low sodium diet  Home BP Monitoring: is not measured at home. Pertinent ROS: taking medications as instructed, no medication side effects noted, no TIA's, no chest pain on exertion, no dyspnea on exertion, noting that her ankles swelling has been mild. She has been off of the statin but she did not see any improvemnet with her myalgia being off of the medication so she has started this back. DM type II follow up:  Compliant w/ meds, diabetic diet, and exercise. Obtains home glucose monitoring averaging in the mid 100s. Checks BS daily on most days and prn. Pt does have BS log at visit today. No Rf needed for today. Denies any tingling sensation, polyuria and polydipsia. UTD with her eye exam but want to see another MD.  Doug Ash like she has a flim over the right eye that make her vision blurred. No significant weight changes. Asthma Review:  The patient is being seen for follow up of chronic respiratory failure/COPD and allergies and chronic sinusitits, not currently in exacerbation. Breathing has been good also with taking prednisone. Using nebulizer as needed but has not recently had to use it. Regimen compliance: The patient reports adherence to asthma action plan and regimen. Encounter for pain management. 1./2. Medical history/Past medical History  Chronic Pain:  Osteoarthritis:  Patient has osteoarthritis in multiple joints. Overall has dealing with a lot of pain in the joints and muscles all over. Has been worse over the past several weeks. Has pain and burning in the legs. Primarily in the knees, hips and back are worse. Has seen by rheumatology but she does not want to go back.   Her symptoms have been wax and wane. She is currently on prednisone and we have discussed long term side effects related to chronic steroid use. Also we discussed increasing the steroids with a dose to alleviate increased sx. Pain has been 8/10 when it is severe. 3. Applicable records from prior treatment providers are apart of [facility] under the media tab. 4. Diagnostic, therapeutic and laboratory results are available in the [facility] chart. 5. Consultation notes are available for review in the media tab of the [facility] chart. 6. Treatment goals include pain control so that the pt may be as active and function with her daily activities and improved comfort level. Previously pt has been limited by pain. 7. The risks and benefits of treatment has been discussed at this office visit with the pt. She understands that the medication has addicting potential.  Additionally the pt has been advised that narcotic pain medication may impair mental and/or physical ability required for performance of tasks such as driving or operating any other machinery. 8. Pt has an updated signed pain contract on file and can be found under the FYI section of the [facility] chart. 9. The pain contract is reviewed. Pain medication will be continued at the previous dosage. Urine drug screening will be done today. Diagnostic studies are not indicated at this time. Interventional procedure are not indicated at this time. 10. Medication prescibed is HYDROcodone-acetaminophen (NORCO)  mg tablet. No prescription is needed today. 11. Patient instructions have been reviewed in detail as outlined above and in the pain contract which is updated today. 12. Re-eval is planned for 3 months. Naloxone prescription is not warranted.      Patient Active Problem List   Diagnosis Code    CHF (congestive heart failure) ([facility]) I50.9    S/P TKR (total knee replacement) Z96.659    Anemia D64.9    s/p lumbar laminectomy Z98.890    S/P ASAD (total abdominal hysterectomy) Z90.710    S/P hemorrhoidectomy Z98.890, Z87.19    S/P rotator cuff surgery Z98.890    DM (diabetes mellitus) ([facility]) E11.9    Chronic sinusitis J32.9    S/P sinus surgery Z98.890    Dyslipidemia E78.5    Environmental allergies Z91.09    Obstructive sleep apnea (adult) (pediatric) G47.33    DJD (degenerative joint disease) M19.90    Chronic systolic heart failure ([facility]) I50.22    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 9) with comorbidity ([facility]) E66.01    Polymyalgia rheumatica ([facility]) M35.3    COPD exacerbation ([facility]) J44.1    Ataxia R27.0    General weakness R53.1    Tremors of nervous system R25.1    Fall at home W19. Mal Locket, Y92.009       Current Outpatient Medications   Medication Sig Dispense Refill    rosuvastatin (CRESTOR) 10 mg tablet TAKE 1 TABLET BY MOUTH EVERY NIGHT 90 Tablet 3    ALPRAZolam (XANAX) 0.5 mg tablet TAKE 1/2 TABLET BY MOUTH AT BEDTIME AS NEEDED 30 Tablet 1    primidone (MYSOLINE) 50 mg tablet TAKE 3 TABLETS BY MOUTH TWICE DAILY 540 Tablet 3    carvediloL (COREG) 12.5 mg tablet Take 1 Tablet by mouth two (2) times daily (with meals).  *NEW DOSE 180 Tablet 3    lancets (Accu-Chek Fastclix Lancet Drum) misc USE TO TEST BLOOD SUGAR TWICE DAILY DX E11.9 300 Each 3    glucose blood VI test strips (Accu-Chek Guide test strips) strip USE TO TEST BLOOD SUGAR TWICE DAILY DX E11.9 300 Strip 3    DULoxetine (CYMBALTA) 60 mg capsule TAKE 1 CAPSULE BY MOUTH DAILY 90 Capsule 3    glipiZIDE SR (GLUCOTROL XL) 2.5 mg CR tablet TAKE 1 TABLET BY MOUTH DAILY 90 Tablet 3    predniSONE (DELTASONE) 5 mg tablet TAKE 3 TABLETS BY MOUTH DAILY 90 Tablet 5    diclofenac (VOLTAREN) 1 % gel APPLY EXTERNALLY TO THE AFFECTED AREA FOUR TIMES DAILY 300 g 3    carvediloL (COREG) 25 mg tablet Take 0.5 Tablets by mouth two (2) times a day. (Patient not taking: Reported on 3/14/2022) 30 Tablet 1    furosemide (LASIX) 20 mg tablet TAKE 1 TABLET BY MOUTH EVERY MORNING AND 1 TABLET EVERY AFTERNOON BETWEEN 2 TO 4  Tablet 3    Accu-Chek Guide Me Glucose Mtr misc USE TO CHECK BLOOD SUGAR TWICE DAILY 1 Each 0    BEVESPI AEROSPHERE 9-4.8 mcg HFA inhaler Take 2 puffs by inhalation daily      albuterol (PROVENTIL HFA, VENTOLIN HFA, PROAIR HFA) 90 mcg/actuation inhaler Take 2 Puffs by inhalation every four (4) hours as needed for Wheezing or Shortness of Breath. 1 Inhaler     guaiFENesin (MUCINEX) 1,200 mg Ta12 ER tablet Take 1,200 mg by mouth daily as needed.  MULTIVITAMIN PO Take 1 Tab by mouth daily.  Omeprazole delayed release (PRILOSEC D/R) 20 mg tablet Take 20 mg by mouth daily.  lisinopril (PRINIVIL, ZESTRIL) 2.5 mg tablet Take 2.5 mg by mouth daily.       fluticasone (FLONASE) 50 mcg/actuation nasal spray SHAKE LIQUID AND USE 2 SPRAYS IN EACH NOSTRIL EVERY DAY 1 Bottle 11    promethazine-dextromethorphan (PROMETHAZINE-DM) 6.25-15 mg/5 mL syrup TAKE ONE TEASPOONFUL BY MOUTH EVERY SIX HOURS AS NEEDED FOR COUGH 240 mL 1       Allergies   Allergen Reactions    Gabapentin Other (comments)     Muscles twitching and jerking    Levaquin [Levofloxacin] Swelling    Lyrica [Pregabalin] Other (comments)     Muscle twitching and jerking    Percodan [Oxycodone Hcl-Oxycodone-Asa] Itching         Past Medical History:   Diagnosis Date    Anemia 3/3/2010    Arrhythmia     irregular heartbeat    Arthritis     Asthma     CHF (congestive heart failure) ([facility] [address] 3/3/2010    Chronic kidney disease     Chronic obstructive pulmonary disease (HCC)     Chronic pain     back    Chronic sinusitis 3/3/2010    CPAP (continuous positive airway pressure) dependence     Diverticulosis     DM (diabetes mellitus) ([address] 3/3/2010    Dyslipidemia 3/3/2010    GERD (gastroesophageal reflux disease)     HTN (hypertension) 3/3/2010    Ill-defined condition     being evaluated py pulmonolgist for \"trouble breathing\"    S/P TKR (total knee replacement) 3/3/2010    Unspecified sleep apnea     doesn't wear CPAP since back has been hurting         Past Surgical History:   Procedure Laterality Date    HX APPENDECTOMY      thinks it was taken with hysterectomy    HX GYN      \"tubes opened\"    HX HEENT      sinus surgery    HX HEMORRHOIDECTOMY      HX HYSTERECTOMY      HX KNEE REPLACEMENT      both knees- at same time    HX LUMBAR LAMINECTOMY      HX MOHS PROCEDURES      left shoulder    HX ORTHOPAEDIC      neck fusion    HX ORTHOPAEDIC      right knee replaced for second time    HX ORTHOPAEDIC      lumbar fusion    HX OTHER SURGICAL      CORNELL BIOPSY BREAST STEREOTACTIC Left yrs ago    (-)    OH COLONOSCOPY FLX DX W/COLLJ SPEC WHEN PFRMD  [id number]    dr. Selam Henry (barium enema)         Family History   Problem Relation Age of Onset    Diabetes Mother     Heart Disease Father     Diabetes Brother     Blindness Brother     Diabetes Sister     Heart Disease Sister     Heart Disease Brother     Heart Disease Brother     Asthma Brother     Malignant Hyperthermia Neg Hx     Pseudocholinesterase Deficiency Neg Hx     Delayed Awakening Neg Hx     Post-op Nausea/Vomiting Neg Hx     Emergence Delirium Neg Hx     Post-op Cognitive Dysfunction Neg Hx        Social History     Tobacco Use    Smoking status: Former Smoker     Packs/day: 0.30     Years: 5.00     Pack years: 1.50     Quit date: 1960     Years since quittin.5    Smokeless tobacco: Never Used Substance Use Topics    Alcohol use: No     Alcohol/week: 0.0 standard drinks        Lab Results   Component Value Date/Time    WBC 8.0 02/21/2022 09:34 AM    HGB 11.2 (L) 02/21/2022 09:34 AM    HCT 37.3 02/21/2022 09:34 AM    PLATELET 877 95/70/2846 09:34 AM    .7 (H) 02/21/2022 09:34 AM     Lab Results   Component Value Date/Time    Cholesterol, total 255 (H) 02/21/2022 09:34 AM    HDL Cholesterol 109 02/21/2022 09:34 AM    LDL, calculated 125 (H) 02/21/2022 09:34 AM    Triglyceride 105 02/21/2022 09:34 AM    CHOL/HDL Ratio 2.3 02/21/2022 09:34 AM     Lab Results   Component Value Date/Time    Sodium 139 02/21/2022 09:34 AM    Potassium 4.7 02/21/2022 09:34 AM    Chloride 105 02/21/2022 09:34 AM    CO2 33 (H) 02/21/2022 09:34 AM    Anion gap 1 (L) 02/21/2022 09:34 AM    Glucose 134 (H) 02/21/2022 09:34 AM    BUN 28 (H) 02/21/2022 09:34 AM    Creatinine 1.73 (H) 02/21/2022 09:34 AM    BUN/Creatinine ratio 16 02/21/2022 09:34 AM    GFR est AA 34 (L) 02/21/2022 09:34 AM    GFR est non-AA 28 (L) 02/21/2022 09:34 AM    Calcium 9.3 02/21/2022 09:34 AM    Bilirubin, total 0.3 02/21/2022 09:34 AM    ALT (SGPT) 26 02/21/2022 09:34 AM    Alk. phosphatase 62 02/21/2022 09:34 AM    Protein, total 6.7 02/21/2022 09:34 AM    Albumin 4.0 02/21/2022 09:34 AM    Globulin 2.7 02/21/2022 09:34 AM    A-G Ratio 1.5 02/21/2022 09:34 AM      Lab Results   Component Value Date/Time    Hemoglobin A1c 6.4 (H) 06/19/2021 02:43 AM    Hemoglobin A1c (POC) 6.3 02/21/2022 09:15 AM         Review of Systems   Constitutional: Negative for malaise/fatigue. HENT: Positive for congestion. Had follow up with ENT for her sinus drainage and hoarseness. Still has some hoarseness that comes and goes. Eyes: Negative for blurred vision. Respiratory: Negative for cough and shortness of breath. Cardiovascular: Negative for chest pain, palpitations and leg swelling.    Gastrointestinal: Negative for abdominal pain, constipation and heartburn. Genitourinary: Negative for dysuria, frequency and urgency. Musculoskeletal: Positive for back pain, joint pain and myalgias. Neurological: Negative for dizziness, tingling, sensory change, focal weakness and headaches. Endo/Heme/Allergies: Negative for environmental allergies. Psychiatric/Behavioral: Negative for depression. The patient does not have insomnia. Physical Exam  Vitals and nursing note reviewed. Constitutional:       Appearance: Normal appearance. She is well-developed. Comments: BP (!) 126/53 (BP 1 Location: Left upper arm, BP Patient Position: Sitting, BP Cuff Size: Large adult)   Pulse (!) 55   Temp 97.3 °F (36.3 °C) (Oral)   Resp 17   Ht 5' 4\" (1.626 m)   Wt 202 lb 6.4 oz (91.8 kg)   LMP 03/04/1961 (LMP Unknown)   SpO2 98%   BMI 34.74 kg/m²              HENT:      Right Ear: Tympanic membrane and ear canal normal.      Left Ear: Tympanic membrane and ear canal normal.      Nose: No mucosal edema. Neck:      Thyroid: No thyromegaly. Cardiovascular:      Rate and Rhythm: Normal rate and regular rhythm. Heart sounds: Normal heart sounds. No gallop. Pulmonary:      Effort: Pulmonary effort is normal.      Breath sounds: Normal breath sounds. Abdominal:      General: Bowel sounds are normal.      Palpations: Abdomen is soft. There is no mass. Tenderness: There is no abdominal tenderness. Musculoskeletal:         General: Tenderness (multiple areas of tenderness in the arm and legs.  ) present. Normal range of motion. Cervical back: Neck supple. No rigidity. No spinous process tenderness or muscular tenderness. Right lower leg: No edema. Left lower leg: No edema. Lymphadenopathy:      Cervical: No cervical adenopathy. Skin:     General: Skin is warm and dry. Neurological:      Mental Status: She is alert and oriented to person, place, and time. Cranial Nerves: No cranial nerve deficit.       Coordination: Coordination normal.       ASSESSMENT and PLAN  Diagnoses and all orders for this visit:    1. Essential hypertension, benign  Stable     2. Mixed hyperlipidemia  Continue to monitor. Work on diet and exercise.  -     LIPID PANEL; Future    3. Type 2 diabetes with nephropathy (HCC)  a1c stable at 6.5%. Continue to monitor. Work on diet and exercise. -     AMB POC HEMOGLOBIN A1C  -     AMB POC GLUCOSE, QUANTITATIVE, BLOOD    4. Stage 3b chronic kidney disease ([facility]  Continue to monitor. Work on diet and exercise. 5. Chronic systolic heart failure (HCC)  Stable     6. Chronic obstructive pulmonary disease, unspecified COPD type ([facility]  Stable     7. Chronic anemia  Stable     8. Arthralgia of multiple joints  9. Polymyalgia rheumatica (HCC)  -     predniSONE (STERAPRED DS) 10 mg dose pack; See administration instruction per 10mg dose pack    10. Encounter for chronic pain management  The pt has signed medication agreement. Pain contract is reviewed. Pain medications will be continued at the previous dosage. Urine drug screening will be done today. Diagnostic  studies are not indicated at this time. Interventional procedure are not indicated at this time. Re-eval in 3 months. 11. Chronic hoarseness  12. Reflux laryngitis? ?  -     pantoprazole (PROTONIX) 40 mg tablet; Take 1 Tablet by mouth Daily (before breakfast). 13. Encounter for medication monitoring  -     METABOLIC PANEL, COMPREHENSIVE; Future      Follow-up and Dispositions    · Return in about 6 weeks (around 8/8/2022).        reviewed diet, exercise and weight control  cardiovascular risk and specific lipid/LDL goals reviewed  reviewed medications and side effects in detail  specific diabetic recommendations: low cholesterol diet, weight control and daily exercise discussed and glycohemoglobin and other lab monitoring discussed

## 2022-07-07 DIAGNOSIS — R60.0 BILATERAL EDEMA OF LOWER EXTREMITY: ICD-10-CM

## 2022-07-08 RX ORDER — FUROSEMIDE 20 MG/1
TABLET ORAL
Qty: 180 TABLET | Refills: 3 | Status: SHIPPED | OUTPATIENT
Start: 2022-07-08

## 2022-07-12 DIAGNOSIS — M35.3 POLYMYALGIA RHEUMATICA (HCC): ICD-10-CM

## 2022-07-12 RX ORDER — PREDNISONE 5 MG/1
TABLET ORAL
Qty: 90 TABLET | Refills: 5 | Status: SHIPPED | OUTPATIENT
Start: 2022-07-12 | End: 2022-08-09 | Stop reason: SDUPTHER

## 2022-08-09 ENCOUNTER — OFFICE VISIT (OUTPATIENT)
Dept: FAMILY MEDICINE CLINIC | Age: 87
End: 2022-08-09
Payer: MEDICARE

## 2022-08-09 VITALS
DIASTOLIC BLOOD PRESSURE: 56 MMHG | BODY MASS INDEX: 35 KG/M2 | WEIGHT: 205 LBS | SYSTOLIC BLOOD PRESSURE: 119 MMHG | HEIGHT: 64 IN | RESPIRATION RATE: 16 BRPM | TEMPERATURE: 98.4 F | HEART RATE: 55 BPM | OXYGEN SATURATION: 98 %

## 2022-08-09 DIAGNOSIS — M35.3 POLYMYALGIA RHEUMATICA (HCC): ICD-10-CM

## 2022-08-09 DIAGNOSIS — G89.29 ENCOUNTER FOR CHRONIC PAIN MANAGEMENT: ICD-10-CM

## 2022-08-09 DIAGNOSIS — I50.22 CHRONIC SYSTOLIC HEART FAILURE (HCC): ICD-10-CM

## 2022-08-09 DIAGNOSIS — R60.0 BILATERAL EDEMA OF LOWER EXTREMITY: ICD-10-CM

## 2022-08-09 DIAGNOSIS — E78.2 MIXED HYPERLIPIDEMIA: ICD-10-CM

## 2022-08-09 DIAGNOSIS — R82.90 NONSPECIFIC FINDINGS ON EXAMINATION OF URINE: ICD-10-CM

## 2022-08-09 DIAGNOSIS — D64.9 CHRONIC ANEMIA: ICD-10-CM

## 2022-08-09 DIAGNOSIS — M25.50 ARTHRALGIA OF MULTIPLE JOINTS: ICD-10-CM

## 2022-08-09 DIAGNOSIS — Z23 ENCOUNTER FOR IMMUNIZATION: ICD-10-CM

## 2022-08-09 DIAGNOSIS — R68.89 GENERAL ILL FEELING: ICD-10-CM

## 2022-08-09 DIAGNOSIS — E11.21 TYPE 2 DIABETES WITH NEPHROPATHY (HCC): ICD-10-CM

## 2022-08-09 DIAGNOSIS — Z51.81 ENCOUNTER FOR MEDICATION MONITORING: ICD-10-CM

## 2022-08-09 DIAGNOSIS — N18.32 STAGE 3B CHRONIC KIDNEY DISEASE (HCC): ICD-10-CM

## 2022-08-09 DIAGNOSIS — I10 ESSENTIAL HYPERTENSION, BENIGN: Primary | ICD-10-CM

## 2022-08-09 DIAGNOSIS — R53.1 WEAKNESS GENERALIZED: ICD-10-CM

## 2022-08-09 LAB
ALBUMIN SERPL-MCNC: 3.5 G/DL (ref 3.5–5)
ALBUMIN/GLOB SERPL: 1.2 {RATIO} (ref 1.1–2.2)
ALP SERPL-CCNC: 73 U/L (ref 45–117)
ALT SERPL-CCNC: 31 U/L (ref 12–78)
ANION GAP SERPL CALC-SCNC: 3 MMOL/L (ref 5–15)
APPEARANCE UR: CLEAR
AST SERPL-CCNC: 20 U/L (ref 15–37)
BACTERIA URNS QL MICRO: NEGATIVE /HPF
BILIRUB SERPL-MCNC: 0.4 MG/DL (ref 0.2–1)
BILIRUB UR QL: NEGATIVE
BUN SERPL-MCNC: 33 MG/DL (ref 6–20)
BUN/CREAT SERPL: 16 (ref 12–20)
CALCIUM SERPL-MCNC: 8.9 MG/DL (ref 8.5–10.1)
CHLORIDE SERPL-SCNC: 107 MMOL/L (ref 97–108)
CK SERPL-CCNC: 46 U/L (ref 26–192)
CO2 SERPL-SCNC: 31 MMOL/L (ref 21–32)
COLOR UR: ABNORMAL
CREAT SERPL-MCNC: 2.02 MG/DL (ref 0.55–1.02)
EPITH CASTS URNS QL MICRO: ABNORMAL /LPF
ERYTHROCYTE [DISTWIDTH] IN BLOOD BY AUTOMATED COUNT: 15 % (ref 11.5–14.5)
ERYTHROCYTE [SEDIMENTATION RATE] IN BLOOD: 52 MM/HR (ref 0–30)
GLOBULIN SER CALC-MCNC: 3 G/DL (ref 2–4)
GLUCOSE SERPL-MCNC: 207 MG/DL (ref 65–100)
GLUCOSE UR STRIP.AUTO-MCNC: NEGATIVE MG/DL
HCT VFR BLD AUTO: 36 % (ref 35–47)
HGB BLD-MCNC: 11.1 G/DL (ref 11.5–16)
HGB UR QL STRIP: NEGATIVE
HYALINE CASTS URNS QL MICRO: ABNORMAL /LPF (ref 0–5)
KETONES UR QL STRIP.AUTO: ABNORMAL MG/DL
LEUKOCYTE ESTERASE UR QL STRIP.AUTO: ABNORMAL
MCH RBC QN AUTO: 33.5 PG (ref 26–34)
MCHC RBC AUTO-ENTMCNC: 30.8 G/DL (ref 30–36.5)
MCV RBC AUTO: 108.8 FL (ref 80–99)
MUCOUS THREADS URNS QL MICRO: ABNORMAL /LPF
NITRITE UR QL STRIP.AUTO: NEGATIVE
NRBC # BLD: 0 K/UL (ref 0–0.01)
NRBC BLD-RTO: 0 PER 100 WBC
PH UR STRIP: 5 [PH] (ref 5–8)
PLATELET # BLD AUTO: 164 K/UL (ref 150–400)
PMV BLD AUTO: 10.9 FL (ref 8.9–12.9)
POTASSIUM SERPL-SCNC: 5.4 MMOL/L (ref 3.5–5.1)
PROT SERPL-MCNC: 6.5 G/DL (ref 6.4–8.2)
PROT UR STRIP-MCNC: NEGATIVE MG/DL
RBC # BLD AUTO: 3.31 M/UL (ref 3.8–5.2)
RBC #/AREA URNS HPF: ABNORMAL /HPF (ref 0–5)
SODIUM SERPL-SCNC: 141 MMOL/L (ref 136–145)
SP GR UR REFRACTOMETRY: 1.02 (ref 1–1.03)
UROBILINOGEN UR QL STRIP.AUTO: 0.2 EU/DL (ref 0.2–1)
WBC # BLD AUTO: 7.4 K/UL (ref 3.6–11)
WBC URNS QL MICRO: ABNORMAL /HPF (ref 0–4)

## 2022-08-09 PROCEDURE — 1123F ACP DISCUSS/DSCN MKR DOCD: CPT | Performed by: FAMILY MEDICINE

## 2022-08-09 PROCEDURE — G8427 DOCREV CUR MEDS BY ELIG CLIN: HCPCS | Performed by: FAMILY MEDICINE

## 2022-08-09 PROCEDURE — G8536 NO DOC ELDER MAL SCRN: HCPCS | Performed by: FAMILY MEDICINE

## 2022-08-09 PROCEDURE — G8417 CALC BMI ABV UP PARAM F/U: HCPCS | Performed by: FAMILY MEDICINE

## 2022-08-09 PROCEDURE — 1090F PRES/ABSN URINE INCON ASSESS: CPT | Performed by: FAMILY MEDICINE

## 2022-08-09 PROCEDURE — G0463 HOSPITAL OUTPT CLINIC VISIT: HCPCS | Performed by: FAMILY MEDICINE

## 2022-08-09 PROCEDURE — 99214 OFFICE O/P EST MOD 30 MIN: CPT | Performed by: FAMILY MEDICINE

## 2022-08-09 PROCEDURE — G8510 SCR DEP NEG, NO PLAN REQD: HCPCS | Performed by: FAMILY MEDICINE

## 2022-08-09 PROCEDURE — 1101F PT FALLS ASSESS-DOCD LE1/YR: CPT | Performed by: FAMILY MEDICINE

## 2022-08-09 PROCEDURE — 3044F HG A1C LEVEL LT 7.0%: CPT | Performed by: FAMILY MEDICINE

## 2022-08-09 RX ORDER — PREDNISONE 5 MG/1
5 TABLET ORAL 2 TIMES DAILY
Qty: 120 TABLET | Refills: 5 | Status: SHIPPED | OUTPATIENT
Start: 2022-08-09 | End: 2022-08-09 | Stop reason: SDUPTHER

## 2022-08-09 RX ORDER — PREDNISONE 5 MG/1
10 TABLET ORAL 2 TIMES DAILY
Qty: 120 TABLET | Refills: 5 | Status: SHIPPED | OUTPATIENT
Start: 2022-08-09 | End: 2022-09-09 | Stop reason: SDUPTHER

## 2022-08-09 RX ORDER — ACETAMINOPHEN 500 MG
TABLET ORAL
COMMUNITY

## 2022-08-09 RX ORDER — DICLOFENAC SODIUM 10 MG/G
GEL TOPICAL
Qty: 300 G | Refills: 3 | Status: SHIPPED | OUTPATIENT
Start: 2022-08-09

## 2022-08-09 NOTE — PROGRESS NOTES
HISTORY OF PRESENT ILLNESS  Fermín Austin is a 80 y.o. female. Follow up on chronic medical problems. Feels sick from hurting all over. Just wants to lay down all the time. No chest pain, fever or chills. Has more SOB. Was seen by cardiology (Dr. Pedro Ballard) on last month and has echo on this Thursday. Cardiovascular Review:  The patient has hypertension, hyperlipidemia and Systolic HF. Diet and Lifestyle: generally follows a low fat low cholesterol diet, generally follows a low sodium diet  Home BP Monitoring: is not measured at home. Pertinent ROS: taking medications as instructed, no medication side effects noted, no TIA's, no chest pain on exertion, no dyspnea on exertion, noting that her ankles swelling has been mild. She has been off of the statin but she did not see any improvemnet with her myalgia being off of the medication so she has started this back. DM type II follow up:  Compliant w/ meds, diabetic diet, and exercise. Obtains home glucose monitoring averaging in the mid 100s. Checks BS daily on most days and prn. Pt does have BS log at visit today. No Rf needed for today. Denies any tingling sensation, polyuria and polydipsia. UTD with her eye exam but want to see another MD.  RAMY like she has a flim over the right eye that make her vision blurred. No significant weight changes. Asthma Review:  The patient is being seen for follow up of chronic respiratory failure/COPD and allergies and chronic sinusitits, not currently in exacerbation. Breathing has been good also with taking prednisone. Using nebulizer as needed but has not recently had to use it. Regimen compliance: The patient reports adherence to asthma action plan and regimen. Encounter for pain management. 1./2. Medical history/Past medical History  Chronic Pain:  Osteoarthritis:  Patient has osteoarthritis in multiple joints. Overall has dealing with a lot of pain in the joints and muscles all over.   Has been worse over the past several weeks. Has pain and burning in the legs. Primarily in the knees, hips and back are worse. Has seen by rheumatology but she does not want to go back. Her symptoms have been wax and wane. She is currently on prednisone and we have discussed long term side effects related to chronic steroid use. Also we discussed increasing the steroids with a dose to alleviate increased sx. Pain has been 8/10 when it is severe. 3. Applicable records from prior treatment providers are apart of [facility] under the media tab. 4. Diagnostic, therapeutic and laboratory results are available in the [facility] chart. 5. Consultation notes are available for review in the media tab of the [facility] chart. 6. Treatment goals include pain control so that the pt may be as active and function with her daily activities and improved comfort level. Previously pt has been limited by pain. 7. The risks and benefits of treatment has been discussed at this office visit with the pt. She understands that the medication has addicting potential.  Additionally the pt has been advised that narcotic pain medication may impair mental and/or physical ability required for performance of tasks such as driving or operating any other machinery. 8. Pt has an updated signed pain contract on file and can be found under the FYI section of the [facility] chart. 9. The pain contract is reviewed. Pain medication will be continued at the previous dosage. Urine drug screening will not be done today. Diagnostic studies are not indicated at this time. Interventional procedure are not indicated at this time. 10. Medication prescibed is HYDROcodone-acetaminophen (NORCO)  mg tablet. No prescription is needed today. 11. Patient instructions have been reviewed in detail as outlined above and in the pain contract which is updated today. 12. Re-eval is planned for 3 months.    Naloxone prescription is not warranted. Patient Active Problem List   Diagnosis Code    CHF (congestive heart failure) ([facility]) I50.9    S/P TKR (total knee replacement) Z96.659    Anemia D64.9    s/p lumbar laminectomy Z98.890    S/P ASDA (total abdominal hysterectomy) Z90.710    S/P hemorrhoidectomy Z98.890, Z87.19    S/P rotator cuff surgery Z98.890    DM (diabetes mellitus) ([facility] E11.9    Chronic sinusitis J32.9    S/P sinus surgery Z98.890    Dyslipidemia E78.5    Environmental allergies Z91.09    Obstructive sleep apnea (adult) (pediatric) G47.33    DJD (degenerative joint disease) M19.90    Chronic systolic heart failure ([facility]) I50.22    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 9) with comorbidity ([facility]) E66.01    Polymyalgia rheumatica ([facility]) M35.3    COPD exacerbation ([facility] J44.1    Ataxia R27.0    General weakness R53.1    Tremors of nervous system R25.1    Fall at home W19. XXXA, Y92.009    CKD (chronic kidney disease) stage 4, GFR 15-29 ml/min ([facility]) N18.4    Chronic renal disease, stage III N18.30       Current Outpatient Medications   Medication Sig Dispense Refill    predniSONE (DELTASONE) 5 mg tablet TAKE 3 TABLETS BY MOUTH DAILY 90 Tablet 5    furosemide (LASIX) 20 mg tablet TAKE 1 TABLET BY MOUTH EVERY MORNING AND 1 TABLET EVERY AFTERNOON BETWEEN 2 TO 4  Tablet 3    pantoprazole (PROTONIX) 40 mg tablet Take 1 Tablet by mouth Daily (before breakfast).  30 Tablet 3    predniSONE (STERAPRED DS) 10 mg dose pack See administration instruction per 10mg dose pack 21 Tablet 0    rosuvastatin (CRESTOR) 10 mg tablet TAKE 1 TABLET BY MOUTH EVERY NIGHT 90 Tablet 3    ALPRAZolam (XANAX) 0.5 mg tablet TAKE 1/2 TABLET BY MOUTH AT BEDTIME AS NEEDED 30 Tablet 1    primidone (MYSOLINE) 50 mg tablet TAKE 3 TABLETS BY MOUTH TWICE DAILY (Patient not taking: Reported on 6/27/2022) 540 Tablet 3    carvediloL (COREG) 12.5 mg tablet Take 1 Tablet by mouth two (2) times daily (with meals). *NEW DOSE 180 Tablet 3    lancets (Accu-Chek Fastclix Lancet Drum) misc USE TO TEST BLOOD SUGAR TWICE DAILY DX E11.9 300 Each 3    glucose blood VI test strips (Accu-Chek Guide test strips) strip USE TO TEST BLOOD SUGAR TWICE DAILY DX E11.9 300 Strip 3    DULoxetine (CYMBALTA) 60 mg capsule TAKE 1 CAPSULE BY MOUTH DAILY 90 Capsule 3    glipiZIDE SR (GLUCOTROL XL) 2.5 mg CR tablet TAKE 1 TABLET BY MOUTH DAILY 90 Tablet 3    diclofenac (VOLTAREN) 1 % gel APPLY EXTERNALLY TO THE AFFECTED AREA FOUR TIMES DAILY 300 g 3    Accu-Chek Guide Me Glucose Mtr misc USE TO CHECK BLOOD SUGAR TWICE DAILY 1 Each 0    BEVESPI AEROSPHERE 9-4.8 mcg HFA inhaler Take 2 puffs by inhalation daily      albuterol (PROVENTIL HFA, VENTOLIN HFA, PROAIR HFA) 90 mcg/actuation inhaler Take 2 Puffs by inhalation every four (4) hours as needed for Wheezing or Shortness of Breath. 1 Inhaler     guaiFENesin (MUCINEX) 1,200 mg Ta12 ER tablet Take 1,200 mg by mouth daily as needed. MULTIVITAMIN PO Take 1 Tab by mouth daily. lisinopril (PRINIVIL, ZESTRIL) 2.5 mg tablet Take 2.5 mg by mouth daily.       fluticasone (FLONASE) 50 mcg/actuation nasal spray SHAKE LIQUID AND USE 2 SPRAYS IN EACH NOSTRIL EVERY DAY 1 Bottle 11       Allergies   Allergen Reactions    Gabapentin Other (comments)     Muscles twitching and jerking    Levaquin [Levofloxacin] Swelling    Lyrica [Pregabalin] Other (comments)     Muscle twitching and jerking    Percodan [Oxycodone Hcl-Oxycodone-Asa] Itching         Past Medical History:   Diagnosis Date    Anemia 3/3/2010    Arrhythmia     irregular heartbeat    Arthritis     Asthma     CHF (congestive heart failure) ([facility] 3/3/2010    Chronic kidney disease     Chronic obstructive pulmonary disease (HCC)     Chronic pain     back    Chronic sinusitis 3/3/2010    CPAP (continuous positive airway pressure) dependence     Diverticulosis     DM (diabetes mellitus) ([facility] 3/3/2010    Dyslipidemia 3/3/2010    GERD (gastroesophageal reflux disease)     HTN (hypertension) 3/3/2010    Ill-defined condition     being evaluated py pulmonolgist for \"trouble breathing\"    S/P TKR (total knee replacement) 3/3/2010    Unspecified sleep apnea     doesn't wear CPAP since back has been hurting         Past Surgical History:   Procedure Laterality Date    HX APPENDECTOMY      thinks it was taken with hysterectomy    HX GYN      \"tubes opened\"    HX HEENT      sinus surgery    HX HEMORRHOIDECTOMY      HX HYSTERECTOMY      HX KNEE REPLACEMENT  1996    both knees- at same time    HX LUMBAR LAMINECTOMY  1980s    HX MOHS PROCEDURES      left shoulder    HX ORTHOPAEDIC  1980    neck fusion    HX ORTHOPAEDIC  1999    right knee replaced for second time    HX ORTHOPAEDIC      lumbar fusion    HX OTHER SURGICAL      CORNELL BIOPSY BREAST STEREOTACTIC Left yrs ago    (-)    OK COLONOSCOPY FLX DX W/COLLJ SPEC WHEN PFRMD  [id number]    dr. [place] (barium enema)         Family History   Problem Relation Age of Onset    Diabetes Mother     Heart Disease Father     Diabetes Brother     Blindness Brother     Diabetes Sister     Heart Disease Sister     Heart Disease Brother     Heart Disease Brother     Asthma Brother     Malignant Hyperthermia Neg Hx     Pseudocholinesterase Deficiency Neg Hx     Delayed Awakening Neg Hx     Post-op Nausea/Vomiting Neg Hx     Emergence Delirium Neg Hx     Post-op Cognitive Dysfunction Neg Hx        Social History     Tobacco Use    Smoking status: Former     Packs/day: 0.30     Years: 5.00     Pack years: 1.50     Types: Cigarettes     Quit date: 1960     Years since quittin.6    Smokeless tobacco: Never   Substance Use Topics    Alcohol use: No     Alcohol/week: 0.0 standard drinks        Lab Results   Component Value Date/Time    WBC 8.0 2022 09:34 AM    HGB 11.2 (L) 2022 09:34 AM    HCT 37.3 2022 09:34 AM    PLATELET 588  09:34 AM    .7 (H) 2022 09:34 AM     Lab Results   Component Value Date/Time    Cholesterol, total 218 (H) 2022 10:37 AM    HDL Cholesterol 98 2022 10:37 AM    LDL, calculated 101.2 (H) 2022 10:37 AM    Triglyceride 94 2022 10:37 AM    CHOL/HDL Ratio 2.2 2022 10:37 AM     Lab Results   Component Value Date/Time    Sodium 140 2022 10:37 AM    Potassium 5.0 2022 10:37 AM    Chloride 104 2022 10:37 AM    CO2 29 2022 10:37 AM    Anion gap 7 2022 10:37 AM    Glucose 142 (H) 2022 10:37 AM    BUN 34 (H) 2022 10:37 AM    Creatinine 1.89 (H) 2022 10:37 AM    BUN/Creatinine ratio 18 2022 10:37 AM    GFR est AA 30 (L) 2022 10:37 AM    GFR est non-AA 25 (L) 2022 10:37 AM    Calcium 9.2 2022 10:37 AM    Bilirubin, total 0.4 2022 10:37 AM    ALT (SGPT) 21 2022 10:37 AM    Alk. phosphatase 69 2022 10:37 AM    Protein, total 6.4 2022 10:37 AM    Albumin 3.5 2022 10:37 AM    Globulin 2.9 2022 10:37 AM    A-G Ratio 1.2 2022 10:37 AM      Lab Results   Component Value Date/Time    Hemoglobin A1c 6.4 (H) 2021 02:43 AM    Hemoglobin A1c (POC) 6.5 2022 09:50 AM         Review of Systems   Constitutional:  Negative for malaise/fatigue. HENT:  Positive for congestion. Had follow up with ENT for her sinus drainage and hoarseness. Still has some hoarseness that comes and goes. Eyes:  Negative for blurred vision. Respiratory:  Negative for cough and shortness of breath.     Cardiovascular:  Negative for chest pain, palpitations and leg swelling. Gastrointestinal:  Negative for abdominal pain, constipation and heartburn. Genitourinary:  Negative for dysuria, frequency and urgency. Musculoskeletal:  Positive for back pain, joint pain and myalgias. Neurological:  Negative for dizziness, tingling, sensory change, focal weakness and headaches. Endo/Heme/Allergies:  Negative for environmental allergies. Psychiatric/Behavioral:  Negative for depression. The patient does not have insomnia. Physical Exam  Vitals and nursing note reviewed. Constitutional:       Appearance: Normal appearance. She is well-developed. Comments: BP (!) 126/53 (BP 1 Location: Left upper arm, BP Patient Position: Sitting, BP Cuff Size: Large adult)   Pulse (!) 55   Temp 97.3 °F (36.3 °C) (Oral)   Resp 17   Ht 5' 4\" (1.626 m)   Wt 202 lb 6.4 oz (91.8 kg)   LMP 03/04/1961 (LMP Unknown)   SpO2 98%   BMI 34.74 kg/m²              HENT:      Right Ear: Tympanic membrane and ear canal normal.      Left Ear: Tympanic membrane and ear canal normal.      Nose: No mucosal edema. Neck:      Thyroid: No thyromegaly. Cardiovascular:      Rate and Rhythm: Normal rate and regular rhythm. Heart sounds: Normal heart sounds. No gallop. Pulmonary:      Effort: Pulmonary effort is normal.      Breath sounds: Normal breath sounds. Abdominal:      General: Bowel sounds are normal.      Palpations: Abdomen is soft. There is no mass. Tenderness: There is no abdominal tenderness. Musculoskeletal:         General: Tenderness (multiple areas of tenderness in the arm and legs.  ) present. Normal range of motion. Cervical back: Neck supple. No rigidity. No spinous process tenderness or muscular tenderness. Right lower leg: No edema. Left lower leg: No edema. Lymphadenopathy:      Cervical: No cervical adenopathy. Skin:     General: Skin is warm and dry.    Neurological:      Mental Status: She is alert and oriented to person, place, and time. Cranial Nerves: No cranial nerve deficit. Coordination: Coordination normal.       ASSESSMENT and PLAN  Diagnoses and all orders for this visit:    1. Essential hypertension, benign  Discussed sodium restriction, high k rich diet, maintaining ideal body weight and regular exercise program such as daily walking 30 min perday 4-5 times per week, as physiologic means to achieve blood pressure control. Medication compliance advised. 2. Type 2 diabetes with nephropathy (HCC)  Stable     3. Mixed hyperlipidemia  Continue to monitor. Work on diet and exercise. 4. Stage 3b chronic kidney disease (HCC)  Stable     5. Weakness generalized//  6. General ill feeling  -     URINALYSIS W/MICROSCOPIC; Future    7. Polymyalgia rheumatica (HCC)//  8. Arthralgia of multiple joints  -     CK; Future  -     SED RATE (ESR); Future  -     increase predniSONE (DELTASONE) 5 mg tablet; Take 2 Tablets by mouth two (2) times a day. 9. Bilateral edema of lower extremity  Stable     10. Chronic systolic heart failure ([address]  Follow up as per cardiology    11. Chronic anemia  -     CBC W/O DIFF; Future    12. Encounter for chronic pain management  The pt has signed medication agreement. Pain contract is reviewed. Pain medications will be continued at the previous dosage. Urine drug screening will be done today. Diagnostic  studies are not indicated at this time. Interventional procedure are not indicated at this time. Re-eval in 3 months. 13. Encounter for medication monitoring  -     METABOLIC PANEL, COMPREHENSIVE; Future      14. Nonspecific findings on examination of urine  -     CULTURE, URINE; Future      Follow-up and Dispositions    Return in about 1 month (around 9/9/2022).        reviewed diet, exercise and weight control  cardiovascular risk and specific lipid/LDL goals reviewed  reviewed medications and side effects in detail  specific diabetic recommendations: low cholesterol diet, weight control and daily exercise discussed and glycohemoglobin and other lab monitoring discussed      I have discussed diagnosis listed in this note with pt and/or family. I have discussed treatment plans and options and the risk/benefit analysis of those options, including safe use of medications and possible medication side effects. Through the use of shared decision making we have agreed to the above plan. The patient has received an after-visit summary and questions were answered concerning future plans and follow up. Advise pt of any urgent changes then to proceed to the ER.

## 2022-08-09 NOTE — PROGRESS NOTES
Chief Complaint   Patient presents with    Follow-up     Medication/fibromyalgia           Vitals:    08/09/22 1118   BP: (!) 119/56   Pulse: (!) 55   Temp: 98.4 °F (36.9 °C)   TempSrc: Oral   SpO2: 98%   Weight: 205 lb (93 kg)   Height: 5' 4\" (1.626 m)   PainSc:   0 - No pain   LMP: 03/04/1961       Current Outpatient Medications on File Prior to Visit   Medication Sig Dispense Refill    cholecalciferol (VITAMIN D3) (2,000 UNITS /50 MCG) cap capsule Vitamin D3 50 mcg (2,000 unit) capsule   Take 1 capsule every day by oral route. predniSONE (DELTASONE) 5 mg tablet TAKE 3 TABLETS BY MOUTH DAILY 90 Tablet 5    furosemide (LASIX) 20 mg tablet TAKE 1 TABLET BY MOUTH EVERY MORNING AND 1 TABLET EVERY AFTERNOON BETWEEN 2 TO 4  Tablet 3    pantoprazole (PROTONIX) 40 mg tablet Take 1 Tablet by mouth Daily (before breakfast). 30 Tablet 3    predniSONE (STERAPRED DS) 10 mg dose pack See administration instruction per 10mg dose pack 21 Tablet 0    rosuvastatin (CRESTOR) 10 mg tablet TAKE 1 TABLET BY MOUTH EVERY NIGHT 90 Tablet 3    ALPRAZolam (XANAX) 0.5 mg tablet TAKE 1/2 TABLET BY MOUTH AT BEDTIME AS NEEDED 30 Tablet 1    primidone (MYSOLINE) 50 mg tablet TAKE 3 TABLETS BY MOUTH TWICE DAILY 540 Tablet 3    carvediloL (COREG) 12.5 mg tablet Take 1 Tablet by mouth two (2) times daily (with meals).  *NEW DOSE 180 Tablet 3    lancets (Accu-Chek Fastclix Lancet Drum) misc USE TO TEST BLOOD SUGAR TWICE DAILY DX E11.9 300 Each 3    glucose blood VI test strips (Accu-Chek Guide test strips) strip USE TO TEST BLOOD SUGAR TWICE DAILY DX E11.9 300 Strip 3    DULoxetine (CYMBALTA) 60 mg capsule TAKE 1 CAPSULE BY MOUTH DAILY 90 Capsule 3    glipiZIDE SR (GLUCOTROL XL) 2.5 mg CR tablet TAKE 1 TABLET BY MOUTH DAILY 90 Tablet 3    diclofenac (VOLTAREN) 1 % gel APPLY EXTERNALLY TO THE AFFECTED AREA FOUR TIMES DAILY 300 g 3    Accu-Chek Guide Me Glucose Mtr misc USE TO CHECK BLOOD SUGAR TWICE DAILY 1 Each 0    BEVESPI AEROSPHERE 9-4.8 mcg HFA inhaler Take 2 puffs by inhalation daily      albuterol (PROVENTIL HFA, VENTOLIN HFA, PROAIR HFA) 90 mcg/actuation inhaler Take 2 Puffs by inhalation every four (4) hours as needed for Wheezing or Shortness of Breath. 1 Inhaler     guaiFENesin (MUCINEX) 1,200 mg Ta12 ER tablet Take 1,200 mg by mouth daily as needed. MULTIVITAMIN PO Take 1 Tab by mouth daily. lisinopril (PRINIVIL, ZESTRIL) 2.5 mg tablet Take 2.5 mg by mouth daily. fluticasone (FLONASE) 50 mcg/actuation nasal spray SHAKE LIQUID AND USE 2 SPRAYS IN EACH NOSTRIL EVERY DAY 1 Bottle 11     No current facility-administered medications on file prior to visit. Health Maintenance Due   Topic    Foot Exam Q1     COVID-19 Vaccine (4 - Booster for Moderna series)       1. \"Have you been to the ER, urgent care clinic since your last visit? Hospitalized since your last visit? \" No    2. \"Have you seen or consulted any other health care providers outside of the [address] [place] [address] since your last visit? \" No     3. For patients aged 39-70: Has the patient had a colonoscopy / FIT/ Cologuard? NA - based on age      If the patient is female:    4. For patients aged 41-77: Has the patient had a mammogram within the past 2 years? NA - based on age or sex      11. For patients aged 21-65: Has the patient had a pap smear?  NA - based on age or sex

## 2022-08-11 LAB
BACTERIA SPEC CULT: NORMAL
SERVICE CMNT-IMP: NORMAL

## 2022-09-09 ENCOUNTER — OFFICE VISIT (OUTPATIENT)
Dept: FAMILY MEDICINE CLINIC | Age: 87
End: 2022-09-09
Payer: MEDICARE

## 2022-09-09 VITALS
OXYGEN SATURATION: 100 % | DIASTOLIC BLOOD PRESSURE: 54 MMHG | HEIGHT: 64 IN | TEMPERATURE: 97.1 F | RESPIRATION RATE: 20 BRPM | WEIGHT: 202 LBS | SYSTOLIC BLOOD PRESSURE: 122 MMHG | BODY MASS INDEX: 34.49 KG/M2 | HEART RATE: 52 BPM

## 2022-09-09 DIAGNOSIS — I50.22 CHRONIC SYSTOLIC HEART FAILURE (HCC): ICD-10-CM

## 2022-09-09 DIAGNOSIS — G89.29 ENCOUNTER FOR CHRONIC PAIN MANAGEMENT: ICD-10-CM

## 2022-09-09 DIAGNOSIS — Z51.81 ENCOUNTER FOR MEDICATION MONITORING: ICD-10-CM

## 2022-09-09 DIAGNOSIS — I10 ESSENTIAL HYPERTENSION, BENIGN: ICD-10-CM

## 2022-09-09 DIAGNOSIS — E78.2 MIXED HYPERLIPIDEMIA: ICD-10-CM

## 2022-09-09 DIAGNOSIS — E11.21 TYPE 2 DIABETES WITH NEPHROPATHY (HCC): ICD-10-CM

## 2022-09-09 DIAGNOSIS — N18.32 STAGE 3B CHRONIC KIDNEY DISEASE (HCC): ICD-10-CM

## 2022-09-09 DIAGNOSIS — M25.50 ARTHRALGIA OF MULTIPLE JOINTS: ICD-10-CM

## 2022-09-09 DIAGNOSIS — Z23 ENCOUNTER FOR IMMUNIZATION: ICD-10-CM

## 2022-09-09 DIAGNOSIS — R60.0 BILATERAL EDEMA OF LOWER EXTREMITY: ICD-10-CM

## 2022-09-09 DIAGNOSIS — M35.3 POLYMYALGIA RHEUMATICA (HCC): Primary | ICD-10-CM

## 2022-09-09 PROCEDURE — 3044F HG A1C LEVEL LT 7.0%: CPT | Performed by: FAMILY MEDICINE

## 2022-09-09 PROCEDURE — G8417 CALC BMI ABV UP PARAM F/U: HCPCS | Performed by: FAMILY MEDICINE

## 2022-09-09 PROCEDURE — G0463 HOSPITAL OUTPT CLINIC VISIT: HCPCS | Performed by: FAMILY MEDICINE

## 2022-09-09 PROCEDURE — G8510 SCR DEP NEG, NO PLAN REQD: HCPCS | Performed by: FAMILY MEDICINE

## 2022-09-09 PROCEDURE — G8536 NO DOC ELDER MAL SCRN: HCPCS | Performed by: FAMILY MEDICINE

## 2022-09-09 PROCEDURE — 1101F PT FALLS ASSESS-DOCD LE1/YR: CPT | Performed by: FAMILY MEDICINE

## 2022-09-09 PROCEDURE — 90694 VACC AIIV4 NO PRSRV 0.5ML IM: CPT | Performed by: FAMILY MEDICINE

## 2022-09-09 PROCEDURE — 1123F ACP DISCUSS/DSCN MKR DOCD: CPT | Performed by: FAMILY MEDICINE

## 2022-09-09 PROCEDURE — 99214 OFFICE O/P EST MOD 30 MIN: CPT | Performed by: FAMILY MEDICINE

## 2022-09-09 PROCEDURE — G8427 DOCREV CUR MEDS BY ELIG CLIN: HCPCS | Performed by: FAMILY MEDICINE

## 2022-09-09 PROCEDURE — 1090F PRES/ABSN URINE INCON ASSESS: CPT | Performed by: FAMILY MEDICINE

## 2022-09-09 RX ORDER — PREDNISONE 5 MG/1
10 TABLET ORAL 2 TIMES DAILY
Qty: 360 TABLET | Refills: 3 | Status: SHIPPED | OUTPATIENT
Start: 2022-09-09

## 2022-09-09 RX ORDER — PREDNISONE 5 MG/1
10 TABLET ORAL 2 TIMES DAILY
Qty: 360 TABLET | Refills: 3 | Status: SHIPPED | OUTPATIENT
Start: 2022-09-09 | End: 2022-09-09 | Stop reason: SDUPTHER

## 2022-09-09 RX ORDER — PREDNISONE 5 MG/1
10 TABLET ORAL 2 TIMES DAILY
Qty: 360 TABLET | Refills: 3 | Status: SHIPPED | OUTPATIENT
Start: 2022-09-09 | End: 2022-09-09 | Stop reason: ALTCHOICE

## 2022-09-09 NOTE — PROGRESS NOTES
Virgene Sacks is a 80 y.o. female    Chief Complaint   Patient presents with    Follow-up     1 month follow up for medication       Visit Vitals  BP (!) 122/54   Pulse (!) 52   Temp 97.1 °F (36.2 °C)   Resp 20   Ht 5' 4\" (1.626 m)   Wt 202 lb (91.6 kg)   LMP 03/04/1961 (LMP Unknown)   SpO2 100%   BMI 34.67 kg/m²       Wt Readings from Last 3 Encounters:   09/09/22 202 lb (91.6 kg)   08/09/22 205 lb (93 kg)   06/27/22 202 lb (91.6 kg)     Temp Readings from Last 3 Encounters:   09/09/22 97.1 °F (36.2 °C)   08/09/22 98.4 °F (36.9 °C) (Oral)   06/27/22 98.7 °F (37.1 °C) (Oral)     BP Readings from Last 3 Encounters:   09/09/22 (!) 122/54   08/09/22 (!) 119/56   06/27/22 (!) 111/40     Pulse Readings from Last 3 Encounters:   09/09/22 (!) 52   08/09/22 (!) 55   06/27/22 (!) 53

## 2022-09-09 NOTE — PROGRESS NOTES
HISTORY OF PRESENT ILLNESS  Rosalinda Obando is a 80 y.o. female. Follow up on chronic medical problems. Mary Rosepine much better on the higher dose of the prednisone. She is able to move around better. He is able to get up from being in the bed all days as she was doing prior to the increased dose. Cardiovascular Review:  The patient has hypertension, hyperlipidemia and Systolic HF. Diet and Lifestyle: generally follows a low fat low cholesterol diet, generally follows a low sodium diet  Home BP Monitoring: is not measured at home. Pertinent ROS: taking medications as instructed, no medication side effects noted, no TIA's, no chest pain on exertion, no dyspnea on exertion, noting that her ankles swelling has been mild. She has been off of the statin but she did not see any improvemnet with her myalgia being off of the medication so she has started this back. DM type II follow up:  Compliant w/ meds, diabetic diet, and exercise. Obtains home glucose monitoring averaging in the mid 100s. Checks BS daily on most days and prn. Pt does have BS log at visit today. No Rf needed for today. Denies any tingling sensation, polyuria and polydipsia. UTD with her eye exam but want to see another MD.  Denver Patience like she has a flim over the right eye that make her vision blurred. No significant weight changes. Asthma Review:  The patient is being seen for follow up of chronic respiratory failure/COPD and allergies and chronic sinusitits, not currently in exacerbation. Breathing has been good also with taking prednisone. Using nebulizer as needed but has not recently had to use it. Had follow up with pulmonary on last week. Regimen compliance: The patient reports adherence to asthma action plan and regimen. Encounter for pain management. 1./2. Medical history/Past medical History  Chronic Pain:  Osteoarthritis:  Patient has osteoarthritis in multiple joints.   Overall has dealing with a lot of pain in the joints and muscles all over. Has been worse over the past several weeks. Has pain and burning in the legs. Primarily in the knees, hips and back are worse. Has seen by rheumatology but she does not want to go back. Her symptoms have been wax and wane. She is currently on prednisone and we have discussed long term side effects related to chronic steroid use. Also we discussed increasing the steroids with a dose to alleviate increased sx. Pain has been 8/10 when it is severe. 3. Applicable records from prior treatment providers are apart of [facility] under the media tab. 4. Diagnostic, therapeutic and laboratory results are available in the [address] [place] [address] chart. 5. Consultation notes are available for review in the media tab of the [address] [place] [address] chart. 6. Treatment goals include pain control so that the pt may be as active and function with her daily activities and improved comfort level. Previously pt has been limited by pain. 7. The risks and benefits of treatment has been discussed at this office visit with the pt. She understands that the medication has addicting potential.  Additionally the pt has been advised that narcotic pain medication may impair mental and/or physical ability required for performance of tasks such as driving or operating any other machinery. 8. Pt has an updated signed pain contract on file and can be found under the FYI section of the [facility] chart. 9. The pain contract is reviewed. Pain medication will be continued at the previous dosage. Urine drug screening will not be done today. Diagnostic studies are not indicated at this time. Interventional procedure are not indicated at this time. 10. Medication prescibed is HYDROcodone-acetaminophen (NORCO)  mg tablet. No prescription is needed today. 11. Patient instructions have been reviewed in detail as outlined above and in the pain contract which is updated today.   12. Re-eval is planned for 3 months. Naloxone prescription is not warranted. Patient Active Problem List   Diagnosis Code    CHF (congestive heart failure) ([facility]) I50.9    S/P TKR (total knee replacement) Z96.659    Anemia D64.9    s/p lumbar laminectomy Z98.890    S/P ASAD (total abdominal hysterectomy) Z90.710    S/P hemorrhoidectomy Z98.890, Z87.19    S/P rotator cuff surgery Z98.890    DM (diabetes mellitus) ([facility] E11.9    Chronic sinusitis J32.9    S/P sinus surgery Z98.890    Dyslipidemia E78.5    Environmental allergies Z91.09    Obstructive sleep apnea (adult) (pediatric) G47.33    DJD (degenerative joint disease) M19.90    Chronic systolic heart failure ([facility]) I50.22    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 9) with comorbidity ([facility]) E66.01    Polymyalgia rheumatica ([facility]) M35.3    COPD exacerbation ([facility] J44.1    Ataxia R27.0    General weakness R53.1    Tremors of nervous system R25.1    Fall at home W19. XXXA, Y92.009    CKD (chronic kidney disease) stage 4, GFR 15-29 ml/min ([facility]) N18.4    Chronic renal disease, stage III N18.30       Current Outpatient Medications   Medication Sig Dispense Refill    cholecalciferol (VITAMIN D3) (2,000 UNITS /50 MCG) cap capsule Vitamin D3 50 mcg (2,000 unit) capsule   Take 1 capsule every day by oral route. predniSONE (DELTASONE) 5 mg tablet Take 2 Tablets by mouth two (2) times a day.  120 Tablet 5    diclofenac (VOLTAREN) 1 % gel APPLY EXTERNALLY TO THE AFFECTED AREA FOUR TIMES DAILY 300 g 3    furosemide (LASIX) 20 mg tablet TAKE 1 TABLET BY MOUTH EVERY MORNING AND 1 TABLET EVERY AFTERNOON BETWEEN 2 TO 4  Tablet 3    pantoprazole (PROTONIX) 40 mg tablet Take 1 Tablet by mouth Daily (before breakfast). 30 Tablet 3    rosuvastatin (CRESTOR) 10 mg tablet TAKE 1 TABLET BY MOUTH EVERY NIGHT 90 Tablet 3    ALPRAZolam (XANAX) 0.5 mg tablet TAKE 1/2 TABLET BY MOUTH AT BEDTIME AS NEEDED 30 Tablet 1    primidone (MYSOLINE) 50 mg tablet TAKE 3 TABLETS BY MOUTH TWICE DAILY 540 Tablet 3    carvediloL (COREG) 12.5 mg tablet Take 1 Tablet by mouth two (2) times daily (with meals). *NEW DOSE 180 Tablet 3    lancets (Accu-Chek Fastclix Lancet Drum) misc USE TO TEST BLOOD SUGAR TWICE DAILY DX E11.9 300 Each 3    glucose blood VI test strips (Accu-Chek Guide test strips) strip USE TO TEST BLOOD SUGAR TWICE DAILY DX E11.9 300 Strip 3    DULoxetine (CYMBALTA) 60 mg capsule TAKE 1 CAPSULE BY MOUTH DAILY 90 Capsule 3    glipiZIDE SR (GLUCOTROL XL) 2.5 mg CR tablet TAKE 1 TABLET BY MOUTH DAILY 90 Tablet 3    Accu-Chek Guide Me Glucose Mtr misc USE TO CHECK BLOOD SUGAR TWICE DAILY 1 Each 0    BEVESPI AEROSPHERE 9-4.8 mcg HFA inhaler Take 2 puffs by inhalation daily      albuterol (PROVENTIL HFA, VENTOLIN HFA, PROAIR HFA) 90 mcg/actuation inhaler Take 2 Puffs by inhalation every four (4) hours as needed for Wheezing or Shortness of Breath. 1 Inhaler     guaiFENesin (MUCINEX) 1,200 mg Ta12 ER tablet Take 1,200 mg by mouth daily as needed. MULTIVITAMIN PO Take 1 Tab by mouth daily. lisinopril (PRINIVIL, ZESTRIL) 2.5 mg tablet Take 2.5 mg by mouth daily.       fluticasone (FLONASE) 50 mcg/actuation nasal spray SHAKE LIQUID AND USE 2 SPRAYS IN EACH NOSTRIL EVERY DAY 1 Bottle 11       Allergies   Allergen Reactions    Gabapentin Other (comments)     Muscles twitching and jerking    Levaquin [Levofloxacin] Swelling    Lyrica [Pregabalin] Other (comments)     Muscle twitching and jerking    Percodan [Oxycodone Hcl-Oxycodone-Asa] Itching         Past Medical History:   Diagnosis Date    Anemia 3/3/2010    Arrhythmia     irregular heartbeat    Arthritis     Asthma     CHF (congestive heart failure) ([facility] 3/3/2010    Chronic kidney disease     Chronic obstructive pulmonary disease (HCC)     Chronic pain     back    Chronic sinusitis 3/3/2010    CPAP (continuous positive airway pressure) dependence     Diverticulosis     DM (diabetes mellitus) ([facility] 3/3/2010    Dyslipidemia 3/3/2010    GERD (gastroesophageal reflux disease)     HTN (hypertension) 3/3/2010    Ill-defined condition     being evaluated py pulmonolgist for \"trouble breathing\"    S/P TKR (total knee replacement) 3/3/2010    Unspecified sleep apnea     doesn't wear CPAP since back has been hurting         Past Surgical History:   Procedure Laterality Date    HX APPENDECTOMY      thinks it was taken with hysterectomy    HX GYN      \"tubes opened\"    HX HEENT      sinus surgery    HX HEMORRHOIDECTOMY      HX HYSTERECTOMY      HX KNEE REPLACEMENT  1996    both knees- at same time    HX LUMBAR LAMINECTOMY  1980s    HX MOHS PROCEDURES      left shoulder    HX ORTHOPAEDIC  1980    neck fusion    HX ORTHOPAEDIC  1999    right knee replaced for second time    HX ORTHOPAEDIC      lumbar fusion    HX OTHER SURGICAL      CORNELL BIOPSY BREAST STEREOTACTIC Left yrs ago    (-)    FL COLONOSCOPY FLX DX W/COLLJ SPEC WHEN PFRMD  [id number]    dr. Radha Busch (barium enema)         Family History   Problem Relation Age of Onset    Diabetes Mother     Heart Disease Father     Diabetes Brother     Blindness Brother     Diabetes Sister     Heart Disease Sister     Heart Disease Brother     Heart Disease Brother     Asthma Brother     Malignant Hyperthermia Neg Hx     Pseudocholinesterase Deficiency Neg Hx     Delayed Awakening Neg Hx     Post-op Nausea/Vomiting Neg Hx     Emergence Delirium Neg Hx     Post-op Cognitive Dysfunction Neg Hx        Social History     Tobacco Use    Smoking status: Former     Packs/day: 0.30     Years: 5.00     Pack years: 1.50     Types: Cigarettes     Quit date: 1960     Years since quittin.7    Smokeless tobacco: Never   Substance Use Topics    Alcohol use: No     Alcohol/week: 0.0 standard drinks        Lab Results   Component Value Date/Time    WBC 7.4 2022 12:16 PM    HGB 11.1 (L) 2022 12:16 PM    HCT 36.0 2022 12:16 PM    PLATELET 254  12:16 PM    .8 (H) 2022 12:16 PM     Lab Results   Component Value Date/Time    Cholesterol, total 218 (H) 2022 10:37 AM    HDL Cholesterol 98 2022 10:37 AM    LDL, calculated 101.2 (H) 2022 10:37 AM    Triglyceride 94 2022 10:37 AM    CHOL/HDL Ratio 2.2 2022 10:37 AM     Lab Results   Component Value Date/Time    Sodium 141 2022 12:16 PM    Potassium 5.4 (H) 2022 12:16 PM    Chloride 107 2022 12:16 PM    CO2 31 2022 12:16 PM    Anion gap 3 (L) 2022 12:16 PM    Glucose 207 (H) 2022 12:16 PM    BUN 33 (H) 2022 12:16 PM    Creatinine 2.02 (H) 2022 12:16 PM    BUN/Creatinine ratio 16 2022 12:16 PM    GFR est AA 28 (L) 2022 12:16 PM    GFR est non-AA 23 (L) 2022 12:16 PM    Calcium 8.9 2022 12:16 PM    Bilirubin, total 0.4 2022 12:16 PM    ALT (SGPT) 31 2022 12:16 PM    Alk. phosphatase 73 2022 12:16 PM    Protein, total 6.5 2022 12:16 PM    Albumin 3.5 2022 12:16 PM    Globulin 3.0 2022 12:16 PM    A-G Ratio 1.2 2022 12:16 PM      Lab Results   Component Value Date/Time    Hemoglobin A1c 6.4 (H) 2021 02:43 AM    Hemoglobin A1c (POC) 6.5 2022 09:50 AM         Review of Systems   Constitutional:  Negative for malaise/fatigue. HENT:  Negative for congestion. Eyes:  Negative for blurred vision. Respiratory:  Negative for cough and shortness of breath. Cardiovascular:  Negative for chest pain, palpitations and leg swelling.    Gastrointestinal:  Negative for abdominal pain, constipation and heartburn. Genitourinary:  Negative for dysuria, frequency and urgency. Musculoskeletal:  Positive for back pain (pain is more toleratble) and joint pain (pain is more tolerable. ). Negative for myalgias. Neurological:  Negative for dizziness, tingling, sensory change, focal weakness and headaches. Endo/Heme/Allergies:  Negative for environmental allergies. Psychiatric/Behavioral:  Negative for depression. The patient does not have insomnia. Physical Exam  Vitals and nursing note reviewed. Constitutional:       Appearance: Normal appearance. She is well-developed. Comments: BP (!) 122/54   Pulse (!) 52   Temp 97.1 °F (36.2 °C)   Resp 20   Ht 5' 4\" (1.626 m)   Wt 202 lb (91.6 kg)   LMP 03/04/1961 (LMP Unknown)   SpO2 100%   BMI 34.67 kg/m²    HENT:      Right Ear: Tympanic membrane and ear canal normal.      Left Ear: Tympanic membrane and ear canal normal.   Neck:      Thyroid: No thyromegaly. Cardiovascular:      Rate and Rhythm: Normal rate and regular rhythm. Heart sounds: Normal heart sounds. Pulmonary:      Effort: Pulmonary effort is normal.      Breath sounds: Normal breath sounds. Abdominal:      General: Bowel sounds are normal.      Palpations: Abdomen is soft. There is no mass. Tenderness: There is no abdominal tenderness. Musculoskeletal:         General: Normal range of motion. Cervical back: Normal range of motion and neck supple. Right lower leg: Edema (mild) present. Left lower leg: Edema (mild) present. Lymphadenopathy:      Cervical: No cervical adenopathy. Skin:     General: Skin is warm and dry. Neurological:      General: No focal deficit present. Mental Status: She is alert and oriented to person, place, and time. Psychiatric:         Mood and Affect: Mood normal.     ASSESSMENT and PLAN  Diagnoses and all orders for this visit:    1. Polymyalgia rheumatica ([address]  2.  Arthralgia of multiple joints  -     Will continue predniSONE (DELTASONE) 5 mg tablet; Take 2 Tablets by mouth two (2) times a day. 3. Essential hypertension, benign  Stable     4. Type 2 diabetes with nephropathy ([facility]  She has not seen any elevation in her BS with the higher dose of prednisone. 5. Mixed hyperlipidemia  Continue to monitor. Work on diet and exercise. Stable on crestor. 6. Chronic systolic heart failure (HCC)  Stable     7. Stage 3b chronic kidney disease ([facility]  Lab results are stable. 8. Bilateral edema of lower extremity  Stable     9. Encounter for chronic pain management  The pt has signed medication agreement. Pain contract is reviewed. Pain medications will be continued at the previous dosage. Urine drug screening will be done today. Diagnostic  studies are not indicated at this time. Interventional procedure are not indicated at this time. Re-eval in 3 months. 10. Encounter for medication monitoring    11. Encounter for immunization  -     INFLUENZA, FLUAD, (AGE 72 Y+), IM, PF, 0.5 ML      Follow-up and Dispositions    Return in about 3 months (around 12/9/2022). reviewed diet, exercise and weight control  cardiovascular risk and specific lipid/LDL goals reviewed  reviewed medications and side effects in detail  specific diabetic recommendations: low cholesterol diet, weight control and daily exercise discussed and glycohemoglobin and other lab monitoring discussed    I have discussed diagnosis listed in this note with pt and/or family. I have discussed treatment plans and options and the risk/benefit analysis of those options, including safe use of medications and possible medication side effects. Through the use of shared decision making we have agreed to the above plan. The patient has received an after-visit summary and questions were answered concerning future plans and follow up. Advise pt of any urgent changes then to proceed to the ER.

## 2022-09-09 NOTE — PROGRESS NOTES
Verbal Order with Readback given by Wally Goldberg MD for Influenza. Given in right Deltoid without difficulty.

## 2022-09-14 ENCOUNTER — OFFICE VISIT (OUTPATIENT)
Dept: NEUROLOGY | Age: 87
End: 2022-09-14
Payer: MEDICARE

## 2022-09-14 VITALS
HEART RATE: 59 BPM | BODY MASS INDEX: 33.97 KG/M2 | RESPIRATION RATE: 16 BRPM | SYSTOLIC BLOOD PRESSURE: 112 MMHG | TEMPERATURE: 97.2 F | WEIGHT: 199 LBS | HEIGHT: 64 IN | DIASTOLIC BLOOD PRESSURE: 68 MMHG | OXYGEN SATURATION: 98 %

## 2022-09-14 DIAGNOSIS — R26.9 GAIT DISORDER: ICD-10-CM

## 2022-09-14 DIAGNOSIS — G25.0 ESSENTIAL TREMOR: Primary | ICD-10-CM

## 2022-09-14 DIAGNOSIS — E11.42 DIABETIC POLYNEUROPATHY ASSOCIATED WITH TYPE 2 DIABETES MELLITUS (HCC): ICD-10-CM

## 2022-09-14 PROCEDURE — G8417 CALC BMI ABV UP PARAM F/U: HCPCS | Performed by: PSYCHIATRY & NEUROLOGY

## 2022-09-14 PROCEDURE — G8427 DOCREV CUR MEDS BY ELIG CLIN: HCPCS | Performed by: PSYCHIATRY & NEUROLOGY

## 2022-09-14 PROCEDURE — 99214 OFFICE O/P EST MOD 30 MIN: CPT | Performed by: PSYCHIATRY & NEUROLOGY

## 2022-09-14 PROCEDURE — 1123F ACP DISCUSS/DSCN MKR DOCD: CPT | Performed by: PSYCHIATRY & NEUROLOGY

## 2022-09-14 PROCEDURE — G8536 NO DOC ELDER MAL SCRN: HCPCS | Performed by: PSYCHIATRY & NEUROLOGY

## 2022-09-14 PROCEDURE — 1090F PRES/ABSN URINE INCON ASSESS: CPT | Performed by: PSYCHIATRY & NEUROLOGY

## 2022-09-14 PROCEDURE — 1101F PT FALLS ASSESS-DOCD LE1/YR: CPT | Performed by: PSYCHIATRY & NEUROLOGY

## 2022-09-14 PROCEDURE — G8510 SCR DEP NEG, NO PLAN REQD: HCPCS | Performed by: PSYCHIATRY & NEUROLOGY

## 2022-09-14 PROCEDURE — 3044F HG A1C LEVEL LT 7.0%: CPT | Performed by: PSYCHIATRY & NEUROLOGY

## 2022-09-14 RX ORDER — CARBIDOPA AND LEVODOPA 25; 100 MG/1; MG/1
1 TABLET ORAL 2 TIMES DAILY
Qty: 180 TABLET | Refills: 2 | Status: SHIPPED | OUTPATIENT
Start: 2022-09-14

## 2022-09-14 NOTE — PROGRESS NOTES
1. Have you been to the ER, urgent care clinic since your last visit? Hospitalized since your last visit? No.    2. Have you seen or consulted any other health care providers outside of the [address] [place] [address] since your last visit? Include any pap smears or colon screening.    No.        Chief Complaint   Patient presents with    Tremors     Neuropathy- 6 month- left hand has gotten worse than right

## 2022-09-14 NOTE — PROGRESS NOTES
Tremor getting worse  250m my mad woredr, will add sinemet 25 /100 bidNeurology Progress Note    NAME:  Sean Lozano   :   1931   MRN:   [id number]     Date/Time:  2022  Subjective: Sean Lozano is a 80 y.o. female here today for follow-up for tremors. Patient was accompanied by her  and grand daughter Delores Tavares. Patient says the tremor seems to be getting worse, has moved from left to the right. She noted that she is very difficult to hold onto things. She has difficulty ambulating, but has not had any falls. Reviewing patient's medication, I wanted to increase Mysoline from 150-250 milligrams  2 times daily, however, her  noted that when this was tried before, it made the tremor worse. After discussing with them, I decided to add on Sinemet 25/100 twice daily to see if it will help in reducing the tremor. Patient is not a candidate for beta-blocker propranolol because she is always having bradycardia, being an elderly person Klonopin is not a good alternative for her. She denies dysphagia or odynophagia.   Review of Systems - General ROS: positive for  - fever and sleep disturbance  Psychological ROS: positive for - anxiety and sleep disturbances  Ophthalmic ROS: positive for - blurry vision and decreased vision  ENT ROS: positive for - tinnitus, vertigo and visual changes  Allergy and Immunology ROS: negative  Hematological and Lymphatic ROS: negative  Endocrine ROS: negative  Respiratory ROS: no cough, shortness of breath, or wheezing  Cardiovascular ROS: no chest pain or dyspnea on exertion  Gastrointestinal ROS: no abdominal pain, change in bowel habits, or black or bloody stools  Genito-Urinary ROS: no dysuria, trouble voiding, or hematuria  Musculoskeletal ROS: positive for - gait disturbance, joint pain, joint stiffness, muscle pain and muscular weakness  Neurological ROS: positive for - dizziness, gait disturbance, impaired coordination/balance, numbness/tingling, tremors, visual changes and weakness  Dermatological ROS: negative      Medications reviewed:  Current Outpatient Medications   Medication Sig Dispense Refill    carbidopa-levodopa (Sinemet)  mg per tablet Take 1 Tablet by mouth two (2) times a day. Indications: a type of movement disorder called parkinsonism 180 Tablet 2    predniSONE (DELTASONE) 5 mg tablet Take 2 Tablets by mouth two (2) times a day. 360 Tablet 3    cholecalciferol (VITAMIN D3) (2,000 UNITS /50 MCG) cap capsule Vitamin D3 50 mcg (2,000 unit) capsule   Take 1 capsule every day by oral route. diclofenac (VOLTAREN) 1 % gel APPLY EXTERNALLY TO THE AFFECTED AREA FOUR TIMES DAILY 300 g 3    furosemide (LASIX) 20 mg tablet TAKE 1 TABLET BY MOUTH EVERY MORNING AND 1 TABLET EVERY AFTERNOON BETWEEN 2 TO 4  Tablet 3    pantoprazole (PROTONIX) 40 mg tablet Take 1 Tablet by mouth Daily (before breakfast). 30 Tablet 3    rosuvastatin (CRESTOR) 10 mg tablet TAKE 1 TABLET BY MOUTH EVERY NIGHT 90 Tablet 3    ALPRAZolam (XANAX) 0.5 mg tablet TAKE 1/2 TABLET BY MOUTH AT BEDTIME AS NEEDED 30 Tablet 1    primidone (MYSOLINE) 50 mg tablet TAKE 3 TABLETS BY MOUTH TWICE DAILY 540 Tablet 3    carvediloL (COREG) 12.5 mg tablet Take 1 Tablet by mouth two (2) times daily (with meals).  *NEW DOSE 180 Tablet 3    lancets (Accu-Chek Fastclix Lancet Drum) misc USE TO TEST BLOOD SUGAR TWICE DAILY DX E11.9 300 Each 3    glucose blood VI test strips (Accu-Chek Guide test strips) strip USE TO TEST BLOOD SUGAR TWICE DAILY DX E11.9 300 Strip 3    DULoxetine (CYMBALTA) 60 mg capsule TAKE 1 CAPSULE BY MOUTH DAILY 90 Capsule 3    glipiZIDE SR (GLUCOTROL XL) 2.5 mg CR tablet TAKE 1 TABLET BY MOUTH DAILY 90 Tablet 3    Accu-Chek Guide Me Glucose Mtr misc USE TO CHECK BLOOD SUGAR TWICE DAILY 1 Each 0    BEVESPI AEROSPHERE 9-4.8 mcg HFA inhaler Take 2 puffs by inhalation daily      albuterol (PROVENTIL HFA, VENTOLIN HFA, PROAIR HFA) 90 mcg/actuation inhaler Take 2 Puffs by inhalation every four (4) hours as needed for Wheezing or Shortness of Breath. 1 Inhaler     guaiFENesin (MUCINEX) 1,200 mg Ta12 ER tablet Take 1,200 mg by mouth daily as needed. MULTIVITAMIN PO Take 1 Tab by mouth daily. lisinopril (PRINIVIL, ZESTRIL) 2.5 mg tablet Take 2.5 mg by mouth daily.       fluticasone (FLONASE) 50 mcg/actuation nasal spray SHAKE LIQUID AND USE 2 SPRAYS IN EACH NOSTRIL EVERY DAY 1 Bottle 11        Objective:   Vitals:  Vitals:    09/14/22 1111   BP: 112/68   Pulse: (!) 59   Resp: 16   Temp: 97.2 °F (36.2 °C)   TempSrc: Temporal   SpO2: 98%   Weight: 199 lb (90.3 kg)   Height: 5' 4\" (1.626 m)   PainSc:   0 - No pain   LMP: 03/04/1961         Lab Data Reviewed:  Lab Results   Component Value Date/Time    WBC 7.4 08/09/2022 12:16 PM    HCT 36.0 08/09/2022 12:16 PM    HGB 11.1 (L) 08/09/2022 12:16 PM    PLATELET 682 61/27/2826 12:16 PM       Lab Results   Component Value Date/Time    Sodium 141 08/09/2022 12:16 PM    Potassium 5.4 (H) 08/09/2022 12:16 PM    Chloride 107 08/09/2022 12:16 PM    CO2 31 08/09/2022 12:16 PM    Glucose 207 (H) 08/09/2022 12:16 PM    BUN 33 (H) 08/09/2022 12:16 PM    Creatinine 2.02 (H) 08/09/2022 12:16 PM    Calcium 8.9 08/09/2022 12:16 PM       No components found for: TROPQUANT    No results found for: AYAH      Lab Results   Component Value Date/Time    Hemoglobin A1c 6.4 (H) 06/19/2021 02:43 AM    Hemoglobin A1c (POC) 6.5 06/27/2022 09:50 AM        Lab Results   Component Value Date/Time    Vitamin B12 >2000 (H) 12/19/2016 04:24 PM    Folate >20.0 12/19/2016 04:24 PM       No results found for: AYAH, Elmarie Grams, XBANA    Lab Results   Component Value Date/Time    Cholesterol, total 218 (H) 06/27/2022 10:37 AM    HDL Cholesterol 98 06/27/2022 10:37 AM    LDL, calculated 101.2 (H) 06/27/2022 10:37 AM    VLDL, calculated 18.8 06/27/2022 10:37 AM    Triglyceride 94 06/27/2022 10:37 AM    CHOL/HDL Ratio 2.2 06/27/2022 10:37 AM         CT Results (recent):  Results from [place] encounter on 06/18/21    CT HEAD WO CONT    Narrative  EXAM: CT HEAD WO CONT    INDICATION: acute weakness    COMPARISON: CT 10/18/2019. CONTRAST: None. TECHNIQUE: Unenhanced CT of the head was performed using 5 mm images. Brain and  bone windows were generated. Coronal and sagittal reformats. CT dose reduction  was achieved through use of a standardized protocol tailored for this  examination and automatic exposure control for dose modulation. FINDINGS:  The ventricles and sulci are normal in size, shape and configuration. . There is  no significant white matter disease. There is no intracranial hemorrhage,  extra-axial collection, or mass effect. The basilar cisterns are open. No CT  evidence of acute infarct. The bone windows demonstrate no abnormalities. Postsurgical changes are seen in  the maxillary sinuses. There is partial opacification of the right maxillary  sinus and ethmoid air cells and left sphenoid sinus. .    Impression  No evidence of acute intracranial process. MRI Results (recent):  Results from [place] encounter on 06/18/21    MRI BRAIN WO CONT    Narrative  EXAM: MRI BRAIN WO CONT    INDICATION: R/o CVA    COMPARISON: CT head 6/18/2021, MRI brain 12/6/2012. CONTRAST: None. TECHNIQUE:  Multiplanar multisequence acquisition without contrast of the brain. FINDINGS:  The ventricles are normal in size and position. Minimal scattered  periventricular and deep white matter T2/FLAIR hyperintensities, and minimal  patchy T2/FLAIR hyperintensity in the ronaldo, consistent with minimal chronic  microvascular ischemic disease. There is no acute infarct, hemorrhage,  extra-axial fluid collection, or mass effect. There is no cerebellar tonsillar  herniation. Expected arterial flow-voids are present.     Complete opacification of the left sphenoid sinus, near complete opacification  of the right maxillary sinus, and scattered mucosal thickening throughout the  remaining paranasal sinuses. The mastoid air cells and middle ears are clear. The orbital contents are within normal limits with bilateral lens implants. No  significant osseous or scalp lesions are identified. Multilevel degenerative  disc disease and facet arthropathy in the cervical spine. Impression  1. No acute intracranial abnormality. Minimal chronic microvascular ischemic  disease. 2. Pansinus mucosal thickening. IR Results (recent):  No results found for this or any previous visit. VAS/US Results (recent): PHYSICAL EXAM:  General:    Alert, cooperative, no distress, appears stated age. Head:   Normocephalic, without obvious abnormality, atraumatic. Eyes:   Conjunctivae/corneas clear. PERRLA  Nose:  Nares normal. No drainage or sinus tenderness. Throat:    Lips, mucosa, and tongue normal.  No Thrush  Neck:  Supple, symmetrical,  no adenopathy, thyroid: non tender    no carotid bruit and no JVD. Paraspinal tenderness  Back:    Symmetric, diffuse tenderness. Lungs:   Clear to auscultation bilaterally. No Wheezing or Rhonchi. No rales. Chest wall:  No tenderness or deformity. No Accessory muscle use. Heart:   Regular rate and rhythm,  no murmur, rub or gallop. Abdomen:   Soft, non-tender. Not distended. Bowel sounds normal. No masses  Extremities: Extremities normal, atraumatic, No cyanosis. No edema. No clubbing  Skin:     Texture, turgor normal. No rashes or lesions. Not Jaundiced  Lymph nodes: Cervical, supraclavicular normal.  Psych:  Good insight. Not depressed. Not anxious or agitated. NEUROLOGICAL EXAM:  Appearance: The patient is well developed, well nourished, provides a coherent history and is in no acute distress. Mental Status: Oriented to time, place and person. Mood and affect appropriate. Cranial Nerves:   Intact visual fields. Fundi are benign. CHENG, EOM's full, no nystagmus, no ptosis.  Facial sensation is normal. Corneal reflexes are intact. Facial movement is symmetric. Hearing is normal bilaterally. Palate is midline with normal sternocleidomastoid and trapezius muscles are normal. Tongue is midline. Motor:  4+5 strength in upper and lower proximal and distal muscles. Normal bulk and tone. No fasciculations. Reflexes:   Deep endon reflexes 1+/4 and symmetrical.  Sensory:   Decreased sensation to touch, pinprick and vibration. Gait:  Unsteady gait. Ambulates with cane   Tremor:   Intention tremor noted both hands. Cerebellar:  No cerebellar signs present. Neurovascular:  Normal heart sounds and regular rhythm, peripheral pulses intact, and no carotid bruits. Assesment  1. Essential tremor  Continue primidone 150 mg p.o. twice daily  Sinemet 25/100 p.o. twice daily    2. Diabetic polyneuropathy associated with type 2 diabetes mellitus (HCC)  Cymbalta    3. Gait disorder  Intermittent physical therapy      ___________________________________________________  PLAN: Medication and plan discussed with patient and       ICD-10-CM ICD-9-CM    1. Essential tremor  G25.0 333.1       2. Diabetic polyneuropathy associated with type 2 diabetes mellitus (HCC)  E11.42 250.60      357.2       3. Gait disorder  R26.9 781.2         Follow-up and Dispositions    Return in about 6 months (around 3/14/2023).            :    ___________________________________________________    Attending Physician: Ziyad Mansfield MD

## 2022-10-05 DIAGNOSIS — G47.09 OTHER INSOMNIA: ICD-10-CM

## 2022-10-05 RX ORDER — ALPRAZOLAM 0.5 MG/1
TABLET ORAL
Qty: 15 TABLET | Refills: 1 | Status: SHIPPED | OUTPATIENT
Start: 2022-10-05

## 2022-12-06 DIAGNOSIS — G47.09 OTHER INSOMNIA: ICD-10-CM

## 2022-12-06 RX ORDER — ALPRAZOLAM 0.5 MG/1
TABLET ORAL
Qty: 15 TABLET | Refills: 1 | Status: SHIPPED | OUTPATIENT
Start: 2022-12-06

## 2022-12-09 ENCOUNTER — OFFICE VISIT (OUTPATIENT)
Dept: FAMILY MEDICINE CLINIC | Age: 87
End: 2022-12-09
Payer: MEDICARE

## 2022-12-09 VITALS
HEIGHT: 64 IN | DIASTOLIC BLOOD PRESSURE: 61 MMHG | HEART RATE: 60 BPM | BODY MASS INDEX: 34.25 KG/M2 | WEIGHT: 200.6 LBS | TEMPERATURE: 97.8 F | OXYGEN SATURATION: 98 % | SYSTOLIC BLOOD PRESSURE: 121 MMHG | RESPIRATION RATE: 12 BRPM

## 2022-12-09 DIAGNOSIS — N18.32 STAGE 3B CHRONIC KIDNEY DISEASE (HCC): ICD-10-CM

## 2022-12-09 DIAGNOSIS — M25.50 ARTHRALGIA OF MULTIPLE JOINTS: ICD-10-CM

## 2022-12-09 DIAGNOSIS — G89.29 ENCOUNTER FOR CHRONIC PAIN MANAGEMENT: ICD-10-CM

## 2022-12-09 DIAGNOSIS — E11.21 TYPE 2 DIABETES WITH NEPHROPATHY (HCC): ICD-10-CM

## 2022-12-09 DIAGNOSIS — I10 ESSENTIAL HYPERTENSION, BENIGN: Primary | ICD-10-CM

## 2022-12-09 DIAGNOSIS — Z51.81 ENCOUNTER FOR MEDICATION MONITORING: ICD-10-CM

## 2022-12-09 DIAGNOSIS — I50.22 CHRONIC SYSTOLIC HEART FAILURE (HCC): ICD-10-CM

## 2022-12-09 DIAGNOSIS — E78.2 MIXED HYPERLIPIDEMIA: ICD-10-CM

## 2022-12-09 LAB
GLUCOSE POC: 194 MG/DL
HBA1C MFR BLD HPLC: 8 %

## 2022-12-09 RX ORDER — GLIPIZIDE 2.5 MG/1
TABLET, EXTENDED RELEASE ORAL
Qty: 180 TABLET | Refills: 3 | Status: SHIPPED | OUTPATIENT
Start: 2022-12-09

## 2022-12-09 NOTE — PROGRESS NOTES
HISTORY OF PRESENT ILLNESS  Cristofer Hopkins is a 80 y.o. female. Follow up on chronic medical problems. Cardiovascular Review:  The patient has hypertension, hyperlipidemia and Systolic HF. Diet and Lifestyle: generally follows a low fat low cholesterol diet, generally follows a low sodium diet  Home BP Monitoring: is not measured at home. Pertinent ROS: taking medications as instructed, no medication side effects noted, no TIA's, no chest pain on exertion, no dyspnea on exertion, noting that her ankles swelling has been mild. She has been off of the statin but she did not see any improvemnet with her myalgia being off of the medication so she has started this back. DM type II follow up:  Compliant w/ meds, diabetic diet, and exercise. Obtains home glucose monitoring averaging in the mid 100s. Checks BS daily on most days and prn. Pt does have BS log at visit today. No Rf needed for today. Denies any tingling sensation, polyuria and polydipsia. UTD with her eye exam but want to see another MD.  Nonah Primus like she has a flim over the right eye that make her vision blurred. No significant weight changes. Asthma Review:  The patient is being seen for follow up of chronic respiratory failure/COPD and allergies and chronic sinusitits, not currently in exacerbation. Breathing has been good also with taking prednisone. Using nebulizer as needed but has not recently had to use it. Had follow up with pulmonary on last week. Regimen compliance: The patient reports adherence to asthma action plan and regimen. Encounter for pain management. 1./2. Medical history/Past medical History  Chronic Pain:  Osteoarthritis:  Patient has osteoarthritis in multiple joints. Overall has dealing with a lot of pain in the joints and muscles all over. Has pain and burning in the legs. Birdie Hurst much better on the higher dose of the prednisone. Has some good days and some not so good days still.   She is able to move around better. Primarily in the knees, hips and back are worse. Has seen by rheumatology but she does not want to go back. Her symptoms have been wax and wane. She is currently on prednisone and we have discussed long term side effects related to chronic steroid use. Also we discussed increasing the steroids with a dose to alleviate increased sx. Pain has been 8/10 when it is severe. 3. Applicable records from prior treatment providers are apart of [facility] under the media tab. 4. Diagnostic, therapeutic and laboratory results are available in the [facility] chart. 5. Consultation notes are available for review in the media tab of the [facility] chart. 6. Treatment goals include pain control so that the pt may be as active and function with her daily activities and improved comfort level. Previously pt has been limited by pain. 7. The risks and benefits of treatment has been discussed at this office visit with the pt. She understands that the medication has addicting potential.  Additionally the pt has been advised that narcotic pain medication may impair mental and/or physical ability required for performance of tasks such as driving or operating any other machinery. 8. Pt has an updated signed pain contract on file and can be found under the FYI section of the [facility] chart. 9. The pain contract is reviewed. Pain medication will be continued at the previous dosage. Urine drug screening will be done today. Diagnostic studies are not indicated at this time. Interventional procedure are not indicated at this time. 10. Medication prescibed is HYDROcodone-acetaminophen (NORCO)  mg tablet. No prescription is needed today. 11. Patient instructions have been reviewed in detail as outlined above and in the pain contract which is updated today. 12. Re-eval is planned for 3 months. Naloxone prescription is not warranted.      Patient Active Problem List Diagnosis Code    CHF (congestive heart failure) ([facility]) I50.9    S/P TKR (total knee replacement) Z96.659    Anemia D64.9    s/p lumbar laminectomy Z98.890    S/P ASAD (total abdominal hysterectomy) Z90.710    S/P hemorrhoidectomy Z98.890, Z87.19    S/P rotator cuff surgery Z98.890    DM (diabetes mellitus) ([facility] E11.9    Chronic sinusitis J32.9    S/P sinus surgery Z98.890    Dyslipidemia E78.5    Environmental allergies Z91.09    Obstructive sleep apnea (adult) (pediatric) G47.33    DJD (degenerative joint disease) M19.90    Chronic systolic heart failure ([facility]) I50.22    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 9) with comorbidity ([facility]) E66.01    Polymyalgia rheumatica ([facility]) M35.3    COPD exacerbation ([facility] J44.1    Ataxia R27.0    General weakness R53.1    Tremors of nervous system R25.1    Fall at home W19. XXXA, Y92.009    CKD (chronic kidney disease) stage 4, GFR 15-29 ml/min ([facility]) N18.4    Chronic renal disease, stage III N18.30       Current Outpatient Medications   Medication Sig Dispense Refill    glipiZIDE SR (GLUCOTROL XL) 2.5 mg CR tablet Take one tab before breakfast and one tab before dinner 180 Tablet 3    ALPRAZolam (XANAX) 0.5 mg tablet TAKE 1/2 TABLET BY MOUTH AT BEDTIME AS NEEDED 15 Tablet 1    pantoprazole (PROTONIX) 40 mg tablet TAKE 1 TABLET BY MOUTH DAILY BEFORE AND BREAKFAST 90 Tablet 3    carbidopa-levodopa (Sinemet)  mg per tablet Take 1 Tablet by mouth two (2) times a day.  Indications: a type of movement disorder called parkinsonism 180 Tablet 2    predniSONE (DELTASONE) 5 mg tablet Take 2 Tablets by mouth two (2) times a day. 360 Tablet 3    cholecalciferol (VITAMIN D3) (2,000 UNITS /50 MCG) cap capsule Vitamin D3 50 mcg (2,000 unit) capsule   Take 1 capsule every day by oral route. diclofenac (VOLTAREN) 1 % gel APPLY EXTERNALLY TO THE AFFECTED AREA FOUR TIMES DAILY 300 g 3    rosuvastatin (CRESTOR) 10 mg tablet TAKE 1 TABLET BY MOUTH EVERY NIGHT 90 Tablet 3    primidone (MYSOLINE) 50 mg tablet TAKE 3 TABLETS BY MOUTH TWICE DAILY 540 Tablet 3    carvediloL (COREG) 12.5 mg tablet Take 1 Tablet by mouth two (2) times daily (with meals). *NEW DOSE 180 Tablet 3    lancets (Accu-Chek Fastclix Lancet Drum) misc USE TO TEST BLOOD SUGAR TWICE DAILY DX E11.9 300 Each 3    glucose blood VI test strips (Accu-Chek Guide test strips) strip USE TO TEST BLOOD SUGAR TWICE DAILY DX E11.9 300 Strip 3    Accu-Chek Guide Me Glucose Mtr misc USE TO CHECK BLOOD SUGAR TWICE DAILY 1 Each 0    BEVESPI AEROSPHERE 9-4.8 mcg HFA inhaler Take 2 puffs by inhalation daily      albuterol (PROVENTIL HFA, VENTOLIN HFA, PROAIR HFA) 90 mcg/actuation inhaler Take 2 Puffs by inhalation every four (4) hours as needed for Wheezing or Shortness of Breath. 1 Inhaler     guaiFENesin (MUCINEX) 1,200 mg Ta12 ER tablet Take 1,200 mg by mouth daily as needed. MULTIVITAMIN PO Take 1 Tab by mouth daily. lisinopril (PRINIVIL, ZESTRIL) 2.5 mg tablet Take 2.5 mg by mouth daily.       furosemide (LASIX) 20 mg tablet TAKE 1 TABLET BY MOUTH EVERY MORNING AND 1 TABLET EVERY AFTERNOON BETWEEN 2 TO 4  Tablet 3    DULoxetine (CYMBALTA) 60 mg capsule TAKE 1 CAPSULE BY MOUTH DAILY 90 Capsule 3    fluticasone (FLONASE) 50 mcg/actuation nasal spray SHAKE LIQUID AND USE 2 SPRAYS IN EACH NOSTRIL EVERY DAY 1 Bottle 11       Allergies   Allergen Reactions    Gabapentin Other (comments)     Muscles twitching and jerking    Levaquin [Levofloxacin] Swelling    Lyrica [Pregabalin] Other (comments)     Muscle twitching and jerking    Percodan [Oxycodone Hcl-Oxycodone-Asa] Itching    Percodan [Oxycodone-Aspirin] Other (comments)         Past Medical History:   Diagnosis Date    Anemia 3/3/2010    Arrhythmia     irregular heartbeat    Arthritis     Asthma     CHF (congestive heart failure) ([facility] 3/3/2010    Chronic kidney disease     Chronic obstructive pulmonary disease (HCC)     Chronic pain     back    Chronic sinusitis 3/3/2010    CPAP (continuous positive airway pressure) dependence     Diverticulosis     DM (diabetes mellitus) ([facility] 3/3/2010    Dyslipidemia 3/3/2010    GERD (gastroesophageal reflux disease)     HTN (hypertension) 3/3/2010    Ill-defined condition     being evaluated py pulmonolgist for \"trouble breathing\"    S/P TKR (total knee replacement) 3/3/2010    Unspecified sleep apnea     doesn't wear CPAP since back has been hurting         Past Surgical History:   Procedure Laterality Date    HX APPENDECTOMY      thinks it was taken with hysterectomy    HX GYN      \"tubes opened\"    HX HEENT      sinus surgery    HX HEMORRHOIDECTOMY      HX HYSTERECTOMY      HX KNEE REPLACEMENT  1996    both knees- at same time    HX LUMBAR LAMINECTOMY  1980s    HX MOHS PROCEDURES      left shoulder    HX ORTHOPAEDIC  1980    neck fusion    [facility]    right knee replaced for second time    HX ORTHOPAEDIC      lumbar fusion    HX OTHER SURGICAL      CORNELL BIOPSY BREAST STEREOTACTIC Left yrs ago    (-)    CO COLONOSCOPY FLX DX W/COLLJ SPEC WHEN PFRMD  [id number]    dr. Charlotte Cadena (barium enema)         Family History   Problem Relation Age of Onset    Diabetes Mother     Heart Disease Father     Diabetes Brother     Blindness Brother     Diabetes Sister     Heart Disease Sister     Heart Disease Brother     Heart Disease Brother     Asthma Brother     Malignant Hyperthermia Neg Hx     Pseudocholinesterase Deficiency Neg Hx     Delayed Awakening Neg Hx     Post-op Nausea/Vomiting Neg Hx Emergence Delirium Neg Hx     Post-op Cognitive Dysfunction Neg Hx        Social History     Tobacco Use    Smoking status: Former     Packs/day: 0.30     Years: 5.00     Pack years: 1.50     Types: Cigarettes     Quit date: 1960     Years since quittin.9    Smokeless tobacco: Never   Substance Use Topics    Alcohol use: No     Alcohol/week: 0.0 standard drinks        Lab Results   Component Value Date/Time    WBC 7.4 2022 12:16 PM    HGB 11.1 (L) 2022 12:16 PM    HCT 36.0 2022 12:16 PM    PLATELET 592 75/10/7503 12:16 PM    .8 (H) 2022 12:16 PM     Lab Results   Component Value Date/Time    Cholesterol, total 218 (H) 2022 10:37 AM    HDL Cholesterol 98 2022 10:37 AM    LDL, calculated 101.2 (H) 2022 10:37 AM    Triglyceride 94 2022 10:37 AM    CHOL/HDL Ratio 2.2 2022 10:37 AM     Lab Results   Component Value Date/Time    Sodium 141 2022 12:16 PM    Potassium 5.4 (H) 2022 12:16 PM    Chloride 107 2022 12:16 PM    CO2 31 2022 12:16 PM    Anion gap 3 (L) 2022 12:16 PM    Glucose 207 (H) 2022 12:16 PM    BUN 33 (H) 2022 12:16 PM    Creatinine 2.02 (H) 2022 12:16 PM    BUN/Creatinine ratio 16 2022 12:16 PM    GFR est AA 28 (L) 2022 12:16 PM    GFR est non-AA 23 (L) 2022 12:16 PM    Calcium 8.9 2022 12:16 PM    Bilirubin, total 0.4 2022 12:16 PM    ALT (SGPT) 31 2022 12:16 PM    Alk. phosphatase 73 2022 12:16 PM    Protein, total 6.5 2022 12:16 PM    Albumin 3.5 2022 12:16 PM    Globulin 3.0 2022 12:16 PM    A-G Ratio 1.2 2022 12:16 PM      Lab Results   Component Value Date/Time    Hemoglobin A1c 6.4 (H) 2021 02:43 AM    Hemoglobin A1c (POC) 8.0 2022 03:00 PM         Review of Systems   Constitutional:  Negative for malaise/fatigue. HENT:  Negative for congestion. Eyes:  Negative for blurred vision. Respiratory:  Negative for cough and shortness of breath. Cardiovascular:  Negative for chest pain, palpitations and leg swelling. Gastrointestinal:  Negative for abdominal pain, constipation and heartburn. Genitourinary:  Negative for dysuria, frequency and urgency. Musculoskeletal:  Positive for back pain (pain is more toleratble) and joint pain (pain is more tolerable. ). Negative for myalgias. Neurological:  Negative for dizziness, tingling, sensory change, focal weakness and headaches. Endo/Heme/Allergies:  Negative for environmental allergies. Psychiatric/Behavioral:  Negative for depression. The patient does not have insomnia. Physical Exam  Vitals and nursing note reviewed. Constitutional:       Appearance: Normal appearance. She is well-developed. Comments: /61   Pulse 60   Temp 97.8 °F (36.6 °C)   Resp 12   Ht 5' 4\" (1.626 m)   Wt 200 lb 9.6 oz (91 kg)   LMP 03/04/1961 (LMP Unknown)   SpO2 98%   BMI 34.43 kg/m²      HENT:      Right Ear: Tympanic membrane and ear canal normal.      Left Ear: Tympanic membrane and ear canal normal.   Neck:      Thyroid: No thyromegaly. Cardiovascular:      Rate and Rhythm: Normal rate and regular rhythm. Heart sounds: Normal heart sounds. Pulmonary:      Effort: Pulmonary effort is normal.      Breath sounds: Normal breath sounds. Abdominal:      General: Bowel sounds are normal.      Palpations: Abdomen is soft. There is no mass. Tenderness: There is no abdominal tenderness. Musculoskeletal:         General: Normal range of motion. Cervical back: Normal range of motion and neck supple. Right lower leg: Edema (mild) present. Left lower leg: Edema (mild) present. Lymphadenopathy:      Cervical: No cervical adenopathy. Skin:     General: Skin is warm and dry. Neurological:      General: No focal deficit present. Mental Status: She is alert and oriented to person, place, and time. Psychiatric:         Mood and Affect: Mood normal.         ASSESSMENT and PLAN  Diagnoses and all orders for this visit:    1. Essential hypertension, benign  Discussed sodium restriction, high k rich diet, maintaining ideal body weight and regular exercise program such as daily walking 30 min perday 4-5 times per week, as physiologic means to achieve blood pressure control. Medication compliance advised. 2. Type 2 diabetes with nephropathy (HCC)  A1c at 8.0%. Continue to monitor. Work on diet and exercise. Admits that she has been eating more sweets. -     AMB POC HEMOGLOBIN A1C  -     AMB POC GLUCOSE, QUANTITATIVE, BLOOD  -     MICROALBUMIN, UR, RAND W/ MICROALB/CREAT RATIO; Future  -     increase glipiZIDE SR (GLUCOTROL XL) 2.5 mg CR tablet; Take one tab before breakfast and one tab before dinner    3. Mixed hyperlipidemia  Continue to monitor. Work on diet and exercise. 4. Chronic systolic heart failure (HCC)  Stable     5. Stage 3b chronic kidney disease (HCC)  Stable     6. Arthralgia of multiple joints  7. Encounter for chronic pain management  -     MONITOR SCREEN 10-DRUG CLASS PROFILE; Future  The pt has signed medication agreement. Pain contract is reviewed. Pain medications will be continued at the previous dosage. 8. Encounter for medication monitoring  -     METABOLIC PANEL, BASIC; Future  -     URINALYSIS W/MICROSCOPIC; Future        Follow-up and Dispositions    Return in about 3 months (around 3/9/2023).        reviewed diet, exercise and weight control  cardiovascular risk and specific lipid/LDL goals reviewed  reviewed medications and side effects in detail  specific diabetic recommendations: low cholesterol diet, weight control and daily exercise discussed, all medications, side effects and compliance discussed carefully, foot care discussed and Podiatry visits discussed, annual eye examinations at Ophthalmology discussed, and glycohemoglobin and other lab monitoring discussed    I have discussed diagnosis listed in this note with pt and/or family. I have discussed treatment plans and options and the risk/benefit analysis of those options, including safe use of medications and possible medication side effects. Through the use of shared decision making we have agreed to the above plan. The patient has received an after-visit summary and questions were answered concerning future plans and follow up. Advise pt of any urgent changes then to proceed to the ER.

## 2022-12-09 NOTE — LETTER
CONTROLLED SUBSTANCE MEDICATION AGREEMENT  Patient Name: Milagros Cordero  Patient YOB: 1931     I understand, that controlled substance medications may be used to help better manage my symptoms and to improve my ability to function at home, work and in social settings. However, I also understand that these medications do have risks, which have been discussed with me, including possible development of physical or psychological dependence. I understand that successful treatment requires mutual trust and honesty between me and my provider. I understand and agree that following this Medication Agreement is necessary in continuing my provider-patient relationship and the success of my treatment plan. Explanation from my Provider: Benefits and Goals of Controlled Substance Medications: There are two potential goals for your treatment: (1) decreased pain and suffering (2) improved daily life functions. There are many possible treatments for your chronic condition(s). Alternatives such as physical therapy, yoga, massage, home daily exercise, meditation, relaxation techniques, injections, chiropractic manipulations, surgery, cognitive therapy, hypnosis and many medications that are not habit-forming may be used. Use of controlled substance medications may be helpful, but they are unlikely to resolve all symptoms or restore all function. Explanation from my Provider: Risks of Controlled Substance Medications:   Opioid pain medications: These medications can lead to problems such as addiction/dependence, sedation, lightheadedness/dizziness, memory issues, falls, constipation, nausea, or vomiting. They may also impair the ability to drive or operate machinery. Additionally, these medications may lower testosterone levels, leading to loss of bone strength, stamina and sex drive.   They may cause problems with breathing, sleep apnea and reduced coughing, which is especially dangerous for patients with lung disease. Overdose or dangerous interactions with alcohol and other medications may occur, leading to death. Hyperalgesia may develop, which means patients receiving opioids for the treatment of pain may become more sensitive to certain painful stimuli, and in some cases, experience pain from ordinarily non-painful stimuli. Women between the ages of 14-53 who could become pregnant should carefully weigh the risks and benefits of opioids with their physicians, as these medications increase the risk of pregnancy complications, including miscarriage,  delivery and stillbirth. It is also possible for babies to be born addicted to opioids. Opioid dependence withdrawal symptoms may include; feelings of uneasiness, increased pain, irritability, belly pain, diarrhea, sweats and goose-flesh. Testosterone replacement therapy:  Potential side effects include increased risk of stroke and heart attack, blood clots, increased blood pressure, increased cholesterol, enlarged prostate, sleep apnea, irritability/aggression and other mood disorders, and decreased fertility. Liliya Townsend (1931)             Page 1 of 4    Initials:_______    Benzodiazepines and non-benzodiazepine sleep medications: These medications can lead to problems such as addiction/dependence, sedation, fatigue, lightheadedness, dizziness, incoordination, falls, depression, hallucinations, and impaired judgment, memory and concentration. The ability to drive and operate machinery may also be affected. Abnormal sleep-related behaviors have been reported, including sleepwalking, driving, making telephone calls, eating, or having sex while not fully awake. These medications can suppress breathing and worsen sleep apnea, particularly when combined with alcohol or other sedating medications, potentially leading to death. Dependence withdrawal symptoms may include tremors, anxiety, hallucinations and seizures.    Stimulants:  Common adverse effects include addiction/dependence, increased blood pressure and heart rate, decreased appetite, nausea, involuntary weight loss, insomnia,  irritability, and headaches. These risks may increase when these medications are combined with other stimulants, such as caffeine pills or energy drinks, certain weight loss supplements and oral decongestants. Dependence withdrawal symptoms may include depressed mood, loss of interest, suicidal thoughts, anxiety, fatigue, appetite changes and agitation. I agree and understand that I and my prescriber have the following rights and responsibilities regarding my treatment plan:   1. MY RIGHTS:  To be informed of my treatment and medication plan. To be an active participant in my health and wellbeing. 2. MY RESPONSIBILITY AND UNDERSTANDING FOR USE OF MEDICATIONS   I will take medications at the dose and frequency as directed. For my safety, I will not increase or change how I take my medications without the recommendation of my healthcare provider.  I will actively participate in any program recommended by my provider which may improve function, including social, physical, psychological programs.  I will not take my medications with alcohol or other drugs not prescribed to me. I understand that drinking alcohol with my medications increases the chances of side effects, including reduced breathing rate and could lead to personal injury when operating machinery.  I understand that if I have a history of substance use disorders, including alcohol or other illicit drugs, that I may be at increased risk of addiction to my medications.  I agree to notify my provider immediately if I should become pregnant so that my treatment plan can be adjusted.    I agree and understand that I shall only receive controlled substance medications from the prescriber that signed this agreement unless there is written agreement among other prescribers of controlled substances outlining the responsibility of the medications being prescribed.  I understand that the if the controlled medication is not helping to achieve goals, the dosage may be tapered and no longer prescribed. 3. MY RESPONSIBILITY FOR COMMUNICATION / PRESCRIPTION RENEWALS   I agree that all controlled substance medications that I take will be prescribed only by my provider. If another healthcare provider prescribes me medication in an emergency, I will notify my provider within seventy-two (72) hours. Chely Díaz (4/5/1931)             Page 2 of 4    Initials:_______   I will arrange for refills at the prescribed interval ONLY during regular office hours. I will not ask for refills earlier than agreed, after-hours, on holidays or weekends. Refills may take up to 72 hours for processing and prescriptions to reach the pharmacy.  I will inform my other health care providers that I am taking these medications and of the existence of this [id number]. In the event of an emergency, I will provide the same information to the emergency department prescribers.  I will keep my provider updated on the pharmacy I am using for controlled medication prescription filling. 4. MY RESPONSIBILITY FOR PROTECTING MEDICATIONS   I will protect my prescriptions and medications. I understand that lost or misplaced prescriptions will not be replaced.  I will keep medications only for my own use and will not share them with others. I will keep all medications away from children.  I agree that if my medications are adjusted or discontinued, I will properly dispose of any remaining medications. I understand that I will be required to dispose of any remaining controlled medications as, directed by my prescriber, prior to being provided with any prescriptions for other controlled medications.   Medication drop box locations can be found at: [email or web address]  5. MY RESPONSIBILITY WITH ILLEGAL DRUGS    I will not use illegal or street drugs or another person's prescription medications not prescribed to me.  If there are identified addiction type symptoms, then referral to a program may be provided by my provider and I agree to follow through with this recommendation. 6. MY RESPONSIBILITY FOR COOPERATION WITH INVESTIGATIONS   I understand that my provider will comply with any applicable law and may discuss my use and/or possible misuse/abuse of controlled substances and alcohol, as appropriate, with any health care provider involved in my care, pharmacist, or legal authority.  I authorize my provider and pharmacy to cooperate fully with law enforcement agencies (as permitted by law) in the investigation of any possible misuse, sale, or other diversion of my controlled substances.  I agree to waive any applicable privilege or right of privacy or confidentiality with respect to these authorizations. 7. PROVIDERS RIGHT TO MONITOR FOR SAFETY: PRESCRIPTION MONITORING / DRUG TESTING   I consent to drug/toxicology screening and will submit to a drug screen upon my providers request to assure I am only taking the prescribed drugs for my safety monitoring. I understand that a drug screen is a laboratory test in which a sample of my urine, blood or saliva is checked to see what drugs I have been taking. This may entail an observed urine specimen, which means that a nurse or other health care provider may watch me provide urine, and I will cooperate if I am asked to provide an observed specimen. Ric Roy (4/5/1931)             Page 3 of 4    Initials:_______  Tomas Hollingsworth I understand that my provider will check a copy of my State Prescription Monitoring Program () Report in order to safely prescribe medications.      Pill Counts: I consent to pill counts when requested. I may be asked to bring all my prescribed controlled substance medications, in their original bottles, to all of my scheduled appointments. In addition, my provider may ask me to come to the practice at any time for a random pill count. 8. TERMINATION OF THIS AGREEMENT   For my safety, my prescriber has the right to stop prescribing controlled substance medications and may end this agreement.  Conditions that may result in termination of this agreement:  a. I do not show any improvement in pain, or my activity has not improved. b. I develop rapid tolerance or loss of improvement, as described in my treatment plan.  c. I develop significant side effects from the medication. d. My behavior is not consistent with the responsibilities outlined above, thereby causing safety concerns to continue prescribing controlled substance medications. e. I fail to follow the terms of this agreement. f. Other:____________________________     UNDERSTANDING THIS MEDICATION AGREEMENT:    I have read the above and have had all my questions answered. For chronic disease management, I know that my symptoms can be managed with many types of treatments. A chronic medication trial may be part of my treatment, but I must be an active participant in my care. Medication therapy is only one part of my symptom management plan. In some cases, there may be limited scientific evidence to support the chronic use of certain medications to improve symptoms and daily function. Furthermore, in certain circumstances, there may be scientific information that suggests that the use of chronic controlled substances may worsen my symptoms and increase my risk of unintentional death directly related to this medication therapy. I know that if my provider feels my risk from controlled medications is greater than my benefit, I will have my controlled substance medication(s) compassionately lowered or removed altogether. I further agree to allow this office to contact my HIPAA contact if there are concerns about my safety and use of the controlled medications. I have agreed to use the prescribed controlled substance medications to me as instructed by my provider and as stated in this Medication Agreement. My initial on each page and my signature below shows that I have read each page and I have had the opportunity to ask questions with answers provided by my provider.       Patient Name (Printed): _____________________________________    Patient Signature:  ______________________   Date: _____________      Prescriber Name (Printed): ___________________________________    Prescriber Signature: _____________________  Date: _____________     Tianna Bile (4/5/1931)             Page 4 of 4

## 2022-12-09 NOTE — PROGRESS NOTES
Patient identified by 2 identifiers. Chief Complaint   Patient presents with    Follow-up    Diabetes   1. Have you been to the ER, urgent care clinic since your last visit? Hospitalized since your last visit? No    2. Have you seen or consulted any other health care providers outside of the [address] [place] [address] since your last visit? Include any pap smears or colon screening.  No

## 2022-12-10 LAB
ANION GAP SERPL CALC-SCNC: 1 MMOL/L (ref 5–15)
APPEARANCE UR: CLEAR
BACTERIA URNS QL MICRO: NEGATIVE /HPF
BILIRUB UR QL: NEGATIVE
BUN SERPL-MCNC: 30 MG/DL (ref 6–20)
BUN/CREAT SERPL: 17 (ref 12–20)
CALCIUM SERPL-MCNC: 9.2 MG/DL (ref 8.5–10.1)
CHLORIDE SERPL-SCNC: 108 MMOL/L (ref 97–108)
CO2 SERPL-SCNC: 33 MMOL/L (ref 21–32)
COLOR UR: ABNORMAL
CREAT SERPL-MCNC: 1.79 MG/DL (ref 0.55–1.02)
CREAT UR-MCNC: 220 MG/DL
EPITH CASTS URNS QL MICRO: ABNORMAL /LPF
GLUCOSE SERPL-MCNC: 213 MG/DL (ref 65–100)
GLUCOSE UR STRIP.AUTO-MCNC: NEGATIVE MG/DL
HGB UR QL STRIP: NEGATIVE
HYALINE CASTS URNS QL MICRO: ABNORMAL /LPF (ref 0–5)
KETONES UR QL STRIP.AUTO: ABNORMAL MG/DL
LEUKOCYTE ESTERASE UR QL STRIP.AUTO: ABNORMAL
MICROALBUMIN UR-MCNC: 13.2 MG/DL
MICROALBUMIN/CREAT UR-RTO: 60 MG/G (ref 0–30)
NITRITE UR QL STRIP.AUTO: NEGATIVE
PH UR STRIP: 5 [PH] (ref 5–8)
POTASSIUM SERPL-SCNC: 5.8 MMOL/L (ref 3.5–5.1)
PROT UR STRIP-MCNC: 30 MG/DL
RBC #/AREA URNS HPF: ABNORMAL /HPF (ref 0–5)
SODIUM SERPL-SCNC: 142 MMOL/L (ref 136–145)
SP GR UR REFRACTOMETRY: 1.02 (ref 1–1.03)
UROBILINOGEN UR QL STRIP.AUTO: 0.2 EU/DL (ref 0.2–1)
WBC URNS QL MICRO: ABNORMAL /HPF (ref 0–4)

## 2022-12-12 ENCOUNTER — TELEPHONE (OUTPATIENT)
Dept: FAMILY MEDICINE CLINIC | Age: 87
End: 2022-12-12

## 2022-12-12 RX ORDER — DICLOFENAC SODIUM 10 MG/G
GEL TOPICAL
Qty: 300 G | Refills: 3 | Status: SHIPPED | OUTPATIENT
Start: 2022-12-12

## 2022-12-12 NOTE — TELEPHONE ENCOUNTER
Patient  states that his wife received a letter in the mail telling her it's time for her mammogram and bone density he wants Mary Marrero to call him regarding the bone density test he can be reached @ [phone]

## 2022-12-12 NOTE — TELEPHONE ENCOUNTER
Patient  states that his wife received a letter in the mail telling her it's time for her mammogram and bone density he wants Tali Cisneros to call him regarding the bone density test he can be reached @ [phone]  ------------------------------------------------------------------------  Pt  want to know when the pt get her mammogram, do she need to get the bone density exam also. He stated she just saw you on 12/9 and he lost the letter about this.

## 2022-12-15 ENCOUNTER — TELEPHONE (OUTPATIENT)
Dept: FAMILY MEDICINE CLINIC | Age: 87
End: 2022-12-15

## 2022-12-21 ENCOUNTER — OFFICE VISIT (OUTPATIENT)
Dept: FAMILY MEDICINE CLINIC | Age: 87
End: 2022-12-21

## 2022-12-21 DIAGNOSIS — E87.5 HYPERKALEMIA: Primary | ICD-10-CM

## 2022-12-22 ENCOUNTER — TELEPHONE (OUTPATIENT)
Dept: FAMILY MEDICINE CLINIC | Age: 87
End: 2022-12-22

## 2022-12-22 LAB — POTASSIUM SERPL-SCNC: 4.5 MMOL/L (ref 3.5–5.1)

## 2023-01-10 ENCOUNTER — HOSPITAL ENCOUNTER (OUTPATIENT)
Age: 88
Setting detail: OBSERVATION
LOS: 1 days | Discharge: HOME HEALTH | DRG: 312 | End: 2023-01-11
Attending: STUDENT IN AN ORGANIZED HEALTH CARE EDUCATION/TRAINING PROGRAM | Admitting: INTERNAL MEDICINE
Payer: MEDICARE

## 2023-01-10 ENCOUNTER — APPOINTMENT (OUTPATIENT)
Dept: CT IMAGING | Age: 88
DRG: 312 | End: 2023-01-10
Attending: STUDENT IN AN ORGANIZED HEALTH CARE EDUCATION/TRAINING PROGRAM
Payer: MEDICARE

## 2023-01-10 ENCOUNTER — APPOINTMENT (OUTPATIENT)
Dept: GENERAL RADIOLOGY | Age: 88
DRG: 312 | End: 2023-01-10
Attending: STUDENT IN AN ORGANIZED HEALTH CARE EDUCATION/TRAINING PROGRAM
Payer: MEDICARE

## 2023-01-10 DIAGNOSIS — R00.1 BRADYCARDIA: ICD-10-CM

## 2023-01-10 DIAGNOSIS — R55 NEAR SYNCOPE: Primary | ICD-10-CM

## 2023-01-10 LAB
ALBUMIN SERPL-MCNC: 3.5 G/DL (ref 3.5–5)
ALBUMIN/GLOB SERPL: 1.2 (ref 1.1–2.2)
ALP SERPL-CCNC: 44 U/L (ref 45–117)
ALT SERPL-CCNC: 10 U/L (ref 12–78)
ANION GAP SERPL CALC-SCNC: 4 MMOL/L (ref 5–15)
APPEARANCE UR: CLEAR
AST SERPL-CCNC: 34 U/L (ref 15–37)
BASOPHILS # BLD: 0 K/UL (ref 0–0.1)
BASOPHILS NFR BLD: 0 % (ref 0–1)
BILIRUB SERPL-MCNC: 0.5 MG/DL (ref 0.2–1)
BILIRUB UR QL: NEGATIVE
BNP SERPL-MCNC: 1634 PG/ML
BUN SERPL-MCNC: 31 MG/DL (ref 6–20)
BUN/CREAT SERPL: 18 (ref 12–20)
CALCIUM SERPL-MCNC: 9.5 MG/DL (ref 8.5–10.1)
CHLORIDE SERPL-SCNC: 104 MMOL/L (ref 97–108)
CO2 SERPL-SCNC: 32 MMOL/L (ref 21–32)
COLOR UR: NORMAL
COMMENT, HOLDF: NORMAL
COMMENT, HOLDF: NORMAL
CREAT SERPL-MCNC: 1.76 MG/DL (ref 0.55–1.02)
DIFFERENTIAL METHOD BLD: ABNORMAL
EOSINOPHIL # BLD: 0.1 K/UL (ref 0–0.4)
EOSINOPHIL NFR BLD: 1 % (ref 0–7)
ERYTHROCYTE [DISTWIDTH] IN BLOOD BY AUTOMATED COUNT: 15.1 % (ref 11.5–14.5)
GLOBULIN SER CALC-MCNC: 3 G/DL (ref 2–4)
GLUCOSE SERPL-MCNC: 203 MG/DL (ref 65–100)
GLUCOSE UR STRIP.AUTO-MCNC: NEGATIVE MG/DL
HCT VFR BLD AUTO: 34.4 % (ref 35–47)
HGB BLD-MCNC: 10.8 G/DL (ref 11.5–16)
HGB UR QL STRIP: NEGATIVE
IMM GRANULOCYTES # BLD AUTO: 0.1 K/UL (ref 0–0.04)
IMM GRANULOCYTES NFR BLD AUTO: 1 % (ref 0–0.5)
KETONES UR QL STRIP.AUTO: NEGATIVE MG/DL
LEUKOCYTE ESTERASE UR QL STRIP.AUTO: NEGATIVE
LYMPHOCYTES # BLD: 1.4 K/UL (ref 0.8–3.5)
LYMPHOCYTES NFR BLD: 14 % (ref 12–49)
MAGNESIUM SERPL-MCNC: 2.4 MG/DL (ref 1.6–2.4)
MCH RBC QN AUTO: 34.4 PG (ref 26–34)
MCHC RBC AUTO-ENTMCNC: 31.4 G/DL (ref 30–36.5)
MCV RBC AUTO: 109.6 FL (ref 80–99)
MONOCYTES # BLD: 0.3 K/UL (ref 0–1)
MONOCYTES NFR BLD: 3 % (ref 5–13)
NEUTS SEG # BLD: 7.9 K/UL (ref 1.8–8)
NEUTS SEG NFR BLD: 81 % (ref 32–75)
NITRITE UR QL STRIP.AUTO: NEGATIVE
NRBC # BLD: 0 K/UL (ref 0–0.01)
NRBC BLD-RTO: 0 PER 100 WBC
PH UR STRIP: 6 (ref 5–8)
PLATELET # BLD AUTO: 149 K/UL (ref 150–400)
PMV BLD AUTO: 11.3 FL (ref 8.9–12.9)
POTASSIUM SERPL-SCNC: 4.2 MMOL/L (ref 3.5–5.1)
POTASSIUM SERPL-SCNC: 5.7 MMOL/L (ref 3.5–5.1)
PROT SERPL-MCNC: 6.5 G/DL (ref 6.4–8.2)
PROT UR STRIP-MCNC: NEGATIVE MG/DL
RBC # BLD AUTO: 3.14 M/UL (ref 3.8–5.2)
RBC MORPH BLD: ABNORMAL
RBC MORPH BLD: ABNORMAL
SAMPLES BEING HELD,HOLD: NORMAL
SAMPLES BEING HELD,HOLD: NORMAL
SODIUM SERPL-SCNC: 140 MMOL/L (ref 136–145)
SP GR UR REFRACTOMETRY: 1.01
TROPONIN-HIGH SENSITIVITY: 52 NG/L (ref 0–51)
TROPONIN-HIGH SENSITIVITY: 53 NG/L (ref 0–51)
UR CULT HOLD, URHOLD: NORMAL
UROBILINOGEN UR QL STRIP.AUTO: 0.2 EU/DL (ref 0.2–1)
WBC # BLD AUTO: 9.8 K/UL (ref 3.6–11)

## 2023-01-10 PROCEDURE — 93005 ELECTROCARDIOGRAM TRACING: CPT

## 2023-01-10 PROCEDURE — 83880 ASSAY OF NATRIURETIC PEPTIDE: CPT

## 2023-01-10 PROCEDURE — 85025 COMPLETE CBC W/AUTO DIFF WBC: CPT

## 2023-01-10 PROCEDURE — 36415 COLL VENOUS BLD VENIPUNCTURE: CPT

## 2023-01-10 PROCEDURE — 99285 EMERGENCY DEPT VISIT HI MDM: CPT

## 2023-01-10 PROCEDURE — 81003 URINALYSIS AUTO W/O SCOPE: CPT

## 2023-01-10 PROCEDURE — 70450 CT HEAD/BRAIN W/O DYE: CPT

## 2023-01-10 PROCEDURE — 94762 N-INVAS EAR/PLS OXIMTRY CONT: CPT

## 2023-01-10 PROCEDURE — 84132 ASSAY OF SERUM POTASSIUM: CPT

## 2023-01-10 PROCEDURE — 65270000046 HC RM TELEMETRY

## 2023-01-10 PROCEDURE — 83735 ASSAY OF MAGNESIUM: CPT

## 2023-01-10 PROCEDURE — 84484 ASSAY OF TROPONIN QUANT: CPT

## 2023-01-10 PROCEDURE — 80053 COMPREHEN METABOLIC PANEL: CPT

## 2023-01-10 PROCEDURE — 71045 X-RAY EXAM CHEST 1 VIEW: CPT

## 2023-01-10 RX ORDER — POLYETHYLENE GLYCOL 3350 17 G/17G
17 POWDER, FOR SOLUTION ORAL DAILY PRN
Status: DISCONTINUED | OUTPATIENT
Start: 2023-01-10 | End: 2023-01-11 | Stop reason: HOSPADM

## 2023-01-10 RX ORDER — PREDNISONE 5 MG/1
10 TABLET ORAL 2 TIMES DAILY
Status: DISCONTINUED | OUTPATIENT
Start: 2023-01-11 | End: 2023-01-11 | Stop reason: HOSPADM

## 2023-01-10 RX ORDER — CARBIDOPA AND LEVODOPA 25; 100 MG/1; MG/1
1 TABLET ORAL 2 TIMES DAILY
Status: DISCONTINUED | OUTPATIENT
Start: 2023-01-11 | End: 2023-01-11 | Stop reason: HOSPADM

## 2023-01-10 RX ORDER — CARVEDILOL 12.5 MG/1
12.5 TABLET ORAL 2 TIMES DAILY WITH MEALS
Status: DISCONTINUED | OUTPATIENT
Start: 2023-01-11 | End: 2023-01-11

## 2023-01-10 RX ORDER — ONDANSETRON 2 MG/ML
4 INJECTION INTRAMUSCULAR; INTRAVENOUS
Status: DISCONTINUED | OUTPATIENT
Start: 2023-01-10 | End: 2023-01-11 | Stop reason: HOSPADM

## 2023-01-10 RX ORDER — FLUTICASONE PROPIONATE 50 MCG
2 SPRAY, SUSPENSION (ML) NASAL DAILY
Status: DISCONTINUED | OUTPATIENT
Start: 2023-01-11 | End: 2023-01-11 | Stop reason: HOSPADM

## 2023-01-10 RX ORDER — PRIMIDONE 50 MG/1
150 TABLET ORAL 2 TIMES DAILY
Status: DISCONTINUED | OUTPATIENT
Start: 2023-01-11 | End: 2023-01-11 | Stop reason: HOSPADM

## 2023-01-10 RX ORDER — SODIUM CHLORIDE 9 MG/ML
75 INJECTION, SOLUTION INTRAVENOUS CONTINUOUS
Status: DISPENSED | OUTPATIENT
Start: 2023-01-10 | End: 2023-01-11

## 2023-01-10 RX ORDER — INSULIN LISPRO 100 [IU]/ML
1-8 INJECTION, SOLUTION INTRAVENOUS; SUBCUTANEOUS
Status: DISCONTINUED | OUTPATIENT
Start: 2023-01-11 | End: 2023-01-11 | Stop reason: HOSPADM

## 2023-01-10 RX ORDER — ALPRAZOLAM 0.25 MG/1
0.25 TABLET ORAL
Status: DISCONTINUED | OUTPATIENT
Start: 2023-01-10 | End: 2023-01-11 | Stop reason: HOSPADM

## 2023-01-10 RX ORDER — FUROSEMIDE 20 MG/1
20 TABLET ORAL 2 TIMES DAILY
Status: DISCONTINUED | OUTPATIENT
Start: 2023-01-11 | End: 2023-01-11 | Stop reason: HOSPADM

## 2023-01-10 RX ORDER — GUAIFENESIN 600 MG/1
1200 TABLET, EXTENDED RELEASE ORAL DAILY PRN
Status: DISCONTINUED | OUTPATIENT
Start: 2023-01-10 | End: 2023-01-11 | Stop reason: HOSPADM

## 2023-01-10 RX ORDER — ATROPINE SULFATE 1 MG/ML
1 INJECTION, SOLUTION INTRAVENOUS
Status: DISCONTINUED | OUTPATIENT
Start: 2023-01-10 | End: 2023-01-10 | Stop reason: SDUPTHER

## 2023-01-10 RX ORDER — SODIUM CHLORIDE 0.9 % (FLUSH) 0.9 %
5-40 SYRINGE (ML) INJECTION EVERY 8 HOURS
Status: DISCONTINUED | OUTPATIENT
Start: 2023-01-10 | End: 2023-01-11 | Stop reason: HOSPADM

## 2023-01-10 RX ORDER — LISINOPRIL 5 MG/1
2.5 TABLET ORAL DAILY
Status: DISCONTINUED | OUTPATIENT
Start: 2023-01-11 | End: 2023-01-11 | Stop reason: HOSPADM

## 2023-01-10 RX ORDER — ROSUVASTATIN CALCIUM 10 MG/1
10 TABLET, COATED ORAL
Status: DISCONTINUED | OUTPATIENT
Start: 2023-01-10 | End: 2023-01-11 | Stop reason: HOSPADM

## 2023-01-10 RX ORDER — ACETAMINOPHEN 325 MG/1
650 TABLET ORAL
Status: DISCONTINUED | OUTPATIENT
Start: 2023-01-10 | End: 2023-01-11 | Stop reason: HOSPADM

## 2023-01-10 RX ORDER — ARFORMOTEROL TARTRATE 15 UG/2ML
15 SOLUTION RESPIRATORY (INHALATION)
Status: DISCONTINUED | OUTPATIENT
Start: 2023-01-10 | End: 2023-01-11 | Stop reason: HOSPADM

## 2023-01-10 RX ORDER — SODIUM CHLORIDE 0.9 % (FLUSH) 0.9 %
5-40 SYRINGE (ML) INJECTION AS NEEDED
Status: DISCONTINUED | OUTPATIENT
Start: 2023-01-10 | End: 2023-01-11 | Stop reason: HOSPADM

## 2023-01-10 RX ORDER — PANTOPRAZOLE SODIUM 40 MG/1
40 TABLET, DELAYED RELEASE ORAL
Status: DISCONTINUED | OUTPATIENT
Start: 2023-01-11 | End: 2023-01-11 | Stop reason: HOSPADM

## 2023-01-10 RX ORDER — IPRATROPIUM BROMIDE 0.5 MG/2.5ML
0.5 SOLUTION RESPIRATORY (INHALATION)
Status: DISCONTINUED | OUTPATIENT
Start: 2023-01-10 | End: 2023-01-11 | Stop reason: HOSPADM

## 2023-01-10 RX ORDER — ACETAMINOPHEN 500 MG
1000 TABLET ORAL ONCE
Status: COMPLETED | OUTPATIENT
Start: 2023-01-10 | End: 2023-01-11

## 2023-01-10 RX ORDER — DULOXETIN HYDROCHLORIDE 30 MG/1
60 CAPSULE, DELAYED RELEASE ORAL DAILY
Status: DISCONTINUED | OUTPATIENT
Start: 2023-01-11 | End: 2023-01-11 | Stop reason: HOSPADM

## 2023-01-10 RX ORDER — IBUPROFEN 200 MG
16 TABLET ORAL AS NEEDED
Status: DISCONTINUED | OUTPATIENT
Start: 2023-01-10 | End: 2023-01-11 | Stop reason: HOSPADM

## 2023-01-10 RX ORDER — PROMETHAZINE HYDROCHLORIDE 25 MG/1
12.5 TABLET ORAL
Status: DISCONTINUED | OUTPATIENT
Start: 2023-01-10 | End: 2023-01-11 | Stop reason: HOSPADM

## 2023-01-10 RX ORDER — ACETAMINOPHEN 650 MG/1
650 SUPPOSITORY RECTAL
Status: DISCONTINUED | OUTPATIENT
Start: 2023-01-10 | End: 2023-01-11 | Stop reason: HOSPADM

## 2023-01-10 RX ORDER — ATROPINE SULFATE 0.1 MG/ML
1 INJECTION INTRAVENOUS
Status: DISCONTINUED | OUTPATIENT
Start: 2023-01-10 | End: 2023-01-11 | Stop reason: HOSPADM

## 2023-01-10 RX ORDER — IPRATROPIUM BROMIDE AND ALBUTEROL SULFATE 2.5; .5 MG/3ML; MG/3ML
3 SOLUTION RESPIRATORY (INHALATION)
Status: DISCONTINUED | OUTPATIENT
Start: 2023-01-10 | End: 2023-01-11 | Stop reason: HOSPADM

## 2023-01-10 NOTE — ED PROVIDER NOTES
MRM [id number] [facility]       Pt Name: Lázaro Mitchell  MRN: [id number]  [place] 4/5/1931  Date of evaluation: 1/10/2023  Provider: Eduarda Wade MD   PCP: Aylin Myrick MD  Note Started: 3:43 PM 1/10/23     CHIEF COMPLAINT       Chief Complaint   Patient presents with    Nausea     Arrives with EMS from home; c/o generalized weakness and nausea.  called EMS stating that patient \"wasn't answering him\" when he called her name; initial call came in as possible unresponsiveness, however patient fully conscious upon EMS arrival. Given 4 mg Zofran IV with improvement. . Lethargy    Slow Heart Rate     PmHx of systolic heart failure;         HISTORY OF PRESENT ILLNESS: 1 or more elements      History From: patient and , History limited by: none     Lázaro Mitchell is a 80 y.o. female who presents after a syncopal episode. Notes she was coming downstairs on the automated lift, got to the landing and felt very weak, nauseas. Called for her  , then syncopized. He notes she was unconscious in her chair for about 10 minutes, not responding to voice or touch. EMS was called and she started to come around when she got on the stretcher. She does not remember being passed out but remembers feeling bad beforehand and awakening on the stretcher. Denies medication changes. Eating and drinking OK, denies CP, dysuria, dizziness. Notes chronic myalgias, HA and SOB which is similar to baseline    Given zofran per EMS. BG normal per ems at 247     Nursing Notes were all reviewed and agreed with or any disagreements were addressed in the HPI. REVIEW OF SYSTEMS        Positives and Pertinent negatives as per HPI.     PAST HISTORY     Past Medical History:  Past Medical History:   Diagnosis Date    Anemia 3/3/2010    Arrhythmia     irregular heartbeat    Arthritis     Asthma     CHF (congestive heart failure) ([facility] 3/3/2010    Chronic kidney disease Chronic obstructive pulmonary disease (HCC)     Chronic pain     back    Chronic sinusitis 3/3/2010    CPAP (continuous positive airway pressure) dependence     Diverticulosis     DM (diabetes mellitus) ([address] 3/3/2010    Dyslipidemia 3/3/2010    GERD (gastroesophageal reflux disease)     HTN (hypertension) 3/3/2010    Ill-defined condition     being evaluated py pulmonolgist for \"trouble breathing\"    S/P TKR (total knee replacement) 3/3/2010    Syncope 1/10/2023    Unspecified sleep apnea     doesn't wear CPAP since back has been hurting       Past Surgical History:  Past Surgical History:   Procedure Laterality Date    HX APPENDECTOMY      thinks it was taken with hysterectomy    HX GYN      \"tubes opened\"    HX HEENT      sinus surgery    HX HEMORRHOIDECTOMY      HX HYSTERECTOMY      HX KNEE REPLACEMENT      both knees- at same time    HX LUMBAR LAMINECTOMY      HX MOHS PROCEDURES      left shoulder    HX ORTHOPAEDIC  1980    neck fusion    [address]    right knee replaced for second time    HX ORTHOPAEDIC      lumbar fusion    HX OTHER SURGICAL      CORNELL BIOPSY BREAST STEREOTACTIC Left yrs ago    (-)    [place] COLONOSCOPY FLX DX W/COLLJ SPEC WHEN PFRMD  [id number]    dr. Bard So (barium enema)       Family History:  Family History   Problem Relation Age of Onset    Diabetes Mother     Heart Disease Father     Diabetes Brother     Blindness Brother     Diabetes Sister     Heart Disease Sister     Heart Disease Brother     Heart Disease Brother     Asthma Brother     Malignant Hyperthermia Neg Hx     Pseudocholinesterase Deficiency Neg Hx     Delayed Awakening Neg Hx     Post-op Nausea/Vomiting Neg Hx     Emergence Delirium Neg Hx     Post-op Cognitive Dysfunction Neg Hx        Social History:  Social History     Tobacco Use    Smoking status: Former     Packs/day: 0.30     Years: 5.00     Pack years: 1.50     Types: Cigarettes     Quit date: 1960     Years since quittin.0    Smokeless tobacco: Never   Vaping Use    Vaping Use: Never used   Substance Use Topics    Alcohol use: No     Alcohol/week: 0.0 standard drinks    Drug use: No       Allergies: Allergies   Allergen Reactions    Gabapentin Other (comments)     Muscles twitching and jerking    Levaquin [Levofloxacin] Swelling    Lyrica [Pregabalin] Other (comments)     Muscle twitching and jerking    Percodan [Oxycodone Hcl-Oxycodone-Asa] Itching    Percodan [Oxycodone-Aspirin] Other (comments)       CURRENT MEDICATIONS      Discharge Medication List as of 1/11/2023  1:58 PM        CONTINUE these medications which have NOT CHANGED    Details   diclofenac (VOLTAREN) 1 % gel APPLY TOPICALLY TO THE AFFECTED AREA FOUR TIMES DAILY, Normal, Disp-300 g, R-3      glipiZIDE SR (GLUCOTROL XL) 2.5 mg CR tablet Take one tab before breakfast and one tab before dinner, Normal, Disp-180 Tablet, R-3      ALPRAZolam (XANAX) 0.5 mg tablet TAKE 1/2 TABLET BY MOUTH AT BEDTIME AS NEEDED, Normal, Disp-15 Tablet, R-1      pantoprazole (PROTONIX) 40 mg tablet TAKE 1 TABLET BY MOUTH DAILY BEFORE AND BREAKFAST, Normal, Disp-90 Tablet, R-3      carbidopa-levodopa (Sinemet)  mg per tablet Take 1 Tablet by mouth two (2) times a day.  Indications: a type of movement disorder called parkinsonism, Normal, Disp-180 Tablet, R-2      predniSONE (DELTASONE) 5 mg tablet Take 2 Tablets by mouth two (2) times a day., Normal, Disp-360 Tablet, R-3      cholecalciferol (VITAMIN D3) (2,000 UNITS /50 MCG) cap capsule Vitamin D3 50 mcg (2,000 unit) capsule   Take 1 capsule every day by oral route., Historical Med      furosemide (LASIX) 20 mg tablet TAKE 1 TABLET BY MOUTH EVERY MORNING AND 1 TABLET EVERY AFTERNOON BETWEEN 2 TO 4 PM, Normal, Disp-180 Tablet, R-3      rosuvastatin (CRESTOR) 10 mg tablet TAKE 1 TABLET BY MOUTH EVERY NIGHT, Normal, Disp-90 Tablet, R-3      primidone (MYSOLINE) 50 mg tablet TAKE 3 TABLETS BY MOUTH TWICE DAILY, Normal, Disp-540 Tablet, R-3**Patient requests 90 days supply**      lancets (Accu-Chek Fastclix Lancet Drum) misc USE TO TEST BLOOD SUGAR TWICE DAILY DX E11.9, Normal, Disp-300 Each, R-3      glucose blood VI test strips (Accu-Chek Guide test strips) strip USE TO TEST BLOOD SUGAR TWICE DAILY DX E11.9, Normal, Disp-300 Strip, R-3      DULoxetine (CYMBALTA) 60 mg capsule TAKE 1 CAPSULE BY MOUTH DAILY, Normal, Disp-90 Capsule, R-3      Accu-Chek Guide Me Glucose Mtr misc USE TO CHECK BLOOD SUGAR TWICE DAILY, Normal, Disp-1 Each, R-0      BEVESPI AEROSPHERE 9-4.8 mcg HFA inhaler Take 2 puffs by inhalation daily, Historical Med, MAGNO      albuterol (PROVENTIL HFA, VENTOLIN HFA, PROAIR HFA) 90 mcg/actuation inhaler Take 2 Puffs by inhalation every four (4) hours as needed for Wheezing or Shortness of Breath., Historical Med, Disp-1 Inhaler      guaiFENesin (MUCINEX) 1,200 mg Ta12 ER tablet Take 1,200 mg by mouth daily as needed., Historical Med      MULTIVITAMIN PO Take 1 Tab by mouth daily. , Historical Med      lisinopril (PRINIVIL, ZESTRIL) 2.5 mg tablet Take 2.5 mg by mouth daily. , Historical Med      fluticasone (FLONASE) 50 mcg/actuation nasal spray SHAKE LIQUID AND USE 2 SPRAYS IN EACH NOSTRIL EVERY DAY, Normal**Patient requests 90 days supply**Disp-1 Bottle, R-11             SCREENINGS               No data recorded         PHYSICAL EXAM      ED Triage Vitals [01/10/23 1438]   ED Encounter Vitals Group      /61      Pulse (Heart Rate) (!) 48      Resp Rate 18      Temp 98.1 °F (36.7 °C)      Temp src       O2 Sat (%) 99 %      Weight       Height         Physical Exam  Vitals and nursing note reviewed. Constitutional:       Appearance: Normal appearance. Eyes:      Extraocular Movements: Extraocular movements intact. Pupils: Pupils are equal, round, and reactive to light. Cardiovascular:      Rate and Rhythm: Bradycardia present. Rhythm irregular. Abdominal:      General: Abdomen is flat. Palpations: Abdomen is soft. Musculoskeletal:      Comments: Diffuse muscular pain to palpation, no edema   Neurological:      General: No focal deficit present. Mental Status: She is alert and oriented to person, place, and time. Psychiatric:         Mood and Affect: Mood normal.         Behavior: Behavior normal.        DIAGNOSTIC RESULTS   LABS:     Recent Results (from the past 12 hour(s))   GLUCOSE, POC    Collection Time: 01/11/23 11:36 AM   Result Value Ref Range    Glucose (POC) 210 (H) 65 - 117 mg/dL    Performed by Ivon Garcia RN         EKG: If performed, independent interpretation documented below in the MDM section     RADIOLOGY:  Non-plain film images such as CT, Ultrasound and MRI are read by the radiologist. Plain radiographic images are visualized and preliminarily interpreted by the ED Provider with the findings documented in the MDM section. Interpretation per the Radiologist below, if available at the time of this note:     No results found. PROCEDURES   Unless otherwise noted below, none  Procedures     CRITICAL CARE TIME   none    EMERGENCY DEPARTMENT COURSE and DIFFERENTIAL DIAGNOSIS/MDM   Vitals:    Vitals:    01/11/23 0757 01/11/23 1107 01/11/23 1114 01/11/23 1135   BP:  (!) 154/61 (!) 154/61 (!) 157/63   Pulse:  70 70 70   Resp:  21 19    Temp:  98.8 °F (37.1 °C) 98.4 °F (36.9 °C)    SpO2: 96% 97% 95% 98%   Weight:            Patient was given the following medications:  Medications   0.9% sodium chloride infusion (75 mL/hr IntraVENous New Bag 1/11/23 0013)   acetaminophen (TYLENOL) tablet 1,000 mg (1,000 mg Oral Given 1/11/23 0013)   glucagon (GLUCAGEN) injection 1 mg (1 mg IntraVENous Given 1/11/23 0014)       Medical Decision Making  Female with history of hypertension, diabetes, systolic heart failure, parkinsonism presents after syncopal episode. On arrival she is notably bradycardic in the 40s. Reviewed prior records and she is normally hovering in the 50s.   Her blood pressure stable at 130/61. Her only complaint right now some nausea and a mild headache as well a lot of chronic conditions including generalized myalgias and shortness of breath. No signs of trauma and she did not fall or hit her head. Reviewed her medication list with her and she denies any medication changes. Consider medication side effects as she is on alprazolam but that she has been taking this for some time and no bad reactions to it. Considered hypoglycemia but she had a normal blood sugar on arrival and she is stable here as well. She is a normal nonfocal exam except for her notable bradycardia. Reviewed family medicine records in the past and she has been seen for hyperkalemia as her creatinine hovers between 1-1/2-2. As such we will check a CBC and a CMP to evaluate for any electrolyte abnormalities or anemia. I reviewed her EKG and is nonischemic except for bradycardia. Notably the EKG is extreme limited by motion artifact and does have a somewhat wide QRS which appears similar to prior. Will need walk test prior to discharge if lab work is unremarkable. Amount and/or Complexity of Data Reviewed  Labs: ordered. Radiology: ordered. ECG/medicine tests: ordered. Risk  OTC drugs. Decision regarding hospitalization. ED Course as of 01/11/23 2109 Tue Luis 10, 2023   2007 K was hemolyzed but resent,  4.2 on recheck. [JS]   2007 Cr is elevated but at baseline [JS]   2025 I reviewed her head CT. No intracranial hemorrhage or mass-effect. Troponin repeated at 53 and is stable. She appears well. Given her high risk syncope event as well as her bradycardia upon arrival recommend admission with telemetry monitoring. Reviewed her medications closely with her daughter and . She does take Coreg which may be the cause of this as I suspect she had some bradycardia possibly some hypotension with left to her syncopal episode. May need to stop this medication.  [JS]      ED Course User Index  [JS] Arlene Hutchinson MD         FINAL IMPRESSION     1. Near syncope    2. Bradycardia          DISPOSITION/PLAN   Oneal Zmaarripa's  results have been reviewed with her. She has been counseled regarding her diagnosis, treatment, and plan. She verbally conveys understanding and agreement of the signs, symptoms, diagnosis, treatment and prognosis and additionally agrees to follow up as discussed. She also agrees with the care-plan and conveys that all of her questions have been answered. I have also provided discharge instructions for her that include: educational information regarding their diagnosis and treatment, and list of reasons why they would want to return to the ED prior to their follow-up appointment, should her condition change. CLINICAL IMPRESSION    Admit Note: Pt is being admitted by Dr. Ray Lipscomb. The results of their tests and reason(s) for their admission have been discussed with pt and/or available family. They convey agreement and understanding for the need to be admitted and for the admission diagnosis.      PATIENT REFERRED TO:  Follow-up Information       Follow up With Specialties Details Why Contact Info    Angy Rodriguez MD Family Medicine Go on 1/18/2023 at 11:20am for your [facility] hospital follow up. [address] [facility]  [phone]      Jaja Mary, [address] [facility] Vascular Surgery, Cardiovascular Disease Physician, Interventional Cardiology Physician Go on 1/19/2023 at 10:30am for your CARDIOLOGY hospital follow up. [id number]  [phone]                DISCHARGE MEDICATIONS:  Discharge Medication List as of 1/11/2023  1:58 PM        CONTINUE these medications which have NOT CHANGED    Details   diclofenac (VOLTAREN) 1 % gel APPLY TOPICALLY TO THE AFFECTED AREA FOUR TIMES DAILY, Normal, Disp-300 g, R-3      glipiZIDE SR (GLUCOTROL XL) 2.5 mg CR tablet Take one tab before breakfast and one tab before dinner, Normal, Disp-180 Tablet, R-3      ALPRAZolam (XANAX) 0.5 mg tablet TAKE 1/2 TABLET BY MOUTH AT BEDTIME AS NEEDED, Normal, Disp-15 Tablet, R-1      pantoprazole (PROTONIX) 40 mg tablet TAKE 1 TABLET BY MOUTH DAILY BEFORE AND BREAKFAST, Normal, Disp-90 Tablet, R-3      carbidopa-levodopa (Sinemet)  mg per tablet Take 1 Tablet by mouth two (2) times a day.  Indications: a type of movement disorder called parkinsonism, Normal, Disp-180 Tablet, R-2      predniSONE (DELTASONE) 5 mg tablet Take 2 Tablets by mouth two (2) times a day., Normal, Disp-360 Tablet, R-3      cholecalciferol (VITAMIN D3) (2,000 UNITS /50 MCG) cap capsule Vitamin D3 50 mcg (2,000 unit) capsule   Take 1 capsule every day by oral route., Historical Med      furosemide (LASIX) 20 mg tablet TAKE 1 TABLET BY MOUTH EVERY MORNING AND 1 TABLET EVERY AFTERNOON BETWEEN 2 TO 4 PM, Normal, Disp-180 Tablet, R-3      rosuvastatin (CRESTOR) 10 mg tablet TAKE 1 TABLET BY MOUTH EVERY NIGHT, Normal, Disp-90 Tablet, R-3      primidone (MYSOLINE) 50 mg tablet TAKE 3 TABLETS BY MOUTH TWICE DAILY, Normal, Disp-540 Tablet, R-3**Patient requests 90 days supply**      lancets (Accu-Chek Fastclix Lancet Drum) misc USE TO TEST BLOOD SUGAR TWICE DAILY DX E11.9, Normal, Disp-300 Each, R-3      glucose blood VI test strips (Accu-Chek Guide test strips) strip USE TO TEST BLOOD SUGAR TWICE DAILY DX E11.9, Normal, Disp-300 Strip, R-3      DULoxetine (CYMBALTA) 60 mg capsule TAKE 1 CAPSULE BY MOUTH DAILY, Normal, Disp-90 Capsule, R-3      Accu-Chek Guide Me Glucose Mtr misc USE TO CHECK BLOOD SUGAR TWICE DAILY, Normal, Disp-1 Each, R-0      BEVESPI AEROSPHERE 9-4.8 mcg HFA inhaler Take 2 puffs by inhalation daily, Historical Med, MAGNO      albuterol (PROVENTIL HFA, VENTOLIN HFA, PROAIR HFA) 90 mcg/actuation inhaler Take 2 Puffs by inhalation every four (4) hours as needed for Wheezing or Shortness of Breath., Historical Med, Disp-1 Inhaler      guaiFENesin (MUCINEX) 1,200 mg Ta12 ER tablet Take 1,200 mg by mouth daily as needed., Historical Med      MULTIVITAMIN PO Take 1 Tab by mouth daily. , Historical Med      lisinopril (PRINIVIL, ZESTRIL) 2.5 mg tablet Take 2.5 mg by mouth daily. , Historical Med      fluticasone (FLONASE) 50 mcg/actuation nasal spray SHAKE LIQUID AND USE 2 SPRAYS IN EACH NOSTRIL EVERY DAY, Normal**Patient requests 90 days supply**Disp-1 Bottle, R-11           STOP taking these medications       carvediloL (COREG) 12.5 mg tablet Comments:   Reason for Stopping:                 DISCONTINUED MEDICATIONS:  Discharge Medication List as of 1/11/2023  1:58 PM        STOP taking these medications       carvediloL (COREG) 12.5 mg tablet Comments:   Reason for Stopping:               I am the Primary Clinician of Record. Ana Cristina Ansari MD (electronically signed)    (Please note that parts of this dictation were completed with voice recognition software. Quite often unanticipated grammatical, syntax, homophones, and other interpretive errors are inadvertently transcribed by the computer software. Please disregards these errors.  Please excuse any errors that have escaped final proofreading.)

## 2023-01-11 VITALS
HEART RATE: 70 BPM | RESPIRATION RATE: 19 BRPM | BODY MASS INDEX: 33.3 KG/M2 | OXYGEN SATURATION: 98 % | DIASTOLIC BLOOD PRESSURE: 63 MMHG | WEIGHT: 194 LBS | SYSTOLIC BLOOD PRESSURE: 157 MMHG | TEMPERATURE: 98.4 F

## 2023-01-11 PROBLEM — R55 SYNCOPE AND COLLAPSE: Status: ACTIVE | Noted: 2023-01-11

## 2023-01-11 LAB
ALBUMIN SERPL-MCNC: 3.1 G/DL (ref 3.5–5)
ALBUMIN/GLOB SERPL: 1.2 (ref 1.1–2.2)
ALP SERPL-CCNC: 42 U/L (ref 45–117)
ALT SERPL-CCNC: 24 U/L (ref 12–78)
ANION GAP SERPL CALC-SCNC: 6 MMOL/L (ref 5–15)
AST SERPL-CCNC: 18 U/L (ref 15–37)
ATRIAL RATE: 52 BPM
ATRIAL RATE: 58 BPM
BILIRUB SERPL-MCNC: 0.3 MG/DL (ref 0.2–1)
BUN SERPL-MCNC: 37 MG/DL (ref 6–20)
BUN/CREAT SERPL: 20 (ref 12–20)
CALCIUM SERPL-MCNC: 8.9 MG/DL (ref 8.5–10.1)
CALCULATED P AXIS, ECG09: 47 DEGREES
CALCULATED R AXIS, ECG10: -35 DEGREES
CALCULATED R AXIS, ECG10: -40 DEGREES
CALCULATED T AXIS, ECG11: 52 DEGREES
CALCULATED T AXIS, ECG11: 72 DEGREES
CHLORIDE SERPL-SCNC: 107 MMOL/L (ref 97–108)
CHOLEST SERPL-MCNC: 209 MG/DL
CK SERPL-CCNC: 34 U/L (ref 26–192)
CO2 SERPL-SCNC: 30 MMOL/L (ref 21–32)
CREAT SERPL-MCNC: 1.84 MG/DL (ref 0.55–1.02)
DIAGNOSIS, 93000: NORMAL
DIAGNOSIS, 93000: NORMAL
ERYTHROCYTE [DISTWIDTH] IN BLOOD BY AUTOMATED COUNT: 15 % (ref 11.5–14.5)
EST. AVERAGE GLUCOSE BLD GHB EST-MCNC: 177 MG/DL
FOLATE SERPL-MCNC: 22.2 NG/ML (ref 5–21)
GLOBULIN SER CALC-MCNC: 2.5 G/DL (ref 2–4)
GLUCOSE BLD STRIP.AUTO-MCNC: 106 MG/DL (ref 65–117)
GLUCOSE BLD STRIP.AUTO-MCNC: 210 MG/DL (ref 65–117)
GLUCOSE SERPL-MCNC: 148 MG/DL (ref 65–100)
HBA1C MFR BLD: 7.8 % (ref 4–5.6)
HCT VFR BLD AUTO: 33.2 % (ref 35–47)
HDLC SERPL-MCNC: 76 MG/DL
HDLC SERPL: 2.8 (ref 0–5)
HGB BLD-MCNC: 10.2 G/DL (ref 11.5–16)
LDLC SERPL CALC-MCNC: 94.8 MG/DL (ref 0–100)
MCH RBC QN AUTO: 33.9 PG (ref 26–34)
MCHC RBC AUTO-ENTMCNC: 30.7 G/DL (ref 30–36.5)
MCV RBC AUTO: 110.3 FL (ref 80–99)
NRBC # BLD: 0 K/UL (ref 0–0.01)
NRBC BLD-RTO: 0 PER 100 WBC
P-R INTERVAL, ECG05: 142 MS
PLATELET # BLD AUTO: 134 K/UL (ref 150–400)
PMV BLD AUTO: 10.9 FL (ref 8.9–12.9)
POTASSIUM SERPL-SCNC: 4.4 MMOL/L (ref 3.5–5.1)
PROT SERPL-MCNC: 5.6 G/DL (ref 6.4–8.2)
Q-T INTERVAL, ECG07: 454 MS
Q-T INTERVAL, ECG07: 456 MS
QRS DURATION, ECG06: 96 MS
QRS DURATION, ECG06: 96 MS
QTC CALCULATION (BEZET), ECG08: 426 MS
QTC CALCULATION (BEZET), ECG08: 447 MS
RBC # BLD AUTO: 3.01 M/UL (ref 3.8–5.2)
SERVICE CMNT-IMP: ABNORMAL
SERVICE CMNT-IMP: NORMAL
SODIUM SERPL-SCNC: 143 MMOL/L (ref 136–145)
T4 FREE SERPL-MCNC: 0.9 NG/DL (ref 0.8–1.5)
TRIGL SERPL-MCNC: 191 MG/DL (ref ?–150)
TROPONIN-HIGH SENSITIVITY: 65 NG/L (ref 0–51)
TSH SERPL DL<=0.05 MIU/L-ACNC: 3.44 UIU/ML (ref 0.36–3.74)
VENTRICULAR RATE, ECG03: 53 BPM
VENTRICULAR RATE, ECG03: 58 BPM
VIT B12 SERPL-MCNC: 1779 PG/ML (ref 193–986)
VLDLC SERPL CALC-MCNC: 38.2 MG/DL
WBC # BLD AUTO: 7.4 K/UL (ref 3.6–11)

## 2023-01-11 PROCEDURE — 97161 PT EVAL LOW COMPLEX 20 MIN: CPT

## 2023-01-11 PROCEDURE — 74011250637 HC RX REV CODE- 250/637: Performed by: STUDENT IN AN ORGANIZED HEALTH CARE EDUCATION/TRAINING PROGRAM

## 2023-01-11 PROCEDURE — 82550 ASSAY OF CK (CPK): CPT

## 2023-01-11 PROCEDURE — 94640 AIRWAY INHALATION TREATMENT: CPT

## 2023-01-11 PROCEDURE — 80061 LIPID PANEL: CPT

## 2023-01-11 PROCEDURE — 85027 COMPLETE CBC AUTOMATED: CPT

## 2023-01-11 PROCEDURE — 97165 OT EVAL LOW COMPLEX 30 MIN: CPT

## 2023-01-11 PROCEDURE — 74011000250 HC RX REV CODE- 250: Performed by: STUDENT IN AN ORGANIZED HEALTH CARE EDUCATION/TRAINING PROGRAM

## 2023-01-11 PROCEDURE — 82746 ASSAY OF FOLIC ACID SERUM: CPT

## 2023-01-11 PROCEDURE — 97535 SELF CARE MNGMENT TRAINING: CPT

## 2023-01-11 PROCEDURE — 84443 ASSAY THYROID STIM HORMONE: CPT

## 2023-01-11 PROCEDURE — 74011250636 HC RX REV CODE- 250/636: Performed by: STUDENT IN AN ORGANIZED HEALTH CARE EDUCATION/TRAINING PROGRAM

## 2023-01-11 PROCEDURE — 36415 COLL VENOUS BLD VENIPUNCTURE: CPT

## 2023-01-11 PROCEDURE — 82962 GLUCOSE BLOOD TEST: CPT

## 2023-01-11 PROCEDURE — 83036 HEMOGLOBIN GLYCOSYLATED A1C: CPT

## 2023-01-11 PROCEDURE — 74011636637 HC RX REV CODE- 636/637: Performed by: STUDENT IN AN ORGANIZED HEALTH CARE EDUCATION/TRAINING PROGRAM

## 2023-01-11 PROCEDURE — 97530 THERAPEUTIC ACTIVITIES: CPT

## 2023-01-11 PROCEDURE — 84439 ASSAY OF FREE THYROXINE: CPT

## 2023-01-11 PROCEDURE — G0378 HOSPITAL OBSERVATION PER HR: HCPCS

## 2023-01-11 PROCEDURE — 82607 VITAMIN B-12: CPT

## 2023-01-11 PROCEDURE — 80053 COMPREHEN METABOLIC PANEL: CPT

## 2023-01-11 PROCEDURE — 84484 ASSAY OF TROPONIN QUANT: CPT

## 2023-01-11 PROCEDURE — 97116 GAIT TRAINING THERAPY: CPT

## 2023-01-11 RX ORDER — CARVEDILOL 6.25 MG/1
6.25 TABLET ORAL 2 TIMES DAILY WITH MEALS
Status: DISCONTINUED | OUTPATIENT
Start: 2023-01-11 | End: 2023-01-11

## 2023-01-11 RX ADMIN — ARFORMOTEROL TARTRATE 15 MCG: 15 SOLUTION RESPIRATORY (INHALATION) at 00:24

## 2023-01-11 RX ADMIN — CARBIDOPA AND LEVODOPA 1 TABLET: 25; 100 TABLET ORAL at 08:26

## 2023-01-11 RX ADMIN — GLUCAGON HYDROCHLORIDE 1 MG: 1 INJECTION, POWDER, FOR SOLUTION INTRAMUSCULAR; INTRAVENOUS; SUBCUTANEOUS at 00:14

## 2023-01-11 RX ADMIN — CARVEDILOL 12.5 MG: 12.5 TABLET, FILM COATED ORAL at 08:26

## 2023-01-11 RX ADMIN — IPRATROPIUM BROMIDE 0.5 MG: 0.5 SOLUTION RESPIRATORY (INHALATION) at 00:24

## 2023-01-11 RX ADMIN — ROSUVASTATIN CALCIUM 10 MG: 10 TABLET, COATED ORAL at 00:53

## 2023-01-11 RX ADMIN — PRIMIDONE 150 MG: 50 TABLET ORAL at 08:25

## 2023-01-11 RX ADMIN — PREDNISONE 10 MG: 5 TABLET ORAL at 08:26

## 2023-01-11 RX ADMIN — FUROSEMIDE 20 MG: 20 TABLET ORAL at 08:26

## 2023-01-11 RX ADMIN — ARFORMOTEROL TARTRATE 15 MCG: 15 SOLUTION RESPIRATORY (INHALATION) at 07:56

## 2023-01-11 RX ADMIN — PANTOPRAZOLE SODIUM 40 MG: 40 TABLET, DELAYED RELEASE ORAL at 08:25

## 2023-01-11 RX ADMIN — IPRATROPIUM BROMIDE 0.5 MG: 0.5 SOLUTION RESPIRATORY (INHALATION) at 07:56

## 2023-01-11 RX ADMIN — LISINOPRIL 2.5 MG: 5 TABLET ORAL at 08:26

## 2023-01-11 RX ADMIN — Medication 3 UNITS: at 12:38

## 2023-01-11 RX ADMIN — SODIUM CHLORIDE 75 ML/HR: 9 INJECTION, SOLUTION INTRAVENOUS at 00:13

## 2023-01-11 RX ADMIN — ACETAMINOPHEN 1000 MG: 500 TABLET ORAL at 00:13

## 2023-01-11 RX ADMIN — SODIUM CHLORIDE, PRESERVATIVE FREE 10 ML: 5 INJECTION INTRAVENOUS at 06:09

## 2023-01-11 RX ADMIN — SODIUM CHLORIDE, PRESERVATIVE FREE 10 ML: 5 INJECTION INTRAVENOUS at 00:17

## 2023-01-11 RX ADMIN — DULOXETINE HYDROCHLORIDE 60 MG: 30 CAPSULE, DELAYED RELEASE ORAL at 08:25

## 2023-01-11 NOTE — PROGRESS NOTES
End of Shift Note    Bedside shift change report given to Daniel Velázquez RN (oncoming nurse) by Sosa Pierre (offgoing nurse).   Report included the following information SBAR    Shift worked:  night     Shift summary and any significant changes:     No significant changes     Concerns for physician to address:  none     Zone phone for oncoming shift:         Activity:     Number times ambulated in hallways past shift: 0  Number of times OOB to chair past shift: 0    Cardiac:   Cardiac Monitoring: Yes      Cardiac Rhythm: Sinus Rhythm    Access:  Current line(s): PIV     Genitourinary:   Urinary status: voiding    Respiratory:   O2 Device: None (Room air)  Chronic home O2 use?: NO  Incentive spirometer at bedside: NO       GI:  Last Bowel Movement Date: 01/10/23  Current diet:  ADULT DIET Regular  Passing flatus: YES  Tolerating current diet: YES       Pain Management:   Patient states pain is manageable on current regimen: YES    Skin:  Capo Score: 20  Interventions: float heels, increase time out of bed, foam dressing, and PT/OT consult    Patient Safety:  Fall Score:    Interventions: bed/chair alarm, assistive device (walker, cane, etc), gripper socks, and pt to call before getting OOB       Length of Stay:  Expected LOS: - - -  Actual LOS: [facility]

## 2023-01-11 NOTE — PROGRESS NOTES
Transition of Care Plan: is here and will be discharging patient home today. RUR:15%    Disposition:Home with . Follow up appointments:Placed on AVS    DME needed:None    Transportation at [address] transporting patient home    [address] or means to access home:    has keys       IM Medicare Letter:Given on 1/10/23    Is patient a [place] and connected with the [place]? No                If yes, was [employer] transfer form completed and [facility] notified? N/A    Caregiver Contact:    Discharge Caregiver contacted prior to discharge? Caregiver aware of discharge for today. Care Conference needed?: No      Reason for Admission:  Patient came to ed for syncopal event at home. RUR Score:  15%                   Plan for utilizing home health:   She has used home health in the past.       PCP: First and Last name:  Tereza Hahn MD     Name of Practice: [facility]     Are you a current patient: Yes/No: Yes     Approximate date of last visit: One month ago     Can you participate in a virtual visit with your PCP: No                      Current Advanced Directive/Advance Care Plan: Full Code  Advance Care Planning     General Advance Care Planning (ACP) Conversation      Date of Conversation: 1/11/23  Conducted with: Patient with Decision Making Capacity    Healthcare Decision Maker:   No healthcare decision makers have been documented.    Click here to complete [address] including selection of the Healthcare Decision Maker Relationship (ie \"Primary\")      Content/Action Overview:   DECLINED ACP conversation - will revisit periodically   Reviewed DNR/DNI and patient elects Full Code (Attempt Resuscitation)         Length of Voluntary ACP Conversation in minutes:  <16 minutes (Non-Billable)    Antionette Peña RN                 Healthcare Decision Maker:   Click here to complete [address] including selection of the Healthcare Decision Maker Relationship (ie \"Primary\")                            Patient's  is in the room with her at this time. Confirmed demographics and pcp information. They live in tri level home and their grand daughter comes over to check on the. Patient does not use home oxygen and does not use cpap.  states he drives her to her appointments. He states she needs occasional help with her adl's and he assists her sometimes . Patient is being discharged home today and  is transporting her. Erma Ventura  RN BSN CRM        [phone]

## 2023-01-11 NOTE — PROGRESS NOTES
Occupational Therapy    OCCUPATIONAL THERAPY EVALUATION/DISCHARGE  Patient: Giesla Robledo (82 y.o. female)  Date: 1/11/2023  Primary Diagnosis: Syncope [R55]  Symptomatic bradycardia [R00.1]  Syncope and collapse [R55]       Precautions:   Fall    ASSESSMENT  Based on the objective data described below, the patient presents with good activity tolerance with mild asymptomatic orthostatic hypotension post walking and toileting activity s/p admission with syncope (possible vasovagal per cardiac) episode with bradycardia noted, currently presenting near baseline of SBA for functional mobility, using RW this date, and overall up to  min A for ADLs with hypotension noted post bathroom mobility and toileting needs, resolved once seated. Spoke with patient and family who reported near baseline with fair endurance, noted use of SPC or RW pending daily fatigue and out in community. Mild B UE tremor with activity at baseline per patient and family. Patient with 24/7 A/S with  or granddaughter who is an RN. Has all DME needed and educated on safety within home and pacing due to noted hypotension this date with activity, recommend use of RW in bathroom and community, At this time recommend no OT needs with continued family support. Current Level of Function (ADLs/self-care): up to min A; SBA with RW    Functional Outcome Measure: The patient scored 70/100 on the barthel ADL index outcome measure which is indicative of near baseline. Other factors to consider for discharge: continued S at [address] [place] [address] :  Recommend with staff: Lay Spann for all meals    Recommendation for discharge: (in order for the patient to meet his/her long term goals)  No skilled occupational therapy/ follow up rehabilitation needs identified at this time.     This discharge recommendation:  A follow-up discussion with the attending provider and/or case management is planned    IF patient discharges home will need the following DME: patient owns DME required for discharge       SUBJECTIVE:   Patient stated I feel pretty good.     OBJECTIVE DATA SUMMARY:   HISTORY:   Past Medical History:   Diagnosis Date    Anemia 3/3/2010    Arrhythmia     irregular heartbeat    Arthritis     Asthma     CHF (congestive heart failure) ([facility] 3/3/2010    Chronic kidney disease     Chronic obstructive pulmonary disease (HCC)     Chronic pain     back    Chronic sinusitis 3/3/2010    CPAP (continuous positive airway pressure) dependence     Diverticulosis     DM (diabetes mellitus) ([facility] 3/3/2010    Dyslipidemia 3/3/2010    GERD (gastroesophageal reflux disease)     HTN (hypertension) 3/3/2010    Ill-defined condition     being evaluated py pulmonolgist for \"trouble breathing\"    S/P TKR (total knee replacement) 3/3/2010    Syncope 1/10/2023    Unspecified sleep apnea     doesn't wear CPAP since back has been hurting     Past Surgical History:   Procedure Laterality Date    HX APPENDECTOMY      thinks it was taken with hysterectomy    HX GYN      \"tubes opened\"    HX HEENT      sinus surgery    HX HEMORRHOIDECTOMY      HX HYSTERECTOMY      HX KNEE REPLACEMENT  1996    both knees- at same time    HX LUMBAR LAMINECTOMY  1980s    HX MOHS PROCEDURES      left shoulder    HX ORTHOPAEDIC  1980    neck fusion    [address]    right knee replaced for second time    HX ORTHOPAEDIC      lumbar fusion    HX OTHER SURGICAL      CORNELL BIOPSY BREAST STEREOTACTIC Left yrs ago    (-)    TN COLONOSCOPY FLX DX W/COLLJ SPEC WHEN PFRMD  [id number]    dr. Fer Bee (barium enema)       Prior Level of Function/Environment/Context: mod I with SPC or RW, S to min A for all ADLs  Expanded or extensive additional review of patient history:   Home Situation  Home Environment: Private residence  # Steps to Enter: 5  Rails to Enter: Yes  Hand Rails : Bilateral  One/Two Story Residence:  (trilevel, steps down to living room (4))  Living Alone: No  Support Systems: Spouse/Significant Other, Child(raj)  Patient Expects to be Discharged to[de-identified] Home  Current DME Used/Available at Home: Debbe Helms, straight, Walker, rollator, Walker, rolling, Shower chair (lift recliner)  Tub or Shower Type: Shower    Hand dominance: Right    EXAMINATION OF PERFORMANCE DEFICITS:  Cognitive/Behavioral Status:  Neurologic State: Alert              Safety/Judgement: Insight into deficits    Skin: intact    Edema: none noted    Hearing: Auditory  Auditory Impairment: Hard of hearing, left side    Vision/Perceptual:                                     Range of Motion:  R limited from previous injury proximally, elbow and hand intact  AROM: Generally decreased, functional  PROM: Generally decreased, functional                      Strength:  B UE  Strength: Generally decreased, functional                Coordination:  Coordination: Generally decreased, functional  Fine Motor Skills-Upper: Left Intact; Right Intact         Tone & Sensation:  B UE  Tone: Abnormal (tremulous bilateral hands)                         Balance:  Sitting: Intact; Without support  Standing: Impaired; With support  Standing - Static: Constant support;Good  Standing - Dynamic : Constant support; Fair    Functional Mobility and Transfers for ADLs:  Bed Mobility:  Supine to Sit: Stand-by assistance;Bed Modified; Additional time  Scooting: Contact guard assistance    Transfers:  Sit to Stand: Contact guard assistance; Additional time  Stand to Sit: Additional time;Minimum assistance  Bed to Chair: Contact guard assistance  Bathroom Mobility: Stand-by assistance  Toilet Transfer : Stand-by assistance  Assistive Device : Walker, rolling    ADL Assessment:  Feeding: Setup    Oral Facial Hygiene/Grooming: Setup    Bathing: Minimum assistance         Upper Body Dressing: Minimum assistance    Lower Body Dressing: Minimum assistance    Toileting: Stand by assistance      Completed OT evaluation and ADLs seated EOB and standing as able with RW for balance.  Educated on safety and endurance training with encouragement for full participation in ADLs while in hospital. Good understanding noted. ADL Intervention and task modifications:       Grooming  Washing Hands: Set-up          Patient instructed and indicated understanding the benefits of maintaining activity tolerance, functional mobility, and independence with self care tasks during acute stay  to ensure safe return home and to baseline. Encouraged patient to increase frequency and duration OOB, be out of bed for all meals, perform daily ADLs (as approved by RN/MD regarding bathing etc), and performing functional mobility to/from bathroom. Lower Body Dressing Assistance  Socks: Minimum assistance    Toileting  Bladder Hygiene: Supervision  Bowel Hygiene: Supervision  Clothing Management: Supervision  Cues: Visual/perceptual training/retraining    Cognitive Retraining  Safety/Judgement: Insight into deficits    Therapeutic Exercise:  B UE encouraged AROM as tolerated   Functional Measure:    Barthel Index:  Bathin  Bladder: 10  Bowels: 10  Groomin  Dressin  Feeding: 10  Mobility: 10  Stairs: 5  Toilet Use: 5  Transfer (Bed to Chair and Back): 10  Total: 70/100      The Barthel ADL Index: Guidelines  1. The index should be used as a record of what a patient does, not as a record of what a patient could do. 2. The main aim is to establish degree of independence from any help, physical or verbal, however minor and for whatever reason. 3. The need for supervision renders the patient not independent. 4. A patient's performance should be established using the best available evidence. Asking the patient, friends/relatives and nurses are the usual sources, but direct observation and common sense are also important. However direct testing is not needed. 5. Usually the patient's performance over the preceding 24-48 hours is important, but occasionally longer periods will be relevant.   6. Middle categories imply that the patient supplies over 50 per cent of the effort. 7. Use of aids to be independent is allowed. Score Interpretation (from [facility])    Independent   60-79 Minimally independent   40-59 Partially dependent   20-39 Very dependent   <20 Totally dependent     -Devonte Tejeda., Barthel, D.W. (1965). Functional evaluation: the Barthel Index. [address] ([address] (1997). The Barthel activities of daily living index: self-reporting versus actual performance in the old (> or = 75 years). Journal of [address] [place] [address] 45(7), [facility], JДМИТРИЙ, Micah Evans., Luzmaria Lagunas. (1999). Measuring the change in disability after inpatient rehabilitation; comparison of the responsiveness of the Barthel Index and Functional [place] Measure. Journal of Neurology, Neurosurgery, and Psychiatry, 66(4), 736-607. [place], N.[place].A, FAUSTO Moreno, & Jai Hernandez MBrittneyA. (2004) Assessment of post-stroke quality of life in cost-effectiveness studies: The usefulness of the Barthel Index and the EuroQoL-5D. Quality of Life Research, 15, 703-41         Occupational Therapy Evaluation Charge Determination   History Examination Decision-Making   MEDIUM Complexity : Expanded review of history including physical, cognitive and psychosocial  history  MEDIUM Complexity : 3-5 performance deficits relating to physical, cognitive , or psychosocial skils that result in activity limitations and / or participation restrictions MEDIUM Complexity : Patient may present with comorbidities that affect occupational performnce.  Miniml to moderate modification of tasks or assistance (eg, physical or verbal ) with assesment(s) is necessary to enable patient to complete evaluation       Based on the above components, the patient evaluation is determined to be of the following complexity level: MEDIUM  Pain Rating:  None at rest, mild at BP cuff and adjusted    Activity Tolerance:   Fair and SpO2 stable on RA    After treatment patient left in no apparent distress:    Sitting in chair, Call bell within reach, and Caregiver / family present    COMMUNICATION/EDUCATION:   The patients plan of care was discussed with: Physical therapist and Registered nurse.      Thank you for this referral.  Darren Good OT  Time Calculation: 41 mins

## 2023-01-11 NOTE — PROGRESS NOTES
0700: Bedside shift change report given to [id number] (oncoming nurse) by Sherif Carmen (offgoing nurse). Report included the following information SBAR, Kardex, Intake/Output, and MAR.      1430: RN discussed discharge instructions with pt and , no further concerns at this time.  Pt escorted down to lobby with  and belongings

## 2023-01-11 NOTE — DISCHARGE INSTRUCTIONS
DISCHARGE DIAGNOSIS:  Acute Syncopal Event  Sinus bradycardia   Orthostatic hypotension   Acute HyperK  CKD:                  Parkinson's:       DM   Asthma     MEDICATIONS:  It is important that you take the medication exactly as they are prescribed. Keep your medication in the bottles provided by the pharmacist and keep a list of the medication names, dosages, and times to be taken in your wallet. Do not take other medications without consulting your doctor. Pain Management: per above medications    What to do at [address]:  Cardiac Diet    Recommended activity: Activity as tolerated    If you have questions regarding the hospital related prescriptions or hospital related issues please call [facility] at . You can always direct your questions to your primary care doctor if you are unable to reach your hospital physician; your PCP works as an extension of your hospital doctor just like your hospital doctor is an extension of your PCP for your time at the hospital [facility]).     If you experience any of the following symptoms then please call your primary care physician or return to the emergency room if you cannot get hold of your doctor:  Fever, chills, nausea, vomiting, diarrhea, change in mentation, falling, bleeding, shortness of breath,

## 2023-01-11 NOTE — CONSULTS
[facility]  [address]  [phone]  www. [email or web address]       CARDIOLOGY CONSULTATION    PLEASE NOTE THAT WE CONFIRMED WITH THE REFERRING PHYSICIAN PRIOR TO SEEING THE PATIENT THAT THE PATIENT IS BEING REFERRED FOR [address] EVALUATION AND FOR LONG-TERM ONGOING CARDIAC CARE     Date of  Admission: 1/10/2023  2:45 PM     Admission type:Emergency   Primary Care Amelia Cheatham MD     Attending Provider: Rancho Horner MD  Cardiology Provider: Dr. Suzy Melendez: Syncope     Subjective: Larry Mendieta is a 80 y.o. female admitted for Syncope [R55]  Symptomatic bradycardia [R00.1]. She has a PMHx of non-ischemic cardiomyopathy, HTN, HLD, CKD, DM2, Parkinson's disease. Admitted to ED [facility] for syncopal episode. Pt reports yesterday, woke up feeling in her usual state of health. She showered, got dressed, took her AM medications and made her way downstairs in her Lift Chair. As she got to the bottom of the stairs, she suddenly started to feel dizzy and nauseous. The last thing she remembers is calling out to her . She claims her  found here slumped over towards her right side, still on the chair, unresponsive. He called EMS. She regained consciousness as EMS was putting her onto the stretcher. When she came to, she felt very weak. She no longer had dizziness. She did not have any chest pain, palpitations or shortness of breath prior to her episode or after. In ED, EKG showed sinus bradycardia with vr 58 bpm.  No ischemic changes noted. Labs remarkable for elevated BNP 1634, trops 52. CXR without acute process. U/A neg. Admitted for further evaluation. Cardiology consulted for further workup of syncope with bradycardia. Pt is known to our practice, follows with Dr. Cesar Dietz. Last seen in the office on 1/4/23, had been doing well.   EKG at that time showed sinus rhythm vr 60 bpm.  She has been maintained on carvedilol 12.5 mg BID without issue in the past.    Currently, pt feels well. No further dizziness. She complains only of headache. Patient Active Problem List    Diagnosis Date Noted    Syncope 01/10/2023    Symptomatic bradycardia 01/10/2023    CKD (chronic kidney disease) stage 4, GFR 15-29 ml/min ([facility] 06/27/2022    Chronic renal disease, stage III 06/27/2022    Ataxia 06/18/2021    General weakness 06/18/2021    Tremors of nervous system 06/18/2021    Fall at home 06/18/2021    COPD exacerbation ([facility] 12/09/2019    Polymyalgia rheumatica ([facility] 06/15/2018    Type 2 diabetes with nephropathy ([facility] 04/11/2018    Severe obesity (BMI 35.0-39. 9) with comorbidity ([facility] 04/11/2018    Plantar fasciitis of left foot 11/02/2017    CKD (chronic kidney disease) 10/12/2016    Arthralgia of multiple joints 10/12/2016    Encounter for medication monitoring 01/29/2016    Reactive airway disease with wheezing without complication 89/59/5626    Essential hypertension, benign 08/25/2015    Asthma 02/23/2014    Anxiety disorder 02/23/2014    Diabetic neuropathy ([facility] 02/23/2014    Esophageal reflux 02/23/2014    Degenerative arthritis of lumbar spine 06/18/2013    DJD (degenerative joint disease) 08/01/2012    Chronic systolic heart failure ([facility] 08/01/2012    Obstructive sleep apnea (adult) (pediatric) 01/12/2012    Environmental allergies 10/12/2010    CHF (congestive heart failure) ([facility] 03/03/2010    S/P TKR (total knee replacement) 03/03/2010    Anemia 03/03/2010    s/p lumbar laminectomy 03/03/2010    S/P ASAD (total abdominal hysterectomy) 03/03/2010    S/P hemorrhoidectomy 03/03/2010    S/P rotator cuff surgery 03/03/2010    DM (diabetes mellitus) ([facility] 03/03/2010    Chronic sinusitis 03/03/2010    S/P sinus surgery 03/03/2010    Dyslipidemia 03/03/2010      Tony Bond MD  Past Medical History:   Diagnosis Date    Anemia 3/3/2010    Arrhythmia     irregular heartbeat    Arthritis     Asthma     [facility] 3/3/2010    Chronic kidney disease     Chronic obstructive pulmonary disease (HCC)     Chronic pain     back    Chronic sinusitis 3/3/2010    CPAP (continuous positive airway pressure) dependence     Diverticulosis     DM (diabetes mellitus) [facility] 3/3/2010    Dyslipidemia 3/3/2010    GERD (gastroesophageal reflux disease)     HTN (hypertension) 3/3/2010    Ill-defined condition     being evaluated py pulmonolgist for \"trouble breathing\"    S/P TKR (total knee replacement) 3/3/2010    Syncope 1/10/2023    Unspecified sleep apnea     doesn't wear CPAP since back has been hurting      Social History     Socioeconomic History    Marital status:    Tobacco Use    Smoking status: Former     Packs/day: 0.30     Years: 5.00     Pack years: 1.50     Types: Cigarettes     Quit date: 1960     Years since quittin.0    Smokeless tobacco: Never   Vaping Use    Vaping Use: Never used   Substance and Sexual Activity    Alcohol use: No     Alcohol/week: 0.0 standard drinks    Drug use: No    Sexual activity: Never   Social History Narrative    ** Merged History Encounter **          Allergies   Allergen Reactions    Gabapentin Other (comments)     Muscles twitching and jerking    Levaquin [Levofloxacin] Swelling    Lyrica [Pregabalin] Other (comments)     Muscle twitching and jerking    Percodan [Oxycodone Hcl-Oxycodone-Asa] Itching    Percodan [Oxycodone-Aspirin] Other (comments)      Family History   Problem Relation Age of Onset    Diabetes Mother     Heart Disease Father     Diabetes Brother     Blindness Brother     Diabetes Sister     Heart Disease Sister     Heart Disease Brother     Heart Disease Brother     Asthma Brother     Malignant Hyperthermia Neg Hx     Pseudocholinesterase Deficiency Neg Hx     Delayed Awakening Neg Hx     Post-op Nausea/Vomiting Neg Hx     Emergence Delirium Neg Hx     Post-op Cognitive Dysfunction Neg Hx Current Facility-Administered Medications   Medication Dose Route Frequency    acetaminophen (TYLENOL) tablet 650 mg  650 mg Oral Q6H PRN    ondansetron (ZOFRAN) injection 4 mg  4 mg IntraVENous Q4H PRN    sodium chloride (NS) flush 5-40 mL  5-40 mL IntraVENous Q8H    sodium chloride (NS) flush 5-40 mL  5-40 mL IntraVENous PRN    acetaminophen (TYLENOL) tablet 650 mg  650 mg Oral Q6H PRN    Or    acetaminophen (TYLENOL) suppository 650 mg  650 mg Rectal Q6H PRN    polyethylene glycol (MIRALAX) packet 17 g  17 g Oral DAILY PRN    promethazine (PHENERGAN) tablet 12.5 mg  12.5 mg Oral Q6H PRN    Or    ondansetron (ZOFRAN) injection 4 mg  4 mg IntraVENous Q6H PRN    glucagon (GLUCAGEN) injection 1 mg  1 mg IntraMUSCular PRN    dextrose 10 % infusion 250 mL  250 mL IntraVENous DIALYSIS PRN    glucose chewable tablet 16 g  16 g Oral PRN    insulin lispro (HUMALOG) injection 1-8 Units  1-8 Units SubCUTAneous AC&HS    lisinopriL (PRINIVIL, ZESTRIL) tablet 2.5 mg  2.5 mg Oral DAILY    DULoxetine (CYMBALTA) capsule 60 mg  60 mg Oral DAILY    carvediloL (COREG) tablet 12.5 mg  12.5 mg Oral BID WITH MEALS    rosuvastatin (CRESTOR) tablet 10 mg  10 mg Oral QHS    predniSONE (DELTASONE) tablet 10 mg  10 mg Oral BID    carbidopa-levodopa (SINEMET)  mg per tablet 1 Tablet  1 Tablet Oral BID    pantoprazole (PROTONIX) tablet 40 mg  40 mg Oral ACB    guaiFENesin ER (MUCINEX) tablet 1,200 mg  1,200 mg Oral DAILY PRN    albuterol-ipratropium (DUO-NEB) 2.5 MG-0.5 MG/3 ML  3 mL Nebulization Q4H PRN    ALPRAZolam (XANAX) tablet 0.25 mg  0.25 mg Oral QHS PRN    fluticasone propionate (FLONASE) 50 mcg/actuation nasal spray 2 Spray  2 Spray Both Nostrils DAILY    furosemide (LASIX) tablet 20 mg  20 mg Oral BID    primidone (MYSOLINE) tablet 150 mg  150 mg Oral BID    atropine injection 1 mg  1 mg IntraVENous ONCE PRN    arformoteroL (BROVANA) neb solution 15 mcg  15 mcg Nebulization BID RT    And    ipratropium (ATROVENT) 0.02 % nebulizer solution 0.5 mg  0.5 mg Nebulization BID RT        Review of Symptoms:   11 systems reviewed, negative other than as stated in the HPI        Objective:      Visit Vitals  BP (!) 145/53 (BP 1 Location: Right upper arm, BP Patient Position: At rest)   Pulse 80   Temp 98.6 °F (37 °C)   Resp 16   Wt 88 kg (194 lb 0.1 oz)   SpO2 96%   BMI 33.30 kg/m²         Physical Exam:  General: Well developed, in no acute distress, cooperative and alert  HEENT: No carotid bruits, PEERL, EOM intact. Heart:  reg rate and rhythm; normal S1/S2; no murmurs  Respiratory: Clear bilaterally x 4, no wheezing or rales  Abdomen:   Soft, non-tender, no distention, no masses. + BS. Extremities:  Normal cap refill, no cyanosis, atraumatic. No edema. Neuro: A&Ox3, speech clear  Skin: Skin color is normal. Non diaphoretic  Vascular: 2+ pulses symmetric in all extremities    Data Review:   Recent Labs     01/11/23  0345 01/10/23  1453   WBC 7.4 9.8   HGB 10.2* 10.8*   HCT 33.2* 34.4*   * 149*     Recent Labs     01/11/23  0345 01/10/23  1925 01/10/23  1724     --  140   K 4.4 4.2 5.7*     --  104   CO2 30  --  32   *  --  203*   BUN 37*  --  31*   CREA 1.84*  --  1.76*   CA 8.9  --  9.5   MG  --   --  2.4   ALB 3.1*  --  3.5   TBILI 0.3  --  0.5   ALT 24  --  10*       Recent Labs     01/11/23  0345   CPK 34       No intake or output data in the 24 hours ending 01/11/23 0849     Cardiographics    Telemetry: sinus rhythm    ECG: sinus bradycardia vr 58 bpm    Echocardiogram: pending    Radiology Results in the past 24 hours:  CT HEAD WO CONT    Result Date: 1/10/2023  Chronic sinus disease. No acute intracranial abnormality. XR CHEST PORT    Result Date: 1/10/2023  No acute process.         Assessment:       Active Problems:    Syncope (1/10/2023)      Symptomatic bradycardia (1/10/2023)         Plan:     Syncope  Unknown etiology  Echo done in our office in 8/2022 with normal LVEF, no signficant valvular pathology  Will reduce coreg dose, monitor on telemetry  Check orthostatics  Can consider OP event monitor    2. Hx of non-ischemic cardiomyopathy  Prev hx of NICM, now resolved  Echo in 8/2022 (our office) with preserved LVEF  Continue lower dose BB, lisinopril  Euvolemic, no evidence of decompensated HF at this time    Thank you for this consultation. We will continue to follow with you. CYNTHIA De La Torre MD      Addendum: Discussed with Dr. Renee Florian. Will dc coreg entirely. Monitor BP. Will obtain event monitor as OP. Okay to dc home today from cardiac standpoint. She will fu in our office for event monitor placement. Patient seen, examined by me personally. Plan discussed as detailed. Agree with note as outlined by NP with modifications as noted. Episode appears to be vasovagal. Hold BB for now. Event monitor. Fu as out pt.      Edy Jarvis MD

## 2023-01-11 NOTE — PROGRESS NOTES
TRANSFER - IN REPORT:    Verbal report received from JULIANO Knox(name) on Mo Rubi  being received from ED(unit) for routine progression of care      Report consisted of patients Situation, Background, Assessment and   Recommendations(SBAR). Information from the following report(s) SBAR, Kardex, ED Summary, MAR, Recent Results, and Med Rec Status was reviewed with the receiving nurse. Opportunity for questions and clarification was provided. Assessment completed upon patients arrival to unit and care assumed.

## 2023-01-11 NOTE — H&P
PLEASE NOTE: I HAVE GENERATED THIS NOTE WITH THE ASSISTANCE OF VOICE-RECOGNITION TECHNOLOGY. PLEASE EXCUSE ANY SPELLING, GRAMMATICAL, AND SYNTAX ERRORS YOU MAY FIND. IF YOU NEED CLARIFICATION ON ANYTHING, PLEASE REACH OUT TO ME.  [facility]  History and Physical - Dr. Kendra Dickerson:     80 y.o. female with pertinent medical hx of several co-morbidities presented with acute syncopal event    Differential diagnosis, explanation and analysis: Patient is on Co-reg at home and may have had sx bradycardia. Also on ddx is orthostatic hypotension, electrolyte abnormality, baseline parkinson's; CT head ruled out acute abnormality, UA ruled out UTI, FS glc was normal.  No suspicion for cva. Acute problems:    # Acute Syncopal Event  - Will check EKG, tropes  - A1C, TSH, T3, T4, Lipid panel  - B12, Folate, CK, ABG  - Orthostatics, Neurochecks  - ECHO; NegCT Head;   - Outpatient Cardio for holter monitor    # Acute HyperK  - Resolved  - Monitor    Chronic Problems:    CKD:  CR at baseline; avoid nephrotoxics  Parkinson's: C/w home Mysoline, Sinemet  DM:  C/w home Cymbalta; RISS; hold home Glipizide  Asthma: C/w home albuterol  HTN:  C/w home Lisinopril  CHF:  C/w homeLasix; hold home Co-reg; C/w home lisinopril, Crestor  Anxiety: C/w home xanax    General Admission Parameters:    GI Prophylaxis: Protonix  Gathering data from Luz, et. al; Mundo et al; and Trevon et al - Please note that routine use of GI PPX in all patients is inappropriate    DVT Prophylaxis: SCD  I have used the Padua score to determine if patient needs DVT PPX.   Please note that routine use of DVT PPX in all patients is inappropriate    Code status: Full  If code status was discussed with the patient, then code status entered as per the orders                                                                                  Subjective:   Primary Care Provider: Tereza Hahn, MD  Date of Service:  See Date Note Was Originated  Chief Complaint: Syncopal event    80 y.o. female presents with unknown duration of abrupt onset, now resolved, syncopal event that was not associated with any other symptoms, remitted by nothing, exacerbated by nothing. Further history: Patient's  called out to her at home and she did not respond, however by the time EMS got there patient was responsive again. Here was found to be bradycardic, takes Co-reg at home. UA, Head CT neg; FS was normal.    Pertinent chart review: Pt follows with Dr. Shawn Bell of Systems:  12 point ROS obtained and otherwise negative, except as per HPI and above.   Past Medical History:   Diagnosis Date    Anemia 3/3/2010    Arrhythmia     irregular heartbeat    Arthritis     Asthma     CHF (congestive heart failure) ([facility] [address] 3/3/2010    Chronic kidney disease     Chronic obstructive pulmonary disease (HCC)     Chronic pain     back    Chronic sinusitis 3/3/2010    CPAP (continuous positive airway pressure) dependence     Diverticulosis     DM (diabetes mellitus) ([facility] [address] 3/3/2010    Dyslipidemia 3/3/2010    GERD (gastroesophageal reflux disease)     HTN (hypertension) 3/3/2010    Ill-defined condition     being evaluated py pulmonolgist for \"trouble breathing\"    S/P TKR (total knee replacement) 3/3/2010    Syncope 1/10/2023    Unspecified sleep apnea     doesn't wear CPAP since back has been hurting      Past Surgical History:   Procedure Laterality Date    HX APPENDECTOMY      thinks it was taken with hysterectomy    HX GYN      \"tubes opened\"    HX HEENT      sinus surgery    HX HEMORRHOIDECTOMY      HX HYSTERECTOMY      HX KNEE REPLACEMENT  1996    both knees- at same time    HX LUMBAR LAMINECTOMY  1980s    HX MOHS PROCEDURES      left shoulder    HX ORTHOPAEDIC  1980    neck fusion    [address]    right knee replaced for second time    HX ORTHOPAEDIC      lumbar fusion    HX OTHER SURGICAL      CORNELL BIOPSY BREAST STEREOTACTIC Left yrs ago    (-)    CO COLONOSCOPY FLX DX W/COLLJ SPEC WHEN PFRMD  [id number]    dr. Charlotte Cadena (barium enema)     Prior to Admission medications    Medication Sig Start Date End Date Taking? Authorizing Provider   diclofenac (VOLTAREN) 1 % gel APPLY TOPICALLY TO THE AFFECTED AREA FOUR TIMES DAILY 12/12/22   Myla Holland MD   glipiZIDE SR (GLUCOTROL XL) 2.5 mg CR tablet Take one tab before breakfast and one tab before dinner 12/9/22   Myla Holland MD   ALPRAZolam Marcellina Bullion) 0.5 mg tablet TAKE 1/2 TABLET BY MOUTH AT BEDTIME AS NEEDED 12/6/22   Myla Holland MD   pantoprazole (PROTONIX) 40 mg tablet TAKE 1 TABLET BY MOUTH DAILY BEFORE AND BREAKFAST 10/19/22   Myla Holland MD   carbidopa-levodopa (Sinemet)  mg per tablet Take 1 Tablet by mouth two (2) times a day. Indications: a type of movement disorder called parkinsonism 9/14/22   Andrea Anaya MD   predniSONE (DELTASONE) 5 mg tablet Take 2 Tablets by mouth two (2) times a day. 9/9/22   Myla Holland MD   cholecalciferol (VITAMIN D3) (2,000 UNITS /50 MCG) cap capsule Vitamin D3 50 mcg (2,000 unit) capsule   Take 1 capsule every day by oral route. Provider, Historical   furosemide (LASIX) 20 mg tablet TAKE 1 TABLET BY MOUTH EVERY MORNING AND 1 TABLET EVERY AFTERNOON BETWEEN 2 TO 4 PM 7/8/22   Zaria Machado MD   rosuvastatin (CRESTOR) 10 mg tablet TAKE 1 TABLET BY MOUTH EVERY NIGHT 6/7/22   Myla SCOTT MD   primidone (MYSOLINE) 50 mg tablet TAKE 3 TABLETS BY MOUTH TWICE DAILY 3/14/22   Andrea Anaya MD   carvediloL (COREG) 12.5 mg tablet Take 1 Tablet by mouth two (2) times daily (with meals).  *NEW DOSE 2/21/22   Myla Holland MD   lancets (Accu-Chek Fastclix Lancet Drum) misc USE TO TEST BLOOD SUGAR TWICE DAILY DX E11.9 2/21/22   Myla SCOTT MD   glucose blood VI test strips (Accu-Chek Guide test strips) strip USE TO TEST BLOOD SUGAR TWICE DAILY DX E11.9 2/21/22   Pankaj Woodward MD   DULoxetine (CYMBALTA) 60 mg capsule TAKE 1 CAPSULE BY MOUTH DAILY 1/31/22   Pankaj Woodward MD   Accu-Chek Guide Me Glucose Mtr misc USE TO CHECK BLOOD SUGAR TWICE DAILY 6/4/20   Danielle Machado MD   BEVESPI AEROSPHERE 9-4.8 mcg HFA inhaler Take 2 puffs by inhalation daily 12/12/19   Provider, Historical   albuterol (PROVENTIL HFA, VENTOLIN HFA, PROAIR HFA) 90 mcg/actuation inhaler Take 2 Puffs by inhalation every four (4) hours as needed for Wheezing or Shortness of Breath. 12/23/19   Pankaj Woodward MD   guaiFENesin (MUCINEX) 1,200 mg Ta12 ER tablet Take 1,200 mg by mouth daily as needed. Provider, Historical   MULTIVITAMIN PO Take 1 Tab by mouth daily. Provider, Historical   lisinopril (PRINIVIL, ZESTRIL) 2.5 mg tablet Take 2.5 mg by mouth daily.     Provider, Historical   fluticasone (FLONASE) 50 mcg/actuation nasal spray SHAKE LIQUID AND USE 2 SPRAYS IN EACH NOSTRIL EVERY DAY 1/22/18   Pankaj Woodward MD     Allergies   Allergen Reactions    Gabapentin Other (comments)     Muscles twitching and jerking    Levaquin [Levofloxacin] Swelling    Lyrica [Pregabalin] Other (comments)     Muscle twitching and jerking    Percodan [Oxycodone Hcl-Oxycodone-Asa] Itching    Percodan [Oxycodone-Aspirin] Other (comments)      Family History   Problem Relation Age of Onset    Diabetes Mother     Heart Disease Father     Diabetes Brother     Blindness Brother     Diabetes Sister     Heart Disease Sister     Heart Disease Brother     Heart Disease Brother     Asthma Brother     Malignant Hyperthermia Neg Hx     Pseudocholinesterase Deficiency Neg Hx     Delayed Awakening Neg Hx     Post-op Nausea/Vomiting Neg Hx     Emergence Delirium Neg Hx     Post-op Cognitive Dysfunction Neg Hx        Social History     Socioeconomic History    Marital status:    Tobacco Use    Smoking status: Former     Packs/day: 0.30     Years: 5.00     Pack years: 1.50     Types: Cigarettes     Quit date: 1960     Years since quittin.0    Smokeless tobacco: Never   Vaping Use    Vaping Use: Never used   Substance and Sexual Activity    Alcohol use: No     Alcohol/week: 0.0 standard drinks    Drug use: No    Sexual activity: Never   Social History Narrative    ** Merged History Encounter **           Objective:   Physical Exam:     VS as below    Const'l:          Obese body habitus, a&o, no acute distress  Head/Neck:       no cervical/head mass  Eyes:     nonicteric sclera, eom intact  ENT:      auditory acuity grossly intact either with or without device, no nasal deformity  Cardio:           reg rate reg rhythm  Pulm:     no accessory muscle use  Abd:       sntnd   Derm:     no rashes, no ulcers, no lesions  Extr:      no edema, no cyanosis, no calf tenderness, no varicosities  Neuro:    cn II-XII intact  Psych:   mood intact, judgement intact    Data Review: All diagnostic labs and studies have been reviewed.

## 2023-01-11 NOTE — PROGRESS NOTES
Seen for PT/OT evaluations, patient overall SBA with RW for activity. Orthostatics    11:35 157/63 supine  11:40 146/69 sitting  11:46 134/66 standing  11:58 108/66 standing post activity   12:03 131/69 siting    Left with all needs met in chair with family present, RN aware.     Formal note to follow, Recommend HH PT/OT    Saundra Valadez DPT

## 2023-01-11 NOTE — PROGRESS NOTES
Problem: Breathing Pattern - Ineffective  Goal: *Absence of hypoxia  Outcome: Resolved/Met  Goal: *Use of effective breathing techniques  Outcome: Resolved/Met     Problem: Patient Education: Go to Patient Education Activity  Goal: Patient/Family Education  Outcome: Resolved/Met     Problem: Falls - Risk of  Goal: *Absence of Falls  Description: Document Monty Fall Risk and appropriate interventions in the flowsheet. Outcome: Resolved/Met     Problem: Patient Education: Go to Patient Education Activity  Goal: Patient/Family Education  Outcome: Resolved/Met     Problem: Diabetes Self-Management  Goal: *Disease process and treatment process  Description: Define diabetes and identify own type of diabetes; list 3 options for treating diabetes. Outcome: Resolved/Met  Goal: *Incorporating nutritional management into lifestyle  Description: Describe effect of type, amount and timing of food on blood glucose; list 3 methods for planning meals. Outcome: Resolved/Met  Goal: *Incorporating physical activity into lifestyle  Description: State effect of exercise on blood glucose levels. Outcome: Resolved/Met  Goal: *Developing strategies to promote health/change behavior  Description: Define the ABC's of diabetes; identify appropriate screenings, schedule and personal plan for screenings. Outcome: Resolved/Met  Goal: *Using medications safely  Description: State effect of diabetes medications on diabetes; name diabetes medication taking, action and side effects. Outcome: Resolved/Met  Goal: *Monitoring blood glucose, interpreting and using results  Description: Identify recommended blood glucose targets  and personal targets. Outcome: Resolved/Met  Goal: *Prevention, detection, treatment of acute complications  Description: List symptoms of hyper- and hypoglycemia; describe how to treat low blood sugar and actions for lowering  high blood glucose level.   Outcome: Resolved/Met  Goal: *Prevention, detection and treatment of chronic complications  Description: Define the natural course of diabetes and describe the relationship of blood glucose levels to long term complications of diabetes.   Outcome: Resolved/Met  Goal: *Developing strategies to address psychosocial issues  Description: Describe feelings about living with diabetes; identify support needed and support network  Outcome: Resolved/Met  Goal: *Insulin pump training  Outcome: Resolved/Met  Goal: *Sick day guidelines  Outcome: Resolved/Met  Goal: *Patient Specific Goal (EDIT GOAL, INSERT TEXT)  Outcome: Resolved/Met     Problem: Patient Education: Go to Patient Education Activity  Goal: Patient/Family Education  Outcome: Resolved/Met

## 2023-01-11 NOTE — PROGRESS NOTES
Cardiology consult okay to be called in AM per Dr. Carissa Shepherd     Patient transported on monitor by this RN and PCT via stretcher to room # [id number]. Patient alert and oriented x4, HR 72 SR with PAC, PVC. VSS. No complaints at this time.

## 2023-01-11 NOTE — PROGRESS NOTES
PHYSICAL THERAPY EVALUATION/DISCHARGE  Patient: Jeronimo Rodriguez (78 y.o. female)  Date: 1/11/2023  Primary Diagnosis: Syncope [R55]  Symptomatic bradycardia [R00.1]  Syncope and collapse [R55]       Precautions:   Fall      ASSESSMENT  Based on the objective data described below, the patient presents with near baseline functional mobility S/P admission for syncope and symptomatic bradycardia. She is assessed for orthostatic hypotension, please see progress note. Patient ambulates and transfers with use of RW and CGA. She requires increased time for sit to stand transition but demonstrates good standing balance with support of RW. She is able to ambulate in and out of the restroom with use of RW and CGA. Functional Outcome Measure: The patient scored 70/100 on the Barthel outcome measure. Other factors to consider for discharge: medical stability      Further skilled acute physical therapy is not indicated at this time. PLAN :  Recommendation for discharge: (in order for the patient to meet his/her long term goals)  Physical therapy at least 2 days/week in the home     This discharge recommendation:  Has not yet been discussed the attending provider and/or case management    IF patient discharges home will need the following DME: patient owns DME required for discharge       SUBJECTIVE:   Patient stated I feel alright.     OBJECTIVE DATA SUMMARY:   HISTORY:    Past Medical History:   Diagnosis Date    Anemia 3/3/2010    Arrhythmia     irregular heartbeat    Arthritis     Asthma     CHF (congestive heart failure) ([facility] [address] 3/3/2010    Chronic kidney disease     Chronic obstructive pulmonary disease (HCC)     Chronic pain     back    Chronic sinusitis 3/3/2010    CPAP (continuous positive airway pressure) dependence     Diverticulosis     DM (diabetes mellitus) ([facility] [address] 3/3/2010    Dyslipidemia 3/3/2010    GERD (gastroesophageal reflux disease)     HTN (hypertension) 3/3/2010    Ill-defined condition     being evaluated py pulmonolgist for \"trouble breathing\"    S/P TKR (total knee replacement) 3/3/2010    Syncope 1/10/2023    Unspecified sleep apnea     doesn't wear CPAP since back has been hurting     Past Surgical History:   Procedure Laterality Date    HX APPENDECTOMY      thinks it was taken with hysterectomy    HX GYN      \"tubes opened\"    HX HEENT      sinus surgery    HX HEMORRHOIDECTOMY      HX HYSTERECTOMY      HX KNEE REPLACEMENT  1996    both knees- at same time    HX LUMBAR LAMINECTOMY  1980s    HX MOHS PROCEDURES      left shoulder    HX ORTHOPAEDIC  1980    neck fusion    [address]    right knee replaced for second time    HX ORTHOPAEDIC      lumbar fusion    HX OTHER SURGICAL      CORNELL BIOPSY BREAST STEREOTACTIC Left yrs ago    (-)    [place] COLONOSCOPY FLX DX W/COLLJ SPEC WHEN PFRMD  [id number]    dr. Casimiro Mcclellan (barium enema)       Prior level of function: Mod I with intermittent Min A when needed  Personal factors and/or comorbidities impacting plan of care: please see above    Home Situation  Home Environment: Private residence  # Steps to Enter: 5  Rails to Enter: Yes  Hand Rails : Bilateral  One/Two Story Residence:  (trilevel, steps down to living room (4))  Living Alone: No  Support Systems: Spouse/Significant Other, Child(raj)  Patient Expects to be Discharged to[de-identified] Home  Current DME Used/Available at Home: Speedy John, straight, Walker, rollator, Walker, rolling, [address] chair (lift recliner)  Tub or Shower Type: Shower    EXAMINATION/PRESENTATION/DECISION MAKING:   Critical Behavior:              Hearing:   Auditory  Auditory Impairment: Hard of hearing, left side  Skin:    Edema:   Range Of Motion:  AROM: Generally decreased, functional           PROM: Generally decreased, functional           Strength:    Strength: Generally decreased, functional                    Tone & Sensation:   Tone: Abnormal (tremulous bilateral hands)                              Coordination:  Coordination: Generally decreased, functional  Vision:      Functional Mobility:  Bed Mobility:     Supine to Sit: Stand-by assistance;Bed Modified; Additional time     Scooting: Contact guard assistance  Transfers:  Sit to Stand: Contact guard assistance; Additional time  Stand to Sit: Additional time;Minimum assistance        Bed to Chair: Contact guard assistance              Balance:   Sitting: Intact; Without support  Standing: Impaired; With support  Standing - Static: Constant support;Good  Standing - Dynamic : Constant support; Fair  Ambulation/Gait Training:  Distance (ft): 20 Feet (ft)  Assistive Device: Gait belt;Walker, rolling  Ambulation - Level of Assistance: Contact guard assistance        Gait Abnormalities: Decreased step clearance              Speed/Shannon: Slow  Step Length: Right shortened;Left shortened                     Stairs: Therapeutic Exercises:       Functional Measure:  Barthel Index:    Bathin  Bladder: 10  Bowels: 10  Groomin  Dressin  Feeding: 10  Mobility: 10  Stairs: 5  Toilet Use: 5  Transfer (Bed to Chair and Back): 10  Total: 70/100       The Barthel ADL Index: Guidelines  1. The index should be used as a record of what a patient does, not as a record of what a patient could do. 2. The main aim is to establish degree of independence from any help, physical or verbal, however minor and for whatever reason. 3. The need for supervision renders the patient not independent. 4. A patient's performance should be established using the best available evidence. Asking the patient, friends/relatives and nurses are the usual sources, but direct observation and common sense are also important. However direct testing is not needed. 5. Usually the patient's performance over the preceding 24-48 hours is important, but occasionally longer periods will be relevant. 6. Middle categories imply that the patient supplies over 50 per cent of the effort.   7. Use of aids to be independent is allowed. Score Interpretation (from [facility])    Independent   60-79 Minimally independent   40-59 Partially dependent   20-39 Very dependent   <20 Totally dependent     -Devonte Tejeda., Barthel, D.W. (1965). Functional evaluation: the Barthel Index. [address] ([address] (1997). The Barthel activities of daily living index: self-reporting versus actual performance in the old (> or = 75 years). Journal of [address] [place] [address] 45(7), [facility], J.J.M.F, Virginie Lott., Chelylyndsey Henry. (1999). Measuring the change in disability after inpatient rehabilitation; comparison of the responsiveness of the Barthel Index and Functional Bremer Measure. Journal of Neurology, Neurosurgery, and Psychiatry, 66(4), 717-785. Jw Ramsey, NLISS, FAUSTO Moreno, & Dani Raymundo, M.A. (2004) Assessment of post-stroke quality of life in cost-effectiveness studies: The usefulness of the Barthel Index and the EuroQoL-5D. Quality of Life Research, 15, 400-41            Physical Therapy Evaluation Charge Determination   History Examination Presentation Decision-Making   HIGH Complexity :3+ comorbidities / personal factors will impact the outcome/ POC  LOW Complexity : 1-2 Standardized tests and measures addressing body structure, function, activity limitation and / or participation in recreation  MEDIUM Complexity : Evolving with changing characteristics  Other outcome measures Barthel  LOW       Based on the above components, the patient evaluation is determined to be of the following complexity level: LOW     Pain Rating:      Activity Tolerance:   Good      After treatment patient left in no apparent distress:   Sitting in chair, Call bell within reach, and Caregiver / family present    COMMUNICATION/EDUCATION:   The patients plan of care was discussed with: Occupational therapist and Registered nurse.      Fall prevention education was provided and the patient/caregiver indicated understanding., Patient/family have participated as able in goal setting and plan of care. , and Patient/family agree to work toward stated goals and plan of care.     Thank you for this referral.  Stacy Garcia, PT   Time Calculation: 42 mins

## 2023-01-11 NOTE — DISCHARGE SUMMARY
Hospitalist Discharge Summary     Patient ID:  Corina Felipe  [id number]  67 y.o.  4/5/1931  1/10/2023    PCP on record: Yemi Guerrero MD    Admit date: 1/10/2023  Discharge date and time: 1/11/2023    DISCHARGE DIAGNOSIS:  Acute Syncopal Event  Sinus bradycardia   Orthostatic hypotension   Acute HyperK  CKD:                  Parkinson's:       DM   Asthma       CONSULTATIONS:  IP CONSULT TO HOSPITALIST  IP CONSULT TO CARDIOLOGY    Excerpted HPI from H&P of DR gallardo __89 y.o. female with pertinent medical hx of several co-morbidities presented with acute syncopal event    Differential diagnosis, explanation and analysis: Patient is on Co-reg at home and may have had sx bradycardia. Also on ddx is orthostatic hypotension, electrolyte abnormality, baseline parkinson's; CT head ruled out acute abnormality, UA ruled out UTI, FS glc was normal.  No suspicion for cva.   ________________________________________________________  DISCHARGE SUMMARY/HOSPITAL COURSE:  for full details see H&P, daily progress notes, labs, consult notes. This is a 80-year-old female with history of nonischemic cardiomyopathy admitted for syncopal episode, EKG showed sinus bradycardia. Patient was seen by cardiology, recommended to discontinue the Coreg, with no need of repeating 2D echo. Advised the patient to follow-up with Cardiology for outpatient event monitor placement . Pt was found to have orthostatic hypotension  patient was medically stable to be discharged. _______________________________________________________________________  Patient seen and examined by me on discharge day. Pertinent Findings:  Gen:    Not in distress  Chest: Clear lungs  CVS:   Regular rhythm.   No edema  Abd:  Soft, not distended, not tender  Neuro:  Alert, oriented x3   _______________________________________________________________________  DISCHARGE MEDICATIONS:   Current Discharge Medication List CONTINUE these medications which have NOT CHANGED    Details   diclofenac (VOLTAREN) 1 % gel APPLY TOPICALLY TO THE AFFECTED AREA FOUR TIMES DAILY  Qty: 300 g, Refills: 3      glipiZIDE SR (GLUCOTROL XL) 2.5 mg CR tablet Take one tab before breakfast and one tab before dinner  Qty: 180 Tablet, Refills: 3      ALPRAZolam (XANAX) 0.5 mg tablet TAKE 1/2 TABLET BY MOUTH AT BEDTIME AS NEEDED  Qty: 15 Tablet, Refills: 1    Associated Diagnoses: Other insomnia      pantoprazole (PROTONIX) 40 mg tablet TAKE 1 TABLET BY MOUTH DAILY BEFORE AND BREAKFAST  Qty: 90 Tablet, Refills: 3    Associated Diagnoses: Chronic hoarseness; Reflux laryngitis      carbidopa-levodopa (Sinemet)  mg per tablet Take 1 Tablet by mouth two (2) times a day. Indications: a type of movement disorder called parkinsonism  Qty: 180 Tablet, Refills: 2      predniSONE (DELTASONE) 5 mg tablet Take 2 Tablets by mouth two (2) times a day. Qty: 360 Tablet, Refills: 3    Associated Diagnoses: Polymyalgia rheumatica (HCC)      cholecalciferol (VITAMIN D3) (2,000 UNITS /50 MCG) cap capsule Vitamin D3 50 mcg (2,000 unit) capsule   Take 1 capsule every day by oral route.       furosemide (LASIX) 20 mg tablet TAKE 1 TABLET BY MOUTH EVERY MORNING AND 1 TABLET EVERY AFTERNOON BETWEEN 2 TO 4 PM  Qty: 180 Tablet, Refills: 3    Associated Diagnoses: Bilateral edema of lower extremity      rosuvastatin (CRESTOR) 10 mg tablet TAKE 1 TABLET BY MOUTH EVERY NIGHT  Qty: 90 Tablet, Refills: 3    Associated Diagnoses: Mixed hyperlipidemia      primidone (MYSOLINE) 50 mg tablet TAKE 3 TABLETS BY MOUTH TWICE DAILY  Qty: 540 Tablet, Refills: 3    Comments: **Patient requests 90 days supply**  Associated Diagnoses: Essential tremor      lancets (Accu-Chek Fastclix Lancet Drum) misc USE TO TEST BLOOD SUGAR TWICE DAILY DX E11.9  Qty: 300 Each, Refills: 3    Associated Diagnoses: Type 2 diabetes with nephropathy ([facility])      glucose blood VI test strips (Accu-Chek Guide test strips) strip USE TO TEST BLOOD SUGAR TWICE DAILY DX E11.9  Qty: 300 Strip, Refills: 3    Associated Diagnoses: Type 2 diabetes with nephropathy (HCC)      DULoxetine (CYMBALTA) 60 mg capsule TAKE 1 CAPSULE BY MOUTH DAILY  Qty: 90 Capsule, Refills: 3    Associated Diagnoses: Arthralgia of multiple joints      Accu-Chek Guide Me Glucose Mtr misc USE TO CHECK BLOOD SUGAR TWICE DAILY  Qty: 1 Each, Refills: 0      BEVESPI AEROSPHERE 9-4.8 mcg HFA inhaler Take 2 puffs by inhalation daily      albuterol (PROVENTIL HFA, VENTOLIN HFA, PROAIR HFA) 90 mcg/actuation inhaler Take 2 Puffs by inhalation every four (4) hours as needed for Wheezing or Shortness of Breath. Qty: 1 Inhaler      guaiFENesin (MUCINEX) 1,200 mg Ta12 ER tablet Take 1,200 mg by mouth daily as needed. MULTIVITAMIN PO Take 1 Tab by mouth daily. lisinopril (PRINIVIL, ZESTRIL) 2.5 mg tablet Take 2.5 mg by mouth daily. fluticasone (FLONASE) 50 mcg/actuation nasal spray SHAKE LIQUID AND USE 2 SPRAYS IN EACH NOSTRIL EVERY DAY  Qty: 1 Bottle, Refills: 11    Comments: **Patient requests 90 days supply**           STOP taking these medications       carvediloL (COREG) 12.5 mg tablet Comments:   Reason for Stopping:                 Patient Follow Up Instructions:    Activity: Activity as tolerated  Diet: Cardiac Diet  Wound Care: None needed    Follow-up Information       Follow up With Specialties Details Why Contact Info    Olga Elmore MD Family Medicine   [address] [address] [phone]      Jose Angel Gibbons, [address] [place] [address] Vascular Surgery, Cardiovascular Disease Physician, Interventional Cardiology Physician Schedule an appointment as soon as possible for a visit in 1 week(s)  [address]  [phone]            ________________________________________________________________    Risk of deterioration: Low    Condition at Discharge: Stable  __________________________________________________________________    Disposition  Home with family and home health services    ____________________________________________________________________    Code Status: Full Code  ___________________________________________________________________      Total time in minutes spent coordinating this discharge (includes going over instructions, follow-up, prescriptions, and preparing report for sign off to her PCP) :  35 minutes    Signed:  Onur Camara MD

## 2023-01-11 NOTE — PROGRESS NOTES
[address] [facility] [address] follow-up transitional care appointment has been scheduled with Dr. Blas Beatty on 1/19/23 at 1030. Pending patient discharge. Antonietta Storey Care Management Assistant     [facility] follow-up transitional care appointment has been scheduled with Dr. rBionna Ocampo on 1/18/23 at 1120. Pending patient discharge.   Bita Sánchez Management

## 2023-01-11 NOTE — ED NOTES
Bedside and Verbal shift change report given to [address] (oncoming nurse) by Jos Bailey RN (offgoing nurse). Report included the following information SBAR, Kardex, ED Summary, MAR, Recent Results, and Cardiac Rhythm NSR .

## 2023-01-12 ENCOUNTER — PATIENT OUTREACH (OUTPATIENT)
Dept: CASE MANAGEMENT | Age: 88
End: 2023-01-12

## 2023-01-13 NOTE — PROGRESS NOTES
Care Transitions Initial Call    Call within 2 business days of discharge: Yes     Patient: Rao Gee Patient : 1931 MRN: [id number]    Last Discharge  Street       Date Complaint Diagnosis Description Type Department Provider    1/10/23 Nausea; Lethargy; Slow Heart Rate Near syncope . .. ED to Hosp-Admission (Discharged) (ADMIT) [id number] Naomy Fonseca MD; [facility] Cunha. .. Was this an external facility discharge? No     Challenges to be reviewed by the provider   Additional needs identified to be addressed with provider: no     Method of communication with provider : none    Discussed COVID-19 related testing which was not done at this time. Advance Care Planning:   Does patient have an Advance Directive: education declined    Inpatient Readmission Risk score: Unplanned Readmit Risk Score: 12    Was this a readmission? no   Patient stated reason for the admission: slow heart rate    Patients top risk factors for readmission: medical condition-bradycardia    Interventions to address risk factors: Scheduled appointment with PCP-23, Scheduled appointment with Specialist-3/1/23, Obtained and reviewed discharge summary and/or continuity of care documents, and verified cardio spoke with spouse regarding mailing of monitor    Care Transition Nurse (CTN) contacted the family by telephone to perform post hospital discharge assessment. Verified name and  with family as identifiers. Provided introduction to self, and explanation of the CTN role. CTN reviewed discharge instructions, medical action plan and red flags with family who verbalized understanding. Were discharge instructions available to patient? yes. Reviewed appropriate site of care based on symptoms and resources available to patient including: PCP, Specialist, and Condition related references. Family given an opportunity to ask questions and does not have any further questions or concerns at this time.  The family agrees to contact the PCP office for questions related to their healthcare. Medication reconciliation was performed with family, who verbalizes understanding of administration of home medications. Advised obtaining a 90-day supply of all daily and as-needed medications. Referral to Pharm D needed: no     Home Health/Outpatient orders at discharge: none    Durable Medical Equipment ordered at discharge:  30 cardiac event monitor  [facility]ia [facility] 23 office  Durable Medical Equipment received: no. Will be mailed to patient    Was patient discharged with a pulse oximeter? no    Discussed follow-up appointments. If no appointment was previously scheduled, appointment scheduling offered:  na . Is follow up appointment scheduled within 7 days of discharge? yes. [address] follow up appointment(s):   Future Appointments   Date Time Provider Danny Montalvo   1/18/2023 11:20 AM Kristie Dougherty MD [facility] BS AMB   1/24/2023 11:40 AM DO VIRIDIANA Devlin BS AMB   3/10/2023  2:00 PM Kristie Dougherty MD [facility] BS AMB     Non-[facility] follow up appointment(s): cardio 3/1/23 after event monitor    Plan for follow-up call in 5-7 days based on severity of symptoms and risk factors. Plan for next call: follow up appointment-pcp and cardiac event monitor received and applied  CTN provided contact information for future needs. Goals Addressed                   This Visit's Progress     Prevent complications post hospitalization. 1/13/23  Activity- activity intolerance/energy conservation/frequent rest, exercise to increase mobility and prevent falls.    Medication compliance  Attend BRUCE appt-1/18/23GURDEEP

## 2023-01-18 ENCOUNTER — OFFICE VISIT (OUTPATIENT)
Dept: FAMILY MEDICINE CLINIC | Age: 88
End: 2023-01-18
Payer: MEDICARE

## 2023-01-18 VITALS
SYSTOLIC BLOOD PRESSURE: 100 MMHG | RESPIRATION RATE: 12 BRPM | HEART RATE: 60 BPM | WEIGHT: 197.6 LBS | BODY MASS INDEX: 33.73 KG/M2 | OXYGEN SATURATION: 98 % | TEMPERATURE: 98.3 F | HEIGHT: 64 IN | DIASTOLIC BLOOD PRESSURE: 42 MMHG

## 2023-01-18 DIAGNOSIS — R53.81 MALAISE AND FATIGUE: ICD-10-CM

## 2023-01-18 DIAGNOSIS — R00.1 BRADYCARDIA: ICD-10-CM

## 2023-01-18 DIAGNOSIS — R53.83 MALAISE AND FATIGUE: ICD-10-CM

## 2023-01-18 DIAGNOSIS — I10 ESSENTIAL HYPERTENSION, BENIGN: ICD-10-CM

## 2023-01-18 DIAGNOSIS — N18.32 STAGE 3B CHRONIC KIDNEY DISEASE (HCC): ICD-10-CM

## 2023-01-18 DIAGNOSIS — Z87.898 HISTORY OF SYNCOPE: ICD-10-CM

## 2023-01-18 DIAGNOSIS — I50.22 CHRONIC SYSTOLIC HEART FAILURE (HCC): ICD-10-CM

## 2023-01-18 DIAGNOSIS — Z09 ENCOUNTER FOR EXAMINATION FOLLOWING TREATMENT AT HOSPITAL: Primary | ICD-10-CM

## 2023-01-18 DIAGNOSIS — G89.29 ENCOUNTER FOR CHRONIC PAIN MANAGEMENT: ICD-10-CM

## 2023-01-18 DIAGNOSIS — Z51.81 ENCOUNTER FOR MEDICATION MONITORING: ICD-10-CM

## 2023-01-18 DIAGNOSIS — E11.21 TYPE 2 DIABETES WITH NEPHROPATHY (HCC): ICD-10-CM

## 2023-01-18 DIAGNOSIS — E78.2 MIXED HYPERLIPIDEMIA: ICD-10-CM

## 2023-01-18 DIAGNOSIS — M25.50 ARTHRALGIA OF MULTIPLE JOINTS: ICD-10-CM

## 2023-01-18 PROBLEM — M35.3 POLYMYALGIA RHEUMATICA (HCC): Status: RESOLVED | Noted: 2018-06-15 | Resolved: 2023-01-18

## 2023-01-18 PROBLEM — N18.4 CKD (CHRONIC KIDNEY DISEASE) STAGE 4, GFR 15-29 ML/MIN (HCC): Status: RESOLVED | Noted: 2022-06-27 | Resolved: 2023-01-18

## 2023-01-18 PROBLEM — J44.1 COPD EXACERBATION (HCC): Status: RESOLVED | Noted: 2019-12-09 | Resolved: 2023-01-18

## 2023-01-18 LAB
ANION GAP SERPL CALC-SCNC: 5 MMOL/L (ref 5–15)
BUN SERPL-MCNC: 29 MG/DL (ref 6–20)
BUN/CREAT SERPL: 14 (ref 12–20)
CALCIUM SERPL-MCNC: 9.5 MG/DL (ref 8.5–10.1)
CHLORIDE SERPL-SCNC: 105 MMOL/L (ref 97–108)
CO2 SERPL-SCNC: 31 MMOL/L (ref 21–32)
CREAT SERPL-MCNC: 2.07 MG/DL (ref 0.55–1.02)
ERYTHROCYTE [DISTWIDTH] IN BLOOD BY AUTOMATED COUNT: 14.6 % (ref 11.5–14.5)
GLUCOSE SERPL-MCNC: 194 MG/DL (ref 65–100)
HCT VFR BLD AUTO: 38.1 % (ref 35–47)
HGB BLD-MCNC: 11.4 G/DL (ref 11.5–16)
MCH RBC QN AUTO: 33.5 PG (ref 26–34)
MCHC RBC AUTO-ENTMCNC: 29.9 G/DL (ref 30–36.5)
MCV RBC AUTO: 112.1 FL (ref 80–99)
NRBC # BLD: 0 K/UL (ref 0–0.01)
NRBC BLD-RTO: 0 PER 100 WBC
PLATELET # BLD AUTO: 174 K/UL (ref 150–400)
PMV BLD AUTO: 11.3 FL (ref 8.9–12.9)
POTASSIUM SERPL-SCNC: 5.4 MMOL/L (ref 3.5–5.1)
RBC # BLD AUTO: 3.4 M/UL (ref 3.8–5.2)
SODIUM SERPL-SCNC: 141 MMOL/L (ref 136–145)
WBC # BLD AUTO: 8.5 K/UL (ref 3.6–11)

## 2023-01-18 PROCEDURE — 93005 ELECTROCARDIOGRAM TRACING: CPT | Performed by: FAMILY MEDICINE

## 2023-01-18 PROCEDURE — G0463 HOSPITAL OUTPT CLINIC VISIT: HCPCS | Performed by: FAMILY MEDICINE

## 2023-01-18 RX ORDER — FUROSEMIDE 20 MG/1
TABLET ORAL
Qty: 180 TABLET | Refills: 3
Start: 2023-01-18

## 2023-01-18 NOTE — PROGRESS NOTES
Patient identified by 2 identifiers. Chief Complaint   Patient presents with    Hospital Follow Up     1. Have you been to the ER, urgent care clinic since your last visit? Hospitalized since your last visit? Yes When: 1/10/2023    2. Have you seen or consulted any other health care providers outside of the [address] [place] [address] since your last visit? Include any pap smears or colon screening.  No

## 2023-01-18 NOTE — PROGRESS NOTES
HISTORY OF PRESENT ILLNESS  Nikki Latif is a 80 y.o. female. Follow up from hospital admission 1/10 thur 1/11/2023 for syncope. Thought to be related to bradycardia and low BP. Coreg was discontinues. She has monitor that she will wear over the next 30 days. Pt reports still not feeling good. Overall she is still having the pain all over and feels \"slow\". Appetite has not been as good. No cough or congestion. No urinary sx. No fever or chills. Urine was negative on last during admission. Cardiovascular Review:  The patient has hypertension, hyperlipidemia and Systolic HF. Diet and Lifestyle: generally follows a low fat low cholesterol diet, generally follows a low sodium diet  Home BP Monitoring: is not measured at home. Pertinent ROS: taking medications as instructed, no medication side effects noted, no TIA's, no chest pain on exertion, no dyspnea on exertion, noting that her ankles swelling has been mild. She has been off of the statin but she did not see any improvemnet with her myalgia being off of the medication so she has started this back. DM type II follow up:  Compliant w/ meds, diabetic diet, and exercise. Obtains home glucose monitoring averaging in the mid 100s. Checks BS daily on most days and prn. Pt does have BS log at visit today. No Rf needed for today. Denies any tingling sensation, polyuria and polydipsia. UTD with her eye exam but want to see another MD.  Nancy Evangelista like she has a flim over the right eye that make her vision blurred. No significant weight changes. Asthma Review:  The patient is being seen for follow up of chronic respiratory failure/COPD and allergies and chronic sinusitits, not currently in exacerbation. Breathing has been at he usual baseline. Using nebulizer as needed but has not recently had to use it. Has had follow up with pulmonary. Regimen compliance: The patient reports adherence to asthma action plan and regimen.   Encounter for pain management. 1./2. Medical history/Past medical History  Chronic Pain:  Osteoarthritis:  Patient has osteoarthritis in multiple joints. Overall has dealing with a lot of pain in the joints and muscles all over. Has pain and burning in the legs. Richad Pickerel much better on the higher dose of the prednisone. Has some good days and some not so good days still. She is able to move around better. Primarily in the knees, hips and back are worse. Has seen by rheumatology but she does not want to go back. Her symptoms have been wax and wane. She is currently on prednisone and we have discussed long term side effects related to chronic steroid use. Also we discussed increasing the steroids with a dose to alleviate increased sx. Pain has been 8/10 when it is severe. 3. Applicable records from prior treatment providers are apart of [facility] under the media tab. 4. Diagnostic, therapeutic and laboratory results are available in the [facility] chart. 5. Consultation notes are available for review in the media tab of the [facility] chart. 6. Treatment goals include pain control so that the pt may be as active and function with her daily activities and improved comfort level. Previously pt has been limited by pain. 7. The risks and benefits of treatment has been discussed at this office visit with the pt. She understands that the medication has addicting potential.  Additionally the pt has been advised that narcotic pain medication may impair mental and/or physical ability required for performance of tasks such as driving or operating any other machinery. 8. Pt has an updated signed pain contract on file and can be found under the FYI section of the [facility] chart. 9. The pain contract is reviewed. Pain medication will be continued at the previous dosage. Urine drug screening will not be done today. Diagnostic studies are not indicated at this time.   Interventional procedure are not indicated at this time. 10. Medication prescibed is HYDROcodone-acetaminophen (NORCO)  mg tablet. No prescription is needed today. 11. Patient instructions have been reviewed in detail as outlined above and in the pain contract which is updated today. 12. Re-eval is planned for 3 months. Naloxone prescription is not warranted. Patient Active Problem List   Diagnosis Code    CHF (congestive heart failure) ([facility]) I50.9    S/P TKR (total knee replacement) Z96.659    Anemia D64.9    s/p lumbar laminectomy Z98.890    S/P ASAD (total abdominal hysterectomy) Z90.710    S/P hemorrhoidectomy Z98.890, Z87.19    S/P rotator cuff surgery Z98.890    DM (diabetes mellitus) ([facility] E11.9    Chronic sinusitis J32.9    S/P sinus surgery Z98.890    Dyslipidemia E78.5    Environmental allergies Z91.09    Obstructive sleep apnea (adult) (pediatric) G47.33    DJD (degenerative joint disease) M19.90    Chronic systolic heart failure ([facility]) I50.22    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 9) with comorbidity ([facility]) E66.01    Polymyalgia rheumatica ([facility]) M35.3    COPD exacerbation ([facility] J44.1    Ataxia R27.0    General weakness R53.1    Tremors of nervous system R25.1    Fall at home W19. XXXA, Y92.009    CKD (chronic kidney disease) stage 4, GFR 15-29 ml/min ([facility]) N18.4    Chronic renal disease, stage III N18.30    Syncope R55    Symptomatic bradycardia R00.1    Syncope and collapse R55       Current Outpatient Medications   Medication Sig Dispense Refill diclofenac (VOLTAREN) 1 % gel APPLY TOPICALLY TO THE AFFECTED AREA FOUR TIMES DAILY 300 g 3    glipiZIDE SR (GLUCOTROL XL) 2.5 mg CR tablet Take one tab before breakfast and one tab before dinner 180 Tablet 3    ALPRAZolam (XANAX) 0.5 mg tablet TAKE 1/2 TABLET BY MOUTH AT BEDTIME AS NEEDED 15 Tablet 1    pantoprazole (PROTONIX) 40 mg tablet TAKE 1 TABLET BY MOUTH DAILY BEFORE AND BREAKFAST 90 Tablet 3    carbidopa-levodopa (Sinemet)  mg per tablet Take 1 Tablet by mouth two (2) times a day. Indications: a type of movement disorder called parkinsonism 180 Tablet 2    predniSONE (DELTASONE) 5 mg tablet Take 2 Tablets by mouth two (2) times a day. 360 Tablet 3    cholecalciferol (VITAMIN D3) (2,000 UNITS /50 MCG) cap capsule Vitamin D3 50 mcg (2,000 unit) capsule   Take 1 capsule every day by oral route. furosemide (LASIX) 20 mg tablet TAKE 1 TABLET BY MOUTH EVERY MORNING AND 1 TABLET EVERY AFTERNOON BETWEEN 2 TO 4  Tablet 3    rosuvastatin (CRESTOR) 10 mg tablet TAKE 1 TABLET BY MOUTH EVERY NIGHT 90 Tablet 3    primidone (MYSOLINE) 50 mg tablet TAKE 3 TABLETS BY MOUTH TWICE DAILY 540 Tablet 3    lancets (Accu-Chek Fastclix Lancet Drum) misc USE TO TEST BLOOD SUGAR TWICE DAILY DX E11.9 300 Each 3    glucose blood VI test strips (Accu-Chek Guide test strips) strip USE TO TEST BLOOD SUGAR TWICE DAILY DX E11.9 300 Strip 3    DULoxetine (CYMBALTA) 60 mg capsule TAKE 1 CAPSULE BY MOUTH DAILY 90 Capsule 3    Accu-Chek Guide Me Glucose Mtr misc USE TO CHECK BLOOD SUGAR TWICE DAILY 1 Each 0    BEVESPI AEROSPHERE 9-4.8 mcg HFA inhaler Take 2 puffs by inhalation daily      albuterol (PROVENTIL HFA, VENTOLIN HFA, PROAIR HFA) 90 mcg/actuation inhaler Take 2 Puffs by inhalation every four (4) hours as needed for Wheezing or Shortness of Breath. 1 Inhaler     guaiFENesin (MUCINEX) 1,200 mg Ta12 ER tablet Take 1,200 mg by mouth daily as needed. MULTIVITAMIN PO Take 1 Tab by mouth daily.       lisinopril (PRINIVIL, ZESTRIL) 2.5 mg tablet Take 2.5 mg by mouth daily.       fluticasone (FLONASE) 50 mcg/actuation nasal spray SHAKE LIQUID AND USE 2 SPRAYS IN EACH NOSTRIL EVERY DAY 1 Bottle 11       Allergies   Allergen Reactions    Gabapentin Other (comments)     Muscles twitching and jerking    Levaquin [Levofloxacin] Swelling    Lyrica [Pregabalin] Other (comments)     Muscle twitching and jerking    Percodan [Oxycodone Hcl-Oxycodone-Asa] Itching    Percodan [Oxycodone-Aspirin] Other (comments)         Past Medical History:   Diagnosis Date    Anemia 3/3/2010    Arrhythmia     irregular heartbeat    Arthritis     Asthma     CHF (congestive heart failure) ([facility] 3/3/2010    Chronic kidney disease     Chronic obstructive pulmonary disease (HCC)     Chronic pain     back    Chronic sinusitis 3/3/2010    CPAP (continuous positive airway pressure) dependence     Diverticulosis     DM (diabetes mellitus) ([facility] 3/3/2010    Dyslipidemia 3/3/2010    GERD (gastroesophageal reflux disease)     HTN (hypertension) 3/3/2010    Ill-defined condition     being evaluated py pulmonolgist for \"trouble breathing\"    S/P TKR (total knee replacement) 3/3/2010    Syncope 1/10/2023    Unspecified sleep apnea     doesn't wear CPAP since back has been hurting         Past Surgical History:   Procedure Laterality Date    HX APPENDECTOMY      thinks it was taken with hysterectomy    HX GYN      \"tubes opened\"    HX HEENT      sinus surgery    HX HEMORRHOIDECTOMY      HX HYSTERECTOMY      HX KNEE REPLACEMENT  1996    both knees- at same time    HX LUMBAR LAMINECTOMY  1980s    HX MOHS PROCEDURES      left shoulder    HX ORTHOPAEDIC  1980    neck fusion    HX ORTHOPAEDIC  1999    right knee replaced for second time    HX ORTHOPAEDIC      lumbar fusion    HX OTHER SURGICAL      CORNELL BIOPSY BREAST STEREOTACTIC Left yrs ago    (-)    CO COLONOSCOPY FLX DX W/COLLJ SPEC WHEN PFRMD  [id number]    dr. Ajith Clay (barium enema)         Family History   Problem Relation Age of Onset    Diabetes Mother     Heart Disease Father     Diabetes Brother     Blindness Brother     Diabetes Sister     Heart Disease Sister     Heart Disease Brother     Heart Disease Brother     Asthma Brother     Malignant Hyperthermia Neg Hx     Pseudocholinesterase Deficiency Neg Hx     Delayed Awakening Neg Hx     Post-op Nausea/Vomiting Neg Hx     Emergence Delirium Neg Hx     Post-op Cognitive Dysfunction Neg Hx        Social History     Tobacco Use    Smoking status: Former     Packs/day: 0.30     Years: 5.00     Pack years: 1.50     Types: Cigarettes     Quit date: 1960     Years since quittin.0    Smokeless tobacco: Never   Substance Use Topics    Alcohol use: No     Alcohol/week: 0.0 standard drinks        Lab Results   Component Value Date/Time    WBC 7.4 2023 03:45 AM    HGB 10.2 (L) 2023 03:45 AM    HCT 33.2 (L) 2023 03:45 AM    PLATELET 965 (L)  03:45 AM    .3 (H) 2023 03:45 AM     Lab Results   Component Value Date/Time    Cholesterol, total 209 (H) 2023 03:45 AM    HDL Cholesterol 76 2023 03:45 AM    LDL, calculated 94.8 2023 03:45 AM    Triglyceride 191 (H) 2023 03:45 AM    CHOL/HDL Ratio 2.8 2023 03:45 AM     Lab Results   Component Value Date/Time    Sodium 143 2023 03:45 AM    Potassium 4.4 2023 03:45 AM    Chloride 107 2023 03:45 AM    CO2 30 2023 03:45 AM    Anion gap 6 2023 03:45 AM    Glucose 148 (H) 2023 03:45 AM    BUN 37 (H) 2023 03:45 AM    Creatinine 1.84 (H) 2023 03:45 AM    BUN/Creatinine ratio 20 2023 03:45 AM    GFR est AA 28 (L) 2022 12:16 PM    GFR est non-AA 23 (L) 2022 12:16 PM    Calcium 8.9 2023 03:45 AM    Bilirubin, total 0.3 2023 03:45 AM    ALT (SGPT) 24 2023 03:45 AM    Alk.  phosphatase 42 (L) 2023 03:45 AM    Protein, total 5.6 (L) 2023 03:45 AM    Albumin 3.1 (L) 2023 03:45 AM Globulin 2.5 01/11/2023 03:45 AM    A-G Ratio 1.2 01/11/2023 03:45 AM      Lab Results   Component Value Date/Time    Hemoglobin A1c 7.8 (H) 01/11/2023 03:45 AM    Hemoglobin A1c (POC) 8.0 12/09/2022 03:00 PM         Review of Systems   Constitutional:  Negative for malaise/fatigue. HENT:  Negative for congestion. Eyes:  Negative for blurred vision. Respiratory:  Negative for cough and shortness of breath. Cardiovascular:  Negative for chest pain, palpitations and leg swelling. Gastrointestinal:  Negative for abdominal pain, constipation and heartburn. Genitourinary:  Negative for dysuria, frequency and urgency. Musculoskeletal:  Positive for back pain (pain is more toleratble) and joint pain (pain is more tolerable. ). Negative for myalgias. Neurological:  Negative for dizziness, tingling, sensory change, focal weakness and headaches. Endo/Heme/Allergies:  Negative for environmental allergies. Psychiatric/Behavioral:  Negative for depression. The patient does not have insomnia. Physical Exam  Vitals and nursing note reviewed. Constitutional:       Appearance: Normal appearance. She is well-developed. Comments: /61   Pulse 60   Temp 97.8 °F (36.6 °C)   Resp 12   Ht 5' 4\" (1.626 m)   Wt 200 lb 9.6 oz (91 kg)   LMP 03/04/1961 (LMP Unknown)   SpO2 98%   BMI 34.43 kg/m²      HENT:      Right Ear: Tympanic membrane and ear canal normal.      Left Ear: Tympanic membrane and ear canal normal.   Neck:      Thyroid: No thyromegaly. Cardiovascular:      Rate and Rhythm: Normal rate and regular rhythm. Heart sounds: Normal heart sounds. Pulmonary:      Effort: Pulmonary effort is normal.      Breath sounds: Normal breath sounds. Abdominal:      General: Bowel sounds are normal.      Palpations: Abdomen is soft. There is no mass. Tenderness: There is no abdominal tenderness. Musculoskeletal:         General: Normal range of motion.       Cervical back: Normal range of motion and neck supple. Right lower leg: Edema (mild) present. Left lower leg: Edema (mild) present. Lymphadenopathy:      Cervical: No cervical adenopathy. Skin:     General: Skin is warm and dry. Neurological:      General: No focal deficit present. Mental Status: She is alert and oriented to person, place, and time. Psychiatric:         Mood and Affect: Mood normal.       ASSESSMENT and PLAN  Diagnoses and all orders for this visit:    1. Encounter for examination following treatment at hospital  2. History of syncope  Holter monitor in place. 3. Bradycardia  -     AMB POC EKG ROUTINE W/ 12 LEADS, INTER & REP    4. Malaise and fatigue  -     reduce furosemide (LASIX) 20 mg tablet; TAKE 1 TABLET BY MOUTH EVERY MORNING only and skip PM dose. 5. Essential hypertension, benign  -     METABOLIC PANEL, BASIC; Future  -     CBC W/O DIFF; Future    6. Type 2 diabetes with nephropathy (HCC)  Continue to monitor. 7. Mixed hyperlipidemia  Stable     8. Chronic systolic heart failure (HCC)  Stable     9. Stage 3b chronic kidney disease (HCC)  Stable     10. Arthralgia of multiple joints  Continue with current regimen    11. Encounter for chronic pain management  The pt has signed medication agreement. Pain contract is reviewed. Pain medications will be continued at the previous dosage. 12. Encounter for medication monitoring        Follow-up and Dispositions    Return in about 6 weeks (around 3/1/2023).        very strongly urged to quit smoking to reduce cardiovascular risk  reviewed medications and side effects in detail  specific diabetic recommendations: home glucose monitoring emphasized and glycohemoglobin and other lab monitoring discussed

## 2023-01-23 ENCOUNTER — PATIENT OUTREACH (OUTPATIENT)
Dept: CASE MANAGEMENT | Age: 88
End: 2023-01-23

## 2023-01-23 NOTE — PROGRESS NOTES
Goals Addressed                   This Visit's Progress     Prevent complications post hospitalization. 1/13/23  Activity- activity intolerance/energy conservation/frequent rest, exercise to increase mobility and prevent falls. Medication compliance  Attend BRUCE appt-1/18/23, GURDEEP  1/23/23 per review of chart patient attended BRUCE appt. Patient called on home number on file. Message left on voicemail with contact information to return call to this writer.  GURDEEP

## 2023-01-24 ENCOUNTER — OFFICE VISIT (OUTPATIENT)
Dept: NEUROLOGY | Age: 88
End: 2023-01-24
Payer: MEDICARE

## 2023-01-24 VITALS
TEMPERATURE: 97.3 F | DIASTOLIC BLOOD PRESSURE: 80 MMHG | SYSTOLIC BLOOD PRESSURE: 120 MMHG | OXYGEN SATURATION: 97 % | HEIGHT: 64 IN | RESPIRATION RATE: 18 BRPM | HEART RATE: 90 BPM | WEIGHT: 197 LBS | BODY MASS INDEX: 33.63 KG/M2

## 2023-01-24 DIAGNOSIS — N18.4 CKD (CHRONIC KIDNEY DISEASE) STAGE 4, GFR 15-29 ML/MIN (HCC): ICD-10-CM

## 2023-01-24 DIAGNOSIS — R26.9 GAIT DISORDER: Primary | ICD-10-CM

## 2023-01-24 DIAGNOSIS — G25.0 ESSENTIAL TREMOR: ICD-10-CM

## 2023-01-24 RX ORDER — PRIMIDONE 50 MG/1
TABLET ORAL
Qty: 540 TABLET | Refills: 3 | Status: SHIPPED | OUTPATIENT
Start: 2023-01-24

## 2023-01-24 NOTE — PROGRESS NOTES
Neurology Note    Patient ID:  Rosalinda Obando  [id number]  1 y.o.  4/5/1931      Date of Consultation:  January 24, 2023      Assessment and Plan:  80-year-old female with essential tremor on primidone, fairly well controlled. Still continues to have symmetric postural tremor which at times worsens with intention and is not at rest.  No rigidity or bradykinesia. No shuffling gait. No concerns for Parkinson's disease. I would like to discontinue Sinemet as the patient was initially prescribed this by her former neurologist Dr. Remy eFrnández, and has not seen any benefit. If anything, she feels that the tremor has gotten worse. We will continue primidone at this current dose 150 mg twice daily. I did discuss with the patient and her  that this is unfortunately a neurodegenerative disease and does worsen over time. She is at this time not a surgical candidate for DBS and is not interested in high-frequency ultrasound therapy. Recommendations:  - Discontinue Sinemet  - Continue primidone 150 mg twice daily  - Physical therapy for right shoulder pain      Problem was discussed at length with the patient. We reviewed the results of the study in detail. We also discussed prognosis and treatment options. The patient had opportunity today to ask all questions, expressed understanding of the instructions provided, and agreed with the plan of treatment. Follow up in 6-8 months. I spent 50 minutes providing care to this patient with >50% of the time spent counseling. History of Present Illness:   Rosalinda Obando is a 80 y.o. female with history of essential tremor on primidone, sleep apnea not on CPAP, hypertension, hyperlipidemia, type 2 diabetes, fibromyalgia, arrhythmia, CKD who presents for follow-up. The patient was a patient of Dr. Remy Fernández. She was diagnosed with essential tremor and is on primidone 150 mg twice daily.   She has not had any side effects however in the past she does mention that when this medication is increased her tremor seem to worsen. She was prescribed Sinemet at the last appointment with her former neurologist and has felt that her tremor has gotten worse since starting that medication. She has not noticed any benefits. She denies any significant change in her tremor. It is postural predominantly with intention and not at rest.  It is in both hands symmetric however started in the left hand. She does not have any tremor in her lips, mouth or in her neck. She has not had any recent falls. She denies any rigidity, bradykinesia, shuffling gait, resting tremor.     Past Medical History:   Diagnosis Date    Anemia 3/3/2010    Arrhythmia     irregular heartbeat    Arthritis     Asthma     CHF (congestive heart failure) ([facility] [address] 3/3/2010    Chronic kidney disease     Chronic obstructive pulmonary disease (HCC)     Chronic pain     back    Chronic sinusitis 3/3/2010    CPAP (continuous positive airway pressure) dependence     Diverticulosis     DM (diabetes mellitus) ([facility] [address] 3/3/2010    Dyslipidemia 3/3/2010    GERD (gastroesophageal reflux disease)     HTN (hypertension) 3/3/2010    Ill-defined condition     being evaluated py pulmonolgist for \"trouble breathing\"    S/P TKR (total knee replacement) 3/3/2010    Syncope 1/10/2023    Unspecified sleep apnea     doesn't wear CPAP since back has been hurting        Past Surgical History:   Procedure Laterality Date    HX APPENDECTOMY      thinks it was taken with hysterectomy    HX GYN      \"tubes opened\"    HX HEENT      sinus surgery    HX HEMORRHOIDECTOMY      HX HYSTERECTOMY      HX KNEE REPLACEMENT  1996    both knees- at same time    HX LUMBAR LAMINECTOMY  1980s    HX MOHS PROCEDURES      left shoulder    HX ORTHOPAEDIC  1980    neck fusion    [address]    right knee replaced for second time    HX ORTHOPAEDIC      lumbar fusion    HX OTHER SURGICAL      CORNELL BIOPSY BREAST STEREOTACTIC Left yrs ago    (-)    OK COLONOSCOPY FLX DX W/COLLJ SPEC WHEN PFRMD  [id number]    dr. Radha Busch (barium enema)        Family History   Problem Relation Age of Onset    Diabetes Mother     Heart Disease Father     Diabetes Brother     Blindness Brother     Diabetes Sister     Heart Disease Sister     Heart Disease Brother     Heart Disease Brother     Asthma Brother     Malignant Hyperthermia Neg Hx     Pseudocholinesterase Deficiency Neg Hx     Delayed Awakening Neg Hx     Post-op Nausea/Vomiting Neg Hx     Emergence Delirium Neg Hx     Post-op Cognitive Dysfunction Neg Hx         Social History     Tobacco Use    Smoking status: Former     Packs/day: 0.30     Years: 5.00     Pack years: 1.50     Types: Cigarettes     Quit date: 1960     Years since quittin.1    Smokeless tobacco: Never   Substance Use Topics    Alcohol use: No     Alcohol/week: 0.0 standard drinks        Allergies   Allergen Reactions    Gabapentin Other (comments)     Muscles twitching and jerking    Levaquin [Levofloxacin] Swelling    Lyrica [Pregabalin] Other (comments)     Muscle twitching and jerking    Percodan [Oxycodone Hcl-Oxycodone-Asa] Itching    Percodan [Oxycodone-Aspirin] Other (comments)        Prior to Admission medications    Medication Sig Start Date End Date Taking?  Authorizing Provider   furosemide (LASIX) 20 mg tablet TAKE 1 TABLET BY MOUTH EVERY MORNING. 23  Yes Maine Orozco MD   diclofenac (VOLTAREN) 1 % gel APPLY TOPICALLY TO THE AFFECTED AREA FOUR TIMES DAILY 22  Yes Maine SCOTT MD   glipiZIDE SR (GLUCOTROL XL) 2.5 mg CR tablet Take one tab before breakfast and one tab before dinner 22  Yes Maine Orozco MD   ALPRAZolam Evaline Beams) 0.5 mg tablet TAKE 1/2 TABLET BY MOUTH AT BEDTIME AS NEEDED 22  Yes Milla Machado MD   pantoprazole (PROTONIX) 40 mg tablet TAKE 1 TABLET BY MOUTH DAILY BEFORE AND BREAKFAST 10/19/22  Yes Maine Orozco MD   carbidopa-levodopa (Sinemet)  mg per tablet Take 1 Tablet by mouth two (2) times a day. Indications: a type of movement disorder called parkinsonism 9/14/22  Yes Andrea Anaya MD   predniSONE (DELTASONE) 5 mg tablet Take 2 Tablets by mouth two (2) times a day. 9/9/22  Yes Tate Machado MD   cholecalciferol (VITAMIN D3) (2,000 UNITS /50 MCG) cap capsule Vitamin D3 50 mcg (2,000 unit) capsule   Take 1 capsule every day by oral route. Yes Provider, Historical   rosuvastatin (CRESTOR) 10 mg tablet TAKE 1 TABLET BY MOUTH EVERY NIGHT 6/7/22  Yes Tate Machado MD   primidone (MYSOLINE) 50 mg tablet TAKE 3 TABLETS BY MOUTH TWICE DAILY 3/14/22  Yes Andrea Anaya MD   lancets (Accu-Chek Fastclix Lancet Drum) misc USE TO TEST BLOOD SUGAR TWICE DAILY DX E11.9 2/21/22  Yes Analilia SCOTT MD   glucose blood VI test strips (Accu-Chek Guide test strips) strip USE TO TEST BLOOD SUGAR TWICE DAILY DX E11.9 2/21/22  Yes Tate Machado MD   DULoxetine (CYMBALTA) 60 mg capsule TAKE 1 CAPSULE BY MOUTH DAILY 1/31/22  Yes Tate Machado MD   Accu-Chek Guide Me Glucose Mtr misc USE TO CHECK BLOOD SUGAR TWICE DAILY 6/4/20  Yes Analilia Quigley MD   BEVESPI AEROSPHERE 9-4.8 mcg HFA inhaler Take 2 puffs by inhalation daily 12/12/19  Yes Provider, Historical   albuterol (PROVENTIL HFA, VENTOLIN HFA, PROAIR HFA) 90 mcg/actuation inhaler Take 2 Puffs by inhalation every four (4) hours as needed for Wheezing or Shortness of Breath. 12/23/19  Yes Analilia Quigley MD   guaiFENesin (MUCINEX) 1,200 mg Ta12 ER tablet Take 1,200 mg by mouth daily as needed. Yes Provider, Historical   MULTIVITAMIN PO Take 1 Tab by mouth daily. Yes Provider, Historical   lisinopril (PRINIVIL, ZESTRIL) 2.5 mg tablet Take 2.5 mg by mouth daily.    Yes Provider, Historical   fluticasone (FLONASE) 50 mcg/actuation nasal spray SHAKE LIQUID AND USE 2 SPRAYS IN EACH NOSTRIL EVERY DAY  Patient taking differently: 2 Sprays by Both Nostrils route daily as needed. 1/22/18  Yes Boris Lim MD       Review of Systems:    General, constitutional: negative  Eyes, vision: negative  Ears, nose, throat: negative  Cardiovascular, heart: negative  Respiratory: negative  Gastrointestinal: negative  Genitourinary: negative  Musculoskeletal: negative  Skin and integumentary: negative  Psychiatric: negative  Endocrine: negative  Neurological: negative, except for HPI  Hematologic/lymphatic: negative  Allergy/immunology: negative    []Unable to obtain  ROS due to  []mental status change  []sedated   []intubated    Objective:     Visit Vitals  LMP 03/04/1961 (LMP Unknown)       Physical Exam:  General:  appears well nourished in no acute distress  Neck: no obvious deformity or masses  Lungs: comfortable on room air  Heart:  well-perfused   Lower extremity: no edema  Skin: intact    Neurological exam:  Awake, alert, oriented to person, place and time  Recent and remote memory were normal  Attention and concentration were intact  Language was intact. There was no aphasia  Speech: no dysarthria  Fund of knowledge was preserved    Cranial nerves:   II-XII were tested    PERRRLA  Visual fields were full to finger counting   EOMI, no evidence of nystagmus  Facial sensation:  normal and symmetric  Facial motor: normal and symmetric  Hearing intact  SCM strength intact  Tongue: midline without fasciculations    Motor: Tone normal in upper and lower extremities     Strength testing:   deltoid triceps biceps Wrist ext. Wrist flex. intrinsics Hip flex. Hip ext. Knee ext. Knee flex Dorsi flex Plantar flex   Right 3- limited by pain/ROM -?RC injury 5 5 5 5 5 5 NT 5 5 5 5   Left 5 5 5 5 5 5 5 NT 5 5 5 5       Sensory:  Intact to LT throughout     Reflexes:    Right Left  Biceps  2 2  Triceps  2 2  Brachiorad.  2 2  Patella  2 2  Achilles absent bilaterally     Cerebellar testing:  no tremor apparent at rest, tremor is seen with posture and mildly with intention, bilateral and symmetric, finger/nose and rapid alternating movements were intact    Gait: Uses a cane, slow and steady with caution however no shuffling noted no ataxia. Labs:     Lab Results   Component Value Date/Time    Hemoglobin A1c 7.8 (H) 01/11/2023 03:45 AM    Sodium 141 01/18/2023 01:24 PM    Potassium 5.4 (H) 01/18/2023 01:24 PM    Chloride 105 01/18/2023 01:24 PM    Glucose 194 (H) 01/18/2023 01:24 PM    BUN 29 (H) 01/18/2023 01:24 PM    Creatinine 2.07 (H) 01/18/2023 01:24 PM    Calcium 9.5 01/18/2023 01:24 PM    WBC 8.5 01/18/2023 01:24 PM    HCT 38.1 01/18/2023 01:24 PM    HGB 11.4 (L) 01/18/2023 01:24 PM    PLATELET 933 52/35/5405 01:24 PM       Imaging:    Results from Hospital Encounter encounter on 06/18/21    MRI BRAIN WO CONT    Narrative  EXAM: MRI BRAIN WO CONT    INDICATION: R/o CVA    COMPARISON: CT head 6/18/2021, MRI brain 12/6/2012. CONTRAST: None. TECHNIQUE:  Multiplanar multisequence acquisition without contrast of the brain. FINDINGS:  The ventricles are normal in size and position. Minimal scattered  periventricular and deep white matter T2/FLAIR hyperintensities, and minimal  patchy T2/FLAIR hyperintensity in the ronaldo, consistent with minimal chronic  microvascular ischemic disease. There is no acute infarct, hemorrhage,  extra-axial fluid collection, or mass effect. There is no cerebellar tonsillar  herniation. Expected arterial flow-voids are present. Complete opacification of the left sphenoid sinus, near complete opacification  of the right maxillary sinus, and scattered mucosal thickening throughout the  remaining paranasal sinuses. The mastoid air cells and middle ears are clear. The orbital contents are within normal limits with bilateral lens implants. No  significant osseous or scalp lesions are identified. Multilevel degenerative  disc disease and facet arthropathy in the cervical spine. Impression  1. No acute intracranial abnormality.  Minimal chronic microvascular ischemic  disease. 2. Pansinus mucosal thickening. Results from [place] encounter on 01/10/23    CT HEAD WO CONT    Narrative  EXAM: CT HEAD WO CONT    INDICATION: headache, nausea    COMPARISON: 6/18/2021. CONTRAST: None. TECHNIQUE: Unenhanced CT of the head was performed using 5 mm images. Brain and  bone windows were generated. Coronal and sagittal reformats. CT dose reduction  was achieved through use of a standardized protocol tailored for this  examination and automatic exposure control for dose modulation. FINDINGS:  The ventricles and sulci are normal in size, shape and configuration. There is  no significant white matter disease. There is no intracranial hemorrhage,  extra-axial collection, or mass effect. The basilar cisterns are open. No CT  evidence of acute infarct. The bone windows demonstrate no abnormalities. Complete opacification of the  right maxillary, left frontal, and left sphenoid sinuses is noted as well as  scattered ethmoidal air cells. Impression  Chronic sinus disease. No acute intracranial abnormality.              Patient Active Problem List   Diagnosis Code    CHF (congestive heart failure) ([facility]) I50.9    S/P TKR (total knee replacement) Z96.659    Anemia D64.9    s/p lumbar laminectomy Z98.890    S/P ASAD (total abdominal hysterectomy) Z90.710    S/P hemorrhoidectomy Z98.890, Z87.19    S/P rotator cuff surgery Z98.890    DM (diabetes mellitus) ([facility] [address] E11.9    Chronic sinusitis J32.9    S/P sinus surgery Z98.890    Dyslipidemia E78.5    Environmental allergies Z91.09    Obstructive sleep apnea (adult) (pediatric) G47.33    DJD (degenerative joint disease) M19.90    Chronic systolic heart failure ([facility]) I50.22    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    Asthma J45.909    Anxiety disorder F41.9    Diabetic neuropathy ([facility]) E11.40    Esophageal reflux K21.9    Essential hypertension, benign I10    Reactive airway disease with wheezing without complication N40.950    Encounter for medication monitoring Z51.81    CKD (chronic kidney disease) N18.9    Arthralgia of multiple joints M25.50    Plantar fasciitis of left foot M72.2    Type 2 diabetes with nephropathy (HCC) E11.21    Severe obesity (BMI 35.0-39. 9) with comorbidity ([facility] [address] E66.01    Ataxia R27.0    General weakness R53.1    Tremors of nervous system R25.1    Fall at home W19. XXXA, Y92.009    Chronic renal disease, stage III N18.30    Syncope R55    Symptomatic bradycardia R00.1    Syncope and collapse R55                   Signed By:   Huma Whalen, DO  Neurophysiology      January 24, 2023

## 2023-01-24 NOTE — PROGRESS NOTES
Chief Complaint   Patient presents with    Tremors     Former patient of Dr Warren Clayton and Erick Cotter      1. Have you been to the ER, urgent care clinic since your last visit? Hospitalized since your last visit? Yes [id number] [facility] ER - passed out     2. Have you seen or consulted any other health care providers outside of the [address] [place] [address] since your last visit? Include any pap smears or colon screening.   No

## 2023-02-01 ENCOUNTER — PATIENT OUTREACH (OUTPATIENT)
Dept: CASE MANAGEMENT | Age: 88
End: 2023-02-01

## 2023-02-01 NOTE — PROGRESS NOTES
Care Transitions Follow Up Call    Care Transition Nurse (CTN) contacted the family by telephone to follow up after admission on 1/10/23. Verified name and  with family as identifiers. Addressed changes since last contact: Follow up appointment completed? yes. Was follow up appointment scheduled within 7 days of discharge? yes. [facility] follow up appointment(s):   Future Appointments   Date Time Provider Danny Montalvo   3/10/2023  2:00 PM La Lorenzo MD [facility] BS AMB   2023 11:40 AM Mel Dacosta DO NEUM BS AMB     Non-[facility] follow up appointment(s): none    CTN provided contact information for future needs. Plan for follow-up call in 7-10 days based on severity of symptoms and risk factors. Goals Addressed                   This Visit's Progress     Prevent complications post hospitalization. 23  Activity- activity intolerance/energy conservation/frequent rest, exercise to increase mobility and prevent falls. Medication compliance  Attend BRUCE appt-23, GURDEEP  23 per review of chart patient attended BRUCE appt. Patient called on home number on file. Message left on voicemail with contact information to return call to this writer. GURDEEP  23 spouse reports patient gait is unchanged, attended neuro appt, discontinued Sinemet. Will follow up on patient next week.  GURDEEP

## 2023-02-03 ENCOUNTER — TELEPHONE (OUTPATIENT)
Dept: FAMILY MEDICINE CLINIC | Age: 88
End: 2023-02-03

## 2023-02-03 NOTE — TELEPHONE ENCOUNTER
Patient  Óscar Bajwa states that his wife need a prior auth on DULoxetine (CYMBALTA) 60 mg capsule he can be reached @ [phone]  ------------------------------------------------------------------    Spoke to pt  to let him the PA specialist will be in on Tuesday and I have sent her a message. He stated the pt only has 1 tablet left. Message sent to Hudson Vasquez LPN for PA.

## 2023-02-03 NOTE — TELEPHONE ENCOUNTER
Patient  Neil Glover states that his wife need a prior auth on DULoxetine (CYMBALTA) 60 mg capsule he can be reached @ [phone]

## 2023-02-07 ENCOUNTER — PATIENT OUTREACH (OUTPATIENT)
Dept: CASE MANAGEMENT | Age: 88
End: 2023-02-07

## 2023-02-07 DIAGNOSIS — G47.09 OTHER INSOMNIA: ICD-10-CM

## 2023-02-07 RX ORDER — ALPRAZOLAM 0.5 MG/1
TABLET ORAL
Qty: 15 TABLET | Refills: 1 | Status: SHIPPED | OUTPATIENT
Start: 2023-02-07

## 2023-02-07 NOTE — PROGRESS NOTES
Goals Addressed                   This Visit's Progress     Prevent complications post hospitalization. 1/13/23  Activity- activity intolerance/energy conservation/frequent rest, exercise to increase mobility and prevent falls. Medication compliance  Attend BRUCE appt-1/18/23, GURDEEP  1/23/23 per review of chart patient attended BRUCE appt. Patient called on home number on file. Message left on voicemail with contact information to return call to this writer. GURDEEP  2/1/23 spouse reports patient gait is unchanged, attended neuro appt, discontinued Sinemet. Will follow up on patient next week. GURDEEP  2/7/23 Patient called on home number on file. Message left on voicemail with contact information to return call to this writer. GURDEEP

## 2023-02-08 ENCOUNTER — OFFICE VISIT (OUTPATIENT)
Dept: FAMILY MEDICINE CLINIC | Age: 88
End: 2023-02-08

## 2023-02-08 DIAGNOSIS — N18.32 STAGE 3B CHRONIC KIDNEY DISEASE (HCC): Primary | ICD-10-CM

## 2023-02-08 DIAGNOSIS — E87.5 HYPERKALEMIA: ICD-10-CM

## 2023-02-09 ENCOUNTER — TELEPHONE (OUTPATIENT)
Dept: FAMILY MEDICINE CLINIC | Age: 88
End: 2023-02-09

## 2023-02-09 LAB
ANION GAP SERPL CALC-SCNC: 5 MMOL/L (ref 5–15)
BUN SERPL-MCNC: 26 MG/DL (ref 6–20)
BUN/CREAT SERPL: 16 (ref 12–20)
CALCIUM SERPL-MCNC: 9.6 MG/DL (ref 8.5–10.1)
CHLORIDE SERPL-SCNC: 107 MMOL/L (ref 97–108)
CO2 SERPL-SCNC: 27 MMOL/L (ref 21–32)
CREAT SERPL-MCNC: 1.67 MG/DL (ref 0.55–1.02)
GLUCOSE SERPL-MCNC: 201 MG/DL (ref 65–100)
POTASSIUM SERPL-SCNC: 4.7 MMOL/L (ref 3.5–5.1)
SODIUM SERPL-SCNC: 139 MMOL/L (ref 136–145)

## 2023-02-13 ENCOUNTER — PATIENT OUTREACH (OUTPATIENT)
Dept: CASE MANAGEMENT | Age: 88
End: 2023-02-13

## 2023-02-13 DIAGNOSIS — M25.50 ARTHRALGIA OF MULTIPLE JOINTS: Primary | ICD-10-CM

## 2023-02-13 DIAGNOSIS — M25.511 ACUTE PAIN OF RIGHT SHOULDER: ICD-10-CM

## 2023-02-13 NOTE — PROGRESS NOTES
Patient has graduated from the Transitions of Care Coordination  program on 2/13/23. Patient/family has the ability to self-manage at this time Care management goals have been completed. Patient was not referred to the [facility] team for further management. Goals Addressed                   This Visit's Progress     COMPLETED: Prevent complications post hospitalization. 1/13/23  Activity- activity intolerance/energy conservation/frequent rest, exercise to increase mobility and prevent falls. Medication compliance  Attend BRUCE appt-1/18/23, [facility]  1/23/23 per review of chart patient attended BRUCE appt. Patient called on home number on file. Message left on voicemail with contact information to return call to this writer. [facility]  2/1/23 spouse reports patient gait is unchanged, attended neuro appt, discontinued Sinemet. Will follow up on patient next week. [facility]  2/7/23 Patient called on home number on file. Message left on voicemail with contact information to return call to this writer. [facility]  2/16/23 Per review of chart patient attended follow up appointments. Patient has had no ED or hospital admissions 30 days post discharge. Goal complete. [facility]               Patient has Care Transition Nurse's contact information for any further questions, concerns, or needs.   Patients upcoming visits:    Future Appointments   Date Time Provider Danny Montalvo   3/10/2023  2:00 PM Donato Hadley MD [facility] BS AMB   9/26/2023 11:40 AM Mel Dacosta DO NEUM BS AMB

## 2023-02-14 ENCOUNTER — TELEPHONE (OUTPATIENT)
Dept: FAMILY MEDICINE CLINIC | Age: 88
End: 2023-02-14

## 2023-02-14 ENCOUNTER — HOME HEALTH ADMISSION (OUTPATIENT)
Dept: HOME HEALTH SERVICES | Facility: HOME HEALTH | Age: 88
End: 2023-02-14
Payer: MEDICARE

## 2023-02-14 NOTE — TELEPHONE ENCOUNTER
----- Message from Juan Carlos Hill LPN sent at 8/89/9915  8:43 AM EST -----  Good morning,    Thank you for the Home health referral, is this for PT? If yes, the earliest we can see the pt is Friday 2/17/2023, is that okay? Please let me know as soon as possible.     [phone] option #2

## 2023-02-17 ENCOUNTER — TELEPHONE (OUTPATIENT)
Dept: FAMILY MEDICINE CLINIC | Age: 88
End: 2023-02-17

## 2023-02-17 ENCOUNTER — HOME CARE VISIT (OUTPATIENT)
Dept: SCHEDULING | Facility: HOME HEALTH | Age: 88
End: 2023-02-17

## 2023-02-17 PROCEDURE — 400018 HH-NO PAY CLAIM PROCEDURE

## 2023-02-17 NOTE — TELEPHONE ENCOUNTER
Patient  would like to get a call from [address] [place] [address] regarding his wife please give him a call @ [phone]

## 2023-02-17 NOTE — TELEPHONE ENCOUNTER
Pt  requesting a call back because the pt could not do her PT on her R shoulder today, she can barely corona it. He stated the therapist said the pt needs and MRI. Mr Effie Gloria is requesting a call back on Monday when Dr. Hannah Cabello returns.

## 2023-02-28 ENCOUNTER — OFFICE VISIT (OUTPATIENT)
Dept: FAMILY MEDICINE CLINIC | Age: 88
End: 2023-02-28
Payer: MEDICARE

## 2023-02-28 VITALS
RESPIRATION RATE: 12 BRPM | DIASTOLIC BLOOD PRESSURE: 76 MMHG | TEMPERATURE: 97.7 F | HEIGHT: 64 IN | OXYGEN SATURATION: 94 % | WEIGHT: 197.8 LBS | HEART RATE: 84 BPM | BODY MASS INDEX: 33.77 KG/M2 | SYSTOLIC BLOOD PRESSURE: 145 MMHG

## 2023-02-28 DIAGNOSIS — M19.011 PRIMARY OSTEOARTHRITIS OF RIGHT SHOULDER: ICD-10-CM

## 2023-02-28 DIAGNOSIS — M67.911 TENDINOPATHY OF RIGHT SHOULDER: Primary | ICD-10-CM

## 2023-02-28 PROCEDURE — G0463 HOSPITAL OUTPT CLINIC VISIT: HCPCS | Performed by: FAMILY MEDICINE

## 2023-02-28 PROCEDURE — 1101F PT FALLS ASSESS-DOCD LE1/YR: CPT | Performed by: FAMILY MEDICINE

## 2023-02-28 PROCEDURE — G8417 CALC BMI ABV UP PARAM F/U: HCPCS | Performed by: FAMILY MEDICINE

## 2023-02-28 PROCEDURE — 1123F ACP DISCUSS/DSCN MKR DOCD: CPT | Performed by: FAMILY MEDICINE

## 2023-02-28 PROCEDURE — G8427 DOCREV CUR MEDS BY ELIG CLIN: HCPCS | Performed by: FAMILY MEDICINE

## 2023-02-28 PROCEDURE — G8510 SCR DEP NEG, NO PLAN REQD: HCPCS | Performed by: FAMILY MEDICINE

## 2023-02-28 PROCEDURE — 1090F PRES/ABSN URINE INCON ASSESS: CPT | Performed by: FAMILY MEDICINE

## 2023-02-28 PROCEDURE — G8536 NO DOC ELDER MAL SCRN: HCPCS | Performed by: FAMILY MEDICINE

## 2023-02-28 PROCEDURE — 99212 OFFICE O/P EST SF 10 MIN: CPT | Performed by: FAMILY MEDICINE

## 2023-02-28 RX ORDER — HYDROCODONE BITARTRATE AND ACETAMINOPHEN 10; 325 MG/1; MG/1
1 TABLET ORAL
Qty: 60 TABLET | Refills: 0
Start: 2023-02-28 | End: 2023-06-07

## 2023-02-28 NOTE — PROGRESS NOTES
HISTORY OF PRESENT ILLNESS  Linda Curiel is a 80 y.o. female. HPI  C/o right shoulder pain for the past few months. Sx comes and goes but in the past few weeks has had more severe pain. Radiates to the upper arm. Increase pain with ROM. No injury noted. Pain is 8/10 when at its worse. Increase pain at night. Was refer to have PT by her neurologist after having OV there on last month. PT was consulted and was concerned about decreased ROM and possible rotator cuff tear and did not want to proceed with PT. PT recommended MRI. She has rods in her back and can not get MRI. She has not seen ortho. Patient Active Problem List   Diagnosis Code    CHF (congestive heart failure) ([facility]) I50.9    S/P TKR (total knee replacement) Z96.659    Anemia D64.9    s/p lumbar laminectomy Z98.890    S/P ASAD (total abdominal hysterectomy) Z90.710    S/P hemorrhoidectomy Z98.890, Z87.19    S/P rotator cuff surgery Z98.890    DM (diabetes mellitus) ([facility] [address] E11.9    Chronic sinusitis J32.9    S/P sinus surgery Z98.890    Dyslipidemia E78.5    Environmental allergies Z91.09    Obstructive sleep apnea (adult) (pediatric) G47.33    DJD (degenerative joint disease) M19.90    Chronic systolic heart failure ([facility]) I50.22    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    Asthma J45.909    Anxiety disorder F41.9    Diabetic neuropathy ([facility]) E11.40    Esophageal reflux K21.9    Essential hypertension, benign I10    Reactive airway disease with wheezing without complication B08.044    Encounter for medication monitoring Z51.81    CKD (chronic kidney disease) N18.9    Arthralgia of multiple joints M25.50    Plantar fasciitis of left foot M72.2    Type 2 diabetes with nephropathy ([facility]) E11.21    Severe obesity (BMI 35.0-39. 9) with comorbidity ([facility] [address] E66.01    Ataxia R27.0    General weakness R53.1    Tremors of nervous system R25.1    Fall at home W19. XXXA, Y92.009    Chronic renal disease, stage III N18.30    Syncope R55 Symptomatic bradycardia R00.1    Syncope and collapse R55    CKD (chronic kidney disease) stage 4, GFR 15-29 ml/min ([facility]) N18.4       Current Outpatient Medications   Medication Sig Dispense Refill    DULoxetine (CYMBALTA) 30 mg capsule Take 1 Capsule by mouth daily. 30 Capsule 3    primidone (MYSOLINE) 50 mg tablet TAKE 3 TABLETS BY MOUTH TWICE DAILY 540 Tablet 3    furosemide (LASIX) 20 mg tablet TAKE 1 TABLET BY MOUTH EVERY MORNING. 180 Tablet 3    diclofenac (VOLTAREN) 1 % gel APPLY TOPICALLY TO THE AFFECTED AREA FOUR TIMES DAILY 300 g 3    glipiZIDE SR (GLUCOTROL XL) 2.5 mg CR tablet Take one tab before breakfast and one tab before dinner 180 Tablet 3    pantoprazole (PROTONIX) 40 mg tablet TAKE 1 TABLET BY MOUTH DAILY BEFORE AND BREAKFAST 90 Tablet 3    predniSONE (DELTASONE) 5 mg tablet Take 2 Tablets by mouth two (2) times a day. 360 Tablet 3    cholecalciferol (VITAMIN D3) (2,000 UNITS /50 MCG) cap capsule Vitamin D3 50 mcg (2,000 unit) capsule   Take 1 capsule every day by oral route. rosuvastatin (CRESTOR) 10 mg tablet TAKE 1 TABLET BY MOUTH EVERY NIGHT 90 Tablet 3    lancets (Accu-Chek Fastclix Lancet Drum) misc USE TO TEST BLOOD SUGAR TWICE DAILY DX E11.9 300 Each 3    glucose blood VI test strips (Accu-Chek Guide test strips) strip USE TO TEST BLOOD SUGAR TWICE DAILY DX E11.9 300 Strip 3    DULoxetine (CYMBALTA) 60 mg capsule TAKE 1 CAPSULE BY MOUTH DAILY 90 Capsule 3    BEVESPI AEROSPHERE 9-4.8 mcg HFA inhaler Take 2 puffs by inhalation daily      guaiFENesin (MUCINEX) 1,200 mg Ta12 ER tablet Take 1,200 mg by mouth daily as needed. MULTIVITAMIN PO Take 1 Tab by mouth daily. lisinopril (PRINIVIL, ZESTRIL) 2.5 mg tablet Take 2.5 mg by mouth daily.       ALPRAZolam (XANAX) 0.5 mg tablet TAKE 1/2 TABLET BY MOUTH AT BEDTIME AS NEEDED 15 Tablet 1    Accu-Chek Guide Me Glucose Mtr misc USE TO CHECK BLOOD SUGAR TWICE DAILY 1 Each 0    albuterol (PROVENTIL HFA, VENTOLIN HFA, PROAIR HFA) 90 mcg/actuation inhaler Take 2 Puffs by inhalation every four (4) hours as needed for Wheezing or Shortness of Breath.  1 Inhaler        Allergies   Allergen Reactions    Gabapentin Other (comments)     Muscles twitching and jerking    Levaquin [Levofloxacin] Swelling    Lyrica [Pregabalin] Other (comments)     Muscle twitching and jerking    Percodan [Oxycodone Hcl-Oxycodone-Asa] Itching    Percodan [Oxycodone-Aspirin] Other (comments)         Past Medical History:   Diagnosis Date    Anemia 3/3/2010    Arrhythmia     irregular heartbeat    Arthritis     Asthma     CHF (congestive heart failure) ([facility] [address] 3/3/2010    Chronic kidney disease     Chronic obstructive pulmonary disease (HCC)     Chronic pain     back    Chronic sinusitis 3/3/2010    CPAP (continuous positive airway pressure) dependence     Diverticulosis     DM (diabetes mellitus) ([facility] [address] 3/3/2010    Dyslipidemia 3/3/2010    GERD (gastroesophageal reflux disease)     HTN (hypertension) 3/3/2010    Ill-defined condition     being evaluated py pulmonolgist for \"trouble breathing\"    S/P TKR (total knee replacement) 3/3/2010    Syncope 1/10/2023    Unspecified sleep apnea     doesn't wear CPAP since back has been hurting         Past Surgical History:   Procedure Laterality Date    HX APPENDECTOMY      thinks it was taken with hysterectomy    HX GYN      \"tubes opened\"    HX HEENT      sinus surgery    HX HEMORRHOIDECTOMY      HX HYSTERECTOMY      HX KNEE REPLACEMENT  1996    both knees- at same time    HX LUMBAR LAMINECTOMY  1980s    HX MOHS PROCEDURES      left shoulder    HX ORTHOPAEDIC  1980    neck fusion    HX ORTHOPAEDIC  1999    right knee replaced for second time    HX ORTHOPAEDIC      lumbar fusion    HX OTHER SURGICAL      CORNELL BIOPSY BREAST STEREOTACTIC Left yrs ago    (-)    [place] COLONOSCOPY FLX DX W/COLLJ SPEC WHEN PFRMD  [id number]    dr. Monty Rogers (barium enema)         Family History   Problem Relation Age of Onset    Diabetes Mother     Heart Disease Father     Diabetes Brother     Blindness Brother     Diabetes Sister     Heart Disease Sister     Heart Disease Brother     Heart Disease Brother     Asthma Brother     Malignant Hyperthermia Neg Hx     Pseudocholinesterase Deficiency Neg Hx     Delayed Awakening Neg Hx     Post-op Nausea/Vomiting Neg Hx     Emergence Delirium Neg Hx     Post-op Cognitive Dysfunction Neg Hx        Social History     Tobacco Use    Smoking status: Former     Packs/day: 0.30     Years: 5.00     Pack years: 1.50     Types: Cigarettes     Quit date: 1960     Years since quittin.2    Smokeless tobacco: Never   Substance Use Topics    Alcohol use: No     Alcohol/week: 0.0 standard drinks        Review of Systems   Constitutional:  Negative for malaise/fatigue. HENT:  Negative for congestion. Eyes:  Negative for blurred vision. Respiratory:  Negative for cough and shortness of breath. Cardiovascular:  Negative for chest pain, palpitations and leg swelling. Gastrointestinal:  Negative for abdominal pain, constipation and heartburn. Genitourinary:  Negative for dysuria, frequency and urgency. Musculoskeletal:  Positive for joint pain. Neurological:  Negative for dizziness, tingling and headaches. Endo/Heme/Allergies:  Negative for environmental allergies. Psychiatric/Behavioral:  Negative for depression. The patient does not have insomnia. Physical Exam  Vitals and nursing note reviewed. Constitutional:       Appearance: Normal appearance. She is well-developed. Comments: BP (!) 145/76   Pulse 84   Temp 97.7 °F (36.5 °C)   Resp 12   Ht 5' 4\" (1.626 m)   Wt 197 lb 12.8 oz (89.7 kg)   LMP 1961 (LMP Unknown)   SpO2 94%   BMI 33.95 kg/m²    Neck:      Thyroid: No thyromegaly. Cardiovascular:      Rate and Rhythm: Normal rate and regular rhythm. Heart sounds: Normal heart sounds. No gallop.    Pulmonary:      Effort: Pulmonary effort is normal.      Breath sounds: Normal breath sounds. Musculoskeletal:      Right shoulder: Tenderness and bony tenderness present. No swelling. Decreased range of motion (pain with ROM. ). Decreased strength. Normal pulse. Right lower leg: No edema. Left lower leg: No edema. Neurological:      Mental Status: She is alert. ASSESSMENT and PLAN  Diagnoses and all orders for this visit:    1. Tendinopathy of right shoulder  2. Primary osteoarthritis of right shoulder  -     REFERRAL TO ORTHOPEDIC SURGERY  -     she has rx for HYDROcodone-acetaminophen (NORCO)  mg tablet; Take 1 Tablet by mouth every six (6) hours as needed for Pain for up to 99 days. Max Daily Amount: 2 Tablets. Instructions for exercises given and reviewed with pt. Pt also to use heat to the area 3-4 times a day. Discussed going for cortisone injection. Then trying to resume PT once shoulder pain has improved.     reviewed medications and side effects in detail

## 2023-02-28 NOTE — PROGRESS NOTES
Patient identified by 2 identifiers. Chief Complaint   Patient presents with    Shoulder Pain     1. Have you been to the ER, urgent care clinic since your last visit? Hospitalized since your last visit? No    2. Have you seen or consulted any other health care providers outside of the [address] [place] [address] since your last visit? Include any pap smears or colon screening.  No

## 2023-03-10 ENCOUNTER — OFFICE VISIT (OUTPATIENT)
Dept: FAMILY MEDICINE CLINIC | Age: 88
End: 2023-03-10

## 2023-03-10 VITALS
WEIGHT: 201.6 LBS | SYSTOLIC BLOOD PRESSURE: 158 MMHG | RESPIRATION RATE: 12 BRPM | TEMPERATURE: 97.3 F | HEIGHT: 64 IN | HEART RATE: 66 BPM | BODY MASS INDEX: 34.42 KG/M2 | DIASTOLIC BLOOD PRESSURE: 78 MMHG | OXYGEN SATURATION: 99 %

## 2023-03-10 DIAGNOSIS — Z00.00 MEDICARE ANNUAL WELLNESS VISIT, SUBSEQUENT: Primary | ICD-10-CM

## 2023-03-10 DIAGNOSIS — E11.21 TYPE 2 DIABETES WITH NEPHROPATHY (HCC): ICD-10-CM

## 2023-03-10 DIAGNOSIS — M25.50 ARTHRALGIA OF MULTIPLE JOINTS: ICD-10-CM

## 2023-03-10 DIAGNOSIS — N18.32 STAGE 3B CHRONIC KIDNEY DISEASE (HCC): ICD-10-CM

## 2023-03-10 DIAGNOSIS — M19.011 PRIMARY OSTEOARTHRITIS OF RIGHT SHOULDER: ICD-10-CM

## 2023-03-10 DIAGNOSIS — E78.2 MIXED HYPERLIPIDEMIA: ICD-10-CM

## 2023-03-10 DIAGNOSIS — I50.22 CHRONIC SYSTOLIC HEART FAILURE (HCC): ICD-10-CM

## 2023-03-10 DIAGNOSIS — Z51.81 ENCOUNTER FOR MEDICATION MONITORING: ICD-10-CM

## 2023-03-10 DIAGNOSIS — I10 ESSENTIAL HYPERTENSION, BENIGN: ICD-10-CM

## 2023-03-10 DIAGNOSIS — G89.29 ENCOUNTER FOR CHRONIC PAIN MANAGEMENT: ICD-10-CM

## 2023-03-10 RX ORDER — DULOXETIN HYDROCHLORIDE 30 MG/1
30 CAPSULE, DELAYED RELEASE ORAL DAILY
Qty: 90 CAPSULE | Refills: 1 | Status: SHIPPED | OUTPATIENT
Start: 2023-03-10 | End: 2023-03-10 | Stop reason: ALTCHOICE

## 2023-03-10 NOTE — PROGRESS NOTES
HISTORY OF PRESENT ILLNESS  Nash Wick is a 80 y.o. female. Follow up on chronic medical problems. Cardiovascular Review:  The patient has hypertension, hyperlipidemia and Systolic HF. Has been seeing cardiology for increase SOB. Has andrea stress test schedule for next Wednesday. Diet and Lifestyle: generally follows a low fat low cholesterol diet, generally follows a low sodium diet  Home BP Monitoring: is not measured at home. Pertinent ROS: taking medications as instructed, no medication side effects noted, no TIA's, no chest pain on exertion, no dyspnea on exertion, noting that her ankles swelling has been mild. DM type II follow up:  Compliant w/ meds, diabetic diet, and exercise. Obtains home glucose monitoring averaging in the mid 100s. Checks BS daily on most days and prn. Pt does have BS log at visit today. No Rf needed for today. Denies any tingling sensation, polyuria and polydipsia. Asthma Review:  The patient is being seen for follow up of chronic respiratory failure/COPD and allergies and chronic sinusitits, not currently in exacerbation. Breathing has been good also with taking prednisone. Using nebulizer as needed but has not recently had to use it. Has had  follow up with pulmonary. Regimen compliance: The patient reports adherence to asthma action plan and regimen. Encounter for pain management. 1./2. Medical history/Past medical History  Chronic Pain:  Osteoarthritis:  Patient has osteoarthritis in multiple joints. Overall has dealing with a lot of pain in the joints and muscles all over. Has pain and burning in the legs. Pain is still bad on most days. Received steroid injection on Monday for the shoulder pain. Pain is limiting her and she is not able to much around much at home and stays in the bed mostly. Primarily in the knees, hips and back are worse. Has seen by rheumatology but she does not want to go back.   Her symptoms have been wax and zak.  She is currently on prednisone and we have discussed long term side effects related to chronic steroid use. Pain has been 8/10 when it is severe. 3. Applicable records from prior treatment providers are apart of [facility] under the media tab. 4. Diagnostic, therapeutic and laboratory results are available in the [address] [place] [address] chart. 5. Consultation notes are available for review in the media tab of the [address] [place] [address] chart. 6. Treatment goals include pain control so that the pt may be as active and function with her daily activities and improved comfort level. Previously pt has been limited by pain. 7. The risks and benefits of treatment has been discussed at this office visit with the pt. She understands that the medication has addicting potential.  Additionally the pt has been advised that narcotic pain medication may impair mental and/or physical ability required for performance of tasks such as driving or operating any other machinery. 8. Pt has an updated signed pain contract on file and can be found under the FYI section of the [facility] chart. 9. The pain contract is reviewed. Pain medication will be continued at the previous dosage. Urine drug screening will not be done today. Diagnostic studies are not indicated at this time. Interventional procedure are not indicated at this time. 10. Medication prescibed is HYDROcodone-acetaminophen (NORCO)  mg tablet. No prescription is needed today. 11. Patient instructions have been reviewed in detail as outlined above and in the pain contract which is updated today. 12. Re-eval is planned for 3 months. Naloxone prescription is not warranted.      Patient Active Problem List   Diagnosis Code    CHF (congestive heart failure) ([facility]) I50.9    S/P TKR (total knee replacement) Z96.659    Anemia D64.9    s/p lumbar laminectomy Z98.890    S/P ASAD (total abdominal hysterectomy) Z90.710    S/P hemorrhoidectomy Z98.890, Z87.19    S/P rotator cuff surgery Z98.890    DM (diabetes mellitus) ([facility] E11.9    Chronic sinusitis J32.9    S/P sinus surgery Z98.890    Dyslipidemia E78.5    Environmental allergies Z91.09    Obstructive sleep apnea (adult) (pediatric) G47.33    DJD (degenerative joint disease) M19.90    Chronic systolic heart failure ([facility]) I50.22    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    Asthma J45.909    Anxiety disorder F41.9    Diabetic neuropathy ([facility]) E11.40    Esophageal reflux K21.9    Essential hypertension, benign I10    Reactive airway disease with wheezing without complication P19.264    Encounter for medication monitoring Z51.81    CKD (chronic kidney disease) N18.9    Arthralgia of multiple joints M25.50    Plantar fasciitis of left foot M72.2    Type 2 diabetes with nephropathy ([facility]) E11.21    Severe obesity (BMI 35.0-39. 9) [facility] comorbidity [facility] E66.01    Ataxia R27.0    General weakness R53.1    Tremors of nervous system R25.1    Fall at home W19. Paty Pabon, Y92.009    Chronic renal disease, stage III N18.30    Syncope R55    Symptomatic bradycardia R00.1    Syncope and collapse R55    CKD (chronic kidney disease) stage 4, GFR 15-29 ml/min ([facility]) N18.4       Current Outpatient Medications   Medication Sig Dispense Refill    HYDROcodone-acetaminophen (NORCO)  mg tablet Take 1 Tablet by mouth every six (6) hours as needed for Pain for up to 99 days. Max Daily Amount: 2 Tablets. 60 Tablet 0    ALPRAZolam (XANAX) 0.5 mg tablet TAKE 1/2 TABLET BY MOUTH AT BEDTIME AS NEEDED 15 Tablet 1    primidone (MYSOLINE) 50 mg tablet TAKE 3 TABLETS BY MOUTH TWICE DAILY 540 Tablet 3    furosemide (LASIX) 20 mg tablet TAKE 1 TABLET BY MOUTH EVERY MORNING.  180 Tablet 3    diclofenac (VOLTAREN) 1 % gel APPLY TOPICALLY TO THE AFFECTED AREA FOUR TIMES DAILY 300 g 3    glipiZIDE SR (GLUCOTROL XL) 2.5 mg CR tablet Take one tab before breakfast and one tab before dinner 180 Tablet 3    pantoprazole (PROTONIX) 40 mg tablet TAKE 1 TABLET BY MOUTH DAILY BEFORE AND BREAKFAST 90 Tablet 3    predniSONE (DELTASONE) 5 mg tablet Take 2 Tablets by mouth two (2) times a day. 360 Tablet 3    cholecalciferol (VITAMIN D3) (2,000 UNITS /50 MCG) cap capsule Vitamin D3 50 mcg (2,000 unit) capsule   Take 1 capsule every day by oral route.  rosuvastatin (CRESTOR) 10 mg tablet TAKE 1 TABLET BY MOUTH EVERY NIGHT 90 Tablet 3    lancets (Accu-Chek Fastclix Lancet Drum) misc USE TO TEST BLOOD SUGAR TWICE DAILY DX E11.9 300 Each 3    glucose blood VI test strips (Accu-Chek Guide test strips) strip USE TO TEST BLOOD SUGAR TWICE DAILY DX E11.9 300 Strip 3    Accu-Chek Guide Me Glucose Mtr misc USE TO CHECK BLOOD SUGAR TWICE DAILY 1 Each 0    BEVESPI AEROSPHERE 9-4.8 mcg HFA inhaler Take 2 puffs by inhalation daily      albuterol (PROVENTIL HFA, VENTOLIN HFA, PROAIR HFA) 90 mcg/actuation inhaler Take 2 Puffs by inhalation every four (4) hours as needed for Wheezing or Shortness of Breath. 1 Inhaler     guaiFENesin (MUCINEX) 1,200 mg Ta12 ER tablet Take 1,200 mg by mouth daily as needed.  MULTIVITAMIN PO Take 1 Tab by mouth daily.  lisinopril (PRINIVIL, ZESTRIL) 2.5 mg tablet Take 2.5 mg by mouth daily. Allergies   Allergen Reactions    Oxycodone Hives    Gabapentin Other (comments)     Muscles twitching and jerking    Levaquin [Levofloxacin] Swelling    Percodan [Oxycodone Hcl-Oxycodone-Asa] Itching    Percodan [Oxycodone-Aspirin] Other (comments)    Pregabalin Other (comments)     Muscle twitching and jerking  Other reaction(s):  Other (see comments)  Muscle twitching and jerking         Past Medical History:   Diagnosis Date    Anemia 3/3/2010    Arrhythmia     irregular heartbeat    Arthritis     Asthma     CHF (congestive heart failure) (HCC) 3/3/2010    Chronic kidney disease     Chronic obstructive pulmonary disease (HCC)     Chronic pain     back    Chronic sinusitis 3/3/2010    CPAP (continuous positive airway pressure) dependence     Diverticulosis     DM (diabetes mellitus) ([facility] 3/3/2010    Dyslipidemia 3/3/2010    GERD (gastroesophageal reflux disease)     HTN (hypertension) 3/3/2010    Ill-defined condition     being evaluated py pulmonolgist for \"trouble breathing\"    S/P TKR (total knee replacement) 3/3/2010    Syncope 1/10/2023    Unspecified sleep apnea     doesn't wear CPAP since back has been hurting         Past Surgical History:   Procedure Laterality Date    HX APPENDECTOMY      thinks it was taken with hysterectomy    HX GYN      \"tubes opened\"    HX HEENT      sinus surgery    HX HEMORRHOIDECTOMY      HX HYSTERECTOMY      HX KNEE REPLACEMENT      both knees- at same time    HX LUMBAR LAMINECTOMY      HX MOHS PROCEDURES      left shoulder    HX ORTHOPAEDIC      neck fusion    HX ORTHOPAEDIC      right knee replaced for second time    HX ORTHOPAEDIC      lumbar fusion    HX OTHER SURGICAL      CORNELL BIOPSY BREAST STEREOTACTIC Left yrs ago    (-)    MO COLONOSCOPY FLX DX W/COLLJ SPEC WHEN PFRMD  [id number]    dr. Nargis Celeste (barium enema)         Family History   Problem Relation Age of Onset    Diabetes Mother     Heart Disease Father     Diabetes Brother     Blindness Brother     Diabetes Sister     Heart Disease Sister     Heart Disease Brother     Heart Disease Brother     Asthma Brother     Malignant Hyperthermia Neg Hx     Pseudocholinesterase Deficiency Neg Hx     Delayed Awakening Neg Hx     Post-op Nausea/Vomiting Neg Hx     Emergence Delirium Neg Hx     Post-op Cognitive Dysfunction Neg Hx        Social History     Tobacco Use    Smoking status: Former     Packs/day: 0.30     Years: 5.00     Pack years: 1.50     Types: Cigarettes     Quit date: 1960     Years since quittin.2    Smokeless tobacco: Never   Substance Use Topics    Alcohol use: No     Alcohol/week: 0.0 standard drinks        Lab Results   Component Value Date/Time    WBC 8.5 01/18/2023 01:24 PM    HGB 11.4 (L) 01/18/2023 01:24 PM    HCT 38.1 01/18/2023 01:24 PM    PLATELET 436 86/51/4990 01:24 PM    .1 (H) 01/18/2023 01:24 PM     Lab Results   Component Value Date/Time    Cholesterol, total 209 (H) 01/11/2023 03:45 AM    HDL Cholesterol 76 01/11/2023 03:45 AM    LDL, calculated 94.8 01/11/2023 03:45 AM    Triglyceride 191 (H) 01/11/2023 03:45 AM    CHOL/HDL Ratio 2.8 01/11/2023 03:45 AM     Lab Results   Component Value Date/Time    Sodium 139 02/08/2023 02:39 PM    Potassium 4.7 02/08/2023 02:39 PM    Chloride 107 02/08/2023 02:39 PM    CO2 27 02/08/2023 02:39 PM    Anion gap 5 02/08/2023 02:39 PM    Glucose 201 (H) 02/08/2023 02:39 PM    BUN 26 (H) 02/08/2023 02:39 PM    Creatinine 1.67 (H) 02/08/2023 02:39 PM    BUN/Creatinine ratio 16 02/08/2023 02:39 PM    GFR est AA 28 (L) 08/09/2022 12:16 PM    GFR est non-AA 23 (L) 08/09/2022 12:16 PM    Calcium 9.6 02/08/2023 02:39 PM    Bilirubin, total 0.3 01/11/2023 03:45 AM    ALT (SGPT) 24 01/11/2023 03:45 AM    Alk. phosphatase 42 (L) 01/11/2023 03:45 AM    Protein, total 5.6 (L) 01/11/2023 03:45 AM    Albumin 3.1 (L) 01/11/2023 03:45 AM    Globulin 2.5 01/11/2023 03:45 AM    A-G Ratio 1.2 01/11/2023 03:45 AM      Lab Results   Component Value Date/Time    Hemoglobin A1c 7.8 (H) 01/11/2023 03:45 AM    Hemoglobin A1c (POC) 8.0 12/09/2022 03:00 PM         Review of Systems   Constitutional:  Negative for malaise/fatigue. HENT:  Negative for congestion. Eyes:  Negative for blurred vision. Respiratory:  Negative for cough and shortness of breath. Cardiovascular:  Negative for chest pain, palpitations and leg swelling. Gastrointestinal:  Negative for abdominal pain, constipation and heartburn. Genitourinary:  Negative for dysuria, frequency and urgency. Neurological:  Negative for dizziness, tingling, sensory change, focal weakness and headaches. Endo/Heme/Allergies:  Negative for environmental allergies. Psychiatric/Behavioral:  Negative for depression. The patient does not have insomnia. Physical Exam  Vitals and nursing note reviewed. Constitutional:       Appearance: Normal appearance. She is well-developed. Comments: BP (!) 158/78   Pulse 66   Temp 97.3 °F (36.3 °C)   Resp 12   Ht 5' 4\" (1.626 m)   Wt 201 lb 9.6 oz (91.4 kg)   LMP 03/04/1961 (LMP Unknown)   SpO2 99%   BMI 34.60 kg/m²        HENT:      Right Ear: Tympanic membrane and ear canal normal.      Left Ear: Tympanic membrane and ear canal normal.   Neck:      Thyroid: No thyromegaly. Cardiovascular:      Rate and Rhythm: Normal rate and regular rhythm. Heart sounds: Normal heart sounds. Pulmonary:      Effort: Pulmonary effort is normal.      Breath sounds: Normal breath sounds. Abdominal:      General: Bowel sounds are normal.      Palpations: Abdomen is soft. There is no mass. Tenderness: There is no abdominal tenderness. Musculoskeletal:         General: Normal range of motion. Cervical back: Normal range of motion and neck supple. Right lower leg: Edema (mild) present. Left lower leg: Edema (mild) present. Lymphadenopathy:      Cervical: No cervical adenopathy. Skin:     General: Skin is warm and dry. Neurological:      General: No focal deficit present. Mental Status: She is alert and oriented to person, place, and time. Psychiatric:         Mood and Affect: Mood normal.         ASSESSMENT and PLAN  Diagnoses and all orders for this visit:    1. Essential hypertension, benign  Discussed sodium restriction, high k rich diet, maintaining ideal body weight and regular exercise program such as daily walking 30 min perday 4-5 times per week, as physiologic means to achieve blood pressure control. Medication compliance advised.    She has been eating mostly prepared foods from take out and getting in more salt than she realized. 2. Type 2 diabetes with nephropathy ([facility]  Continue to monitor. Work on diet and exercise. 3. Mixed hyperlipidemia  Continue to monitor. Work on diet and exercise. 4. Chronic systolic heart failure ([facility]  As per cardiology    5. Stage 3b chronic kidney disease ([facility]  Has follow up with renal for next month. 6. Primary osteoarthritis of right shoulder  7. Arthralgia of multiple joints  8. Encounter for chronic pain management  The pt has signed medication agreement. Pain contract is reviewed. Pain medications will be continued at the previous dosage. 9. Encounter for medication monitoring      Follow-up and Dispositions    Return in about 2 months (around 5/10/2023). reviewed diet, exercise and weight control  cardiovascular risk and specific lipid/LDL goals reviewed  reviewed medications and side effects in detail  specific diabetic recommendations: low cholesterol diet, weight control and daily exercise discussed and glycohemoglobin and other lab monitoring discussed    I have discussed diagnosis listed in this note with pt and/or family. I have discussed treatment plans and options and the risk/benefit analysis of those options, including safe use of medications and possible medication side effects. Through the use of shared decision making we have agreed to the above plan. The patient has received an after-visit summary and questions were answered concerning future plans and follow up. Advise pt of any urgent changes then to proceed to the ER.

## 2023-03-10 NOTE — PROGRESS NOTES
This is a Subsequent Medicare Annual Wellness Exam (AWV) (Performed 12 months after IPPE or effective date of Medicare Part B enrollment)    I have reviewed the patient's medical history in detail and updated the computerized patient record. History     Patient Active Problem List   Diagnosis Code    CHF (congestive heart failure) ([facility]) I50.9    S/P TKR (total knee replacement) Z96.659    Anemia D64.9    s/p lumbar laminectomy Z98.890    S/P ASAD (total abdominal hysterectomy) Z90.710    S/P hemorrhoidectomy Z98.890, Z87.19    S/P rotator cuff surgery Z98.890    DM (diabetes mellitus) ([facility] [facility] E11.9    Chronic sinusitis J32.9    S/P sinus surgery Z98.890    Dyslipidemia E78.5    Environmental allergies Z91.09    Obstructive sleep apnea (adult) (pediatric) G47.33    DJD (degenerative joint disease) M19.90    Chronic systolic heart failure ([facility]) I50.22    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    Asthma J45.909    Anxiety disorder F41.9    Diabetic neuropathy ([facility]) E11.40    Esophageal reflux K21.9    Essential hypertension, benign I10    Reactive airway disease with wheezing without complication D53.533    Encounter for medication monitoring Z51.81    CKD (chronic kidney disease) N18.9    Arthralgia of multiple joints M25.50    Plantar fasciitis of left foot M72.2    Type 2 diabetes with nephropathy ([facility]) E11.21    Severe obesity (BMI 35.0-39. 9) with comorbidity ([email or web address] E66.01    Ataxia R27.0    General weakness R53.1    Tremors of nervous system R25.1    Fall at home W19. XXXA, Y92.009    Chronic renal disease, stage III N18.30    Syncope R55    Symptomatic bradycardia R00.1    Syncope and collapse R55    CKD (chronic kidney disease) stage 4, GFR 15-29 ml/min ([facility]) N18.4       Current Outpatient Medications   Medication Sig Dispense Refill    HYDROcodone-acetaminophen (NORCO)  mg tablet Take 1 Tablet by mouth every six (6) hours as needed for Pain for up to 99 days. Max Daily Amount: 2 Tablets.  61 Tablet 0    ALPRAZolam (XANAX) 0.5 mg tablet TAKE 1/2 TABLET BY MOUTH AT BEDTIME AS NEEDED 15 Tablet 1    primidone (MYSOLINE) 50 mg tablet TAKE 3 TABLETS BY MOUTH TWICE DAILY 540 Tablet 3    furosemide (LASIX) 20 mg tablet TAKE 1 TABLET BY MOUTH EVERY MORNING. 180 Tablet 3    diclofenac (VOLTAREN) 1 % gel APPLY TOPICALLY TO THE AFFECTED AREA FOUR TIMES DAILY 300 g 3    glipiZIDE SR (GLUCOTROL XL) 2.5 mg CR tablet Take one tab before breakfast and one tab before dinner 180 Tablet 3    pantoprazole (PROTONIX) 40 mg tablet TAKE 1 TABLET BY MOUTH DAILY BEFORE AND BREAKFAST 90 Tablet 3    predniSONE (DELTASONE) 5 mg tablet Take 2 Tablets by mouth two (2) times a day. 360 Tablet 3    cholecalciferol (VITAMIN D3) (2,000 UNITS /50 MCG) cap capsule Vitamin D3 50 mcg (2,000 unit) capsule   Take 1 capsule every day by oral route. rosuvastatin (CRESTOR) 10 mg tablet TAKE 1 TABLET BY MOUTH EVERY NIGHT 90 Tablet 3    lancets (Accu-Chek Fastclix Lancet Drum) misc USE TO TEST BLOOD SUGAR TWICE DAILY DX E11.9 300 Each 3    glucose blood VI test strips (Accu-Chek Guide test strips) strip USE TO TEST BLOOD SUGAR TWICE DAILY DX E11.9 300 Strip 3    Accu-Chek Guide Me Glucose Mtr misc USE TO CHECK BLOOD SUGAR TWICE DAILY 1 Each 0    BEVESPI AEROSPHERE 9-4.8 mcg HFA inhaler Take 2 puffs by inhalation daily      albuterol (PROVENTIL HFA, VENTOLIN HFA, PROAIR HFA) 90 mcg/actuation inhaler Take 2 Puffs by inhalation every four (4) hours as needed for Wheezing or Shortness of Breath. 1 Inhaler     guaiFENesin (MUCINEX) 1,200 mg Ta12 ER tablet Take 1,200 mg by mouth daily as needed. MULTIVITAMIN PO Take 1 Tab by mouth daily. lisinopril (PRINIVIL, ZESTRIL) 2.5 mg tablet Take 2.5 mg by mouth daily.          Allergies   Allergen Reactions    Oxycodone Hives    Gabapentin Other (comments)     Muscles twitching and jerking    Levaquin [Levofloxacin] Swelling    Percodan [Oxycodone Hcl-Oxycodone-Asa] Itching    Percodan [Oxycodone-Aspirin] Other (comments)    Pregabalin Other (comments)     Muscle twitching and jerking  Other reaction(s):  Other (see comments)  Muscle twitching and jerking         Past Medical History:   Diagnosis Date    Anemia 3/3/2010    Arrhythmia     irregular heartbeat    Arthritis     Asthma     CHF (congestive heart failure) ([facility] [address] 3/3/2010    Chronic kidney disease     Chronic obstructive pulmonary disease (HCC)     Chronic pain     back    Chronic sinusitis 3/3/2010    CPAP (continuous positive airway pressure) dependence     Diverticulosis     DM (diabetes mellitus) ([facility] [address] 3/3/2010    Dyslipidemia 3/3/2010    GERD (gastroesophageal reflux disease)     HTN (hypertension) 3/3/2010    Ill-defined condition     being evaluated py pulmonolgist for \"trouble breathing\"    S/P TKR (total knee replacement) 3/3/2010    Syncope 1/10/2023    Unspecified sleep apnea     doesn't wear CPAP since back has been hurting         Past Surgical History:   Procedure Laterality Date    HX APPENDECTOMY      thinks it was taken with hysterectomy    HX GYN      \"tubes opened\"    HX HEENT      sinus surgery    HX HEMORRHOIDECTOMY      HX HYSTERECTOMY      HX KNEE REPLACEMENT  1996    both knees- at same time    HX LUMBAR LAMINECTOMY  1980s    HX MOHS PROCEDURES      left shoulder    HX ORTHOPAEDIC  1980    neck fusion    [address]    right knee replaced for second time    HX ORTHOPAEDIC      lumbar fusion    HX OTHER SURGICAL      CORNELL BIOPSY BREAST STEREOTACTIC Left yrs ago    (-)    CT COLONOSCOPY FLX DX W/COLLJ SPEC WHEN PFRMD  [id number]    dr. Blade Guerrero (barium enema)         Family History   Problem Relation Age of Onset    Diabetes Mother     Heart Disease Father     Diabetes Brother     Blindness Brother     Diabetes Sister     Heart Disease Sister     Heart Disease Brother     Heart Disease Brother     Asthma Brother     Malignant Hyperthermia Neg Hx     Pseudocholinesterase Deficiency Neg Hx     Delayed Awakening Neg Hx     Post-op Nausea/Vomiting Neg Hx     Emergence Delirium Neg Hx     Post-op Cognitive Dysfunction Neg Hx        Social History     Tobacco Use    Smoking status: Former     Packs/day: 0.30     Years: 5.00     Pack years: 1.50     Types: Cigarettes     Quit date: 1960     Years since quittin.2    Smokeless tobacco: Never   Substance Use Topics    Alcohol use: No     Alcohol/week: 0.0 standard drinks         Depression Risk Factor Screening:     PHQ over the last two weeks    Little interest or pleasure in doing things Not at all   Feeling down, depressed or hopeless Not at all   Total Score PHQ 2 0     Alcohol Risk Factor Screening: You do not drink alcohol or very rarely. Functional Ability and Level of Safety:   Hearing Loss  Hearing is fair. Activities of Daily Living  The home contains: discussed safety equipment. Patient does total self care    Fall RiskFall Risk Assessment, last 12 mths    Able to walk? Yes   Fall in past 12 months? No     Functional Ability:   Does the patient exhibit a steady gait? yes with cane   How long did it take the patient to get up and walk from a sitting position? Few seconds    Is the patient self reliant? (ie can do own laundry, meals, household chores)  no,  does    Does the patient handle his/her own medications? yes   Does the patient handle his/her own money? yes   Is the patients home safe (ie good lighting, handrails on stairs and bath, etc.)? yes   Did you notice or did patient express any hearing difficulties? Some decline in the left ear. Did you notice or did patient express any vision difficulties? Seeing opthalomoogy d/t some visual difficulties. Advance Care Planning:   Patient was offered the opportunity to discuss advance care planning:  yes    Does patient have an Advance Directive:  no   If no, did you provide information on Caring Connections?   yes      Abuse Screen  Patient is not abused    Cognitive Screening Evaluation of Cognitive Function:  Has your family/caregiver stated any concerns about your memory: some forgetfulness. Patient Care Team   Patient Care Team:  Manasa Guerrero MD as PCP - General    Assessment/Plan   Education and counseling provided:  Are appropriate based on today's review and evaluation  End-of-Life planning (with patient's consent)    ASSESSMENT and PLAN    Medicare Annual Wellness  Continue current treatment plan. Continue annual follow up. I have discussed diagnosis listed in this note with pt and/or family. I have discussed treatment plans and options and the risk/benefit analysis of those options, including safe use of medications and possible medication side effects. Through the use of shared decision making we have agreed to the above plan. The patient has received an after-visit summary and questions were answered concerning future plans and follow up. Advise pt of any urgent changes then to proceed to the ER.

## 2023-03-10 NOTE — PROGRESS NOTES
Patient identified by 2 identifiers. Chief Complaint   Patient presents with    Follow-up    Hypertension     1. Have you been to the ER, urgent care clinic since your last visit? Hospitalized since your last visit? No    2. Have you seen or consulted any other health care providers outside of the [address] [place] [address] since your last visit? Include any pap smears or colon screening.  No

## 2023-03-28 ENCOUNTER — TELEPHONE (OUTPATIENT)
Dept: FAMILY MEDICINE CLINIC | Age: 88
End: 2023-03-28

## 2023-03-28 RX ORDER — AZITHROMYCIN 250 MG/1
TABLET, FILM COATED ORAL
Qty: 6 TABLET | Refills: 0 | Status: SHIPPED | OUTPATIENT
Start: 2023-03-28 | End: 2023-04-02

## 2023-03-28 RX ORDER — PROMETHAZINE HYDROCHLORIDE AND DEXTROMETHORPHAN HYDROBROMIDE 6.25; 15 MG/5ML; MG/5ML
5 SYRUP ORAL
Qty: 180 ML | Refills: 1 | Status: SHIPPED | OUTPATIENT
Start: 2023-03-28

## 2023-03-28 NOTE — TELEPHONE ENCOUNTER
Patient  Federico David states that his wife has a terrible cold can Andre Campbell send in a  pac for her and he would like a call from her as well he can be reached [phone]

## 2023-03-28 NOTE — TELEPHONE ENCOUNTER
Patient  Vern Delgado states that his wife has a terrible cold can Shefali Roman send in a z pac for her and he would like a call from her as well he can be reached   ------------------------------------------------------------------------------    Pt  stated she has cough, congestion, sore throat with brown mucus. Her COVID was negative. He is requesting something be sent to her pharmacy and he can be reached at [phone]. The pt is taking some of his cough syrup.

## 2023-05-02 ENCOUNTER — TELEPHONE (OUTPATIENT)
Dept: FAMILY MEDICINE CLINIC | Age: 88
End: 2023-05-02

## 2023-05-02 DIAGNOSIS — E11.21 TYPE 2 DIABETES WITH NEPHROPATHY (HCC): ICD-10-CM

## 2023-05-02 DIAGNOSIS — M19.011 PRIMARY OSTEOARTHRITIS OF RIGHT SHOULDER: ICD-10-CM

## 2023-05-02 DIAGNOSIS — M67.911 TENDINOPATHY OF RIGHT SHOULDER: ICD-10-CM

## 2023-05-02 RX ORDER — HYDROCODONE BITARTRATE AND ACETAMINOPHEN 10; 325 MG/1; MG/1
1 TABLET ORAL
Qty: 60 TABLET | Refills: 0 | Status: SHIPPED | OUTPATIENT
Start: 2023-05-02 | End: 2023-08-09

## 2023-05-02 RX ORDER — LANCETS
EACH MISCELLANEOUS
Qty: 300 EACH | Refills: 3 | Status: SHIPPED | OUTPATIENT
Start: 2023-05-02

## 2023-05-02 RX ORDER — DULOXETIN HYDROCHLORIDE 30 MG/1
30 CAPSULE, DELAYED RELEASE ORAL DAILY
Qty: 30 CAPSULE | Refills: 11 | Status: SHIPPED | OUTPATIENT
Start: 2023-05-02

## 2023-05-02 RX ORDER — BLOOD SUGAR DIAGNOSTIC
STRIP MISCELLANEOUS
Qty: 300 STRIP | Refills: 3 | Status: SHIPPED | OUTPATIENT
Start: 2023-05-02

## 2023-05-03 ENCOUNTER — TELEPHONE (OUTPATIENT)
Dept: FAMILY MEDICINE CLINIC | Age: 88
End: 2023-05-03

## 2023-05-04 ENCOUNTER — TELEPHONE (OUTPATIENT)
Dept: FAMILY MEDICINE CLINIC | Age: 88
End: 2023-05-04

## 2023-05-10 ENCOUNTER — OFFICE VISIT (OUTPATIENT)
Age: 88
End: 2023-05-10
Payer: MEDICARE

## 2023-05-10 VITALS
TEMPERATURE: 99.2 F | OXYGEN SATURATION: 95 % | HEIGHT: 64 IN | HEART RATE: 65 BPM | RESPIRATION RATE: 12 BRPM | SYSTOLIC BLOOD PRESSURE: 129 MMHG | WEIGHT: 194.8 LBS | BODY MASS INDEX: 33.26 KG/M2 | DIASTOLIC BLOOD PRESSURE: 74 MMHG

## 2023-05-10 DIAGNOSIS — M79.604 BILATERAL LEG PAIN: ICD-10-CM

## 2023-05-10 DIAGNOSIS — M79.89 SWELLING OF BOTH LOWER EXTREMITIES: Primary | ICD-10-CM

## 2023-05-10 DIAGNOSIS — M79.605 BILATERAL LEG PAIN: ICD-10-CM

## 2023-05-10 DIAGNOSIS — Z79.899 ENCOUNTER FOR LONG-TERM (CURRENT) USE OF MEDICATIONS: ICD-10-CM

## 2023-05-10 DIAGNOSIS — E11.21 TYPE 2 DIABETES WITH NEPHROPATHY (HCC): ICD-10-CM

## 2023-05-10 DIAGNOSIS — M25.50 GENERALIZED JOINT PAIN: ICD-10-CM

## 2023-05-10 DIAGNOSIS — G89.29 OTHER CHRONIC PAIN: ICD-10-CM

## 2023-05-10 DIAGNOSIS — I10 ESSENTIAL HYPERTENSION, BENIGN: ICD-10-CM

## 2023-05-10 DIAGNOSIS — E11.42 DIABETIC POLYNEUROPATHY ASSOCIATED WITH TYPE 2 DIABETES MELLITUS (HCC): ICD-10-CM

## 2023-05-10 DIAGNOSIS — R53.1 GENERAL WEAKNESS: ICD-10-CM

## 2023-05-10 DIAGNOSIS — N18.4 CKD (CHRONIC KIDNEY DISEASE) STAGE 4, GFR 15-29 ML/MIN (HCC): ICD-10-CM

## 2023-05-10 DIAGNOSIS — I50.22 CHRONIC SYSTOLIC (CONGESTIVE) HEART FAILURE (HCC): ICD-10-CM

## 2023-05-10 PROCEDURE — 99214 OFFICE O/P EST MOD 30 MIN: CPT | Performed by: FAMILY MEDICINE

## 2023-05-10 PROCEDURE — 4004F PT TOBACCO SCREEN RCVD TLK: CPT | Performed by: FAMILY MEDICINE

## 2023-05-10 PROCEDURE — G8427 DOCREV CUR MEDS BY ELIG CLIN: HCPCS | Performed by: FAMILY MEDICINE

## 2023-05-10 PROCEDURE — 1123F ACP DISCUSS/DSCN MKR DOCD: CPT | Performed by: FAMILY MEDICINE

## 2023-05-10 PROCEDURE — 3051F HG A1C>EQUAL 7.0%<8.0%: CPT | Performed by: FAMILY MEDICINE

## 2023-05-10 PROCEDURE — 1090F PRES/ABSN URINE INCON ASSESS: CPT | Performed by: FAMILY MEDICINE

## 2023-05-10 PROCEDURE — G8417 CALC BMI ABV UP PARAM F/U: HCPCS | Performed by: FAMILY MEDICINE

## 2023-05-10 RX ORDER — METOPROLOL SUCCINATE 25 MG/1
25 TABLET, EXTENDED RELEASE ORAL DAILY
COMMUNITY
Start: 2023-04-04

## 2023-05-10 RX ORDER — DULOXETIN HYDROCHLORIDE 30 MG/1
30 CAPSULE, DELAYED RELEASE ORAL DAILY
COMMUNITY
Start: 2017-10-16

## 2023-05-10 RX ORDER — CARVEDILOL 25 MG/1
1 TABLET ORAL DAILY
COMMUNITY
Start: 2018-03-09 | End: 2023-05-10 | Stop reason: SINTOL

## 2023-05-10 RX ORDER — FLUTICASONE PROPIONATE 50 MCG
SPRAY, SUSPENSION (ML) NASAL
COMMUNITY
Start: 2018-01-22

## 2023-05-10 RX ORDER — BLOOD-GLUCOSE METER
EACH MISCELLANEOUS
COMMUNITY
Start: 2020-06-04

## 2023-05-10 RX ORDER — DEXTROMETHORPHAN HYDROBROMIDE AND PROMETHAZINE HYDROCHLORIDE 15; 6.25 MG/5ML; MG/5ML
5 SYRUP ORAL EVERY 6 HOURS PRN
COMMUNITY
Start: 2017-05-22

## 2023-05-10 SDOH — ECONOMIC STABILITY: INCOME INSECURITY: HOW HARD IS IT FOR YOU TO PAY FOR THE VERY BASICS LIKE FOOD, HOUSING, MEDICAL CARE, AND HEATING?: SOMEWHAT HARD

## 2023-05-10 SDOH — ECONOMIC STABILITY: HOUSING INSECURITY
IN THE LAST 12 MONTHS, WAS THERE A TIME WHEN YOU DID NOT HAVE A STEADY PLACE TO SLEEP OR SLEPT IN A SHELTER (INCLUDING NOW)?: NO

## 2023-05-10 SDOH — ECONOMIC STABILITY: FOOD INSECURITY: WITHIN THE PAST 12 MONTHS, THE FOOD YOU BOUGHT JUST DIDN'T LAST AND YOU DIDN'T HAVE MONEY TO GET MORE.: NEVER TRUE

## 2023-05-10 SDOH — ECONOMIC STABILITY: FOOD INSECURITY: WITHIN THE PAST 12 MONTHS, YOU WORRIED THAT YOUR FOOD WOULD RUN OUT BEFORE YOU GOT MONEY TO BUY MORE.: NEVER TRUE

## 2023-05-10 ASSESSMENT — ENCOUNTER SYMPTOMS
BLOOD IN STOOL: 0
SHORTNESS OF BREATH: 0
RHINORRHEA: 0
CONSTIPATION: 0
CHEST TIGHTNESS: 0
ABDOMINAL PAIN: 0
COUGH: 0

## 2023-05-10 ASSESSMENT — PATIENT HEALTH QUESTIONNAIRE - PHQ9
SUM OF ALL RESPONSES TO PHQ QUESTIONS 1-9: 0
SUM OF ALL RESPONSES TO PHQ QUESTIONS 1-9: 0
1. LITTLE INTEREST OR PLEASURE IN DOING THINGS: 0
SUM OF ALL RESPONSES TO PHQ9 QUESTIONS 1 & 2: 0
SUM OF ALL RESPONSES TO PHQ QUESTIONS 1-9: 0
SUM OF ALL RESPONSES TO PHQ QUESTIONS 1-9: 0
2. FEELING DOWN, DEPRESSED OR HOPELESS: 0

## 2023-05-10 NOTE — PROGRESS NOTES
Patient identified by 2 indentifiers. Chief Complaint   Patient presents with    Follow-up    Hypertension     1. Have you been to the ER, urgent care clinic since your last visit? Hospitalized since your last visit? No    2. Have you seen or consulted any other health care providers outside of the [address] [place] [address] since your last visit? Include any pap smears or colon screening. No    Lab results faxed to Dr. Melania Wang as requested on 5/12/2023 and phone call to alert Cardiologist as well.

## 2023-05-10 NOTE — PROGRESS NOTES
Subjective:      Patient ID: Dejan Thomas is a 80 y.o. female. HPI  Follow up on chronic medical problems. Cardiovascular Review:  The patient has hypertension, hyperlipidemia and Systolic HF. Has been seeing cardiology for increase SOB and having increased swelling in the ankles and feet. Seen on yesterday. Has schedule doppler test on the LEs. Had echo yesterday that showed improved from her most recent EF at 35%. Compliant with taking medications. Diet and Lifestyle: generally follows a low fat low cholesterol diet, generally follows a low sodium diet but does eat out most of her meal that her  picks up. Home BP Monitoring: is not measured at home. Pertinent ROS: taking medications as instructed, no medication side effects noted, no TIA's, no chest pain on exertion, no dyspnea on exertion, noting that her ankles swelling has been mild. DM type II follow up:  Compliant w/ meds, diabetic diet, and exercise. Obtains home glucose monitoring averaging in the mid 100s. Checks BS daily on most days and prn. Pt does have BS log at visit today. No Rf needed for today. Denies any tingling sensation, polyuria and polydipsia. Asthma Review:  The patient is being seen for follow up of chronic respiratory failure/COPD and allergies and chronic sinusitits, not currently in exacerbation. Breathing has been good also with taking prednisone. Using nebulizer as needed but has not recently had to use it. Has had  follow up with pulmonary. Regimen compliance: The patient reports adherence to asthma action plan and regimen. Encounter for pain management. 1./2. Medical history/Past medical History  Chronic Pain:  Osteoarthritis:  Patient has osteoarthritis in multiple joints. Overall has dealing with a lot of pain in the joints and muscles all over. Has pain and burning in the legs. Pain is still bad on most days.   Pain is limiting her and she is not able to much around much at home

## 2023-05-11 LAB
ANION GAP SERPL CALC-SCNC: 1 MMOL/L (ref 5–15)
BUN SERPL-MCNC: 33 MG/DL (ref 6–20)
BUN/CREAT SERPL: 17 (ref 12–20)
CALCIUM SERPL-MCNC: 10.2 MG/DL (ref 8.5–10.1)
CHLORIDE SERPL-SCNC: 108 MMOL/L (ref 97–108)
CO2 SERPL-SCNC: 32 MMOL/L (ref 21–32)
COMMENT:: NORMAL
CREAT SERPL-MCNC: 1.98 MG/DL (ref 0.55–1.02)
GLUCOSE SERPL-MCNC: 233 MG/DL (ref 65–100)
NT PRO BNP: 2006 PG/ML
POTASSIUM SERPL-SCNC: 5.4 MMOL/L (ref 3.5–5.1)
SODIUM SERPL-SCNC: 141 MMOL/L (ref 136–145)
SPECIMEN HOLD: NORMAL

## 2023-06-07 DIAGNOSIS — M67.911 TENDINOPATHY OF RIGHT SHOULDER: Primary | ICD-10-CM

## 2023-06-07 DIAGNOSIS — M19.011 PRIMARY OSTEOARTHRITIS OF RIGHT SHOULDER: ICD-10-CM

## 2023-06-07 RX ORDER — HYDROCODONE BITARTRATE AND ACETAMINOPHEN 10; 325 MG/1; MG/1
1 TABLET ORAL EVERY 6 HOURS PRN
Qty: 60 TABLET | Refills: 0 | Status: SHIPPED | OUTPATIENT
Start: 2023-06-08 | End: 2023-07-08

## 2023-06-07 NOTE — TELEPHONE ENCOUNTER
----- Message from Thomas Patterson sent at 6/7/2023 11:17 AM EDT -----  Subject: Refill Request    QUESTIONS  Name of Medication? HYDROcodone-acetaminophen (NORCO)  MG per tablet  Patient-reported dosage and instructions?  mg, Take 1 tablet by   mouth every 6 hours as needed. How many days do you have left? 0  Preferred Pharmacy? [phone]  Pharmacy phone number (if available)? [phone]  ---------------------------------------------------------------------------  --------------  CALL BACK INFO  What is the best way for the office to contact you? OK to leave message on   voicemail  Preferred Call Back Phone Number? [phone]  ---------------------------------------------------------------------------  --------------  SCRIPT ANSWERS  Relationship to Patient? Spouse/Partner  Representative Name? Garcia Edwards  Is the representative on the Communication Release of Information (JESUS)   form in Epic?  Yes

## 2023-06-07 NOTE — TELEPHONE ENCOUNTER
Last appointment: 5/10/23  Next appointment: 6/28/23  Previous refill encounter(s): 5/2/23 #60    Requested Prescriptions     Pending Prescriptions Disp Refills    HYDROcodone-acetaminophen (NORCO)  MG per tablet 60 tablet 0     Sig: Take 1 tablet by mouth every 6 hours as needed for Pain for up to 30 days. Max Daily Amount: 4 tablets           For Pharmacy Admin Tracking Only    Program: Medication Refill  CPA in place:    Recommendation Provided To:    Intervention Detail: New Rx: 1, reason: Patient Preference  Intervention Accepted By:   Ashkna Del Real Closed?:    Time Spent (min): 5

## 2023-06-23 NOTE — TELEPHONE ENCOUNTER
Last appointment: 5/10/23  Next appointment: 6/28/23  Previous refill encounter(s): 12/12/22 #300 with 3 refills    Requested Prescriptions     Pending Prescriptions Disp Refills    diclofenac sodium (VOLTAREN) 1 % GEL [Pharmacy Med Name: DICLOFENAC 1% GEL 100GM (RX)] 300 g 3     Sig: APPLY TOPICALLY TO THE AFFECTED AREA FOUR TIMES DAILY         For Pharmacy Admin Tracking Only    Program: Medication Refill  CPA in place:    Recommendation Provided To:    Intervention Detail: New Rx: 1, reason: Patient Preference  Intervention Accepted By:   Raffi García Closed?:    Time Spent (min): 5

## 2023-06-28 ENCOUNTER — OFFICE VISIT (OUTPATIENT)
Age: 88
End: 2023-06-28
Payer: MEDICARE

## 2023-06-28 VITALS
DIASTOLIC BLOOD PRESSURE: 70 MMHG | BODY MASS INDEX: 32.06 KG/M2 | OXYGEN SATURATION: 96 % | HEART RATE: 88 BPM | RESPIRATION RATE: 18 BRPM | TEMPERATURE: 98.4 F | HEIGHT: 64 IN | WEIGHT: 187.8 LBS | SYSTOLIC BLOOD PRESSURE: 112 MMHG

## 2023-06-28 DIAGNOSIS — N18.4 CKD (CHRONIC KIDNEY DISEASE) STAGE 4, GFR 15-29 ML/MIN (HCC): ICD-10-CM

## 2023-06-28 DIAGNOSIS — M79.10 MYALGIA: ICD-10-CM

## 2023-06-28 DIAGNOSIS — E11.21 TYPE 2 DIABETES WITH NEPHROPATHY (HCC): ICD-10-CM

## 2023-06-28 DIAGNOSIS — R22.43 LOCALIZED SWELLING OF BOTH LOWER LEGS: ICD-10-CM

## 2023-06-28 DIAGNOSIS — M79.89 SWELLING OF BOTH LOWER EXTREMITIES: ICD-10-CM

## 2023-06-28 DIAGNOSIS — E11.42 DIABETIC POLYNEUROPATHY ASSOCIATED WITH TYPE 2 DIABETES MELLITUS (HCC): ICD-10-CM

## 2023-06-28 DIAGNOSIS — G89.29 ENCOUNTER FOR CHRONIC PAIN MANAGEMENT: ICD-10-CM

## 2023-06-28 DIAGNOSIS — E78.00 HYPERCHOLESTEROLEMIA: ICD-10-CM

## 2023-06-28 DIAGNOSIS — M25.50 GENERALIZED JOINT PAIN: ICD-10-CM

## 2023-06-28 DIAGNOSIS — I50.22 CHRONIC SYSTOLIC (CONGESTIVE) HEART FAILURE (HCC): ICD-10-CM

## 2023-06-28 DIAGNOSIS — I10 ESSENTIAL HYPERTENSION, BENIGN: Primary | ICD-10-CM

## 2023-06-28 DIAGNOSIS — Z79.899 ENCOUNTER FOR LONG-TERM (CURRENT) USE OF MEDICATIONS: ICD-10-CM

## 2023-06-28 LAB
GLUCOSE, POC: 204 MG/DL
HBA1C MFR BLD: 7.8 %

## 2023-06-28 PROCEDURE — PBSHW AMB POC GLUCOSE BLOOD, BY GLUCOSE MONITORING DEVICE: Performed by: FAMILY MEDICINE

## 2023-06-28 PROCEDURE — 99214 OFFICE O/P EST MOD 30 MIN: CPT | Performed by: FAMILY MEDICINE

## 2023-06-28 PROCEDURE — 1036F TOBACCO NON-USER: CPT | Performed by: FAMILY MEDICINE

## 2023-06-28 PROCEDURE — 83036 HEMOGLOBIN GLYCOSYLATED A1C: CPT | Performed by: FAMILY MEDICINE

## 2023-06-28 PROCEDURE — 1123F ACP DISCUSS/DSCN MKR DOCD: CPT | Performed by: FAMILY MEDICINE

## 2023-06-28 PROCEDURE — 1090F PRES/ABSN URINE INCON ASSESS: CPT | Performed by: FAMILY MEDICINE

## 2023-06-28 PROCEDURE — 3051F HG A1C>EQUAL 7.0%<8.0%: CPT | Performed by: FAMILY MEDICINE

## 2023-06-28 PROCEDURE — G8427 DOCREV CUR MEDS BY ELIG CLIN: HCPCS | Performed by: FAMILY MEDICINE

## 2023-06-28 PROCEDURE — G8417 CALC BMI ABV UP PARAM F/U: HCPCS | Performed by: FAMILY MEDICINE

## 2023-06-28 PROCEDURE — PBSHW AMB POC HEMOGLOBIN A1C: Performed by: FAMILY MEDICINE

## 2023-06-28 PROCEDURE — 82962 GLUCOSE BLOOD TEST: CPT | Performed by: FAMILY MEDICINE

## 2023-06-28 RX ORDER — PRIMIDONE 50 MG/1
TABLET ORAL
Qty: 90 TABLET | Refills: 0
Start: 2023-06-28

## 2023-06-28 RX ORDER — FUROSEMIDE 40 MG/1
40 TABLET ORAL DAILY
Qty: 60 TABLET | Refills: 3 | COMMUNITY
Start: 2023-06-28

## 2023-06-28 RX ORDER — METHYLPREDNISOLONE 4 MG/1
TABLET ORAL
Qty: 1 KIT | Refills: 0 | Status: SHIPPED | OUTPATIENT
Start: 2023-06-28

## 2023-06-28 RX ORDER — PREDNISONE 5 MG/1
15 TABLET ORAL DAILY
Qty: 270 TABLET | Refills: 3 | Status: SHIPPED | OUTPATIENT
Start: 2023-06-28

## 2023-06-28 SDOH — ECONOMIC STABILITY: FOOD INSECURITY: WITHIN THE PAST 12 MONTHS, YOU WORRIED THAT YOUR FOOD WOULD RUN OUT BEFORE YOU GOT MONEY TO BUY MORE.: NEVER TRUE

## 2023-06-28 SDOH — ECONOMIC STABILITY: INCOME INSECURITY: HOW HARD IS IT FOR YOU TO PAY FOR THE VERY BASICS LIKE FOOD, HOUSING, MEDICAL CARE, AND HEATING?: NOT HARD AT ALL

## 2023-06-28 SDOH — ECONOMIC STABILITY: FOOD INSECURITY: WITHIN THE PAST 12 MONTHS, THE FOOD YOU BOUGHT JUST DIDN'T LAST AND YOU DIDN'T HAVE MONEY TO GET MORE.: NEVER TRUE

## 2023-06-28 ASSESSMENT — ANXIETY QUESTIONNAIRES
1. FEELING NERVOUS, ANXIOUS, OR ON EDGE: 0
GAD7 TOTAL SCORE: 0
3. WORRYING TOO MUCH ABOUT DIFFERENT THINGS: 0
2. NOT BEING ABLE TO STOP OR CONTROL WORRYING: 0
4. TROUBLE RELAXING: 0
GAD7 TOTAL SCORE: 0
5. BEING SO RESTLESS THAT IT IS HARD TO SIT STILL: 0
7. FEELING AFRAID AS IF SOMETHING AWFUL MIGHT HAPPEN: 0
IF YOU CHECKED OFF ANY PROBLEMS ON THIS QUESTIONNAIRE, HOW DIFFICULT HAVE THESE PROBLEMS MADE IT FOR YOU TO DO YOUR WORK, TAKE CARE OF THINGS AT HOME, OR GET ALONG WITH OTHER PEOPLE: NOT DIFFICULT AT ALL
6. BECOMING EASILY ANNOYED OR IRRITABLE: 0

## 2023-06-28 ASSESSMENT — ENCOUNTER SYMPTOMS
CONSTIPATION: 0
SHORTNESS OF BREATH: 0
RHINORRHEA: 0
CHEST TIGHTNESS: 0
BLOOD IN STOOL: 0
ABDOMINAL PAIN: 0
COUGH: 0

## 2023-06-28 ASSESSMENT — PATIENT HEALTH QUESTIONNAIRE - PHQ9
SUM OF ALL RESPONSES TO PHQ QUESTIONS 1-9: 0
1. LITTLE INTEREST OR PLEASURE IN DOING THINGS: 0
2. FEELING DOWN, DEPRESSED OR HOPELESS: 0
SUM OF ALL RESPONSES TO PHQ QUESTIONS 1-9: 0
SUM OF ALL RESPONSES TO PHQ9 QUESTIONS 1 & 2: 0
SUM OF ALL RESPONSES TO PHQ QUESTIONS 1-9: 0
SUM OF ALL RESPONSES TO PHQ QUESTIONS 1-9: 0

## 2023-06-29 ENCOUNTER — CLINICAL DOCUMENTATION (OUTPATIENT)
Age: 88
End: 2023-06-29

## 2023-06-29 DIAGNOSIS — G89.29 ENCOUNTER FOR CHRONIC PAIN MANAGEMENT: ICD-10-CM

## 2023-07-01 LAB
AMPHETAMINES UR QL SCN: NEGATIVE NG/ML
BARBITURATES UR QL SCN: POSITIVE NG/ML
BENZODIAZ UR QL SCN: NEGATIVE NG/ML
BZE UR QL SCN: NEGATIVE NG/ML
CANNABINOIDS UR QL SCN: NEGATIVE NG/ML
CREAT UR-MCNC: 82.3 MG/DL (ref 20–300)
LABORATORY COMMENT REPORT: ABNORMAL
METHADONE UR QL SCN: NEGATIVE NG/ML
OPIATES UR QL SCN: POSITIVE NG/ML
OXYCODONE+OXYMORPHONE UR QL SCN: NEGATIVE NG/ML
PCP UR QL: NEGATIVE NG/ML
PH UR: 7.1 (ref 4.5–8.9)
PROPOXYPH UR QL SCN: NEGATIVE NG/ML

## 2023-07-03 DIAGNOSIS — M67.911 TENDINOPATHY OF RIGHT SHOULDER: ICD-10-CM

## 2023-07-03 DIAGNOSIS — M19.011 PRIMARY OSTEOARTHRITIS OF RIGHT SHOULDER: ICD-10-CM

## 2023-07-12 NOTE — ED TRIAGE NOTES
Patient states that she felt \"nauseous and dizzy\" today after standing up. States that she thinks she's \"dry. \" Last visited cardiology last week The patient returns today for planned surgical excision of a left malar eminence subcutaneous mass.  The patient reports no new changes since I last saw him.    Physical examination reveals an approximately 2 cm mobile nontender subcutaneous mass overlying the left malar prominence.  It is suspicious for either a small lipoma or a sebaceous cyst.      Impression-left malar eminence subcutaneous mass    Plan-excisional biopsy under local anesthesia in the office today.  The rationale risks and the alternatives were discussed with the patient.  He expressed an understanding and consented to proceed.      Procedure note-    Preoperative Diagnosis:  Left malar eminence mass    Postoperative Diagnosis:  Same    Operative Procedure:  Excision left facial lesion    Details of Procedure:  The patient was placed in a semi supine position.  The skin overlying the mass was locally anesthetized by infiltration of 1% lidocaine with 1 100,000 of epinephrine using approximately 2.5 cc for the entire procedure.  The area is prepped with Betadine and he was prepped and draped in the standard manner.  A 2 cm incision was placed overlying a relaxed skin tension line directly overlying the mass.  Sharp dissection was then carried down to the capsule of the mass.  The mass was then circumferentially dissected and found to be a cystic mass consistent with a sebaceous cyst.  The mass was circumferentially dissected with its capsule intact beneath.  It was subsequently removed.  The wound was then irrigated with saline and the incision closed with 3 simple interrupted sutures of 4-0 Monocryl.  A Steri-Strip dressing was applied.  The patient was then given postoperative instructions prior to discharge in stable condition without complication.    Summary of Procedure/Findings:  Cystic subcutaneous mass left malar eminence consistent with a sebaceous cyst    Complications:  None observed    Estimated Blood Loss:  Negligible    Specimen:   Sent to Surgical pathology in formalin

## 2023-08-01 ENCOUNTER — TELEPHONE (OUTPATIENT)
Age: 88
End: 2023-08-01

## 2023-08-01 DIAGNOSIS — M79.604 BILATERAL LEG PAIN: ICD-10-CM

## 2023-08-01 DIAGNOSIS — M79.605 BILATERAL LEG PAIN: ICD-10-CM

## 2023-08-01 DIAGNOSIS — G89.29 OTHER CHRONIC PAIN: ICD-10-CM

## 2023-08-01 DIAGNOSIS — M79.10 MYALGIA: ICD-10-CM

## 2023-08-01 DIAGNOSIS — M25.50 GENERALIZED JOINT PAIN: Primary | ICD-10-CM

## 2023-08-01 NOTE — TELEPHONE ENCOUNTER
Patients  would like a return call regarding his wife's condition, he said she can't walk or sit down and in a lot of pain.   Please give him a call @ [phone]

## 2023-08-01 NOTE — TELEPHONE ENCOUNTER
Patients  would like a return call regarding his wife's condition, he said she can't walk or sit down and in a lot of pain. Please give him a call @ [phone]    ----------------------------------------------------------------------------------------------    Pt  stated he is walking with her with the gait belt around her waist so she does not fall. He stated she is crying because the pain is so bad and has fibromyalgia. He stated he know you will call him back later this evening.

## 2023-08-04 RX ORDER — HYDROCODONE BITARTRATE AND ACETAMINOPHEN 10; 325 MG/1; MG/1
1 TABLET ORAL EVERY 6 HOURS PRN
Qty: 60 TABLET | Refills: 0 | Status: SHIPPED | OUTPATIENT
Start: 2023-08-04 | End: 2023-11-11

## 2023-08-17 DIAGNOSIS — G47.09 OTHER INSOMNIA: ICD-10-CM

## 2023-08-17 RX ORDER — ALPRAZOLAM 0.5 MG/1
TABLET ORAL
Qty: 15 TABLET | Refills: 1 | OUTPATIENT
Start: 2023-08-17 | End: 2023-10-16

## 2023-08-17 RX ORDER — ALPRAZOLAM 0.5 MG/1
0.25 TABLET ORAL NIGHTLY PRN
Qty: 15 TABLET | Refills: 1 | Status: SHIPPED | OUTPATIENT
Start: 2023-08-20 | End: 2023-11-27

## 2023-08-17 NOTE — TELEPHONE ENCOUNTER
Last appointment: 6/28/23  Next appointment: 8/30/23  Previous refill encounter(s): 6/15/23 #15 with 1 refill    Requested Prescriptions     Pending Prescriptions Disp Refills    ALPRAZolam (XANAX) 0.5 MG tablet [Pharmacy Med Name: ALPRAZOLAM 0.5MG TABLETS] 15 tablet 1     Sig: TAKE 1/2 TABLET BY MOUTH AT BEDTIME AS NEEDED         For Pharmacy Admin Tracking Only    Program: Medication Refill  CPA in place:    Recommendation Provided To:    Intervention Detail: New Rx: 1, reason: Patient Preference  Intervention Accepted By:   Nely Martinez Closed?:    Time Spent (min): 5

## 2023-08-30 ENCOUNTER — OFFICE VISIT (OUTPATIENT)
Age: 88
End: 2023-08-30
Payer: MEDICARE

## 2023-08-30 VITALS
HEART RATE: 102 BPM | OXYGEN SATURATION: 99 % | RESPIRATION RATE: 18 BRPM | HEIGHT: 63 IN | TEMPERATURE: 98.7 F | WEIGHT: 178.4 LBS | BODY MASS INDEX: 31.61 KG/M2 | DIASTOLIC BLOOD PRESSURE: 70 MMHG | SYSTOLIC BLOOD PRESSURE: 111 MMHG

## 2023-08-30 DIAGNOSIS — N18.4 CKD (CHRONIC KIDNEY DISEASE) STAGE 4, GFR 15-29 ML/MIN (HCC): ICD-10-CM

## 2023-08-30 DIAGNOSIS — M25.50 GENERALIZED JOINT PAIN: ICD-10-CM

## 2023-08-30 DIAGNOSIS — G89.29 ENCOUNTER FOR CHRONIC PAIN MANAGEMENT: ICD-10-CM

## 2023-08-30 DIAGNOSIS — E78.00 HYPERCHOLESTEROLEMIA: ICD-10-CM

## 2023-08-30 DIAGNOSIS — M79.7 FIBROMYALGIA AFFECTING MULTIPLE SITES: ICD-10-CM

## 2023-08-30 DIAGNOSIS — I50.22 CHRONIC SYSTOLIC (CONGESTIVE) HEART FAILURE (HCC): ICD-10-CM

## 2023-08-30 DIAGNOSIS — E11.42 DIABETIC POLYNEUROPATHY ASSOCIATED WITH TYPE 2 DIABETES MELLITUS (HCC): ICD-10-CM

## 2023-08-30 DIAGNOSIS — I10 ESSENTIAL HYPERTENSION, BENIGN: Primary | ICD-10-CM

## 2023-08-30 DIAGNOSIS — Z79.899 ENCOUNTER FOR LONG-TERM (CURRENT) USE OF MEDICATIONS: ICD-10-CM

## 2023-08-30 DIAGNOSIS — E11.21 TYPE 2 DIABETES WITH NEPHROPATHY (HCC): ICD-10-CM

## 2023-08-30 PROCEDURE — 1123F ACP DISCUSS/DSCN MKR DOCD: CPT | Performed by: FAMILY MEDICINE

## 2023-08-30 PROCEDURE — 99214 OFFICE O/P EST MOD 30 MIN: CPT | Performed by: FAMILY MEDICINE

## 2023-08-30 PROCEDURE — 3051F HG A1C>EQUAL 7.0%<8.0%: CPT | Performed by: FAMILY MEDICINE

## 2023-08-30 PROCEDURE — 1090F PRES/ABSN URINE INCON ASSESS: CPT | Performed by: FAMILY MEDICINE

## 2023-08-30 PROCEDURE — 1036F TOBACCO NON-USER: CPT | Performed by: FAMILY MEDICINE

## 2023-08-30 PROCEDURE — G8417 CALC BMI ABV UP PARAM F/U: HCPCS | Performed by: FAMILY MEDICINE

## 2023-08-30 PROCEDURE — G8427 DOCREV CUR MEDS BY ELIG CLIN: HCPCS | Performed by: FAMILY MEDICINE

## 2023-08-30 RX ORDER — PREGABALIN 25 MG/1
25 CAPSULE ORAL 2 TIMES DAILY
Qty: 60 CAPSULE | Refills: 5 | Status: SHIPPED | OUTPATIENT
Start: 2023-08-30 | End: 2023-12-07

## 2023-08-30 RX ORDER — PREDNISONE 5 MG/1
20 TABLET ORAL DAILY
Qty: 270 TABLET | Refills: 3
Start: 2023-08-30

## 2023-08-30 ASSESSMENT — PATIENT HEALTH QUESTIONNAIRE - PHQ9
9. THOUGHTS THAT YOU WOULD BE BETTER OFF DEAD, OR OF HURTING YOURSELF: 0
2. FEELING DOWN, DEPRESSED OR HOPELESS: 3
8. MOVING OR SPEAKING SO SLOWLY THAT OTHER PEOPLE COULD HAVE NOTICED. OR THE OPPOSITE, BEING SO FIGETY OR RESTLESS THAT YOU HAVE BEEN MOVING AROUND A LOT MORE THAN USUAL: 0
SUM OF ALL RESPONSES TO PHQ QUESTIONS 1-9: 5
SUM OF ALL RESPONSES TO PHQ QUESTIONS 1-9: 5
7. TROUBLE CONCENTRATING ON THINGS, SUCH AS READING THE NEWSPAPER OR WATCHING TELEVISION: 0
5. POOR APPETITE OR OVEREATING: 2
4. FEELING TIRED OR HAVING LITTLE ENERGY: 0
SUM OF ALL RESPONSES TO PHQ QUESTIONS 1-9: 5
SUM OF ALL RESPONSES TO PHQ9 QUESTIONS 1 & 2: 3
SUM OF ALL RESPONSES TO PHQ QUESTIONS 1-9: 5
10. IF YOU CHECKED OFF ANY PROBLEMS, HOW DIFFICULT HAVE THESE PROBLEMS MADE IT FOR YOU TO DO YOUR WORK, TAKE CARE OF THINGS AT HOME, OR GET ALONG WITH OTHER PEOPLE: 0
1. LITTLE INTEREST OR PLEASURE IN DOING THINGS: 0
6. FEELING BAD ABOUT YOURSELF - OR THAT YOU ARE A FAILURE OR HAVE LET YOURSELF OR YOUR FAMILY DOWN: 0
3. TROUBLE FALLING OR STAYING ASLEEP: 0

## 2023-08-30 ASSESSMENT — ENCOUNTER SYMPTOMS
COUGH: 0
CONSTIPATION: 0
SHORTNESS OF BREATH: 0
ABDOMINAL PAIN: 0
BLOOD IN STOOL: 0
RHINORRHEA: 0
CHEST TIGHTNESS: 0

## 2023-08-30 NOTE — PROGRESS NOTES
Chief Complaint   Patient presents with    Follow-up    Annual Exam       1. \"Have you been to the ER, urgent care clinic since your last visit? Hospitalized since your last visit? \" No    2. \"Have you seen or consulted any other health care providers outside of the [address] [place] [address] since your last visit? \" No     3. For patients aged 43-73: Has the patient had a colonoscopy / FIT/ Cologuard? N/A      If the patient is female:    4. For patients aged 43-66: Has the patient had a mammogram within the past 2 years? N/a      5. For patients aged 21-65: Has the patient had a pap smear?  N/A     Health Maintenance Due   Topic Date Due    Flu vaccine (1) 08/01/2023

## 2023-08-30 NOTE — PROGRESS NOTES
Subjective:      Patient ID: Aidan Armstrong is a 80 y.o. female. HPI  Follow up on chronic medical problems. Cardiovascular Review:  The patient has hypertension, hyperlipidemia and Systolic HF.  has SOB that comes and goes but overall has been improved. Overall the swelling is improved. Had echo that showed improved from her most recent EF at 35%. Compliant with taking medications. Diet and Lifestyle: generally follows a low fat low cholesterol diet, generally follows a low sodium diet but does eat out most of her meal that her  picks up. Home BP Monitoring: is not measured at home. Pertinent ROS: taking medications as instructed, no medication side effects noted, no TIA's, no chest pain on exertion, no dyspnea on exertion, noting that her ankles swelling has been mild. DM type II follow up:  Compliant w/ meds, diabetic diet, and exercise. Obtains home glucose monitoring averaging in the mid 100s. Checks BS daily on most days and prn. Pt does have BS log at visit today. No Rf needed for today. Denies any tingling sensation, polyuria and polydipsia. Asthma Review:  The patient is being seen for follow up of chronic respiratory failure/COPD and allergies and chronic sinusitits, not currently in exacerbation. Breathing has been good also with taking prednisone. Using nebulizer as needed but has not recently had to use it. Has had  follow up with pulmonary. Regimen compliance: The patient reports adherence to asthma action plan and regimen. Encounter for pain management. 1./2. Medical history/Past medical History  Chronic Pain:  Osteoarthritis:  Patient has osteoarthritis and myalgias in multiple joints. Overall has dealing with a lot of pain in the joints and muscles all over. Has pain and burning in the legs. Pain is still bad on most days. Pain is limiting her and she is not able to much around much at home and stays in the bed mostly.          Primarily in the knees,

## 2023-08-31 LAB
ALBUMIN SERPL-MCNC: 4.2 G/DL (ref 3.5–5)
ALBUMIN/GLOB SERPL: 1.5 (ref 1.1–2.2)
ALP SERPL-CCNC: 58 U/L (ref 45–117)
ALT SERPL-CCNC: 27 U/L (ref 12–78)
ANION GAP SERPL CALC-SCNC: 7 MMOL/L (ref 5–15)
AST SERPL-CCNC: 14 U/L (ref 15–37)
BILIRUB SERPL-MCNC: 0.4 MG/DL (ref 0.2–1)
BUN SERPL-MCNC: 36 MG/DL (ref 6–20)
BUN/CREAT SERPL: 15 (ref 12–20)
CALCIUM SERPL-MCNC: 10.2 MG/DL (ref 8.5–10.1)
CHLORIDE SERPL-SCNC: 106 MMOL/L (ref 97–108)
CHOLEST SERPL-MCNC: 242 MG/DL
CO2 SERPL-SCNC: 29 MMOL/L (ref 21–32)
CREAT SERPL-MCNC: 2.42 MG/DL (ref 0.55–1.02)
ERYTHROCYTE [DISTWIDTH] IN BLOOD BY AUTOMATED COUNT: 14.4 % (ref 11.5–14.5)
ERYTHROCYTE [SEDIMENTATION RATE] IN BLOOD: 35 MM/HR (ref 0–30)
GLOBULIN SER CALC-MCNC: 2.8 G/DL (ref 2–4)
GLUCOSE SERPL-MCNC: 237 MG/DL (ref 65–100)
HCT VFR BLD AUTO: 38.7 % (ref 35–47)
HDLC SERPL-MCNC: 93 MG/DL
HDLC SERPL: 2.6 (ref 0–5)
HGB BLD-MCNC: 11.9 G/DL (ref 11.5–16)
LDLC SERPL CALC-MCNC: 117.2 MG/DL (ref 0–100)
MCH RBC QN AUTO: 33.4 PG (ref 26–34)
MCHC RBC AUTO-ENTMCNC: 30.7 G/DL (ref 30–36.5)
MCV RBC AUTO: 108.7 FL (ref 80–99)
NRBC # BLD: 0 K/UL (ref 0–0.01)
NRBC BLD-RTO: 0 PER 100 WBC
PLATELET # BLD AUTO: 163 K/UL (ref 150–400)
PMV BLD AUTO: 11.6 FL (ref 8.9–12.9)
POTASSIUM SERPL-SCNC: 4.9 MMOL/L (ref 3.5–5.1)
PROT SERPL-MCNC: 7 G/DL (ref 6.4–8.2)
RBC # BLD AUTO: 3.56 M/UL (ref 3.8–5.2)
SODIUM SERPL-SCNC: 142 MMOL/L (ref 136–145)
TRIGL SERPL-MCNC: 159 MG/DL
VLDLC SERPL CALC-MCNC: 31.8 MG/DL
WBC # BLD AUTO: 4.4 K/UL (ref 3.6–11)

## 2023-09-06 ENCOUNTER — TELEPHONE (OUTPATIENT)
Age: 88
End: 2023-09-06

## 2023-09-06 DIAGNOSIS — M79.604 BILATERAL LEG PAIN: ICD-10-CM

## 2023-09-06 DIAGNOSIS — M79.605 BILATERAL LEG PAIN: ICD-10-CM

## 2023-09-06 DIAGNOSIS — G89.29 OTHER CHRONIC PAIN: ICD-10-CM

## 2023-09-06 DIAGNOSIS — M25.50 GENERALIZED JOINT PAIN: ICD-10-CM

## 2023-09-06 DIAGNOSIS — M79.10 MYALGIA: ICD-10-CM

## 2023-09-06 RX ORDER — HYDROCODONE BITARTRATE AND ACETAMINOPHEN 10; 325 MG/1; MG/1
1 TABLET ORAL EVERY 6 HOURS PRN
Qty: 60 TABLET | Refills: 0 | Status: SHIPPED | OUTPATIENT
Start: 2023-09-06 | End: 2023-12-14

## 2023-09-06 NOTE — TELEPHONE ENCOUNTER
----- Message from Oakley Ganser sent at 9/5/2023  1:32 PM EDT -----  Subject: Message to Provider    QUESTIONS  Information for Provider? Patients  says he would like a call back   regarding a pain medication refill. Please advise.   ---------------------------------------------------------------------------  --------------  Eve Hernández [facility]  [phone]; OK to leave message on voicemail  ---------------------------------------------------------------------------  --------------  SCRIPT ANSWERS  Relationship to Patient? Spouse/Partner  Representative Name? Leif Mcgee  Is the representative on the Communication Release of Information (JESUS)   form in Epic?  Yes

## 2023-09-07 ENCOUNTER — TELEPHONE (OUTPATIENT)
Age: 88
End: 2023-09-07

## 2023-09-07 NOTE — TELEPHONE ENCOUNTER
----- Message from Sigrid Francois MD sent at 8/31/2023 11:32 AM EDT -----  Please send lab results to her nephrologist Dr. Elyssa Holloway.

## 2023-09-22 NOTE — TELEPHONE ENCOUNTER
Requested Prescriptions     Pending Prescriptions Disp Refills    diclofenac sodium (VOLTAREN) 1 % GEL [Pharmacy Med Name: DICLOFENAC 1% GEL 100GM (RX)] 300 g 3     Sig: APPLY TOPICALLY TO THE AFFECTED AREA FOUR TIMES DAILY     LOV: 08/30/2023  NOV: 10/10/2023

## 2023-09-25 ENCOUNTER — TELEPHONE (OUTPATIENT)
Facility: CLINIC | Age: 88
End: 2023-09-25

## 2023-09-28 ENCOUNTER — TELEPHONE (OUTPATIENT)
Facility: CLINIC | Age: 88
End: 2023-09-28

## 2023-09-28 NOTE — TELEPHONE ENCOUNTER
JUANA spoke with pt's dtr, Alvy Cushing. JUANA informed that [facility] provider, Benoit Vega NP, will be visiting with mom on Friday 10/6/23. She stated that this works for the family. She said that either her mother, or her father, can sign consent form. She encouraged team to call her father's cell directly ([phone]). Maria T Amayaing stated that she will try to be present for the initial visit with NP. DIMITRYW to call Maria T Cushing and/or pt's spouse to schedule initial in-home visit for social work assessment after 10/9/23, when LCSW returns from [address] [place] [address],[address].      GIORGI Brand, [facility]  Licensed Clinical   [facility]  (C) [phone]  (W) [phone]

## 2023-10-03 ENCOUNTER — TELEPHONE (OUTPATIENT)
Facility: CLINIC | Age: 88
End: 2023-10-03

## 2023-10-03 NOTE — TELEPHONE ENCOUNTER
Called patient to confirm their in home visit with Laquita Riley NP on Friday, 10/6/2023, arrival between 9am-1pm.  Spoke with Oli Man, they confirmed the appointment and answered the covid screen questions.  Does anyone in the household have covid NO  Have there been any changes to insurance NO or location NO.

## 2023-10-04 ENCOUNTER — TELEPHONE (OUTPATIENT)
Facility: CLINIC | Age: 88
End: 2023-10-04

## 2023-10-04 NOTE — TELEPHONE ENCOUNTER
The patient's daughter, Jodie Medina left a message on [employer] 10/3/23 at 8:28 pm. She is asking if the visit with Nina Moscoso can be between 1-5 instead of 9-1. She wants to be present at the appointment to make sure all questions are being asked.  # [phone].

## 2023-10-04 NOTE — TELEPHONE ENCOUNTER
Per an email from [employer]:     Caller: Young Page (dtr)  Patient: Juvencio Craig  : 31  Reason: Stated she missed a call from you, requested callback  Callback: [phone]    I returned Batool's call immediately and she answered the phone. I let her know that Rosendo Harris, NP cannot do a Friday afternoon SOC. She can call Hansel Lugo during or after the visit. Hansel Lugo asked if Nina Moscoso could arrive closer to 1pm.  I told her that we do not guarantee arrival times. She asked if the appointment can be changed to a different date. I let her know that it could be several weeks before the NP can reschedule the [facility]. Hansel Lugo acknowledged and said that she has a doctors appt on that day, her phone will be on silent. She asks if Nina Moscoso can call her and leave a message as to what time is the best for Hansel Lugo to call back after the [facility] appointment. She does not want to cancel/reschedule.

## 2023-10-04 NOTE — TELEPHONE ENCOUNTER
Per Nini Keith called Cristobal Jones to let her know that Jennie Sicard do an [facility] HOSPITAL visit on Friday afternoon but can call during or after the visit to answer any questions that Chauncey Shannon may have. I reached Batool's VM. She has a generic greeting so I left a general message to please call [facility] about the Friday appointment.

## 2023-10-05 DIAGNOSIS — E11.21 TYPE 2 DIABETES WITH NEPHROPATHY (HCC): Primary | ICD-10-CM

## 2023-10-05 DIAGNOSIS — I50.22 CHRONIC SYSTOLIC HEART FAILURE (HCC): ICD-10-CM

## 2023-10-05 DIAGNOSIS — F11.90 CHRONIC, CONTINUOUS USE OF OPIOIDS: ICD-10-CM

## 2023-10-05 DIAGNOSIS — N18.4 CKD (CHRONIC KIDNEY DISEASE) STAGE 4, GFR 15-29 ML/MIN (HCC): ICD-10-CM

## 2023-10-05 DIAGNOSIS — I10 ESSENTIAL HYPERTENSION, BENIGN: ICD-10-CM

## 2023-10-05 DIAGNOSIS — Z51.81 ENCOUNTER FOR MEDICATION MONITORING: ICD-10-CM

## 2023-10-06 ENCOUNTER — OFFICE VISIT (OUTPATIENT)
Facility: CLINIC | Age: 88
End: 2023-10-06

## 2023-10-06 VITALS
SYSTOLIC BLOOD PRESSURE: 130 MMHG | WEIGHT: 179.8 LBS | BODY MASS INDEX: 31.85 KG/M2 | RESPIRATION RATE: 20 BRPM | DIASTOLIC BLOOD PRESSURE: 80 MMHG | TEMPERATURE: 97.3 F | OXYGEN SATURATION: 91 % | HEART RATE: 72 BPM

## 2023-10-06 DIAGNOSIS — N18.4 CKD (CHRONIC KIDNEY DISEASE) STAGE 4, GFR 15-29 ML/MIN (HCC): ICD-10-CM

## 2023-10-06 DIAGNOSIS — E11.42 DIABETIC POLYNEUROPATHY ASSOCIATED WITH TYPE 2 DIABETES MELLITUS (HCC): ICD-10-CM

## 2023-10-06 DIAGNOSIS — E11.21 TYPE 2 DIABETES WITH NEPHROPATHY (HCC): ICD-10-CM

## 2023-10-06 DIAGNOSIS — M79.7 FIBROMYALGIA AFFECTING MULTIPLE SITES: ICD-10-CM

## 2023-10-06 DIAGNOSIS — Z71.89 ADVANCE CARE PLANNING: ICD-10-CM

## 2023-10-06 DIAGNOSIS — G47.00 INSOMNIA, UNSPECIFIED TYPE: ICD-10-CM

## 2023-10-06 DIAGNOSIS — Z51.81 ENCOUNTER FOR MEDICATION MONITORING: ICD-10-CM

## 2023-10-06 DIAGNOSIS — G25.0 ESSENTIAL TREMOR: ICD-10-CM

## 2023-10-06 DIAGNOSIS — I10 ESSENTIAL HYPERTENSION, BENIGN: ICD-10-CM

## 2023-10-06 DIAGNOSIS — K21.9 GASTROESOPHAGEAL REFLUX DISEASE, UNSPECIFIED WHETHER ESOPHAGITIS PRESENT: ICD-10-CM

## 2023-10-06 DIAGNOSIS — M47.816 OSTEOARTHRITIS OF LUMBAR SPINE, UNSPECIFIED SPINAL OSTEOARTHRITIS COMPLICATION STATUS: ICD-10-CM

## 2023-10-06 DIAGNOSIS — Z76.89 ENCOUNTER TO ESTABLISH CARE: Primary | ICD-10-CM

## 2023-10-06 DIAGNOSIS — G89.29 OTHER CHRONIC PAIN: ICD-10-CM

## 2023-10-06 DIAGNOSIS — I50.22 CHRONIC SYSTOLIC HEART FAILURE (HCC): ICD-10-CM

## 2023-10-06 DIAGNOSIS — F11.90 CHRONIC, CONTINUOUS USE OF OPIOIDS: ICD-10-CM

## 2023-10-06 PROBLEM — R55 SYNCOPE: Status: RESOLVED | Noted: 2023-01-10 | Resolved: 2023-10-06

## 2023-10-06 PROBLEM — N18.30 CHRONIC RENAL DISEASE, STAGE III (HCC): Status: RESOLVED | Noted: 2022-06-27 | Resolved: 2023-10-06

## 2023-10-06 LAB
COMMENT:: NORMAL
SPECIMEN HOLD: NORMAL

## 2023-10-06 RX ORDER — PREDNISONE 5 MG/1
20 TABLET ORAL DAILY
Qty: 360 TABLET | Refills: 1 | Status: SHIPPED | OUTPATIENT
Start: 2023-10-06

## 2023-10-06 RX ORDER — METOPROLOL SUCCINATE 25 MG/1
25 TABLET, EXTENDED RELEASE ORAL DAILY
Qty: 90 TABLET | Refills: 1 | Status: SHIPPED | OUTPATIENT
Start: 2023-10-06

## 2023-10-06 RX ORDER — LANOLIN ALCOHOL/MO/W.PET/CERES
1000 CREAM (GRAM) TOPICAL DAILY
COMMUNITY
End: 2023-10-06 | Stop reason: SDUPTHER

## 2023-10-06 RX ORDER — MELATONIN 10 MG
10 CAPSULE ORAL NIGHTLY
COMMUNITY

## 2023-10-06 RX ORDER — PATIROMER 8.4 G/1
8.4 POWDER, FOR SUSPENSION ORAL EVERY OTHER DAY
COMMUNITY

## 2023-10-06 RX ORDER — BUDESONIDE, GLYCOPYRROLATE, AND FORMOTEROL FUMARATE 160; 9; 4.8 UG/1; UG/1; UG/1
2 AEROSOL, METERED RESPIRATORY (INHALATION) DAILY
COMMUNITY

## 2023-10-06 RX ORDER — DULOXETIN HYDROCHLORIDE 30 MG/1
30 CAPSULE, DELAYED RELEASE ORAL DAILY
Qty: 90 CAPSULE | Refills: 1 | Status: SHIPPED | OUTPATIENT
Start: 2023-10-06

## 2023-10-06 RX ORDER — MIRTAZAPINE 7.5 MG/1
7.5 TABLET, FILM COATED ORAL NIGHTLY
Qty: 90 TABLET | Refills: 1 | Status: SHIPPED | OUTPATIENT
Start: 2023-10-06

## 2023-10-06 RX ORDER — FUROSEMIDE 40 MG/1
40 TABLET ORAL DAILY
Qty: 90 TABLET | Refills: 1 | Status: SHIPPED | OUTPATIENT
Start: 2023-10-06

## 2023-10-06 RX ORDER — GLIPIZIDE 2.5 MG/1
TABLET, EXTENDED RELEASE ORAL
Qty: 180 TABLET | Refills: 1 | Status: SHIPPED | OUTPATIENT
Start: 2023-10-06

## 2023-10-06 RX ORDER — PANTOPRAZOLE SODIUM 40 MG/1
TABLET, DELAYED RELEASE ORAL
Qty: 90 TABLET | Refills: 1 | Status: SHIPPED | OUTPATIENT
Start: 2023-10-06

## 2023-10-06 RX ORDER — ROSUVASTATIN CALCIUM 10 MG/1
10 TABLET, COATED ORAL NIGHTLY
Qty: 90 TABLET | Refills: 1 | Status: SHIPPED | OUTPATIENT
Start: 2023-10-06

## 2023-10-06 RX ORDER — PRIMIDONE 50 MG/1
TABLET ORAL
Qty: 360 TABLET | Refills: 1 | Status: SHIPPED | OUTPATIENT
Start: 2023-10-06

## 2023-10-06 RX ORDER — LANOLIN ALCOHOL/MO/W.PET/CERES
1000 CREAM (GRAM) TOPICAL DAILY
Qty: 90 TABLET | Refills: 1 | Status: SHIPPED | OUTPATIENT
Start: 2023-10-06

## 2023-10-06 RX ORDER — ALBUTEROL SULFATE 90 UG/1
2 AEROSOL, METERED RESPIRATORY (INHALATION) EVERY 4 HOURS PRN
Qty: 18 G | Refills: 1 | Status: SHIPPED | OUTPATIENT
Start: 2023-10-06

## 2023-10-06 RX ORDER — HYDROCODONE BITARTRATE AND ACETAMINOPHEN 10; 325 MG/1; MG/1
1 TABLET ORAL EVERY 6 HOURS PRN
Qty: 60 TABLET | Refills: 0 | Status: SHIPPED | OUTPATIENT
Start: 2023-10-06 | End: 2023-11-05

## 2023-10-06 NOTE — ACP (ADVANCE CARE PLANNING)
Advance Care Planning     General Advance Care Planning (ACP) Conversation    Date of Conversation: 10/6/2023  Conducted with: Patient with [address]: Named in Advance Directive or Healthcare Power of  (name) [facility]  Decision Maker:    Primary Decision Maker: Clemens Krabbe - Spouse - [phone]    Secondary Decision Maker: Maria Del Rosario Patel - Child - [phone]  Click here to complete Healthcare Decision Makers including selection of the Healthcare Decision Maker Relationship (ie \"Primary\"). Today we documented Decision Maker(s) consistent with ACP documents on file. Content/Action Overview:  Completed AMD today with patient. Reviewed DNR/DNI and patient elects Full Code (Attempt Resuscitation)  treatment goals, benefit/burden of treatment options, artificial nutrition, ventilation preferences, hospitalization preferences, and resuscitation preferences  Patient completed AMD today identifying her primary and secondary decision makers; this was uploaded to EMR and decision makers were updated. Discussed code status and interventions; she wishes to consider this further, so will revisit at a future time.      Length of Voluntary ACP Conversation in minutes:  20 minutes    LEMUEL Huerta NP

## 2023-10-06 NOTE — PROGRESS NOTES
SN Joint visit with Amari Araujo NP    Patient alert, oriented x4, follows commands. Patient's  present in the room, providing additional health information. Patient able to self report. Ambulates in the bedroom with walker. Med Review completed. /80 Supine  HR 71  Temp 97.3F  PO2 91% on RA  Weight 179.8lbs    Venipuncture x1 - blood obtained from [facility] - specimen taken to [facility]. Urine specimen collected - Clean catch - taken to same lab.

## 2023-10-06 NOTE — PROGRESS NOTES
[address] [place] [address]    [phone]      NOTE: [address] [place] [address] is a PROVIDER (MD/NP) based interdisciplinary practice (RN, LCSW) for patients who cannot access (or have a taxing effort) primary / speciality medical care in an office setting. [facility] is NOT [facility] but works with [address] [place] [address]. when there is a skilled need. Our MD/NPs are integral in Care Transitions; PLEASE CALL [phone] if this patient arrives in the Emergency Department or Hospital.        Date of Current Visit: 10/6/23  Patient/Family present for Current Visit: patient,  Dagoberto Mcdaniels (by phone)  Goals of Care as stated by the patient/family is: \"to minimize pain and stay out of a nursing home\"     Preferences for hospitalization if that were to be necessary:  [] Patient DOES NOT WANT hospitalization; focus all treatments at home  [x] Patient WOULD WANT hospitalization for potentially reversible causes  Patient prefers hospitalization at: [facility] (: 1931) is a 80 y.o. female, patient, here for evaluation of the following chief complaint(s):  New Patient       ASSESSMENT/PLAN:  Below is the assessment and plan developed based on review of pertinent history, physical exam, labs, studies, and medications. 1. Encounter to establish care  2. Type 2 diabetes with nephropathy (HCC)  -     rosuvastatin (CRESTOR) 10 MG tablet; Take 1 tablet by mouth nightly, Disp-90 tablet, R-1Normal  -     glipiZIDE (GLUCOTROL XL) 2.5 MG extended release tablet; Take one tab before breakfast and one tab before dinner, Disp-180 tablet, R-1Normal  3. Diabetic polyneuropathy associated with type 2 diabetes mellitus ([address])  4. Chronic systolic heart failure (HCC)  -     furosemide (LASIX) 40 MG tablet; Take 1 tablet by mouth daily, Disp-90 tablet, R-1Normal  5. Essential hypertension, benign  -     furosemide (LASIX) 40 MG tablet;  Take 1 tablet by mouth daily,

## 2023-10-07 LAB
ALBUMIN SERPL-MCNC: 3.7 G/DL (ref 3.5–5)
ALBUMIN/GLOB SERPL: 1.4 (ref 1.1–2.2)
ALP SERPL-CCNC: 58 U/L (ref 45–117)
ALT SERPL-CCNC: 23 U/L (ref 12–78)
ANION GAP SERPL CALC-SCNC: 6 MMOL/L (ref 5–15)
AST SERPL-CCNC: 17 U/L (ref 15–37)
BASOPHILS # BLD: 0 K/UL (ref 0–0.1)
BASOPHILS NFR BLD: 0 % (ref 0–1)
BILIRUB SERPL-MCNC: 0.4 MG/DL (ref 0.2–1)
BUN SERPL-MCNC: 36 MG/DL (ref 6–20)
BUN/CREAT SERPL: 18 (ref 12–20)
CALCIUM SERPL-MCNC: 10.3 MG/DL (ref 8.5–10.1)
CHLORIDE SERPL-SCNC: 108 MMOL/L (ref 97–108)
CHOLEST SERPL-MCNC: 232 MG/DL
CO2 SERPL-SCNC: 30 MMOL/L (ref 21–32)
CREAT SERPL-MCNC: 1.98 MG/DL (ref 0.55–1.02)
CREAT UR-MCNC: 164 MG/DL
DIFFERENTIAL METHOD BLD: ABNORMAL
EOSINOPHIL # BLD: 0.1 K/UL (ref 0–0.4)
EOSINOPHIL NFR BLD: 1 % (ref 0–7)
ERYTHROCYTE [DISTWIDTH] IN BLOOD BY AUTOMATED COUNT: 14.6 % (ref 11.5–14.5)
EST. AVERAGE GLUCOSE BLD GHB EST-MCNC: 146 MG/DL
GLOBULIN SER CALC-MCNC: 2.7 G/DL (ref 2–4)
GLUCOSE SERPL-MCNC: 126 MG/DL (ref 65–100)
HBA1C MFR BLD: 6.7 % (ref 4–5.6)
HCT VFR BLD AUTO: 34.6 % (ref 35–47)
HDLC SERPL-MCNC: 94 MG/DL
HDLC SERPL: 2.5 (ref 0–5)
HGB BLD-MCNC: 10.8 G/DL (ref 11.5–16)
IMM GRANULOCYTES # BLD AUTO: 0 K/UL (ref 0–0.04)
IMM GRANULOCYTES NFR BLD AUTO: 0 % (ref 0–0.5)
LDLC SERPL CALC-MCNC: 104 MG/DL (ref 0–100)
LYMPHOCYTES # BLD: 1.8 K/UL (ref 0.8–3.5)
LYMPHOCYTES NFR BLD: 31 % (ref 12–49)
MCH RBC QN AUTO: 33.8 PG (ref 26–34)
MCHC RBC AUTO-ENTMCNC: 31.2 G/DL (ref 30–36.5)
MCV RBC AUTO: 108.1 FL (ref 80–99)
MICROALBUMIN UR-MCNC: 2.08 MG/DL
MICROALBUMIN/CREAT UR-RTO: 13 MG/G (ref 0–30)
MONOCYTES # BLD: 0.3 K/UL (ref 0–1)
MONOCYTES NFR BLD: 5 % (ref 5–13)
NEUTS SEG # BLD: 3.7 K/UL (ref 1.8–8)
NEUTS SEG NFR BLD: 63 % (ref 32–75)
NRBC # BLD: 0 K/UL (ref 0–0.01)
NRBC BLD-RTO: 0 PER 100 WBC
PLATELET # BLD AUTO: 171 K/UL (ref 150–400)
PMV BLD AUTO: 11.4 FL (ref 8.9–12.9)
POTASSIUM SERPL-SCNC: 4.8 MMOL/L (ref 3.5–5.1)
PROT SERPL-MCNC: 6.4 G/DL (ref 6.4–8.2)
RBC # BLD AUTO: 3.2 M/UL (ref 3.8–5.2)
RBC MORPH BLD: ABNORMAL
SODIUM SERPL-SCNC: 144 MMOL/L (ref 136–145)
TRIGL SERPL-MCNC: 170 MG/DL
VLDLC SERPL CALC-MCNC: 34 MG/DL
WBC # BLD AUTO: 5.9 K/UL (ref 3.6–11)

## 2023-10-11 ENCOUNTER — TELEPHONE (OUTPATIENT)
Facility: CLINIC | Age: 88
End: 2023-10-11

## 2023-10-11 NOTE — TELEPHONE ENCOUNTER
LCSW called pt's spouse to coordinate in-home appointment for initial social work assessment, and completion of remaining intake documents.  Spouse answered, and LCSW scheduled visit for Tuesday 10/24/23 @ 12:00pm.     GIORGI Min, LCSW  Licensed Clinical   [facility] at [address] [place] [address]  [phone]

## 2023-10-13 LAB — DRUGS UR: NORMAL

## 2023-10-16 NOTE — ROUTINE PROCESS
Bedside shift change report given to Anai Mortensen (oncoming nurse) by Amparo STAPLETON RN (offgoing nurse). Report included the following information SBAR, Kardex and MAR.
The following appointments have been successfully scheduled: 
 
Date/time Monday, December 23, 2019 11:15 AM 
Patient  Victor Manuel Robb 04/05/1931  Department [facility] SYSTEM OFFICE Appointment type Transitional Care Provider [place] 

Statement Selected

## 2023-10-18 ENCOUNTER — TELEPHONE (OUTPATIENT)
Facility: CLINIC | Age: 88
End: 2023-10-18

## 2023-10-18 NOTE — TELEPHONE ENCOUNTER
Called patient to confirm their in home visit with Rosendo Harris on 10/23/23, arrival between 9-1. Spoke with Dennis Perez, they confirmed the appointment and answered the covid screen questions.  Does anyone in the household have covid No  Have there been any changes to insurance No or location No

## 2023-10-23 ENCOUNTER — OFFICE VISIT (OUTPATIENT)
Facility: CLINIC | Age: 88
End: 2023-10-23
Payer: MEDICARE

## 2023-10-23 VITALS
RESPIRATION RATE: 20 BRPM | SYSTOLIC BLOOD PRESSURE: 112 MMHG | TEMPERATURE: 97.9 F | OXYGEN SATURATION: 99 % | DIASTOLIC BLOOD PRESSURE: 64 MMHG | HEART RATE: 64 BPM

## 2023-10-23 DIAGNOSIS — E11.21 TYPE 2 DIABETES WITH NEPHROPATHY (HCC): ICD-10-CM

## 2023-10-23 DIAGNOSIS — Z91.09 ENVIRONMENTAL ALLERGIES: ICD-10-CM

## 2023-10-23 DIAGNOSIS — N18.4 CKD (CHRONIC KIDNEY DISEASE) STAGE 4, GFR 15-29 ML/MIN (HCC): ICD-10-CM

## 2023-10-23 DIAGNOSIS — I50.22 CHRONIC SYSTOLIC HEART FAILURE (HCC): ICD-10-CM

## 2023-10-23 DIAGNOSIS — J44.9 CHRONIC OBSTRUCTIVE PULMONARY DISEASE, UNSPECIFIED COPD TYPE (HCC): Primary | ICD-10-CM

## 2023-10-23 DIAGNOSIS — M79.7 FIBROMYALGIA AFFECTING MULTIPLE SITES: ICD-10-CM

## 2023-10-23 DIAGNOSIS — M47.816 OSTEOARTHRITIS OF LUMBAR SPINE, UNSPECIFIED SPINAL OSTEOARTHRITIS COMPLICATION STATUS: ICD-10-CM

## 2023-10-23 DIAGNOSIS — D63.1 ANEMIA DUE TO STAGE 4 CHRONIC KIDNEY DISEASE (HCC): ICD-10-CM

## 2023-10-23 DIAGNOSIS — N18.4 ANEMIA DUE TO STAGE 4 CHRONIC KIDNEY DISEASE (HCC): ICD-10-CM

## 2023-10-23 DIAGNOSIS — E11.42 DIABETIC POLYNEUROPATHY ASSOCIATED WITH TYPE 2 DIABETES MELLITUS (HCC): ICD-10-CM

## 2023-10-23 DIAGNOSIS — R25.1 TREMORS OF NERVOUS SYSTEM: ICD-10-CM

## 2023-10-23 PROCEDURE — G8417 CALC BMI ABV UP PARAM F/U: HCPCS | Performed by: NURSE PRACTITIONER

## 2023-10-23 PROCEDURE — G8484 FLU IMMUNIZE NO ADMIN: HCPCS | Performed by: NURSE PRACTITIONER

## 2023-10-23 PROCEDURE — 1036F TOBACCO NON-USER: CPT | Performed by: NURSE PRACTITIONER

## 2023-10-23 PROCEDURE — 1123F ACP DISCUSS/DSCN MKR DOCD: CPT | Performed by: NURSE PRACTITIONER

## 2023-10-23 PROCEDURE — 3044F HG A1C LEVEL LT 7.0%: CPT | Performed by: NURSE PRACTITIONER

## 2023-10-23 PROCEDURE — 99350 HOME/RES VST EST HIGH MDM 60: CPT | Performed by: NURSE PRACTITIONER

## 2023-10-23 PROCEDURE — 1090F PRES/ABSN URINE INCON ASSESS: CPT | Performed by: NURSE PRACTITIONER

## 2023-10-23 RX ORDER — FLUTICASONE PROPIONATE 50 MCG
2 SPRAY, SUSPENSION (ML) NASAL 2 TIMES DAILY PRN
Qty: 1 G | Refills: 0
Start: 2023-10-23

## 2023-10-23 RX ORDER — HYDROCODONE BITARTRATE AND ACETAMINOPHEN 10; 325 MG/1; MG/1
1 TABLET ORAL EVERY 6 HOURS PRN
Qty: 60 TABLET | Refills: 0 | Status: SHIPPED | OUTPATIENT
Start: 2023-11-01 | End: 2023-12-01

## 2023-10-23 RX ORDER — GLIPIZIDE 2.5 MG/1
TABLET, EXTENDED RELEASE ORAL
Qty: 1 TABLET | Refills: 0
Start: 2023-10-23

## 2023-10-23 RX ORDER — PRIMIDONE 50 MG/1
TABLET ORAL
Qty: 1 TABLET | Refills: 0
Start: 2023-10-23

## 2023-10-23 RX ORDER — ROSUVASTATIN CALCIUM 20 MG/1
20 TABLET, COATED ORAL NIGHTLY
Qty: 1 TABLET | Refills: 0
Start: 2023-10-23

## 2023-10-23 NOTE — PROGRESS NOTES
cholesterol of 232. Hypertension/CHF - Previously on low-dose lisinopril, but recently discontinued by nephrology. Follows with Dr. Joe More, and had a syncopal episode < 1 year ago attributed to vasovagal symptoms but BB was reportedly stopped due to bradycardia, though she is now again on toprol-XL 25mg daily. Also takes furosemide 40mg daily. Minimal swelling, wears compression hose regularly. Last echocardiogram done in our system was June 2021, with estimated LVEF of 50 - 55%. Saw Dr. Asya Ndiaye this summer for \"lower extremity swelling\". Believes follow up is scheduled for December 2023. Records requested but not yet received. COPD - Had reported history of COPD at initial visit but chart diagnosis of asthma; updated today to COPD as pulmonary records indicate COPD. Follows with Dr. Gallo Patel --> [facility] R[facility]al Leyden [facility]. Has been on bevespi 2 puffs once daily (instead of 2x/day) for some time, but this is expensive and was recently given samples of both saundra and Trelegy at [facility] but costs are very high (> $800) and patient's  prefer she not be on anything that requires her to rinse her mouth after taking (ie. ICS) due to difficulty in getting up. She reports rare use of her albuterol inhaler, less than 1x/week. Had a nebulizer in the past, but  prefers for her not to use it because he would need to set it up for her. No indication for controller to be used per pulmonary notes received, appears that they received whatever inhaler was available as a sample, but not having any difficulty and using albuterol once daily \"out of habit\" but very rare use for wheezing/SOB. CKD - Follows with Dr. Giovany Mosley at [facility]. Has been fluctuating between CKD 3B-4, and most recent labs reviewed today from September 2023 with K of 6.1, creatinine of 1.77, eGFR 27.   Lisinopril and lyrica recently discontinued and was started on valtessa every-other-day with repeat

## 2023-10-24 ENCOUNTER — OFFICE VISIT (OUTPATIENT)
Facility: CLINIC | Age: 88
End: 2023-10-24

## 2023-10-24 DIAGNOSIS — Z76.89 ENCOUNTER FOR SOCIAL WORK INTERVENTION: Primary | ICD-10-CM

## 2023-10-24 SDOH — ECONOMIC STABILITY: INCOME INSECURITY: IN THE LAST 12 MONTHS, WAS THERE A TIME WHEN YOU WERE NOT ABLE TO PAY THE MORTGAGE OR RENT ON TIME?: NO

## 2023-10-24 SDOH — ECONOMIC STABILITY: INCOME INSECURITY: HOW HARD IS IT FOR YOU TO PAY FOR THE VERY BASICS LIKE FOOD, HOUSING, MEDICAL CARE, AND HEATING?: NOT VERY HARD

## 2023-10-24 SDOH — SOCIAL STABILITY: SOCIAL INSECURITY: WITHIN THE LAST YEAR, HAVE YOU BEEN AFRAID OF YOUR PARTNER OR EX-PARTNER?: NO

## 2023-10-24 SDOH — HEALTH STABILITY: PHYSICAL HEALTH: ON AVERAGE, HOW MANY DAYS PER WEEK DO YOU ENGAGE IN MODERATE TO STRENUOUS EXERCISE (LIKE A BRISK WALK)?: 0 DAYS

## 2023-10-24 SDOH — ECONOMIC STABILITY: FOOD INSECURITY: WITHIN THE PAST 12 MONTHS, YOU WORRIED THAT YOUR FOOD WOULD RUN OUT BEFORE YOU GOT MONEY TO BUY MORE.: NEVER TRUE

## 2023-10-24 SDOH — SOCIAL STABILITY: SOCIAL INSECURITY
WITHIN THE LAST YEAR, HAVE TO BEEN RAPED OR FORCED TO HAVE ANY KIND OF SEXUAL ACTIVITY BY YOUR PARTNER OR EX-PARTNER?: NO

## 2023-10-24 SDOH — ECONOMIC STABILITY: TRANSPORTATION INSECURITY
IN THE PAST 12 MONTHS, HAS LACK OF TRANSPORTATION KEPT YOU FROM MEETINGS, WORK, OR FROM GETTING THINGS NEEDED FOR DAILY LIVING?: NO

## 2023-10-24 SDOH — ECONOMIC STABILITY: TRANSPORTATION INSECURITY
IN THE PAST 12 MONTHS, HAS THE LACK OF TRANSPORTATION KEPT YOU FROM MEDICAL APPOINTMENTS OR FROM GETTING MEDICATIONS?: NO

## 2023-10-24 SDOH — ECONOMIC STABILITY: HOUSING INSECURITY: IN THE LAST 12 MONTHS, HOW MANY PLACES HAVE YOU LIVED?: 1

## 2023-10-24 SDOH — ECONOMIC STABILITY: FOOD INSECURITY: WITHIN THE PAST 12 MONTHS, THE FOOD YOU BOUGHT JUST DIDN'T LAST AND YOU DIDN'T HAVE MONEY TO GET MORE.: NEVER TRUE

## 2023-10-24 SDOH — SOCIAL STABILITY: SOCIAL NETWORK: ARE YOU MARRIED, WIDOWED, DIVORCED, SEPARATED, NEVER MARRIED, OR LIVING WITH A PARTNER?: MARRIED

## 2023-10-24 SDOH — HEALTH STABILITY: PHYSICAL HEALTH: ON AVERAGE, HOW MANY MINUTES DO YOU ENGAGE IN EXERCISE AT THIS LEVEL?: 0 MIN

## 2023-10-24 SDOH — SOCIAL STABILITY: SOCIAL INSECURITY
WITHIN THE LAST YEAR, HAVE YOU BEEN KICKED, HIT, SLAPPED, OR OTHERWISE PHYSICALLY HURT BY YOUR PARTNER OR EX-PARTNER?: NO

## 2023-10-24 SDOH — SOCIAL STABILITY: SOCIAL INSECURITY: WITHIN THE LAST YEAR, HAVE YOU BEEN HUMILIATED OR EMOTIONALLY ABUSED IN OTHER WAYS BY YOUR PARTNER OR EX-PARTNER?: NO

## 2023-10-24 NOTE — PROGRESS NOTES
Yes, which drugs:   [x] No    Do you have an Emergency Call Device system? [] Yes, Which brand? [x] No, Are you interested in obtaining and subscribing to an Emergency Call Device system? Not at this time    COGNITIVE/EMOTIONAL   Mental Status: Pt was alert and oriented during today's visit, she was able to sign intake paperwork independently    How is your memory? Fair    In the last 6 months, has anyone told you that you are forgetful? At times    Do you receive help with ADLs? [] Yes, Who helps you? How many hours/days? [x] No, Do you need help? Pt is primarily independent with ADLs at this time    TRANSPORTATION NEEDS ASSESSMENT  Can you use public transportation? [] Yes [x] No  Do you use transportation services provided by: Managed Care Organization? Community Service Resource? No    EMERGENCY ACTION PLAN  RUBENS reviewed the Emergency Action Plan with pt and/or family during this initial in-home Social Work assessment. RUBENS completed Emergency Numbers, reviewed the content areas of Power Loss, Natural Disasters, Clinical Emergencies, Severe Weather, Safety in the Home, and Infection Control. Patient's acuity value consider to be MODERATE. ADVANCED CARE PLANNING  AMD completed, in pt's EMR    CAREGIVER BURDEN ASSESSMENT  Does the caregiver feel confident administering medication? Yes  Does the caregiver need any help connecting with community resources? Not at this time  Does the caregiver feel confident assisting with activities of daily living? Yes    Narrative:  RUBENS visited with pt, spouse, and dtr Leeann Rasmussen in the home. New patient assessment of psychosocial needs and social determinants of health completed. RUBENS introduced Home Based Primary Care and Supportive Services and reviewed Emergency Action Plan booklet. Pt was able to sign intake paperwork independently. She was quiet and reserved during conversation, but pleasant. Pt lives at home with her .  She is primarily independent with ADLs at

## 2023-11-08 ENCOUNTER — TELEPHONE (OUTPATIENT)
Facility: CLINIC | Age: 88
End: 2023-11-08

## 2023-11-08 NOTE — TELEPHONE ENCOUNTER
Called patient to confirm their in home visit with Mauro Raymond on 11/15/23, arrival between 9-1. Spoke with Adela Potter, they confirmed the appointment and answered the covid screen questions.  Does anyone in the household have covid No  Have there been any changes to insurance No or location No

## 2023-11-14 NOTE — PROGRESS NOTES
[address] [place] [address]    [phone]      NOTE: [address] [place] [address] is a PROVIDER (MD/NP) based interdisciplinary practice (RN, LCSW) for patients who cannot access (or have a taxing effort) primary / speciality medical care in an office setting. [facility] is NOT [facility] but works with [address] [place] [address]. when there is a skilled need. Our MD/NPs are integral in Care Transitions; PLEASE CALL [phone] if this patient arrives in the Emergency Department or Hospital.        Date of Current Visit: 11/15/23  Patient/Family present for Current Visit: patient,  Ulysses Aburto of Care as stated by the patient/family is: \"to minimize pain and stay out of a nursing home\"     Preferences for hospitalization if that were to be necessary:  [] Patient DOES NOT WANT hospitalization; focus all treatments at home  [x] Patient WOULD WANT hospitalization for potentially reversible causes  Patient prefers hospitalization at: [facility] (: 1931) is a 80 y.o. female, patient, here for evaluation of the following chief complaint(s):  Follow-up       ASSESSMENT/PLAN:  Below is the assessment and plan developed based on review of pertinent history, physical exam, labs, studies, and medications. 1. Chronic obstructive pulmonary disease, unspecified COPD type ([address])  2. Type 2 diabetes with nephropathy ([address])  3. Chronic systolic heart failure ([address])  4. Diabetic polyneuropathy associated with type 2 diabetes mellitus ([address])  5. CKD (chronic kidney disease) stage 4, GFR 15-29 ml/min (HCC)  6. Essential hypertension, benign  7. Tremors of nervous system  8. Osteoarthritis, unspecified osteoarthritis type, unspecified site  9. Anxiety disorder, unspecified type  10.  Fibromyalgia affecting multiple sites      Chronic Pain 2/2 Fibromyalgia & Osteoarthritis, s/p numerous interventions - Has been maintained on hydrocodone-APAP 10-325mg 2 - 3x/day for some time, along

## 2023-11-15 ENCOUNTER — OFFICE VISIT (OUTPATIENT)
Facility: CLINIC | Age: 88
End: 2023-11-15
Payer: MEDICARE

## 2023-11-15 VITALS
OXYGEN SATURATION: 95 % | HEART RATE: 55 BPM | TEMPERATURE: 98.2 F | DIASTOLIC BLOOD PRESSURE: 80 MMHG | SYSTOLIC BLOOD PRESSURE: 138 MMHG | RESPIRATION RATE: 20 BRPM

## 2023-11-15 DIAGNOSIS — M79.7 FIBROMYALGIA AFFECTING MULTIPLE SITES: ICD-10-CM

## 2023-11-15 DIAGNOSIS — F41.9 ANXIETY DISORDER, UNSPECIFIED TYPE: ICD-10-CM

## 2023-11-15 DIAGNOSIS — J44.9 CHRONIC OBSTRUCTIVE PULMONARY DISEASE, UNSPECIFIED COPD TYPE (HCC): Primary | ICD-10-CM

## 2023-11-15 DIAGNOSIS — E11.42 DIABETIC POLYNEUROPATHY ASSOCIATED WITH TYPE 2 DIABETES MELLITUS (HCC): ICD-10-CM

## 2023-11-15 DIAGNOSIS — R25.1 TREMORS OF NERVOUS SYSTEM: ICD-10-CM

## 2023-11-15 DIAGNOSIS — I50.22 CHRONIC SYSTOLIC HEART FAILURE (HCC): ICD-10-CM

## 2023-11-15 DIAGNOSIS — N18.4 CKD (CHRONIC KIDNEY DISEASE) STAGE 4, GFR 15-29 ML/MIN (HCC): ICD-10-CM

## 2023-11-15 DIAGNOSIS — M19.90 OSTEOARTHRITIS, UNSPECIFIED OSTEOARTHRITIS TYPE, UNSPECIFIED SITE: ICD-10-CM

## 2023-11-15 DIAGNOSIS — E11.21 TYPE 2 DIABETES WITH NEPHROPATHY (HCC): ICD-10-CM

## 2023-11-15 DIAGNOSIS — I10 ESSENTIAL HYPERTENSION, BENIGN: ICD-10-CM

## 2023-11-15 PROCEDURE — 1090F PRES/ABSN URINE INCON ASSESS: CPT | Performed by: NURSE PRACTITIONER

## 2023-11-15 PROCEDURE — 1123F ACP DISCUSS/DSCN MKR DOCD: CPT | Performed by: NURSE PRACTITIONER

## 2023-11-15 PROCEDURE — 3044F HG A1C LEVEL LT 7.0%: CPT | Performed by: NURSE PRACTITIONER

## 2023-11-15 PROCEDURE — G8417 CALC BMI ABV UP PARAM F/U: HCPCS | Performed by: NURSE PRACTITIONER

## 2023-11-15 PROCEDURE — 1036F TOBACCO NON-USER: CPT | Performed by: NURSE PRACTITIONER

## 2023-11-15 PROCEDURE — G8484 FLU IMMUNIZE NO ADMIN: HCPCS | Performed by: NURSE PRACTITIONER

## 2023-11-15 PROCEDURE — 99350 HOME/RES VST EST HIGH MDM 60: CPT | Performed by: NURSE PRACTITIONER

## 2023-11-15 RX ORDER — ROSUVASTATIN CALCIUM 20 MG/1
20 TABLET, COATED ORAL NIGHTLY
Qty: 90 TABLET | Refills: 1 | Status: SHIPPED | OUTPATIENT
Start: 2023-11-15

## 2023-11-15 RX ORDER — GLIPIZIDE 2.5 MG/1
TABLET, EXTENDED RELEASE ORAL
Qty: 90 TABLET | Refills: 1 | Status: SHIPPED | OUTPATIENT
Start: 2023-11-15

## 2023-11-17 ENCOUNTER — TELEPHONE (OUTPATIENT)
Facility: CLINIC | Age: 88
End: 2023-11-17

## 2023-11-17 NOTE — TELEPHONE ENCOUNTER
Immunizations - Brian Almazan is the patient's daughter. She called to update Emma Elliott NP that the patient had the following immunizations recently at [facility] on [address]:    Covid - 10/12/23  RSV - 11/16/23    Vaccination Cards - Adis Hoffman states that she will have the patient's updated vaccination card available for the provider to scan at the patient's next visit in January, 2024      [address] [place]ES# [phone] if needed.

## 2023-11-17 NOTE — TELEPHONE ENCOUNTER
Great! Can you please enter as historical immunizations using data from [address] [place] [address]? Thanks!   LEMUEL Hein NP

## 2023-12-14 DIAGNOSIS — M79.7 FIBROMYALGIA AFFECTING MULTIPLE SITES: ICD-10-CM

## 2023-12-14 DIAGNOSIS — M47.816 OSTEOARTHRITIS OF LUMBAR SPINE, UNSPECIFIED SPINAL OSTEOARTHRITIS COMPLICATION STATUS: Primary | ICD-10-CM

## 2023-12-14 RX ORDER — HYDROCODONE BITARTRATE AND ACETAMINOPHEN 10; 325 MG/1; MG/1
1 TABLET ORAL EVERY 8 HOURS PRN
Qty: 60 TABLET | Refills: 0 | Status: SHIPPED | OUTPATIENT
Start: 2023-12-14 | End: 2024-01-13

## 2023-12-14 NOTE — TELEPHONE ENCOUNTER
Received call from [facility] requesting Hydrocodone Acetaminophen refill. To be called in to [facility] pharmacy.

## 2023-12-14 NOTE — TELEPHONE ENCOUNTER
Triage for Controlled Substance Refill Request    Pain Diagnosis: fibromyalgia multiple sites, osteoarthritis of lumbar spine    Last Outpatient Visit: 11/15/23    Next Outpatient Visit: 1/16/24    Pharmacy: [facility] [address]    Medication:  Hydrocodone-APAP  mg  Dose and directions: one tablet 2 - 3 x day as needed for pain  Number dispensed: 60  Date filled ( or Pharmacy): 11/16/23     reviewed: yes    Date of Urine Drug Screen:  10/6/23    Opioid Safety Handout given:  yes    Appropriate for refill:  yes    Action:  pended to Sly Hand RN

## 2024-01-01 ENCOUNTER — HOME CARE VISIT (OUTPATIENT)
Facility: HOME HEALTH | Age: 89
End: 2024-01-01
Payer: MEDICARE

## 2024-01-01 ENCOUNTER — HOME CARE VISIT (OUTPATIENT)
Age: 89
End: 2024-01-01
Payer: MEDICARE

## 2024-01-01 VITALS
DIASTOLIC BLOOD PRESSURE: 60 MMHG | OXYGEN SATURATION: 100 % | SYSTOLIC BLOOD PRESSURE: 128 MMHG | RESPIRATION RATE: 20 BRPM | HEART RATE: 113 BPM

## 2024-01-01 VITALS
OXYGEN SATURATION: 95 % | SYSTOLIC BLOOD PRESSURE: 139 MMHG | HEART RATE: 80 BPM | RESPIRATION RATE: 28 BRPM | DIASTOLIC BLOOD PRESSURE: 72 MMHG

## 2024-01-01 VITALS
RESPIRATION RATE: 20 BRPM | DIASTOLIC BLOOD PRESSURE: 65 MMHG | HEART RATE: 98 BPM | TEMPERATURE: 98 F | SYSTOLIC BLOOD PRESSURE: 130 MMHG

## 2024-01-01 PROCEDURE — G0155 HHCP-SVS OF CSW,EA 15 MIN: HCPCS

## 2024-01-01 PROCEDURE — G0156 HHCP-SVS OF AIDE,EA 15 MIN: HCPCS

## 2024-01-01 PROCEDURE — G0300 HHS/HOSPICE OF LPN EA 15 MIN: HCPCS

## 2024-01-01 PROCEDURE — G0299 HHS/HOSPICE OF RN EA 15 MIN: HCPCS

## 2024-01-01 RX ADMIN — HYDROMORPHONE HYDROCHLORIDE 1 MG: 1 SOLUTION ORAL at 11:10

## 2024-01-01 ASSESSMENT — ENCOUNTER SYMPTOMS: DYSPNEA ACTIVITY LEVEL: AT REST

## 2024-01-02 ENCOUNTER — CLINICAL DOCUMENTATION (OUTPATIENT)
Facility: CLINIC | Age: 89
End: 2024-01-02

## 2024-01-02 ENCOUNTER — TELEPHONE (OUTPATIENT)
Facility: CLINIC | Age: 89
End: 2024-01-02

## 2024-01-02 NOTE — TELEPHONE ENCOUNTER
VM - 3:16pm - Batool Alegria left a  asking for a callback from Christianne Torres NP.  She states she will be present at the patient's next in home visit, but wants to update Christianne on some things w/ the patient.  She says that they are things she cannot discuss in front of the patient.    Batool's CB# [phone]

## 2024-01-02 NOTE — PROGRESS NOTES
[facility] - Plan of Care  [address], [address]  [place] [address]    [facility] Team Members: Renae Lugo MD; Christianne Torres NP; Cynthia Xavier NP; Jewels Langley, RN; Ken Moreno, RN; Kassy Hand, RN; Micaela Sampson LCSW    Quynh Quijano  4/5/1931 / [id number]  female    Date of Initial Visit (Start of Care): 10/6/2023    Diagnoses  Patient Active Problem List   Diagnosis    Anemia    Dyslipidemia    S/P rotator cuff surgery    Fall at home    Chronic systolic heart failure (HCC)    Esophageal reflux    S/P laminectomy    S/P TKR (total knee replacement)    Essential hypertension, benign    COPD (chronic obstructive pulmonary disease) ([facility])    S/P sinus surgery    S/P hemorrhoidectomy    Chronic sinusitis    Degenerative arthritis of lumbar spine    Plantar fasciitis of left foot    Severe obesity (BMI 35.0-39.9) with comorbidity (HCC)    Reactive airway disease with wheezing without complication    Type 2 diabetes with nephropathy (HCC)    Diabetic neuropathy (HCC)    Tremors of nervous system    Obstructive sleep apnea (adult) (pediatric)    Environmental allergies    DJD (degenerative joint disease)    S/P PORFIRIO (total abdominal hysterectomy)    Encounter for medication monitoring    Anxiety disorder    Ataxia    General weakness    CKD (chronic kidney disease) stage 4, GFR 15-29 ml/min ([facility])    Symptomatic bradycardia    Syncope and collapse    Fibromyalgia affecting multiple sites       Advance Care Planning:    Code Status: Full Code        Primary Decision Maker: Romeo Quijano - Spouse - [phone]    Secondary Decision Maker: Beatriz Matthews - Child - [phone]       1/2/2024    11:15 AM   Demographics   Marital Status        DME/Supplies:  Bedside Commode, Chair Lift (for stairs), Rolling walker, Walker, and Wheelchair (non-motorized)     Allergies   Allergen Reactions    Oxycodone Hcl Hives    Gabapentin Other (See Comments)     Muscles twitching and

## 2024-01-03 NOTE — TELEPHONE ENCOUNTER
Phone call returned to daughter Batool.  She reports that patient has started to hallucinate. Patient's  called Batool to come over on [place]; patient was angry and adamant that her  had had a woman in the house and had come into bed with her and her ; Batool allowed patient to calm down but she remained adamant that this occurred, and patient's granddaughter confirmed that this has happened before. Granddaughter also reported that  covered all the mirrors because she was seeing things. Will discuss medication changes (ie. Starting seroquel) at next visit.   Batool also requested that I raise the issue of changing living arrangemnets at my next visit; ie. Assisted living. She reports that both she and her granddaughter have tried numerous times but been shut down by her father about this. I explained that I had tried that at the initial visit but he shut me down as well, but I will try again and phrase it as important to consider other options prior to a crisis. Batool expressed appreciation for the call and plans to be present at the next visit as well.  LEMUEL Crocker - CHELSI

## 2024-01-08 ENCOUNTER — TELEPHONE (OUTPATIENT)
Facility: CLINIC | Age: 89
End: 2024-01-08

## 2024-01-08 NOTE — TELEPHONE ENCOUNTER
DIMITRYW called and spoke with pt's  for routine social work assessment. Pt stated, \"I think we're hanging in there\". He is his wife's primary caregiver, and at this time he is able to manage her care needs. DIMITRYW engaged spouse in conversation re: in the future, should her care needs increase or should his abilities decrease, what is the plan for caregiving. He stated that income-based, they would qualify for Medicaid, but due to his savings and their diligence having significant money in savings and as resources, they would not qualify. He has already looked in to Medicaid and understands income eligibility limits and the spend-down process which he is not willing to consider at this time. He said he is of \"sound mind and body\" and will continue to do for his wife what he is able, but is open to revisiting this conversation in the future. They do not have long term care insurance policies, and paying out of pocket for an in-home personal care aide would provide to be financially straining. DIMITRYW provided reflective listening, and validation today.     Micaela Sampson, GIORGI, DIMITRYW  Licensed Clinical   [facility]  (O) [phone]  (C) [phone]

## 2024-01-10 ENCOUNTER — TELEPHONE (OUTPATIENT)
Facility: CLINIC | Age: 89
End: 2024-01-10

## 2024-01-10 NOTE — TELEPHONE ENCOUNTER
Called patient to confirm their in home visit with Christianne Torres on 1/16/24, arrival between 12-4.  Spoke with Romeo Quijano, they confirmed the appointment and answered the covid screen questions. Does anyone in the household have covid No  Have there been any changes to insurance No or location No

## 2024-01-16 ENCOUNTER — OFFICE VISIT (OUTPATIENT)
Facility: CLINIC | Age: 89
End: 2024-01-16
Payer: MEDICARE

## 2024-01-16 ENCOUNTER — HOSPICE ADMISSION (OUTPATIENT)
Age: 89
End: 2024-01-16
Payer: MEDICARE

## 2024-01-16 VITALS
HEART RATE: 94 BPM | RESPIRATION RATE: 20 BRPM | DIASTOLIC BLOOD PRESSURE: 70 MMHG | SYSTOLIC BLOOD PRESSURE: 128 MMHG | OXYGEN SATURATION: 90 % | TEMPERATURE: 96.8 F

## 2024-01-16 DIAGNOSIS — J44.9 CHRONIC OBSTRUCTIVE PULMONARY DISEASE, UNSPECIFIED COPD TYPE (HCC): ICD-10-CM

## 2024-01-16 DIAGNOSIS — I50.22 CHRONIC SYSTOLIC HEART FAILURE (HCC): Primary | ICD-10-CM

## 2024-01-16 DIAGNOSIS — N18.4 CKD (CHRONIC KIDNEY DISEASE) STAGE 4, GFR 15-29 ML/MIN (HCC): ICD-10-CM

## 2024-01-16 DIAGNOSIS — M06.00 RHEUMATOID ARTHRITIS WITH NEGATIVE RHEUMATOID FACTOR, INVOLVING UNSPECIFIED SITE (HCC): ICD-10-CM

## 2024-01-16 DIAGNOSIS — F02.B3 MODERATE LEWY BODY DEMENTIA WITH MOOD DISTURBANCE (HCC): ICD-10-CM

## 2024-01-16 DIAGNOSIS — E11.21 TYPE 2 DIABETES WITH NEPHROPATHY (HCC): ICD-10-CM

## 2024-01-16 DIAGNOSIS — G31.83 MODERATE LEWY BODY DEMENTIA WITH MOOD DISTURBANCE (HCC): ICD-10-CM

## 2024-01-16 PROCEDURE — G8484 FLU IMMUNIZE NO ADMIN: HCPCS | Performed by: NURSE PRACTITIONER

## 2024-01-16 PROCEDURE — 1036F TOBACCO NON-USER: CPT | Performed by: NURSE PRACTITIONER

## 2024-01-16 PROCEDURE — 1123F ACP DISCUSS/DSCN MKR DOCD: CPT | Performed by: NURSE PRACTITIONER

## 2024-01-16 PROCEDURE — G8417 CALC BMI ABV UP PARAM F/U: HCPCS | Performed by: NURSE PRACTITIONER

## 2024-01-16 PROCEDURE — 99350 HOME/RES VST EST HIGH MDM 60: CPT | Performed by: NURSE PRACTITIONER

## 2024-01-16 PROCEDURE — 1090F PRES/ABSN URINE INCON ASSESS: CPT | Performed by: NURSE PRACTITIONER

## 2024-01-16 RX ORDER — TIOTROPIUM BROMIDE 18 UG/1
18 CAPSULE ORAL; RESPIRATORY (INHALATION) DAILY
Qty: 90 CAPSULE | Refills: 1 | Status: SHIPPED | OUTPATIENT
Start: 2024-01-16 | End: 2024-01-17

## 2024-01-16 RX ORDER — QUETIAPINE FUMARATE 50 MG/1
50 TABLET, FILM COATED ORAL EVERY EVENING
Qty: 30 TABLET | Refills: 1 | Status: SHIPPED | OUTPATIENT
Start: 2024-01-16

## 2024-01-16 NOTE — PROGRESS NOTES
and stepdaughter report that she has been increasingly confused and hallucinating. She has accused her  of having other women in their bed and has been very upset by \"seeing\" other women in their room so they have covered the mirrors to prevent her from getting upset. She has been sleeping more, now up to about 18 - 20 hours per day. She has had worsening lower extremity edema over the past few days and it is only minimally responsive to the compression hose she is wearing; unable to elevate her legs much as she is in bed most of the day.  still isn't sure about a hospital bed. Acknowledges that their home situation (tri-level with many stairs) is not ideal for caregiving; open to other options but keeping her out of a nursing home is his primary goal.     Chronic Pain/Osteoarthritis - Patient with a history of osteoarthritis and fibromyalgia. Currently takes Norco 10-325mg up to 3x/day (2x per day + tylenol 2x or 3x/day if needed), diclofenac gel, prednisone 20mg daily, duloxetine 30mg daily (decreased from 60mg daily due to worsening renal function) . Has difficulty with movement but is still ambulating with a walker, uses a transport chair and/or rollator when going out. Saw Dr. Calles last in June 2023 for epidural steroid injection, history of numerous orthopedic surgeries including laminectomy. She takes a \"double dose\" of miralax daily to prevent constipation, as per her previous PCP's recommendation. Hydrocodone working relatively well for pain control.     Type 2 diabetes with nephropathy - Last HgA1C was 6.7% in October 2023, down from 7.8% in June 2023. Takes glipizide 2.5mg once daily (recently decreased from BID due to reports of low blood sugar readings), rosuvastatin 20mg daily (recently increased from 10mg daily); lisinopril recently discontinued by nephrology due to worsening renal function. Home blood sugar readings 70s - 120s, checked every-other-day with [employer]. No

## 2024-01-17 RX ORDER — TIOTROPIUM BROMIDE 18 UG/1
18 CAPSULE ORAL; RESPIRATORY (INHALATION) DAILY
Qty: 30 CAPSULE | Refills: 3 | Status: SHIPPED | OUTPATIENT
Start: 2024-01-17

## 2024-01-17 RX ORDER — HYDROCODONE BITARTRATE AND ACETAMINOPHEN 10; 325 MG/1; MG/1
1 TABLET ORAL EVERY 8 HOURS PRN
Qty: 60 TABLET | Refills: 0 | Status: SHIPPED | OUTPATIENT
Start: 2024-01-17 | End: 2024-02-16

## 2024-01-18 ENCOUNTER — TELEPHONE (OUTPATIENT)
Facility: CLINIC | Age: 89
End: 2024-01-18

## 2024-01-18 ENCOUNTER — HOME CARE VISIT (OUTPATIENT)
Age: 89
End: 2024-01-18
Payer: MEDICARE

## 2024-01-18 VITALS
SYSTOLIC BLOOD PRESSURE: 130 MMHG | HEART RATE: 86 BPM | DIASTOLIC BLOOD PRESSURE: 85 MMHG | RESPIRATION RATE: 16 BRPM | OXYGEN SATURATION: 96 %

## 2024-01-18 PROCEDURE — 0651 HSPC ROUTINE HOME CARE

## 2024-01-18 PROCEDURE — G0299 HHS/HOSPICE OF RN EA 15 MIN: HCPCS

## 2024-01-18 NOTE — TELEPHONE ENCOUNTER
Inhaler problem - Romeo Quijano called to update Christianne Torres NP that the inhaler prescribed for the patient is $700 and insurance will not cover it.  He also said that through [facility] the inhaler is $159/per 30 days and is not an option.      Romeo asks for a callback at [phone]

## 2024-01-18 NOTE — TELEPHONE ENCOUNTER
Call placed to pharmacy - Pharmacist confirmed that the price for the medication, with insurance is approximately 500 dollars (without insurance, the price is close to 2,000). She explains that the reason for that, is that the patient has not reached her deductible yet this year. Using [facility], the cost is around 150 dollars, as stated by , but it would not count towards the insurance deductible.    The information above was discussed with Christianne Torres NP.     Patient has hospice consult this afternoon, and I will follow up after that, if patient is not admitted to hospice.

## 2024-01-19 ENCOUNTER — HOME CARE VISIT (OUTPATIENT)
Facility: HOME HEALTH | Age: 89
End: 2024-01-19
Payer: MEDICARE

## 2024-01-19 VITALS
HEART RATE: 70 BPM | DIASTOLIC BLOOD PRESSURE: 90 MMHG | SYSTOLIC BLOOD PRESSURE: 134 MMHG | BODY MASS INDEX: 34.72 KG/M2 | WEIGHT: 196 LBS

## 2024-01-19 PROCEDURE — 2500000001 HSPC NON INJECTABLE MED

## 2024-01-19 PROCEDURE — G0156 HHCP-SVS OF AIDE,EA 15 MIN: HCPCS

## 2024-01-19 PROCEDURE — G0299 HHS/HOSPICE OF RN EA 15 MIN: HCPCS

## 2024-01-19 PROCEDURE — 0651 HSPC ROUTINE HOME CARE

## 2024-01-19 NOTE — HOSPICE
when SRK available with Beatriz as a support). Ordered by Triage for next day delivery.    IDT communication to include MD, SN, SW, CH, and support team.

## 2024-01-20 PROCEDURE — 0651 HSPC ROUTINE HOME CARE

## 2024-01-20 NOTE — HOSPICE
Dual visit with HHA. Patient walks with assistance and with use of rollator and gait belt. Family has a chair lift between the upper portion and main level of home as it is a Tri-Level. Patient's  and HHA get patient up stairs to bathe. Patient's  and this RN go over services offered, team members, and what to expect. Patient's  is unaware of all the things hospice can be besides EOL. Patient's  very appreciative with the 24 help available should he need assistance by phone or in person. Patient's  states that he has no help with is wife. Advise that we have volunteers available should he need time to run errands, doctor appointments, and whatnot, or someone just to spend some time with his wife while he does things around the home. Went over medications as patient's  had questions about what was being covered and what wasn't. Patient has been taking Veltassa through the nephrology office as samples. Advised NP Archana that patient's  wanted to know if it could be covered. After NP spoke with Dr. Reddy, it was advised that patient could stop taking it or take the alternative Kayexalate 60 mL daily or 30 mL every 12 hours if the 60 mL was too much at once, and that with either option, hospice would not draw labs for potassium levels. Patient's  took the infomation down and wants to get with nephrology, then will report back to hospice with their decision. Weight is 196 lb, MAC 28. No new concerns per patient's . Advised family to call hospice with any questions or concerns.

## 2024-01-21 PROCEDURE — 0651 HSPC ROUTINE HOME CARE

## 2024-01-22 ENCOUNTER — HOME CARE VISIT (OUTPATIENT)
Age: 89
End: 2024-01-22
Payer: MEDICARE

## 2024-01-22 PROCEDURE — 0651 HSPC ROUTINE HOME CARE

## 2024-01-23 ENCOUNTER — HOME CARE VISIT (OUTPATIENT)
Age: 89
End: 2024-01-23
Payer: MEDICARE

## 2024-01-23 ENCOUNTER — HOME CARE VISIT (OUTPATIENT)
Facility: HOME HEALTH | Age: 89
End: 2024-01-23
Payer: MEDICARE

## 2024-01-23 PROCEDURE — G0156 HHCP-SVS OF AIDE,EA 15 MIN: HCPCS

## 2024-01-23 PROCEDURE — 0651 HSPC ROUTINE HOME CARE

## 2024-01-24 PROCEDURE — 0651 HSPC ROUTINE HOME CARE

## 2024-01-25 ENCOUNTER — HOME CARE VISIT (OUTPATIENT)
Facility: HOME HEALTH | Age: 89
End: 2024-01-25
Payer: MEDICARE

## 2024-01-25 PROCEDURE — 0651 HSPC ROUTINE HOME CARE

## 2024-01-25 PROCEDURE — G0300 HHS/HOSPICE OF LPN EA 15 MIN: HCPCS

## 2024-01-25 PROCEDURE — 2500000001 HSPC NON INJECTABLE MED

## 2024-01-26 ENCOUNTER — HOME CARE VISIT (OUTPATIENT)
Facility: HOME HEALTH | Age: 89
End: 2024-01-26
Payer: MEDICARE

## 2024-01-26 VITALS
SYSTOLIC BLOOD PRESSURE: 128 MMHG | RESPIRATION RATE: 18 BRPM | HEART RATE: 72 BPM | DIASTOLIC BLOOD PRESSURE: 80 MMHG | OXYGEN SATURATION: 97 %

## 2024-01-26 PROCEDURE — G0156 HHCP-SVS OF AIDE,EA 15 MIN: HCPCS

## 2024-01-26 PROCEDURE — 0651 HSPC ROUTINE HOME CARE

## 2024-01-26 ASSESSMENT — ENCOUNTER SYMPTOMS
PAIN LOCATION - PAIN QUALITY: JOINT
CONSTIPATION: 1

## 2024-01-26 NOTE — HOSPICE
Patient sitting on side bed with  present , granddaughter present for visit. Patient deneis pain or sob at this time. She report back and upper leg discomfort at time. Last  per . Medications reviewed with both granddaughter and . Granddaughter is a nurse and plans to help with managing medications. Caregiver reported patient had c/o great toe pain on right foot, no reddness or open area noted ,family will monitor for any change. Assessment completed, med list updated  NEW MEDICATION INITIATION DOCUMENTATION:  Consulted AT MD to report change in patient status: yes, Archana GAGNON  Obtained Order from Provider for initiation of Symptom relief medication / other medication needed:yes, Mucinex   Documentation completed in Telephone/Visit Note in [facility]  Reason medication is being initiated:cough  MD / Provider name consulted re: change in status / initiation of new medication:Archana GAGNON  New Symptom(s): cough  New Order(s): Mucinex 600mg twice daily as needed for cough  Name of person being taught: Patient and   Instructions given: yes, take twice daily as needed  Side Effects taught:yes, nausea/vomiting   Response to teaching: verbalize understanding    NEW MEDICATION INITIATION DOCUMENTATION:  Consulted AT MD to report change in patient status: yes, Archana GAGNON  Obtained Order from Provider for initiation of Symptom relief medication / other medication needed:yes,    Documentation completed in Telephone/Visit Note in [facility]  Reason medication is being initiated:stephanie GUAN / Provider name consulted re: change in status / initiation of new medication:Archana GAGNON  New Symptom(s): no new symtoms/   New Order(s): kayexalate 15gm daily  Name of person being taught:  and patient  Instructions given: yes, take 15gm/60ml daily  Side Effects taught:yes, nausea/vomiting  Response to teaching: verbalize understanding

## 2024-01-27 PROCEDURE — 0651 HSPC ROUTINE HOME CARE

## 2024-01-28 PROCEDURE — 0651 HSPC ROUTINE HOME CARE

## 2024-01-29 PROCEDURE — 0651 HSPC ROUTINE HOME CARE

## 2024-01-30 ENCOUNTER — HOME CARE VISIT (OUTPATIENT)
Facility: HOME HEALTH | Age: 89
End: 2024-01-30
Payer: MEDICARE

## 2024-01-30 PROCEDURE — G0156 HHCP-SVS OF AIDE,EA 15 MIN: HCPCS

## 2024-01-30 PROCEDURE — 0651 HSPC ROUTINE HOME CARE

## 2024-01-30 PROCEDURE — G0155 HHCP-SVS OF CSW,EA 15 MIN: HCPCS

## 2024-01-31 PROCEDURE — 0651 HSPC ROUTINE HOME CARE

## 2024-02-01 ENCOUNTER — HOME CARE VISIT (OUTPATIENT)
Facility: HOME HEALTH | Age: 89
End: 2024-02-01
Payer: MEDICARE

## 2024-02-01 VITALS
HEART RATE: 72 BPM | DIASTOLIC BLOOD PRESSURE: 77 MMHG | TEMPERATURE: 97.1 F | OXYGEN SATURATION: 94 % | SYSTOLIC BLOOD PRESSURE: 132 MMHG

## 2024-02-01 PROCEDURE — 0651 HSPC ROUTINE HOME CARE

## 2024-02-01 PROCEDURE — 2500000001 HSPC NON INJECTABLE MED

## 2024-02-01 PROCEDURE — G0299 HHS/HOSPICE OF RN EA 15 MIN: HCPCS

## 2024-02-01 ASSESSMENT — ENCOUNTER SYMPTOMS
SPUTUM AMOUNT: SCANT
STOOL DESCRIPTION: FORMED
SPUTUM PRODUCTION: 1
PAIN LOCATION - PAIN QUALITY: CRAMPING
SPUTUM CONSISTENCY: THIN

## 2024-02-02 ENCOUNTER — HOME CARE VISIT (OUTPATIENT)
Facility: HOME HEALTH | Age: 89
End: 2024-02-02
Payer: MEDICARE

## 2024-02-02 PROCEDURE — 0651 HSPC ROUTINE HOME CARE

## 2024-02-02 NOTE — HOSPICE
Patient upstairs lying in bed awake. Patient states that she hurts all over and has pain in her stomach. Patient states that her Fibromyalgia is worse in the winter and with rainy days. Patient is currently taking Norco 2-3 times a day per  Romeo and it seems to be helping patient in that it is not taking all the pain away but enough for patient to be active in daily activities. Upon palpitating patient's abdomen, patient complains of pain in the RLQ, patient denies fever, n/v, lack of eating. Advised NP Archana, was advised to tell  to keep an eye on patient and if there are any changes to call in. MAC 28. No new concerns per . Advised patient's  to call hospice with any new questions or concerns.

## 2024-02-03 PROCEDURE — 0651 HSPC ROUTINE HOME CARE

## 2024-02-04 PROCEDURE — 0651 HSPC ROUTINE HOME CARE

## 2024-02-06 ENCOUNTER — HOME CARE VISIT (OUTPATIENT)
Facility: HOME HEALTH | Age: 89
End: 2024-02-06
Payer: MEDICARE

## 2024-02-06 PROCEDURE — G0156 HHCP-SVS OF AIDE,EA 15 MIN: HCPCS

## 2024-02-08 ENCOUNTER — HOME CARE VISIT (OUTPATIENT)
Facility: HOME HEALTH | Age: 89
End: 2024-02-08
Payer: MEDICARE

## 2024-02-08 PROCEDURE — G0300 HHS/HOSPICE OF LPN EA 15 MIN: HCPCS

## 2024-02-08 PROCEDURE — G0156 HHCP-SVS OF AIDE,EA 15 MIN: HCPCS

## 2024-02-12 VITALS
DIASTOLIC BLOOD PRESSURE: 84 MMHG | OXYGEN SATURATION: 96 % | SYSTOLIC BLOOD PRESSURE: 130 MMHG | RESPIRATION RATE: 18 BRPM | HEART RATE: 79 BPM

## 2024-02-12 NOTE — HOSPICE
Patient resting in bed with eyes closed, open eyes to verbal greeting. Patient set up on side of bed for visit, reported hip and thigh discomfort with movement. Assessment completed. no new concerns voiced by . encourage to call with any questions or concerns

## 2024-02-13 ENCOUNTER — HOME CARE VISIT (OUTPATIENT)
Facility: HOME HEALTH | Age: 89
End: 2024-02-13
Payer: MEDICARE

## 2024-02-13 PROCEDURE — G0156 HHCP-SVS OF AIDE,EA 15 MIN: HCPCS

## 2024-02-15 ENCOUNTER — HOME CARE VISIT (OUTPATIENT)
Age: 89
End: 2024-02-15
Payer: MEDICARE

## 2024-02-15 ENCOUNTER — HOME CARE VISIT (OUTPATIENT)
Facility: HOME HEALTH | Age: 89
End: 2024-02-15
Payer: MEDICARE

## 2024-02-15 VITALS
SYSTOLIC BLOOD PRESSURE: 130 MMHG | TEMPERATURE: 97.8 F | HEART RATE: 72 BPM | DIASTOLIC BLOOD PRESSURE: 68 MMHG | OXYGEN SATURATION: 97 %

## 2024-02-15 PROCEDURE — G0299 HHS/HOSPICE OF RN EA 15 MIN: HCPCS

## 2024-02-15 NOTE — HOSPICE
Dual visit with new RN Timothy. Patient sitting downstairs. A&O 4, continues to have generalized pain, stomach pain, to which patient's  states has been going on for months, most likely due to her fibromyalgia and dementia. Patient takes Norco 2 times a day. Patient states that she is eating well,  states meals a day by the time she gets up but does have 2 snacks a day as well and liquid in take is adequate. MAC 28.5. New order for Voltarn 1/% gel for pain.  No new concerns per . Advised  to call hospice with any questions or concerns.

## 2024-02-16 ENCOUNTER — HOME CARE VISIT (OUTPATIENT)
Age: 89
End: 2024-02-16
Payer: MEDICARE

## 2024-02-20 ENCOUNTER — HOME CARE VISIT (OUTPATIENT)
Facility: HOME HEALTH | Age: 89
End: 2024-02-20
Payer: MEDICARE

## 2024-02-20 PROCEDURE — G0156 HHCP-SVS OF AIDE,EA 15 MIN: HCPCS

## 2024-02-22 ENCOUNTER — HOME CARE VISIT (OUTPATIENT)
Facility: HOME HEALTH | Age: 89
End: 2024-02-22
Payer: MEDICARE

## 2024-02-22 VITALS
SYSTOLIC BLOOD PRESSURE: 137 MMHG | HEART RATE: 77 BPM | OXYGEN SATURATION: 96 % | RESPIRATION RATE: 16 BRPM | DIASTOLIC BLOOD PRESSURE: 78 MMHG

## 2024-02-22 PROBLEM — Z51.5 HOSPICE CARE PATIENT: Status: ACTIVE | Noted: 2024-02-22

## 2024-02-22 PROCEDURE — G0300 HHS/HOSPICE OF LPN EA 15 MIN: HCPCS

## 2024-02-22 NOTE — HOSPICE
Patient resting with eyes closed, appears to be sleeping, patient open eyes to verbal greeting , return greeting and set up on side of bed for visit. Patient deneis pain at this time, but reports back and generalize discomfort.  reports medicating with Norco twice daily, am and pm, asked if order frequency to be change to every 4--6 hour prn, for better pain control. Order obtain from NP adriane to change to every 4 hour as needed. Assessment completed, Patient and caregiver reports increase weakness, using transfer chair to take patient to bathroom for safety. Meds reviewed. Encourage caregiver to call with any change in status

## 2024-02-27 ENCOUNTER — HOME CARE VISIT (OUTPATIENT)
Facility: HOME HEALTH | Age: 89
End: 2024-02-27
Payer: MEDICARE

## 2024-02-27 PROCEDURE — G0156 HHCP-SVS OF AIDE,EA 15 MIN: HCPCS

## 2024-02-29 ENCOUNTER — HOME CARE VISIT (OUTPATIENT)
Facility: HOME HEALTH | Age: 89
End: 2024-02-29
Payer: MEDICARE

## 2024-02-29 ENCOUNTER — HOME CARE VISIT (OUTPATIENT)
Age: 89
End: 2024-02-29
Payer: MEDICARE

## 2024-02-29 VITALS
SYSTOLIC BLOOD PRESSURE: 154 MMHG | TEMPERATURE: 97.1 F | DIASTOLIC BLOOD PRESSURE: 95 MMHG | OXYGEN SATURATION: 96 % | HEART RATE: 88 BPM

## 2024-02-29 PROCEDURE — G0156 HHCP-SVS OF AIDE,EA 15 MIN: HCPCS

## 2024-02-29 PROCEDURE — G0299 HHS/HOSPICE OF RN EA 15 MIN: HCPCS

## 2024-02-29 ASSESSMENT — ENCOUNTER SYMPTOMS
PAIN LOCATION - PAIN QUALITY: DULL, ACHING
STOOL DESCRIPTION: SOFT

## 2024-02-29 NOTE — HOSPICE
Per , patient continues to eat 2 meals a day with a snack or two and continues to complain of generalized pain.  states that he has noticed patient's left hand is having more tremors and that is more noticeable at night. Patient's  stated that this happened before and patient went up a tablet on per Primidone from 150 to 200 mg 2x a day, that there was no difference so the doctor had placed her back on 150 mg 2x a day. Called NP Archana to discuss, advised to place patient on 200 mg 2x a day and if that didn't work, reset patient back to 150 mg 2x a day and incorporate Lorazepam at night.  agreed with new medication regimen. Will re-evaluate in a week. Patient also complains of swelling in her face,  said that she has had that before and she was placed on an increase in fluid medication for 5 days, but that it didn't help. Patient is currently on 40 mg of Furosemide. Will see if Dr. Hernandez can come out for evaluation. MAC 29. No other concerns per . Advised  to call hospice if he has any questions or concerns.       primidone 50 mg, 150 mg 2x a day to 200 mg 2x a day.

## 2024-03-05 ENCOUNTER — HOME CARE VISIT (OUTPATIENT)
Facility: HOME HEALTH | Age: 89
End: 2024-03-05
Payer: MEDICARE

## 2024-03-05 PROCEDURE — G0156 HHCP-SVS OF AIDE,EA 15 MIN: HCPCS

## 2024-03-07 ENCOUNTER — HOME CARE VISIT (OUTPATIENT)
Facility: HOME HEALTH | Age: 89
End: 2024-03-07
Payer: MEDICARE

## 2024-03-07 PROCEDURE — G0300 HHS/HOSPICE OF LPN EA 15 MIN: HCPCS

## 2024-03-10 VITALS
RESPIRATION RATE: 18 BRPM | DIASTOLIC BLOOD PRESSURE: 94 MMHG | OXYGEN SATURATION: 98 % | HEART RATE: 94 BPM | SYSTOLIC BLOOD PRESSURE: 142 MMHG

## 2024-03-11 NOTE — LETTER
CONTROLLED SUBSTANCE MEDICATION AGREEMENT  Patient Name: Lázaro Mitchell  Patient YOB: 1931     I understand, that controlled substance medications may be used to help better manage my symptoms and to improve my ability to function at home, work and in social settings. However, I also understand that these medications do have risks, which have been discussed with me, including possible development of physical or psychological dependence. I understand that successful treatment requires mutual trust and honesty between me and my provider. I understand and agree that following this Medication Agreement is necessary in continuing my provider-patient relationship and the success of my treatment plan. Explanation from my Provider: Benefits and Goals of Controlled Substance Medications: There are two potential goals for your treatment: (1) decreased pain and suffering (2) improved daily life functions. There are many possible treatments for your chronic condition(s). Alternatives such as physical therapy, yoga, massage, home daily exercise, meditation, relaxation techniques, injections, chiropractic manipulations, surgery, cognitive therapy, hypnosis and many medications that are not habit-forming may be used. Use of controlled substance medications may be helpful, but they are unlikely to resolve all symptoms or restore all function. Explanation from my Provider: Risks of Controlled Substance Medications:   Opioid pain medications: These medications can lead to problems such as addiction/dependence, sedation, lightheadedness/dizziness, memory issues, falls, constipation, nausea, or vomiting. They may also impair the ability to drive or operate machinery. Additionally, these medications may lower testosterone levels, leading to loss of bone strength, stamina and sex drive.   They may cause problems with breathing, sleep apnea and reduced coughing, which is especially dangerous for patients with using micropuncture technique with ultrasound guidance into the left axillary vein. lung disease. Overdose or dangerous interactions with alcohol and other medications may occur, leading to death. Hyperalgesia may develop, which means patients receiving opioids for the treatment of pain may become more sensitive to certain painful stimuli, and in some cases, experience pain from ordinarily non-painful stimuli. Women between the ages of 14-53 who could become pregnant should carefully weigh the risks and benefits of opioids with their physicians, as these medications increase the risk of pregnancy complications, including miscarriage,  delivery and stillbirth. It is also possible for babies to be born addicted to opioids. Opioid dependence withdrawal symptoms may include; feelings of uneasiness, increased pain, irritability, belly pain, diarrhea, sweats and goose-flesh. Testosterone replacement therapy:  Potential side effects include increased risk of stroke and heart attack, blood clots, increased blood pressure, increased cholesterol, enlarged prostate, sleep apnea, irritability/aggression and other mood disorders, and decreased fertility. Tianna Sanchez (1931)             Page 1 of 4    Initials:_______    Benzodiazepines and non-benzodiazepine sleep medications: These medications can lead to problems such as addiction/dependence, sedation, fatigue, lightheadedness, dizziness, incoordination, falls, depression, hallucinations, and impaired judgment, memory and concentration. The ability to drive and operate machinery may also be affected. Abnormal sleep-related behaviors have been reported, including sleepwalking, driving, making telephone calls, eating, or having sex while not fully awake. These medications can suppress breathing and worsen sleep apnea, particularly when combined with alcohol or other sedating medications, potentially leading to death. Dependence withdrawal symptoms may include tremors, anxiety, hallucinations and seizures.    Stimulants:  Common adverse effects include addiction/dependence, increased blood pressure and heart rate, decreased appetite, nausea, involuntary weight loss, insomnia,  irritability, and headaches. These risks may increase when these medications are combined with other stimulants, such as caffeine pills or energy drinks, certain weight loss supplements and oral decongestants. Dependence withdrawal symptoms may include depressed mood, loss of interest, suicidal thoughts, anxiety, fatigue, appetite changes and agitation. I agree and understand that I and my prescriber have the following rights and responsibilities regarding my treatment plan:   1. MY RIGHTS:  To be informed of my treatment and medication plan. To be an active participant in my health and wellbeing. 2. MY RESPONSIBILITY AND UNDERSTANDING FOR USE OF MEDICATIONS   I will take medications at the dose and frequency as directed. For my safety, I will not increase or change how I take my medications without the recommendation of my healthcare provider.  I will actively participate in any program recommended by my provider which may improve function, including social, physical, psychological programs.  I will not take my medications with alcohol or other drugs not prescribed to me. I understand that drinking alcohol with my medications increases the chances of side effects, including reduced breathing rate and could lead to personal injury when operating machinery.  I understand that if I have a history of substance use disorders, including alcohol or other illicit drugs, that I may be at increased risk of addiction to my medications.  I agree to notify my provider immediately if I should become pregnant so that my treatment plan can be adjusted.    I agree and understand that I shall only receive controlled substance medications from the prescriber that signed this agreement unless there is written agreement among other prescribers of controlled substances outlining the responsibility of the medications being prescribed.  I understand that the if the controlled medication is not helping to achieve goals, the dosage may be tapered and no longer prescribed. 3. MY RESPONSIBILITY FOR COMMUNICATION / PRESCRIPTION RENEWALS   I agree that all controlled substance medications that I take will be prescribed only by my provider. If another healthcare provider prescribes me medication in an emergency, I will notify my provider within seventy-two (72) hours. Nuno Bakari (4/5/1931)             Page 2 of 4    Initials:_______   I will arrange for refills at the prescribed interval ONLY during regular office hours. I will not ask for refills earlier than agreed, after-hours, on holidays or weekends. Refills may take up to 72 hours for processing and prescriptions to reach the pharmacy.  I will inform my other health care providers that I am taking these medications and of the existence of this [id number]. In the event of an emergency, I will provide the same information to the emergency department prescribers.  I will keep my provider updated on the pharmacy I am using for controlled medication prescription filling. 4. MY RESPONSIBILITY FOR PROTECTING MEDICATIONS   I will protect my prescriptions and medications. I understand that lost or misplaced prescriptions will not be replaced.  I will keep medications only for my own use and will not share them with others. I will keep all medications away from children.  I agree that if my medications are adjusted or discontinued, I will properly dispose of any remaining medications. I understand that I will be required to dispose of any remaining controlled medications as, directed by my prescriber, prior to being provided with any prescriptions for other controlled medications.   Medication drop box locations can be found at: [employer]  5. MY RESPONSIBILITY WITH ILLEGAL DRUGS    I will not use illegal or street drugs or another person's prescription medications not prescribed to me.  If there are identified addiction type symptoms, then referral to a program may be provided by my provider and I agree to follow through with this recommendation. 6. MY RESPONSIBILITY FOR COOPERATION WITH INVESTIGATIONS   I understand that my provider will comply with any applicable law and may discuss my use and/or possible misuse/abuse of controlled substances and alcohol, as appropriate, with any health care provider involved in my care, pharmacist, or legal authority.  I authorize my provider and pharmacy to cooperate fully with law enforcement agencies (as permitted by law) in the investigation of any possible misuse, sale, or other diversion of my controlled substances.  I agree to waive any applicable privilege or right of privacy or confidentiality with respect to these authorizations. 7. PROVIDERS RIGHT TO MONITOR FOR SAFETY: PRESCRIPTION MONITORING / DRUG TESTING   I consent to drug/toxicology screening and will submit to a drug screen upon my providers request to assure I am only taking the prescribed drugs for my safety monitoring. I understand that a drug screen is a laboratory test in which a sample of my urine, blood or saliva is checked to see what drugs I have been taking. This may entail an observed urine specimen, which means that a nurse or other health care provider may watch me provide urine, and I will cooperate if I am asked to provide an observed specimen. Gisela Robledo (4/5/1931)             Page 3 of 4    Initials:_______  [facility] I understand that my provider will check a copy of my State Prescription Monitoring Program () Report in order to safely prescribe medications.      Pill Counts: I consent to pill counts when requested. I may be asked to bring all my prescribed controlled substance medications, in their original bottles, to all of my scheduled appointments. In addition, my provider may ask me to come to the practice at any time for a random pill count. 8. TERMINATION OF THIS AGREEMENT   For my safety, my prescriber has the right to stop prescribing controlled substance medications and may end this agreement.  Conditions that may result in termination of this agreement:  a. I do not show any improvement in pain, or my activity has not improved. b. I develop rapid tolerance or loss of improvement, as described in my treatment plan.  c. I develop significant side effects from the medication. d. My behavior is not consistent with the responsibilities outlined above, thereby causing safety concerns to continue prescribing controlled substance medications. e. I fail to follow the terms of this agreement. f. Other:____________________________     UNDERSTANDING THIS MEDICATION AGREEMENT:    I have read the above and have had all my questions answered. For chronic disease management, I know that my symptoms can be managed with many types of treatments. A chronic medication trial may be part of my treatment, but I must be an active participant in my care. Medication therapy is only one part of my symptom management plan. In some cases, there may be limited scientific evidence to support the chronic use of certain medications to improve symptoms and daily function. Furthermore, in certain circumstances, there may be scientific information that suggests that the use of chronic controlled substances may worsen my symptoms and increase my risk of unintentional death directly related to this medication therapy. I know that if my provider feels my risk from controlled medications is greater than my benefit, I will have my controlled substance medication(s) compassionately lowered or removed altogether. I further agree to allow this office to contact my HIPAA contact if there are concerns about my safety and use of the controlled medications. I have agreed to use the prescribed controlled substance medications to me as instructed by my provider and as stated in this Medication Agreement. My initial on each page and my signature below shows that I have read each page and I have had the opportunity to ask questions with answers provided by my provider.       Patient Name (Printed): _____________________________________    Patient Signature:  ______________________   Date: _____________      Prescriber Name (Printed): ___________________________________    Prescriber Signature: _____________________  Date: _____________     Wilmer Pain (4/5/1931)             Page 4 of 4

## 2024-03-12 ENCOUNTER — HOME CARE VISIT (OUTPATIENT)
Facility: HOME HEALTH | Age: 89
End: 2024-03-12
Payer: MEDICARE

## 2024-03-12 PROCEDURE — G0156 HHCP-SVS OF AIDE,EA 15 MIN: HCPCS

## 2024-03-14 ENCOUNTER — HOME CARE VISIT (OUTPATIENT)
Facility: HOME HEALTH | Age: 89
End: 2024-03-14
Payer: MEDICARE

## 2024-03-14 VITALS
OXYGEN SATURATION: 94 % | TEMPERATURE: 97.5 F | SYSTOLIC BLOOD PRESSURE: 143 MMHG | DIASTOLIC BLOOD PRESSURE: 76 MMHG | HEART RATE: 84 BPM

## 2024-03-14 PROCEDURE — G0156 HHCP-SVS OF AIDE,EA 15 MIN: HCPCS

## 2024-03-14 PROCEDURE — G0299 HHS/HOSPICE OF RN EA 15 MIN: HCPCS

## 2024-03-14 ASSESSMENT — ENCOUNTER SYMPTOMS: PAIN LOCATION - PAIN QUALITY: ACHINESS

## 2024-03-14 NOTE — HOSPICE
Patient is lying in bed A&O to self and place, on RA, denies sob, but does state that she has pain all over due to her Fibromyalgia. Patient's  states the pain continues to be the same. Patient's  does state that patient is having increasing episodes of not knowing where she is and is becoming more suspicious. We had discussion and education on dementia, Lewy body dementia, and signs/symptoms of Lewy body.  verbalized understanding. We also spoke about a volunteer coming out,  states that he will call if he decides to request one. Advised both patient and  that CHELSI Mata will be coming out next week for a visit. Patient also has had several missed visits by family. Visits going forward will be Tuesday and Fridays in hoping that will work better for the family. MAC 28. No other concerns per . Advised  to call hospice with any questions or concerns.

## 2024-03-19 ENCOUNTER — HOME CARE VISIT (OUTPATIENT)
Facility: HOME HEALTH | Age: 89
End: 2024-03-19
Payer: MEDICARE

## 2024-03-19 PROCEDURE — G0156 HHCP-SVS OF AIDE,EA 15 MIN: HCPCS

## 2024-03-20 NOTE — HOSPICE
[facility]   Good Help to Those in Need  NEW HOME PATIENT VISIT  [phone]     Patient Name:  Quynh Quijano  YOB: 1931      Date of Provider Hospice Visit: 03/20/24     Level of Care:   [] General Inpatient (GIP)              [x] Routine                   [] Respite     Current Location of Care:  [] [facility]           [] [facility]         [] [facility]        [] [facility]           [x] Home        Date of Original Hospice Admission: 1/18/2024  Hospice Medical Director at time of admission: Dr. Reddy     Principle Hospice Diagnosis: Chronic systolic heart failure  Diagnoses RELATED to the terminal prognosis: HTN  Other Diagnoses: Rheumatoid arthritis with negative rheumatoid factor, involving unspecified site,  Chronic obstructive pulmonary disease, unspecified COPD type, Type 2 diabetes with nephropathy,   CKD (chronic kidney disease) stage 4, GFR 15-29 ml/min, Moderate Lewy body dementia with mood disturbance, Fibromyalgia     HOSPICE SUMMARY   Quynh Quijano is a 89 year old female admitted to hospice services on 1/18/2024 with a hospice diagnosis of chronic systolic heart failure.     Review of records indicate patient previously under the care of [facility] for primary care, where she had been demonstrating a significant functional decline over the last 3-6 months. Most likely in accompaniment to presumed Lewy Body dementia with her described symptoms of new onset behaviors and confusion. She has additionally been battling chronic pain secondary to fibromyalgia and OA, worsening renal function now progressed to CKD IV with no interest in dialysis, and worsening heart failure symptoms despite being optimized on medical therapy. Home health provider initiated goals of care discussion in mid January due to worsening decline and exacerbating symptoms of acute on chronic comborbidities, as she is appropriate for hospice. Patient's  opted for goals of comfort and symptom

## 2024-03-21 ENCOUNTER — HOME HEALTH/ HOSPICE FACE TO FACE (OUTPATIENT)
Age: 89
End: 2024-03-21

## 2024-03-21 ENCOUNTER — HOME CARE VISIT (OUTPATIENT)
Facility: HOME HEALTH | Age: 89
End: 2024-03-21
Payer: MEDICARE

## 2024-03-21 VITALS
OXYGEN SATURATION: 95 % | DIASTOLIC BLOOD PRESSURE: 96 MMHG | SYSTOLIC BLOOD PRESSURE: 154 MMHG | TEMPERATURE: 98.2 F | HEART RATE: 99 BPM

## 2024-03-21 PROCEDURE — G0299 HHS/HOSPICE OF RN EA 15 MIN: HCPCS

## 2024-03-21 ASSESSMENT — ENCOUNTER SYMPTOMS: BOWEL INCONTINENCE: 1

## 2024-03-22 ENCOUNTER — HOME CARE VISIT (OUTPATIENT)
Facility: HOME HEALTH | Age: 89
End: 2024-03-22
Payer: MEDICARE

## 2024-03-22 PROCEDURE — G0156 HHCP-SVS OF AIDE,EA 15 MIN: HCPCS

## 2024-03-22 NOTE — HOSPICE
Dual visit with NP Adolfo Staley for F2F.  Patient is upstairs lying in her bed and sits up for visit. Patient states that she has pain all over but that she is taking medication for it and is okay at the moment. Patient also complains of feeling sob this morning and that she had to use her inhaler. Patient continues to eat 2 meals a day, snacks throughout the day, and liquid intake is good. Myrlax is also being used with good affect for bm regimen, patient and  educated/advised to drink plenty of water as not enough could lead to constipation; both verbalized understanding.  states that patient continues to accuse him of leaving the home late at night,  states that he gets up to go to the bathroom and that's when patient thinks he is leaving.  advised to let hospice know if patient becomes worse with her confusion.  was also saying that he can't get out of the house to run errands or cut his lawn, re-advised  that we have volunteers that will stay and sit with the patient for a while so that he can do those things, and he can even have the volunteer come a few times while he is there so he can make sure the patient does okay with them before deciding to venture out.  verbalized understanding and stated he will call into the number if he decides to us the volunteer services. No other concerns per . Advised  to call hospice with any questions or concerns.

## 2024-03-26 ENCOUNTER — HOME CARE VISIT (OUTPATIENT)
Facility: HOME HEALTH | Age: 89
End: 2024-03-26
Payer: MEDICARE

## 2024-03-26 PROCEDURE — G0156 HHCP-SVS OF AIDE,EA 15 MIN: HCPCS

## 2024-03-28 ENCOUNTER — HOME CARE VISIT (OUTPATIENT)
Facility: HOME HEALTH | Age: 89
End: 2024-03-28
Payer: MEDICARE

## 2024-03-28 VITALS
SYSTOLIC BLOOD PRESSURE: 153 MMHG | OXYGEN SATURATION: 95 % | TEMPERATURE: 98.1 F | DIASTOLIC BLOOD PRESSURE: 106 MMHG | HEART RATE: 87 BPM

## 2024-03-28 PROCEDURE — G0299 HHS/HOSPICE OF RN EA 15 MIN: HCPCS

## 2024-03-28 ASSESSMENT — ENCOUNTER SYMPTOMS
PAIN LOCATION - PAIN QUALITY: ACHING
STOOL DESCRIPTION: SOFT

## 2024-03-28 NOTE — HOSPICE
Patient lying in bed but sat up on the side for visit, A&O 3 except time, states that she continues to have pain all over and the weather is not helping. She also states she is somewhat feeling short of breath. Asked patient if she had taken her inhaler yet, she stated no, but she did accept taking her inhaler to which her  helped her. Patient continues to eat 2 meals a day with snacks and liquid intake is adequate. Visit on 3/21 with CHELSI Mata, patient's  states that he put patient back to the 150 mg 2x a day of Primidone from 200 mg as he didn't see a difference. NP inquired about using nebulizer treatments, but  declined. At today's visit, patient's  has requested neb treatments. Received order from CHELSI Mata; ipratropium 0.5 mg-albuterol 2.5 mg (DUONEB) 0.5-2.5 (3) MG/3ML SOLN nebulizer solution every 6 hours as needed for sob and/or wheezing. DME ordered, med ordered. Also ordered spacer added for inhaler as patient is having a hard time with inhaling from inhaler. No other concerns per . MAC 29. Advised  to call hospice with any questions or concerns.    Medline Order Number : [id number]

## 2024-03-29 ENCOUNTER — HOME CARE VISIT (OUTPATIENT)
Facility: HOME HEALTH | Age: 89
End: 2024-03-29
Payer: MEDICARE

## 2024-03-29 PROCEDURE — G0156 HHCP-SVS OF AIDE,EA 15 MIN: HCPCS

## 2024-03-29 PROCEDURE — G0155 HHCP-SVS OF CSW,EA 15 MIN: HCPCS

## 2024-04-02 ENCOUNTER — HOME CARE VISIT (OUTPATIENT)
Facility: HOME HEALTH | Age: 89
End: 2024-04-02
Payer: MEDICARE

## 2024-04-02 PROCEDURE — G0156 HHCP-SVS OF AIDE,EA 15 MIN: HCPCS

## 2024-04-03 ENCOUNTER — HOME CARE VISIT (OUTPATIENT)
Facility: HOME HEALTH | Age: 89
End: 2024-04-03
Payer: MEDICARE

## 2024-04-03 PROCEDURE — G0300 HHS/HOSPICE OF LPN EA 15 MIN: HCPCS

## 2024-04-04 VITALS
RESPIRATION RATE: 18 BRPM | DIASTOLIC BLOOD PRESSURE: 97 MMHG | HEART RATE: 86 BPM | OXYGEN SATURATION: 96 % | SYSTOLIC BLOOD PRESSURE: 137 MMHG

## 2024-04-04 ASSESSMENT — ENCOUNTER SYMPTOMS: PAIN LOCATION - PAIN QUALITY: JOINT

## 2024-04-04 NOTE — HOSPICE
Patient resting in bed, set up on side of bed for visit, Patient reports back and knee pain 8/10 at time. Caregiver reports medicating 3 times daily schedule for pain control. Patient reports pain is control with current medications. Patient reports doing neb tx twice daily and inhaler prn for wheezing. No new concerns voiced by caregiver. Assessment completed. Encourage  to call with any questions or concerns

## 2024-04-05 ENCOUNTER — HOME CARE VISIT (OUTPATIENT)
Facility: HOME HEALTH | Age: 89
End: 2024-04-05
Payer: MEDICARE

## 2024-04-05 PROCEDURE — G0156 HHCP-SVS OF AIDE,EA 15 MIN: HCPCS

## 2024-04-09 ENCOUNTER — HOME CARE VISIT (OUTPATIENT)
Facility: HOME HEALTH | Age: 89
End: 2024-04-09
Payer: MEDICARE

## 2024-04-09 PROCEDURE — G0299 HHS/HOSPICE OF RN EA 15 MIN: HCPCS

## 2024-04-09 PROCEDURE — G0156 HHCP-SVS OF AIDE,EA 15 MIN: HCPCS

## 2024-04-10 VITALS
HEART RATE: 78 BPM | DIASTOLIC BLOOD PRESSURE: 85 MMHG | OXYGEN SATURATION: 96 % | TEMPERATURE: 97.9 F | SYSTOLIC BLOOD PRESSURE: 132 MMHG

## 2024-04-10 ASSESSMENT — ENCOUNTER SYMPTOMS: STOOL DESCRIPTION: SOFT

## 2024-04-10 NOTE — HOSPICE
Medline Order Number : [id number]  liners    Patient name & Age: Quynh Quijano, 89 yrs.  Hospice Dx: Chronic systolic heart failure  Recertification Assessment date: 4/9  Changes since admission: Nebulizer treatments added to regimen.  Problem you've worked on over the past 2 weeks: Feeling short of breath.  Plan of Care Changes since last recert: DuoNeb added to MAR.  Why should they stay on hospice?   Patient continues to have generalized pain due to her Fibromyalgia to which Norco is effective for pain control. Primidone was increased to 200 mg 2x a day from 150 mg 2x a day due to patient's increased hand tremors for a short period of time that was not effective, and orders were changed back to 150 mg.  stated that this was tried in the past with no changes as well. On 3/21, NP Adolfo spoke with  about using DuoNeb nebulizer treatments as patient was complaining of being short of breath even when using her inhaler, but he declined; however, on 3/28 patient's  requested DuoNeb and states that they have been helping. Weather seems to really affect the patient with her Fibromyalgia as she stays in bed most of the time and since admission, she has become more suspicious and upset with her  at times. He states that she sometimes thinks he's leaving her at night and thinks he's speaking about her on the phone in another room. Patient was seeing a Podiatrist prior to coming onto services and continues to have on and off pain with her right great toe and nail area.  states her podiatrist is looking for someone that will come in the home to assess her. No new concerns per . MAC 28.   Plan for the next 2 weeks: Continue with current plan of care.  Patient is being followed by: NARESH ART 2x a week, Declined - [place]  Patient is recommended for recertification.  Patients will be in the terminal stage of heart disease (life expectancy of six months or less) if they meet the following

## 2024-04-15 ENCOUNTER — HOME CARE VISIT (OUTPATIENT)
Facility: HOME HEALTH | Age: 89
End: 2024-04-15
Payer: MEDICARE

## 2024-04-15 PROCEDURE — G0156 HHCP-SVS OF AIDE,EA 15 MIN: HCPCS

## 2024-04-18 ENCOUNTER — HOME CARE VISIT (OUTPATIENT)
Facility: HOME HEALTH | Age: 89
End: 2024-04-18
Payer: MEDICARE

## 2024-04-18 PROCEDURE — G0300 HHS/HOSPICE OF LPN EA 15 MIN: HCPCS

## 2024-04-19 ENCOUNTER — HOME CARE VISIT (OUTPATIENT)
Facility: HOME HEALTH | Age: 89
End: 2024-04-19
Payer: MEDICARE

## 2024-04-19 PROCEDURE — G0156 HHCP-SVS OF AIDE,EA 15 MIN: HCPCS

## 2024-04-22 VITALS — RESPIRATION RATE: 18 BRPM | SYSTOLIC BLOOD PRESSURE: 112 MMHG | HEART RATE: 78 BPM | DIASTOLIC BLOOD PRESSURE: 66 MMHG

## 2024-04-22 NOTE — HOSPICE
Patient resting in bed with eyes open. She reports generalize pain 5/10,  reports medicating 4 times daily for pain control. No new concerns voiced. Assessment completed.  understand to call with any change in status

## 2024-04-22 NOTE — HOSPICE
MSW missed visit with patient for the month. MSW will reach out to patient and  to offer visit and support.

## 2024-04-23 ENCOUNTER — HOME CARE VISIT (OUTPATIENT)
Facility: HOME HEALTH | Age: 89
End: 2024-04-23
Payer: MEDICARE

## 2024-04-23 PROCEDURE — G0156 HHCP-SVS OF AIDE,EA 15 MIN: HCPCS

## 2024-04-26 ENCOUNTER — HOME CARE VISIT (OUTPATIENT)
Facility: HOME HEALTH | Age: 89
End: 2024-04-26
Payer: MEDICARE

## 2024-04-26 PROCEDURE — G0299 HHS/HOSPICE OF RN EA 15 MIN: HCPCS

## 2024-04-26 PROCEDURE — G0156 HHCP-SVS OF AIDE,EA 15 MIN: HCPCS

## 2024-04-28 VITALS
SYSTOLIC BLOOD PRESSURE: 163 MMHG | RESPIRATION RATE: 12 BRPM | DIASTOLIC BLOOD PRESSURE: 85 MMHG | OXYGEN SATURATION: 91 % | HEART RATE: 73 BPM

## 2024-04-29 ENCOUNTER — HOME CARE VISIT (OUTPATIENT)
Age: 89
End: 2024-04-29
Payer: MEDICARE

## 2024-04-30 ENCOUNTER — HOME CARE VISIT (OUTPATIENT)
Facility: HOME HEALTH | Age: 89
End: 2024-04-30
Payer: MEDICARE

## 2024-05-01 ENCOUNTER — HOME CARE VISIT (OUTPATIENT)
Facility: HOME HEALTH | Age: 89
End: 2024-05-01
Payer: MEDICARE

## 2024-05-01 PROCEDURE — G0300 HHS/HOSPICE OF LPN EA 15 MIN: HCPCS

## 2024-05-02 ENCOUNTER — HOME CARE VISIT (OUTPATIENT)
Facility: HOME HEALTH | Age: 89
End: 2024-05-02
Payer: MEDICARE

## 2024-05-02 VITALS
HEART RATE: 93 BPM | SYSTOLIC BLOOD PRESSURE: 132 MMHG | RESPIRATION RATE: 18 BRPM | OXYGEN SATURATION: 94 % | DIASTOLIC BLOOD PRESSURE: 68 MMHG

## 2024-05-02 VITALS
SYSTOLIC BLOOD PRESSURE: 118 MMHG | DIASTOLIC BLOOD PRESSURE: 78 MMHG | TEMPERATURE: 97.8 F | HEART RATE: 79 BPM | OXYGEN SATURATION: 100 % | RESPIRATION RATE: 28 BRPM

## 2024-05-02 PROCEDURE — G0299 HHS/HOSPICE OF RN EA 15 MIN: HCPCS

## 2024-05-02 RX ADMIN — HALOPERIDOL 1 MG: 2 SOLUTION ORAL at 16:00

## 2024-05-02 ASSESSMENT — ENCOUNTER SYMPTOMS
CONSTIPATION: 1
DYSPNEA ACTIVITY LEVEL: AT REST
NAUSEA: 1
CONSTIPATION: 1

## 2024-05-02 NOTE — HOSPICE
greeted nurse at door, he stated meds for pain has been changed and patient no longer taking Norco. He reported patient feels Norco was working better for pain, requesting to restart. Patient resting in bed, set up on side of bed for visit, she reports back pain 8/10, caregiver reports medicating as directed for comfort with dilaudid. Patient reports feeling norco worked better for pain. Caregiver reported patient with periods of confusion, she didn't know who he was the other day. Meds reviewed, Call to CHELSI Mata, order obtain for Norco 10/325mg every 4 hour as needed for pain.  reports patient with periods of sob, using neb tx as directed with good results. Assessment completed. Encourage caregiver to call with any change in status.  NEW MEDICATION INITIATION DOCUMENTATION:  Consulted AT MD to report change in patient status: yes, CHELSI Mata  Obtained Order from Provider for initiation of Symptom relief medication / other medication needed:yes,    Documentation completed in Telephone/Visit Note in [facility]  Reason medication is being initiated:pain control  MD / Provider name consulted re: change in status / initiation of new medication:CHELSI Staley  New Symptom(s): pain not control well  New Order(s): norco 10/325mg 1 tab every 4 hour as needed for pain  Name of person being taught: Patient and   Instructions given: yes,   Side Effects taught:yes, nausea, dizziness, vomiting rash  Response to teaching: both verbalize understanding

## 2024-05-03 ENCOUNTER — HOME CARE VISIT (OUTPATIENT)
Facility: HOME HEALTH | Age: 89
End: 2024-05-03
Payer: MEDICARE

## 2024-05-03 PROCEDURE — G0156 HHCP-SVS OF AIDE,EA 15 MIN: HCPCS

## 2024-05-07 ENCOUNTER — HOME CARE VISIT (OUTPATIENT)
Facility: HOME HEALTH | Age: 89
End: 2024-05-07
Payer: MEDICARE

## 2024-05-07 PROCEDURE — G0156 HHCP-SVS OF AIDE,EA 15 MIN: HCPCS

## 2024-05-08 ENCOUNTER — HOME CARE VISIT (OUTPATIENT)
Facility: HOME HEALTH | Age: 89
End: 2024-05-08
Payer: MEDICARE

## 2024-05-08 VITALS
OXYGEN SATURATION: 93 % | HEART RATE: 83 BPM | SYSTOLIC BLOOD PRESSURE: 141 MMHG | DIASTOLIC BLOOD PRESSURE: 80 MMHG | RESPIRATION RATE: 20 BRPM | TEMPERATURE: 97.3 F

## 2024-05-08 PROCEDURE — G0299 HHS/HOSPICE OF RN EA 15 MIN: HCPCS

## 2024-05-08 ASSESSMENT — ENCOUNTER SYMPTOMS
CONSTIPATION: 1
PAIN LOCATION - PAIN QUALITY: ACHING

## 2024-05-09 ENCOUNTER — HOME HEALTH/ HOSPICE FACE TO FACE (OUTPATIENT)
Age: 89
End: 2024-05-09

## 2024-05-09 ENCOUNTER — HOME CARE VISIT (OUTPATIENT)
Facility: HOME HEALTH | Age: 89
End: 2024-05-09
Payer: MEDICARE

## 2024-05-09 PROCEDURE — G0300 HHS/HOSPICE OF LPN EA 15 MIN: HCPCS

## 2024-05-09 NOTE — HOSPICE
deformities, swelling legs  Neurologic:confusion, hallucinations, weakness  Psychiatric:anxiety, feeling depressed, poor sleep  Endocrine:         FUNCTIONAL ASSESSMENT      Palliative Performance Scale (PPS): 50%      HISTORY     Past Medical History:   Diagnosis Date    Anemia 3/3/2010    Arrhythmia     irregular heartbeat    Arthritis     Asthma     CHF (congestive heart failure) ([facility]) 3/3/2010    Chronic kidney disease     Chronic obstructive pulmonary disease ([facility])     Chronic pain     back    CPAP (continuous positive airway pressure) dependence     Diverticulosis     DM (diabetes mellitus) ([facility]) 3/3/2010    Dyslipidemia 3/3/2010    GERD (gastroesophageal reflux disease)     HTN (hypertension) 3/3/2010    S/P TKR (total knee replacement) 3/3/2010    Syncope 1/10/2023    Unspecified sleep apnea     doesn't wear CPAP since back has been hurting; as of 6/8/2023 has not been using CPAP 10 years ago.          PHYSICAL EXAM      Wt Readings from Last 3 Encounters:   01/19/24 88.9 kg (196 lb)   10/06/23 81.6 kg (179 lb 12.8 oz)   08/30/23 80.9 kg (178 lb 6.4 oz)       BP: (!) 141/80    Supplemental O2         [] Yes                        [x] NO  Last bowel movement:      Currently this patient has:  [] Peripheral IV          [] PICC           [] PORT        [] ICD               [] Lieberman Catheter       [] NG Tube     [] PEG Tube               [] Rectal Tube           [] Drain  [] Other:      Constitutional: awake, oriented to self and place, no distress, reports generalized pain   Eyes: anicteric sclera, no injection, no drainage  ENMT: moist oral mucosa, nares normal, no nasal drainage  Cardiovascular: regular, distal pulses intact, +2 bilateral lower extremity edema  Respiratory: breath sounds clear posteriorly, no increased work of breathing   Gastrointestinal: soft, non-tender, +bowel sounds  Musculoskeletal: no gross abnormalities  Skin: warm, dry  Neurologic: following commands, moving all extremities slowly

## 2024-05-09 NOTE — HOSPICE
Comprehensive SN assessment visit  Patient received sitting on side of her bed awake and alert  Responds verbally and is appropriate  Skin is warm an dry, breath sounds clear, diminished at bases, no cough , abdomen soft and non tender + bowel sounds  Bms regular per caregiver  Lower extremities 3+ pitting edema  Patient is on room air and denies shortness of breath at rest  02 sat=93%   Patient states that she has pain constantly however the pain pills help along with diclofenac gel topically    No new issues reported by caregiver, spouse   To continue with current hospice plan of care   Informed spouse to notify hospice of concerns / needs  Understanding verbalized                                medication refills from [facility] to be delivered 5/9/24      nebs     prednisone 20 mg     diclofenac topical gel      Hydrocodone /apap

## 2024-05-10 ENCOUNTER — HOME CARE VISIT (OUTPATIENT)
Facility: HOME HEALTH | Age: 89
End: 2024-05-10
Payer: MEDICARE

## 2024-05-10 PROCEDURE — G0155 HHCP-SVS OF CSW,EA 15 MIN: HCPCS

## 2024-05-10 PROCEDURE — G0156 HHCP-SVS OF AIDE,EA 15 MIN: HCPCS

## 2024-05-13 ENCOUNTER — HOME CARE VISIT (OUTPATIENT)
Age: 89
End: 2024-05-13
Payer: MEDICARE

## 2024-05-13 VITALS — RESPIRATION RATE: 18 BRPM

## 2024-05-13 NOTE — HOSPICE
Prn visit for joint visit with NP Adolfo to review patient status.  and granddaughter Charley present for visit.  reported increase periods of confusion, at times patient doesn't know who is, which is very upsetting for caregiver. NP Adolfo assess patient and talk about patient and caregivers concerns and decline in status, Medications reviewed  with caregivers. No new med orders. Patient code status reviewed with , he and granddaughter believed there was a DNR order in place. MSSUNSHINE Gallegos notified and will f/u with DNR forms to be signed. Encourage caregiver to call with any questions or concerns

## 2024-05-14 ENCOUNTER — HOME CARE VISIT (OUTPATIENT)
Facility: HOME HEALTH | Age: 89
End: 2024-05-14
Payer: MEDICARE

## 2024-05-14 PROCEDURE — G0156 HHCP-SVS OF AIDE,EA 15 MIN: HCPCS

## 2024-05-15 ENCOUNTER — HOME CARE VISIT (OUTPATIENT)
Facility: HOME HEALTH | Age: 89
End: 2024-05-15
Payer: MEDICARE

## 2024-05-15 ENCOUNTER — HOME CARE VISIT (OUTPATIENT)
Age: 89
End: 2024-05-15
Payer: MEDICARE

## 2024-05-15 PROCEDURE — G0300 HHS/HOSPICE OF LPN EA 15 MIN: HCPCS
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
## 2024-05-16 VITALS
SYSTOLIC BLOOD PRESSURE: 138 MMHG | RESPIRATION RATE: 18 BRPM | OXYGEN SATURATION: 95 % | HEART RATE: 89 BPM | DIASTOLIC BLOOD PRESSURE: 90 MMHG

## 2024-05-16 ASSESSMENT — ENCOUNTER SYMPTOMS
CONSTIPATION: 1
DYSPNEA ACTIVITY LEVEL: AFTER AMBULATING 10 - 20 FT

## 2024-05-16 NOTE — HOSPICE
Patient sitting on side of bed waiting for nurse visit, after ambulating from bathroom.  and granddaughter present. Granddaughter reports patient with increase periods of confusion. She reports patient thinking she not in her home, and doesn't recognize . She reported having medicated with haldol the other night when patient was restless with good results. Medications reviewed. Offer respite as  is exhausted, he will talk with granddaughter. Plan to increase visits to twice weekly by nursing. Encourage caregiver to call with any questions or concerns

## 2024-05-17 ENCOUNTER — HOME CARE VISIT (OUTPATIENT)
Facility: HOME HEALTH | Age: 89
End: 2024-05-17
Payer: MEDICARE

## 2024-05-17 PROCEDURE — G0156 HHCP-SVS OF AIDE,EA 15 MIN: HCPCS

## 2024-05-21 ENCOUNTER — HOME CARE VISIT (OUTPATIENT)
Facility: HOME HEALTH | Age: 89
End: 2024-05-21
Payer: MEDICARE

## 2024-05-21 PROCEDURE — G0156 HHCP-SVS OF AIDE,EA 15 MIN: HCPCS

## 2024-05-22 ENCOUNTER — HOME CARE VISIT (OUTPATIENT)
Facility: HOME HEALTH | Age: 89
End: 2024-05-22
Payer: MEDICARE

## 2024-05-22 PROCEDURE — G0299 HHS/HOSPICE OF RN EA 15 MIN: HCPCS

## 2024-05-23 VITALS
HEART RATE: 90 BPM | TEMPERATURE: 97.1 F | SYSTOLIC BLOOD PRESSURE: 124 MMHG | RESPIRATION RATE: 16 BRPM | OXYGEN SATURATION: 94 % | DIASTOLIC BLOOD PRESSURE: 64 MMHG

## 2024-05-23 ASSESSMENT — ENCOUNTER SYMPTOMS
DYSPNEA ACTIVITY LEVEL: AFTER AMBULATING LESS THAN 10 FT
CONSTIPATION: 1

## 2024-05-23 NOTE — HOSPICE
Spouse and granddaughter present.  Patient A&Ox2, resting comfortably in recliner in den.  Quynh denies pain, vital signs WNL.  She is mildly confused, verbal, pleasant and cooperative.  Skin is intact.  Breathing is even, with normal effort, regular and effective without adventitious lung sounds.  Qunyh grows short of breath quickly with any exertion and recovers with rest.  Feet and ankles are chronically swollen withoutout improvement from elevation including over night.  Urinary ouput is reportedly normal.  BM last night after 2 days was difficult.  Romeo will add one Senna Plus tablet at nightime and monitor for results.  Arthritic pain responds well to medication and ointment.  Medication refills ordered.  No supplies needed.  Romeo will call with any changes or needs.

## 2024-05-24 ENCOUNTER — HOME CARE VISIT (OUTPATIENT)
Facility: HOME HEALTH | Age: 89
End: 2024-05-24
Payer: MEDICARE

## 2024-05-24 PROCEDURE — G0156 HHCP-SVS OF AIDE,EA 15 MIN: HCPCS

## 2024-05-28 ENCOUNTER — HOME CARE VISIT (OUTPATIENT)
Facility: HOME HEALTH | Age: 89
End: 2024-05-28
Payer: MEDICARE

## 2024-05-28 PROCEDURE — G0156 HHCP-SVS OF AIDE,EA 15 MIN: HCPCS

## 2024-05-30 ENCOUNTER — HOME CARE VISIT (OUTPATIENT)
Facility: HOME HEALTH | Age: 89
End: 2024-05-30
Payer: MEDICARE

## 2024-05-30 PROCEDURE — G0300 HHS/HOSPICE OF LPN EA 15 MIN: HCPCS

## 2024-05-31 ENCOUNTER — HOME CARE VISIT (OUTPATIENT)
Facility: HOME HEALTH | Age: 89
End: 2024-05-31
Payer: MEDICARE

## 2024-05-31 VITALS
RESPIRATION RATE: 20 BRPM | DIASTOLIC BLOOD PRESSURE: 90 MMHG | SYSTOLIC BLOOD PRESSURE: 128 MMHG | HEART RATE: 125 BPM | OXYGEN SATURATION: 96 %

## 2024-05-31 PROCEDURE — G0156 HHCP-SVS OF AIDE,EA 15 MIN: HCPCS

## 2024-05-31 NOTE — HOSPICE
Patient ambulating from bathroom with walker with slow steady gait. Patient with sob with activity, but recovered at rest.  reports patient sleeping better at night. He said she forget who he is, but knows granddaughter Beatriz and other family members, she other thinks she needs to go home. He reports bloods been 140--150. Medications reviewed. No other concerns voiced. Encouarge caregiver to call with any questions or concerns

## 2024-06-06 NOTE — HOSPICE
Joint visit made with JUANA Gallegos.  Arrived at patient's home; patient's , Romeo and granddaughter, Beatriz, present for visit.  Started visit up in the room with patient, sitting in her wheelchair. Asked patient how she was feeling she replied \"not good, tired, and I'm scared\". Tried to talk a bit more with patient but patient seemed to be tired from talking.  Upon assessment, patient drowsy, had her eyes closed most of visit, garbled at times, respirations slightly labored, tachycardic, abdomen soft, +2 edema to lower extremities.   Had conversation in separate room with  and granddaughter and Rosy. They state that it has become extremely difficult to transfer patient and they cannot bring her downstairs and also when transferring her to wheelchair to get the bathroom, and she is unable to help at all. They state that she is having difficulty sleeping and waking up scared during the middle of the night. Family also states that patient is refusing lasix because she does not want to get up to bathroom. They state that she falling asleep while eating at times. They also state that patient does not know who they are and she does not know that she is in her home.   Rosy discussed different options with family such as hired caregivers, group homes, SNFs. Also recommended respite for a break for the family but family states they only want to have to move patient once. Family open to moving patient because they know they are getting past limits of being able to provide care for patient.  Discussed medications with family. Suggested discontinuing glipizide due to hypoglycemia risk and unpredictable eating. Family agreeable. Order received to discontinue.  Also discussed giving a dose of haldol every night to help with sleep since she responds well to it. Family agreeable to plan.   Also discussed that for patient's and caregiver's safety that patient may be safer to remain in bed.  insisted

## 2024-06-08 NOTE — HOSPICE
prn visit due to decline in patient status.  greeted nurse at door. He stated go on up she can't be left alone due wanting to get out of bed. He reports patient unable to support wt, increase weakness. He stated he unable to transfer to chair as patient unable to follow instructions. Reviewed for caring for bed bound patient, brief and fatou given during visit. incontinent care provided during visit. Patient with slurred words, speaking in word salad at time. Caregiver reported last medicating with dilaudid at 0900 for comfort. He reported giving neb tx just prior to nurse arrival. Reviewed prn medication for symptom management,  verbalize understanding. offer sue, decline at this time. Spoke with granddaughter Beatriz during visit to update on patient decline status. Reviewed POC with both  and granddaughter. MSW made visit this week and reviewed caregiver plan and possible placement. Encourage both  and grandaughter to call with any question or concerns. Will have order placed for hospital bed for Tuesday as granddaughter will be able to be present.
